# Patient Record
Sex: MALE | Race: WHITE | NOT HISPANIC OR LATINO | Employment: OTHER | URBAN - METROPOLITAN AREA
[De-identification: names, ages, dates, MRNs, and addresses within clinical notes are randomized per-mention and may not be internally consistent; named-entity substitution may affect disease eponyms.]

---

## 2017-01-02 ENCOUNTER — HOSPITAL ENCOUNTER (OUTPATIENT)
Dept: PHYSICAL THERAPY | Facility: CLINIC | Age: 52
Setting detail: THERAPIES SERIES
End: 2017-01-02
Attending: NEUROLOGICAL SURGERY
Payer: COMMERCIAL

## 2017-01-02 DIAGNOSIS — M47.812 OSTEOARTHRITIS OF CERVICAL SPINE, UNSPECIFIED SPINAL OSTEOARTHRITIS COMPLICATION STATUS: Primary | ICD-10-CM

## 2017-01-02 DIAGNOSIS — M54.2 CERVICALGIA: Primary | ICD-10-CM

## 2017-01-02 PROCEDURE — 40000718 ZZHC STATISTIC PT DEPARTMENT ORTHO VISIT: Performed by: PHYSICAL THERAPIST

## 2017-01-02 PROCEDURE — 97162 PT EVAL MOD COMPLEX 30 MIN: CPT | Mod: GP | Performed by: PHYSICAL THERAPIST

## 2017-01-02 PROCEDURE — 97140 MANUAL THERAPY 1/> REGIONS: CPT | Mod: GP | Performed by: PHYSICAL THERAPIST

## 2017-01-02 NOTE — PROGRESS NOTES
01/02/17 1456   General Information   Type of Visit Initial OP Ortho PT Evaluation   Start of Care Date 01/02/17   Referring Physician Dr. Tran   Patient/Family Goals Statement Get rid of pain.   Orders Evaluate and Treat   Date of Order 12/22/17   Insurance Type Other   Insurance Comments/Visits Authorized Preferred One   Medical Diagnosis Osteoarthritis of cervical spine, unsepcified spinal osteoarthritis complication satus.     Surgical/Medical history reviewed Yes   Weight-Bearing Status - LUE full weight-bearing   Weight-Bearing Status - RUE full weight-bearing       Present No   Body Part(s)   Body Part(s) Cervical Spine   Presentation and Etiology   Pertinent history of current problem (include personal factors and/or comorbidities that impact the POC) PMH:  Low back pain and bone on bone, 3 bulged disc in cervical spine.  In the summer time, he doesn't have as much pain in the neck.  Pt has been consistently having to take 2 pills but only lasting 5 hours later.  Pt also has the feet pain and having challenges standing for long periods of time.  Pt wants to focus on the cervical spine.  Only gets 5 hours of sleep at a time and feels like he is having troubles with pain when the neck feels swollen.  Pt states that he gets a tightness and swelling feeling that he gets lightheaded and dizzy.  Pt will also experience headaches on a regular basis.   Impairments A. Pain;C. Swelling;D. Decreased ROM   Functional Limitations perform activities of daily living   Symptom Location From C2-7   How/Where did it occur From Degenerative Joint Disease   Onset date of current episode/exacerbation 12/01/16   Chronicity Recurrent   Pain rating (0-10 point scale) Best (/10);Worst (/10)   Best (/10) 3/10   Worst (/10) 10/10   Pain quality C. Aching;B. Dull   Frequency of pain/symptoms A. Constant   Pain/symptoms are: Worse during the night   Pain/symptoms exacerbated by (Laying down)   Pain/symptoms eased  by (Codeine meds at night)   Progression of symptoms since onset: Worsened   Prior Level of Function   Prior Level of Function-Mobility Independent   Prior Level of Function-ADLs Independent   Current Level of Function   Current Community Support Family/friend caregiver   Patient role/employment history A. Employed   Employment Comments [employer]   [facility]   Home/community accessibility No concerns   Current equipment-Gait/Locomotion None   Current equipment-ADL None   Fall Risk Screen   Fall screen completed by PT   Per patient - Fall 2 or more times in past year? No   Per patient - Fall with injury in past year? No   Is patient a fall risk? No   Functional Scales   Functional Scales Other   Other Scales  NDI   Cervical Spine   Integumentary  No swelling.   Posture Forward head, rounded shoulders   Cervical Flexion ROM WNL   Cervical Extension ROM WNL   Cervical Right Side Bending ROM WNL   Cervical Left Side Bending ROM WNL   Cervical Right Rotation ROM WNL   Cervical Left Rotation ROM WNL   Shoulder AROM Screen WNL for all motions   Shoulder Shrug (C2-C4) Strength 5/5   Shoulder Abd (C5) Strength 5/5   Shoulder Add (C7) Strength 5/5   Shoulder ER (C5, C6) Strength 4+/5   Shoulder IR (C5, C6) Strength 5/5   Elbow Flexion (C5, C6) Strength 5/5   Elbow Extension (C7) Strength 5/5   Upper Trapezius Flexibility Increased tone and tightness   Levator Scapula Flexibility Increased tone and tightness   Scalene Flexibility Increased tone and tightness   Vertebral Artery Test Negative   Alar Ligament Test Negative   Transverse Ligament Test Negative   Spurling Test Negative   Cervical Distraction Test Negative   Cervical Rotation/Lateral Flexion Test Positive   Neer Impingement Test Negative   Cheng Impingement Test Negative   Segmental Mobility-Cervical Decreased PA mobility of C3-7.   Segmental Mobility-Thoracic Decreased PA mobility of T1-4.   Palpation Increased tone and tightness to  suboccipital region resulting in lightheadedness when palpating.  1st rib restrictions R > L.   Planned Therapy Interventions   Planned Therapy Interventions joint mobilization;manual therapy;neuromuscular re-education;ROM;strengthening;stretching   Planned Therapy Interventions Comment Skilled services to improve impairments and return pt to more functional mobility.   Planned Modality Interventions   Planned Modality Interventions Comments As needed for symptom relief.   Clinical Impression   Criteria for Skilled Therapeutic Interventions Met yes, treatment indicated   PT Diagnosis Cervical pain and postural weakness secondary to chronic issues of disc and nerve irritation.   Influenced by the following impairments Pain   Functional limitations due to impairments Sleeping   Clinical Presentation Evolving/Changing   Clinical Presentation Rationale Pt is a 51 year old male with complaints of neck pain especially at night.  Pt suffers from headaches, lightheadedness, and sleep disruption.  He has a history of low back and neck issues that reoccur.  When the neck flares up the back flares up and vice versa.  Pt will benefit from skilled PT services to address impairments of tissue and joint mobility to allow for improved posture and ROM.   Clinical Decision Making (Complexity) Moderate complexity   Therapy Frequency 2 times/Week   Predicted Duration of Therapy Intervention (days/wks) 8 weeks   Risk & Benefits of therapy have been explained Yes   Patient, Family & other staff in agreement with plan of care Yes   Education Assessment   Preferred Learning Style Listening;Reading;Demonstration;Pictures/video   Barriers to Learning No barriers   ORTHO GOALS   PT Ortho Eval Goals 1;2;3   Ortho Goal 1   Goal Identifier #1   Goal Description Pt will demonstrate ability to sleep for >5 hours for 3 nights a week demonstrating ability to get enough rest to be more functional throughout the day.   Target Date 03/03/17   Ortho  Goal 2   Goal Identifier #2   Goal Description Pt will report less than 1 headache a week with <2/10 pain allowing pt to stay focused on work and home duties.   Target Date 03/03/17   Ortho Goal 3   Goal Identifier #3   Goal Description Pt will report <20% on the NDI demonstrating improved functional mobility with less impairments.   Target Date 03/03/17   Total Evaluation Time   Total Evaluation Time 20     Thank you for your referral.    Kiera Morrison, PT, DPT  [facility]  [phone]

## 2017-01-02 NOTE — TELEPHONE ENCOUNTER
Tylenol #3      Last Written Prescription Date: 12/06/2016  Last Fill Quantity: 60,  # refills: 0   [place] [facility] [place] [facility] prescribing provider: 07/02/2015    Aura Erazo, Pharmacy Technician  [facility]  [phone]

## 2017-01-09 ENCOUNTER — HOSPITAL ENCOUNTER (OUTPATIENT)
Dept: PHYSICAL THERAPY | Facility: CLINIC | Age: 52
Setting detail: THERAPIES SERIES
End: 2017-01-09
Attending: NEUROLOGICAL SURGERY
Payer: COMMERCIAL

## 2017-01-09 PROCEDURE — 40000718 ZZHC STATISTIC PT DEPARTMENT ORTHO VISIT: Performed by: PHYSICAL THERAPIST

## 2017-01-09 PROCEDURE — 97140 MANUAL THERAPY 1/> REGIONS: CPT | Mod: GP | Performed by: PHYSICAL THERAPIST

## 2017-01-09 PROCEDURE — 97035 APP MDLTY 1+ULTRASOUND EA 15: CPT | Mod: GP | Performed by: PHYSICAL THERAPIST

## 2017-01-10 ENCOUNTER — TRANSFERRED RECORDS (OUTPATIENT)
Dept: HEALTH INFORMATION MANAGEMENT | Facility: CLINIC | Age: 52
End: 2017-01-10

## 2017-01-11 ENCOUNTER — HOSPITAL ENCOUNTER (OUTPATIENT)
Dept: PHYSICAL THERAPY | Facility: CLINIC | Age: 52
Setting detail: THERAPIES SERIES
End: 2017-01-11
Attending: NEUROLOGICAL SURGERY
Payer: COMMERCIAL

## 2017-01-11 PROCEDURE — 97140 MANUAL THERAPY 1/> REGIONS: CPT | Mod: GP | Performed by: PHYSICAL THERAPIST

## 2017-01-11 PROCEDURE — 40000718 ZZHC STATISTIC PT DEPARTMENT ORTHO VISIT: Performed by: PHYSICAL THERAPIST

## 2017-01-11 PROCEDURE — 97035 APP MDLTY 1+ULTRASOUND EA 15: CPT | Mod: GP | Performed by: PHYSICAL THERAPIST

## 2017-01-17 ENCOUNTER — HOSPITAL ENCOUNTER (OUTPATIENT)
Dept: PHYSICAL THERAPY | Facility: CLINIC | Age: 52
Setting detail: THERAPIES SERIES
End: 2017-01-17
Attending: NEUROLOGICAL SURGERY
Payer: COMMERCIAL

## 2017-01-17 PROCEDURE — 97140 MANUAL THERAPY 1/> REGIONS: CPT | Mod: GP

## 2017-01-17 PROCEDURE — 97035 APP MDLTY 1+ULTRASOUND EA 15: CPT | Mod: GP

## 2017-01-17 PROCEDURE — 97110 THERAPEUTIC EXERCISES: CPT | Mod: GP

## 2017-01-17 PROCEDURE — 40000718 ZZHC STATISTIC PT DEPARTMENT ORTHO VISIT

## 2017-01-19 ENCOUNTER — HOSPITAL ENCOUNTER (OUTPATIENT)
Dept: PHYSICAL THERAPY | Facility: CLINIC | Age: 52
Setting detail: THERAPIES SERIES
End: 2017-01-19
Attending: NEUROLOGICAL SURGERY
Payer: COMMERCIAL

## 2017-01-19 PROCEDURE — 40000718 ZZHC STATISTIC PT DEPARTMENT ORTHO VISIT

## 2017-01-19 PROCEDURE — 97140 MANUAL THERAPY 1/> REGIONS: CPT | Mod: GP

## 2017-01-19 PROCEDURE — 97110 THERAPEUTIC EXERCISES: CPT | Mod: GP

## 2017-01-19 PROCEDURE — 97035 APP MDLTY 1+ULTRASOUND EA 15: CPT | Mod: GP

## 2017-01-20 NOTE — ADDENDUM NOTE
Encounter addended by: Moon Jacinto, PT on: 1/20/2017  1:32 PM<BR>     Documentation filed: Inpatient Document Flowsheet

## 2017-01-24 NOTE — ADDENDUM NOTE
Encounter addended by: Moon Jacinto, PT on: 1/23/2017  9:01 PM<BR>     Documentation filed: Inpatient Document Flowsheet

## 2017-01-24 NOTE — ADDENDUM NOTE
Encounter addended by: Moon Jacinto, PT on: 1/23/2017  9:02 PM<BR>     Documentation filed: Episodes

## 2017-01-27 ENCOUNTER — TELEPHONE (OUTPATIENT)
Dept: FAMILY MEDICINE | Facility: CLINIC | Age: 52
End: 2017-01-27

## 2017-01-27 DIAGNOSIS — F41.9 ANXIETY: Primary | ICD-10-CM

## 2017-01-27 RX ORDER — DIAZEPAM 10 MG
10 TABLET ORAL EVERY 6 HOURS PRN
Qty: 2 TABLET | Refills: 0 | Status: SHIPPED | OUTPATIENT
Start: 2017-01-27 | End: 2018-11-13

## 2017-01-27 NOTE — TELEPHONE ENCOUNTER
Dr Robbins, TAMMYS had to renew his DESMOND number and has not received his new DESMOND number.  Bulmaro needs 1 Diazepam 10mg taken 1 hour prior to appointment for Monday morning appointment.  If you have any questions, Dr Robbins can be reached at [phone].  Will you approve this medication and send to [facility] pharmacy.  Bulmaro takes this with every dental appointment, so it is a refill.    Thank you,  Tara Seals [facility]  [phone]

## 2017-01-27 NOTE — TELEPHONE ENCOUNTER
Prescription signed and brought to the [facility].   Sole Owusu, FER

## 2017-01-31 ENCOUNTER — OFFICE VISIT (OUTPATIENT)
Dept: URGENT CARE | Facility: RETAIL CLINIC | Age: 52
End: 2017-01-31
Payer: COMMERCIAL

## 2017-01-31 ENCOUNTER — HOSPITAL ENCOUNTER (OUTPATIENT)
Dept: PHYSICAL THERAPY | Facility: CLINIC | Age: 52
Setting detail: THERAPIES SERIES
End: 2017-01-31
Attending: NEUROLOGICAL SURGERY
Payer: COMMERCIAL

## 2017-01-31 VITALS
DIASTOLIC BLOOD PRESSURE: 86 MMHG | OXYGEN SATURATION: 95 % | TEMPERATURE: 97.3 F | HEART RATE: 71 BPM | SYSTOLIC BLOOD PRESSURE: 120 MMHG

## 2017-01-31 DIAGNOSIS — J02.9 ACUTE PHARYNGITIS, UNSPECIFIED ETIOLOGY: Primary | ICD-10-CM

## 2017-01-31 DIAGNOSIS — J00 COMMON COLD: ICD-10-CM

## 2017-01-31 LAB — S PYO AG THROAT QL IA.RAPID: NORMAL

## 2017-01-31 PROCEDURE — 97140 MANUAL THERAPY 1/> REGIONS: CPT | Mod: GP | Performed by: PHYSICAL THERAPIST

## 2017-01-31 PROCEDURE — 40000718 ZZHC STATISTIC PT DEPARTMENT ORTHO VISIT: Performed by: PHYSICAL THERAPIST

## 2017-01-31 PROCEDURE — 99213 OFFICE O/P EST LOW 20 MIN: CPT | Performed by: NURSE PRACTITIONER

## 2017-01-31 PROCEDURE — 97035 APP MDLTY 1+ULTRASOUND EA 15: CPT | Mod: GP | Performed by: PHYSICAL THERAPIST

## 2017-01-31 PROCEDURE — 87880 STREP A ASSAY W/OPTIC: CPT | Mod: QW | Performed by: NURSE PRACTITIONER

## 2017-01-31 PROCEDURE — 87081 CULTURE SCREEN ONLY: CPT | Performed by: NURSE PRACTITIONER

## 2017-01-31 NOTE — PROGRESS NOTES
[facility] clinic note    SUBJECTIVE:  Bulmaro Herrera is a 51 year old male who presents to [facility] with chief complaint of sore throat.    Onset of symptoms was 5 day(s) ago.    Course of illness: sudden onset and some up & down.    Severity moderate  Course of illness:  Current and Associated symptoms: fever, sore throat and stomach ache  Treatment measures tried at home include OTC meds and Rest.  Predisposing factors include strep exposure.    Current Outpatient Prescriptions   Medication     acetaminophen-codeine (TYLENOL #3) 300-30 MG per tablet     diazepam (VALIUM) 10 MG tablet     albuterol (PROAIR HFA, PROVENTIL HFA, VENTOLIN HFA) 108 (90 BASE) MCG/ACT inhaler     omeprazole (PRILOSEC) 20 MG capsule     No current facility-administered medications for this visit.     PAST MEDICAL HISTORY:   Past Medical History   Diagnosis Date     Tobacco use disorder      Other and unspecified hyperlipidemia 1/2007     Depressive disorder, not elsewhere classified 1/28/2007     declines Rx     Back pains      Disc degeneration 12/2008     see MRI -throaco-lumbar mild discogenic degenerative changes     Sleep disorder 9/09     CPAP     Elevated LFT's 11/09     alt and ast       PAST SURGICAL HISTORY:   Past Surgical History   Procedure Laterality Date     C nonspecific procedure       rt hand surgery - laceration/glass/tendons     Colonoscopy  07/07/08     adenomatous polyp repeat 5 years     Hc echo heart xthoracic, stress/rest  6/2009     neg     Orthopedic surgery       Right hand     Tonsillectomy       Ent surgery       for deviated septum     Inject epidural cervical N/A 5/14/2015     Procedure: INJECT EPIDURAL CERVICAL;  Surgeon: Christian Chaudhry MD;  Location:  OR       FAMILY HISTORY:   Family History   Problem Relation Age of Onset     Breast Cancer Mother      Arthritis Mother      joint ?     Depression Father      CANCER Maternal Grandmother      CAMADO  Maternal Grandfather      Cardiovascular Maternal Grandfather      Gynecology Paternal Grandmother       giving birth     Prostate Cancer Paternal Grandfather      Genetic Disorder Brother      joint pain?     Depression Brother      Cardiovascular Son      congenital heart defect -       Prostate Cancer Other       age 70's     DIABETES Other        SOCIAL HISTORY:   Social History   Substance Use Topics     Smoking status: Former Smoker -- 1.50 packs/day     Quit date: 2007     Smokeless tobacco: Never Used     Alcohol Use: Yes      Comment: 3-4 times/year     ROS:  Review of systems negative except as stated above.    OBJECTIVE:   Filed Vitals:    17 0908   BP: 120/86   Pulse: 71   Temp: 97.3  F (36.3  C)   TempSrc: Oral   SpO2: 95%     GENERAL APPEARANCE: alert, active, mild distress and cooperative  EYES: EOMI,  PERRL, conjunctiva clear  HENT: ear canals and TM's normal.  Nose congested.  oral mucous membranes moist, no erythema noted  NECK: supple, non-tender to palpation, no adenopathy noted  RESP: lungs clear to auscultation - no rales, rhonchi or wheezes  CV: regular rates and rhythm, normal S1 S2, no murmur noted  ABDOMEN:  soft, nontender, no HSM or masses and bowel sounds normal  SKIN: no suspicious lesions or rashes    Rapid Strep test is negative; await throat culture results.    ASSESSMENT:  Sore Throat J02.9/Acute pharyngitis, unspecified etiology [J02.9]  Common cold [J00]    PLAN:   [facility] not [facility] [phone]  Encourage good hydration (mainly water), may drink tea /c honey, warm chicken broth to sooth throat.  Soft foods may be preferred for several days.  Symptomatic treatment with warm Na+ H2O gargles, OTC analgesic, etc. discussed.   Strep culture pending.   Bulmaro Herrera told positive cultures called only.  Rest as needed.  Follow-up with primary care provider if not improving.    If difficulty breathing or  swallowing be seen immediately in the ED.    Jeramy CHANEY, MSN, Family NP-C  [facility]

## 2017-01-31 NOTE — MR AVS SNAPSHOT
After Visit Summary   1/31/2017    Bulmaro Herrera    MRN: [id number]           Patient Information     Date Of Birth          1965        Visit Information        Provider Department      1/31/2017 9:00 AM Jreamy Grissom APRN [facility]        Today's Diagnoses     Acute pharyngitis, unspecified etiology    -  1     Common cold            Follow-ups after your visit        Your next 10 appointments already scheduled     Jan 31, 2017  4:45 PM   Treatment 45 with Kiera Morrison, PT   [facility] ([facility])    [address] [place] [id number] [phone]            Feb 03, 2017  4:15 PM   Treatment 45 with Kiera Morrison, PT   [facility] ([facility])    [address] [place] [id number] [phone]              Who to contact     You can reach your care team any time of the day by calling [phone].  Notification of test results:  If you have an abnormal lab result, we will notify you by phone as soon as possible.         Additional Information About Your Visit        MyChart Information     [employer]Spring gives you secure access to your electronic health record. If you see a primary care provider, you can also send messages to your care team and make appointments. If you have questions, please call your primary care clinic.  If you do not have a primary care provider, please call [phone] and they will assist you.        Care EveryWhere ID     This is your Care EveryWhere ID. This could be used by other organizations to access your [place] medical records  [id number]        Your Vitals Were     Pulse Temperature Pulse Oximetry             71 97.3  F (36.3  C) (Oral) 95%          Blood Pressure from Last 3 Encounters:   01/31/17 120/86   09/25/16 127/94   06/19/16 132/88    Weight from Last 3 Encounters:   12/22/16 237 lb (107.502 kg)   12/22/16 237 lb (107.502 kg)    09/25/16 230 lb (104.327 kg)              We Performed the Following     BETA STREP GROUP A R/O CULTURE     RAPID STREP SCREEN        Primary Care Provider Office Phone # Fax #    Roger Rosado [phone]       [address] [place] [address]        Thank you!     Thank you for choosing [facility]  for your care. Our goal is always to provide you with excellent care. Hearing back from our patients is one way we can continue to improve our services. Please take a few minutes to complete the written survey that you may receive in the mail after your visit with us. Thank you!             Your Updated Medication List - Protect others around you: Learn how to safely use, store and throw away your medicines at www.disposemymeds.org.          This list is accurate as of: 1/31/17  9:38 AM.  Always use your most recent med list.                   Brand Name Dispense Instructions for use    acetaminophen-codeine 300-30 MG per tablet    TYLENOL #3    60 tablet    Take 1 tablet by mouth every 4 hours as needed for moderate pain       albuterol 108 (90 BASE) MCG/ACT Inhaler    PROAIR HFA/PROVENTIL HFA/VENTOLIN HFA    1 Inhaler    Inhale 2 puffs into the lungs every 6 hours as needed for shortness of breath / dyspnea or wheezing       diazepam 10 MG tablet    VALIUM    2 tablet    Take 1 tablet (10 mg) by mouth every 6 hours as needed for anxiety or sleep (Before appointment) Take 30-60 minutes before procedure.  Do not operate a vehicle after taking this medication.       omeprazole 20 MG CR capsule    priLOSEC    180 capsule    Take 2 capsules (40 mg) by mouth daily

## 2017-02-02 LAB — BETA STREP CONFIRM: NORMAL

## 2017-02-03 ENCOUNTER — HOSPITAL ENCOUNTER (OUTPATIENT)
Dept: PHYSICAL THERAPY | Facility: CLINIC | Age: 52
Setting detail: THERAPIES SERIES
End: 2017-02-03
Attending: NEUROLOGICAL SURGERY
Payer: COMMERCIAL

## 2017-02-03 PROCEDURE — 97035 APP MDLTY 1+ULTRASOUND EA 15: CPT | Mod: GP | Performed by: PHYSICAL THERAPIST

## 2017-02-03 PROCEDURE — 40000718 ZZHC STATISTIC PT DEPARTMENT ORTHO VISIT: Performed by: PHYSICAL THERAPIST

## 2017-02-03 PROCEDURE — 97140 MANUAL THERAPY 1/> REGIONS: CPT | Mod: GP | Performed by: PHYSICAL THERAPIST

## 2017-02-06 DIAGNOSIS — M54.2 CERVICALGIA: Primary | ICD-10-CM

## 2017-02-07 NOTE — TELEPHONE ENCOUNTER
Tylenol #3      Last Written Prescription Date: 01/02/2017  Last Fill Quantity: 60,  # refills: 0   Last Office Visit with G, P or [facility] prescribing provider: 02/18/2016    Aura Erazo, Pharmacy Technician  [facility]  [phone]

## 2017-02-14 ENCOUNTER — HOSPITAL ENCOUNTER (OUTPATIENT)
Dept: PHYSICAL THERAPY | Facility: CLINIC | Age: 52
Setting detail: THERAPIES SERIES
End: 2017-02-14
Attending: NEUROLOGICAL SURGERY
Payer: COMMERCIAL

## 2017-02-14 PROCEDURE — 97035 APP MDLTY 1+ULTRASOUND EA 15: CPT | Mod: GP | Performed by: PHYSICAL THERAPIST

## 2017-02-14 PROCEDURE — 40000718 ZZHC STATISTIC PT DEPARTMENT ORTHO VISIT: Performed by: PHYSICAL THERAPIST

## 2017-02-14 PROCEDURE — 97140 MANUAL THERAPY 1/> REGIONS: CPT | Mod: GP | Performed by: PHYSICAL THERAPIST

## 2017-03-10 ENCOUNTER — MYC REFILL (OUTPATIENT)
Dept: FAMILY MEDICINE | Facility: CLINIC | Age: 52
End: 2017-03-10

## 2017-03-10 DIAGNOSIS — M54.2 CERVICALGIA: ICD-10-CM

## 2017-03-10 NOTE — TELEPHONE ENCOUNTER
Message from [employer]:  Original authorizing provider: MD Bulmaro Patrick would like a refill of the following medications:  acetaminophen-codeine (TYLENOL #3) 300-30 MG per tablet [Roger Rosado MD]    Preferred pharmacy: [address]     Comment:  refill #[id number] thank you

## 2017-03-10 NOTE — TELEPHONE ENCOUNTER
TYLENOL #3      Last Written Prescription Date:  2/7/17  Last Fill Quantity: 60,   # refills: 0  [place] [facility] [facility] or [facility] prescribing provider: 12/22/16  Future Office visit:       Routing refill request to provider for review/approval because:  Drug not on the [facility] [facility] or [facility] refill protocol or controlled substance

## 2017-03-21 ENCOUNTER — THERAPY VISIT (OUTPATIENT)
Dept: CHIROPRACTIC MEDICINE | Facility: CLINIC | Age: 52
End: 2017-03-21
Payer: COMMERCIAL

## 2017-03-21 DIAGNOSIS — M51.369 DDD (DEGENERATIVE DISC DISEASE), LUMBAR: Primary | ICD-10-CM

## 2017-03-21 DIAGNOSIS — M99.01 SEGMENTAL DYSFUNCTION OF CERVICAL REGION: ICD-10-CM

## 2017-03-21 DIAGNOSIS — M99.02 SEGMENTAL DYSFUNCTION OF THORACIC REGION: ICD-10-CM

## 2017-03-21 DIAGNOSIS — M99.03 SEGMENTAL DYSFUNCTION OF LUMBAR REGION: ICD-10-CM

## 2017-03-21 DIAGNOSIS — M54.2 CERVICALGIA: ICD-10-CM

## 2017-03-21 DIAGNOSIS — M50.20 HERNIATION OF INTERVERTEBRAL DISC OF CERVICAL SPINE WITHOUT RADICULOPATHY: ICD-10-CM

## 2017-03-21 PROCEDURE — 97035 APP MDLTY 1+ULTRASOUND EA 15: CPT | Performed by: CHIROPRACTOR

## 2017-03-21 PROCEDURE — 98941 CHIROPRACT MANJ 3-4 REGIONS: CPT | Mod: AT | Performed by: CHIROPRACTOR

## 2017-03-21 PROCEDURE — 99203 OFFICE O/P NEW LOW 30 MIN: CPT | Mod: 25 | Performed by: CHIROPRACTOR

## 2017-03-21 NOTE — MR AVS SNAPSHOT
After Visit Summary   3/21/2017    Bulmaro Herrera    MRN: [id number]           Patient Information     Date Of Birth          1965        Visit Information        Provider Department      3/21/2017 7:15 AM Verito Candelario DC [facility]        Today's Diagnoses     DDD (degenerative disc disease), lumbar    -  1    Herniation of intervertebral disc of cervical spine without radiculopathy        Segmental dysfunction of cervical region        Cervicalgia        Segmental dysfunction of thoracic region        Segmental dysfunction of lumbar region           Follow-ups after your visit        Your next 10 appointments already scheduled     Mar 24, 2017  7:15 AM CDT   [facility] Chiropractor with Verito Candelario DC   [facility] ([facility])    [address] [id number] [phone]              Who to contact     If you have questions or need follow up information about today's clinic visit or your schedule please contact [facility] directly at [phone].  Normal or non-critical lab and imaging results will be communicated to you by T[employer], letter or phone within 4 business days after the clinic has received the results. If you do not hear from us within 7 days, please contact the clinic through FameBit or phone. If you have a critical or abnormal lab result, we will notify you by phone as soon as possible.  Submit refill requests through FameBit or call your pharmacy and they will forward the refill request to us. Please allow 3 business days for your refill to be completed.          Additional Information About Your Visit        T[employer] Information     [employer] gives you secure access to your electronic health record. If you see a primary care provider, you can also send messages to your care team and make appointments. If you have questions, please call your primary care clinic.  If you do not have  a primary care provider, please call [phone] and they will assist you.        Care EveryWhere ID     This is your Care EveryWhere ID. This could be used by other organizations to access your [place] medical records  [id number]         Blood Pressure from Last 3 Encounters:   01/31/17 120/86   09/25/16 (!) 127/94   06/19/16 132/88    Weight from Last 3 Encounters:   12/22/16 107.5 kg (237 lb)   12/22/16 107.5 kg (237 lb)   09/25/16 104.3 kg (230 lb)              We Performed the Following     CHIROPRAC MANIP,SPINAL,3-4 REGIONS     OFFICE/OUTPT VISIT,NEW,LEVL III     ULTRASOUND THERAPY        Primary Care Provider Office Phone # Fax #    Roger Rosado [phone]       [address] [place] [address]        Thank you!     Thank you for choosing [facility]  for your care. Our goal is always to provide you with excellent care. Hearing back from our patients is one way we can continue to improve our services. Please take a few minutes to complete the written survey that you may receive in the mail after your visit with us. Thank you!             Your Updated Medication List - Protect others around you: Learn how to safely use, store and throw away your medicines at www.disposemymeds.org.          This list is accurate as of: 3/21/17  9:10 AM.  Always use your most recent med list.                   Brand Name Dispense Instructions for use    acetaminophen-codeine 300-30 MG per tablet    TYLENOL #3    60 tablet    Take 1 tablet by mouth every 4 hours as needed for moderate pain       albuterol 108 (90 BASE) MCG/ACT Inhaler    PROAIR HFA/PROVENTIL HFA/VENTOLIN HFA    1 Inhaler    Inhale 2 puffs into the lungs every 6 hours as needed for shortness of breath / dyspnea or wheezing       diazepam 10 MG tablet    VALIUM    2 tablet    Take 1 tablet (10 mg) by mouth every 6 hours as needed for anxiety or sleep (Before appointment) Take  30-60 minutes before procedure.  Do not operate a vehicle after taking this medication.       omeprazole 20 MG CR capsule    priLOSEC    180 capsule    Take 2 capsules (40 mg) by mouth daily

## 2017-03-21 NOTE — PROGRESS NOTES
"Chiropractic Clinic Visit    PCP: Roger Rosado Sharon is a 51 year old male who is seen as a self referral presenting with neck pain that began a \"long time ago.\" He also has lower back pain that is what started it all. He takes pain killers every night to sleep. He describes numbness in the bottom of bottom feet, all the time. He is in so much pain in his feet that he can hardly stand on his feet anymore to work. After he got inserts for his shoes, his LBP has improved. His neck constantly hurts, it feels like a toothache. The pain is right at the CT junction, and is rated 6/10. He has been to a chiropractor in the past, and the Ultrasound immediately \"fixes the pain.\" He denies radiation into his arms and legs. He does note \"little muscle quivers all the time.\" He states that when he is sitting he really notices the pain and \"is a big baby,\" but when he is on his feet he just plows thru. He notes that his pain doesn't bother him when he is driving. He notices his pain more when he is standing, and his neck when he is relaxing in bed. He notes that he has tried every pillow possible, and he changes them each night. He has seen [facility] and Dr. Tran for both of his pain, he started PT and that has helped some for 2-3 days of relief. He denies headaches. He does get lightheaded from time to time, mostly in the winter. He notes that in the summer time, his pain is more minimal but in the winter, it is much worse. He takes a Benadryl, ibuprofen, and Tylenol 3 every night to sleep. Patient notes that he was last adjusted last Saturday but states he never has time to get adjusted. Patient notes he has noticed some changes in his left eye, and has balance issues from time to time. His muscle twitches happen in his arms about once per day. Patient does not prefer P to A adjustment in lumbar region.    Injury: 1988: slipped on belly dump, hit left shoulder, heard crack, neck has never been " same    Location of Pain: central CT junction, but more painful on the left side  Duration of Pain: always had neck and back pain, gotten worse in the last 6 years  Rating of Pain at worst: 10/10 at night  Rating of Pain Currently: 6/10 on average  Symptoms are better with: US, heating pad  Symptoms are worse with: laying down  Additional Features: numbness in bilateral feet       Health History  as reported by the patient:    How does the patient rate their own health:   Fair    Current or past medical history:   Dizziness/Concussions, Numbness/tingling, Pain at night/rest and Weakness    Medical allergies:  Percocet  Wasps    Past Traumas/Surgeries:  1988: fall    Family History:  Father, grandfather - all have bad backs  Mother - breast cancer  Grandfather - prostate cancer    Medications:  Pain     Occupation:      Primary job tasks:   Driving, Prolonged sitting, Prolonged standing and Pushing/pulling    Barriers as home/work:   Patient notes that when he is working on his feet, he oftentimes has to get a 6-pack of beer to manage it.           Review of Systems  Musculoskeletal: as above  Remainder of review of systems is negative including constitutional, CV, pulmonary, GI, Skin and Neurologic except as noted in HPI or medical history.    Past Medical History   Diagnosis Date     Back pains      Depressive disorder, not elsewhere classified 1/28/2007     declines Rx     Disc degeneration 12/2008     see MRI -throaco-lumbar mild discogenic degenerative changes     Elevated LFT's 11/09     alt and ast     Other and unspecified hyperlipidemia 1/2007     Sleep disorder 9/09     CPAP     Tobacco use disorder      Past Surgical History   Procedure Laterality Date     C nonspecific procedure       rt hand surgery - laceration/glass/tendons     Colonoscopy  07/07/08     adenomatous polyp repeat 5 years     Hc echo heart xthoracic, stress/rest  6/2009     neg     Orthopedic surgery       Right hand      Tonsillectomy       Ent surgery       for deviated septum     Inject epidural cervical N/A 5/14/2015     Procedure: INJECT EPIDURAL CERVICAL;  Surgeon: Christian Chaudhry MD;  Location: [facility]       Objective  There were no vitals taken for this visit.    GENERAL APPEARANCE: healthy, alert and no distress   GAIT: NORMAL  SKIN: no suspicious lesions or rashes  NEURO: Normal strength and tone, mentation intact and speech normal  PSYCH:  mentation appears normal and affect normal/bright    Bulmaro was asked to complete the Neck Disability Index, today in the office. NDI Disability score: 38%; pain severity scale: 6/10. Oswestry: 30%. Ivan: 8;5.      Cervical Spine Exam    Range of Motion:         Flexion, RR mildly restricted, Extension, RLF, LLF and LR moderately reduced    Inspection:         No visible deformity        normal lordotic curvature maintained    Tender:        upper border of trapezius      Muscle strength:       C5 (shoulder abduction) symmetric 5/5       C6 (elbow flexion) symmetric 5/5       C7 (elbow extension) symmetric 5/5       C8 (finger abduction, thumb flexion) symmetric 5/5    Reflexes:        C5 (biceps) symmetric normal       C6 (supinator) symmetric normal       C7 (triceps) symmetric normal    Sensation:       grossly intact througout bilateral upper extremities    Special Tests:       neg (-) Spurling  Sb's- positive, Distraction - positive and Shoulder depression - Right negative and Left negative    Lymphatics:        Skin turns hyperemic very quickly       Segmental spinal dysfunction/restrictions found at:  C2 RR, LRR  C5 LR, RRR  T1 RR, LRR  T3 E, FR  T7 E, FR  L1 Extension restriction and Flexion restriction  L2 Extension restriction and Flexion restriction  L3 Extension restriction and Flexion restriction  L4 Extension restriction and Flexion restriction  L5 Extension restriction and Flexion restriction.        Muscle spasm found in:Traps      Radiology:  MRIs date 1/10/2017 from  [facility] show severe disk degeneration at L5-S1 and C5-R with mild central canal stenosis.    Assessment:    No diagnosis found.    RX ordered/plan of care: Degenerative changes in cervical and lumbar spine with associated myospasm and intersegmental dysfunction.  Anticipated outcomes: Patient is expected to receive benefit with care.  Possible risks and side effects: Minimal soreness expected post-adjustment.    After discussing the risk and benefits of care, patient consented to treatment.    Patient's condition:  Patient had restrictions pre-manipulation and Patient had decreased motion prior to manipulation    Treatment effectiveness:  Post manipulation there is better intersegmental movement and Patient claims to feel looser post manipulation    Plan:    Procedures:    Evaluation and Management:  61446 Moderate level exam 30 min    CMT:  21883 Chiropractic manipulative treatment 3-4 regions performed   Cervical: Diversified, C2, C5 , Supine  Thoracic: Diversified, T1, T3, T7, Prone  Lumbar: Flexion/Distraction, L1, L2, L3, L4, L5, Prone    Modalities:  27335: US:  1.0 Lane/cm squared for 8 minutes at 1.0mhz  Continuous  pulsed, Location: bilateral upper traps    Therapeutic procedures:  None      Prognosis: Good      Treatment plan and goals:  Goals:  Reduce numbness and tingling into bilateral feet.  Decrease pain in neck and shoulders from 6-10/10 to 4/10 in 4 visits.  Decrease Neck Disability from 38% to 20% in 4 visits.    Frequency of care  Duration of care is estimated to be 4 weeks, from the initial treatment.  It is estimated that the patient will need a total of 8 visits to resolve this episode.  For the initial therapeutic trial of care, the frequency is recommended at 1-2 times per week.  A reevaluation would be clinically appropriate in 8 visits, to determine progress and further course of care.    In-Office Treatment  Spinal Chiropractic Manipulative Therapy:    Trial of care - re-evaluate after 8  visits.      Recommendations:    Instructions:ice 20 minutes every other hour as needed and stretch as instructed at visit    Follow-up:  Return to care Friday. Patient stated when he left that he had no numbness in his feet, and his neck pain had improved.     Disclaimer: This note consists of symbols derived from keyboarding, dictation and/or voice recognition software. As a result, there may be errors in the script that have gone undetected. Please consider this when interpreting information found in this chart.

## 2017-03-24 ENCOUNTER — THERAPY VISIT (OUTPATIENT)
Dept: CHIROPRACTIC MEDICINE | Facility: CLINIC | Age: 52
End: 2017-03-24
Payer: COMMERCIAL

## 2017-03-24 DIAGNOSIS — M99.03 SEGMENTAL DYSFUNCTION OF LUMBAR REGION: ICD-10-CM

## 2017-03-24 DIAGNOSIS — M99.01 SEGMENTAL DYSFUNCTION OF CERVICAL REGION: ICD-10-CM

## 2017-03-24 DIAGNOSIS — M99.02 SEGMENTAL DYSFUNCTION OF THORACIC REGION: ICD-10-CM

## 2017-03-24 DIAGNOSIS — M51.369 DDD (DEGENERATIVE DISC DISEASE), LUMBAR: ICD-10-CM

## 2017-03-24 DIAGNOSIS — M54.2 CERVICALGIA: ICD-10-CM

## 2017-03-24 DIAGNOSIS — M50.20 HERNIATION OF INTERVERTEBRAL DISC OF CERVICAL SPINE WITHOUT RADICULOPATHY: ICD-10-CM

## 2017-03-24 PROCEDURE — 98940 CHIROPRACT MANJ 1-2 REGIONS: CPT | Mod: AT | Performed by: CHIROPRACTOR

## 2017-03-24 PROCEDURE — 97035 APP MDLTY 1+ULTRASOUND EA 15: CPT | Performed by: CHIROPRACTOR

## 2017-03-24 NOTE — PROGRESS NOTES
Visit #:  2 of 8 based on treatment plan 3/21/2017    Subjective:  Bulmaro Herrera is a 51 year old male who is seen in f/u up for:        Herniation of intervertebral disc of cervical spine without radiculopathy  Segmental dysfunction of cervical region  Segmental dysfunction of thoracic region  Segmental dysfunction of lumbar region  Cervicalgia  DDD (degenerative disc disease), lumbar.     Since last visit on 3/21/2017,  Bulmaro Herrera reports the following changes: Patient presents and states that his neck felt pretty good when he left here, but after a couple days he is right back to where he was in his neck. His feet weren't hurting as bad either. He notes that he just concentrating on his work, and not his pain, but when we asked him today how he was, he knew he had improved. He rates his current neck pain 6/10. His sleep was about the same, his pain bugs him the most at night.          Objective:  The following was observed:    P: pain elicited on palpation, bilateral upper traps    A: static palpation demonstrates intersegmental asymmetry, as noted    R: motion palpation notes restricted motion    T: localized muscle spasm at: Traps Bilaterally      Assessment:    Segmental spinal dysfunction/restrictions found at:  C2   C5   T1   T5    Diagnoses:      1. Herniation of intervertebral disc of cervical spine without radiculopathy    2. Segmental dysfunction of cervical region    3. Segmental dysfunction of thoracic region    4. Segmental dysfunction of lumbar region    5. Cervicalgia    6. DDD (degenerative disc disease), lumbar        Patient's condition:  Patient had restrictions pre-manipulation and Patient had decreased motion prior to manipulation    Treatment effectiveness:  Post manipulation there is better intersegmental movement and Patient claims to feel looser post manipulation      Procedures:  CMT:  98321 Chiropractic manipulative treatment 1-2 regions performed   Cervical: Diversified, C2, C5 ,  Supine  Thoracic: Diversified, T1, T5, Prone    Modalities:  13906: US:  1.0 Lane/cm squared for 8 minutes at 1.0mhz  continuous pulsed, Location: upper traps    Therapeutic procedures:  None      Prognosis: Good    Progress towards Goals: Patient is making progress towards the goal of:   Reduce numbness and tingling into bilateral feet.  Decrease pain in neck and shoulders from 6-10/10 to 4/10 in 4 visits.  Decrease Neck Disability from 38% to 20% in 4 visits.    Response to Treatment:   Reduction of symptoms overall      Recommendations:    Instructions:ice 20 minutes every other hour as needed and stretch as instructed at visit    Follow-up:  Return to care next week.

## 2017-03-24 NOTE — MR AVS SNAPSHOT
After Visit Summary   3/24/2017    Bulmaro Herrera    MRN: [id number]           Patient Information     Date Of Birth          1965        Visit Information        Provider Department      3/24/2017 7:15 AM Verito Candelario DC [facility]        Today's Diagnoses     Herniation of intervertebral disc of cervical spine without radiculopathy        Segmental dysfunction of cervical region        Segmental dysfunction of thoracic region        Segmental dysfunction of lumbar region        Cervicalgia        DDD (degenerative disc disease), lumbar           Follow-ups after your visit        Who to contact     If you have questions or need follow up information about today's clinic visit or your schedule please contact [facility] directly at [phone].  Normal or non-critical lab and imaging results will be communicated to you by Neurologixhart, letter or phone within 4 business days after the clinic has received the results. If you do not hear from us within 7 days, please contact the clinic through Neurologixhart or phone. If you have a critical or abnormal lab result, we will notify you by phone as soon as possible.  Submit refill requests through Tilera or call your pharmacy and they will forward the refill request to us. Please allow 3 business days for your refill to be completed.          Additional Information About Your Visit        MyChart Information     Tilera gives you secure access to your electronic health record. If you see a primary care provider, you can also send messages to your care team and make appointments. If you have questions, please call your primary care clinic.  If you do not have a primary care provider, please call [phone] and they will assist you.        Care EveryWhere ID     This is your Care EveryWhere ID. This could be used by other organizations to access your [facility] medical records  [id number]         Blood Pressure  from Last 3 Encounters:   01/31/17 120/86   09/25/16 (!) 127/94   06/19/16 132/88    Weight from Last 3 Encounters:   12/22/16 107.5 kg (237 lb)   12/22/16 107.5 kg (237 lb)   09/25/16 104.3 kg (230 lb)              We Performed the Following     CHIROPRAC MANIP,SPINAL,1-2 REGIONS     ULTRASOUND THERAPY        Primary Care Provider Office Phone # Fax #    Roger Rosado [phone]       [address]   [place] [facility] [place] [address]        Thank you!     Thank you for choosing [facility]  for your care. Our goal is always to provide you with excellent care. Hearing back from our patients is one way we can continue to improve our services. Please take a few minutes to complete the written survey that you may receive in the mail after your visit with us. Thank you!             Your Updated Medication List - Protect others around you: Learn how to safely use, store and throw away your medicines at www.disposemymeds.org.          This list is accurate as of: 3/24/17  7:41 AM.  Always use your most recent med list.                   Brand Name Dispense Instructions for use    acetaminophen-codeine 300-30 MG per tablet    TYLENOL #3    60 tablet    Take 1 tablet by mouth every 4 hours as needed for moderate pain       albuterol 108 (90 BASE) MCG/ACT Inhaler    PROAIR HFA/PROVENTIL HFA/VENTOLIN HFA    1 Inhaler    Inhale 2 puffs into the lungs every 6 hours as needed for shortness of breath / dyspnea or wheezing       diazepam 10 MG tablet    VALIUM    2 tablet    Take 1 tablet (10 mg) by mouth every 6 hours as needed for anxiety or sleep (Before appointment) Take 30-60 minutes before procedure.  Do not operate a vehicle after taking this medication.       omeprazole 20 MG CR capsule    priLOSEC    180 capsule    Take 2 capsules (40 mg) by mouth daily

## 2017-03-27 ENCOUNTER — THERAPY VISIT (OUTPATIENT)
Dept: CHIROPRACTIC MEDICINE | Facility: CLINIC | Age: 52
End: 2017-03-27
Payer: COMMERCIAL

## 2017-03-27 DIAGNOSIS — M51.369 DDD (DEGENERATIVE DISC DISEASE), LUMBAR: ICD-10-CM

## 2017-03-27 DIAGNOSIS — M99.03 SEGMENTAL DYSFUNCTION OF LUMBAR REGION: ICD-10-CM

## 2017-03-27 DIAGNOSIS — M50.20 HERNIATION OF INTERVERTEBRAL DISC OF CERVICAL SPINE WITHOUT RADICULOPATHY: ICD-10-CM

## 2017-03-27 DIAGNOSIS — M99.02 SEGMENTAL DYSFUNCTION OF THORACIC REGION: ICD-10-CM

## 2017-03-27 DIAGNOSIS — M99.01 SEGMENTAL DYSFUNCTION OF CERVICAL REGION: ICD-10-CM

## 2017-03-27 DIAGNOSIS — M54.2 CERVICALGIA: ICD-10-CM

## 2017-03-27 PROCEDURE — 97035 APP MDLTY 1+ULTRASOUND EA 15: CPT | Performed by: CHIROPRACTOR

## 2017-03-27 PROCEDURE — 98940 CHIROPRACT MANJ 1-2 REGIONS: CPT | Mod: AT | Performed by: CHIROPRACTOR

## 2017-03-27 NOTE — PROGRESS NOTES
Visit #:  3 of 8 based on treatment plan 3/21/2017    Subjective:  Bulmaro Herrera is a 51 year old male who is seen in f/u up for:        Herniation of intervertebral disc of cervical spine without radiculopathy  Segmental dysfunction of cervical region  Segmental dysfunction of thoracic region  Segmental dysfunction of lumbar region  Cervicalgia  DDD (degenerative disc disease), lumbar.     Since last visit on 3/24/2017,  Bulmaro Herrera reports the following changes: Patient presents and states that he is feeling dizzy today, and he feels like it is related to his neck. He felt zingers in his right mastoid area yesterday. He feels like US would really help in his upper neck today. He rates his pain 6/10. He notices the dizziness the most.         Objective:  The following was observed:    P: pain elicited on palpation, bilateral upper traps    A: static palpation demonstrates intersegmental asymmetry, as noted    R: motion palpation notes restricted motion    T: localized muscle spasm at: Traps Bilaterally      Assessment:    Segmental spinal dysfunction/restrictions found at:  C1 RR,  LRR  C5 LR, RRR  T1 RR, LRR  T5 E, FR    Diagnoses:      1. Herniation of intervertebral disc of cervical spine without radiculopathy    2. Segmental dysfunction of cervical region    3. Segmental dysfunction of thoracic region    4. Segmental dysfunction of lumbar region    5. Cervicalgia    6. DDD (degenerative disc disease), lumbar        Patient's condition:  Patient had restrictions pre-manipulation and Patient had decreased motion prior to manipulation    Treatment effectiveness:  Post manipulation there is better intersegmental movement and Patient claims to feel looser post manipulation      Procedures:  CMT:  93003 Chiropractic Treatment  Cervical: Diversified, C1, C5, Supine  Thoracic: Diversified, T1, T5, Prone    Modalities:  18174: US:  1.0 Lane/cm squared for 8 minutes at 1.0mhz  continuous pulsed, Location: Right upper  traps to right SCM    Therapeutic procedures:  None      Prognosis: Good    Progress towards Goals: Patient is making progress towards the goal of:   Reduce numbness and tingling into bilateral feet.  Decrease pain in neck and shoulders from 6-10/10 to 4/10 in 4 visits.  Decrease Neck Disability from 38% to 20% in 4 visits.    Response to Treatment:   Reduction of symptoms overall      Recommendations:    Instructions:ice 20 minutes every other hour as needed and stretch as instructed at visit    Follow-up:  Return to care tomorrow.

## 2017-03-27 NOTE — MR AVS SNAPSHOT
After Visit Summary   3/27/2017    Bulmaro Herrera    MRN: [id number]           Patient Information     Date Of Birth          1965        Visit Information        Provider Department      3/27/2017 2:00 PM Verito Candelario DC [facility]        Today's Diagnoses     Herniation of intervertebral disc of cervical spine without radiculopathy        Segmental dysfunction of cervical region        Segmental dysfunction of thoracic region        Segmental dysfunction of lumbar region        Cervicalgia        DDD (degenerative disc disease), lumbar           Follow-ups after your visit        Your next 10 appointments already scheduled     Mar 27, 2017  2:00 PM CDT   AIDAN Chiropractor with Verito Candelario DC   [facility] ([facility])    [address] [place] [address] [id number] [phone]            Mar 28, 2017  8:15 AM CDT   AIDAN Chiropractor with Verito Candelario DC   [facility] ([facility])    [address] [place] [address] [id number] [phone]              Who to contact     If you have questions or need follow up information about today's clinic visit or your schedule please contact [facility] directly at [phone].  Normal or non-critical lab and imaging results will be communicated to you by Infoharmonihart, letter or phone within 4 business days after the clinic has received the results. If you do not hear from us within 7 days, please contact the clinic through Infoharmonihart or phone. If you have a critical or abnormal lab result, we will notify you by phone as soon as possible.  Submit refill requests through [employer] or call your pharmacy and they will forward the refill request to us. Please allow 3 business days for your refill to be completed.          Additional Information About Your Visit        [email or web address] Information     [employer] gives you secure access to your electronic  health record. If you see a primary care provider, you can also send messages to your care team and make appointments. If you have questions, please call your primary care clinic.  If you do not have a primary care provider, please call [phone] and they will assist you.        Care EveryWhere ID     This is your Care EveryWhere ID. This could be used by other organizations to access your [facility] medical records  [id number]         Blood Pressure from Last 3 Encounters:   01/31/17 120/86   09/25/16 (!) 127/94   06/19/16 132/88    Weight from Last 3 Encounters:   12/22/16 107.5 kg (237 lb)   12/22/16 107.5 kg (237 lb)   09/25/16 104.3 kg (230 lb)              We Performed the Following     CHIROPRAC MANIP,SPINAL,1-2 REGIONS     ULTRASOUND THERAPY        Primary Care Provider Office Phone # Fax #    Roger Rosado [phone]       [address]   [facility] [address]        Thank you!     Thank you for choosing [facility]  for your care. Our goal is always to provide you with excellent care. Hearing back from our patients is one way we can continue to improve our services. Please take a few minutes to complete the written survey that you may receive in the mail after your visit with us. Thank you!             Your Updated Medication List - Protect others around you: Learn how to safely use, store and throw away your medicines at www.disposemymeds.org.          This list is accurate as of: 3/27/17 12:58 PM.  Always use your most recent med list.                   Brand Name Dispense Instructions for use    acetaminophen-codeine 300-30 MG per tablet    TYLENOL #3    60 tablet    Take 1 tablet by mouth every 4 hours as needed for moderate pain       albuterol 108 (90 BASE) MCG/ACT Inhaler    PROAIR HFA/PROVENTIL HFA/VENTOLIN HFA    1 Inhaler    Inhale 2 puffs into the lungs every 6 hours as needed for shortness of breath / dyspnea or  wheezing       diazepam 10 MG tablet    VALIUM    2 tablet    Take 1 tablet (10 mg) by mouth every 6 hours as needed for anxiety or sleep (Before appointment) Take 30-60 minutes before procedure.  Do not operate a vehicle after taking this medication.       omeprazole 20 MG CR capsule    priLOSEC    180 capsule    Take 2 capsules (40 mg) by mouth daily

## 2017-04-03 ENCOUNTER — THERAPY VISIT (OUTPATIENT)
Dept: CHIROPRACTIC MEDICINE | Facility: CLINIC | Age: 52
End: 2017-04-03
Payer: COMMERCIAL

## 2017-04-03 DIAGNOSIS — M54.2 CERVICALGIA: ICD-10-CM

## 2017-04-03 DIAGNOSIS — M50.20 HERNIATION OF INTERVERTEBRAL DISC OF CERVICAL SPINE WITHOUT RADICULOPATHY: ICD-10-CM

## 2017-04-03 DIAGNOSIS — M99.01 SEGMENTAL DYSFUNCTION OF CERVICAL REGION: ICD-10-CM

## 2017-04-03 DIAGNOSIS — M51.369 DDD (DEGENERATIVE DISC DISEASE), LUMBAR: ICD-10-CM

## 2017-04-03 DIAGNOSIS — M99.03 SEGMENTAL DYSFUNCTION OF LUMBAR REGION: ICD-10-CM

## 2017-04-03 DIAGNOSIS — M99.02 SEGMENTAL DYSFUNCTION OF THORACIC REGION: ICD-10-CM

## 2017-04-03 PROCEDURE — 97035 APP MDLTY 1+ULTRASOUND EA 15: CPT | Performed by: CHIROPRACTOR

## 2017-04-03 PROCEDURE — 98940 CHIROPRACT MANJ 1-2 REGIONS: CPT | Mod: AT | Performed by: CHIROPRACTOR

## 2017-04-03 NOTE — PROGRESS NOTES
Visit #:  4 of 8 based on treatment plan 3/21/2017    Subjective:  Bulmaro Herrera is a 51 year old male who is seen in f/u up for:        Herniation of intervertebral disc of cervical spine without radiculopathy  Segmental dysfunction of cervical region  Segmental dysfunction of thoracic region  Segmental dysfunction of lumbar region  Cervicalgia  DDD (degenerative disc disease), lumbar.     Since last visit on 3/27/2017,  Bulmaro Herrera reports the following changes: Patient presents and states that his pain is worse when he is trying to sleep at night. He can't get comfortable when he is laying down. His neck is achy right at the C7 level, rated 10/10 at night, but he has no pain throughout the day. He notes that he is getting improvement with care, but it lasts for 2-3 days and then he is back to where he started.        Objective:  The following was observed:    P: pain elicited on palpation, bilateral upper traps    A: static palpation demonstrates intersegmental asymmetry, as noted    R: motion palpation notes restricted motion    T: localized muscle spasm at: Traps Bilaterally      Assessment:    Segmental spinal dysfunction/restrictions found at:  C1 RR,  LRR  C5 LR, RRR  T1 RR, LRR  T5 E, FR    Diagnoses:      1. Herniation of intervertebral disc of cervical spine without radiculopathy    2. Segmental dysfunction of cervical region    3. Segmental dysfunction of thoracic region    4. Segmental dysfunction of lumbar region    5. Cervicalgia    6. DDD (degenerative disc disease), lumbar        Patient's condition:  Patient had restrictions pre-manipulation and Patient had decreased motion prior to manipulation    Treatment effectiveness:  Post manipulation there is better intersegmental movement and Patient claims to feel looser post manipulation      Procedures:  CMT:  12965 Chiropractic Treatment  Cervical: Diversified, C1, C5, Supine  Thoracic: Diversified, T1, T5, Prone    Modalities:  19148: US:  1.0  Lane/cm squared for 8 minutes at 1.0mhz  continuous pulsed, Location: bilateral upper traps at C7 level    Therapeutic procedures:  None      Prognosis: Good    Progress towards Goals: Patient is making progress towards the goal of:   Reduce numbness and tingling into bilateral feet.  Decrease pain in neck and shoulders from 6-10/10 to 4/10 in 4 visits.  Decrease Neck Disability from 38% to 20% in 4 visits.    Response to Treatment:   Reduction of symptoms overall      Recommendations:    Instructions:ice 20 minutes every other hour as needed and stretch as instructed at visit    Follow-up:  Return to care as needed.

## 2017-04-03 NOTE — MR AVS SNAPSHOT
After Visit Summary   4/3/2017    Bulmaro Herrera    MRN: [id number]           Patient Information     Date Of Birth          1965        Visit Information        Provider Department      4/3/2017 1:45 PM Verito Candelario DC [facility] Orthopedic [facility]        Today's Diagnoses     Herniation of intervertebral disc of cervical spine without radiculopathy        Segmental dysfunction of cervical region        Segmental dysfunction of thoracic region        Segmental dysfunction of lumbar region        Cervicalgia        DDD (degenerative disc disease), lumbar           Follow-ups after your visit        Who to contact     If you have questions or need follow up information about today's clinic visit or your schedule please contact [facility] directly at [phone].  Normal or non-critical lab and imaging results will be communicated to you by Filementhart, letter or phone within 4 business days after the clinic has received the results. If you do not hear from us within 7 days, please contact the clinic through Filementhart or phone. If you have a critical or abnormal lab result, we will notify you by phone as soon as possible.  Submit refill requests through Connect HQ or call your pharmacy and they will forward the refill request to us. Please allow 3 business days for your refill to be completed.          Additional Information About Your Visit        MyChart Information     Connect HQ gives you secure access to your electronic health record. If you see a primary care provider, you can also send messages to your care team and make appointments. If you have questions, please call your primary care clinic.  If you do not have a primary care provider, please call [phone] and they will assist you.        Care EveryWhere ID     This is your Care EveryWhere ID. This could be used by other organizations to access your [place] medical records  [id number]         Blood Pressure  from Last 3 Encounters:   01/31/17 120/86   09/25/16 (!) 127/94   06/19/16 132/88    Weight from Last 3 Encounters:   12/22/16 107.5 kg (237 lb)   12/22/16 107.5 kg (237 lb)   09/25/16 104.3 kg (230 lb)              We Performed the Following     CHIROPRAC MANIP,SPINAL,1-2 REGIONS     ULTRASOUND THERAPY        Primary Care Provider Office Phone # Fax #    Roger Rosado [phone]       [address]   [facility] [address]        Thank you!     Thank you for choosing [place] [facility]  for your care. Our goal is always to provide you with excellent care. Hearing back from our patients is one way we can continue to improve our services. Please take a few minutes to complete the written survey that you may receive in the mail after your visit with us. Thank you!             Your Updated Medication List - Protect others around you: Learn how to safely use, store and throw away your medicines at www.disposemymeds.org.          This list is accurate as of: 4/3/17  2:07 PM.  Always use your most recent med list.                   Brand Name Dispense Instructions for use    acetaminophen-codeine 300-30 MG per tablet    TYLENOL #3    60 tablet    Take 1 tablet by mouth every 4 hours as needed for moderate pain       albuterol 108 (90 BASE) MCG/ACT Inhaler    PROAIR HFA/PROVENTIL HFA/VENTOLIN HFA    1 Inhaler    Inhale 2 puffs into the lungs every 6 hours as needed for shortness of breath / dyspnea or wheezing       diazepam 10 MG tablet    VALIUM    2 tablet    Take 1 tablet (10 mg) by mouth every 6 hours as needed for anxiety or sleep (Before appointment) Take 30-60 minutes before procedure.  Do not operate a vehicle after taking this medication.       omeprazole 20 MG CR capsule    priLOSEC    180 capsule    Take 2 capsules (40 mg) by mouth daily

## 2017-04-05 ENCOUNTER — MYC REFILL (OUTPATIENT)
Dept: FAMILY MEDICINE | Facility: CLINIC | Age: 52
End: 2017-04-05

## 2017-04-05 DIAGNOSIS — M54.2 CERVICALGIA: ICD-10-CM

## 2017-04-05 NOTE — TELEPHONE ENCOUNTER
TYLENOL #3      Last Written Prescription Date:  3/10/17  Last Fill Quantity: 60,   # refills: 0  Last Office Visit with [facility] prescribing provider: 4/3/17, Gato Pham  Future Office visit:       Routing refill request to provider for review/approval because:  Drug not on the [facility] refill protocol or controlled substance

## 2017-04-05 NOTE — TELEPHONE ENCOUNTER
Message from [employer] Management[employer]:  Original authorizing provider: MD Bulmaro Patrick would like a refill of the following medications:  acetaminophen-codeine (TYLENOL #3) 300-30 MG per tablet [Roger Rosado MD]    [facility] [place] [address] [facility]     Comment:

## 2017-04-19 ENCOUNTER — THERAPY VISIT (OUTPATIENT)
Dept: CHIROPRACTIC MEDICINE | Facility: CLINIC | Age: 52
End: 2017-04-19
Payer: COMMERCIAL

## 2017-04-19 DIAGNOSIS — M99.01 SEGMENTAL DYSFUNCTION OF CERVICAL REGION: ICD-10-CM

## 2017-04-19 DIAGNOSIS — M51.369 DDD (DEGENERATIVE DISC DISEASE), LUMBAR: ICD-10-CM

## 2017-04-19 DIAGNOSIS — M50.20 HERNIATION OF INTERVERTEBRAL DISC OF CERVICAL SPINE WITHOUT RADICULOPATHY: ICD-10-CM

## 2017-04-19 DIAGNOSIS — M99.03 SEGMENTAL DYSFUNCTION OF LUMBAR REGION: ICD-10-CM

## 2017-04-19 DIAGNOSIS — M54.2 CERVICALGIA: ICD-10-CM

## 2017-04-19 DIAGNOSIS — M99.02 SEGMENTAL DYSFUNCTION OF THORACIC REGION: ICD-10-CM

## 2017-04-19 PROCEDURE — 98940 CHIROPRACT MANJ 1-2 REGIONS: CPT | Mod: AT | Performed by: CHIROPRACTOR

## 2017-04-19 PROCEDURE — 97035 APP MDLTY 1+ULTRASOUND EA 15: CPT | Performed by: CHIROPRACTOR

## 2017-04-19 NOTE — PROGRESS NOTES
"Visit #:  5 of 8 based on treatment plan 3/21/2017    Subjective:  Bulmaro Herrera is a 51 year old male who is seen in f/u up for:        Herniation of intervertebral disc of cervical spine without radiculopathy  Segmental dysfunction of cervical region  Segmental dysfunction of thoracic region  Segmental dysfunction of lumbar region  Cervicalgia  DDD (degenerative disc disease), lumbar.     Since last visit on 43/3/2017,  Bulmaro Herrera reports the following changes: Patient presents and states that he is back to work more full-time, and he feels like he is doing pretty good. He feels like his anxiety is \"going thru the roof.\" He states that his neck \"needs to be whacked.\" He feels like he always has to squint to get everything to focus, and his pain is in his lower cervical spine. With the US and adjusting, everything comes in to focus. He rates his pain 6/10.        Objective:  The following was observed:    P: pain elicited on palpation, bilateral upper traps    A: static palpation demonstrates intersegmental asymmetry, as noted    R: motion palpation notes restricted motion    T: localized muscle spasm at: Traps Bilaterally      Assessment:    Segmental spinal dysfunction/restrictions found at:  C5 LR, RRR  T1 RR, LRR  T5 E, FR    Diagnoses:      1. Herniation of intervertebral disc of cervical spine without radiculopathy    2. Segmental dysfunction of cervical region    3. Segmental dysfunction of thoracic region    4. Segmental dysfunction of lumbar region    5. Cervicalgia    6. DDD (degenerative disc disease), lumbar        Patient's condition:  Patient had restrictions pre-manipulation and Patient had decreased motion prior to manipulation    Treatment effectiveness:  Post manipulation there is better intersegmental movement and Patient claims to feel looser post manipulation      Procedures:  CMT:  58626 Chiropractic Treatment  Cervical: Diversified, C5, Supine  Thoracic: Diversified, T1, T5, " Prone    Modalities:  99962: US:  1.0 Lane/cm squared for 8 minutes at 1.0mhz  continuous pulsed, Location: bilateral upper traps at C7 level    Therapeutic procedures:  None      Prognosis: Good    Progress towards Goals: Patient is making progress towards the goal of:   Reduce numbness and tingling into bilateral feet.  Decrease pain in neck and shoulders from 6-10/10 to 4/10 in 4 visits.  Decrease Neck Disability from 38% to 20% in 4 visits.    Response to Treatment:   Reduction of symptoms overall      Recommendations:    Instructions:ice 20 minutes every other hour as needed and stretch as instructed at visit    Follow-up:  Return to care as needed.

## 2017-04-19 NOTE — MR AVS SNAPSHOT
After Visit Summary   4/19/2017    Bulmaro Herrera    MRN: [id number]           Patient Information     Date Of Birth          1965        Visit Information        Provider Department      4/19/2017 4:30 PM Verito Candelario DC [facility] Orthopedic [facility]        Today's Diagnoses     Herniation of intervertebral disc of cervical spine without radiculopathy        Segmental dysfunction of cervical region        Segmental dysfunction of thoracic region        Segmental dysfunction of lumbar region        Cervicalgia        DDD (degenerative disc disease), lumbar           Follow-ups after your visit        Who to contact     If you have questions or need follow up information about today's clinic visit or your schedule please contact [facility] directly at [phone].  Normal or non-critical lab and imaging results will be communicated to you by CyrusOnehart, letter or phone within 4 business days after the clinic has received the results. If you do not hear from us within 7 days, please contact the clinic through CyrusOnehart or phone. If you have a critical or abnormal lab result, we will notify you by phone as soon as possible.  Submit refill requests through Lazy Angel or call your pharmacy and they will forward the refill request to us. Please allow 3 business days for your refill to be completed.          Additional Information About Your Visit        MyChart Information     Lazy Angel gives you secure access to your electronic health record. If you see a primary care provider, you can also send messages to your care team and make appointments. If you have questions, please call your primary care clinic.  If you do not have a primary care provider, please call [phone] and they will assist you.        Care EveryWhere ID     This is your Care EveryWhere ID. This could be used by other organizations to access your [place] medical records  [id number]         Blood Pressure  from Last 3 Encounters:   01/31/17 120/86   09/25/16 (!) 127/94   06/19/16 132/88    Weight from Last 3 Encounters:   12/22/16 107.5 kg (237 lb)   12/22/16 107.5 kg (237 lb)   09/25/16 104.3 kg (230 lb)              We Performed the Following     CHIROPRAC MANIP,SPINAL,1-2 REGIONS     ULTRASOUND THERAPY        Primary Care Provider Office Phone # Fax #    Roger Rosado [phone]       [address]   [facility] [place] [address]        Thank you!     Thank you for choosing [place] [facility]  for your care. Our goal is always to provide you with excellent care. Hearing back from our patients is one way we can continue to improve our services. Please take a few minutes to complete the written survey that you may receive in the mail after your visit with us. Thank you!             Your Updated Medication List - Protect others around you: Learn how to safely use, store and throw away your medicines at www.disposemymeds.org.          This list is accurate as of: 4/19/17  4:38 PM.  Always use your most recent med list.                   Brand Name Dispense Instructions for use    acetaminophen-codeine 300-30 MG per tablet    TYLENOL #3    60 tablet    Take 1 tablet by mouth every 4 hours as needed for moderate pain       albuterol 108 (90 BASE) MCG/ACT Inhaler    PROAIR HFA/PROVENTIL HFA/VENTOLIN HFA    1 Inhaler    Inhale 2 puffs into the lungs every 6 hours as needed for shortness of breath / dyspnea or wheezing       diazepam 10 MG tablet    VALIUM    2 tablet    Take 1 tablet (10 mg) by mouth every 6 hours as needed for anxiety or sleep (Before appointment) Take 30-60 minutes before procedure.  Do not operate a vehicle after taking this medication.       omeprazole 20 MG CR capsule    priLOSEC    180 capsule    Take 2 capsules (40 mg) by mouth daily

## 2017-05-02 ENCOUNTER — THERAPY VISIT (OUTPATIENT)
Dept: CHIROPRACTIC MEDICINE | Facility: CLINIC | Age: 52
End: 2017-05-02
Payer: COMMERCIAL

## 2017-05-02 DIAGNOSIS — M99.01 SEGMENTAL DYSFUNCTION OF CERVICAL REGION: ICD-10-CM

## 2017-05-02 DIAGNOSIS — M50.20 HERNIATION OF INTERVERTEBRAL DISC OF CERVICAL SPINE WITHOUT RADICULOPATHY: ICD-10-CM

## 2017-05-02 DIAGNOSIS — M99.03 SEGMENTAL DYSFUNCTION OF LUMBAR REGION: ICD-10-CM

## 2017-05-02 DIAGNOSIS — M99.02 SEGMENTAL DYSFUNCTION OF THORACIC REGION: ICD-10-CM

## 2017-05-02 DIAGNOSIS — M51.369 DDD (DEGENERATIVE DISC DISEASE), LUMBAR: ICD-10-CM

## 2017-05-02 DIAGNOSIS — M54.2 CERVICALGIA: ICD-10-CM

## 2017-05-02 PROCEDURE — 98940 CHIROPRACT MANJ 1-2 REGIONS: CPT | Mod: AT | Performed by: CHIROPRACTOR

## 2017-05-02 PROCEDURE — 97035 APP MDLTY 1+ULTRASOUND EA 15: CPT | Performed by: CHIROPRACTOR

## 2017-05-02 NOTE — PROGRESS NOTES
Visit #:  6 of 8 based on treatment plan 3/21/2017    Subjective:  Bulmaro Herrera is a 51 year old male who is seen in f/u up for:        Herniation of intervertebral disc of cervical spine without radiculopathy  Segmental dysfunction of cervical region  Segmental dysfunction of thoracic region  Segmental dysfunction of lumbar region  Cervicalgia  DDD (degenerative disc disease), lumbar.     Since last visit on 4/19/2017,  Bulmaro Herrera reports the following changes: Patient presents and states that he is very stressed because of various vehicle issues he is having. He has pain in his upper jara more on the left side. He rates that pain 6-7/10. He denies headaches, but is completely exhausted. He denies radiation.           Objective:  The following was observed:    P: pain elicited on palpation, bilateral upper traps    A: static palpation demonstrates intersegmental asymmetry, as noted    R: motion palpation notes restricted motion    T: localized muscle spasm at: Traps Bilaterally      Assessment:    Segmental spinal dysfunction/restrictions found at:  C2 RR, LRR  C5 LR, RRR  T1 RR, LRR  T2 LR, RRR  T5 E, FR    Diagnoses:      1. Herniation of intervertebral disc of cervical spine without radiculopathy    2. Segmental dysfunction of cervical region    3. Segmental dysfunction of thoracic region    4. Segmental dysfunction of lumbar region    5. Cervicalgia    6. DDD (degenerative disc disease), lumbar        Patient's condition:  Patient had restrictions pre-manipulation and Patient had decreased motion prior to manipulation    Treatment effectiveness:  Post manipulation there is better intersegmental movement and Patient claims to feel looser post manipulation      Procedures:  CMT:  68252 Chiropractic Treatment  Cervical: Diversified, C2, C5, Supine  Thoracic: Diversified, T1, T2, T5, Prone    Modalities:  75231: US:  1.0 Lane/cm squared for 8 minutes at 1.0mhz  continuous pulsed, Location: bilateral upper  traps at C7 level    Therapeutic procedures:  None      Prognosis: Good    Progress towards Goals: Patient is making progress towards the goal of:   Reduce numbness and tingling into bilateral feet.  Decrease pain in neck and shoulders from 6-10/10 to 4/10 in 4 visits.  Decrease Neck Disability from 38% to 20% in 4 visits.    Response to Treatment:   Reduction of symptoms overall      Recommendations:    Instructions:ice 20 minutes every other hour as needed and stretch as instructed at visit    Follow-up:  Return to care as needed.

## 2017-05-02 NOTE — MR AVS SNAPSHOT
After Visit Summary   5/2/2017    Bulmaro Herrera    MRN: [id number]           Patient Information     Date Of Birth          1965        Visit Information        Provider Department      5/2/2017 9:00 AM Verito Candelario DC [facility] Orth[facility]        Today's Diagnoses     Herniation of intervertebral disc of cervical spine without radiculopathy        Segmental dysfunction of cervical region        Segmental dysfunction of thoracic region        Segmental dysfunction of lumbar region        Cervicalgia        DDD (degenerative disc disease), lumbar           Follow-ups after your visit        Your next 10 appointments already scheduled     May 04, 2017  6:00 PM CDT   [facility]    [address] [place] [id number] [phone]              Who to contact     If you have questions or need follow up information about today's clinic visit or your schedule please contact [facility] directly at [phone].  Normal or non-critical lab and imaging results will be communicated to you by CargoGuardhart, letter or phone within 4 business days after the clinic has received the results. If you do not hear from us within 7 days, please contact the clinic through Vitrynt or phone. If you have a critical or abnormal lab result, we will notify you by phone as soon as possible.  Submit refill requests through [employer] or call your pharmacy and they will forward the refill request to us. Please allow 3 business days for your refill to be completed.          Additional Information About Your Visit        MyChart Information     [employer] gives you secure access to your electronic health record. If you see a primary care provider, you can also send messages to your care team and make appointments. If you have questions, please call your primary care clinic.  If you do not have a primary  care provider, please call [phone] and they will assist you.        Care EveryWhere ID     This is your Care EveryWhere ID. This could be used by other organizations to access your [place] medical records  [id number]         Blood Pressure from Last 3 Encounters:   01/31/17 120/86   09/25/16 (!) 127/94   06/19/16 132/88    Weight from Last 3 Encounters:   12/22/16 107.5 kg (237 lb)   12/22/16 107.5 kg (237 lb)   09/25/16 104.3 kg (230 lb)              We Performed the Following     CHIROPRAC MANIP,SPINAL,1-2 REGIONS     ULTRASOUND THERAPY        Primary Care Provider Office Phone # Fax #    Roger Rosado [phone]       [address] [place] [address]        Thank you!     Thank you for choosing [place] [facility]  for your care. Our goal is always to provide you with excellent care. Hearing back from our patients is one way we can continue to improve our services. Please take a few minutes to complete the written survey that you may receive in the mail after your visit with us. Thank you!             Your Updated Medication List - Protect others around you: Learn how to safely use, store and throw away your medicines at www.disposemymeds.org.          This list is accurate as of: 5/2/17  9:18 AM.  Always use your most recent med list.                   Brand Name Dispense Instructions for use    acetaminophen-codeine 300-30 MG per tablet    TYLENOL #3    60 tablet    Take 1 tablet by mouth every 4 hours as needed for moderate pain       albuterol 108 (90 BASE) MCG/ACT Inhaler    PROAIR HFA/PROVENTIL HFA/VENTOLIN HFA    1 Inhaler    Inhale 2 puffs into the lungs every 6 hours as needed for shortness of breath / dyspnea or wheezing       diazepam 10 MG tablet    VALIUM    2 tablet    Take 1 tablet (10 mg) by mouth every 6 hours as needed for anxiety or sleep (Before appointment) Take 30-60 minutes before procedure.  Do not operate a  vehicle after taking this medication.       omeprazole 20 MG CR capsule    priLOSEC    180 capsule    Take 2 capsules (40 mg) by mouth daily

## 2017-05-05 DIAGNOSIS — M54.2 CERVICALGIA: ICD-10-CM

## 2017-05-05 NOTE — ADDENDUM NOTE
Encounter addended by: Kiera Morrison, PT on: 5/5/2017  1:55 PM<BR>     Actions taken: Episode resolved, Pend clinical note, Flowsheet accepted, Sign clinical note

## 2017-05-05 NOTE — TELEPHONE ENCOUNTER
T3       Last Written Prescription Date: 04/05/2017  Last Fill Quantity: 60,  # refills: 0   Last Office Visit with [facility], [facility] or [facility] prescribing provider: 02/18/2016    Thanks  Berta Katz [facility]   [phone]

## 2017-05-05 NOTE — PROGRESS NOTES
Outpatient Physical Therapy Discharge Note     Patient: Bulmaro Herrera  : 1965    Beginning/End Dates of Reporting Period:  2017 to 2017    Referring Provider: Dr. Tran    Therapy Diagnosis: Cervical pain and postural weakness secondary to chronic issues of disc and nerve irritation.     Client Self Report: Pt reports he was doing better up until a few days ago.  Feels like the last couple days the dizziness feeling has returned, and he is not sleeping as well.    Objective Measurements:  Objective Measure: Palpation  Details: Increased tightness L > R suboccipital musculature.    Goals:  Goal Identifier #1   Goal Description Pt will demonstrate ability to sleep for >5 hours for 3 nights a week demonstrating ability to get enough rest to be more functional throughout the day.   Target Date 17   Date Met   (Still 5 hours and wakes to pain.)   Progress:     Goal Identifier #2   Goal Description Pt will report less than 1 headache a week with <2/10 pain allowing pt to stay focused on work and home duties.   Target Date 17   Date Met  17   Progress:     Goal Identifier #3   Goal Description Pt will report <20% on the NDI demonstrating improved functional mobility with less impairments.   Target Date 17   Date Met      Progress:     Progress Toward Goals:   Pt was seen for a total of 8 visits.  He reports improvement in symptoms and longer relief with each treatment.  Pt has began to complete HEP and postural exercises to avoid increased symptoms.  Pt went on vacation and was to make more follow up appointments however no return to clinic.  Pt will progress with continued HEP and self management.      Plan:  Discharge from therapy.    Discharge:    Reason for Discharge: Patient has failed to schedule further appointments.    Equipment Issued: None    Discharge Plan: Patient to continue home program.    Thank you for your referral.    Kiera Morrison, PT, DPT  [facility]  [facility]  [phone]

## 2017-05-10 ENCOUNTER — OFFICE VISIT (OUTPATIENT)
Dept: URGENT CARE | Facility: RETAIL CLINIC | Age: 52
End: 2017-05-10
Payer: COMMERCIAL

## 2017-05-10 VITALS
OXYGEN SATURATION: 96 % | DIASTOLIC BLOOD PRESSURE: 88 MMHG | HEART RATE: 73 BPM | TEMPERATURE: 97.4 F | SYSTOLIC BLOOD PRESSURE: 125 MMHG

## 2017-05-10 DIAGNOSIS — J20.9 ACUTE BRONCHITIS WITH SYMPTOMS > 10 DAYS: Primary | ICD-10-CM

## 2017-05-10 DIAGNOSIS — R05.9 COUGH: ICD-10-CM

## 2017-05-10 PROCEDURE — 99213 OFFICE O/P EST LOW 20 MIN: CPT | Performed by: PHYSICIAN ASSISTANT

## 2017-05-10 RX ORDER — CODEINE PHOSPHATE AND GUAIFENESIN 10; 100 MG/5ML; MG/5ML
1-2 SOLUTION ORAL EVERY 4 HOURS PRN
Qty: 120 ML | Refills: 0 | Status: SHIPPED | OUTPATIENT
Start: 2017-05-10 | End: 2017-08-10

## 2017-05-10 RX ORDER — AZITHROMYCIN 250 MG/1
TABLET, FILM COATED ORAL
Qty: 6 TABLET | Refills: 0 | Status: SHIPPED | OUTPATIENT
Start: 2017-05-10 | End: 2017-08-10

## 2017-05-10 NOTE — MR AVS SNAPSHOT
After Visit Summary   5/10/2017    Bulmaro Herrera    MRN: [id number]           Patient Information     Date Of Birth          1965        Visit Information        Provider Department      5/10/2017 5:00 PM Casie Riojas PA-C [facility]        Today's Diagnoses     Acute bronchitis with symptoms > 10 days    -  1    Cough          Care Instructions      Please FOLLOW UP at primary care clinic if not improving, new symptoms, worse or this does not resolve.  [facility]  [phone]          Follow-ups after your visit        Your next 10 appointments already scheduled     Jun 01, 2017  6:00 PM CDT   PHYSICAL with Roger Rosado MD   [facility] ([facility])    [address] [address] [phone]              Who to contact     You can reach your care team any time of the day by calling [phone].  Notification of test results:  If you have an abnormal lab result, we will notify you by phone as soon as possible.         Additional Information About Your Visit        MyChart Information     Sinc[employer]Pool gives you secure access to your electronic health record. If you see a primary care provider, you can also send messages to your care team and make appointments. If you have questions, please call your primary care clinic.  If you do not have a primary care provider, please call [phone] and they will assist you.        Care EveryWhere ID     This is your Care EveryWhere ID. This could be used by other organizations to access your [place] medical records  [id number]        Your Vitals Were     Pulse Temperature Pulse Oximetry             73 97.4  F (36.3  C) (Oral) 96%          Blood Pressure from Last 3 Encounters:   05/10/17 125/88   01/31/17 120/86   09/25/16 (!) 127/94    Weight from Last 3 Encounters:   12/22/16 237 lb (107.5 kg)   12/22/16 237 lb (107.5 kg)   09/25/16 230 lb (104.3 kg)               Today, you had the following     No orders found for display         Today's Medication Changes          These changes are accurate as of: 5/10/17  5:23 PM.  If you have any questions, ask your nurse or doctor.               Start taking these medicines.        Dose/Directions    azithromycin 250 MG tablet   Commonly known as:  ZITHROMAX   Used for:  Acute bronchitis with symptoms > 10 days   Started by:  Casie Riojas PA-C        Two tablets first day, then one tablet daily for four days.   Quantity:  6 tablet   Refills:  0       guaiFENesin-codeine 100-10 MG/5ML Soln solution   Commonly known as:  ROBITUSSIN AC   Used for:  Cough   Started by:  Casie Riojas PA-C        Dose:  1-2 tsp.   Take 5-10 mLs by mouth every 4 hours as needed   Quantity:  120 mL   Refills:  0            Where to get your medicines      These medications were sent to [facility] [place] [facility] [address] [facility]   [address] [facility] [address]     Phone:  [phone]     azithromycin 250 MG tablet         Some of these will need a paper prescription and others can be bought over the counter.  Ask your nurse if you have questions.     Bring a paper prescription for each of these medications     guaiFENesin-codeine 100-10 MG/5ML Soln solution                Primary Care Provider Office Phone # Fax #    Roger Rosado [phone]       [place] [address] [facility]   [facility] [address]        Thank you!     Thank you for choosing [facility]  for your care. Our goal is always to provide you with excellent care. Hearing back from our patients is one way we can continue to improve our services. Please take a few minutes to complete the written survey that you may receive in the mail after your visit with us. Thank you!             Your Updated Medication List - Protect others around you: Learn how to safely use, store and throw away your  medicines at www.disposemymeds.org.          This list is accurate as of: 5/10/17  5:23 PM.  Always use your most recent med list.                   Brand Name Dispense Instructions for use    acetaminophen-codeine 300-30 MG per tablet    TYLENOL #3    60 tablet    Take 1 tablet by mouth every 4 hours as needed for moderate pain       albuterol 108 (90 BASE) MCG/ACT Inhaler    PROAIR HFA/PROVENTIL HFA/VENTOLIN HFA    1 Inhaler    Inhale 2 puffs into the lungs every 6 hours as needed for shortness of breath / dyspnea or wheezing       azithromycin 250 MG tablet    ZITHROMAX    6 tablet    Two tablets first day, then one tablet daily for four days.       diazepam 10 MG tablet    VALIUM    2 tablet    Take 1 tablet (10 mg) by mouth every 6 hours as needed for anxiety or sleep (Before appointment) Take 30-60 minutes before procedure.  Do not operate a vehicle after taking this medication.       guaiFENesin-codeine 100-10 MG/5ML Soln solution    ROBITUSSIN AC    120 mL    Take 5-10 mLs by mouth every 4 hours as needed       omeprazole 20 MG CR capsule    priLOSEC    180 capsule    Take 2 capsules (40 mg) by mouth daily

## 2017-05-10 NOTE — NURSING NOTE
Chief Complaint   Patient presents with     Cough     x a week       Initial /88  Pulse 73  Temp 97.4  F (36.3  C) (Oral)  SpO2 96% Estimated body mass index is 32.14 kg/(m^2) as calculated from the following:    Height as of 12/22/16: 6' (1.829 m).    Weight as of 12/22/16: 237 lb (107.5 kg).  Medication Reconciliation: complete   Ayaka Zarate

## 2017-05-10 NOTE — PROGRESS NOTES
Chief Complaint   Patient presents with     Cough     x a week         SUBJECTIVE:   Pt. presenting to [facility] -  with a chief complaint of cough -getting worse. Wakes him at night. Tired.. No SOB or chest pain .  Onset of symptoms 6 days - had a little head cold before this  Course of illness is worsening.    Severity moderate  Current and Associated symptoms: cough  and malaise  Treatment measures tried include Fluids, OTC meds and Rest.  Predisposing factors include None.  Last antibiotic 6/2016     Smoker no  Past Medical History:   Diagnosis Date     Back pains      Depressive disorder, not elsewhere classified 1/28/2007    declines Rx     Disc degeneration 12/2008    see MRI -throaco-lumbar mild discogenic degenerative changes     Elevated LFT's 11/09    alt and ast     Other and unspecified hyperlipidemia 1/2007     Sleep disorder 9/09    CPAP     Tobacco use disorder      Past Surgical History:   Procedure Laterality Date     C NONSPECIFIC PROCEDURE      rt hand surgery - laceration/glass/tendons     COLONOSCOPY  07/07/08    adenomatous polyp repeat 5 years     ENT SURGERY      for deviated septum     HC ECHO HEART XTHORACIC, STRESS/REST  6/2009    neg     INJECT EPIDURAL CERVICAL N/A 5/14/2015    Procedure: INJECT EPIDURAL CERVICAL;  Surgeon: Christian Chaudhry MD;  Location:  OR     ORTHOPEDIC SURGERY      Right hand     TONSILLECTOMY       Patient Active Problem List   Diagnosis     Esophageal reflux     Back pains     Pruritus ani     Elevated liver enzymes     DDD (degenerative disc disease), lumbar     DDD (degenerative disc disease), cervical     Hyperlipidemia LDL goal <160     Mild intermittent asthma without complication     Chronic pain syndrome     Herniation of intervertebral disc of cervical spine without radiculopathy     Segmental dysfunction of cervical region     Cervicalgia     Segmental dysfunction of thoracic region     Segmental dysfunction of lumbar region     Current  Outpatient Prescriptions   Medication     acetaminophen-codeine (TYLENOL #3) 300-30 MG per tablet     omeprazole (PRILOSEC) 20 MG capsule     diazepam (VALIUM) 10 MG tablet     albuterol (PROAIR HFA, PROVENTIL HFA, VENTOLIN HFA) 108 (90 BASE) MCG/ACT inhaler     No current facility-administered medications for this visit.        OBJECTIVE:  /88  Pulse 73  Temp 97.4  F (36.3  C) (Oral)  SpO2 96%    GENERAL APPEARANCE: cooperative, alert and no distress. Appears well hydrated.  EYES: conjunctiva clear  HENT: Rt ear canal  clear and TM normal   Lt ear canal clear and TM normal   Nose some congestion. some discharge  Mouth without ulcers or lesions. no erythema. no exudate. Some PND noted  NECK: supple, few small shoddy NT ant nodes. No  posterior nodes.  RESP: lungs  no rales but few scattered  rhonchi  Rt - no wheeze. Breathing easily.  CV: regular rates and rhythm  ABDOMEN:  soft, nontender, no HSM or masses and bowel sounds normal   SKIN: no suspicious lesions or rashes  no tenderness to palpate over  sinus areas.    ASSESSMENT:     Acute bronchitis with symptoms > 10 days  Cough      PLAN:  Symptomatic measures   Prescriptions as below. Discussed indications, dosing, side affects and adverse reactions of medications with  Patient -Z pack (no history of arrhythmias)  And Theo DILMA (do not take T#3 with this)  Eat yogurt daily or take a probiotic supplement when on antibiotics.  OTC cough suppressant/expectorant discussed -see Rx  Cool mist vaporizer.   Stay in clean air environment.  > rest.  > fluids.  Contagiousness and hygiene discussed.  Fever and pain  control measures discussed.   If unable to swallow or any breathing difficulty to go to ED     AVS given and discussed:  Patient Instructions     Please FOLLOW UP at primary care clinic if not improving, new symptoms, worse or this does not resolve.  [facility]  [phone]    Patient is comfortable with this plan.  Electronically  signed,  ROGELIO Riojas, PAC

## 2017-05-10 NOTE — PATIENT INSTRUCTIONS
Please FOLLOW UP at primary care clinic if not improving, new symptoms, worse or this does not resolve.  [facility]  [phone]

## 2017-05-19 ENCOUNTER — THERAPY VISIT (OUTPATIENT)
Dept: CHIROPRACTIC MEDICINE | Facility: CLINIC | Age: 52
End: 2017-05-19
Payer: COMMERCIAL

## 2017-05-19 DIAGNOSIS — M99.03 SEGMENTAL DYSFUNCTION OF LUMBAR REGION: ICD-10-CM

## 2017-05-19 DIAGNOSIS — M99.02 SEGMENTAL DYSFUNCTION OF THORACIC REGION: ICD-10-CM

## 2017-05-19 DIAGNOSIS — M50.20 HERNIATION OF INTERVERTEBRAL DISC OF CERVICAL SPINE WITHOUT RADICULOPATHY: ICD-10-CM

## 2017-05-19 DIAGNOSIS — M54.2 CERVICALGIA: ICD-10-CM

## 2017-05-19 DIAGNOSIS — M51.369 DDD (DEGENERATIVE DISC DISEASE), LUMBAR: ICD-10-CM

## 2017-05-19 DIAGNOSIS — M99.01 SEGMENTAL DYSFUNCTION OF CERVICAL REGION: ICD-10-CM

## 2017-05-19 PROCEDURE — 97035 APP MDLTY 1+ULTRASOUND EA 15: CPT | Performed by: CHIROPRACTOR

## 2017-05-19 PROCEDURE — 98940 CHIROPRACT MANJ 1-2 REGIONS: CPT | Mod: AT | Performed by: CHIROPRACTOR

## 2017-05-19 NOTE — MR AVS SNAPSHOT
After Visit Summary   5/19/2017    Bulmaro Herrera    MRN: [id number]           Patient Information     Date Of Birth          1965        Visit Information        Provider Department      5/19/2017 8:30 AM Verito Candelario DC [facility]        Today's Diagnoses     Herniation of intervertebral disc of cervical spine without radiculopathy        Segmental dysfunction of cervical region        Segmental dysfunction of thoracic region        Segmental dysfunction of lumbar region        Cervicalgia        DDD (degenerative disc disease), lumbar           Follow-ups after your visit        Your next 10 appointments already scheduled     Jun 01, 2017  6:00 PM CDT   PHYSICAL with Roger Rosado MD   [facility] ([facility])    [address] [place] [address] [phone]              Who to contact     If you have questions or need follow up information about today's clinic visit or your schedule please contact [facility] directly at [phone].  Normal or non-critical lab and imaging results will be communicated to you by Primus Powerhart, letter or phone within 4 business days after the clinic has received the results. If you do not hear from us within 7 days, please contact the clinic through EndoInSightt or phone. If you have a critical or abnormal lab result, we will notify you by phone as soon as possible.  Submit refill requests through Char[employer]zz or call your pharmacy and they will forward the refill request to us. Please allow 3 business days for your refill to be completed.          Additional Information About Your Visit        MyChart Information     [employer] gives you secure access to your electronic health record. If you see a primary care provider, you can also send messages to your care team and make appointments. If you have questions, please call your primary care clinic.  If you do not have a  primary care provider, please call [phone] and they will assist you.        Care EveryWhere ID     This is your Care EveryWhere ID. This could be used by other organizations to access your [place] medical records  [id number]         Blood Pressure from Last 3 Encounters:   05/10/17 125/88   01/31/17 120/86   09/25/16 (!) 127/94    Weight from Last 3 Encounters:   12/22/16 107.5 kg (237 lb)   12/22/16 107.5 kg (237 lb)   09/25/16 104.3 kg (230 lb)              We Performed the Following     CHIROPRAC MANIP,SPINAL,1-2 REGIONS     ULTRASOUND THERAPY        Primary Care Provider Office Phone # Fax #    Roger Rosado [phone]       [address] [place] [address]        Thank you!     Thank you for choosing [place] [facility]  for your care. Our goal is always to provide you with excellent care. Hearing back from our patients is one way we can continue to improve our services. Please take a few minutes to complete the written survey that you may receive in the mail after your visit with us. Thank you!             Your Updated Medication List - Protect others around you: Learn how to safely use, store and throw away your medicines at www.disposemymeds.org.          This list is accurate as of: 5/19/17  8:39 AM.  Always use your most recent med list.                   Brand Name Dispense Instructions for use    acetaminophen-codeine 300-30 MG per tablet    TYLENOL #3    60 tablet    Take 1 tablet by mouth every 4 hours as needed for moderate pain       albuterol 108 (90 BASE) MCG/ACT Inhaler    PROAIR HFA/PROVENTIL HFA/VENTOLIN HFA    1 Inhaler    Inhale 2 puffs into the lungs every 6 hours as needed for shortness of breath / dyspnea or wheezing       azithromycin 250 MG tablet    ZITHROMAX    6 tablet    Two tablets first day, then one tablet daily for four days.       diazepam 10 MG tablet    VALIUM    2 tablet    Take 1 tablet (10 mg)  by mouth every 6 hours as needed for anxiety or sleep (Before appointment) Take 30-60 minutes before procedure.  Do not operate a vehicle after taking this medication.       guaiFENesin-codeine 100-10 MG/5ML Soln solution    ROBITUSSIN AC    120 mL    Take 5-10 mLs by mouth every 4 hours as needed       omeprazole 20 MG CR capsule    priLOSEC    180 capsule    Take 2 capsules (40 mg) by mouth daily

## 2017-05-19 NOTE — PROGRESS NOTES
"Visit #:  7 of 8 based on treatment plan 3/21/2017    Subjective:  Bulmaro Herrera is a 51 year old male who is seen in f/u up for:        Herniation of intervertebral disc of cervical spine without radiculopathy  Segmental dysfunction of cervical region  Segmental dysfunction of thoracic region  Segmental dysfunction of lumbar region  Cervicalgia  DDD (degenerative disc disease), lumbar.     Since last visit on 5/2/2017,  Bulmaro Herrera reports the following changes: Patient presents and states that he is \"tweaking out because he hasn't been working because of the rain.\" He feels sharp pains that comes and goes along his left occipital ridge. He is not getting headaches, just specific pain in the muscles. This has been happening for the last 10 days, and he has been getting worried enough to go to the ER.         Objective:  The following was observed:    P: pain elicited on palpation, bilateral upper traps    A: static palpation demonstrates intersegmental asymmetry, as noted    R: motion palpation notes restricted motion    T: localized muscle spasm at: Traps Bilaterally      Assessment:    Segmental spinal dysfunction/restrictions found at:  C1 LR, RRR  C2 RR, LRR  T1 RR, LRR  T2 LR, RRR  T7 E, FR    Diagnoses:      1. Herniation of intervertebral disc of cervical spine without radiculopathy    2. Segmental dysfunction of cervical region    3. Segmental dysfunction of thoracic region    4. Segmental dysfunction of lumbar region    5. Cervicalgia    6. DDD (degenerative disc disease), lumbar        Patient's condition:  Patient had restrictions pre-manipulation and Patient had decreased motion prior to manipulation    Treatment effectiveness:  Post manipulation there is better intersegmental movement and Patient claims to feel looser post manipulation      Procedures:  CMT:  95571 Chiropractic Treatment  Cervical: Diversified, C1, C2, Supine  Thoracic: Diversified, T1, T2, T7, Prone    Modalities:  08879: US:  1.0 " Lane/cm squared for 8 minutes at 1.0mhz  continuous pulsed, Location: bilateral upper traps at C7 level and left suboccipital ridge    Therapeutic procedures:  None      Prognosis: Good    Progress towards Goals: Patient is making progress towards the goal of:   Reduce numbness and tingling into bilateral feet.  Decrease pain in neck and shoulders from 6-10/10 to 4/10 in 4 visits.  Decrease Neck Disability from 38% to 20% in 4 visits.    Response to Treatment:   Reduction of symptoms overall      Recommendations:    Instructions:ice 20 minutes every other hour as needed and stretch as instructed at visit    Follow-up:  Return to care as needed.

## 2017-06-03 DIAGNOSIS — M54.2 CERVICALGIA: ICD-10-CM

## 2017-06-03 NOTE — TELEPHONE ENCOUNTER
Acetaminophen #3      Last Written Prescription Date: 5/5/17  Last Fill Quantity: 60,  # refills: 0   Last Office Visit with G, P or [facility] prescribing provider: 5/10/17

## 2017-06-05 DIAGNOSIS — K21.9 GASTROESOPHAGEAL REFLUX DISEASE WITHOUT ESOPHAGITIS: Primary | ICD-10-CM

## 2017-06-06 NOTE — TELEPHONE ENCOUNTER
omeprazole (PRILOSEC) 20 MG capsule      Last Written Prescription Date: 5/2/16  Last Fill Quantity: 180,  # refills: 3   Last Office Visit with [facility], [facility] or [facility] prescribing provider: 2/18/16

## 2017-06-07 ENCOUNTER — THERAPY VISIT (OUTPATIENT)
Dept: CHIROPRACTIC MEDICINE | Facility: CLINIC | Age: 52
End: 2017-06-07
Payer: COMMERCIAL

## 2017-06-07 DIAGNOSIS — M54.2 CERVICALGIA: ICD-10-CM

## 2017-06-07 DIAGNOSIS — M99.03 SEGMENTAL DYSFUNCTION OF LUMBAR REGION: ICD-10-CM

## 2017-06-07 DIAGNOSIS — M99.02 SEGMENTAL DYSFUNCTION OF THORACIC REGION: ICD-10-CM

## 2017-06-07 DIAGNOSIS — M99.01 SEGMENTAL DYSFUNCTION OF CERVICAL REGION: ICD-10-CM

## 2017-06-07 DIAGNOSIS — M50.20 HERNIATION OF INTERVERTEBRAL DISC OF CERVICAL SPINE WITHOUT RADICULOPATHY: ICD-10-CM

## 2017-06-07 DIAGNOSIS — M51.369 DDD (DEGENERATIVE DISC DISEASE), LUMBAR: ICD-10-CM

## 2017-06-07 PROCEDURE — 98941 CHIROPRACT MANJ 3-4 REGIONS: CPT | Mod: AT | Performed by: CHIROPRACTOR

## 2017-06-07 PROCEDURE — 97035 APP MDLTY 1+ULTRASOUND EA 15: CPT | Performed by: CHIROPRACTOR

## 2017-06-07 NOTE — MR AVS SNAPSHOT
After Visit Summary   6/7/2017    Bulmaro Herrera    MRN: [id number]           Patient Information     Date Of Birth          1965        Visit Information        Provider Department      6/7/2017 2:30 PM Verito Candelario DC [facility]        Today's Diagnoses     Herniation of intervertebral disc of cervical spine without radiculopathy        Segmental dysfunction of cervical region        Segmental dysfunction of thoracic region        Segmental dysfunction of lumbar region        Cervicalgia        DDD (degenerative disc disease), lumbar           Follow-ups after your visit        Who to contact     If you have questions or need follow up information about today's clinic visit or your schedule please contact [facility] directly at [phone].  Normal or non-critical lab and imaging results will be communicated to you by Project Dancehart, letter or phone within 4 business days after the clinic has received the results. If you do not hear from us within 7 days, please contact the clinic through Project Dancehart or phone. If you have a critical or abnormal lab result, we will notify you by phone as soon as possible.  Submit refill requests through Playfire or call your pharmacy and they will forward the refill request to us. Please allow 3 business days for your refill to be completed.          Additional Information About Your Visit        MyChart Information     Playfire gives you secure access to your electronic health record. If you see a primary care provider, you can also send messages to your care team and make appointments. If you have questions, please call your primary care clinic.  If you do not have a primary care provider, please call [phone] and they will assist you.        Care EveryWhere ID     This is your Care EveryWhere ID. This could be used by other organizations to access your [place] medical records  [id number]         Blood Pressure  from Last 3 Encounters:   05/10/17 125/88   01/31/17 120/86   09/25/16 (!) 127/94    Weight from Last 3 Encounters:   12/22/16 107.5 kg (237 lb)   12/22/16 107.5 kg (237 lb)   09/25/16 104.3 kg (230 lb)              We Performed the Following     CHIROPRAC MANIP,SPINAL,3-4 REGIONS     ULTRASOUND THERAPY        Primary Care Provider Office Phone # Fax #    Jeronimolauren Steve Rosado [phone]       [address]   [place] [address]        Thank you!     Thank you for choosing [place] [facility]  for your care. Our goal is always to provide you with excellent care. Hearing back from our patients is one way we can continue to improve our services. Please take a few minutes to complete the written survey that you may receive in the mail after your visit with us. Thank you!             Your Updated Medication List - Protect others around you: Learn how to safely use, store and throw away your medicines at www.disposemymeds.org.          This list is accurate as of: 6/7/17  2:58 PM.  Always use your most recent med list.                   Brand Name Dispense Instructions for use    acetaminophen-codeine 300-30 MG per tablet    TYLENOL #3    60 tablet    Take 1 tablet by mouth every 4 hours as needed for moderate pain       albuterol 108 (90 BASE) MCG/ACT Inhaler    PROAIR HFA/PROVENTIL HFA/VENTOLIN HFA    1 Inhaler    Inhale 2 puffs into the lungs every 6 hours as needed for shortness of breath / dyspnea or wheezing       azithromycin 250 MG tablet    ZITHROMAX    6 tablet    Two tablets first day, then one tablet daily for four days.       diazepam 10 MG tablet    VALIUM    2 tablet    Take 1 tablet (10 mg) by mouth every 6 hours as needed for anxiety or sleep (Before appointment) Take 30-60 minutes before procedure.  Do not operate a vehicle after taking this medication.       guaiFENesin-codeine 100-10 MG/5ML Soln solution    ROBITUSSIN AC    120 mL    Take  5-10 mLs by mouth every 4 hours as needed       omeprazole 20 MG CR capsule    priLOSEC    180 capsule    Take 2 capsules (40 mg) by mouth daily

## 2017-06-07 NOTE — PROGRESS NOTES
"Visit #:  8 of 8 based on treatment plan 3/21/2017    Subjective:  Bulmaro Herrera is a 51 year old male who is seen in f/u up for:        Herniation of intervertebral disc of cervical spine without radiculopathy  Segmental dysfunction of cervical region  Segmental dysfunction of thoracic region  Segmental dysfunction of lumbar region  Cervicalgia  DDD (degenerative disc disease), lumbar.     Since last visit on 5/19/2017,  Bulmaro Herrera reports the following changes: Patient presents and states that his neck is killing him. He did get some  Improvement with his last adjustment, but he is really stressed lately with work, and probably has not noticed his pain. He rates his current pain as \"aggravating.\" He really notices the pain at night time because he is so busy. He has not been getting headaches. He also states that his lower back has been tight and bothering him, too.        Objective:  The following was observed:    P: pain elicited on palpation, bilateral upper traps    A: static palpation demonstrates intersegmental asymmetry, as noted    R: motion palpation notes restricted motion    T: localized muscle spasm at: Traps , lumbar paraspinals Bilaterally      Assessment:    Segmental spinal dysfunction/restrictions found at:  C1 LR, RRR  C2 RR, LRR  T1 RR, LRR  T2 LR, RRR  T7 E, FR  L4 LR, RRR  Right SI posterior    Diagnoses:      1. Herniation of intervertebral disc of cervical spine without radiculopathy    2. Segmental dysfunction of cervical region    3. Segmental dysfunction of thoracic region    4. Segmental dysfunction of lumbar region    5. Cervicalgia    6. DDD (degenerative disc disease), lumbar        Patient's condition:  Patient had restrictions pre-manipulation and Patient had decreased motion prior to manipulation    Treatment effectiveness:  Post manipulation there is better intersegmental movement and Patient claims to feel looser post manipulation      Procedures:  CMT:  37357 Chiropractic " Treatment  Cervical: Diversified, C1, C2, Supine  Thoracic: Diversified, T1, T3, T9, Prone   Lumbar/Pelvis: Drop assist, L4, Right SI, Prone    Modalities:  18244: US:  1.0 Lane/cm squared for 8 minutes at 1.0mhz  continuous pulsed, Location: bilateral upper traps at C7 level and second unit on central lumbar region    Therapeutic procedures:  None      Prognosis: Good    Progress towards Goals: Patient is making progress towards the goal of:   Reduce numbness and tingling into bilateral feet.  Decrease pain in neck and shoulders from 6-10/10 to 4/10 in 4 visits.  Decrease Neck Disability from 38% to 20% in 4 visits.    Response to Treatment:   Reduction of symptoms overall      Recommendations:    Instructions:ice 20 minutes every other hour as needed and stretch as instructed at visit    Follow-up:  Return to care as needed.

## 2017-06-08 RX ORDER — OMEPRAZOLE 40 MG/1
CAPSULE, DELAYED RELEASE ORAL
Qty: 90 CAPSULE | Refills: 0 | Status: SHIPPED | OUTPATIENT
Start: 2017-06-08 | End: 2017-08-08

## 2017-06-08 NOTE — TELEPHONE ENCOUNTER
Medication is being filled for 1 time refill only due to:  Patient needs to be seen because it has been more than one year since last visit..............TONEY Yan

## 2017-06-14 ENCOUNTER — THERAPY VISIT (OUTPATIENT)
Dept: CHIROPRACTIC MEDICINE | Facility: CLINIC | Age: 52
End: 2017-06-14
Payer: COMMERCIAL

## 2017-06-14 DIAGNOSIS — M51.369 DDD (DEGENERATIVE DISC DISEASE), LUMBAR: ICD-10-CM

## 2017-06-14 DIAGNOSIS — M50.20 HERNIATION OF INTERVERTEBRAL DISC OF CERVICAL SPINE WITHOUT RADICULOPATHY: ICD-10-CM

## 2017-06-14 DIAGNOSIS — M99.02 SEGMENTAL DYSFUNCTION OF THORACIC REGION: ICD-10-CM

## 2017-06-14 DIAGNOSIS — M99.03 SEGMENTAL DYSFUNCTION OF LUMBAR REGION: ICD-10-CM

## 2017-06-14 DIAGNOSIS — M54.2 CERVICALGIA: ICD-10-CM

## 2017-06-14 DIAGNOSIS — M99.01 SEGMENTAL DYSFUNCTION OF CERVICAL REGION: ICD-10-CM

## 2017-06-14 PROCEDURE — 98941 CHIROPRACT MANJ 3-4 REGIONS: CPT | Mod: AT | Performed by: CHIROPRACTOR

## 2017-06-14 PROCEDURE — 97035 APP MDLTY 1+ULTRASOUND EA 15: CPT | Performed by: CHIROPRACTOR

## 2017-06-14 NOTE — PROGRESS NOTES
Visit #:  1 of 8 based on treatment plan 3/21/2017    Subjective:  Bulmaro Herrera is a 51 year old male who is seen in f/u up for:        Herniation of intervertebral disc of cervical spine without radiculopathy  Segmental dysfunction of cervical region  Segmental dysfunction of thoracic region  Segmental dysfunction of lumbar region  Cervicalgia  DDD (degenerative disc disease), lumbar.     Since last visit on 6/7/2017,  Bulmaro Herrera reports the following changes: Patient presents and states that he is terrible today. He thinks he slept wrong on it or something. He states that he wishes he could pop pills today. He rates his current pain 10/10, achy constantly. The pain is in the same spot and the CT junction. He states that he has not been working the last few days. He states that his lower bach has been a little sore.        Objective:  The following was observed:    P: pain elicited on palpation, bilateral upper traps, CT junction    A: static palpation demonstrates intersegmental asymmetry, as noted    R: motion palpation notes restricted motion    T: localized muscle spasm at: Traps , lumbar paraspinals Bilaterally      Assessment:    Segmental spinal dysfunction/restrictions found at:  C1 RR, LRR  C2 LR, RRR  T1 RR, LRR  T5 E, FR  T9 E, FR  L4 LR, RRR  Right SI posterior    Diagnoses:      1. Herniation of intervertebral disc of cervical spine without radiculopathy    2. Segmental dysfunction of cervical region    3. Segmental dysfunction of thoracic region    4. Segmental dysfunction of lumbar region    5. Cervicalgia    6. DDD (degenerative disc disease), lumbar        Patient's condition:  Patient had restrictions pre-manipulation and Patient had decreased motion prior to manipulation    Treatment effectiveness:  Post manipulation there is better intersegmental movement and Patient claims to feel looser post manipulation      Procedures:  CMT:  90489 Chiropractic Treatment  Cervical: Diversified, C1, C2,  Supine  Thoracic: Diversified, T1, T5, T9, Prone   Lumbar/Pelvis: Drop assist, L4, Right SI, Prone    Modalities:  63437: US:  1.0 Lane/cm squared for 8 minutes at 1.0mhz  continuous pulsed, Location: bilateral upper traps at C7 level     Therapeutic procedures:  None      Prognosis: Good    Progress towards Goals: Patient is making progress towards the goal of:   Reduce numbness and tingling into bilateral feet.  Decrease pain in neck and shoulders from 6-10/10 to 4/10 in 4 visits.  Decrease Neck Disability from 38% to 20% in 4 visits.    Response to Treatment:   Reduction of symptoms overall      Recommendations:    Instructions:ice 20 minutes every other hour as needed and stretch as instructed at visit    Follow-up:  Return to care as needed.

## 2017-06-14 NOTE — MR AVS SNAPSHOT
After Visit Summary   6/14/2017    Bulmaro Herrera    MRN: [id number]           Patient Information     Date Of Birth          1965        Visit Information        Provider Department      6/14/2017 1:15 PM Verito Candelario DC [facility]        Today's Diagnoses     Herniation of intervertebral disc of cervical spine without radiculopathy        Segmental dysfunction of cervical region        Segmental dysfunction of thoracic region        Segmental dysfunction of lumbar region        Cervicalgia        DDD (degenerative disc disease), lumbar           Follow-ups after your visit        Who to contact     If you have questions or need follow up information about today's clinic visit or your schedule please contact [facility] directly at [phone].  Normal or non-critical lab and imaging results will be communicated to you by [employer], letter or phone within 4 business days after the clinic has received the results. If you do not hear from us within 7 days, please contact the clinic through [employer] or phone. If you have a critical or abnormal lab result, we will notify you by phone as soon as possible.  Submit refill requests through [employer] or call your pharmacy and they will forward the refill request to us. Please allow 3 business days for your refill to be completed.          Additional Information About Your Visit        MyChart Information     [employer] gives you secure access to your electronic health record. If you see a primary care provider, you can also send messages to your care team and make appointments. If you have questions, please call your primary care clinic.  If you do not have a primary care provider, please call [phone] and they will assist you.        Care EveryWhere ID     This is your Care EveryWhere ID. This could be used by other organizations to access your [place] medical records  [id number]         Blood Pressure  from Last 3 Encounters:   05/10/17 125/88   01/31/17 120/86   09/25/16 (!) 127/94    Weight from Last 3 Encounters:   12/22/16 107.5 kg (237 lb)   12/22/16 107.5 kg (237 lb)   09/25/16 104.3 kg (230 lb)              We Performed the Following     CHIROPRAC MANIP,SPINAL,3-4 REGIONS     ULTRASOUND THERAPY        Primary Care Provider Office Phone # Fax #    Jeronimolauren Steve Rosado [phone]       [address]   [facility] [address]        Thank you!     Thank you for choosing [facility]  for your care. Our goal is always to provide you with excellent care. Hearing back from our patients is one way we can continue to improve our services. Please take a few minutes to complete the written survey that you may receive in the mail after your visit with us. Thank you!             Your Updated Medication List - Protect others around you: Learn how to safely use, store and throw away your medicines at www.disposemymeds.org.          This list is accurate as of: 6/14/17  1:32 PM.  Always use your most recent med list.                   Brand Name Dispense Instructions for use    acetaminophen-codeine 300-30 MG per tablet    TYLENOL #3    60 tablet    Take 1 tablet by mouth every 4 hours as needed for moderate pain       albuterol 108 (90 BASE) MCG/ACT Inhaler    PROAIR HFA/PROVENTIL HFA/VENTOLIN HFA    1 Inhaler    Inhale 2 puffs into the lungs every 6 hours as needed for shortness of breath / dyspnea or wheezing       azithromycin 250 MG tablet    ZITHROMAX    6 tablet    Two tablets first day, then one tablet daily for four days.       diazepam 10 MG tablet    VALIUM    2 tablet    Take 1 tablet (10 mg) by mouth every 6 hours as needed for anxiety or sleep (Before appointment) Take 30-60 minutes before procedure.  Do not operate a vehicle after taking this medication.       guaiFENesin-codeine 100-10 MG/5ML Soln solution    ROBITUSSIN AC    120 mL    Take  5-10 mLs by mouth every 4 hours as needed       * omeprazole 20 MG CR capsule    priLOSEC    180 capsule    Take 2 capsules (40 mg) by mouth daily       * omeprazole 40 MG capsule    priLOSEC    90 capsule    TAKE ONE CAPSULE BY MOUTH EVERY DAY       * Notice:  This list has 2 medication(s) that are the same as other medications prescribed for you. Read the directions carefully, and ask your doctor or other care provider to review them with you.

## 2017-07-05 ENCOUNTER — THERAPY VISIT (OUTPATIENT)
Dept: CHIROPRACTIC MEDICINE | Facility: CLINIC | Age: 52
End: 2017-07-05
Payer: COMMERCIAL

## 2017-07-05 DIAGNOSIS — M99.03 SEGMENTAL DYSFUNCTION OF LUMBAR REGION: ICD-10-CM

## 2017-07-05 DIAGNOSIS — M99.01 SEGMENTAL DYSFUNCTION OF CERVICAL REGION: ICD-10-CM

## 2017-07-05 DIAGNOSIS — M51.369 DDD (DEGENERATIVE DISC DISEASE), LUMBAR: ICD-10-CM

## 2017-07-05 DIAGNOSIS — M99.02 SEGMENTAL DYSFUNCTION OF THORACIC REGION: ICD-10-CM

## 2017-07-05 DIAGNOSIS — M54.2 CERVICALGIA: ICD-10-CM

## 2017-07-05 DIAGNOSIS — M50.20 HERNIATION OF INTERVERTEBRAL DISC OF CERVICAL SPINE WITHOUT RADICULOPATHY: ICD-10-CM

## 2017-07-05 PROCEDURE — 99212 OFFICE O/P EST SF 10 MIN: CPT | Mod: 25 | Performed by: CHIROPRACTOR

## 2017-07-05 PROCEDURE — 98940 CHIROPRACT MANJ 1-2 REGIONS: CPT | Mod: AT | Performed by: CHIROPRACTOR

## 2017-07-05 PROCEDURE — 97035 APP MDLTY 1+ULTRASOUND EA 15: CPT | Performed by: CHIROPRACTOR

## 2017-07-05 NOTE — PROGRESS NOTES
"Visit #:  2 of 8 based on treatment plan 3/21/2017    Subjective:  Bulmaro Herrera is a 51 year old male who is seen in f/u up for:        Herniation of intervertebral disc of cervical spine without radiculopathy  Segmental dysfunction of cervical region  Segmental dysfunction of thoracic region  Segmental dysfunction of lumbar region  Cervicalgia  DDD (degenerative disc disease), lumbar.     Since last visit on 6/14/2017,  Bulmaro Herrera reports the following changes: Patient presents and states that he is terrible today. He states that he is so bad that it isn't even normal. He states that he has a horrible ache at his CT junction. He is sleeping okay with sleeping pills. He states that he is \"freaking at his wit's end.\"     We reviewed his MRI results of the cervical spine performed on 1/10/2017. The MRI found \"osteophyte and disc bulge abut the cord with mild central spinal stenosis. Moderate right and mild left degenerative foraminal stenosis, no facet arthropathy\" at the C5-C6 level. At the C6-C7 level \"Mild disc degeneration with osteophyte and bulge without central stenosis, Moderate right foraminal stenosis, patent left nerve root canal. No facet arthropathy.\"         Objective:  The following was observed:    P: pain elicited on palpation, bilateral upper traps, CT junction    A: static palpation demonstrates intersegmental asymmetry, as noted    R: motion palpation notes restricted motion    T: localized muscle spasm at: Traps , lumbar paraspinals Bilaterally      Assessment:      MSRs:  C5-T1 WNL    CAROM: RLF and LR moderately reduced    Segmental spinal dysfunction/restrictions found at:  C1 RR, LRR  C3 LR, RRR  T1 RR, LRR  T5 E, FR  T9 E, FR        Diagnoses:      1. Herniation of intervertebral disc of cervical spine without radiculopathy    2. Segmental dysfunction of cervical region    3. Segmental dysfunction of thoracic region    4. Segmental dysfunction of lumbar region    5. Cervicalgia    6. DDD " (degenerative disc disease), lumbar        Patient's condition:  Patient had restrictions pre-manipulation and Patient had decreased motion prior to manipulation    Treatment effectiveness:  Post manipulation there is better intersegmental movement and Patient claims to feel looser post manipulation      Procedures:  CMT:  Level 2 re-exam  10296 Chiropractic Treatment  Cervical: Diversified, C1, C3, Supine  Thoracic: Diversified, T1, T5, T9, Prone       Modalities:  80141: US:  1.0 Lane/cm squared for 8 minutes at 1.0mhz  continuous pulsed, Location: bilateral upper traps at C7 level     Therapeutic procedures:  None      Prognosis: Good    Progress towards Goals: Patient is making progress towards the goal of:   Reduce numbness and tingling into bilateral feet.  Decrease pain in neck and shoulders from 6-10/10 to 4/10 in 4 visits.  Decrease Neck Disability from 38% to 20% in 4 visits.    Response to Treatment:   Reduction of symptoms overall      Recommendations:    Instructions:ice 20 minutes every other hour as needed and stretch as instructed at visit    Follow-up:  Return to care as needed. I will look into referral options at [facility] per patient request.

## 2017-07-05 NOTE — MR AVS SNAPSHOT
After Visit Summary   7/5/2017    Bulmaro Herrera    MRN: [id number]           Patient Information     Date Of Birth          1965        Visit Information        Provider Department      7/5/2017 1:15 PM Verito Candelario DC [facility]        Today's Diagnoses     Herniation of intervertebral disc of cervical spine without radiculopathy        Segmental dysfunction of cervical region        Segmental dysfunction of thoracic region        Segmental dysfunction of lumbar region        Cervicalgia        DDD (degenerative disc disease), lumbar           Follow-ups after your visit        Who to contact     If you have questions or need follow up information about today's clinic visit or your schedule please contact [facility] directly at [phone].  Normal or non-critical lab and imaging results will be communicated to you by [employer], letter or phone within 4 business days after the clinic has received the results. If you do not hear from us within 7 days, please contact the clinic through [employer] or phone. If you have a critical or abnormal lab result, we will notify you by phone as soon as possible.  Submit refill requests through [employer] or call your pharmacy and they will forward the refill request to us. Please allow 3 business days for your refill to be completed.          Additional Information About Your Visit        MyChart Information     [employer] gives you secure access to your electronic health record. If you see a primary care provider, you can also send messages to your care team and make appointments. If you have questions, please call your primary care clinic.  If you do not have a primary care provider, please call [phone] and they will assist you.        Care EveryWhere ID     This is your Care EveryWhere ID. This could be used by other organizations to access your [place] medical records  [id number]         Blood Pressure  from Last 3 Encounters:   05/10/17 125/88   01/31/17 120/86   09/25/16 (!) 127/94    Weight from Last 3 Encounters:   12/22/16 107.5 kg (237 lb)   12/22/16 107.5 kg (237 lb)   09/25/16 104.3 kg (230 lb)              We Performed the Following     CHIROPRAC MANIP,SPINAL,1-2 REGIONS     OFFICE/OUTPT VISIT,EST,LEVL II     ULTRASOUND THERAPY        Primary Care Provider Office Phone # Fax #    Jeronimolauren Steve Rosado [phone]       [address]   [facility] [address]        Equal Access to Services     NICK WEBSTER : Hadii cruz Chavez, wafreda avalos, shine kaalmajama easley, darin stanton. So [facility] [phone].    ATENCIÓN: Si habla español, tiene a nair disposición servicios gratuitos de asistencia lingüística. Llame al [phone].    We comply with applicable federal civil rights laws and [place] laws. We do not discriminate on the basis of race, color, national origin, age, disability sex, sexual orientation or gender identity.            Thank you!     Thank you for choosing [facility]  for your care. Our goal is always to provide you with excellent care. Hearing back from our patients is one way we can continue to improve our services. Please take a few minutes to complete the written survey that you may receive in the mail after your visit with us. Thank you!             Your Updated Medication List - Protect others around you: Learn how to safely use, store and throw away your medicines at www.disposemymeds.org.          This list is accurate as of: 7/5/17  1:59 PM.  Always use your most recent med list.                   Brand Name Dispense Instructions for use Diagnosis    acetaminophen-codeine 300-30 MG per tablet    TYLENOL #3    60 tablet    Take 1 tablet by mouth every 4 hours as needed for moderate pain    Cervicalgia       albuterol 108 (90 BASE) MCG/ACT Inhaler    PROAIR HFA/PROVENTIL  HFA/VENTOLIN HFA    1 Inhaler    Inhale 2 puffs into the lungs every 6 hours as needed for shortness of breath / dyspnea or wheezing    Mild intermittent asthma without complication       azithromycin 250 MG tablet    ZITHROMAX    6 tablet    Two tablets first day, then one tablet daily for four days.    Acute bronchitis with symptoms > 10 days       diazepam 10 MG tablet    VALIUM    2 tablet    Take 1 tablet (10 mg) by mouth every 6 hours as needed for anxiety or sleep (Before appointment) Take 30-60 minutes before procedure.  Do not operate a vehicle after taking this medication.    Anxiety       guaiFENesin-codeine 100-10 MG/5ML Soln solution    ROBITUSSIN AC    120 mL    Take 5-10 mLs by mouth every 4 hours as needed    Cough       * omeprazole 20 MG CR capsule    priLOSEC    180 capsule    Take 2 capsules (40 mg) by mouth daily    Gastroesophageal reflux disease without esophagitis       * omeprazole 40 MG capsule    priLOSEC    90 capsule    TAKE ONE CAPSULE BY MOUTH EVERY DAY    Gastroesophageal reflux disease without esophagitis       * Notice:  This list has 2 medication(s) that are the same as other medications prescribed for you. Read the directions carefully, and ask your doctor or other care provider to review them with you.

## 2017-07-11 DIAGNOSIS — M54.2 CERVICALGIA: ICD-10-CM

## 2017-07-26 ENCOUNTER — THERAPY VISIT (OUTPATIENT)
Dept: CHIROPRACTIC MEDICINE | Facility: CLINIC | Age: 52
End: 2017-07-26
Payer: COMMERCIAL

## 2017-07-26 DIAGNOSIS — M99.01 SEGMENTAL DYSFUNCTION OF CERVICAL REGION: ICD-10-CM

## 2017-07-26 DIAGNOSIS — M51.369 DDD (DEGENERATIVE DISC DISEASE), LUMBAR: ICD-10-CM

## 2017-07-26 DIAGNOSIS — M50.20 HERNIATION OF INTERVERTEBRAL DISC OF CERVICAL SPINE WITHOUT RADICULOPATHY: ICD-10-CM

## 2017-07-26 DIAGNOSIS — M99.02 SEGMENTAL DYSFUNCTION OF THORACIC REGION: ICD-10-CM

## 2017-07-26 DIAGNOSIS — M99.03 SEGMENTAL DYSFUNCTION OF LUMBAR REGION: ICD-10-CM

## 2017-07-26 DIAGNOSIS — M54.2 CERVICALGIA: ICD-10-CM

## 2017-07-26 PROCEDURE — 97035 APP MDLTY 1+ULTRASOUND EA 15: CPT | Performed by: CHIROPRACTOR

## 2017-07-26 PROCEDURE — 98940 CHIROPRACT MANJ 1-2 REGIONS: CPT | Mod: AT | Performed by: CHIROPRACTOR

## 2017-07-26 NOTE — PROGRESS NOTES
"Visit #:  3 of 8 based on treatment plan 3/21/2017    Subjective:  Bulmaro Herrera is a 51 year old male who is seen in f/u up for:        Herniation of intervertebral disc of cervical spine without radiculopathy  Segmental dysfunction of cervical region  Segmental dysfunction of thoracic region  Segmental dysfunction of lumbar region  Cervicalgia  DDD (degenerative disc disease), lumbar.     Since last visit on 7/5/2017,  Bulmaro Herrera reports the following changes: Patient presents and states that his pain is up and down in his neck, but today he is feeling pretty good. The other day that he was here he had an \"episode\" where he felt like he might need a steroid injection. Currently his pain is \"tolerable.\" His treatments here are helpful for a few days. He notes he is still asleep, and is taking pain killers, so it is tolerable today, but most days he is working he is in severe pain.         Objective:  The following was observed:    P: pain elicited on palpation, bilateral upper traps, CT junction    A: static palpation demonstrates intersegmental asymmetry, as noted    R: motion palpation notes restricted motion    T: localized muscle spasm at: Traps , lumbar paraspinals Bilaterally      Assessment:    Segmental spinal dysfunction/restrictions found at:  C1 RR, LRR  C2 LR, RRR  T1 RR, LRR  T5 E, FR        Diagnoses:      1. Herniation of intervertebral disc of cervical spine without radiculopathy    2. Segmental dysfunction of cervical region    3. Segmental dysfunction of thoracic region    4. Segmental dysfunction of lumbar region    5. Cervicalgia    6. DDD (degenerative disc disease), lumbar        Patient's condition:  Patient had restrictions pre-manipulation and Patient had decreased motion prior to manipulation    Treatment effectiveness:  Post manipulation there is better intersegmental movement and Patient claims to feel looser post manipulation      Procedures:  CMT:  20659 Chiropractic " Treatment  Cervical: Diversified, C1, C2, Supine  Thoracic: Diversified, T1, T5,  Prone       Modalities:  84462: US:  1.0 Lane/cm squared for 8 minutes at 1.0mhz  continuous pulsed, Location: bilateral upper traps at C7 level     Therapeutic procedures:  None      Prognosis: Good    Progress towards Goals: Patient is making progress towards the goal of:   Reduce numbness and tingling into bilateral feet.  Decrease pain in neck and shoulders from 6-10/10 to 4/10 in 4 visits.  Decrease Neck Disability from 38% to 20% in 4 visits.    Response to Treatment:   Reduction of symptoms overall      Recommendations:    Instructions:ice 20 minutes every other hour as needed and stretch as instructed at visit    Follow-up:  Return to care as needed, per patient's work schedule. Patient would like to be referred to Dr. Bonilla Samuels at [facility] for a second opinion.

## 2017-07-26 NOTE — MR AVS SNAPSHOT
After Visit Summary   7/26/2017    Bulmaro Herrera    MRN: [id number]           Patient Information     Date Of Birth          1965        Visit Information        Provider Department      7/26/2017 9:00 AM Verito Candelario DC [facility]        Today's Diagnoses     Herniation of intervertebral disc of cervical spine without radiculopathy        Segmental dysfunction of cervical region        Segmental dysfunction of thoracic region        Segmental dysfunction of lumbar region        Cervicalgia        DDD (degenerative disc disease), lumbar           Follow-ups after your visit        Additional Services     ORTHO  REFERRAL       [facility] is referring you to the Orthopedic  Services at [facility].       The  Representative will assist you in the coordination of your Orthopedic and Musculoskeletal Care as prescribed by your physician.    The  Representative will call you within 1 business day to help schedule your appointment, or you may contact the  Representative at:    All areas ~ [phone]     Type of Referral : Spine: Cervical / Thoracic: Cervical / Thoracic Spine Surgeon  - Dr. Wei Samuels      Timeframe requested: Within 2 weeks    Coverage of these services is subject to the terms and limitations of your health insurance plan.  Please call member services at your health plan with any benefit or coverage questions.      If X-rays, CT or MRI's have been performed, please contact the facility where they were done to arrange for , prior to your scheduled appointment.  Please bring this referral request to your appointment and present it to your specialist.                  Who to contact     If you have questions or need follow up information about today's clinic visit or your schedule please contact [facility] directly at  [phone].  Normal or non-critical lab and imaging results will be communicated to you by MyChart, letter or phone within 4 business days after the clinic has received the results. If you do not hear from us within 7 days, please contact the clinic through METRIXWAREhart or phone. If you have a critical or abnormal lab result, we will notify you by phone as soon as possible.  Submit refill requests through Rank By Search or call your pharmacy and they will forward the refill request to us. Please allow 3 business days for your refill to be completed.          Additional Information About Your Visit        METRIXWAREhart Information     Rank By Search gives you secure access to your electronic health record. If you see a primary care provider, you can also send messages to your care team and make appointments. If you have questions, please call your primary care clinic.  If you do not have a primary care provider, please call [phone] and they will assist you.        Care EveryWhere ID     This is your Care EveryWhere ID. This could be used by other organizations to access your [place] medical records  [id number]         Blood Pressure from Last 3 Encounters:   05/10/17 125/88   01/31/17 120/86   09/25/16 (!) 127/94    Weight from Last 3 Encounters:   12/22/16 107.5 kg (237 lb)   12/22/16 107.5 kg (237 lb)   09/25/16 104.3 kg (230 lb)              We Performed the Following     CHIROPRAC MANIP,SPINAL,1-2 REGIONS     ORTHO  REFERRAL     ULTRASOUND THERAPY        Primary Care Provider Office Phone # Fax #    [facility] Steve Rosado [phone]       [address]   [facility] [address]        Equal Access to Services     [facility]: Hadii cruz vickers hadashlucina Soomar, waaxda luqadaha, qaybta kaalmada tracee, darin stanton. So [facility] [phone].    ATENCIÓN: Si habla español, tiene a nair disposición servicios gratuitos de asistencia lingüística. Llame al  [phone].    We comply with applicable federal civil rights laws and [place] laws. We do not discriminate on the basis of race, color, national origin, age, disability sex, sexual orientation or gender identity.            Thank you!     Thank you for choosing [facility]  for your care. Our goal is always to provide you with excellent care. Hearing back from our patients is one way we can continue to improve our services. Please take a few minutes to complete the written survey that you may receive in the mail after your visit with us. Thank you!             Your Updated Medication List - Protect others around you: Learn how to safely use, store and throw away your medicines at www.disposemymeds.org.          This list is accurate as of: 7/26/17  9:21 AM.  Always use your most recent med list.                   Brand Name Dispense Instructions for use Diagnosis    acetaminophen-codeine 300-30 MG per tablet    TYLENOL #3    60 tablet    Take 1 tablet by mouth every 4 hours as needed for moderate pain    Cervicalgia       albuterol 108 (90 BASE) MCG/ACT Inhaler    PROAIR HFA/PROVENTIL HFA/VENTOLIN HFA    1 Inhaler    Inhale 2 puffs into the lungs every 6 hours as needed for shortness of breath / dyspnea or wheezing    Mild intermittent asthma without complication       azithromycin 250 MG tablet    ZITHROMAX    6 tablet    Two tablets first day, then one tablet daily for four days.    Acute bronchitis with symptoms > 10 days       diazepam 10 MG tablet    VALIUM    2 tablet    Take 1 tablet (10 mg) by mouth every 6 hours as needed for anxiety or sleep (Before appointment) Take 30-60 minutes before procedure.  Do not operate a vehicle after taking this medication.    Anxiety       guaiFENesin-codeine 100-10 MG/5ML Soln solution    ROBITUSSIN AC    120 mL    Take 5-10 mLs by mouth every 4 hours as needed    Cough       * omeprazole 20 MG CR capsule    priLOSEC    180 capsule    Take 2  capsules (40 mg) by mouth daily    Gastroesophageal reflux disease without esophagitis       * omeprazole 40 MG capsule    priLOSEC    90 capsule    TAKE ONE CAPSULE BY MOUTH EVERY DAY    Gastroesophageal reflux disease without esophagitis       * Notice:  This list has 2 medication(s) that are the same as other medications prescribed for you. Read the directions carefully, and ask your doctor or other care provider to review them with you.

## 2017-08-08 ENCOUNTER — MYC REFILL (OUTPATIENT)
Dept: FAMILY MEDICINE | Facility: CLINIC | Age: 52
End: 2017-08-08

## 2017-08-08 DIAGNOSIS — M54.2 CERVICALGIA: ICD-10-CM

## 2017-08-08 DIAGNOSIS — K21.9 GASTROESOPHAGEAL REFLUX DISEASE WITHOUT ESOPHAGITIS: ICD-10-CM

## 2017-08-08 RX ORDER — OMEPRAZOLE 40 MG/1
40 CAPSULE, DELAYED RELEASE ORAL DAILY
Qty: 90 CAPSULE | Refills: 0 | Status: SHIPPED | OUTPATIENT
Start: 2017-08-08 | End: 2017-08-10

## 2017-08-08 NOTE — TELEPHONE ENCOUNTER
OMEPRAZOLE      Last Written Prescription Date: 6/8/17  Last Fill Quantity: 90,  # refills: 0   Last Office Visit with [facility], [facility] or [facility] prescribing provider: 2/18/16                                         Next 5 appointments (look out 90 days)     Aug 24, 2017  5:20 PM CDT   PHYSICAL with Roger Rosado MD   [facility] ([address] [address]   [phone]                 TYLENOL #3      Last Written Prescription Date:  7/12/17  Last Fill Quantity: 60,   # refills: 0  Last Office Visit with [facility], [facility] or [facility] prescribing provider: 2/18/16  Future Office visit:    Next 5 appointments (look out 90 days)     Aug 24, 2017  5:20 PM CDT   PHYSICAL with Roger Rosado MD   [facility] ([facility])    [address] [place] [address]   [phone]                   Routing refill request to provider for review/approval because:  Drug not on the [facility], [facility] or [facility] refill protocol or controlled substance

## 2017-08-08 NOTE — TELEPHONE ENCOUNTER
Message from Kidboxhart:  Original authorizing provider: MD Bulmaro Patrick would like a refill of the following medications:  omeprazole (PRILOSEC) 40 MG capsule [Roger Rosado MD]  acetaminophen-codeine (TYLENOL #3) 300-30 MG per tablet [Roger Rosado MD]    [facility] [place] [id number] [facility]     Comment:

## 2017-08-10 ENCOUNTER — TELEPHONE (OUTPATIENT)
Dept: PULMONOLOGY | Facility: CLINIC | Age: 52
End: 2017-08-10

## 2017-08-10 ENCOUNTER — OFFICE VISIT (OUTPATIENT)
Dept: FAMILY MEDICINE | Facility: CLINIC | Age: 52
End: 2017-08-10
Payer: COMMERCIAL

## 2017-08-10 VITALS
OXYGEN SATURATION: 97 % | DIASTOLIC BLOOD PRESSURE: 70 MMHG | RESPIRATION RATE: 16 BRPM | HEART RATE: 82 BPM | TEMPERATURE: 96.4 F | BODY MASS INDEX: 33.5 KG/M2 | WEIGHT: 247 LBS | SYSTOLIC BLOOD PRESSURE: 104 MMHG

## 2017-08-10 DIAGNOSIS — R06.02 SHORTNESS OF BREATH: Primary | ICD-10-CM

## 2017-08-10 PROCEDURE — 99214 OFFICE O/P EST MOD 30 MIN: CPT | Performed by: OBSTETRICS & GYNECOLOGY

## 2017-08-10 PROCEDURE — 93000 ELECTROCARDIOGRAM COMPLETE: CPT | Performed by: OBSTETRICS & GYNECOLOGY

## 2017-08-10 ASSESSMENT — PAIN SCALES - GENERAL: PAINLEVEL: NO PAIN (0)

## 2017-08-10 NOTE — NURSING NOTE
Chief Complaint   Patient presents with     chest congestion       Initial /70 (BP Location: Right arm, Patient Position: Chair, Cuff Size: Adult Large)  Pulse 82  Temp 96.4  F (35.8  C) (Tympanic)  Resp 16  Wt 247 lb (112 kg)  SpO2 97%  BMI 33.5 kg/m2 Estimated body mass index is 33.5 kg/(m^2) as calculated from the following:    Height as of 12/22/16: 6' (1.829 m).    Weight as of this encounter: 247 lb (112 kg)..   BP completed using cuff size: large  Medication Rec Completed    Lilliana Simons, CMA

## 2017-08-10 NOTE — TELEPHONE ENCOUNTER
Reason for Call:  Other call back    Detailed comments: Dr. Rainey referring for shortness of breath, first available is November, patient will be having a PFT and cardio work up, Dr. Ortiz requesting to see if patient can be worked in on Aug. 25th. Please call patient if possible     Phone Number Patient can be reached at: Cell number on file:    Telephone Information:   Mobile [phone]       Best Time: asap     Can we leave a detailed message on this number? YES    Call taken on 8/10/2017 at 9:46 AM by Connie Pearson

## 2017-08-10 NOTE — MR AVS SNAPSHOT
After Visit Summary   8/10/2017    Bulmaro Herrera    MRN: [id number]           Patient Information     Date Of Birth          1965        Visit Information        Provider Department      8/10/2017 8:50 AM Ayaan Rainey MD [facility]        Today's Diagnoses     Shortness of breath    -  1       Follow-ups after your visit        Additional Services     PULMONARY MEDICINE REFERRAL       Your provider has referred you to: [facility]: [facility] [phone]   [email or web address]    Please be aware that coverage of these services is subject to the terms and limitations of your health insurance plan.  Call member services at your health plan with any benefit or coverage questions.      Please bring the following with you to your appointment:    (1) Any X-Rays, CTs or MRIs which have been performed.  Contact the facility where they were done to arrange for  prior to your scheduled appointment.    (2) List of current medications   (3) This referral request   (4) Any documents/labs given to you for this referral                  Your next 10 appointments already scheduled     Aug 23, 2017  4:00 PM CDT   Pulmonary Function with [facility]AT[facility]APY   [facility] ([facility])    [address] [place] [address]   [facility] [id number] [phone]           No Inhalers for 6 hours prior to test No Smoking 2 hours prior to test            Aug 24, 2017  5:20 PM CDT   PHYSICAL with Roger Rosado MD   [facility] ([address] [address]   [phone]            Nov 10, 2017  3:30 PM CST   New Visit with Harris Deras MD   [facility] ([address] [address] [phone]              Future tests that were ordered for you today     Open  Future Orders        Priority Expected Expires Ordered    NM Lexiscan stress test Routine  8/10/2018 8/10/2017    General PFT Lab (Please always keep checked) Routine  8/10/2018 8/10/2017    Pulmonary Function Test Routine  8/10/2018 8/10/2017    General PFT Lab (Please always keep checked) Routine  8/10/2018 8/10/2017    Pulmonary Function Test Routine  8/10/2018 8/10/2017            Who to contact     If you have questions or need follow up information about today's clinic visit or your schedule please contact [facility] directly at [phone].  Normal or non-critical lab and imaging results will be communicated to you by SNAPP'hart, letter or phone within 4 business days after the clinic has received the results. If you do not hear from us within 7 days, please contact the clinic through Spruikt or phone. If you have a critical or abnormal lab result, we will notify you by phone as soon as possible.  Submit refill requests through EchoPixel or call your pharmacy and they will forward the refill request to us. Please allow 3 business days for your refill to be completed.          Additional Information About Your Visit        SNAPP'harGolden Star Resources Information     EchoPixel gives you secure access to your electronic health record. If you see a primary care provider, you can also send messages to your care team and make appointments. If you have questions, please call your primary care clinic.  If you do not have a primary care provider, please call [phone] and they will assist you.        Care EveryWhere ID     This is your Care EveryWhere ID. This could be used by other organizations to access your [place] medical records  [id number]        Your Vitals Were     Pulse Temperature Respirations Pulse Oximetry BMI (Body Mass Index)       82 96.4  F (35.8  C) (Tympanic) 16 97% 33.5 kg/m2        Blood Pressure from Last 3 Encounters:   08/10/17 104/70   05/10/17 125/88   01/31/17 120/86    Weight from Last 3  Encounters:   08/10/17 247 lb (112 kg)   12/22/16 237 lb (107.5 kg)   12/22/16 237 lb (107.5 kg)              We Performed the Following     PULMONARY MEDICINE REFERRAL        Primary Care Provider Office Phone # Fax #    Roger Rosado [phone] [address] [place] [address]        Equal Access to Services     [facility]: Hadii aad ku hadasho Soomaali, waaxda luqadaha, qaybta kaalmada adeegyada, waxay idiin hayaan adeeg khbryantsh la'aan ah. So [facility] [phone].    ATENCIÓN: Si habla español, tiene a nair disposición servicios gratuitos de asistencia lingüística. [facility] [phone].    We comply with applicable federal civil rights laws and [place] laws. We do not discriminate on the basis of race, color, national origin, age, disability sex, sexual orientation or gender identity.            Thank you!     Thank you for choosing [facility]  for your care. Our goal is always to provide you with excellent care. Hearing back from our patients is one way we can continue to improve our services. Please take a few minutes to complete the written survey that you may receive in the mail after your visit with us. Thank you!             Your Updated Medication List - Protect others around you: Learn how to safely use, store and throw away your medicines at www.disposemymeds.org.          This list is accurate as of: 8/10/17 10:00 AM.  Always use your most recent med list.                   Brand Name Dispense Instructions for use Diagnosis    acetaminophen-codeine 300-30 MG per tablet    TYLENOL #3    60 tablet    Take 1 tablet by mouth every 4 hours as needed for moderate pain    Cervicalgia       albuterol 108 (90 BASE) MCG/ACT Inhaler    PROAIR HFA/PROVENTIL HFA/VENTOLIN HFA    1 Inhaler    Inhale 2 puffs into the lungs every 6 hours as needed for shortness of breath / dyspnea or wheezing    Mild intermittent asthma without complication       diazepam 10 MG tablet     VALIUM    2 tablet    Take 1 tablet (10 mg) by mouth every 6 hours as needed for anxiety or sleep (Before appointment) Take 30-60 minutes before procedure.  Do not operate a vehicle after taking this medication.    Anxiety       omeprazole 20 MG CR capsule    priLOSEC    180 capsule    Take 2 capsules (40 mg) by mouth daily    Gastroesophageal reflux disease without esophagitis

## 2017-08-10 NOTE — PROGRESS NOTES
Subjective: he gets an occasional cough but feels a certain tightness at times. He smoked 2  1/2 packs per day for almost 20 years but quit 10 years ago. No hemoptysis reported.  No wheezing.  It is the sense of tightness or possibly difficult breathing that worries him. It comes and goes. No dyspnea on exertion. No PND.  Sx for the past few months.      The past medical history, social history, past surgical history and family history as shown below have been reviewed by me today.  Past Medical History:   Diagnosis Date     Back pains      Depressive disorder, not elsewhere classified 1/28/2007    declines Rx     Disc degeneration 12/2008    see MRI -throaco-lumbar mild discogenic degenerative changes     Elevated LFT's 11/09    alt and ast     Other and unspecified hyperlipidemia 1/2007     Sleep disorder 9/09    CPAP     Tobacco use disorder         Allergies   Allergen Reactions     Percocet [Oxycodone-Acetaminophen] Itching     Wasps [Hornets] Swelling     Current Outpatient Prescriptions   Medication Sig Dispense Refill     albuterol (PROAIR HFA, PROVENTIL HFA, VENTOLIN HFA) 108 (90 BASE) MCG/ACT inhaler Inhale 2 puffs into the lungs every 6 hours as needed for shortness of breath / dyspnea or wheezing 1 Inhaler 0     acetaminophen-codeine (TYLENOL #3) 300-30 MG per tablet Take 1 tablet by mouth every 4 hours as needed for moderate pain (Patient not taking: Reported on 8/10/2017) 60 tablet 0     diazepam (VALIUM) 10 MG tablet Take 1 tablet (10 mg) by mouth every 6 hours as needed for anxiety or sleep (Before appointment) Take 30-60 minutes before procedure.  Do not operate a vehicle after taking this medication. (Patient not taking: Reported on 8/10/2017) 2 tablet 0     omeprazole (PRILOSEC) 20 MG capsule Take 2 capsules (40 mg) by mouth daily (Patient not taking: Reported on 8/10/2017) 180 capsule 3     Past Surgical History:   Procedure Laterality Date     C NONSPECIFIC PROCEDURE      rt hand surgery -  laceration/glass/tendons     COLONOSCOPY  08    adenomatous polyp repeat 5 years     ENT SURGERY      for deviated septum     HC ECHO HEART XTHORACIC, STRESS/REST  2009    neg     INJECT EPIDURAL CERVICAL N/A 2015    Procedure: INJECT EPIDURAL CERVICAL;  Surgeon: Christian Chaudhry MD;  Location: [facility] OR     ORTHOPEDIC SURGERY      Right hand     TONSILLECTOMY       Social History     Social History     Marital status:      Spouse name: N/A     Number of children: N/A     Years of education: N/A     Occupational History      Self     Social History Main Topics     Smoking status: Former Smoker     Packs/day: 1.50     Quit date: 2007     Smokeless tobacco: Never Used     Alcohol use Yes      Comment: 3-4 times/year     Drug use: No     Sexual activity: Yes     Partners: Female     Other Topics Concern     Parent/Sibling W/ Cabg, Mi Or Angioplasty Before 65f 55m? No     Social History Narrative    Dairy/d a lot, 1 gallon of milk per day plus cheese and other servings     Caffeine 48 oz. coffee per day    Exercise none    Sunscreen used - No    Seatbelts used - Yes    Working smoke/CO detectors in the home - Yes    Guns stored in the home - No    Self Breast Exams - NA    Self Testicular Exam - No    Eye Exam up to date - No    Dental Exam up to date - Yes    Pap Smear up to date - NA    Mammogram up to date - NA    PSA up to date - No    Dexa Scan up to date - NA    Flex Sig / Colonoscopy up to date - No    Immunizations up to date - No    Abuse: Current or Past(Physical, Sexual or Emotional)- No    Do you feel safe in your environment - Yes    Tms,cma        Lives with spouse and her parents.     Family History   Problem Relation Age of Onset     Breast Cancer Mother      Arthritis Mother      joint ?     Depression Father      CANCER Maternal Grandmother      C.A.D. Maternal Grandfather      Cardiovascular Maternal Grandfather      Gynecology Paternal Grandmother       giving  birth     Prostate Cancer Paternal Grandfather      Genetic Disorder Brother      joint pain?     Depression Brother      Cardiovascular Son      congenital heart defect -       Prostate Cancer Other       age 70's     DIABETES Other        ROS: A 12 point review of systems was done. Except for what is listed above in the HPI, the systems review is negative .      Objective: Vital signs: Blood pressure 104/70, pulse 82, temperature 96.4  F (35.8  C), temperature source Tympanic, resp. rate 16, weight 247 lb (112 kg), SpO2 97 %.    HEENT:    Sclerae and conjunctiva are normal.   Ear canals and TMs look normal.  Nasal mucosa is pink  - no polyps or masses seen.  sinuses are non tender to palpation.  Throat is unremarkable . Mucous membranes are moist.   Neck is supple, mobile, no adenopathy or masses palpable. The thyroid feels normal.   Normal range of motion noted.  Chest is clear to auscultation.  No wheezes, rales or rhonchi heard.  Cardiac exam is normal with s1, s2, no murmurs or adventitious sounds.Normal rate and rhythm is heard.     O2 sat 97% on room air.    EKG  Shows no pathologic Q waves  or ST changes.          Assessment/Plan:    1. Sense of dyspnea- 40-50 pack year history of smoking- quit 10 years ago.   R/o COPD   R/o underlying coronary artery disease. I offered CXR but he declined- had on 2016- it was normal.    2. We will arrange for formal PFTs and then pulmonary consult.    3. We will set up cardiac stress testing after the PFTs completed.    4. rechekc acutely if sx worse.    KRYSTAL Rainey MD

## 2017-08-11 DIAGNOSIS — M54.2 BILATERAL NECK PAIN: Primary | ICD-10-CM

## 2017-08-14 ENCOUNTER — TRANSFERRED RECORDS (OUTPATIENT)
Dept: HEALTH INFORMATION MANAGEMENT | Facility: CLINIC | Age: 52
End: 2017-08-14

## 2017-08-15 ENCOUNTER — OFFICE VISIT (OUTPATIENT)
Dept: NEUROSURGERY | Facility: CLINIC | Age: 52
End: 2017-08-15
Payer: COMMERCIAL

## 2017-08-15 VITALS
HEIGHT: 72 IN | OXYGEN SATURATION: 97 % | DIASTOLIC BLOOD PRESSURE: 93 MMHG | SYSTOLIC BLOOD PRESSURE: 126 MMHG | BODY MASS INDEX: 32.7 KG/M2 | WEIGHT: 241.4 LBS | HEART RATE: 86 BPM | RESPIRATION RATE: 16 BRPM

## 2017-08-15 DIAGNOSIS — M50.30 DEGENERATIVE DISC DISEASE, CERVICAL: Primary | ICD-10-CM

## 2017-08-15 PROCEDURE — 99213 OFFICE O/P EST LOW 20 MIN: CPT | Performed by: NURSE PRACTITIONER

## 2017-08-15 RX ORDER — METHOCARBAMOL 750 MG/1
750 TABLET, FILM COATED ORAL 2 TIMES DAILY PRN
Qty: 60 TABLET | Refills: 0 | Status: SHIPPED | OUTPATIENT
Start: 2017-08-15 | End: 2018-02-05

## 2017-08-15 ASSESSMENT — PAIN SCALES - GENERAL: PAINLEVEL: SEVERE PAIN (6)

## 2017-08-15 NOTE — PROGRESS NOTES
"[facility]  Neurosurgery followup:    The following is a transcription of a shared visit between Dr. Wei Samuels and myself, Caren Stringer CNP        HPI: Mr Herrera is a 52 year old male with posterior neck pain for the past 5-6 years.  He was seen lastly at our office by Dr. Demetrius Tran on 12/22/16 at which time an updated MRI was recommended.  The patient states Dr. Tran had recommended PT and had told him he was not a candidate for surgery.  Prior to the appointment with Dr. Tran the patient had gone to [facility] and had multiple injections along with PT which did not give him long-term relief.  He states the provider at [facility] had also recommended surgery.  The patient has been unable to f/u with them due to insurance barriers.  The patient is here today to review recent MRI results and meet with Dr. Samuels for a second opinion. Today he states the pain has become progressively worse and he describes it as a constant aching pain to the posterior neck. He denies radicular symptoms or BUE weakness.  The pain is more noticeable to him during down time.  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   He is taking narcotic pain medication and states, \"I only take it at night.  I hate the way it makes me feel.\"  He denies difficulty with gait or balance or changes to bowel or bladder.    Exam:  Constitutional:  Alert, well nourished, NAD.  HEENT: Normocephalic, atraumatic.   Pulm:  Without shortness of breath   CV:  No pitting edema of BLE.     Neurological:  Awake  Alert  Oriented x 3  Motor exam:     Shoulder Abduction:  Right:  5/5    Left:  5/5  Biceps:                      Right:  5/5    Left:  5/5  Triceps:                     Right:  5/5    Left:  5/5  Wrist Extensors:       Right:  5/5    Left:  5/5  Wrist Flexors:           Right:  5/5    Left:  5/5  Intrinsics:                  Right:  5/5    Left:  5/5     Able to spontaneously move " U/E bilaterally  Sensation intact throughout all U/E dermatomes  Dumont's negative    Cervical examination reveals tenderness along paravertebral bodies and paraspinous musculature bilaterally.  ROM causes pain most notably with extension which is limited.    Imaging:  Cervical MRI with disc protrusion at C5-6 with mild central stenosis and ventral cord flattening; moderate severe bilateral foraminal narrowing.      A/P:   Cervical DDD with stenosis    Mr Herrera is a 52 year old male with posterior neck pain for the past 5-6 years.  He was seen lastly at our office by Dr. Demetrius Tran on 12/22/16 at which time an updated MRI was recommended.  The patient states Dr. Tran had recommended PT and had told him he was not a candidate for surgery.  Prior to the appointment with Dr. Tran the patient had gone to [facility] and had multiple injections along with PT which did not give him long-term relief.  He states the provider at [facility] had also recommended surgery.  The patient has been unable to f/u with them due to insurance barriers.  The patient is here today to review recent MRI results and meet with Dr. Samuels for a second opinion. Today he states the pain has become progressively worse and he describes it as a constant aching pain to the posterior neck. He denies radicular symptoms or BUE weakness.   Dr. Samuels met with the patient today and reviewed MRI results.  He is not recommending surgery.  We discussed recommendation of PT and muscle relaxants and injections.  The patient was prescribed a trial of Robaxin and was instructed not to take while driving.  We offered a referral for comprehensive care, but he declined.  He will f/u with his primary provider if he finds Robaxin helpful for further management.    Patient Instructions   Trial robaxin as needed.  Please contact the clinic if questions at [phone].      Caren Stringer [facility]  [facility]  [address]  [address]  [place] [address]    Tel [phone]  Pager [phone]

## 2017-08-15 NOTE — MR AVS SNAPSHOT
After Visit Summary   8/15/2017    Bulmaro Herrera    MRN: [id number]           Patient Information     Date Of Birth          1965        Visit Information        Provider Department      8/15/2017 11:40 AM Caren Stringer NP [facility]        Today's Diagnoses     Degenerative disc disease, cervical    -  1      Care Instructions    Trial robaxin as needed.  Please contact the clinic if questions at [phone].            Follow-ups after your visit        Your next 10 appointments already scheduled     Aug 23, 2017  4:00 PM CDT   Pulmonary Function with NL RESPIRATORY THERAPY   [facility] ([facility])    [address] [place] [address] [address] [place] [address]   [phone]           No Inhalers for 6 hours prior to test No Smoking 2 hours prior to test            Aug 24, 2017  5:20 PM CDT   PHYSICAL with Roger Rosado MD   [facility]    [address] [place] [address]   [phone]            Aug 25, 2017  2:00 PM CDT   New Visit with Harris Deras MD   [facility] ([facility])    [address] [place] [address]   [phone]              Who to contact     If you have questions or need follow up information about today's clinic visit or your schedule please contact [facility] directly at [phone].  Normal or non-critical lab and imaging results will be communicated to you by MyChart, letter or phone within 4 business days after the clinic has received the results. If you do not hear from us within 7 days, please contact the clinic through MyChart or phone. If you have a critical or abnormal lab result, we will notify you by phone as soon as possible.  Submit refill requests through [employer] or call your pharmacy and they will forward the refill request to us. Please allow 3 business days for your refill to  be completed.          Additional Information About Your Visit        [employer] Information     [employer] gives you secure access to your electronic health record. If you see a primary care provider, you can also send messages to your care team and make appointments. If you have questions, please call your primary care clinic.  If you do not have a primary care provider, please call [phone] and they will assist you.        Care EveryWhere ID     This is your Care EveryWhere ID. This could be used by other organizations to access your [facility] medical records  [id number]        Your Vitals Were     Pulse Respirations Height Pulse Oximetry BMI (Body Mass Index)       86 16 6' (1.829 m) 97% 32.74 kg/m2        Blood Pressure from Last 3 Encounters:   08/15/17 (!) 126/93   08/10/17 104/70   05/10/17 125/88    Weight from Last 3 Encounters:   08/15/17 241 lb 6.4 oz (109.5 kg)   08/10/17 247 lb (112 kg)   12/22/16 237 lb (107.5 kg)              Today, you had the following     No orders found for display         Today's Medication Changes          These changes are accurate as of: 8/15/17  1:18 PM.  If you have any questions, ask your nurse or doctor.               Start taking these medicines.        Dose/Directions    methocarbamol 750 MG tablet   Commonly known as:  ROBAXIN   Used for:  Degenerative disc disease, cervical   Started by:  Caren Stringer, NP        Dose:  750 mg   Take 1 tablet (750 mg) by mouth 2 times daily as needed for muscle spasms (DO NOT take while driving)   Quantity:  60 tablet   Refills:  0            Where to get your medicines      These medications were sent to [facility]  [address] [place] [address]     Phone:  [phone]     methocarbamol 750 MG tablet                Primary Care Provider Office Phone # Fax #    Roger Rosaod [phone]       [place] [address]        Equal  Access to Services     [facility]: Hadii cruz vickers alonzo Chavez, wamameda luqadaha, qaybta kamelissadarin yusuf. So [facility] [phone].    ATENCIÓN: Si nancyla raina, tiene a nair disposición servicios gratuitos de asistencia lingüística. Llame al [phone].    We comply with applicable federal civil rights laws and [place] laws. We do not discriminate on the basis of race, color, national origin, age, disability sex, sexual orientation or gender identity.            Thank you!     Thank you for choosing [facility]  for your care. Our goal is always to provide you with excellent care. Hearing back from our patients is one way we can continue to improve our services. Please take a few minutes to complete the written survey that you may receive in the mail after your visit with us. Thank you!             Your Updated Medication List - Protect others around you: Learn how to safely use, store and throw away your medicines at www.disposemymeds.org.          This list is accurate as of: 8/15/17  1:18 PM.  Always use your most recent med list.                   Brand Name Dispense Instructions for use Diagnosis    acetaminophen-codeine 300-30 MG per tablet    TYLENOL #3    60 tablet    Take 1 tablet by mouth every 4 hours as needed for moderate pain    Cervicalgia       albuterol 108 (90 BASE) MCG/ACT Inhaler    PROAIR HFA/PROVENTIL HFA/VENTOLIN HFA    1 Inhaler    Inhale 2 puffs into the lungs every 6 hours as needed for shortness of breath / dyspnea or wheezing    Mild intermittent asthma without complication       diazepam 10 MG tablet    VALIUM    2 tablet    Take 1 tablet (10 mg) by mouth every 6 hours as needed for anxiety or sleep (Before appointment) Take 30-60 minutes before procedure.  Do not operate a vehicle after taking this medication.    Anxiety       methocarbamol 750 MG tablet    ROBAXIN    60 tablet    Take 1 tablet (750 mg) by mouth 2 times  daily as needed for muscle spasms (DO NOT take while driving)    Degenerative disc disease, cervical       omeprazole 20 MG CR capsule    priLOSEC    180 capsule    Take 2 capsules (40 mg) by mouth daily    Gastroesophageal reflux disease without esophagitis

## 2017-08-15 NOTE — NURSING NOTE
Bulmaro Herrera is a 52 year old male who presents for:  Chief Complaint   Patient presents with     Neck Pain     Neck pain. Onset: 5-6 years, but has gotten worse in the last 2 years. NKI. Patient is here for a 2nd opinion. He has seen Dr. Tran. MRI 8/14/17.The only thing he can contribute the neck pain is when he fell off a trailer in 1989 and landing on his left shoulder, he heard a crunch. He can not sleep at night. He has to take pain pills to go to sleep. Denies any N/T in arms. His neck constantly aches.         Initial Vitals:  BP (!) 126/93  Pulse 86  Resp 16  Ht 6' (1.829 m)  Wt 241 lb 6.4 oz (109.5 kg)  SpO2 97%  BMI 32.74 kg/m2 Estimated body mass index is 32.74 kg/(m^2) as calculated from the following:    Height as of this encounter: 6' (1.829 m).    Weight as of this encounter: 241 lb 6.4 oz (109.5 kg).. Body surface area is 2.36 meters squared. BP completed using cuff size: large  Severe Pain (6)    Do you feel safe in your environment?  Yes  Do you need any refills today? No    Nursing Comments: Neck pain. Onset: 5-6 years, but has gotten worse in the last 2 years. NKI. Patient is here for a 2nd opinion. He has seen Dr. Tran. MRI 8/14/17.The only thing he can contribute the neck pain is when he fell off a trailer in 1989 and landing on his left shoulder, he heard a crunch. He can not sleep at night. He has to take pain pills to go to sleep. Denies any N/T in arms. His neck constantly aches. He has had injections in his neck but they did not help. He has seen [facility]. He has gone to physical therapy but it only lasts for 1-3 days.     Analy Mcelroy

## 2017-08-24 ENCOUNTER — OFFICE VISIT (OUTPATIENT)
Dept: FAMILY MEDICINE | Facility: CLINIC | Age: 52
End: 2017-08-24
Payer: COMMERCIAL

## 2017-08-24 ENCOUNTER — DOCUMENTATION ONLY (OUTPATIENT)
Dept: FAMILY MEDICINE | Facility: CLINIC | Age: 52
End: 2017-08-24

## 2017-08-24 VITALS
OXYGEN SATURATION: 97 % | HEART RATE: 95 BPM | TEMPERATURE: 98.1 F | DIASTOLIC BLOOD PRESSURE: 82 MMHG | WEIGHT: 249.6 LBS | SYSTOLIC BLOOD PRESSURE: 126 MMHG | BODY MASS INDEX: 33.85 KG/M2

## 2017-08-24 DIAGNOSIS — D12.6 BENIGN NEOPLASM OF COLON, UNSPECIFIED PART OF COLON: ICD-10-CM

## 2017-08-24 DIAGNOSIS — M50.30 DDD (DEGENERATIVE DISC DISEASE), CERVICAL: ICD-10-CM

## 2017-08-24 DIAGNOSIS — Z00.00 ROUTINE GENERAL MEDICAL EXAMINATION AT A HEALTH CARE FACILITY: Primary | ICD-10-CM

## 2017-08-24 PROCEDURE — 99396 PREV VISIT EST AGE 40-64: CPT | Performed by: FAMILY MEDICINE

## 2017-08-24 PROCEDURE — 99000 SPECIMEN HANDLING OFFICE-LAB: CPT | Performed by: FAMILY MEDICINE

## 2017-08-24 PROCEDURE — 80307 DRUG TEST PRSMV CHEM ANLYZR: CPT | Mod: 90 | Performed by: FAMILY MEDICINE

## 2017-08-24 RX ORDER — GABAPENTIN 300 MG/1
CAPSULE ORAL
Qty: 60 CAPSULE | Refills: 0 | Status: SHIPPED | OUTPATIENT
Start: 2017-08-24 | End: 2017-12-26

## 2017-08-24 ASSESSMENT — PATIENT HEALTH QUESTIONNAIRE - PHQ9
5. POOR APPETITE OR OVEREATING: SEVERAL DAYS
SUM OF ALL RESPONSES TO PHQ QUESTIONS 1-9: 7

## 2017-08-24 ASSESSMENT — ANXIETY QUESTIONNAIRES
1. FEELING NERVOUS, ANXIOUS, OR ON EDGE: NOT AT ALL
2. NOT BEING ABLE TO STOP OR CONTROL WORRYING: NOT AT ALL
IF YOU CHECKED OFF ANY PROBLEMS ON THIS QUESTIONNAIRE, HOW DIFFICULT HAVE THESE PROBLEMS MADE IT FOR YOU TO DO YOUR WORK, TAKE CARE OF THINGS AT HOME, OR GET ALONG WITH OTHER PEOPLE: NOT DIFFICULT AT ALL
GAD7 TOTAL SCORE: 1
7. FEELING AFRAID AS IF SOMETHING AWFUL MIGHT HAPPEN: NOT AT ALL
6. BECOMING EASILY ANNOYED OR IRRITABLE: NOT AT ALL
5. BEING SO RESTLESS THAT IT IS HARD TO SIT STILL: NOT AT ALL
3. WORRYING TOO MUCH ABOUT DIFFERENT THINGS: NOT AT ALL

## 2017-08-24 ASSESSMENT — PAIN SCALES - GENERAL: PAINLEVEL: NO PAIN (0)

## 2017-08-24 NOTE — MR AVS SNAPSHOT
After Visit Summary   8/24/2017    Bulmaro Herrera    MRN: [id number]           Patient Information     Date Of Birth          1965        Visit Information        Provider Department      8/24/2017 5:20 PM Roger Rosado MD [facility]        Today's Diagnoses     Routine general medical examination at a health care facility    -  1    DDD (degenerative disc disease), cervical        Benign neoplasm of colon, unspecified part of colon          Care Instructions      Preventive Health Recommendations  Male Ages 50 - 64    Yearly exam:             See your health care provider every year in order to  o   Review health changes.   o   Discuss preventive care.    o   Review your medicines if your doctor has prescribed any.     Have a cholesterol test every 5 years, or more frequently if you are at risk for high cholesterol/heart disease.     Have a diabetes test (fasting glucose) every three years. If you are at risk for diabetes, you should have this test more often.     Have a colonoscopy at age 50, or have a yearly FIT test (stool test). These exams will check for colon cancer.      Talk with your health care provider about whether or not a prostate cancer screening test (PSA) is right for you.    You should be tested each year for STDs (sexually transmitted diseases), if you re at risk.     Shots: Get a flu shot each year. Get a tetanus shot every 10 years.     Nutrition:    Eat at least 5 servings of fruits and vegetables daily.     Eat whole-grain bread, whole-wheat pasta and brown rice instead of white grains and rice.     Talk to your provider about Calcium and Vitamin D.     Lifestyle    Exercise for at least 150 minutes a week (30 minutes a day, 5 days a week). This will help you control your weight and prevent disease.     Limit alcohol to one drink per day.     No smoking.     Wear sunscreen to prevent skin cancer.     See your dentist every six months for an exam and  cleaning.     See your eye doctor every 1 to 2 years.            Follow-ups after your visit        Additional Services     GASTROENTEROLOGY ADULT REF PROCEDURE ONLY       Last Lab Result: Creatinine (mg/dL)       Date                     Value                 01/28/2016               1.09             ----------  Body mass index is 33.85 kg/(m^2).     Needed:  No  Language:  English    Patient will be contacted to schedule procedure.     Please be aware that coverage of these services is subject to the terms and limitations of your health insurance plan.  Call member services at your health plan with any benefit or coverage questions.  Any procedures must be performed at a [facility] facility OR coordinated by your clinic's referral office.    Please bring the following with you to your appointment:    (1) Any X-Rays, CTs or MRIs which have been performed.  Contact the facility where they were done to arrange for  prior to your scheduled appointment.    (2) List of current medications   (3) This referral request   (4) Any documents/labs given to you for this referral                  Your next 10 appointments already scheduled     Aug 29, 2017  4:00 PM CDT   Pulmonary Function with NL RESPIRATORY THERAPY   [facility] ([facility])    [address] [place] [address] [phone]           No Inhalers for 6 hours prior to test No Smoking 2 hours prior to test              Who to contact     If you have questions or need follow up information about today's clinic visit or your schedule please contact [facility] directly at [phone].  Normal or non-critical lab and imaging results will be communicated to you by MyChart, letter or phone within 4 business days after the clinic has received the results. If you do not hear from us within 7 days, please contact the clinic through MyChart or phone. If you have a critical or abnormal  lab result, we will notify you by phone as soon as possible.  Submit refill requests through [employer] or call your pharmacy and they will forward the refill request to us. Please allow 3 business days for your refill to be completed.          Additional Information About Your Visit        [employer] Information     [employer] gives you secure access to your electronic health record. If you see a primary care provider, you can also send messages to your care team and make appointments. If you have questions, please call your primary care clinic.  If you do not have a primary care provider, please call [phone] and they will assist you.        Care EveryWhere ID     This is your Care EveryWhere ID. This could be used by other organizations to access your [facility] medical records  [id number]        Your Vitals Were     Pulse Temperature Pulse Oximetry BMI (Body Mass Index)          95 98.1  F (36.7  C) (Temporal) 97% 33.85 kg/m2         Blood Pressure from Last 3 Encounters:   08/24/17 126/82   08/15/17 (!) 126/93   08/10/17 104/70    Weight from Last 3 Encounters:   08/24/17 113.2 kg (249 lb 9.6 oz)   08/15/17 109.5 kg (241 lb 6.4 oz)   08/10/17 112 kg (247 lb)              We Performed the Following     Drug  Screen Comprehensive , Urine with Reported Meds ([employer]) (Pain Care Package)     GASTROENTEROLOGY ADULT REF PROCEDURE ONLY          Today's Medication Changes          These changes are accurate as of: 8/24/17 11:59 PM.  If you have any questions, ask your nurse or doctor.               Start taking these medicines.        Dose/Directions    gabapentin 300 MG capsule   Commonly known as:  NEURONTIN   Used for:  DDD (degenerative disc disease), cervical   Started by:  Roger Rosado MD        Take 1 tablet (300 mg) every night for 1-3 days, then 1 tablet twice daily   Quantity:  60 capsule   Refills:  0            Where to get your medicines      These medications were sent to [facility]  [place], [place] - [address]   [address] , [facility] [address]     Phone:  [phone]     gabapentin 300 MG capsule                Primary Care Provider Office Phone # Fax #    Roger Steve Rosado [phone]       [address]   [facility] [address]        Equal Access to Services     [facility]: Hadii aad ku hadasho Soomaali, waaxda luqadaha, qaybta kaalmada adeegyada, waxay idiin hayaan adeeg kharash la'aan . So [facility] [phone].    ATENCIÓN: Si habla español, tiene a nair disposición servicios gratuitos de asistencia lingüística. [facility] [phone].    We comply with applicable federal civil rights laws and [place] laws. We do not discriminate on the basis of race, color, national origin, age, disability sex, sexual orientation or gender identity.            Thank you!     Thank you for choosing [facility]  for your care. Our goal is always to provide you with excellent care. Hearing back from our patients is one way we can continue to improve our services. Please take a few minutes to complete the written survey that you may receive in the mail after your visit with us. Thank you!             Your Updated Medication List - Protect others around you: Learn how to safely use, store and throw away your medicines at www.disposemymeds.org.          This list is accurate as of: 8/24/17 11:59 PM.  Always use your most recent med list.                   Brand Name Dispense Instructions for use Diagnosis    acetaminophen-codeine 300-30 MG per tablet    TYLENOL #3    60 tablet    Take 1 tablet by mouth every 4 hours as needed for moderate pain    Cervicalgia       albuterol 108 (90 BASE) MCG/ACT Inhaler    PROAIR HFA/PROVENTIL HFA/VENTOLIN HFA    1 Inhaler    Inhale 2 puffs into the lungs every 6 hours as needed for shortness of breath / dyspnea or wheezing    Mild intermittent asthma without complication       diazepam 10 MG tablet    VALIUM    2 tablet    Take 1  tablet (10 mg) by mouth every 6 hours as needed for anxiety or sleep (Before appointment) Take 30-60 minutes before procedure.  Do not operate a vehicle after taking this medication.    Anxiety       gabapentin 300 MG capsule    NEURONTIN    60 capsule    Take 1 tablet (300 mg) every night for 1-3 days, then 1 tablet twice daily    DDD (degenerative disc disease), cervical       methocarbamol 750 MG tablet    ROBAXIN    60 tablet    Take 1 tablet (750 mg) by mouth 2 times daily as needed for muscle spasms (DO NOT take while driving)    Degenerative disc disease, cervical       omeprazole 20 MG CR capsule    priLOSEC    180 capsule    Take 2 capsules (40 mg) by mouth daily    Gastroesophageal reflux disease without esophagitis

## 2017-08-24 NOTE — PROGRESS NOTES
SUBJECTIVE:   CC: Bulmaro Herrera is an 52 year old male who presents for preventative health visit.     Healthy Habits:    Do you get at least three servings of calcium containing foods daily (dairy, green leafy vegetables, etc.)? yes    Amount of exercise or daily activities, outside of work: 0-1 hour(s) per day    Problems taking medications regularly No    Medication side effects: Yes when he wakes up in th emonring he feels like he has a hangover from the tylenol    Have you had an eye exam in the past two years? yes    Do you see a dentist twice per year? yes    Do you have sleep apnea, excessive snoring or daytime drowsiness?no      Returns to clinic to discuss annual exam topics and a number of items. He continues to suffer from cervical disc disease. Barely controlled with Tylenol 3 at bedtime. He pretty much. The pain during the day. Recent evaluation showed there was no surgical options. Wondering what to do next. Schedule for rhizotomy next.     Today's PHQ-2 Score:   PHQ-2 ( 1999 Pf[employer]) 8/24/2017 5/14/2015   Q1: Little interest or pleasure in doing things 0 0   Q2: Feeling down, depressed or hopeless 0 0   PHQ-2 Score 0 0         Abuse: Current or Past(Physical, Sexual or Emotional)- No  Do you feel safe in your environment - Yes  Social History   Substance Use Topics     Smoking status: Former Smoker     Packs/day: 1.50     Quit date: 2/14/2007     Smokeless tobacco: Never Used     Alcohol use Yes      Comment: 3-4 times/year     The patient does not drink >3 drinks per day nor >7 drinks per week.    Last PSA: No results found for: PSA    Reviewed orders with patient. Reviewed health maintenance and updated orders accordingly - Yes      Reviewed and updated as needed this visit by clinical staff  Tobacco  Allergies  Meds         Reviewed and updated as needed this visit by Provider            ROS:   ROS: 10 point ROS neg other than the symptoms noted above in the HPI.     OBJECTIVE:   /82 (BP  Location: Right arm, Patient Position: Chair, Cuff Size: Adult Regular)  Pulse 95  Temp 98.1  F (36.7  C) (Temporal)  Wt 113.2 kg (249 lb 9.6 oz)  SpO2 97%  BMI 33.85 kg/m2  EXAM:  GENERAL: healthy, alert and no distress  EYES: Eyes grossly normal to inspection, PERRL and conjunctivae and sclerae normal  HENT: ear canals and TM's normal, nose and mouth without ulcers or lesions  NECK: no adenopathy, no asymmetry, masses, or scars and thyroid normal to palpation  RESP: lungs clear to auscultation - no rales, rhonchi or wheezes  CV: regular rate and rhythm, normal S1 S2, no S3 or S4, no murmur, click or rub, no peripheral edema, and peripheral pulses strong  ABDOMEN: soft, nontender, no hepatosplenomegaly, no masses and bowel sounds normal  MS: no gross musculoskeletal defects noted, no edema  SKIN: no suspicious lesions or rashes  NEURO: Normal strength and tone, mentation intact and speech normal  PSYCH: mentation appears normal, affect normal/bright    ASSESSMENT/PLAN:       ICD-10-CM    1. Routine general medical examination at a health care facility Z00.00 TSH with free T4 reflex     CBC with platelets     Comprehensive metabolic panel     Lipid panel reflex to direct LDL     CANCELED: TSH with free T4 reflex     CANCELED: CBC with platelets     CANCELED: Comprehensive metabolic panel     CANCELED: Lipid panel reflex to direct LDL   2. DDD (degenerative disc disease), cervical M50.30 gabapentin (NEURONTIN) 300 MG capsule   3. Benign neoplasm of colon, unspecified part of colon D12.6 GASTROENTEROLOGY ADULT REF PROCEDURE ONLY     Continue current plan for cervical disc disease. Update his urine drug screen. Other annual exam items discovered. Due for colonoscopy due to history of adenomatous polyps. Recheck routine labs. See him back in 6 months    COUNSELING:  Reviewed preventive health counseling, as reflected in patient instructions         reports that he quit smoking about 10 years ago. He smoked 1.50  packs per day. He has never used smokeless tobacco.    Estimated body mass index is 33.85 kg/(m^2) as calculated from the following:    Height as of 8/15/17: 1.829 m (6').    Weight as of this encounter: 113.2 kg (249 lb 9.6 oz).       Counseling Resources:  ATP IV Guidelines  Pooled Cohorts Equation Calculator  FRAX Risk Assessment  ICSI Preventive Guidelines  Dietary Guidelines for Americans, 2010  USDA's MyPlate  ASA Prophylaxis  Lung CA Screening    Roger Rosado MD  [facility]

## 2017-08-24 NOTE — LETTER
My Asthma Action Plan  Name: Bulmaro Herrera   YOB: 1965  Date: 8/24/2017   My doctor: Roger Rosado MD   My clinic: [facility]      My Control Medicine: { :430760}  My Rescue Medicine: { :059878}  {AAP include Oral Steroid:414451} My Asthma Severity: { :376041}  My Best Peak Flow Number:***  Avoid your asthma triggers: { :165618}        {Is patient a child or adult?:807475}       GREEN ZONE   Good Control    I feel good    No cough or wheeze    Can work, sleep and play without asthma symptoms    My Peak Flow number is above *** (>80% of Best)       Take your asthma control medicine every day.     1. If exercise triggers your asthma, take your rescue medication    15 minutes before exercise or sports, and    During exercise if you have asthma symptoms  2. Spacer to use with inhaler: If you have a spacer, make sure to use it with your inhaler             YELLOW ZONE Getting Worse  I have ANY of these:    I do not feel good    Cough or wheeze    Chest feels tight    Wake up at night    My Peak Flow number is between *** (50%) and *** (80%)   1. Keep taking your Green Zone medications  2. Start taking your rescue medicine:    every 20 minutes for up to 1 hour. Then every 4 hours for 24-48 hours.  3. If you stay in the Yellow Zone for more than 12-24 hours, contact your doctor.  4. If you do not return to the Green Zone in 12-24 hours or you get worse, start taking your oral steroid medicine if prescribed by your provider.           RED ZONE Medical Alert - Get Help  I have ANY of these:    I feel awful    Medicine is not helping    Breathing getting harder    Trouble walking or talking    Nose opens wide to breathe    My Peak Flow number is below *** (50%)       1. Take your rescue medicine NOW  2. If your provider has prescribed an oral steroid medicine, start taking it NOW  3. Call your doctor NOW  4. If you are still in the Red Zone after 20 minutes and you have not reached your  doctor:    Take your rescue medicine again and    Call 911 or go to the emergency room right away    See your regular doctor within 2 weeks of an Emergency Room or Urgent Care visit for follow-up treatment.        Electronically signed by: Sole Owusu, August 24, 2017    Annual Reminders:  Meet with Asthma Educator,  Flu Shot in the Fall, consider Pneumonia Vaccination for patients with asthma (aged 19 and older).    Pharmacy:    [facility]  [place] [address] [address]                    Asthma Triggers  How To Control Things That Make Your Asthma Worse    Triggers are things that make your asthma worse.  Look at the list below to help you find your triggers and what you can do about them.  You can help prevent asthma flare-ups by staying away from your triggers.      Trigger                                                          What you can do   Cigarette Smoke  Tobacco smoke can make asthma worse. Do not allow smoking in your home, car or around you.  Be sure no one smokes at a child s day care or school.  If you smoke, ask your health care provider for ways to help you quit.  Ask family members to quit too.  Ask your health care provider for a referral to Quit Plan to help you quit smoking, or call 7-206-312-PLAN.     Colds, Flu, Bronchitis  These are common triggers of asthma. Wash your hands often.  Don t touch your eyes, nose or mouth.  Get a flu shot every year.     Dust Mites  These are tiny bugs that live in cloth or carpet. They are too small to see. Wash sheets and blankets in hot water every week.   Encase pillows and mattress in dust mite proof covers.  Avoid having carpet if you can. If you have carpet, vacuum weekly.   Use a dust mask and HEPA vacuum.   Pollen and Outdoor Mold  Some people are allergic to trees, grass, or weed pollen, or molds. Try to keep your windows closed.  Limit time out doors  when pollen count is high.   Ask you health care provider about taking medicine during allergy season.     Animal Dander  Some people are allergic to skin flakes, urine or saliva from pets with fur or feathers. Keep pets with fur or feathers out of your home.    If you can t keep the pet outdoors, then keep the pet out of your bedroom.  Keep the bedroom door closed.  Keep pets off cloth furniture and away from stuffed toys.     Mice, Rats, and Cockroaches  Some people are allergic to the waste from these pests.   Cover food and garbage.  Clean up spills and food crumbs.  Store grease in the refrigerator.   Keep food out of the bedroom.   Indoor Mold  This can be a trigger if your home has high moisture. Fix leaking faucets, pipes, or other sources of water.   Clean moldy surfaces.  Dehumidify basement if it is damp and smelly.   Smoke, Strong Odors, and Sprays  These can reduce air quality. Stay away from strong odors and sprays, such as perfume, powder, hair spray, paints, smoke incense, paint, cleaning products, candles and new carpet.   Exercise or Sports  Some people with asthma have this trigger. Be active!  Ask your doctor about taking medicine before sports or exercise to prevent symptoms.    Warm up for 5-10 minutes before and after sports or exercise.     Other Triggers of Asthma  Cold air:  Cover your nose and mouth with a scarf.  Sometimes laughing or crying can be a trigger.  Some medicines and food can trigger asthma.

## 2017-08-25 ENCOUNTER — TELEPHONE (OUTPATIENT)
Dept: FAMILY MEDICINE | Facility: CLINIC | Age: 52
End: 2017-08-25

## 2017-08-25 ASSESSMENT — ANXIETY QUESTIONNAIRES: GAD7 TOTAL SCORE: 1

## 2017-08-25 ASSESSMENT — ASTHMA QUESTIONNAIRES: ACT_TOTALSCORE: 22

## 2017-08-25 NOTE — PROGRESS NOTES
Called patient and let him know, he stated that he would come in this weekend and get them done.     Sole Owusu, CMA

## 2017-08-25 NOTE — PROGRESS NOTES
Patient did not show up for lab. Orders were canceled and reordered as future for 1 week.  Please contact patient to come back in.  Thanks, Mavis Noriega

## 2017-08-25 NOTE — TELEPHONE ENCOUNTER
Called patient to schedule colonoscopy, he states he will check his work schedule and call back to schedule    Schedule with Dr. Rosado

## 2017-08-27 PROBLEM — M99.02 SEGMENTAL DYSFUNCTION OF THORACIC REGION: Status: RESOLVED | Noted: 2017-03-21 | Resolved: 2017-08-27

## 2017-08-27 PROBLEM — M99.01 SEGMENTAL DYSFUNCTION OF CERVICAL REGION: Status: RESOLVED | Noted: 2017-03-21 | Resolved: 2017-08-27

## 2017-08-27 PROBLEM — M99.03 SEGMENTAL DYSFUNCTION OF LUMBAR REGION: Status: RESOLVED | Noted: 2017-03-21 | Resolved: 2017-08-27

## 2017-09-01 NOTE — TELEPHONE ENCOUNTER
Patients wife scheduled colonoscopy for 9/18 Dr. Rosado. Prep given to wife    Saray Garcia  Surgical Scheduler

## 2017-09-02 DIAGNOSIS — Z00.00 ROUTINE GENERAL MEDICAL EXAMINATION AT A HEALTH CARE FACILITY: ICD-10-CM

## 2017-09-02 LAB
ALBUMIN SERPL-MCNC: 4.1 G/DL (ref 3.4–5)
ALP SERPL-CCNC: 71 U/L (ref 40–150)
ALT SERPL W P-5'-P-CCNC: 72 U/L (ref 0–70)
ANION GAP SERPL CALCULATED.3IONS-SCNC: 13 MMOL/L (ref 3–14)
AST SERPL W P-5'-P-CCNC: 32 U/L (ref 0–45)
BILIRUB SERPL-MCNC: 1 MG/DL (ref 0.2–1.3)
BUN SERPL-MCNC: 16 MG/DL (ref 7–30)
CALCIUM SERPL-MCNC: 8.7 MG/DL (ref 8.5–10.1)
CHLORIDE SERPL-SCNC: 104 MMOL/L (ref 94–109)
CHOLEST SERPL-MCNC: 270 MG/DL
CO2 SERPL-SCNC: 26 MMOL/L (ref 20–32)
CREAT SERPL-MCNC: 1.12 MG/DL (ref 0.66–1.25)
ERYTHROCYTE [DISTWIDTH] IN BLOOD BY AUTOMATED COUNT: 12.8 % (ref 10–15)
GFR SERPL CREATININE-BSD FRML MDRD: 69 ML/MIN/1.7M2
GLUCOSE SERPL-MCNC: 100 MG/DL (ref 70–99)
HCT VFR BLD AUTO: 47 % (ref 40–53)
HDLC SERPL-MCNC: 32 MG/DL
HGB BLD-MCNC: 15.8 G/DL (ref 13.3–17.7)
LDLC SERPL CALC-MCNC: ABNORMAL MG/DL
LDLC SERPL DIRECT ASSAY-MCNC: 159 MG/DL
MCH RBC QN AUTO: 31.2 PG (ref 26.5–33)
MCHC RBC AUTO-ENTMCNC: 33.6 G/DL (ref 31.5–36.5)
MCV RBC AUTO: 93 FL (ref 78–100)
NONHDLC SERPL-MCNC: 238 MG/DL
PLATELET # BLD AUTO: 197 10E9/L (ref 150–450)
POTASSIUM SERPL-SCNC: 4.3 MMOL/L (ref 3.4–5.3)
PROT SERPL-MCNC: 7.1 G/DL (ref 6.8–8.8)
RBC # BLD AUTO: 5.07 10E12/L (ref 4.4–5.9)
SODIUM SERPL-SCNC: 143 MMOL/L (ref 133–144)
TRIGL SERPL-MCNC: 450 MG/DL
TSH SERPL DL<=0.005 MIU/L-ACNC: 3.07 MU/L (ref 0.4–4)
WBC # BLD AUTO: 5 10E9/L (ref 4–11)

## 2017-09-02 PROCEDURE — 80061 LIPID PANEL: CPT | Performed by: FAMILY MEDICINE

## 2017-09-02 PROCEDURE — 83721 ASSAY OF BLOOD LIPOPROTEIN: CPT | Mod: 59 | Performed by: FAMILY MEDICINE

## 2017-09-02 PROCEDURE — 36415 COLL VENOUS BLD VENIPUNCTURE: CPT | Performed by: FAMILY MEDICINE

## 2017-09-02 PROCEDURE — 80053 COMPREHEN METABOLIC PANEL: CPT | Performed by: FAMILY MEDICINE

## 2017-09-02 PROCEDURE — 85027 COMPLETE CBC AUTOMATED: CPT | Performed by: FAMILY MEDICINE

## 2017-09-02 PROCEDURE — 84443 ASSAY THYROID STIM HORMONE: CPT | Performed by: FAMILY MEDICINE

## 2017-09-03 LAB — PAIN DRUG SCR UR W RPTD MEDS: NORMAL

## 2017-09-05 RX ORDER — ONDANSETRON 2 MG/ML
4 INJECTION INTRAMUSCULAR; INTRAVENOUS
Status: CANCELLED | OUTPATIENT
Start: 2017-09-05

## 2017-09-05 RX ORDER — LIDOCAINE 40 MG/G
CREAM TOPICAL
Status: CANCELLED | OUTPATIENT
Start: 2017-09-05

## 2017-09-19 DIAGNOSIS — M54.2 CERVICALGIA: ICD-10-CM

## 2017-09-19 NOTE — TELEPHONE ENCOUNTER
Acetaminophen      Last Written Prescription Date: 9/12/17  Last Fill Quantity: 120,  # refills: 0   Last Office Visit with G, P or [facility] prescribing provider: 8/24/17

## 2017-09-21 NOTE — TELEPHONE ENCOUNTER
Patient stated he was never notified my the pharmacy or staff here that it was filled. He will call pharmacy.  Charis Beard MA 9/21/2017

## 2017-09-26 ENCOUNTER — TELEPHONE (OUTPATIENT)
Dept: FAMILY MEDICINE | Facility: CLINIC | Age: 52
End: 2017-09-26

## 2017-09-26 ENCOUNTER — MYC MEDICAL ADVICE (OUTPATIENT)
Dept: FAMILY MEDICINE | Facility: CLINIC | Age: 52
End: 2017-09-26

## 2017-09-26 DIAGNOSIS — M54.2 CERVICALGIA: ICD-10-CM

## 2017-09-26 NOTE — TELEPHONE ENCOUNTER
Note other phone encounter. Script was placed and approved by Dr. Rosado today. Anabela Kenyon LPN

## 2017-09-26 NOTE — TELEPHONE ENCOUNTER
Our , Nuha Kenyon LPN, has resent new RX to Dr. Rosado to sign as the hard copy cannot be found anywhere in the clinic. The original RX was printed and signed after 6 PM by DR. Rosado on 9/12/17. The Shred it bin man  came and emptied out all the  Bins yesterday to cannot look in them to find it. ....................TONEY Yan

## 2017-09-26 NOTE — TELEPHONE ENCOUNTER
Pharmacy stating they did not receive script that was approved on 9/12. No documentation that it was taken to pharmacy. New script placed to approve. Anabela Kenyon LPN    TYLENOL #3      Last Written Prescription Date:  8/8/1/  Last Fill Quantity: 60,   # refills: 0  Last Office Visit with [facility], [facility] or  Health prescribing provider: 8/24/17  Future Office visit:       Routing refill request to provider for review/approval because:  Drug not on the [facility], [facility] or  Health refill protocol or controlled substance

## 2017-09-26 NOTE — TELEPHONE ENCOUNTER
Please resend script for T-3 - shows approved 9/12/17, but pharmacy never received        Thanks       Charlee Stricklandles  Pharmacy Tech.  [facility]  [phone]

## 2017-10-30 ENCOUNTER — OFFICE VISIT (OUTPATIENT)
Dept: FAMILY MEDICINE | Facility: CLINIC | Age: 52
End: 2017-10-30
Payer: COMMERCIAL

## 2017-10-30 VITALS
WEIGHT: 241.8 LBS | OXYGEN SATURATION: 97 % | HEART RATE: 112 BPM | BODY MASS INDEX: 32.75 KG/M2 | TEMPERATURE: 97.1 F | SYSTOLIC BLOOD PRESSURE: 110 MMHG | HEIGHT: 72 IN | DIASTOLIC BLOOD PRESSURE: 84 MMHG

## 2017-10-30 DIAGNOSIS — J20.9 ACUTE BRONCHITIS, UNSPECIFIED ORGANISM: Primary | ICD-10-CM

## 2017-10-30 PROCEDURE — 99213 OFFICE O/P EST LOW 20 MIN: CPT | Performed by: FAMILY MEDICINE

## 2017-10-30 RX ORDER — PREDNISONE 20 MG/1
20 TABLET ORAL 2 TIMES DAILY
Qty: 12 TABLET | Refills: 0 | Status: SHIPPED | OUTPATIENT
Start: 2017-10-30 | End: 2017-12-26

## 2017-10-30 RX ORDER — AZITHROMYCIN 250 MG/1
TABLET, FILM COATED ORAL
Qty: 6 TABLET | Refills: 0 | Status: SHIPPED | OUTPATIENT
Start: 2017-10-30 | End: 2017-12-26

## 2017-10-30 NOTE — NURSING NOTE
Chief Complaint   Patient presents with     Cough     cough        Initial /84 (BP Location: Left arm, Patient Position: Chair, Cuff Size: Adult Large)  Pulse 112  Temp 97.1  F (36.2  C) (Tympanic)  Ht 6' (1.829 m)  Wt 241 lb 12.8 oz (109.7 kg)  SpO2 97%  BMI 32.79 kg/m2 Estimated body mass index is 32.79 kg/(m^2) as calculated from the following:    Height as of this encounter: 6' (1.829 m).    Weight as of this encounter: 241 lb 12.8 oz (109.7 kg).  Medication Reconciliation: complete

## 2017-10-30 NOTE — MR AVS SNAPSHOT
After Visit Summary   10/30/2017    Bulmaro Herrera    MRN: [id number]           Patient Information     Date Of Birth          1965        Visit Information        Provider Department      10/30/2017 4:40 PM Jesus Manuel Hurt MD [facility]        Today's Diagnoses     Acute bronchitis, unspecified organism    -  1       Follow-ups after your visit        Your next 10 appointments already scheduled     Nov 27, 2017   Procedure with Roger Rosado MD   [facility] ([facility])    [address] [place] [id number] [phone]              Who to contact     If you have questions or need follow up information about today's clinic visit or your schedule please contact [facility] directly at [phone].  Normal or non-critical lab and imaging results will be communicated to you by MyChart, letter or phone within 4 business days after the clinic has received the results. If you do not hear from us within 7 days, please contact the clinic through MyChart or phone. If you have a critical or abnormal lab result, we will notify you by phone as soon as possible.  Submit refill requests through [employer] or call your pharmacy and they will forward the refill request to us. Please allow 3 business days for your refill to be completed.          Additional Information About Your Visit        MyChart Information     [employer] gives you secure access to your electronic health record. If you see a primary care provider, you can also send messages to your care team and make appointments. If you have questions, please call your primary care clinic.  If you do not have a primary care provider, please call [phone] and they will assist you.        Care EveryWhere ID     This is your Care EveryWhere ID. This could be used by other organizations to access your [place] medical records  [id number]        Your Vitals Were     Pulse  Temperature Height Pulse Oximetry BMI (Body Mass Index)       112 97.1  F (36.2  C) (Tympanic) 6' (1.829 m) 97% 32.79 kg/m2        Blood Pressure from Last 3 Encounters:   10/30/17 110/84   08/24/17 126/82   08/15/17 (!) 126/93    Weight from Last 3 Encounters:   10/30/17 241 lb 12.8 oz (109.7 kg)   08/24/17 249 lb 9.6 oz (113.2 kg)   08/15/17 241 lb 6.4 oz (109.5 kg)              Today, you had the following     No orders found for display         Today's Medication Changes          These changes are accurate as of: 10/30/17  5:09 PM.  If you have any questions, ask your nurse or doctor.               Start taking these medicines.        Dose/Directions    azithromycin 250 MG tablet   Commonly known as:  ZITHROMAX   Used for:  Acute bronchitis, unspecified organism   Started by:  Jesus Manuel Hurt MD        Two tablets first day, then one tablet daily for four days.   Quantity:  6 tablet   Refills:  0       predniSONE 20 MG tablet   Commonly known as:  DELTASONE   Used for:  Acute bronchitis, unspecified organism   Started by:  Jesus Manuel Hurt MD        Dose:  20 mg   Take 1 tablet (20 mg) by mouth 2 times daily   Quantity:  12 tablet   Refills:  0            Where to get your medicines      These medications were sent to [facility] [address]   [address] [place] [address] [address] [facility] [address]     Phone:  [phone]     azithromycin 250 MG tablet    predniSONE 20 MG tablet                Primary Care Provider Office Phone # Fax #    Rolb Steve Rosado [phone]       [address] [facility]   [facility] [address]        Equal Access to Services     [facility] Center[facility]YSTAL : Hadii cruz Chavez, waaxda luqadaha, qaybta kaaldarin alvarez. So [facility] [phone].    ATENCIÓN: Si habla español, tiene a nair disposición servicios gratuitos de asistencia lingüística. Llame al [phone].    We comply with applicable [facility] civil  rights laws and [place] laws. We do not discriminate on the basis of race, color, national origin, age, disability, sex, sexual orientation, or gender identity.            Thank you!     Thank you for choosing [facility]  for your care. Our goal is always to provide you with excellent care. Hearing back from our patients is one way we can continue to improve our services. Please take a few minutes to complete the written survey that you may receive in the mail after your visit with us. Thank you!             Your Updated Medication List - Protect others around you: Learn how to safely use, store and throw away your medicines at www.disposemymeds.org.          This list is accurate as of: 10/30/17  5:09 PM.  Always use your most recent med list.                   Brand Name Dispense Instructions for use Diagnosis    acetaminophen-codeine 300-30 MG per tablet    TYLENOL #3    120 tablet    Take 1 tablet by mouth every 4 hours as needed for moderate pain    Cervicalgia       albuterol 108 (90 BASE) MCG/ACT Inhaler    PROAIR HFA/PROVENTIL HFA/VENTOLIN HFA    1 Inhaler    Inhale 2 puffs into the lungs every 6 hours as needed for shortness of breath / dyspnea or wheezing    Mild intermittent asthma without complication       azithromycin 250 MG tablet    ZITHROMAX    6 tablet    Two tablets first day, then one tablet daily for four days.    Acute bronchitis, unspecified organism       diazepam 10 MG tablet    VALIUM    2 tablet    Take 1 tablet (10 mg) by mouth every 6 hours as needed for anxiety or sleep (Before appointment) Take 30-60 minutes before procedure.  Do not operate a vehicle after taking this medication.    Anxiety       gabapentin 300 MG capsule    NEURONTIN    60 capsule    Take 1 tablet (300 mg) every night for 1-3 days, then 1 tablet twice daily    DDD (degenerative disc disease), cervical       methocarbamol 750 MG tablet    ROBAXIN    60 tablet    Take 1 tablet (750 mg) by mouth 2  times daily as needed for muscle spasms (DO NOT take while driving)    Degenerative disc disease, cervical       omeprazole 20 MG CR capsule    priLOSEC    180 capsule    Take 2 capsules (40 mg) by mouth daily    Gastroesophageal reflux disease without esophagitis       predniSONE 20 MG tablet    DELTASONE    12 tablet    Take 1 tablet (20 mg) by mouth 2 times daily    Acute bronchitis, unspecified organism

## 2017-10-30 NOTE — PROGRESS NOTES
SUBJECTIVE:   Bulmaro Herrera is a 52 year old male who presents to clinic today for the following health issues:      Acute Illness   Acute illness concerns: cough   Onset: October 27th     Fever: YES    Chills/Sweats: YES    Headache (location?): no     Sinus Pressure:yes     Conjunctivitis:  no    Ear Pain: YES- Ear,teeth    Rhinorrhea: YES    Congestion: YES    Sore Throat: no      Cough: YES - Green phlem     Wheeze: YES    Decreased Appetite: no     Nausea: no     Vomiting: no     Diarrhea:  no     Dysuria/Freq.: no     Fatigue/Achiness: YES    Sick/Strep Exposure: no      Therapies Tried and outcome: Mucinex             Problem list and histories reviewed & adjusted, as indicated.  Additional history: as documented        Reviewed and updated as needed this visit by clinical staff     Reviewed and updated as needed this visit by Provider        SUBJECTIVE:  Bulmaro  is a 52 year old male who presents for:  Cough symptoms as noted above. He is felt he may have had some fever. Feels like he can't get a full deep breath at times.    Past Medical History:   Diagnosis Date     Back pains      Depressive disorder, not elsewhere classified 1/28/2007    declines Rx     Disc degeneration 12/2008    see MRI -throaco-lumbar mild discogenic degenerative changes     Elevated LFT's 11/09    alt and ast     Other and unspecified hyperlipidemia 1/2007     Sleep disorder 9/09    CPAP     Tobacco use disorder      Past Surgical History:   Procedure Laterality Date     C NONSPECIFIC PROCEDURE      rt hand surgery - laceration/glass/tendons     COLONOSCOPY  07/07/08    adenomatous polyp repeat 5 years     ENT SURGERY      for deviated septum     HC ECHO HEART XTHORACIC, STRESS/REST  6/2009    neg     INJECT EPIDURAL CERVICAL N/A 5/14/2015    Procedure: INJECT EPIDURAL CERVICAL;  Surgeon: Christian Chaudhry MD;  Location:  OR     ORTHOPEDIC SURGERY      Right hand     TONSILLECTOMY       Social History   Substance Use Topics      Smoking status: Former Smoker     Packs/day: 1.50     Quit date: 2/14/2007     Smokeless tobacco: Never Used     Alcohol use Yes      Comment: 3-4 times/year     Current Outpatient Prescriptions   Medication Sig Dispense Refill     azithromycin (ZITHROMAX) 250 MG tablet Two tablets first day, then one tablet daily for four days. 6 tablet 0     predniSONE (DELTASONE) 20 MG tablet Take 1 tablet (20 mg) by mouth 2 times daily 12 tablet 0     acetaminophen-codeine (TYLENOL #3) 300-30 MG per tablet Take 1 tablet by mouth every 4 hours as needed for moderate pain 120 tablet 0     albuterol (PROAIR HFA, PROVENTIL HFA, VENTOLIN HFA) 108 (90 BASE) MCG/ACT inhaler Inhale 2 puffs into the lungs every 6 hours as needed for shortness of breath / dyspnea or wheezing 1 Inhaler 0     omeprazole (PRILOSEC) 20 MG capsule Take 2 capsules (40 mg) by mouth daily 180 capsule 3     gabapentin (NEURONTIN) 300 MG capsule Take 1 tablet (300 mg) every night for 1-3 days, then 1 tablet twice daily (Patient not taking: Reported on 10/30/2017) 60 capsule 0     methocarbamol (ROBAXIN) 750 MG tablet Take 1 tablet (750 mg) by mouth 2 times daily as needed for muscle spasms (DO NOT take while driving) (Patient not taking: Reported on 8/24/2017) 60 tablet 0     diazepam (VALIUM) 10 MG tablet Take 1 tablet (10 mg) by mouth every 6 hours as needed for anxiety or sleep (Before appointment) Take 30-60 minutes before procedure.  Do not operate a vehicle after taking this medication. (Patient not taking: Reported on 8/24/2017) 2 tablet 0       REVIEW OF SYSTEMS:   5 point ROS negative except as noted above in HPI, including Gen., Resp, CV, GI &  system review.     OBJECTIVE:  Vitals: /84 (BP Location: Left arm, Patient Position: Chair, Cuff Size: Adult Large)  Pulse 112  Temp 97.1  F (36.2  C) (Tympanic)  Ht 6' (1.829 m)  Wt 241 lb 12.8 oz (109.7 kg)  SpO2 97%  BMI 32.79 kg/m2  BMI= Body mass index is 32.79 kg/(m^2).  He appears in no  distress. Throat is clear. Ears are clear. Neck is supple no adenopathy. Lungs are pretty much clear to auscultation. Heart regular rhythm. Skin clear. Frequent coughing in the room is best treated with deep breaths.    ASSESSMENT:  Bronchitis    PLAN:  He is regarding taking Robitussin with codeine for his back and knees been on some Mucinex. States it isn't helping his cough. I told him he may be just take some cough lozenge. He is already taking Ventolin in this doesn't seem to be helping with the bronchospasm. We'll put him on a Z-Matthew and some prednisone for inflammatory component of his airways if not improving is going to need further workup with chest x-ray included.        Jesus Manuel Hurt MD  [facility]

## 2017-11-08 ENCOUNTER — MYC MEDICAL ADVICE (OUTPATIENT)
Dept: FAMILY MEDICINE | Facility: CLINIC | Age: 52
End: 2017-11-08

## 2017-11-20 ENCOUNTER — MYC REFILL (OUTPATIENT)
Dept: FAMILY MEDICINE | Facility: CLINIC | Age: 52
End: 2017-11-20

## 2017-11-20 DIAGNOSIS — M54.2 CERVICALGIA: ICD-10-CM

## 2017-11-20 DIAGNOSIS — K21.9 GASTROESOPHAGEAL REFLUX DISEASE WITHOUT ESOPHAGITIS: ICD-10-CM

## 2017-11-20 NOTE — TELEPHONE ENCOUNTER
prilosec       Last Written Prescription Date: 5/2/2016  Last Fill Quantity: 180,  # refills: 3   Last Office Visit with [facility], [facility] or [facility] prescribing provider: 10/30/2017                                              Tylenol #3      Last Written Prescription Date:  9/26/2017  Last Fill Quantity: 120,   # refills: 0  Future Office visit:       Routing refill request to provider for review/approval because:  Drug not on the [facility], [facility] or [facility] refill protocol or controlled substance

## 2017-11-20 NOTE — TELEPHONE ENCOUNTER
Message from Yangaro[employer]art:  Original authorizing provider: MD Bulmaro Patrick would like a refill of the following medications:  omeprazole (PRILOSEC) 20 MG capsule [Roger Rosado MD]  acetaminophen-codeine (TYLENOL #3) 300-30 MG per tablet [Roger Rosado MD]    [facility] [place] [address] [facility]     Comment:

## 2017-12-27 ENCOUNTER — HOSPITAL ENCOUNTER (OUTPATIENT)
Dept: RESPIRATORY THERAPY | Facility: CLINIC | Age: 52
Discharge: HOME | End: 2017-12-27
Attending: OBSTETRICS & GYNECOLOGY | Admitting: OBSTETRICS & GYNECOLOGY
Payer: COMMERCIAL

## 2017-12-27 DIAGNOSIS — R06.02 SHORTNESS OF BREATH: ICD-10-CM

## 2017-12-27 PROCEDURE — 94060 EVALUATION OF WHEEZING: CPT

## 2017-12-27 PROCEDURE — 94060 EVALUATION OF WHEEZING: CPT | Mod: 26

## 2017-12-27 PROCEDURE — 94726 PLETHYSMOGRAPHY LUNG VOLUMES: CPT

## 2017-12-27 PROCEDURE — 25000125 ZZHC RX 250: Performed by: OBSTETRICS & GYNECOLOGY

## 2017-12-27 PROCEDURE — 94726 PLETHYSMOGRAPHY LUNG VOLUMES: CPT | Mod: 26

## 2017-12-27 PROCEDURE — 94729 DIFFUSING CAPACITY: CPT

## 2017-12-27 PROCEDURE — 94729 DIFFUSING CAPACITY: CPT | Mod: 26

## 2017-12-27 RX ORDER — ALBUTEROL SULFATE 0.83 MG/ML
2.5 SOLUTION RESPIRATORY (INHALATION)
Status: COMPLETED | OUTPATIENT
Start: 2017-12-27 | End: 2017-12-27

## 2017-12-27 RX ADMIN — ALBUTEROL SULFATE 2.5 MG: 2.5 SOLUTION RESPIRATORY (INHALATION) at 09:19

## 2017-12-27 NOTE — PROGRESS NOTES
Lilliana Please inform Bulmaro/ or caretaker  that this result(s) is/are back but I am not familiar with how to interpret the results- so please make sure he has the referral to the lung doctor as we had planned- I wanted him to have a pulmonology consult- with Dr Deras if possible-Thanks. KRYSTAL Rainey MD

## 2017-12-27 NOTE — DISCHARGE INSTRUCTIONS
Thank you for completing pulmonary function testing today.  All results will be scanned into your epic results for your doctor to review.  Please resume taking all your current prescribed medications and diet as directed by your provider.   If you have not heard from your provider about your testing within two weeks and do not have a follow-up appointment scheduled with them please contact your provider about any questions you have concerning your testing.   Thank you  [facility]

## 2017-12-27 NOTE — PROGRESS NOTES
The FEV1 and FVC are reduced but the FEV1/FVC ratio is normal.  The inspiratory flow rates are reduced.  The airway resistance is normal.  The lung volumes are reduced.  Following administration of bronchodilators, there is no significant response.  The diffusing capacity is normal.  However, the diffusing capacity was not corrected for the patient's hemoglobin.    The reduced FRC, in combination with a normal RV, suggest an extrapulmonary deformity such as pleural disease or obesity.    IMPRESSION:  Mild Restriction -Secondary to Obesity        This preliminary report should not be used clinically unless reviewed and signed by a physician.

## 2017-12-27 NOTE — PROGRESS NOTES
Pt notified of labs results, No questions or concerns. Wife will coordinate visit with Pulm as appt was cancelled. Lilliana Simons CMA

## 2017-12-27 NOTE — PROGRESS NOTES
Pre-Bronch    Post-Bronch         Actual Pred %Pred  Actual %Pred %Chng       ---- SPIROMETRY ----                          FVC (L)  3.95 5.32 74   4.08 76 +3            FEV1 (L)  3.13 4.17 75   3.40 81 +8            FEV1/FVC (%) 79 79     83   +5            FEV1/SVC (%) 90 75                      FEV1/FEV6 (%) 79 80     83   +5            FEF Max (L/sec) 6.57 10.26 64   6.84 66 +3            FEF 25-75% (L/sec) 2.85 3.72 76   3.76 100 +31            FIVC (L) 3.32       3.61   +8            FIF Max (L/sec) 3.58 8.28 43   4.34 52 +21            Expiratory Time (sec) 8.51       6.91   -18            ----LUNG MECHANICS                           MVV (L/min)   159                      MEP (cmH2O)                          MIP (cmH2O)                          ---- LUNG VOLUMES ----                          SVC (L) 3.46 5.56 62                    IC (L) 2.59 4.36 59                    ERV (L) 0.87 1.20 72                    FRC(Pleth) (L) 2.84 3.64 77                    RV (Pleth) (L) 1.97 2.28 86                    TLC (Pleth) (L) 5.43 7.53 72                    RV/TLC (Pleth) (%) 36 34                      ---- DIFFUSION ----                          DLCOunc (ml/min/mmHg) 29.93 31.44 95                    DLCOcor (ml/min/mmHg)   31.44                      DL/VA (ml/min/mmHg/L) 5.63 4.36                      VA (L) 5.32 7.21 73                    IVC (L) 3.46                        Hgb (gm/dL)   12-18

## 2017-12-29 ENCOUNTER — HOSPITAL ENCOUNTER (OUTPATIENT)
Facility: CLINIC | Age: 52
Discharge: HOME | End: 2017-12-29
Attending: INTERNAL MEDICINE | Admitting: INTERNAL MEDICINE
Payer: COMMERCIAL

## 2017-12-29 ENCOUNTER — SURGERY (OUTPATIENT)
Age: 52
End: 2017-12-29

## 2017-12-29 VITALS
RESPIRATION RATE: 14 BRPM | SYSTOLIC BLOOD PRESSURE: 127 MMHG | TEMPERATURE: 97.6 F | DIASTOLIC BLOOD PRESSURE: 88 MMHG | OXYGEN SATURATION: 93 %

## 2017-12-29 LAB — COLONOSCOPY: NORMAL

## 2017-12-29 PROCEDURE — 40000296 ZZH STATISTIC ENDO RECOVERY CLASS 1:2 FIRST HOUR: Performed by: INTERNAL MEDICINE

## 2017-12-29 PROCEDURE — 88305 TISSUE EXAM BY PATHOLOGIST: CPT | Mod: 26 | Performed by: INTERNAL MEDICINE

## 2017-12-29 PROCEDURE — 25000128 H RX IP 250 OP 636: Performed by: INTERNAL MEDICINE

## 2017-12-29 PROCEDURE — 45380 COLONOSCOPY AND BIOPSY: CPT | Performed by: INTERNAL MEDICINE

## 2017-12-29 PROCEDURE — 25000125 ZZHC RX 250: Performed by: INTERNAL MEDICINE

## 2017-12-29 PROCEDURE — 88305 TISSUE EXAM BY PATHOLOGIST: CPT | Performed by: INTERNAL MEDICINE

## 2017-12-29 RX ORDER — LIDOCAINE 40 MG/G
CREAM TOPICAL
Status: DISCONTINUED | OUTPATIENT
Start: 2017-12-29 | End: 2017-12-29 | Stop reason: HOSPADM

## 2017-12-29 RX ORDER — ONDANSETRON 2 MG/ML
4 INJECTION INTRAMUSCULAR; INTRAVENOUS
Status: COMPLETED | OUTPATIENT
Start: 2017-12-29 | End: 2017-12-29

## 2017-12-29 RX ORDER — FENTANYL CITRATE 50 UG/ML
INJECTION, SOLUTION INTRAMUSCULAR; INTRAVENOUS PRN
Status: DISCONTINUED | OUTPATIENT
Start: 2017-12-29 | End: 2017-12-29 | Stop reason: HOSPADM

## 2017-12-29 RX ADMIN — LIDOCAINE HYDROCHLORIDE 1 ML: 10 INJECTION, SOLUTION EPIDURAL; INFILTRATION; INTRACAUDAL; PERINEURAL at 07:37

## 2017-12-29 RX ADMIN — FENTANYL CITRATE 50 MCG: 50 INJECTION, SOLUTION INTRAMUSCULAR; INTRAVENOUS at 08:17

## 2017-12-29 RX ADMIN — MIDAZOLAM 1 MG: 1 INJECTION INTRAMUSCULAR; INTRAVENOUS at 08:19

## 2017-12-29 RX ADMIN — MIDAZOLAM 1 MG: 1 INJECTION INTRAMUSCULAR; INTRAVENOUS at 08:26

## 2017-12-29 RX ADMIN — FENTANYL CITRATE 50 MCG: 50 INJECTION, SOLUTION INTRAMUSCULAR; INTRAVENOUS at 08:23

## 2017-12-29 RX ADMIN — MIDAZOLAM 2 MG: 1 INJECTION INTRAMUSCULAR; INTRAVENOUS at 08:17

## 2017-12-29 RX ADMIN — ONDANSETRON 4 MG: 2 INJECTION, SOLUTION INTRAMUSCULAR; INTRAVENOUS at 07:36

## 2017-12-29 RX ADMIN — MIDAZOLAM 1 MG: 1 INJECTION INTRAMUSCULAR; INTRAVENOUS at 08:20

## 2017-12-29 RX ADMIN — MIDAZOLAM 1 MG: 1 INJECTION INTRAMUSCULAR; INTRAVENOUS at 08:18

## 2017-12-29 NOTE — CONSULTS
[facility] GI Pre-Procedure Physical Assessment    Bulmaro Herrera MRN# [id number]   Age: 52 year old YOB: 1965      Date of Surgery: 12/29/2017  Location [facility]      Date of Exam 12/29/2017 Facility (Same day)       Home clinic: [facility]  Primary care provider: Roger Rosado         Active problem list:   Patient Active Problem List   Diagnosis     Esophageal reflux     Back pains     Pruritus ani     Elevated liver enzymes     DDD (degenerative disc disease), lumbar     DDD (degenerative disc disease), cervical     Hyperlipidemia LDL goal <160     Mild intermittent asthma without complication     Chronic pain syndrome     Herniation of intervertebral disc of cervical spine without radiculopathy     Cervicalgia            Medications (include herbals and vitamins):   Any Plavix use in the last 7 days?  No     Current Facility-Administered Medications   Medication     sodium chloride (PF) 0.9% PF flush 3 mL     lidocaine 1 % 1 mL     lidocaine (LMX4) kit     sodium chloride (PF) 0.9% PF flush 3 mL             Allergies:      Allergies   Allergen Reactions     Percocet [Oxycodone-Acetaminophen] Itching     Wasps [Hornets] Swelling     Allergy to Latex?  No  Allergy to tape?    No          Social History:     Social History   Substance Use Topics     Smoking status: Former Smoker     Packs/day: 1.50     Quit date: 2/14/2007     Smokeless tobacco: Never Used     Alcohol use Yes      Comment: 3-4 times/year            Physical Exam:   All vitals have been reviewed  Blood pressure (!) 130/97, temperature 97.6  F (36.4  C), temperature source Oral, resp. rate 16, SpO2 95 %.  Airway assessment:   Patient is able to open mouth wide  Patient is able to stick out tongue      Lungs:   No increased work of breathing, good air exchange, clear to auscultation bilaterally, no crackles or wheezing      Cardiovascular:   Normal apical impulse, regular rate and rhythm,  normal S1 and S2, no S3 or S4, and no murmur noted           Lab / Radiology Results:   All laboratory data reviewed          Assessment:   Appropriately NPO  Chief complaint or anatomic assessment of involved area: polyps colonoscopy         Plan:   Moderate (conscious) sedation     Patient's active problems diagnostically and therapeutically optimized for the planned procedure  Risks, benefits, alternatives to sedation and blood explained and consent obtained  Risks, benefits, alternatives to procedure explained and consent obtained  Orders and progress notes are in the chart  Discharge from Phase 1 and / or Phase 2 recovery when patient meets criteria    I have reviewed the history and physical, lab finding(s), diagnostic data, medicaitons, and the plan for sedation.  I have determined this patient to be an appropriate candidate for the planned sedation / procedure and have reassessed the patient immediately prior to sedation / procedure.    I have personally and medically directed the administration of medications used.    Matt Reyes MD

## 2018-01-02 LAB — COPATH REPORT: NORMAL

## 2018-01-05 LAB
DLCOUNC-%PRED-PRE: 95 %
DLCOUNC-PRE: 29.93 ML/MIN/MMHG
DLCOUNC-PRED: 31.44 ML/MIN/MMHG
ERV-%PRED-PRE: 72 %
ERV-PRE: 0.87 L
ERV-PRED: 1.2 L
EXPTIME-PRE: 8.51 SEC
FEF2575-%PRED-POST: 100 %
FEF2575-%PRED-PRE: 76 %
FEF2575-POST: 3.76 L/SEC
FEF2575-PRE: 2.85 L/SEC
FEF2575-PRED: 3.72 L/SEC
FEFMAX-%PRED-PRE: 64 %
FEFMAX-PRE: 6.57 L/SEC
FEFMAX-PRED: 10.26 L/SEC
FEV1-%PRED-PRE: 75 %
FEV1-PRE: 3.13 L
FEV1FEV6-PRE: 79 %
FEV1FEV6-PRED: 80 %
FEV1FVC-PRE: 79 %
FEV1FVC-PRED: 79 %
FEV1SVC-PRE: 90 %
FEV1SVC-PRED: 75 %
FIFMAX-PRE: 3.58 L/SEC
FRCPLETH-%PRED-PRE: 77 %
FRCPLETH-PRE: 2.84 L
FRCPLETH-PRED: 3.64 L
FVC-%PRED-PRE: 74 %
FVC-PRE: 3.95 L
FVC-PRED: 5.32 L
GAW-%PRED-PRE: 52 %
GAW-PRE: 0.54 L/S/CMH2O
GAW-PRED: 1.03 L/S/CMH2O
IC-%PRED-PRE: 59 %
IC-PRE: 2.59 L
IC-PRED: 4.36 L
RVPLETH-%PRED-PRE: 86 %
RVPLETH-PRE: 1.97 L
RVPLETH-PRED: 2.28 L
SGAW-%PRED-PRE: 236 %
SGAW-PRE: 0.19 1/CMH2O*S
SGAW-PRED: 0.08 1/CMH2O*S
SRAW-%PRED-PRE: 116 %
SRAW-PRE: 5.53 CMH2O*S
SRAW-PRED: 4.76 CMH2O*S
TLCPLETH-%PRED-PRE: 72 %
TLCPLETH-PRE: 5.43 L
TLCPLETH-PRED: 7.53 L
VA-%PRED-PRE: 73 %
VA-PRE: 5.32 L
VC-%PRED-PRE: 62 %
VC-PRE: 3.46 L
VC-PRED: 5.56 L

## 2018-01-09 ENCOUNTER — HOSPITAL ENCOUNTER (OUTPATIENT)
Dept: NUCLEAR MEDICINE | Facility: CLINIC | Age: 53
Setting detail: NUCLEAR MEDICINE
Discharge: HOME | End: 2018-01-09
Attending: OBSTETRICS & GYNECOLOGY | Admitting: OBSTETRICS & GYNECOLOGY
Payer: COMMERCIAL

## 2018-01-09 DIAGNOSIS — R06.02 SHORTNESS OF BREATH: ICD-10-CM

## 2018-01-09 PROCEDURE — 78452 HT MUSCLE IMAGE SPECT MULT: CPT | Mod: 26 | Performed by: INTERNAL MEDICINE

## 2018-01-09 PROCEDURE — 93018 CV STRESS TEST I&R ONLY: CPT | Performed by: INTERNAL MEDICINE

## 2018-01-09 PROCEDURE — 78452 HT MUSCLE IMAGE SPECT MULT: CPT

## 2018-01-09 PROCEDURE — 34300033 ZZH RX 343: Performed by: OBSTETRICS & GYNECOLOGY

## 2018-01-09 PROCEDURE — 93016 CV STRESS TEST SUPVJ ONLY: CPT | Performed by: INTERNAL MEDICINE

## 2018-01-09 PROCEDURE — A9502 TC99M TETROFOSMIN: HCPCS | Performed by: OBSTETRICS & GYNECOLOGY

## 2018-01-09 PROCEDURE — 25000128 H RX IP 250 OP 636: Performed by: OBSTETRICS & GYNECOLOGY

## 2018-01-09 PROCEDURE — 93017 CV STRESS TEST TRACING ONLY: CPT | Performed by: REHABILITATION PRACTITIONER

## 2018-01-09 RX ORDER — REGADENOSON 0.08 MG/ML
0.4 INJECTION, SOLUTION INTRAVENOUS ONCE
Status: COMPLETED | OUTPATIENT
Start: 2018-01-09 | End: 2018-01-09

## 2018-01-09 RX ADMIN — REGADENOSON 0.4 MG: 0.08 INJECTION, SOLUTION INTRAVENOUS at 09:27

## 2018-01-09 RX ADMIN — TETROFOSMIN 31.7 MCI.: 1.38 INJECTION, POWDER, LYOPHILIZED, FOR SOLUTION INTRAVENOUS at 09:40

## 2018-01-09 RX ADMIN — TETROFOSMIN 10.8 MCI.: 1.38 INJECTION, POWDER, LYOPHILIZED, FOR SOLUTION INTRAVENOUS at 07:25

## 2018-01-10 ENCOUNTER — TELEPHONE (OUTPATIENT)
Dept: FAMILY MEDICINE | Facility: CLINIC | Age: 53
End: 2018-01-10

## 2018-01-10 DIAGNOSIS — R06.02 SOB (SHORTNESS OF BREATH): Primary | ICD-10-CM

## 2018-01-10 NOTE — TELEPHONE ENCOUNTER
Per  and Dr. Deras recommendations. Patient to have repeat chest x ray to compare to one 1 year ago to look for changes. Recent normal PFT and Muna scan. Orders placed. Patient informed    Lilliana Simons CMA

## 2018-01-11 ENCOUNTER — HOSPITAL ENCOUNTER (OUTPATIENT)
Dept: GENERAL RADIOLOGY | Facility: CLINIC | Age: 53
Discharge: HOME | End: 2018-01-11
Attending: OBSTETRICS & GYNECOLOGY | Admitting: OBSTETRICS & GYNECOLOGY
Payer: COMMERCIAL

## 2018-01-11 DIAGNOSIS — R06.02 SOB (SHORTNESS OF BREATH): ICD-10-CM

## 2018-01-11 PROCEDURE — 71046 X-RAY EXAM CHEST 2 VIEWS: CPT | Mod: TC

## 2018-01-15 NOTE — PROGRESS NOTES
Lilliana Please inform Bulmaro/ or caretaker  that this result(s) is/are normal.  Thanks. KRYSTAL Rainey MD

## 2018-02-05 ENCOUNTER — HOSPITAL ENCOUNTER (OUTPATIENT)
Dept: GENERAL RADIOLOGY | Facility: CLINIC | Age: 53
Discharge: HOME | End: 2018-02-05
Attending: FAMILY MEDICINE | Admitting: FAMILY MEDICINE
Payer: COMMERCIAL

## 2018-02-05 ENCOUNTER — HOSPITAL ENCOUNTER (OUTPATIENT)
Dept: CT IMAGING | Facility: CLINIC | Age: 53
End: 2018-02-05
Attending: FAMILY MEDICINE
Payer: COMMERCIAL

## 2018-02-05 ENCOUNTER — OFFICE VISIT (OUTPATIENT)
Dept: FAMILY MEDICINE | Facility: CLINIC | Age: 53
End: 2018-02-05
Payer: COMMERCIAL

## 2018-02-05 VITALS
RESPIRATION RATE: 18 BRPM | TEMPERATURE: 98.5 F | WEIGHT: 249 LBS | HEIGHT: 72 IN | SYSTOLIC BLOOD PRESSURE: 132 MMHG | HEART RATE: 90 BPM | OXYGEN SATURATION: 100 % | DIASTOLIC BLOOD PRESSURE: 74 MMHG | BODY MASS INDEX: 33.72 KG/M2

## 2018-02-05 DIAGNOSIS — R10.84 ABDOMINAL PAIN, GENERALIZED: ICD-10-CM

## 2018-02-05 DIAGNOSIS — R10.84 ABDOMINAL PAIN, GENERALIZED: Primary | ICD-10-CM

## 2018-02-05 DIAGNOSIS — R19.7 DIARRHEA, UNSPECIFIED TYPE: ICD-10-CM

## 2018-02-05 DIAGNOSIS — R19.7 ACUTE DIARRHEA: ICD-10-CM

## 2018-02-05 LAB
ALBUMIN SERPL-MCNC: 4.3 G/DL (ref 3.4–5)
ALP SERPL-CCNC: 87 U/L (ref 40–150)
ALT SERPL W P-5'-P-CCNC: 56 U/L (ref 0–70)
ANION GAP SERPL CALCULATED.3IONS-SCNC: 9 MMOL/L (ref 3–14)
AST SERPL W P-5'-P-CCNC: 23 U/L (ref 0–45)
BASOPHILS # BLD AUTO: 0 10E9/L (ref 0–0.2)
BASOPHILS NFR BLD AUTO: 0.3 %
BILIRUB SERPL-MCNC: 0.8 MG/DL (ref 0.2–1.3)
BUN SERPL-MCNC: 18 MG/DL (ref 7–30)
CALCIUM SERPL-MCNC: 9 MG/DL (ref 8.5–10.1)
CHLORIDE SERPL-SCNC: 106 MMOL/L (ref 94–109)
CO2 SERPL-SCNC: 24 MMOL/L (ref 20–32)
CREAT SERPL-MCNC: 1.02 MG/DL (ref 0.66–1.25)
DIFFERENTIAL METHOD BLD: NORMAL
EOSINOPHIL # BLD AUTO: 0.3 10E9/L (ref 0–0.7)
EOSINOPHIL NFR BLD AUTO: 3 %
ERYTHROCYTE [DISTWIDTH] IN BLOOD BY AUTOMATED COUNT: 12.9 % (ref 10–15)
GFR SERPL CREATININE-BSD FRML MDRD: 77 ML/MIN/1.7M2
GLUCOSE SERPL-MCNC: 102 MG/DL (ref 70–99)
HCT VFR BLD AUTO: 49.3 % (ref 40–53)
HGB BLD-MCNC: 16.4 G/DL (ref 13.3–17.7)
IMM GRANULOCYTES # BLD: 0.1 10E9/L (ref 0–0.4)
IMM GRANULOCYTES NFR BLD: 1 %
LIPASE SERPL-CCNC: 116 U/L (ref 73–393)
LYMPHOCYTES # BLD AUTO: 2.5 10E9/L (ref 0.8–5.3)
LYMPHOCYTES NFR BLD AUTO: 27.7 %
MCH RBC QN AUTO: 31.4 PG (ref 26.5–33)
MCHC RBC AUTO-ENTMCNC: 33.3 G/DL (ref 31.5–36.5)
MCV RBC AUTO: 94 FL (ref 78–100)
MONOCYTES # BLD AUTO: 0.7 10E9/L (ref 0–1.3)
MONOCYTES NFR BLD AUTO: 8 %
NEUTROPHILS # BLD AUTO: 5.4 10E9/L (ref 1.6–8.3)
NEUTROPHILS NFR BLD AUTO: 60 %
PLATELET # BLD AUTO: 195 10E9/L (ref 150–450)
POTASSIUM SERPL-SCNC: 4 MMOL/L (ref 3.4–5.3)
PROT SERPL-MCNC: 7.6 G/DL (ref 6.8–8.8)
RBC # BLD AUTO: 5.23 10E12/L (ref 4.4–5.9)
SODIUM SERPL-SCNC: 139 MMOL/L (ref 133–144)
WBC # BLD AUTO: 9 10E9/L (ref 4–11)

## 2018-02-05 PROCEDURE — 99214 OFFICE O/P EST MOD 30 MIN: CPT | Performed by: FAMILY MEDICINE

## 2018-02-05 PROCEDURE — 25000125 ZZHC RX 250: Performed by: FAMILY MEDICINE

## 2018-02-05 PROCEDURE — 74019 RADEX ABDOMEN 2 VIEWS: CPT | Mod: TC

## 2018-02-05 PROCEDURE — 85025 COMPLETE CBC W/AUTO DIFF WBC: CPT | Performed by: FAMILY MEDICINE

## 2018-02-05 PROCEDURE — 36415 COLL VENOUS BLD VENIPUNCTURE: CPT | Performed by: FAMILY MEDICINE

## 2018-02-05 PROCEDURE — 80053 COMPREHEN METABOLIC PANEL: CPT | Performed by: FAMILY MEDICINE

## 2018-02-05 PROCEDURE — 74177 CT ABD & PELVIS W/CONTRAST: CPT

## 2018-02-05 PROCEDURE — 83690 ASSAY OF LIPASE: CPT | Performed by: FAMILY MEDICINE

## 2018-02-05 PROCEDURE — 25000128 H RX IP 250 OP 636: Performed by: FAMILY MEDICINE

## 2018-02-05 RX ORDER — IOPAMIDOL 755 MG/ML
500 INJECTION, SOLUTION INTRAVASCULAR ONCE
Status: COMPLETED | OUTPATIENT
Start: 2018-02-05 | End: 2018-02-05

## 2018-02-05 RX ADMIN — SODIUM CHLORIDE, PRESERVATIVE FREE 60 ML: 5 INJECTION INTRAVENOUS at 12:17

## 2018-02-05 RX ADMIN — IOPAMIDOL 100 ML: 755 INJECTION, SOLUTION INTRAVENOUS at 12:18

## 2018-02-05 ASSESSMENT — PAIN SCALES - GENERAL: PAINLEVEL: MODERATE PAIN (4)

## 2018-02-05 NOTE — NURSING NOTE
Chief Complaint   Patient presents with     Diarrhea     bloated, fullness, can't have a BM, feels like he ate a full [place] dinner. At night he is up and down having Explosive diarrhea, looks like pea soup.        Initial /74  Pulse 90  Temp 98.5  F (36.9  C) (Temporal)  Resp 18  Ht 6' (1.829 m)  Wt 249 lb (112.9 kg)  SpO2 100%  BMI 33.77 kg/m2 Estimated body mass index is 33.77 kg/(m^2) as calculated from the following:    Height as of this encounter: 6' (1.829 m).    Weight as of this encounter: 249 lb (112.9 kg).  Medication Reconciliation: complete

## 2018-02-05 NOTE — MR AVS SNAPSHOT
After Visit Summary   2/5/2018    Bulmaro Herrera    MRN: [id number]           Patient Information     Date Of Birth          1965        Visit Information        Provider Department      2/5/2018 9:00 AM Sandor Serrano MD [facility]        Today's Diagnoses     Abdominal pain, generalized    -  1    Diarrhea, unspecified type           Follow-ups after your visit        Future tests that were ordered for you today     Open Future Orders        Priority Expected Expires Ordered    CT Abdomen Pelvis w Contrast STAT  2/5/2019 2/5/2018    XR Abdomen 2 Views STAT 2/5/2018 2/5/2019 2/5/2018            Who to contact     If you have questions or need follow up information about today's clinic visit or your schedule please contact [facility] directly at [phone].  Normal or non-critical lab and imaging results will be communicated to you by MyChart, letter or phone within 4 business days after the clinic has received the results. If you do not hear from us within 7 days, please contact the clinic through [employer] or phone. If you have a critical or abnormal lab result, we will notify you by phone as soon as possible.  Submit refill requests through [employer] or call your pharmacy and they will forward the refill request to us. Please allow 3 business days for your refill to be completed.          Additional Information About Your Visit        MyChart Information     [employer] gives you secure access to your electronic health record. If you see a primary care provider, you can also send messages to your care team and make appointments. If you have questions, please call your primary care clinic.  If you do not have a primary care provider, please call [phone] and they will assist you.        Care EveryWhere ID     This is your Care EveryWhere ID. This could be used by other organizations to access your [place] medical records  [id number]        Your Vitals Were      Pulse Temperature Respirations Height Pulse Oximetry BMI (Body Mass Index)    90 98.5  F (36.9  C) (Temporal) 18 6' (1.829 m) 100% 33.77 kg/m2       Blood Pressure from Last 3 Encounters:   02/05/18 132/74   12/29/17 127/88   10/30/17 110/84    Weight from Last 3 Encounters:   02/05/18 249 lb (112.9 kg)   10/30/17 241 lb 12.8 oz (109.7 kg)   08/24/17 249 lb 9.6 oz (113.2 kg)              We Performed the Following     CBC with platelets and differential     Comprehensive metabolic panel     Lipase          Today's Medication Changes          These changes are accurate as of 2/5/18 10:42 AM.  If you have any questions, ask your nurse or doctor.               Stop taking these medicines if you haven't already. Please contact your care team if you have questions.     methocarbamol 750 MG tablet   Commonly known as:  ROBAXIN   Stopped by:  Sandor Serrano MD                    Primary Care Provider Office Phone # Fax #    Royi Steve Rosado [phone] [address] [place] [address]        Equal Access to Services     [facility] AH: Hadii cruz vickers hadradhao Soomar, waaxda luqadaha, qaybta kaalmada adephilipyada, darin miranda . So [facility] [phone].    ATENCIÓN: Si habla español, tiene a nair disposición servicios gratuitos de asistencia lingüística. Llame al [phone].    We comply with applicable federal civil rights laws and [place] laws. We do not discriminate on the basis of race, color, national origin, age, disability, sex, sexual orientation, or gender identity.            Thank you!     Thank you for choosing [facility]  for your care. Our goal is always to provide you with excellent care. Hearing back from our patients is one way we can continue to improve our services. Please take a few minutes to complete the written survey that you may receive in the mail after your visit with us. Thank you!             Your Updated  Medication List - Protect others around you: Learn how to safely use, store and throw away your medicines at www.disposemymeds.org.          This list is accurate as of 2/5/18 10:42 AM.  Always use your most recent med list.                   Brand Name Dispense Instructions for use Diagnosis    acetaminophen-codeine 300-30 MG per tablet    TYLENOL #3    120 tablet    Take 1 tablet by mouth every 4 hours as needed for moderate pain    Cervicalgia       albuterol 108 (90 BASE) MCG/ACT Inhaler    PROAIR HFA/PROVENTIL HFA/VENTOLIN HFA    1 Inhaler    Inhale 2 puffs into the lungs every 6 hours as needed for shortness of breath / dyspnea or wheezing    Mild intermittent asthma without complication       diazepam 10 MG tablet    VALIUM    2 tablet    Take 1 tablet (10 mg) by mouth every 6 hours as needed for anxiety or sleep (Before appointment) Take 30-60 minutes before procedure.  Do not operate a vehicle after taking this medication.    Anxiety       omeprazole 20 MG CR capsule    priLOSEC    180 capsule    Take 2 capsules (40 mg) by mouth daily    Gastroesophageal reflux disease without esophagitis

## 2018-02-05 NOTE — PROGRESS NOTES
SUBJECTIVE:   Bulmaro Herrera is a 52 year old male who presents to clinic today for the following health issues:      Diarrhea      Duration: 6 days.     Description:       Consistency of stool: watery, runny, green and explosive       Blood in stool: no        Number of loose stools past 24 hours:     Intensity:  severe    Accompanying signs and symptoms:       Fever: no        Nausea/vomitting: no        Abdominal pain: YES       Weight loss: no     History (recent antibiotics or travel/ill contacts/med changes/testing done):     Precipitating or alleviating factors: None    Therapies tried and outcome: Imodium AD, PeptoBismol and Tums    Problem list and histories reviewed & adjusted, as indicated.  Additional history: as documented        Reviewed and updated as needed this visit by clinical staff  Tobacco  Allergies  Meds  Problems  Med Hx  Surg Hx  Fam Hx  Soc Hx        Reviewed and updated as needed this visit by Provider  Allergies  Meds  Problems         Patient is here for symptoms of diarrhea as noted above as well as severe abdominal pain and bloating.  This has worsened over the past 6 days.  Burping all the time with foul-smelling burps.  He feels like he could just put a pain in his abdomen and pop at.  Passing some gas, but not enough to give him comfort.  And have all the air escape.  He feels hungry, but getting full easily when he eats.    No fevers, chills, no hematochezia, melena.      No history of abdominal surgery.      ROS:  10 point ROS of systems including Constitutional, Eyes, HENT, Respiratory, Cardiovascular, Gastroenterology, Genitourinary, Integumentary, Muscularskeletal, Psychiatric were all negative except for pertinent positives noted in my HPI.     OBJECTIVE:   /74  Pulse 90  Temp 98.5  F (36.9  C) (Temporal)  Resp 18  Ht 6' (1.829 m)  Wt 249 lb (112.9 kg)  SpO2 100%  BMI 33.77 kg/m2  Body mass index is 33.77 kg/(m^2).  Physical Exam  Results for orders  placed or performed in visit on 02/05/18   CBC with platelets and differential   Result Value Ref Range    WBC 9.0 4.0 - 11.0 10e9/L    RBC Count 5.23 4.4 - 5.9 10e12/L    Hemoglobin 16.4 13.3 - 17.7 g/dL    Hematocrit 49.3 40.0 - 53.0 %    MCV 94 78 - 100 fl    MCH 31.4 26.5 - 33.0 pg    MCHC 33.3 31.5 - 36.5 g/dL    RDW 12.9 10.0 - 15.0 %    Platelet Count 195 150 - 450 10e9/L    Diff Method Automated Method     % Neutrophils 60.0 %    % Lymphocytes 27.7 %    % Monocytes 8.0 %    % Eosinophils 3.0 %    % Basophils 0.3 %    % Immature Granulocytes 1.0 %    Absolute Neutrophil 5.4 1.6 - 8.3 10e9/L    Absolute Lymphocytes 2.5 0.8 - 5.3 10e9/L    Absolute Monocytes 0.7 0.0 - 1.3 10e9/L    Absolute Eosinophils 0.3 0.0 - 0.7 10e9/L    Absolute Basophils 0.0 0.0 - 0.2 10e9/L    Abs Immature Granulocytes 0.1 0 - 0.4 10e9/L   Comprehensive metabolic panel   Result Value Ref Range    Sodium 139 133 - 144 mmol/L    Potassium 4.0 3.4 - 5.3 mmol/L    Chloride 106 94 - 109 mmol/L    Carbon Dioxide 24 20 - 32 mmol/L    Anion Gap 9 3 - 14 mmol/L    Glucose 102 (H) 70 - 99 mg/dL    Urea Nitrogen 18 7 - 30 mg/dL    Creatinine 1.02 0.66 - 1.25 mg/dL    GFR Estimate 77 >60 mL/min/1.7m2    GFR Estimate If Black >90 >60 mL/min/1.7m2    Calcium 9.0 8.5 - 10.1 mg/dL    Bilirubin Total 0.8 0.2 - 1.3 mg/dL    Albumin 4.3 3.4 - 5.0 g/dL    Protein Total 7.6 6.8 - 8.8 g/dL    Alkaline Phosphatase 87 40 - 150 U/L    ALT 56 0 - 70 U/L    AST 23 0 - 45 U/L   Lipase   Result Value Ref Range    Lipase 116 73 - 393 U/L     CT of the abdomen and pelvis done with oral and IV contrast:    FINDINGS:      Abdomen: Moderate diffuse fatty infiltration of the liver. The spleen,  pancreas, adrenal glands and kidneys are unremarkable. The gallbladder  is present. No enlarged lymph nodes or free fluid in the upper  abdomen.     Scan through the lower chest is significant for a small calcified  granuloma in left lung base and a few small calcified left hilar  lymph  nodes.     Pelvis: No bowel wall thickening, pneumatosis or free intraperitoneal  gas. The small and large bowel are normal in caliber. The appendix is  unremarkable. No enlarged lymph nodes or free fluid in the pelvis.         IMPRESSION: No cause of acute pain identified in the abdomen or  pelvis.  ASSESSMENT/PLAN:       ICD-10-CM    1. Abdominal pain, generalized R10.84 XR Abdomen 2 Views     CBC with platelets and differential     Comprehensive metabolic panel     Lipase     CT Abdomen Pelvis w Contrast   2. Acute diarrhea R19.7 XR Abdomen 2 Views     CBC with platelets and differential     Comprehensive metabolic panel     Lipase     CT Abdomen Pelvis w Contrast     Clostridium difficile Toxin B PCR     Enteric Bacteria and Virus Panel by MILI Stool     PLAN:  1.  Patient had workup as noted above for his acute onset of diarrhea and severe abdominal pain spanning over the past 6 days.  At this point, his workup is essentially normal and I do not have a good explanation as to why he has his symptoms.  I suspect that he has a severe viral gastroenteritis and hopefully over the next few days his symptoms will completely pass.  If, however, if symptoms do not resolve within the next 3-4 days, I did place orders for stool testing including enteric pathogens and C. difficile testing.  He will bring in these samples if needed, or return to clinic or the ER if his symptoms severely worsen.    Sandor Serrano MD   [facility]

## 2018-02-06 ASSESSMENT — ASTHMA QUESTIONNAIRES: ACT_TOTALSCORE: 25

## 2018-02-20 ENCOUNTER — MYC REFILL (OUTPATIENT)
Dept: FAMILY MEDICINE | Facility: CLINIC | Age: 53
End: 2018-02-20

## 2018-02-20 DIAGNOSIS — M54.2 CERVICALGIA: ICD-10-CM

## 2018-02-20 DIAGNOSIS — K21.9 GASTROESOPHAGEAL REFLUX DISEASE WITHOUT ESOPHAGITIS: ICD-10-CM

## 2018-02-20 NOTE — TELEPHONE ENCOUNTER
"Requested Prescriptions   Pending Prescriptions Disp Refills     omeprazole (PRILOSEC) 20 MG CR capsule 180 capsule 3     Sig: Take 2 capsules (40 mg) by mouth daily    PPI Protocol Passed    2/20/2018  8:49 AM       Passed - Not on Clopidogrel (unless Pantoprazole ordered)       Passed - No diagnosis of osteoporosis on record       Passed - Recent or future visit with authorizing provider's specialty    Patient had office visit in the last year or has a visit in the next 30 days with authorizing provider.  See \"Patient Info\" tab in inbasket, or \"Choose Columns\" in Meds & Orders section of the refill encounter.            Passed - Patient is age 18 or older        acetaminophen-codeine (TYLENOL #3) 300-30 MG per tablet 120 tablet 0     Sig: Take 1 tablet by mouth every 4 hours as needed for moderate pain    There is no refill protocol information for this order          Last Written Prescription Date:  11/27/17  Last Fill Quantity: 180,  # refills: 3   Last Office Visit with [facility], [facility] or [facility] prescribing provider:  2/5/18   Future Office Visit:     Tylenol #3      Last Written Prescription Date:  11/21/17  Last Fill Quantity: 120,   # refills: 0  Last Office Visit: 2/5/18  Future Office visit:       Routing refill request to provider for review/approval because:  Drug not on the [facility], [facility] or [facility] refill protocol or controlled substance    "

## 2018-02-20 NOTE — TELEPHONE ENCOUNTER
Message from [employer]:  Original authorizing provider: MD Bulmaro Patrick would like a refill of the following medications:  omeprazole (PRILOSEC) 20 MG CR capsule [Roger Rosado MD]  acetaminophen-codeine (TYLENOL #3) 300-30 MG per tablet [Roger Rosado MD]    [facility] [place] [address] [facility]     Comment:

## 2018-02-21 NOTE — TELEPHONE ENCOUNTER
Omeprazole has refills on file.     Tylenol # 3   Last Written Prescription Date:  11/21/2017  Last Fill Quantity: 120,  # refills: 0   Last office visit: 2/5/2018 with prescribing provider:  08/24/2017     Routing refill request to provider for review/approval because:  Drug not on the [facility] refill protocol.    Maria L Parmar RN

## 2018-06-21 DIAGNOSIS — M54.2 CERVICALGIA: ICD-10-CM

## 2018-06-22 NOTE — TELEPHONE ENCOUNTER
Tylenol #3      Last Written Prescription Date:  2/21/18  Last Fill Quantity: 120,   # refills: 0  Last Office Visit: 2/5/18  Future Office visit:       Routing refill request to provider for review/approval because:  Drug not on the [facility] or [facility] refill protocol or controlled substance

## 2018-08-29 ENCOUNTER — OFFICE VISIT (OUTPATIENT)
Dept: FAMILY MEDICINE | Facility: CLINIC | Age: 53
End: 2018-08-29
Payer: COMMERCIAL

## 2018-08-29 VITALS
DIASTOLIC BLOOD PRESSURE: 82 MMHG | SYSTOLIC BLOOD PRESSURE: 118 MMHG | RESPIRATION RATE: 14 BRPM | BODY MASS INDEX: 33.46 KG/M2 | WEIGHT: 247 LBS | HEART RATE: 102 BPM | OXYGEN SATURATION: 98 % | TEMPERATURE: 98.1 F | HEIGHT: 72 IN

## 2018-08-29 DIAGNOSIS — D23.9 INTRADERMAL NEVUS: Primary | ICD-10-CM

## 2018-08-29 PROCEDURE — 99213 OFFICE O/P EST LOW 20 MIN: CPT | Performed by: FAMILY MEDICINE

## 2018-08-29 ASSESSMENT — ANXIETY QUESTIONNAIRES
1. FEELING NERVOUS, ANXIOUS, OR ON EDGE: NOT AT ALL
7. FEELING AFRAID AS IF SOMETHING AWFUL MIGHT HAPPEN: SEVERAL DAYS
3. WORRYING TOO MUCH ABOUT DIFFERENT THINGS: SEVERAL DAYS
GAD7 TOTAL SCORE: 3
6. BECOMING EASILY ANNOYED OR IRRITABLE: NOT AT ALL
2. NOT BEING ABLE TO STOP OR CONTROL WORRYING: NOT AT ALL
IF YOU CHECKED OFF ANY PROBLEMS ON THIS QUESTIONNAIRE, HOW DIFFICULT HAVE THESE PROBLEMS MADE IT FOR YOU TO DO YOUR WORK, TAKE CARE OF THINGS AT HOME, OR GET ALONG WITH OTHER PEOPLE: NOT DIFFICULT AT ALL
5. BEING SO RESTLESS THAT IT IS HARD TO SIT STILL: NOT AT ALL

## 2018-08-29 ASSESSMENT — PAIN SCALES - GENERAL: PAINLEVEL: MILD PAIN (2)

## 2018-08-29 ASSESSMENT — PATIENT HEALTH QUESTIONNAIRE - PHQ9: 5. POOR APPETITE OR OVEREATING: SEVERAL DAYS

## 2018-08-29 NOTE — PATIENT INSTRUCTIONS
Monthly Mole Check Chart  Anyone can get skin cancer. It doesn t matter what your skin color is. Doing a monthly skin check is an important way to spot early signs of melanoma. Melanoma is the deadliest type of skin cancer. But if it s found early, it can be treated. Regular skin checks can help you track any changes in your skin.  Getting started  Each month, check your body for any spots such as freckles, age spots, and moles. Use this sheet to help you by doing the followin. Number each spot you find on the images below.  2. Record the details for each spot, along with the date, on the chart at the bottom of the page.  3. Keep all of your completed charts. This will help you track any changes in your skin over time.  What to look for  When doing a skin check, be sure to use the ABCDEs of melanoma. This means checking spots and moles for the following:      Asymmetry. One half of the mole is not like the other half.    Border. The edges are not smooth but ragged, notched, or blurred.    Color. Color varies from 1 part of the mole to another and may be tan, brown, or black. In some cases the color can be white, red, or blue.    Diameter. The mole is larger than 6 mm (size of a pencil eraser).    Evolving. The mole is getting larger or changing its shape or color.  How to check  Stand before a full-length mirror and check all parts of your body. For spots on your back or other areas you can't see, have a family member or friend do this for you. Or you can also use a hand mirror to check hard-to-see areas such as your back, buttocks, back of the neck, and scalp. To get a better look when checking your scalp, part your hair.  When to call your healthcare provider  Call your healthcare provider if any of your moles:    Hurt    Itch    Ooze    Bleed    Thicken    Become crusty    Show any of the ABCDEs of melanoma  Monthly Skin Check Chart  Date       Mole #    Asymmetry:  What is the mole's shape? Border of mole  Color of mole Diameter of mole Evolving: How has mole changed?                                                                                                   Date Last Reviewed: 3/1/2017    [id number] [email or web address] [address], [place] [address]. All rights reserved. This information is not intended as a substitute for professional medical care. Always follow your healthcare professional's instructions.

## 2018-08-29 NOTE — MR AVS SNAPSHOT
After Visit Summary   2018    Bulmaro Herrera    MRN: [id number]           Patient Information     Date Of Birth          1965        Visit Information        Provider Department      2018 5:00 PM Aleksander Bowles MD [employer]        Care Instructions      Monthly Mole Check Chart  Anyone can get skin cancer. It doesn t matter what your skin color is. Doing a monthly skin check is an important way to spot early signs of melanoma. Melanoma is the deadliest type of skin cancer. But if it s found early, it can be treated. Regular skin checks can help you track any changes in your skin.  Getting started  Each month, check your body for any spots such as freckles, age spots, and moles. Use this sheet to help you by doing the followin. Number each spot you find on the images below.  2. Record the details for each spot, along with the date, on the chart at the bottom of the page.  3. Keep all of your completed charts. This will help you track any changes in your skin over time.  What to look for  When doing a skin check, be sure to use the ABCDEs of melanoma. This means checking spots and moles for the following:      Asymmetry. One half of the mole is not like the other half.    Border. The edges are not smooth but ragged, notched, or blurred.    Color. Color varies from 1 part of the mole to another and may be tan, brown, or black. In some cases the color can be white, red, or blue.    Diameter. The mole is larger than 6 mm (size of a pencil eraser).    Evolving. The mole is getting larger or changing its shape or color.  How to check  Stand before a full-length mirror and check all parts of your body. For spots on your back or other areas you can't see, have a family member or friend do this for you. Or you can also use a hand mirror to check hard-to-see areas such as your back, buttocks, back of the neck, and scalp. To get a better look when checking your scalp, part your  hair.  When to call your healthcare provider  Call your healthcare provider if any of your moles:    Hurt    Itch    Ooze    Bleed    Thicken    Become crusty    Show any of the ABCDEs of melanoma  Monthly Skin Check Chart  Date       Mole #    Asymmetry:  What is the mole's shape? Border of mole Color of mole Diameter of mole Evolving: How has mole changed?                                                                                                   Date Last Reviewed: 3/1/2017    9406-7750 [employer]. [address] [place] [address], [place] [id number]. All rights reserved. This information is not intended as a substitute for professional medical care. Always follow your healthcare professional's instructions.                Follow-ups after your visit        Follow-up notes from your care team     Return in about 1 month (around 9/29/2018) for recheck mole if not resolved.      Who to contact     If you have questions or need follow up information about today's clinic visit or your schedule please contact [facility] directly at [phone].  Normal or non-critical lab and imaging results will be communicated to you by MyChart, letter or phone within 4 business days after the clinic has received the results. If you do not hear from us within 7 days, please contact the clinic through [employer] or phone. If you have a critical or abnormal lab result, we will notify you by phone as soon as possible.  Submit refill requests through [employer] or call your pharmacy and they will forward the refill request to us. Please allow 3 business days for your refill to be completed.          Additional Information About Your Visit        [facility] Information     [employer] gives you secure access to your electronic health record. If you see a primary care provider, you can also send messages to your care team and make appointments. If you have questions, please call your primary care clinic.  If you do not have  "a primary care provider, please call [phone] and they will assist you.        Care EveryWhere ID     This is your Care EveryWhere ID. This could be used by other organizations to access your [facility] medical records  [id number]        Your Vitals Were     Pulse Temperature Respirations Height Pulse Oximetry BMI (Body Mass Index)    102 98.1  F (36.7  C) (Tympanic) 14 5' 11.5\" (1.816 m) 98% 33.97 kg/m2       Blood Pressure from Last 3 Encounters:   08/29/18 118/82   02/05/18 132/74   12/29/17 127/88    Weight from Last 3 Encounters:   08/29/18 247 lb (112 kg)   02/05/18 249 lb (112.9 kg)   10/30/17 241 lb 12.8 oz (109.7 kg)              Today, you had the following     No orders found for display       Primary Care Provider Office Phone # Fax #    Jeronimolauren Steve Rosado [phone]       [address] [place] [address]        Equal Access to Services     [facility]: Hadii aad ku hadasho Soomaali, waaxda luqadaha, qaybta kaalmada adeegyada, darin miranda . So [facility] [phone].    ATENCIÓN: Si habla español, tiene a nair disposición servicios gratuitos de asistencia lingüística. Llame al [phone].    We comply with applicable federal civil rights laws and [place] laws. We do not discriminate on the basis of race, color, national origin, age, disability, sex, sexual orientation, or gender identity.            Thank you!     Thank you for choosing [facility]  for your care. Our goal is always to provide you with excellent care. Hearing back from our patients is one way we can continue to improve our services. Please take a few minutes to complete the written survey that you may receive in the mail after your visit with us. Thank you!             Your Updated Medication List - Protect others around you: Learn how to safely use, store and throw away your medicines at www.disposemymeds.org.          This list is accurate as of 8/29/18  5:33 PM.  " Always use your most recent med list.                   Brand Name Dispense Instructions for use Diagnosis    acetaminophen-codeine 300-30 MG per tablet    TYLENOL #3    120 tablet    Take 1 tablet by mouth every 4 hours as needed for moderate pain    Cervicalgia       diazepam 10 MG tablet    VALIUM    2 tablet    Take 1 tablet (10 mg) by mouth every 6 hours as needed for anxiety or sleep (Before appointment) Take 30-60 minutes before procedure.  Do not operate a vehicle after taking this medication.    Anxiety       omeprazole 20 MG CR capsule    priLOSEC    180 capsule    Take 2 capsules (40 mg) by mouth daily    Gastroesophageal reflux disease without esophagitis

## 2018-08-29 NOTE — LETTER
My Asthma Action Plan  Name: Bulmaro Herrera   YOB: 1965  Date: 8/29/2018   My doctor: Aleksander Bowles MD   My clinic: [facility]        My Control Medicine: None  My Rescue Medicine: Albuterol (Proair/Ventolin/Proventil) inhaler prn   My Asthma Severity: mild intermittent  Avoid your asthma triggers: Patient is unaware of triggers               GREEN ZONE   Good Control    I feel good    No cough or wheeze    Can work, sleep and play without asthma symptoms       Take your asthma control medicine every day.     1. If exercise triggers your asthma, take your rescue medication    15 minutes before exercise or sports, and    During exercise if you have asthma symptoms  2. Spacer to use with inhaler: If you have a spacer, make sure to use it with your inhaler             YELLOW ZONE Getting Worse  I have ANY of these:    I do not feel good    Cough or wheeze    Chest feels tight    Wake up at night   1. Keep taking your Green Zone medications  2. Start taking your rescue medicine:    every 20 minutes for up to 1 hour. Then every 4 hours for 24-48 hours.  3. If you stay in the Yellow Zone for more than 12-24 hours, contact your doctor.  4. If you do not return to the Green Zone in 12-24 hours or you get worse, start taking your oral steroid medicine if prescribed by your provider.           RED ZONE Medical Alert - Get Help  I have ANY of these:    I feel awful    Medicine is not helping    Breathing getting harder    Trouble walking or talking    Nose opens wide to breathe       1. Take your rescue medicine NOW  2. If your provider has prescribed an oral steroid medicine, start taking it NOW  3. Call your doctor NOW  4. If you are still in the Red Zone after 20 minutes and you have not reached your doctor:    Take your rescue medicine again and    Call 911 or go to the emergency room right away    See your regular doctor within 2 weeks of an Emergency Room or Urgent Care visit for follow-up  treatment.          Annual Reminders:  Meet with Asthma Educator,  Flu Shot in the Fall, consider Pneumonia Vaccination for patients with asthma (aged 19 and older).    Pharmacy:    [facility] - [facility]  [facility], [place] - [facility] KASHMIR MEEHAN                      Asthma Triggers  How To Control Things That Make Your Asthma Worse    Triggers are things that make your asthma worse.  Look at the list below to help you find your triggers and what you can do about them.  You can help prevent asthma flare-ups by staying away from your triggers.      Trigger                                                          What you can do   Cigarette Smoke  Tobacco smoke can make asthma worse. Do not allow smoking in your home, car or around you.  Be sure no one smokes at a child s day care or school.  If you smoke, ask your health care provider for ways to help you quit.  Ask family members to quit too.  Ask your health care provider for a referral to Quit Plan to help you quit smoking, or call 2-001-455-PLAN.     Colds, Flu, Bronchitis  These are common triggers of asthma. Wash your hands often.  Don t touch your eyes, nose or mouth.  Get a flu shot every year.     Dust Mites  These are tiny bugs that live in cloth or carpet. They are too small to see. Wash sheets and blankets in hot water every week.   Encase pillows and mattress in dust mite proof covers.  Avoid having carpet if you can. If you have carpet, vacuum weekly.   Use a dust mask and HEPA vacuum.   Pollen and Outdoor Mold  Some people are allergic to trees, grass, or weed pollen, or molds. Try to keep your windows closed.  Limit time out doors when pollen count is high.   Ask you health care provider about taking medicine during allergy season.     Animal Dander  Some people are allergic to skin flakes, urine or saliva from pets with fur or feathers. Keep pets with fur or feathers out of your  home.    If you can t keep the pet outdoors, then keep the pet out of your bedroom.  Keep the bedroom door closed.  Keep pets off cloth furniture and away from stuffed toys.     Mice, Rats, and Cockroaches  Some people are allergic to the waste from these pests.   Cover food and garbage.  Clean up spills and food crumbs.  Store grease in the refrigerator.   Keep food out of the bedroom.   Indoor Mold  This can be a trigger if your home has high moisture. Fix leaking faucets, pipes, or other sources of water.   Clean moldy surfaces.  Dehumidify basement if it is damp and smelly.   Smoke, Strong Odors, and Sprays  These can reduce air quality. Stay away from strong odors and sprays, such as perfume, powder, hair spray, paints, smoke incense, paint, cleaning products, candles and new carpet.   Exercise or Sports  Some people with asthma have this trigger. Be active!  Ask your doctor about taking medicine before sports or exercise to prevent symptoms.    Warm up for 5-10 minutes before and after sports or exercise.     Other Triggers of Asthma  Cold air:  Cover your nose and mouth with a scarf.  Sometimes laughing or crying can be a trigger.  Some medicines and food can trigger asthma.

## 2018-08-29 NOTE — PROGRESS NOTES
SUBJECTIVE:   Bulmaro Herrera is a 53 year old male who presents to clinic today for the following health issues:      Chief Complaint   Patient presents with     Mole     unsure how long he has had it but his wife thought it didnt look right. Upper back left shoulder blade area. Slightly raised.              Problem list and histories reviewed & adjusted, as indicated.  Additional history:     SUBJECTIVE:   Bulmaro Herrera is a 53 year old male who would like a complete skin check today. There is no personal history of skin cancer. There is no family history of skin cancer. His wife noted the mole on his upper back. He does not recall any trauma but was lying on the ground working on his truck.  See below for history and description of each lesion.        Patient Active Problem List   Diagnosis     Esophageal reflux     Back pains     Pruritus ani     Elevated liver enzymes     DDD (degenerative disc disease), lumbar     DDD (degenerative disc disease), cervical     Hyperlipidemia LDL goal <160     Mild intermittent asthma without complication     Chronic pain syndrome     Herniation of intervertebral disc of cervical spine without radiculopathy     Cervicalgia     Past Surgical History:   Procedure Laterality Date     C NONSPECIFIC PROCEDURE      rt hand surgery - laceration/glass/tendons     COLONOSCOPY  07/07/08    adenomatous polyp repeat 5 years     COLONOSCOPY N/A 12/29/2017    Procedure: COMBINED COLONOSCOPY, SINGLE OR MULTIPLE BIOPSY/POLYPECTOMY BY BIOPSY;  Colonoscopy, Polypectomy by Biopsy;  Surgeon: Matt Reyes MD;  Location:  GI     ENT SURGERY      for deviated septum     HC ECHO HEART XTHORACIC, STRESS/REST  6/2009    neg     INJECT EPIDURAL CERVICAL N/A 5/14/2015    Procedure: INJECT EPIDURAL CERVICAL;  Surgeon: Christian Chaudhry MD;  Location:  OR     ORTHOPEDIC SURGERY      Right hand     TONSILLECTOMY         Social History   Substance Use Topics     Smoking status: Former Smoker      Packs/day: 1.50     Quit date: 2007     Smokeless tobacco: Never Used     Alcohol use Yes      Comment: 3-4 times/year     Family History   Problem Relation Age of Onset     Breast Cancer Mother      Arthritis Mother      joint ?     Depression Father      Cancer Maternal Grandmother      LEODAN.A.D. Maternal Grandfather      Cardiovascular Maternal Grandfather      Gynecology Paternal Grandmother       giving birth     Prostate Cancer Paternal Grandfather      Genetic Disorder Brother      joint pain?     Depression Brother      Cardiovascular Son      congenital heart defect -       Prostate Cancer Other       age 70's     Diabetes Other          Current Outpatient Prescriptions   Medication Sig Dispense Refill     acetaminophen-codeine (TYLENOL #3) 300-30 MG per tablet Take 1 tablet by mouth every 4 hours as needed for moderate pain 120 tablet 0     omeprazole (PRILOSEC) 20 MG CR capsule Take 2 capsules (40 mg) by mouth daily 180 capsule 3     diazepam (VALIUM) 10 MG tablet Take 1 tablet (10 mg) by mouth every 6 hours as needed for anxiety or sleep (Before appointment) Take 30-60 minutes before procedure.  Do not operate a vehicle after taking this medication. (Patient not taking: Reported on 2018) 2 tablet 0     Allergies   Allergen Reactions     Percocet [Oxycodone-Acetaminophen] Itching     Wasps [Hornets] Swelling     Recent Labs   Lab Test  18   0943  17   1000  02/10/16   0725   07/03/15   0821   14   0837   A1C   --    --   5.4   --    --    --    --    LDL   --   Cannot estimate LDL when triglyceride exceeds 400 mg/dL  159*   --    --   Cannot estimate LDL when triglyceride exceeds 400 mg/dL  185*   --   126   HDL   --   32*   --    --   34*   --   40*   TRIG   --   450*   --    --   439*   --   109   ALT  56  72*   --    --    --    --   43   CR  1.02  1.12   --    < >   --    < >   --    GFRESTIMATED  77  69   --    < >   --    < >   --    GFRESTBLACK  >90  84    "--    < >   --    < >   --    POTASSIUM  4.0  4.3   --    < >   --    < >   --    TSH   --   3.07  6.54*   --    --    < >   --     < > = values in this interval not displayed.      BP Readings from Last 3 Encounters:   08/29/18 118/82   02/05/18 132/74   12/29/17 127/88    Wt Readings from Last 3 Encounters:   08/29/18 247 lb (112 kg)   02/05/18 249 lb (112.9 kg)   10/30/17 241 lb 12.8 oz (109.7 kg)                  Labs reviewed in EPIC    Reviewed and updated as needed this visit by clinical staff  Tobacco  Allergies  Meds  Problems  Soc Hx      Reviewed and updated as needed this visit by Provider  Allergies  Meds  Problems         ROS:  Constitutional, HEENT, cardiovascular, pulmonary, gi and gu systems are negative, except as otherwise noted.    OBJECTIVE:     /82  Pulse 102  Temp 98.1  F (36.7  C) (Tympanic)  Resp 14  Ht 5' 11.5\" (1.816 m)  Wt 247 lb (112 kg)  SpO2 98%  BMI 33.97 kg/m2  Body mass index is 33.97 kg/(m^2).  OBJECTIVE:   Appears well, alert, oriented, pleasant and cooperative. Vitals are as noted by the nurse. Complete skin exam is performed. Lesion on left upper back with patient's observations stated as spouse has noticed the lesion, exam of this area shows intradermal nevus, features sharp border, evenly pigmented, raised, slight trauma to lateral edge 7 o'clock, size 3 mm.        ASSESSMENT:  intradermal nevus with recent traumatized edge   Diagnostic Test Results:  none     ASSESSMENT/PLAN:     1. Intradermal nevus    PLAN:   asymptomatic skin lesions as described, suspect lesion as described on left upper back. Appears to be a recently traumatized intradermal nevus.  Asymptomatic benign lesions can be observed for changes or symptoms over time. Will continue to monitor over next 2-4 weeks. He will take photo and recheck in 2 weeks. If not resolved then consider excisional biopsy. Symptomatic lesions can be treated if he desires; to be scheduled at a later date. Sun " protection with sunscreens and clothing to prevent skin cancer is discussed. The signs and symptoms of malignant skin lesions are reviewed with him today.          FUTURE APPOINTMENTS:       - Follow-up visit in 2-4 weeks if not resolved.    Patient Instructions       Monthly Mole Check Chart  Anyone can get skin cancer. It doesn t matter what your skin color is. Doing a monthly skin check is an important way to spot early signs of melanoma. Melanoma is the deadliest type of skin cancer. But if it s found early, it can be treated. Regular skin checks can help you track any changes in your skin.  Getting started  Each month, check your body for any spots such as freckles, age spots, and moles. Use this sheet to help you by doing the followin. Number each spot you find on the images below.  2. Record the details for each spot, along with the date, on the chart at the bottom of the page.  3. Keep all of your completed charts. This will help you track any changes in your skin over time.  What to look for  When doing a skin check, be sure to use the ABCDEs of melanoma. This means checking spots and moles for the following:      Asymmetry. One half of the mole is not like the other half.    Border. The edges are not smooth but ragged, notched, or blurred.    Color. Color varies from 1 part of the mole to another and may be tan, brown, or black. In some cases the color can be white, red, or blue.    Diameter. The mole is larger than 6 mm (size of a pencil eraser).    Evolving. The mole is getting larger or changing its shape or color.  How to check  Stand before a full-length mirror and check all parts of your body. For spots on your back or other areas you can't see, have a family member or friend do this for you. Or you can also use a hand mirror to check hard-to-see areas such as your back, buttocks, back of the neck, and scalp. To get a better look when checking your scalp, part your hair.  When to call your  healthcare provider  Call your healthcare provider if any of your moles:    Hurt    Itch    Ooze    Bleed    Thicken    Become crusty    Show any of the ABCDEs of melanoma  Monthly Skin Check Chart  Date       Mole #    Asymmetry:  What is the mole's shape? Border of mole Color of mole Diameter of mole Evolving: How has mole changed?                                                                                                   Date Last Reviewed: 3/1/2017    4825-8860 [employer]. [address] [place] [address]. All rights reserved. This information is not intended as a substitute for professional medical care. Always follow your healthcare professional's instructions.            Aleksander Bowles MD  [facility]

## 2018-08-30 ASSESSMENT — PATIENT HEALTH QUESTIONNAIRE - PHQ9: SUM OF ALL RESPONSES TO PHQ QUESTIONS 1-9: 3

## 2018-08-30 ASSESSMENT — ANXIETY QUESTIONNAIRES: GAD7 TOTAL SCORE: 3

## 2018-08-30 ASSESSMENT — ASTHMA QUESTIONNAIRES: ACT_TOTALSCORE: 25

## 2018-09-10 ENCOUNTER — TELEPHONE (OUTPATIENT)
Dept: FAMILY MEDICINE | Facility: CLINIC | Age: 53
End: 2018-09-10

## 2018-09-10 ENCOUNTER — HOSPITAL ENCOUNTER (EMERGENCY)
Facility: CLINIC | Age: 53
Discharge: HOME | End: 2018-09-10
Attending: EMERGENCY MEDICINE | Admitting: EMERGENCY MEDICINE
Payer: COMMERCIAL

## 2018-09-10 ENCOUNTER — APPOINTMENT (OUTPATIENT)
Dept: ULTRASOUND IMAGING | Facility: CLINIC | Age: 53
End: 2018-09-10
Attending: EMERGENCY MEDICINE
Payer: COMMERCIAL

## 2018-09-10 ENCOUNTER — APPOINTMENT (OUTPATIENT)
Dept: CT IMAGING | Facility: CLINIC | Age: 53
End: 2018-09-10
Attending: EMERGENCY MEDICINE
Payer: COMMERCIAL

## 2018-09-10 VITALS
RESPIRATION RATE: 16 BRPM | OXYGEN SATURATION: 95 % | HEIGHT: 72 IN | WEIGHT: 240 LBS | BODY MASS INDEX: 32.51 KG/M2 | TEMPERATURE: 98 F | SYSTOLIC BLOOD PRESSURE: 132 MMHG | DIASTOLIC BLOOD PRESSURE: 93 MMHG

## 2018-09-10 DIAGNOSIS — R10.13 ABDOMINAL PAIN, EPIGASTRIC: ICD-10-CM

## 2018-09-10 DIAGNOSIS — K27.9 PEPTIC ULCER DISEASE: ICD-10-CM

## 2018-09-10 LAB
ALBUMIN SERPL-MCNC: 4.5 G/DL (ref 3.4–5)
ALBUMIN UR-MCNC: NEGATIVE MG/DL
ALP SERPL-CCNC: 81 U/L (ref 40–150)
ALT SERPL W P-5'-P-CCNC: 64 U/L (ref 0–70)
ANION GAP SERPL CALCULATED.3IONS-SCNC: 14 MMOL/L (ref 3–14)
APPEARANCE UR: CLEAR
AST SERPL W P-5'-P-CCNC: 32 U/L (ref 0–45)
BASOPHILS # BLD AUTO: 0.1 10E9/L (ref 0–0.2)
BASOPHILS NFR BLD AUTO: 1 %
BILIRUB SERPL-MCNC: 1.2 MG/DL (ref 0.2–1.3)
BILIRUB UR QL STRIP: NEGATIVE
BUN SERPL-MCNC: 19 MG/DL (ref 7–30)
CALCIUM SERPL-MCNC: 9.3 MG/DL (ref 8.5–10.1)
CHLORIDE SERPL-SCNC: 103 MMOL/L (ref 94–109)
CO2 SERPL-SCNC: 24 MMOL/L (ref 20–32)
COLOR UR AUTO: YELLOW
CREAT SERPL-MCNC: 1.06 MG/DL (ref 0.66–1.25)
DIFFERENTIAL METHOD BLD: NORMAL
EOSINOPHIL NFR BLD AUTO: 2 %
ERYTHROCYTE [DISTWIDTH] IN BLOOD BY AUTOMATED COUNT: 12.4 % (ref 10–15)
GFR SERPL CREATININE-BSD FRML MDRD: 74 ML/MIN/1.7M2
GLUCOSE SERPL-MCNC: 91 MG/DL (ref 70–99)
GLUCOSE UR STRIP-MCNC: NEGATIVE MG/DL
HCT VFR BLD AUTO: 47.4 % (ref 40–53)
HGB BLD-MCNC: 16.4 G/DL (ref 13.3–17.7)
HGB UR QL STRIP: NEGATIVE
IMM GRANULOCYTES # BLD: 0.1 10E9/L (ref 0–0.4)
IMM GRANULOCYTES NFR BLD: 0.9 %
KETONES UR STRIP-MCNC: NEGATIVE MG/DL
LEUKOCYTE ESTERASE UR QL STRIP: NEGATIVE
LIPASE SERPL-CCNC: 95 U/L (ref 73–393)
LYMPHOCYTES # BLD AUTO: 2.9 10E9/L (ref 0.8–5.3)
LYMPHOCYTES NFR BLD AUTO: 41.7 %
MCH RBC QN AUTO: 31.8 PG (ref 26.5–33)
MCHC RBC AUTO-ENTMCNC: 34.6 G/DL (ref 31.5–36.5)
MCV RBC AUTO: 92 FL (ref 78–100)
MONOCYTES # BLD AUTO: 0.5 10E9/L (ref 0–1.3)
MONOCYTES NFR BLD AUTO: 7.2 %
MUCOUS THREADS #/AREA URNS LPF: PRESENT /LPF
NEUTROPHILS # BLD AUTO: 3.2 10E9/L (ref 1.6–8.3)
NEUTROPHILS NFR BLD AUTO: 47.2 %
NITRATE UR QL: NEGATIVE
NRBC # BLD AUTO: 0 10*3/UL
NRBC BLD AUTO-RTO: 0 /100
PH UR STRIP: 5 PH (ref 5–7)
PLATELET # BLD AUTO: 236 10E9/L (ref 150–450)
POTASSIUM SERPL-SCNC: 4 MMOL/L (ref 3.4–5.3)
PROT SERPL-MCNC: 7.9 G/DL (ref 6.8–8.8)
RBC # BLD AUTO: 5.16 10E12/L (ref 4.4–5.9)
RBC #/AREA URNS AUTO: 1 /HPF (ref 0–2)
SODIUM SERPL-SCNC: 141 MMOL/L (ref 133–144)
SOURCE: ABNORMAL
SP GR UR STRIP: >1.035 (ref 1–1.03)
SQUAMOUS #/AREA URNS AUTO: <1 /HPF (ref 0–1)
TROPONIN I SERPL-MCNC: <0.015 UG/L (ref 0–0.04)
UROBILINOGEN UR STRIP-MCNC: 0 MG/DL (ref 0–2)
WBC # BLD AUTO: 6.8 10E9/L (ref 4–11)
WBC #/AREA URNS AUTO: <1 /HPF (ref 0–5)

## 2018-09-10 PROCEDURE — 99284 EMERGENCY DEPT VISIT MOD MDM: CPT | Mod: Z6 | Performed by: EMERGENCY MEDICINE

## 2018-09-10 PROCEDURE — 25000125 ZZHC RX 250: Performed by: EMERGENCY MEDICINE

## 2018-09-10 PROCEDURE — 25000132 ZZH RX MED GY IP 250 OP 250 PS 637: Performed by: EMERGENCY MEDICINE

## 2018-09-10 PROCEDURE — 25000128 H RX IP 250 OP 636: Performed by: EMERGENCY MEDICINE

## 2018-09-10 PROCEDURE — 74177 CT ABD & PELVIS W/CONTRAST: CPT

## 2018-09-10 PROCEDURE — 85025 COMPLETE CBC W/AUTO DIFF WBC: CPT | Performed by: EMERGENCY MEDICINE

## 2018-09-10 PROCEDURE — 99285 EMERGENCY DEPT VISIT HI MDM: CPT | Mod: 25 | Performed by: EMERGENCY MEDICINE

## 2018-09-10 PROCEDURE — 83690 ASSAY OF LIPASE: CPT | Performed by: EMERGENCY MEDICINE

## 2018-09-10 PROCEDURE — 76705 ECHO EXAM OF ABDOMEN: CPT

## 2018-09-10 PROCEDURE — 81001 URINALYSIS AUTO W/SCOPE: CPT | Performed by: EMERGENCY MEDICINE

## 2018-09-10 PROCEDURE — 80053 COMPREHEN METABOLIC PANEL: CPT | Performed by: EMERGENCY MEDICINE

## 2018-09-10 PROCEDURE — 84484 ASSAY OF TROPONIN QUANT: CPT | Performed by: EMERGENCY MEDICINE

## 2018-09-10 PROCEDURE — 96374 THER/PROPH/DIAG INJ IV PUSH: CPT | Mod: 59 | Performed by: EMERGENCY MEDICINE

## 2018-09-10 RX ORDER — IOPAMIDOL 755 MG/ML
100 INJECTION, SOLUTION INTRAVASCULAR ONCE
Status: COMPLETED | OUTPATIENT
Start: 2018-09-10 | End: 2018-09-10

## 2018-09-10 RX ORDER — ONDANSETRON 2 MG/ML
4 INJECTION INTRAMUSCULAR; INTRAVENOUS EVERY 30 MIN PRN
Status: DISCONTINUED | OUTPATIENT
Start: 2018-09-10 | End: 2018-09-10 | Stop reason: HOSPADM

## 2018-09-10 RX ADMIN — SODIUM CHLORIDE 60 ML: 9 INJECTION, SOLUTION INTRAVENOUS at 20:00

## 2018-09-10 RX ADMIN — LIDOCAINE HYDROCHLORIDE 30 ML: 20 SOLUTION ORAL; TOPICAL at 19:27

## 2018-09-10 RX ADMIN — IOPAMIDOL 100 ML: 755 INJECTION, SOLUTION INTRAVENOUS at 20:01

## 2018-09-10 RX ADMIN — ONDANSETRON 4 MG: 2 INJECTION INTRAMUSCULAR; INTRAVENOUS at 19:27

## 2018-09-10 NOTE — TELEPHONE ENCOUNTER
----- Message from Jazzy Herrera sent at 9/10/2018 10:43 AM CDT -----  Regarding: Appointment scheduled from [facility]  Contact: [phone]  Appointment For: JAZZY [place] ([id number])  Visit Type: [facility] OFFICE VISIT SHORT ([id number])    9/24/2018    3:00 PM  15 mins.  Sandor Serrano MD  FAMILY PRACTICE    Patient Comments:  Primary Care  [facility] care

## 2018-09-10 NOTE — TELEPHONE ENCOUNTER
Please see [facility] message below.  Pt scheduled himself an appt thru [facility] and only scheduled it for 15 minutes.  Is this ok?

## 2018-09-10 NOTE — ED AVS SNAPSHOT
[facility] Emergency Department    [address] [place] [address]    Phone:  [phone]    Fax:  [phone]                                       Bulmaro Herrera   MRN: [id number]    Department:  [facility] Emergency Department   Date of Visit:  9/10/2018           Patient Information     Date Of Birth          1965        Your diagnoses for this visit were:     Abdominal pain, epigastric     Peptic ulcer disease suspected       You were seen by Corbin Henderson MD.      Follow-up Information     Follow up with Roger Rosado MD In 2 days.    Specialty:  Family Practice    Contact information:    [address] [place] [address]  [phone]          Discharge Instructions       Prilosec 20 mg 2x/day for one week, then once a week.  May use Maalox or Tums in between as needed.  No ibuprofen.  Recommend no alcohol.      Epigastric Pain (Uncertain Cause)     Epigastric pain can be a sign of disease in the upper abdomen. Common causes include:    Acid reflux (stomach acid flowing up into the esophagus)    Gastritis (irritation of the stomach lining)    Peptic Ulcer Disease    Inflammation of the pancreas    Gallstone    Infection in the gallbladder  Pain may be dull or burning. It may spread upward to the chest or to the back. There may be other symptoms such as belching, bloating, cramps or hunger pains. There may be weight loss or poor appetite, nausea or vomiting.  Since the diagnosis of your pain is not certain yet, further tests may sometimes be needed. Sometimes the doctor will treat you for the most likely condition to see if there is improvement before doing further tests.  Home care  Medicines    Antacids help neutralize the normal acids in your stomach. Examples are Maalox, Mylanta, Rolaids, and Tums. If you don t like the liquid, you can also try a chewable one. You may find one works better than another for you. Overuse can cause diarrhea or  constipation.    Acid blockers (H2 blockers) decrease acid production. Examples are cimetidine (Tagamet), famotidine (Pepcid) and ranitidine (Zantac).    Acid inhibitors (PPIs) decrease acid production in a different way than the blockers. You may find they work better, but can take a little longer to take effect.  Examples are omeprazole (Prilosec), lansoprazole (Prevacid), pantoprazole (Protonix), rabeprazole (Aciphex), and esomeprazole (Nexium).    Take an antacid 30-60 minutes after eating and at bedtime, but not at the same time as an acid blocker.    Try not to take NSAIDs. Aspirin may also cause problems, but if taking it for your heart or other medical reasons, talk to your doctor before stopping it; you do not want to cause a worse problem, like a heart attack or stroke.  Diet    If certain foods seem to cause your spasm, try to avoid them.     Eat slowly and chew food well before swallowing. Symptoms of gastritis can be worsened by certain foods. Limit or avoid fatty, fried, and spicy foods, as well as coffee, chocolate, mint, and foods with high acid content such as tomatoes and citrus fruit and juices (orange, grapefruit, lemon).    Avoid alcohol, caffeine, and tobacco, which can delay healing and worsen your problem.    Try eating smaller meals with snacks in between  Follow-up care  Follow up with your healthcare provider or as advised.  When to seek medical advice  Call your healthcare provider right away if any of the following occur:    Stomach pain worsens or moves to the right lower part of the abdomen    Chest pain appears, or if it worsens or spreads to the chest, back, neck, shoulder, or arm    Frequent vomiting (can t keep down liquids)    Blood in the stool or vomit (red or black color)    Feeling weak or dizzy, fainting, or having trouble breathing    Fever of 100.4 F (38 C) or higher, or as directed by your healthcare provider    Abdominal swelling  Date Last Reviewed: 9/25/2015     8914-1969 [employer]. [address] [place] [address]. All rights reserved. This information is not intended as a substitute for professional medical care. Always follow your healthcare professional's instructions.          Peptic Ulcer    A peptic ulcer is an open sore in the lining of the stomach. It may also occur in the duodenum (first part of the small intestine).   Causes  The most common causes of ulcers are:    H. pylori bacteria infection    Long-term use of nonsteroidal anti-inflammatory drugs (NSAIDs), such as aspirin or ibuprofen  Other factors that can increase the risk for ulcers include older age and family history of peptic ulcers. Tobacco and alcohol use are also risk factors. Emotional stress, worry, and spicy foods are not causes of peptic ulcers.  Symptoms  A peptic ulcer may or may not cause symptoms. If symptoms do occur, they can include:    Dull or burning pain in the stomach region (anywhere between your belly button and breastbone)    Loss of appetite    Heartburn or upset stomach    Frequent burping    Bloated feeling    Nausea or vomiting (vomit may be bloody or look like coffee grounds)    Black, tarry, or bloody stools (which means the ulcer is bleeding)  Sometimes the bleeding is not seen. In these cases, it may be discovered by symptoms of low blood count (anemia) such as dizziness, weakness, shortness of breath, pale skin, difficulty with exercise, or a blood test.  If an ulcer is suspected, tests may be done to check for H. pylori infection. These can include blood, stool, or breath tests. Upper endoscopy (also called EGD) is usually done to check for ulcers and allows for samples (biopsies) to be taken and evaluated under the microscope. An X-ray test called an upper GI series can be done in some situations but it could miss small ulcers that would be seen on the upper endoscopy.  Without treatment, a peptic ulcer may worsen. This can lead to serious  problems such as bleeding or perforation (a hole) in the stomach or duodenum. Treatment is needed to prevent these problems.  Medicines are the most common treatment for peptic ulcers. In severe cases, surgery may be needed.  Home care  Medicines  If you re prescribed medicines, be sure to take them as directed. Common medicines prescribed include:    Antibiotics. These kill H. pylori bacteria. In many cases, you ll need to take at least two types of antibiotics. The regimens can be complicated and require many medicines either at once, or taken in order. This is because this bacteria is often difficult to treat. It is very important to take the medicines as prescribed.     Proton pump inhibitors. These block your stomach from making any acid.    H2 blockers. These reduce the amount of acid your stomach makes. These are sometimes used for duodenal ulcers.    Bismuth subsalicylate. This helps protect the lining of your stomach and duodenum from acid.  General care    Don t take any NSAIDS without talking with your healthcare provider first. They may delay healing. Also check with your healthcare provider before taking any antacids.    Don't use alcohol or tobacco. These may delay healing. They may also make symptoms worse.  Follow-up care  Follow up with your healthcare provider as directed. If testing was done, you ll be told the results when they are ready. In some situations of gastric ulcer, a repeat upper endoscopy is needed to check for healing. Your healthcare provider may also do a test of cure after treatment for H. pylori.  When to seek medical advice  Call your healthcare provider right away if any of these occur:    Fever of 100.4 F (38 C) or higher, or as directed by your healthcare provider    Stomach pain that worsens or moves to the lower right part of abdomen    Continued weight loss    Pale skin    Fast heartbeat    Weakness or dizziness    Extreme fatigue    Shortness of breath    Frequent  vomiting, blood in your vomit, or coffee ground-like substance in your vomit    Black, tarry, or bloody stools  Call 911  Call 911 right away if any of these occur:    Sudden or severe pain in the stomach region    Stomach becomes rigid    Low body temperature    Unusually fast heart rate    Chest pain appears or worsens, or spreads to the back, neck, shoulder, or arm    Trouble breathing or swallowing    Confusion    Extreme drowsiness or trouble waking up    Fainting    Large amounts of blood present in vomit or stool  Date Last Reviewed: 9/1/2017 2000-2017 [email or web address]. [address] [place] [address]. All rights reserved. This information is not intended as a substitute for professional medical care. Always follow your healthcare professional's instructions.          Your next 10 appointments already scheduled     Sep 20, 2018  7:30 AM CDT   (Arrive by 12:00 AM)   Jaqueline Bonds with Sandor Serrano MD   [facility]    [address] [place] [id number] [phone]              24 Hour Appointment Hotline       To make an appointment at any [facility], call [phone] ([phone]). If you don't have a family doctor or clinic, we will help you find one. [facility] are conveniently located to serve the needs of you and your family.             Review of your medicines      Our records show that you are taking the medicines listed below. If these are incorrect, please call your family doctor or clinic.        Dose / Directions Last dose taken    acetaminophen-codeine 300-30 MG per tablet   Commonly known as:  TYLENOL #3   Dose:  1 tablet   Quantity:  120 tablet        Take 1 tablet by mouth every 4 hours as needed for moderate pain   Refills:  0        diazepam 10 MG tablet   Commonly known as:  VALIUM   Dose:  10 mg   Quantity:  2 tablet        Take 1 tablet (10 mg) by mouth every 6 hours as needed for  anxiety or sleep (Before appointment) Take 30-60 minutes before procedure.  Do not operate a vehicle after taking this medication.   Refills:  0        omeprazole 20 MG CR capsule   Commonly known as:  priLOSEC   Dose:  40 mg   Quantity:  180 capsule        Take 2 capsules (40 mg) by mouth daily   Refills:  3                Procedures and tests performed during your visit     CBC with platelets differential    CT Abdomen Pelvis w Contrast    Comprehensive metabolic panel    EKG 12-lead, tracing only    Give 20 ounces of water 15 minutes before CT of abdomen    Lipase    Peripheral IV catheter    Troponin I    UA with Microscopic    US Abdomen Limited      Orders Needing Specimen Collection     None      Pending Results     Date and Time Order Name Status Description    9/10/2018 1909 US Abdomen Limited Preliminary     9/10/2018 1909 CT Abdomen Pelvis w Contrast Preliminary             Pending Culture Results     No orders found from 9/8/2018 to 9/11/2018.            Pending Results Instructions     If you had any lab results that were not finalized at the time of your Discharge, you can call the ED Lab Result RN at [phone]. You will be contacted by this team for any positive Lab results or changes in treatment. The nurses are available 7 days a week from 10A to 6:30P.  You can leave a message 24 hours per day and they will return your call.        Thank you for choosing [facility]       Thank you for choosing [facility] for your care. Our goal is always to provide you with excellent care. Hearing back from our patients is one way we can continue to improve our services. Please take a few minutes to complete the written survey that you may receive in the mail after you visit with us. Thank you!        [facility] Information     [facility] [employer] gives you secure access to your electronic health record. If you see a primary care provider, you can also send messages to your care team and make appointments. If you have questions,  please call your primary care clinic.  If you do not have a primary care provider, please call [phone] and they will assist you.        Care EveryWhere ID     This is your Care EveryWhere ID. This could be used by other organizations to access your [place] medical records  [id number]        Equal Access to Services     RAUL WEBSTER : Fran Chavez, kirstie avalos, shine joealdarin alvarez. So [facility] [phone].    ATENCIÓN: Si habla español, tiene a nair disposición servicios gratuitos de asistencia lingüística. Llame al [phone].    We comply with applicable federal civil rights laws and [place] laws. We do not discriminate on the basis of race, color, national origin, age, disability, sex, sexual orientation, or gender identity.            After Visit Summary       This is your record. Keep this with you and show to your community pharmacist(s) and doctor(s) at your next visit.

## 2018-09-10 NOTE — ED AVS SNAPSHOT
[facility] Emergency Department    [address] [place] [address]    Phone:  [phone]    Fax:  [phone]                                       Bulmaro Herrera   MRN: [id number]    Department:  [facility] Emergency Department   Date of Visit:  9/10/2018           After Visit Summary Signature Page     I have received my discharge instructions, and my questions have been answered. I have discussed any challenges I see with this plan with the nurse or doctor.    ..........................................................................................................................................  Patient/Patient Representative Signature      ..........................................................................................................................................  Patient Representative Print Name and Relationship to Patient    ..................................................               ................................................  Date                                            Time    ..........................................................................................................................................  Reviewed by Signature/Title    ...................................................              ..............................................  Date                                                            Time          22EPIC Rev 08/18

## 2018-09-11 NOTE — DISCHARGE INSTRUCTIONS
Prilosec 20 mg 2x/day for one week, then once a week.  May use Maalox or Tums in between as needed.  No ibuprofen.  Recommend no alcohol.      Epigastric Pain (Uncertain Cause)     Epigastric pain can be a sign of disease in the upper abdomen. Common causes include:    Acid reflux (stomach acid flowing up into the esophagus)    Gastritis (irritation of the stomach lining)    Peptic Ulcer Disease    Inflammation of the pancreas    Gallstone    Infection in the gallbladder  Pain may be dull or burning. It may spread upward to the chest or to the back. There may be other symptoms such as belching, bloating, cramps or hunger pains. There may be weight loss or poor appetite, nausea or vomiting.  Since the diagnosis of your pain is not certain yet, further tests may sometimes be needed. Sometimes the doctor will treat you for the most likely condition to see if there is improvement before doing further tests.  Home care  Medicines    Antacids help neutralize the normal acids in your stomach. Examples are Maalox, Mylanta, Rolaids, and Tums. If you don t like the liquid, you can also try a chewable one. You may find one works better than another for you. Overuse can cause diarrhea or constipation.    Acid blockers (H2 blockers) decrease acid production. Examples are cimetidine (Tagamet), famotidine (Pepcid) and ranitidine (Zantac).    Acid inhibitors (PPIs) decrease acid production in a different way than the blockers. You may find they work better, but can take a little longer to take effect.  Examples are omeprazole (Prilosec), lansoprazole (Prevacid), pantoprazole (Protonix), rabeprazole (Aciphex), and esomeprazole (Nexium).    Take an antacid 30-60 minutes after eating and at bedtime, but not at the same time as an acid blocker.    Try not to take NSAIDs. Aspirin may also cause problems, but if taking it for your heart or other medical reasons, talk to your doctor before stopping it; you do not want to cause a worse  problem, like a heart attack or stroke.  Diet    If certain foods seem to cause your spasm, try to avoid them.     Eat slowly and chew food well before swallowing. Symptoms of gastritis can be worsened by certain foods. Limit or avoid fatty, fried, and spicy foods, as well as coffee, chocolate, mint, and foods with high acid content such as tomatoes and citrus fruit and juices (orange, grapefruit, lemon).    Avoid alcohol, caffeine, and tobacco, which can delay healing and worsen your problem.    Try eating smaller meals with snacks in between  Follow-up care  Follow up with your healthcare provider or as advised.  When to seek medical advice  Call your healthcare provider right away if any of the following occur:    Stomach pain worsens or moves to the right lower part of the abdomen    Chest pain appears, or if it worsens or spreads to the chest, back, neck, shoulder, or arm    Frequent vomiting (can t keep down liquids)    Blood in the stool or vomit (red or black color)    Feeling weak or dizzy, fainting, or having trouble breathing    Fever of 100.4 F (38 C) or higher, or as directed by your healthcare provider    Abdominal swelling  Date Last Reviewed: 9/25/2015 2000-2017 [employer]. [address] [place] [address]. All rights reserved. This information is not intended as a substitute for professional medical care. Always follow your healthcare professional's instructions.          Peptic Ulcer    A peptic ulcer is an open sore in the lining of the stomach. It may also occur in the duodenum (first part of the small intestine).   Causes  The most common causes of ulcers are:    H. pylori bacteria infection    Long-term use of nonsteroidal anti-inflammatory drugs (NSAIDs), such as aspirin or ibuprofen  Other factors that can increase the risk for ulcers include older age and family history of peptic ulcers. Tobacco and alcohol use are also risk factors. Emotional stress, worry, and  spicy foods are not causes of peptic ulcers.  Symptoms  A peptic ulcer may or may not cause symptoms. If symptoms do occur, they can include:    Dull or burning pain in the stomach region (anywhere between your belly button and breastbone)    Loss of appetite    Heartburn or upset stomach    Frequent burping    Bloated feeling    Nausea or vomiting (vomit may be bloody or look like coffee grounds)    Black, tarry, or bloody stools (which means the ulcer is bleeding)  Sometimes the bleeding is not seen. In these cases, it may be discovered by symptoms of low blood count (anemia) such as dizziness, weakness, shortness of breath, pale skin, difficulty with exercise, or a blood test.  If an ulcer is suspected, tests may be done to check for H. pylori infection. These can include blood, stool, or breath tests. Upper endoscopy (also called EGD) is usually done to check for ulcers and allows for samples (biopsies) to be taken and evaluated under the microscope. An X-ray test called an upper GI series can be done in some situations but it could miss small ulcers that would be seen on the upper endoscopy.  Without treatment, a peptic ulcer may worsen. This can lead to serious problems such as bleeding or perforation (a hole) in the stomach or duodenum. Treatment is needed to prevent these problems.  Medicines are the most common treatment for peptic ulcers. In severe cases, surgery may be needed.  Home care  Medicines  If you re prescribed medicines, be sure to take them as directed. Common medicines prescribed include:    Antibiotics. These kill H. pylori bacteria. In many cases, you ll need to take at least two types of antibiotics. The regimens can be complicated and require many medicines either at once, or taken in order. This is because this bacteria is often difficult to treat. It is very important to take the medicines as prescribed.     Proton pump inhibitors. These block your stomach from making any  acid.    H2 blockers. These reduce the amount of acid your stomach makes. These are sometimes used for duodenal ulcers.    Bismuth subsalicylate. This helps protect the lining of your stomach and duodenum from acid.  General care    Don t take any NSAIDS without talking with your healthcare provider first. They may delay healing. Also check with your healthcare provider before taking any antacids.    Don't use alcohol or tobacco. These may delay healing. They may also make symptoms worse.  Follow-up care  Follow up with your healthcare provider as directed. If testing was done, you ll be told the results when they are ready. In some situations of gastric ulcer, a repeat upper endoscopy is needed to check for healing. Your healthcare provider may also do a test of cure after treatment for H. pylori.  When to seek medical advice  Call your healthcare provider right away if any of these occur:    Fever of 100.4 F (38 C) or higher, or as directed by your healthcare provider    Stomach pain that worsens or moves to the lower right part of abdomen    Continued weight loss    Pale skin    Fast heartbeat    Weakness or dizziness    Extreme fatigue    Shortness of breath    Frequent vomiting, blood in your vomit, or coffee ground-like substance in your vomit    Black, tarry, or bloody stools  Call 911  Call 911 right away if any of these occur:    Sudden or severe pain in the stomach region    Stomach becomes rigid    Low body temperature    Unusually fast heart rate    Chest pain appears or worsens, or spreads to the back, neck, shoulder, or arm    Trouble breathing or swallowing    Confusion    Extreme drowsiness or trouble waking up    Fainting    Large amounts of blood present in vomit or stool  Date Last Reviewed: 9/1/2017 2000-2017 [employer]. [address] [place] [address], [place] [address]. All rights reserved. This information is not intended as a substitute for professional medical care. Always follow  your healthcare professional's instructions.

## 2018-09-15 NOTE — ED PROVIDER NOTES
History     Chief Complaint   Patient presents with     Abdominal Pain     HPI  Bulmaro Herrera is a 53 year old male who presents with upper abdominal pain.  He states this is been there for 3 days and worse today.  He has had no fever.  He has had some intermittent nausea.  No vomiting.  3 days ago he had some green stool.  He is not sleeping well.  He has not noted a food is made this especially worse or better.  There is some concern about gallbladder disease.  Pain is dull and achy.  He does carry a history of some gastroesophageal esophageal reflux.  He occasionally takes Prilosec but nothing real regular or recent.    Problem List:    Patient Active Problem List    Diagnosis Date Noted     Herniation of intervertebral disc of cervical spine without radiculopathy 03/21/2017     Priority: Medium     Cervicalgia 03/21/2017     Priority: Medium     Chronic pain syndrome 04/05/2016     Priority: Medium     DDD, cervical. T#3, #60/mo.        Mild intermittent asthma without complication 02/18/2016     Priority: Medium     Hyperlipidemia LDL goal <160 04/30/2015     Priority: Medium     DDD (degenerative disc disease), lumbar 12/20/2013     Priority: Medium     DDD (degenerative disc disease), cervical 12/20/2013     Priority: Medium     Elevated liver enzymes 05/14/2012     Priority: Medium     Pruritus ani 01/20/2010     Priority: Medium     Back pains      Priority: Medium     Problem list name updated by automated process. Provider to review and confirm       Esophageal reflux 04/13/2005     Priority: Medium        Past Medical History:    Past Medical History:   Diagnosis Date     Back pains      Depressive disorder, not elsewhere classified 1/28/2007     Disc degeneration 12/2008     Elevated LFT's 11/09     Other and unspecified hyperlipidemia 1/2007     Sleep disorder 9/09     Tobacco use disorder        Past Surgical History:    Past Surgical History:   Procedure Laterality Date     C NONSPECIFIC PROCEDURE       rt hand surgery - laceration/glass/tendons     COLONOSCOPY  08    adenomatous polyp repeat 5 years     COLONOSCOPY N/A 2017    Procedure: COMBINED COLONOSCOPY, SINGLE OR MULTIPLE BIOPSY/POLYPECTOMY BY BIOPSY;  Colonoscopy, Polypectomy by Biopsy;  Surgeon: Matt Reyes MD;  Location:  GI     ENT SURGERY      for deviated septum     HC ECHO HEART XTHORACIC, STRESS/REST  2009    neg     INJECT EPIDURAL CERVICAL N/A 2015    Procedure: INJECT EPIDURAL CERVICAL;  Surgeon: Christian Chaudhry MD;  Location:  OR     ORTHOPEDIC SURGERY      Right hand     TONSILLECTOMY         Family History:    Family History   Problem Relation Age of Onset     Breast Cancer Mother      Arthritis Mother      joint ?     Depression Father      Cancer Maternal Grandmother      C.A.D. Maternal Grandfather      Cardiovascular Maternal Grandfather      Gynecology Paternal Grandmother       giving birth     Prostate Cancer Paternal Grandfather      Genetic Disorder Brother      joint pain?     Depression Brother      Cardiovascular Son      congenital heart defect -       Prostate Cancer Other       age 70's     Diabetes Other        Social History:  Marital Status:   [2]  Social History   Substance Use Topics     Smoking status: Former Smoker     Packs/day: 1.50     Quit date: 2007     Smokeless tobacco: Never Used     Alcohol use Yes      Comment: rare        Medications:      acetaminophen-codeine (TYLENOL #3) 300-30 MG per tablet   omeprazole (PRILOSEC) 20 MG CR capsule   diazepam (VALIUM) 10 MG tablet         Review of Systems  All other systems are reviewed and are negative    Physical Exam   BP: (!) 130/99 (right arm, regular long cuff)  Heart Rate: 68  Temp: 98  F (36.7  C)  Resp: 16  Height: 182.9 cm (6')  Weight: 108.9 kg (240 lb)  SpO2: 95 %      Physical Exam   Constitutional: He appears well-developed and well-nourished. No distress.   HENT:   Head: Normocephalic and atraumatic.    Mouth/Throat: Oropharynx is clear and moist.   Eyes: Conjunctivae are normal. Right eye exhibits no discharge. Left eye exhibits no discharge. No scleral icterus.   Neck: Normal range of motion. Neck supple.   Cardiovascular: Normal rate, regular rhythm and normal heart sounds.    No murmur heard.  Pulmonary/Chest: Effort normal and breath sounds normal. No stridor. No respiratory distress.   Abdominal: Soft. There is tenderness (mild) in the epigastric area. There is no rigidity, no rebound and no guarding.   Musculoskeletal: Normal range of motion.   Neurological: He is alert. No cranial nerve deficit. He exhibits normal muscle tone.   Skin: Skin is warm and dry. No rash noted. He is not diaphoretic. No erythema. No pallor.   Psychiatric: He has a normal mood and affect.   Nursing note and vitals reviewed.      ED Course     ED Course     Procedures             Recent Results (from the past 744 hour(s))   US Abdomen Limited    Narrative    US ABDOMEN LIMITED 9/10/2018 7:56 PM    HISTORY: Abdominal pain.    COMPARISON: 2/9/2013.    FINDINGS:  Gallbladder: Normal.    Common bile duct: Normal.    Liver: Diffuse fatty infiltration. No focal lesions.    Pancreas: Most of the pancreatic body and tail are obscured by  overlying bowel gas. Visualized pancreas is normal.    Right kidney: Normal.    Proximal aorta is partially obscured but appears to be of normal  diameter. Proximal inferior vena cava is totally obscured by overlying  structures.      Impression    IMPRESSION:   1. Diffuse fatty infiltration of the liver.  2. Most of the pancreas is obscured.     DEBBY COE MD   CT Abdomen Pelvis w Contrast    Narrative    CT ABDOMEN PELVIS WITH CONTRAST 9/10/2018 8:17 PM    HISTORY: Abdominal pain.    TECHNIQUE: Helical axial scans from dome of liver through pubic  symphysis with Isovue-370, 100mL IV contrast. Radiation dose for this  scan was reduced using automated exposure control, adjustment of the  mA and/or kV  according to patient size, or iterative reconstruction  technique.    COMPARISON: 2/5/2018.    FINDINGS: Diffuse fatty infiltration of the liver without change. The  spleen, pancreas, bilateral adrenal glands and kidneys bilaterally are  unremarkable and unchanged. The bowel and mesentery in the upper  abdomen show no abnormality. Minimal vascular calcifications.    Scans through the pelvis show no acute abnormality or free fluid. The  appendix is identified and appears normal.      Impression    IMPRESSION:  1. No acute appearing abnormality in the abdomen or pelvis and no  change since the prior exam.  2. Diffuse fatty infiltration of the liver.     DEBBY COE MD     Labs including troponin, UA, CBC, comprehensive metabolic panel and lipase without significant abnormality.    EKG: Normal sinus rhythm, normal axis.  Rate of 68 beats per minute.  No acute ST or T wave changes.  Interpreted by myself.      Critical Care time:  none            No results found for this or any previous visit (from the past 24 hour(s)).    Medications   lidocaine (viscous) (XYLOCAINE) 2 % 15 mL, alum & mag hydroxide-simethicone (MYLANTA ES/MAALOX  ES) 15 mL GI Cocktail (30 mLs Oral Given 9/10/18 1927)   sodium chloride (PF) 0.9% PF flush 3 mL (10 mLs Intravenous Given 9/10/18 2000)   iopamidol (ISOVUE-370) solution 100 mL (100 mLs Intravenous Given 9/10/18 2001)   sodium chloride 0.9 % bag 500mL for CT scan flush use (60 mLs As instructed Given 9/10/18 2000)       Assessments & Plan (with Medical Decision Making)  53-year-old male with some epigastric abdominal pain.  He has had considerable stress.  Workup as above including labs, ultrasound and CT without significant acute abnormality.  Suspect this could be peptic ulcer disease.  Have recommended trial of regular Prilosec with antacids in between as needed.  Will need to follow-up primary care if symptoms not improving.  Return anytime sooner to the emergency department if  condition worsens or other concern.       I have reviewed the nursing notes.    I have reviewed the findings, diagnosis, plan and need for follow up with the patient.       Discharge Medication List as of 9/10/2018  9:46 PM          Final diagnoses:   Abdominal pain, epigastric   Peptic ulcer disease - suspected       9/10/2018   [facility] EMERGENCY DEPARTMENT     Corbin Henderson MD  09/15/18 1369

## 2018-09-16 ENCOUNTER — OFFICE VISIT (OUTPATIENT)
Dept: URGENT CARE | Facility: RETAIL CLINIC | Age: 53
End: 2018-09-16
Payer: COMMERCIAL

## 2018-09-16 VITALS
SYSTOLIC BLOOD PRESSURE: 107 MMHG | HEART RATE: 86 BPM | TEMPERATURE: 97.4 F | DIASTOLIC BLOOD PRESSURE: 65 MMHG | OXYGEN SATURATION: 96 %

## 2018-09-16 DIAGNOSIS — J45.20 MILD INTERMITTENT ASTHMA WITHOUT COMPLICATION: ICD-10-CM

## 2018-09-16 DIAGNOSIS — J20.9 ACUTE BRONCHITIS WITH COEXISTING CONDITION REQUIRING PROPHYLACTIC TREATMENT: Primary | ICD-10-CM

## 2018-09-16 PROCEDURE — 99213 OFFICE O/P EST LOW 20 MIN: CPT | Performed by: FAMILY MEDICINE

## 2018-09-16 RX ORDER — AZITHROMYCIN 250 MG/1
TABLET, FILM COATED ORAL
Qty: 6 TABLET | Refills: 0 | Status: SHIPPED | OUTPATIENT
Start: 2018-09-16 | End: 2018-11-13

## 2018-09-16 RX ORDER — FLUTICASONE PROPIONATE 220 UG/1
2 AEROSOL, METERED RESPIRATORY (INHALATION) 2 TIMES DAILY
Qty: 1 INHALER | Refills: 1 | Status: SHIPPED | OUTPATIENT
Start: 2018-09-16 | End: 2018-11-13

## 2018-09-16 NOTE — PROGRESS NOTES
SUBJECTIVE:  Bulmaro Herrera is a 53 year old male who presents to the clinic today with a chief complaint of cough  for 1 day(s).  His cough is described as persistent, daytime, nightime and productive green.    The patient's symptoms are severe and worsening.  Associated symptoms include malaise. The patient's symptoms are exacerbated by no particular triggers  Patient has been using nothing  to improve symptoms.    Past Medical History:   Diagnosis Date     Back pains      Depressive disorder, not elsewhere classified 1/28/2007    declines Rx     Disc degeneration 12/2008    see MRI -throaco-lumbar mild discogenic degenerative changes     Elevated LFT's 11/09    alt and ast     Other and unspecified hyperlipidemia 1/2007     Sleep disorder 9/09    CPAP     Tobacco use disorder      Current Outpatient Prescriptions   Medication Sig Dispense Refill     acetaminophen-codeine (TYLENOL #3) 300-30 MG per tablet Take 1 tablet by mouth every 4 hours as needed for moderate pain 120 tablet 0     azithromycin (ZITHROMAX) 250 MG tablet Two tablets first day, then one tablet daily for four days. 6 tablet 0     fluticasone (FLOVENT HFA) 220 MCG/ACT Inhaler Inhale 2 puffs into the lungs 2 times daily 1 Inhaler 1     omeprazole (PRILOSEC) 20 MG CR capsule Take 2 capsules (40 mg) by mouth daily 180 capsule 3     diazepam (VALIUM) 10 MG tablet Take 1 tablet (10 mg) by mouth every 6 hours as needed for anxiety or sleep (Before appointment) Take 30-60 minutes before procedure.  Do not operate a vehicle after taking this medication. (Patient not taking: Reported on 2/5/2018) 2 tablet 0     History   Smoking Status     Former Smoker     Packs/day: 1.50     Quit date: 2/14/2007   Smokeless Tobacco     Never Used       ROS  Review of systems negative except as stated above.    OBJECTIVE:  /65 (BP Location: Left arm)  Pulse 86  Temp 97.4  F (36.3  C) (Tympanic)  SpO2 96%  GENERAL APPEARANCE: mild distress  EYES: EOMI,  PERRL,  conjunctiva clear  HENT: ear canals and TM's normal.  Nose and mouth without ulcers, erythema or lesions  NECK: supple, nontender, no lymphadenopathy  RESP: rhonchi throughout  CV: regular rates and rhythm, normal S1 S2, no murmur noted  ABDOMEN:  soft, nontender, no HSM or masses and bowel sounds normal  NEURO: Normal strength and tone, sensory exam grossly normal,  normal speech and mentation  SKIN: no suspicious lesions or rashes    ASSESSMENT:    Acute Bronchitis  Asthma exacerbation    PLAN:  Flovent and zithromax, rest, fluids.  Symptomatic measures encouraged, humidified air, plenty of fluids.  Follow up with primary care provider if no improvement.

## 2018-09-16 NOTE — MR AVS SNAPSHOT
After Visit Summary   9/16/2018    Bulmaro Herrera    MRN: [id number]           Patient Information     Date Of Birth          1965        Visit Information        Provider Department      9/16/2018 9:20 AM Jimmie Malone MD [facility]        Today's Diagnoses     Acute bronchitis with coexisting condition requiring prophylactic treatment    -  1    Mild intermittent asthma without complication           Follow-ups after your visit        Your next 10 appointments already scheduled     Sep 20, 2018  7:30 AM CDT   (Arrive by 12:00 AM)   Jaqueline Short with Sandor Serrano MD   [facility]    [address] [place] [id number] [phone]              Who to contact     You can reach your care team any time of the day by calling [phone].  Notification of test results:  If you have an abnormal lab result, we will notify you by phone as soon as possible.         Additional Information About Your Visit        MyChart Information     [employer] gives you secure access to your electronic health record. If you see a primary care provider, you can also send messages to your care team and make appointments. If you have questions, please call your primary care clinic.  If you do not have a primary care provider, please call [phone] and they will assist you.        Care EveryWhere ID     This is your Care EveryWhere ID. This could be used by other organizations to access your [place] medical records  [id number]        Your Vitals Were     Pulse Temperature Pulse Oximetry             86 97.4  F (36.3  C) (Tympanic) 96%          Blood Pressure from Last 3 Encounters:   09/16/18 107/65   09/10/18 (!) 132/93   08/29/18 118/82    Weight from Last 3 Encounters:   09/10/18 240 lb (108.9 kg)   08/29/18 247 lb (112 kg)   02/05/18 249 lb (112.9 kg)              Today, you had the following     No orders found for display          Today's Medication Changes          These changes are accurate as of 9/16/18  9:41 AM.  If you have any questions, ask your nurse or doctor.               Start taking these medicines.        Dose/Directions    azithromycin 250 MG tablet   Commonly known as:  ZITHROMAX   Used for:  Mild intermittent asthma without complication, Acute bronchitis with coexisting condition requiring prophylactic treatment        Two tablets first day, then one tablet daily for four days.   Quantity:  6 tablet   Refills:  0       fluticasone 220 MCG/ACT Inhaler   Commonly known as:  FLOVENT HFA   Used for:  Acute bronchitis with coexisting condition requiring prophylactic treatment, Mild intermittent asthma without complication        Dose:  2 puff   Inhale 2 puffs into the lungs 2 times daily   Quantity:  1 Inhaler   Refills:  1            Where to get your medicines      These medications were sent to [facility] [place] [id number] [facility]   [address] [facility] , [facility] [address]     Phone:  [phone]     azithromycin 250 MG tablet         Some of these will need a paper prescription and others can be bought over the counter.  Ask your nurse if you have questions.     Bring a paper prescription for each of these medications     fluticasone 220 MCG/ACT Inhaler                Primary Care Provider Office Phone # Fax #    Jeronimolauren Steve Rosado [phone]       [address] [facility]   [facility] [address]        Equal Access to Services     RAUL WEBSTER AH: Fran mchugho Soomar, waaxda luqadaha, qaybta kaalmada adeegyada, darin stanton. So [facility] [phone].    ATENCIÓN: Si habla español, tiene a nair disposición servicios gratuitos de asistencia lingüística. Llame al [phone].    We comply with applicable federal civil rights laws and [place] laws. We do not discriminate on the basis of race, color, national origin, age, disability, sex, sexual orientation, or  gender identity.            Thank you!     Thank you for choosing [facility]  for your care. Our goal is always to provide you with excellent care. Hearing back from our patients is one way we can continue to improve our services. Please take a few minutes to complete the written survey that you may receive in the mail after your visit with us. Thank you!             Your Updated Medication List - Protect others around you: Learn how to safely use, store and throw away your medicines at www.disposemymeds.org.          This list is accurate as of 9/16/18  9:41 AM.  Always use your most recent med list.                   Brand Name Dispense Instructions for use Diagnosis    acetaminophen-codeine 300-30 MG per tablet    TYLENOL #3    120 tablet    Take 1 tablet by mouth every 4 hours as needed for moderate pain    Cervicalgia       azithromycin 250 MG tablet    ZITHROMAX    6 tablet    Two tablets first day, then one tablet daily for four days.    Mild intermittent asthma without complication, Acute bronchitis with coexisting condition requiring prophylactic treatment       diazepam 10 MG tablet    VALIUM    2 tablet    Take 1 tablet (10 mg) by mouth every 6 hours as needed for anxiety or sleep (Before appointment) Take 30-60 minutes before procedure.  Do not operate a vehicle after taking this medication.    Anxiety       fluticasone 220 MCG/ACT Inhaler    FLOVENT HFA    1 Inhaler    Inhale 2 puffs into the lungs 2 times daily    Acute bronchitis with coexisting condition requiring prophylactic treatment, Mild intermittent asthma without complication       omeprazole 20 MG CR capsule    priLOSEC    180 capsule    Take 2 capsules (40 mg) by mouth daily    Gastroesophageal reflux disease without esophagitis

## 2018-09-18 ENCOUNTER — OFFICE VISIT (OUTPATIENT)
Dept: FAMILY MEDICINE | Facility: CLINIC | Age: 53
End: 2018-09-18
Payer: COMMERCIAL

## 2018-09-18 VITALS
RESPIRATION RATE: 20 BRPM | SYSTOLIC BLOOD PRESSURE: 118 MMHG | HEART RATE: 80 BPM | BODY MASS INDEX: 32.9 KG/M2 | WEIGHT: 242.6 LBS | DIASTOLIC BLOOD PRESSURE: 80 MMHG | OXYGEN SATURATION: 98 % | TEMPERATURE: 98.3 F

## 2018-09-18 DIAGNOSIS — J20.9 ACUTE BRONCHITIS, UNSPECIFIED ORGANISM: Primary | ICD-10-CM

## 2018-09-18 PROCEDURE — 99213 OFFICE O/P EST LOW 20 MIN: CPT | Performed by: PHYSICIAN ASSISTANT

## 2018-09-18 RX ORDER — GUAIFENESIN 600 MG/1
600 TABLET, EXTENDED RELEASE ORAL 2 TIMES DAILY PRN
Qty: 20 TABLET | Refills: 0 | Status: SHIPPED | OUTPATIENT
Start: 2018-09-18 | End: 2018-11-13

## 2018-09-18 RX ORDER — PREDNISONE 20 MG/1
20 TABLET ORAL 2 TIMES DAILY
Qty: 10 TABLET | Refills: 0 | Status: SHIPPED | OUTPATIENT
Start: 2018-09-18 | End: 2018-11-13

## 2018-09-18 ASSESSMENT — PAIN SCALES - GENERAL: PAINLEVEL: EXTREME PAIN (8)

## 2018-09-18 NOTE — PROGRESS NOTES
SUBJECTIVE:   Bulmaro Herrera is a 53 year old male who presents to clinic today for the following health issues:      Acute Illness   Acute illness concerns: cold symptoms  Onset: recheck    Fever: YES    Chills/Sweats: YES    Headache (location?): YES    Sinus Pressure:YES    Conjunctivitis:  no    Ear Pain: YES: right    Rhinorrhea: YES    Congestion: YES    Sore Throat: YES     Cough: YES-productive of green sputum    Wheeze: YES    Decreased Appetite: YES    Nausea: no    Vomiting: no    Diarrhea:  no    Dysuria/Freq.: no    Fatigue/Achiness: YES    Sick/Strep Exposure: no     Therapies Tried and outcome: Zithromax, no relief      Patient is a 53 year old male who presents with complaint of worsening lower respiratory infection. He was seen on 09/16 at the [facility] and treated for acute bronchitis with zpak and supportive care. He said that he is on day 3 of the antibiotic and not feeling any better. His symptoms include SOB, fever, sinus congestion, sore throat, productive cough, generalized fatigue and achiness. He has tried his son's albuterol inhaler with minimal relief. We discussed supportive therapies such as steamy showers, guaifenesin, OTC pain medication for aches and honey for cough. Denies chest pain/pressure, trouble breathing, abdominal pain, nausea/vomiting, changes in bladder/bowel or rash.     Problem list and histories reviewed & adjusted, as indicated.  Additional history: as documented    Patient Active Problem List   Diagnosis     Esophageal reflux     Back pains     Pruritus ani     Elevated liver enzymes     DDD (degenerative disc disease), lumbar     DDD (degenerative disc disease), cervical     Hyperlipidemia LDL goal <160     Mild intermittent asthma without complication     Chronic pain syndrome     Herniation of intervertebral disc of cervical spine without radiculopathy     Cervicalgia     Past Surgical History:   Procedure Laterality Date     C NONSPECIFIC PROCEDURE      rt  hand surgery - laceration/glass/tendons     COLONOSCOPY  08    adenomatous polyp repeat 5 years     COLONOSCOPY N/A 2017    Procedure: COMBINED COLONOSCOPY, SINGLE OR MULTIPLE BIOPSY/POLYPECTOMY BY BIOPSY;  Colonoscopy, Polypectomy by Biopsy;  Surgeon: Matt Reyes MD;  Location:  GI     ENT SURGERY      for deviated septum     HC ECHO HEART XTHORACIC, STRESS/REST  2009    neg     INJECT EPIDURAL CERVICAL N/A 2015    Procedure: INJECT EPIDURAL CERVICAL;  Surgeon: Christian Chaudhry MD;  Location:  OR     ORTHOPEDIC SURGERY      Right hand     TONSILLECTOMY         Social History   Substance Use Topics     Smoking status: Former Smoker     Packs/day: 1.50     Quit date: 2007     Smokeless tobacco: Never Used     Alcohol use Yes      Comment: rare     Family History   Problem Relation Age of Onset     Breast Cancer Mother      Arthritis Mother      joint ?     Depression Father      Cancer Maternal Grandmother      C.A.D. Maternal Grandfather      Cardiovascular Maternal Grandfather      Gynecology Paternal Grandmother       giving birth     Prostate Cancer Paternal Grandfather      Genetic Disorder Brother      joint pain?     Depression Brother      Cardiovascular Son      congenital heart defect -       Prostate Cancer Other       age 70's     Diabetes Other          Current Outpatient Prescriptions   Medication Sig Dispense Refill     amoxicillin-clavulanate (AUGMENTIN) 875-125 MG per tablet Take 1 tablet by mouth 2 times daily 20 tablet 0     azithromycin (ZITHROMAX) 250 MG tablet Two tablets first day, then one tablet daily for four days. 6 tablet 0     guaiFENesin (MUCINEX) 600 MG 12 hr tablet Take 1 tablet (600 mg) by mouth 2 times daily as needed for congestion 20 tablet 0     omeprazole (PRILOSEC) 20 MG CR capsule Take 2 capsules (40 mg) by mouth daily 180 capsule 3     predniSONE (DELTASONE) 20 MG tablet Take 1 tablet (20 mg) by mouth 2 times daily 10 tablet 0      acetaminophen-codeine (TYLENOL #3) 300-30 MG per tablet Take 1 tablet by mouth every 4 hours as needed for moderate pain (Patient not taking: Reported on 9/18/2018) 120 tablet 0     diazepam (VALIUM) 10 MG tablet Take 1 tablet (10 mg) by mouth every 6 hours as needed for anxiety or sleep (Before appointment) Take 30-60 minutes before procedure.  Do not operate a vehicle after taking this medication. (Patient not taking: Reported on 2/5/2018) 2 tablet 0     fluticasone (FLOVENT HFA) 220 MCG/ACT Inhaler Inhale 2 puffs into the lungs 2 times daily (Patient not taking: Reported on 9/18/2018) 1 Inhaler 1     Allergies   Allergen Reactions     Percocet [Oxycodone-Acetaminophen] Itching     Wasps [Hornets] Swelling       Reviewed and updated as needed this visit by clinical staff  Tobacco  Allergies  Meds  Med Hx  Surg Hx  Fam Hx  Soc Hx      Reviewed and updated as needed this visit by Provider         ROS:  Constitutional, HEENT, cardiovascular, pulmonary, gi and gu systems are negative, except as otherwise noted.    OBJECTIVE:     /80 (BP Location: Left arm, Patient Position: Chair, Cuff Size: Adult Large)  Pulse 80  Temp 98.3  F (36.8  C) (Oral)  Resp 20  Wt 242 lb 9.6 oz (110 kg)  SpO2 98%  BMI 32.9 kg/m2  Body mass index is 32.9 kg/(m^2).  GENERAL: healthy, alert and no distress  HENT: nose congested and mouth without ulcers or lesions  RESP: lungs with diffuse rhonchi, cleared on cough  CV: regular rate and rhythm, normal S1 S2, no S3 or S4, no murmur, click or rub, no peripheral edema and peripheral pulses strong  MS: no gross musculoskeletal defects noted, no edema  SKIN: no suspicious lesions or rashes  NEURO: Normal strength and tone, mentation intact and speech normal  PSYCH: mentation appears normal, affect normal/bright    Diagnostic Test Results:  none     ASSESSMENT/PLAN:       1. Acute bronchitis, unspecified organism  Discussed with patient switching to an antibiotic with broader  coverage and adding prednisone burst on to reduce inflammation. Patient was in agreement with this plan. Reviewed additional supportive therapies (see HPI) and educational material with patient. Will call if not improving as expected.   - predniSONE (DELTASONE) 20 MG tablet; Take 1 tablet (20 mg) by mouth 2 times daily  Dispense: 10 tablet; Refill: 0  - amoxicillin-clavulanate (AUGMENTIN) 875-125 MG per tablet; Take 1 tablet by mouth 2 times daily  Dispense: 20 tablet; Refill: 0  - guaiFENesin (MUCINEX) 600 MG 12 hr tablet; Take 1 tablet (600 mg) by mouth 2 times daily as needed for congestion  Dispense: 20 tablet; Refill: 0    Follow up with clinic as needed or sooner if conditions change, worsen or fail to improve as expected.      Yassine Gregorio PA-C  [facility]

## 2018-09-18 NOTE — PATIENT INSTRUCTIONS
1. Prednisone 20mg 1 tablet with breakfast and lunch  2. Augment 1 tablet with breakfast and dinner   - Probiotic or yogurt with lunch as needed  3. Tylenol or ibuprofen for pain as needed   4. Guaifenesin 600mg twice daily for 5 days as needed  5. Honey natural cough suppressant     Acute Bronchitis  Your healthcare provider has told you that you have acute bronchitis. Bronchitis is infection or inflammation of the bronchial tubes (airways in the lungs). Normally, air moves easily in and out of the airways. Bronchitis narrows the airways, making it harder for air to flow in and out of the lungs. This causes symptoms such as shortness of breath, coughing up yellow or green mucus, and wheezing. Bronchitis can be acute or chronic. Acute means the condition comes on quickly and goes away in a short time, usually within 3 to 10 days. Chronic means a condition lasts a long time and often comes back.    What causes acute bronchitis?  Acute bronchitis almost always starts as a viral respiratory infection, such as a cold or the flu. Certain factors make it more likely for a cold or flu to turn into bronchitis. These include being very young, being elderly, having a heart or lung problem, or having a weak immune system. Cigarette smoking also makes bronchitis more likely.  When bronchitis develops, the airways become swollen. The airways may also become infected with bacteria. This is known as a secondary infection.  Diagnosing acute bronchitis  Your healthcare provider will examine you and ask about your symptoms and health history. You may also have a sputum culture to test the fluid in your lungs. Chest X-rays may be done to look for infection in the lungs.  Treating acute bronchitis  Bronchitis usually clears up as the cold or flu goes away. You can help feel better faster by doing the following:    Take medicine as directed. You may be told to take ibuprofen or other over-the-counter medicines. These help relieve  inflammation in your bronchial tubes. Your healthcare provider may prescribe an inhaler to help open up the bronchial tubes. Most of the time, acute bronchitis is caused by a viral infection. Antibiotics are usually not prescribed for viral infections.    Drink plenty of fluids, such as water, juice, or warm soup. Fluids loosen mucus so that you can cough it up. This helps you breathe more easily. Fluids also prevent dehydration.    Make sure you get plenty of rest.    Do not smoke. Do not allow anyone else to smoke in your home.  Recovery and follow-up  Follow up with your doctor as you are told. You will likely feel better in a week or two. But a dry cough can linger beyond that time. Let your doctor know if you still have symptoms (other than a dry cough) after 2 weeks, or if you re prone to getting bronchial infections. Take steps to protect yourself from future infections. These steps include stopping smoking and avoiding tobacco smoke, washing your hands often, and getting a yearly flu shot.  When to call your healthcare provider  Call the healthcare provider if you have any of the following:    Fever of 100.4 F (38.0 C) or higher, or as advised    Symptoms that get worse, or new symptoms    Trouble breathing    Symptoms that don t start to improve within a week, or within 3 days of taking antibiotics   Date Last Reviewed: 12/1/2016 2000-2017 [employer]. [address] [place] [address], [place] [address]. All rights reserved. This information is not intended as a substitute for professional medical care. Always follow your healthcare professional's instructions.

## 2018-09-18 NOTE — MR AVS SNAPSHOT
After Visit Summary   9/18/2018    Bulmaro Herrera    MRN: [id number]           Patient Information     Date Of Birth          1965        Visit Information        Provider Department      9/18/2018 5:00 PM Yassine Gregorio PA-C [facility]        Today's Diagnoses     Acute bronchitis, unspecified organism    -  1      Care Instructions      1. Prednisone 20mg 1 tablet with breakfast and lunch  2. Augment 1 tablet with breakfast and dinner   - Probiotic or yogurt with lunch as needed  3. Tylenol or ibuprofen for pain as needed   4. Guaifenesin 600mg twice daily for 5 days as needed  5. Honey natural cough suppressant     Acute Bronchitis  Your healthcare provider has told you that you have acute bronchitis. Bronchitis is infection or inflammation of the bronchial tubes (airways in the lungs). Normally, air moves easily in and out of the airways. Bronchitis narrows the airways, making it harder for air to flow in and out of the lungs. This causes symptoms such as shortness of breath, coughing up yellow or green mucus, and wheezing. Bronchitis can be acute or chronic. Acute means the condition comes on quickly and goes away in a short time, usually within 3 to 10 days. Chronic means a condition lasts a long time and often comes back.    What causes acute bronchitis?  Acute bronchitis almost always starts as a viral respiratory infection, such as a cold or the flu. Certain factors make it more likely for a cold or flu to turn into bronchitis. These include being very young, being elderly, having a heart or lung problem, or having a weak immune system. Cigarette smoking also makes bronchitis more likely.  When bronchitis develops, the airways become swollen. The airways may also become infected with bacteria. This is known as a secondary infection.  Diagnosing acute bronchitis  Your healthcare provider will examine you and ask about your symptoms and health history. You may also have a  sputum culture to test the fluid in your lungs. Chest X-rays may be done to look for infection in the lungs.  Treating acute bronchitis  Bronchitis usually clears up as the cold or flu goes away. You can help feel better faster by doing the following:    Take medicine as directed. You may be told to take ibuprofen or other over-the-counter medicines. These help relieve inflammation in your bronchial tubes. Your healthcare provider may prescribe an inhaler to help open up the bronchial tubes. Most of the time, acute bronchitis is caused by a viral infection. Antibiotics are usually not prescribed for viral infections.    Drink plenty of fluids, such as water, juice, or warm soup. Fluids loosen mucus so that you can cough it up. This helps you breathe more easily. Fluids also prevent dehydration.    Make sure you get plenty of rest.    Do not smoke. Do not allow anyone else to smoke in your home.  Recovery and follow-up  Follow up with your doctor as you are told. You will likely feel better in a week or two. But a dry cough can linger beyond that time. Let your doctor know if you still have symptoms (other than a dry cough) after 2 weeks, or if you re prone to getting bronchial infections. Take steps to protect yourself from future infections. These steps include stopping smoking and avoiding tobacco smoke, washing your hands often, and getting a yearly flu shot.  When to call your healthcare provider  Call the healthcare provider if you have any of the following:    Fever of 100.4 F (38.0 C) or higher, or as advised    Symptoms that get worse, or new symptoms    Trouble breathing    Symptoms that don t start to improve within a week, or within 3 days of taking antibiotics   Date Last Reviewed: 12/1/2016 2000-2017 [employer]. [address] [place] [address], [place] [address]. All rights reserved. This information is not intended as a substitute for professional medical care. Always follow your healthcare  professional's instructions.                Follow-ups after your visit        Your next 10 appointments already scheduled     Sep 18, 2018  5:00 PM CDT   SHORT with Yassine Gregorio PA-C   [facility] ([address] [id number] [phone]            Sep 20, 2018  7:30 AM CDT   (Arrive by 12:00 AM)   Jaqueline Short with Sandor Serrano MD   [facility] ([address] [id number] [phone]              Who to contact     If you have questions or need follow up information about today's clinic visit or your schedule please contact [facility] directly at [phone].  Normal or non-critical lab and imaging results will be communicated to you by MyChart, letter or phone within 4 business days after the clinic has received the results. If you do not hear from us within 7 days, please contact the clinic through [facility] or phone. If you have a critical or abnormal lab result, we will notify you by phone as soon as possible.  Submit refill requests through [employer] or call your pharmacy and they will forward the refill request to us. Please allow 3 business days for your refill to be completed.          Additional Information About Your Visit        [facility] [facility] Information     [employer] gives you secure access to your electronic health record. If you see a primary care provider, you can also send messages to your care team and make appointments. If you have questions, please call your primary care clinic.  If you do not have a primary care provider, please call [phone] and they will assist you.        Care EveryWhere ID     This is your Care EveryWhere ID. This could be used by other organizations to access your [facility] medical records  [id number]        Your Vitals Were     Pulse Temperature Respirations Pulse Oximetry BMI (Body Mass Index)       80 98.3  F (36.8   C) (Oral) 20 98% 32.9 kg/m2        Blood Pressure from Last 3 Encounters:   09/18/18 118/80   09/16/18 107/65   09/10/18 (!) 132/93    Weight from Last 3 Encounters:   09/18/18 242 lb 9.6 oz (110 kg)   09/10/18 240 lb (108.9 kg)   08/29/18 247 lb (112 kg)              Today, you had the following     No orders found for display         Today's Medication Changes          These changes are accurate as of 9/18/18  4:52 PM.  If you have any questions, ask your nurse or doctor.               Start taking these medicines.        Dose/Directions    amoxicillin-clavulanate 875-125 MG per tablet   Commonly known as:  AUGMENTIN   Used for:  Acute bronchitis, unspecified organism   Started by:  Yassine Gregorio PA-C        Dose:  1 tablet   Take 1 tablet by mouth 2 times daily   Quantity:  20 tablet   Refills:  0       guaiFENesin 600 MG 12 hr tablet   Commonly known as:  MUCINEX   Used for:  Acute bronchitis, unspecified organism   Started by:  Yassine Gregorio PA-C        Dose:  600 mg   Take 1 tablet (600 mg) by mouth 2 times daily as needed for congestion   Quantity:  20 tablet   Refills:  0       predniSONE 20 MG tablet   Commonly known as:  DELTASONE   Used for:  Acute bronchitis, unspecified organism   Started by:  Yassine Gregorio PA-C        Dose:  20 mg   Take 1 tablet (20 mg) by mouth 2 times daily   Quantity:  10 tablet   Refills:  0            Where to get your medicines      These medications were sent to [facility] [place], [place] [facility] [place] [address] [place] [address]     Phone:  [phone]     amoxicillin-clavulanate 875-125 MG per tablet    guaiFENesin 600 MG 12 hr tablet    predniSONE 20 MG tablet                Primary Care Provider Office Phone # Fax #    Roger Rosado [phone]        KASHMIR QURESHI [address]        Equal Access to Services     RAUL WEBSTER AH: Fran Chavez, kirstie avalos, shine caal  darin easleyphilip efrajoe mcdonaldaan ah. [facility] [phone].    ATENCIÓN: Si nancyla raina, tiene a nair disposición servicios gratuitos de asistencia lingüística. Jodie al [phone].    We comply with applicable federal civil rights laws and [place] laws. We do not discriminate on the basis of race, color, national origin, age, disability, sex, sexual orientation, or gender identity.            Thank you!     Thank you for choosing [facility]  for your care. Our goal is always to provide you with excellent care. Hearing back from our patients is one way we can continue to improve our services. Please take a few minutes to complete the written survey that you may receive in the mail after your visit with us. Thank you!             Your Updated Medication List - Protect others around you: Learn how to safely use, store and throw away your medicines at www.disposemymeds.org.          This list is accurate as of 9/18/18  4:52 PM.  Always use your most recent med list.                   Brand Name Dispense Instructions for use Diagnosis    acetaminophen-codeine 300-30 MG per tablet    TYLENOL #3    120 tablet    Take 1 tablet by mouth every 4 hours as needed for moderate pain    Cervicalgia       amoxicillin-clavulanate 875-125 MG per tablet    AUGMENTIN    20 tablet    Take 1 tablet by mouth 2 times daily    Acute bronchitis, unspecified organism       azithromycin 250 MG tablet    ZITHROMAX    6 tablet    Two tablets first day, then one tablet daily for four days.    Mild intermittent asthma without complication, Acute bronchitis with coexisting condition requiring prophylactic treatment       diazepam 10 MG tablet    VALIUM    2 tablet    Take 1 tablet (10 mg) by mouth every 6 hours as needed for anxiety or sleep (Before appointment) Take 30-60 minutes before procedure.  Do not operate a vehicle after taking this medication.    Anxiety       fluticasone 220 MCG/ACT Inhaler    FLOVENT  HFA    1 Inhaler    Inhale 2 puffs into the lungs 2 times daily    Acute bronchitis with coexisting condition requiring prophylactic treatment, Mild intermittent asthma without complication       guaiFENesin 600 MG 12 hr tablet    MUCINEX    20 tablet    Take 1 tablet (600 mg) by mouth 2 times daily as needed for congestion    Acute bronchitis, unspecified organism       omeprazole 20 MG CR capsule    priLOSEC    180 capsule    Take 2 capsules (40 mg) by mouth daily    Gastroesophageal reflux disease without esophagitis       predniSONE 20 MG tablet    DELTASONE    10 tablet    Take 1 tablet (20 mg) by mouth 2 times daily    Acute bronchitis, unspecified organism

## 2018-09-24 ENCOUNTER — MYC REFILL (OUTPATIENT)
Dept: FAMILY MEDICINE | Facility: CLINIC | Age: 53
End: 2018-09-24

## 2018-09-24 DIAGNOSIS — K21.9 GASTROESOPHAGEAL REFLUX DISEASE WITHOUT ESOPHAGITIS: ICD-10-CM

## 2018-09-24 DIAGNOSIS — M54.2 CERVICALGIA: ICD-10-CM

## 2018-09-24 NOTE — TELEPHONE ENCOUNTER
Omeprazole Prescription approved per [facility] Refill Protocol.    Tylenol #3  Routing refill request to provider for review/approval because:  Drug not on the [facility] refill protocol     Maria L Parmar RN

## 2018-09-24 NOTE — TELEPHONE ENCOUNTER
Message from [employer]:  Original authorizing provider: MD Bulmaro Patrick would like a refill of the following medications:  omeprazole (PRILOSEC) 20 MG CR capsule [Roger Rosado MD]  acetaminophen-codeine (TYLENOL #3) 300-30 MG per tablet [Roger Rosado MD]    [facility] [place] [address] [facility]     Comment:

## 2018-09-24 NOTE — TELEPHONE ENCOUNTER
"Requested Prescriptions   Pending Prescriptions Disp Refills     omeprazole (PRILOSEC) 20 MG CR capsule 180 capsule 3    Last Written Prescription Date:  11/21/2017  Last Fill Quantity: 180,  # refills: 3   Last office visit: 9/18/2018 with prescribing provider:  08/24/2017Aaliyah    Future Office Visit:     Sig: Take 2 capsules (40 mg) by mouth daily    PPI Protocol Passed    9/24/2018  1:22 PM       Passed - Not on Clopidogrel (unless Pantoprazole ordered)       Passed - No diagnosis of osteoporosis on record       Passed - Recent (12 mo) or future (30 days) visit within the authorizing provider's specialty    Patient had office visit in the last 12 months or has a visit in the next 30 days with authorizing provider or within the authorizing provider's specialty.  See \"Patient Info\" tab in inbasket, or \"Choose Columns\" in Meds & Orders section of the refill encounter.           Passed - Patient is age 18 or older        acetaminophen-codeine (TYLENOL #3) 300-30 MG per tablet 120 tablet 0     Sig: Take 1 tablet by mouth every 4 hours as needed for moderate pain    There is no refill protocol information for this order        Last Written Prescription Date:  06/26/2018  Last Fill Quantity: 120,  # refills: 0   Last office visit: 9/18/2018 with prescribing provider:  08/27/2017-Ashlee    Future Office Visit:      "

## 2018-11-01 ENCOUNTER — MYC REFILL (OUTPATIENT)
Dept: FAMILY MEDICINE | Facility: CLINIC | Age: 53
End: 2018-11-01

## 2018-11-01 DIAGNOSIS — M54.2 CERVICALGIA: ICD-10-CM

## 2018-11-02 NOTE — TELEPHONE ENCOUNTER
acetaminophen-codeine (TYLENOL #3) 300-30 MG per tablet     Last Written Prescription Date:  09/26/2018  Last Fill Quantity: 120,   # refills: 0  Last Office Visit: 09/18/2018  Future Office visit:    Next 5 appointments (look out 90 days)     Nov 13, 2018  7:30 AM CST   [facility]    [address] [place] [id number] [phone]                   Routing refill request to provider for review/approval because:  Drug not on the [facility], [facility] or  Health refill protocol or controlled substance

## 2018-11-13 ENCOUNTER — OFFICE VISIT (OUTPATIENT)
Dept: FAMILY MEDICINE | Facility: CLINIC | Age: 53
End: 2018-11-13
Payer: COMMERCIAL

## 2018-11-13 VITALS
OXYGEN SATURATION: 98 % | HEIGHT: 72 IN | BODY MASS INDEX: 32.91 KG/M2 | RESPIRATION RATE: 20 BRPM | TEMPERATURE: 98.2 F | SYSTOLIC BLOOD PRESSURE: 120 MMHG | HEART RATE: 74 BPM | WEIGHT: 243 LBS | DIASTOLIC BLOOD PRESSURE: 72 MMHG

## 2018-11-13 DIAGNOSIS — M51.369 DDD (DEGENERATIVE DISC DISEASE), LUMBAR: Primary | ICD-10-CM

## 2018-11-13 DIAGNOSIS — R79.89 LOW TESTOSTERONE IN MALE: ICD-10-CM

## 2018-11-13 DIAGNOSIS — E78.2 MIXED HYPERLIPIDEMIA: ICD-10-CM

## 2018-11-13 DIAGNOSIS — M50.30 DDD (DEGENERATIVE DISC DISEASE), CERVICAL: ICD-10-CM

## 2018-11-13 DIAGNOSIS — M50.20 HERNIATION OF INTERVERTEBRAL DISC OF CERVICAL SPINE WITHOUT RADICULOPATHY: ICD-10-CM

## 2018-11-13 DIAGNOSIS — G47.33 OSA (OBSTRUCTIVE SLEEP APNEA): ICD-10-CM

## 2018-11-13 LAB
CHOLEST SERPL-MCNC: 278 MG/DL
HDLC SERPL-MCNC: 33 MG/DL
LDLC SERPL CALC-MCNC: ABNORMAL MG/DL
LDLC SERPL DIRECT ASSAY-MCNC: 171 MG/DL
NONHDLC SERPL-MCNC: 245 MG/DL
TRIGL SERPL-MCNC: 440 MG/DL

## 2018-11-13 PROCEDURE — 36415 COLL VENOUS BLD VENIPUNCTURE: CPT | Performed by: FAMILY MEDICINE

## 2018-11-13 PROCEDURE — 83721 ASSAY OF BLOOD LIPOPROTEIN: CPT | Mod: 59 | Performed by: FAMILY MEDICINE

## 2018-11-13 PROCEDURE — 99215 OFFICE O/P EST HI 40 MIN: CPT | Performed by: FAMILY MEDICINE

## 2018-11-13 PROCEDURE — 84403 ASSAY OF TOTAL TESTOSTERONE: CPT | Performed by: FAMILY MEDICINE

## 2018-11-13 PROCEDURE — 80061 LIPID PANEL: CPT | Performed by: FAMILY MEDICINE

## 2018-11-13 RX ORDER — CYCLOBENZAPRINE HCL 10 MG
TABLET ORAL
Qty: 90 TABLET | Refills: 0 | Status: SHIPPED | OUTPATIENT
Start: 2018-11-13 | End: 2018-11-30

## 2018-11-13 ASSESSMENT — ENCOUNTER SYMPTOMS
WEAKNESS: 0
ABDOMINAL PAIN: 0
CHILLS: 0
HEMATURIA: 0
NAUSEA: 0
SORE THROAT: 0
COUGH: 1
HEMATOCHEZIA: 0
DIZZINESS: 1
HEARTBURN: 0
CONSTIPATION: 0
FEVER: 0
DYSURIA: 0
PARESTHESIAS: 0
FREQUENCY: 0
MYALGIAS: 0
HEADACHES: 0
DIARRHEA: 0
JOINT SWELLING: 0
EYE PAIN: 0
NERVOUS/ANXIOUS: 0
ARTHRALGIAS: 0
PALPITATIONS: 0
SHORTNESS OF BREATH: 0

## 2018-11-13 ASSESSMENT — PAIN SCALES - GENERAL: PAINLEVEL: MILD PAIN (2)

## 2018-11-13 NOTE — PROGRESS NOTES
"SUBJECTIVE:  Bulmaro is a 53 year old male who comes in today for discussion on his neck and low back pain management.  He originally was here for a preventative visit but proceeded to discuss his pain concerns and wished to discuss this in more detail.  We opted to cancel his physical and discuss his pain management issues in more detail instead.  He is a new patient to me and is establishing care.    Patient reviewed with me his neck and spine history.  He saw our old neurosurgery team a few years ago and went through a series of injections and evaluation and was told that he needed surgery.  He opted to not have surgery but eventually continued to have pain.  By then, the current neurosurgery team had moved on from our practice and he went and sought consult from our current neurosurgery team with Dr. Tran.  Patient did not have a good experience and was told that he did not need surgery and he is to pursue physical therapy instead.  Patient stated that he had done physical therapy in copious amounts but had no response to pain management.  In addition, patient sees chiropractic care on a regular basis without long-term improvement.    Patient currently takes Tylenol No. 3 at night to help him sleep.  This is only partially effective as it helps him fall asleep but he does not stay asleep.  He wakes up in the middle the night with terrible pain and when he finally does wake up to get up in the morning, he states he feels \"hung over.\"    Review of his medication list reveals that he has been on gabapentin in the past for which he states that it did not work.  However, it does not appear that he was on more than 300 mg twice daily.  He stated that he did not have any side effects on this medication.  He has never tried cyclobenzaprine for pain management.  He also has never tried amitriptyline.  He has been on an SSRI before for depression management but he said that that \"messed him up.\"    Patient has a history of " obstructive sleep apnea and he states that it is mild.  He was seen by our sleep clinic a number of years ago and was not successful with CPAP but did find some improvement with an oral appliance.  He states that this is starting to wear out and he needs to get it replaced.  He has not been back to the sleep clinic in quite some time.    Patient has a history of hyperlipidemia and he is fasting today and wishes to get his cholesterol checked.    Patient apparently has a history of low testosterone and was supposed to do testosterone shots.  This was a number of years ago and he never did follow-up on this.    ROS:  10 point ROS of systems including Constitutional, Eyes, HENT, Respiratory, Cardiovascular, Gastroenterology, Genitourinary, Integumentary, Muscularskeletal, Psychiatric were all negative except for pertinent positives noted in my HPI.     OBJECTIVE:  /72  Pulse 74  Temp 98.2  F (36.8  C) (Temporal)  Resp 20  Ht 6' (1.829 m)  Wt 243 lb (110.2 kg)  SpO2 98%  BMI 32.96 kg/m2  Body mass index is 32.96 kg/(m^2).  Physical Exam   Constitutional: He appears well-developed and well-nourished.   Cardiovascular: Normal rate, regular rhythm, S1 normal, S2 normal and normal heart sounds.    No murmur heard.  Pulmonary/Chest: Effort normal and breath sounds normal. No respiratory distress. He has no wheezes. He has no rhonchi. He has no rales.   Neurological: He is alert.   Psychiatric: He has a normal mood and affect. His behavior is normal. Judgment and thought content normal.       ASSESSMENT/ORDERS:    ICD-10-CM    1. DDD (degenerative disc disease), lumbar M51.36 cyclobenzaprine (FLEXERIL) 10 MG tablet   2. DDD (degenerative disc disease), cervical M50.30 cyclobenzaprine (FLEXERIL) 10 MG tablet   3. Herniation of intervertebral disc of cervical spine without radiculopathy M50.20 cyclobenzaprine (FLEXERIL) 10 MG tablet   4. Mixed hyperlipidemia E78.2 Lipid panel reflex to direct LDL Fasting   5. ROBBY  (obstructive sleep apnea) G47.33    6. Low testosterone in male R79.89 Testosterone, total     PLAN:  1.  We had a long conversation about his pain management options today.  First, we discussed that he could go back again and see Dr. Tran for a another recheck and further discussion on surgical options.  Another option would be for him to see another neurosurgeon within the [facility] system, but he would need to go to [facility] in order to have this done.  He is not sure today is what to what he wants to do with regards to this.  2.  We discussed pain management options and I informed him that Tylenol #3 is certainly not a good option for long-term pain management.  I informed him that he could try gabapentin again (he was not interested in this at this time as it is a 3 times daily option) or we could try cyclobenzaprine dosing at 20 mg at night and 10 mg in the morning.  I informed him that he may have drowsiness from this medication so the nighttime dose will certainly be beneficial.  Not everybody gets daytime drowsiness so he should try the morning dose on a day in which that he is not working.  He also should not take the Tylenol 3 at the same time as the cyclobenzaprine.  We also discussed that amitriptyline may potentially be an option for him as well.  3.  Cholesterol testing done today as well as a recheck on his testosterone as the timing works well for an a.m. lab draw.  4.  I recommended that he contact the sleep clinic for a recheck on his sleep apnea.  5. See below for over the counter medication recommendations for Tylenol and ibuprofen.    Patient Instructions   Ibuprofen up to 800 mg every 8 hours  Acetaminophen up to 1000 mg every 6 hours    You can take both acetaminophen and ibuprofen of these together and it is safe to do so.  You can also take it with the flexeril.      Make appointment with sleep clinic for recheck on obstructive sleep apnea.        Follow up with Provider - Return in about 1  month (around 12/13/2018).      I spent >50 minutes of face to face time with the patient, >50% of which was spent counseling and coordination of care regarding pain management.     Sandor Serrano MD

## 2018-11-13 NOTE — PROGRESS NOTES
SUBJECTIVE:   CC: Bulmaro Herrera is an 53 year old male who presents for preventative health visit.     Physical   Annual:     Getting at least 3 servings of Calcium per day:  Yes    Bi-annual eye exam:  Yes    Dental care twice a year:  Yes    Sleep apnea or symptoms of sleep apnea:  Excessive snoring    Diet:  Regular (no restrictions)    Frequency of exercise:  1 day/week    Duration of exercise:  Less than 15 minutes    Taking medications regularly:  Yes    Medication side effects:  None    Additional concerns today:  No        {additional problems to add (Optional):136975}    Today's PHQ-2 Score:   PHQ-2 ( 1999 [employer]) 11/13/2018   Q1: Little interest or pleasure in doing things 1   Q2: Feeling down, depressed or hopeless 0   PHQ-2 Score 1   Q1: Little interest or pleasure in doing things Several days   Q2: Feeling down, depressed or hopeless Not at all   PHQ-2 Score 1       Abuse: Current or Past(Physical, Sexual or Emotional)- No  Do you feel safe in your environment - Yes    Social History   Substance Use Topics     Smoking status: Former Smoker     Packs/day: 1.50     Quit date: 2/14/2007     Smokeless tobacco: Never Used     Alcohol use Yes      Comment: rare     Alcohol Use 11/13/2018   If you drink alcohol do you typically have greater than 3 drinks per day OR greater than 7 drinks per week? No   {add AUDIT responses (Optional) (A score of 7 for adult men is an indication of hazardous drinking; a score of 8 or more is an indication of an alcohol use disorder.  A score of 7 or more for adult women is an indication of hazardous drinking or an alchohol use disorder):965614}    Last PSA: No results found for: PSA    Reviewed orders with patient. Reviewed health maintenance and updated orders accordingly - Yes  {Chronicprobdata (Optional):161854}    Reviewed and updated as needed this visit by clinical staff  Tobacco  Allergies  Meds  Soc Hx        Reviewed and updated as needed this visit by Provider       "  {HISTORY OPTIONS (Optional):647364}    Review of Systems   Constitutional: Negative for chills and fever.   HENT: Positive for hearing loss. Negative for congestion, ear pain and sore throat.    Eyes: Negative for pain and visual disturbance.   Respiratory: Positive for cough. Negative for shortness of breath.    Cardiovascular: Negative for chest pain, palpitations and peripheral edema.   Gastrointestinal: Negative for abdominal pain, constipation, diarrhea, heartburn, hematochezia and nausea.   Genitourinary: Negative for discharge, dysuria, frequency, genital sores, hematuria, impotence and urgency.   Musculoskeletal: Negative for arthralgias, joint swelling and myalgias.   Skin: Negative for rash.   Neurological: Positive for dizziness. Negative for weakness, headaches and paresthesias.   Psychiatric/Behavioral: Negative for mood changes. The patient is not nervous/anxious.      {MALE ROS (Optional):230529::\"CONSTITUTIONAL: NEGATIVE for fever, chills, change in weight\",\"INTEGUMENTARY/SKIN: NEGATIVE for worrisome rashes, moles or lesions\",\"EYES: NEGATIVE for vision changes or irritation\",\"ENT: NEGATIVE for ear, mouth and throat problems\",\"RESP: NEGATIVE for significant cough or SOB\",\"CV: NEGATIVE for chest pain, palpitations or peripheral edema\",\"GI: NEGATIVE for nausea, abdominal pain, heartburn, or change in bowel habits\",\" male: negative for dysuria, hematuria, decreased urinary stream, erectile dysfunction, urethral discharge\",\"MUSCULOSKELETAL: NEGATIVE for significant arthralgias or myalgia\",\"NEURO: NEGATIVE for weakness, dizziness or paresthesias\",\"PSYCHIATRIC: NEGATIVE for changes in mood or affect\"}    OBJECTIVE:   There were no vitals taken for this visit.    Physical Exam  {MALE - complete :182977}    {Diagnostic Test Results (Optional):547763::\"Diagnostic Test Results:\",\"none \"}    ASSESSMENT/PLAN:   {Diag Picklist:591050}    COUNSELING:   {MALE COUNSELING MESSAGES:913130::\"Reviewed preventive " "health counseling, as reflected in patient instructions\"}    BP Readings from Last 1 Encounters:   09/18/18 118/80     Estimated body mass index is 32.9 kg/(m^2) as calculated from the following:    Height as of 9/10/18: 6' (1.829 m).    Weight as of 9/18/18: 242 lb 9.6 oz (110 kg).    {BP Counseling- Complete if BP >= 120/80  (Optional):174233}  {Weight Management Plan (ACO) Complete if BMI is abnormal-  Ages 18-64  BMI >24.9.  Age 65+ with BMI <23 or >30 (Optional):071508}     reports that he quit smoking about 11 years ago. He smoked 1.50 packs per day. He has never used smokeless tobacco.      Counseling Resources:  ATP IV Guidelines  Pooled Cohorts Equation Calculator  FRAX Risk Assessment  ICSI Preventive Guidelines  Dietary Guidelines for Americans, 2010  USDA's MyPlate  ASA Prophylaxis  Lung CA Screening    Sandor Serrano MD  [facility]  Answers for HPI/ROS submitted by the patient on 11/13/2018   PHQ-2 Score: 1    "

## 2018-11-13 NOTE — PATIENT INSTRUCTIONS
Ibuprofen up to 800 mg every 8 hours  Acetaminophen up to 1000 mg every 6 hours    You can take both acetaminophen and ibuprofen of these together and it is safe to do so.  You can also take it with the flexeril.      Make appointment with sleep clinic for recheck on obstructive sleep apnea.

## 2018-11-13 NOTE — MR AVS SNAPSHOT
After Visit Summary   11/13/2018    Bulmaro Herrera    MRN: [id number]           Patient Information     Date Of Birth          1965        Visit Information        Provider Department      11/13/2018 7:30 AM Sandor Serrano MD [facility]        Today's Diagnoses     DDD (degenerative disc disease), lumbar    -  1    DDD (degenerative disc disease), cervical        Herniation of intervertebral disc of cervical spine without radiculopathy        Mixed hyperlipidemia        ROBBY (obstructive sleep apnea)        Low testosterone in male          Care Instructions    Ibuprofen up to 800 mg every 8 hours  Acetaminophen up to 1000 mg every 6 hours    You can take both acetaminophen and ibuprofen of these together and it is safe to do so.  You can also take it with the flexeril.      Make appointment with sleep clinic for recheck on obstructive sleep apnea.          Follow-ups after your visit        Follow-up notes from your care team     Return in about 1 month (around 12/13/2018).      Who to contact     If you have questions or need follow up information about today's clinic visit or your schedule please contact [facility] directly at [phone].  Normal or non-critical lab and imaging results will be communicated to you by Respihart, letter or phone within 4 business days after the clinic has received the results. If you do not hear from us within 7 days, please contact the clinic through Qufenqit or phone. If you have a critical or abnormal lab result, we will notify you by phone as soon as possible.  Submit refill requests through Zaelab or call your pharmacy and they will forward the refill request to us. Please allow 3 business days for your refill to be completed.          Additional Information About Your Visit        MyChart Information     Zaelab gives you secure access to your electronic health record. If you see a primary care provider, you can also  send messages to your care team and make appointments. If you have questions, please call your primary care clinic.  If you do not have a primary care provider, please call [phone] and they will assist you.        Care EveryWhere ID     This is your Care EveryWhere ID. This could be used by other organizations to access your [facility] medical records  [id number]        Your Vitals Were     Pulse Temperature Respirations Height Pulse Oximetry BMI (Body Mass Index)    74 98.2  F (36.8  C) (Temporal) 20 6' (1.829 m) 98% 32.96 kg/m2       Blood Pressure from Last 3 Encounters:   11/13/18 120/72   09/18/18 118/80   09/16/18 107/65    Weight from Last 3 Encounters:   11/13/18 243 lb (110.2 kg)   09/18/18 242 lb 9.6 oz (110 kg)   09/10/18 240 lb (108.9 kg)              We Performed the Following     Lipid panel reflex to direct LDL Fasting     Testosterone, total          Today's Medication Changes          These changes are accurate as of 11/13/18  8:49 AM.  If you have any questions, ask your nurse or doctor.               Start taking these medicines.        Dose/Directions    cyclobenzaprine 10 MG tablet   Commonly known as:  FLEXERIL   Used for:  DDD (degenerative disc disease), lumbar, DDD (degenerative disc disease), cervical, Herniation of intervertebral disc of cervical spine without radiculopathy   Started by:  Sandor Serrano MD        Take 2 tablets in the evening and 1 in the morning   Quantity:  90 tablet   Refills:  0         Stop taking these medicines if you haven't already. Please contact your care team if you have questions.     amoxicillin-clavulanate 875-125 MG per tablet   Commonly known as:  AUGMENTIN   Stopped by:  Sandor Serrano MD           azithromycin 250 MG tablet   Commonly known as:  ZITHROMAX   Stopped by:  Sandor Serrano MD           diazepam 10 MG tablet   Commonly known as:  VALIUM   Stopped by:  Sandor Serrano MD           fluticasone 220 MCG/ACT  Inhaler   Commonly known as:  FLOVENT HFA   Stopped by:  Sandor Serrano MD           guaiFENesin 600 MG 12 hr tablet   Commonly known as:  MUCINEX   Stopped by:  Sandor Serrano MD           predniSONE 20 MG tablet   Commonly known as:  DELTASONE   Stopped by:  Sandor Serrano MD                Where to get your medicines      These medications were sent to [facility], [place] - [address]   [address] , [facility] [address]     Phone:  [phone]     cyclobenzaprine 10 MG tablet                Primary Care Provider Office Phone # Fax #    Sandor Serrano [phone] [address]   [facility] [address]        Equal Access to Services     [facility] : Hadii aad ku hadasho Soomaali, waaxda luqadaha, qaybta kaalmada adeegyada, waxay idiin haysanjay miranda . So [facility] [phone].    ATENCIÓN: Si habla español, tiene a nair disposición servicios gratuitos de asistencia lingüística. [facility] [phone].    We comply with applicable federal civil rights laws and [place] laws. We do not discriminate on the basis of race, color, national origin, age, disability, sex, sexual orientation, or gender identity.            Thank you!     Thank you for choosing [facility]  for your care. Our goal is always to provide you with excellent care. Hearing back from our patients is one way we can continue to improve our services. Please take a few minutes to complete the written survey that you may receive in the mail after your visit with us. Thank you!             Your Updated Medication List - Protect others around you: Learn how to safely use, store and throw away your medicines at www.disposemymeds.org.          This list is accurate as of 11/13/18  8:49 AM.  Always use your most recent med list.                   Brand Name Dispense Instructions for use Diagnosis    acetaminophen-codeine 300-30 MG per tablet     TYLENOL #3    120 tablet    Take 1 tablet by mouth every 4 hours as needed for moderate pain    Cervicalgia       cyclobenzaprine 10 MG tablet    FLEXERIL    90 tablet    Take 2 tablets in the evening and 1 in the morning    DDD (degenerative disc disease), lumbar, DDD (degenerative disc disease), cervical, Herniation of intervertebral disc of cervical spine without radiculopathy       omeprazole 20 MG CR capsule    priLOSEC    180 capsule    Take 2 capsules (40 mg) by mouth daily    Gastroesophageal reflux disease without esophagitis

## 2018-11-15 LAB — TESTOST SERPL-MCNC: 445 NG/DL (ref 240–950)

## 2018-11-19 ENCOUNTER — TELEPHONE (OUTPATIENT)
Dept: FAMILY MEDICINE | Facility: CLINIC | Age: 53
End: 2018-11-19

## 2018-11-19 DIAGNOSIS — E78.2 MIXED HYPERLIPIDEMIA: Primary | ICD-10-CM

## 2018-11-19 DIAGNOSIS — G47.33 OSA (OBSTRUCTIVE SLEEP APNEA): ICD-10-CM

## 2018-11-19 RX ORDER — ATORVASTATIN CALCIUM 20 MG/1
20 TABLET, FILM COATED ORAL DAILY
Qty: 90 TABLET | Refills: 0 | Status: SHIPPED | OUTPATIENT
Start: 2018-11-19 | End: 2019-02-12

## 2018-11-19 NOTE — PROGRESS NOTES
Bulmaro,  Your results show that your cholesterol is elevated as well as your triglycerides.  We should start you on cholesterol medication if you would like.  I would plan on Lipitor 20 mg daily and then recheck your cholesterol in 2 months.  Please contact us if you feel that this is okay.  Your testosterone level was within normal limits and I would not recommend any further management on this.  You should contact the sleep clinic for management of your sleep apnea as this will be the best impact that you can have on your sleep issues.  Please let me know if you have any questions.    Sincerely,  Dr. Serrano

## 2018-11-19 NOTE — TELEPHONE ENCOUNTER
Reason for Call:  Medication or medication refill:    Do you use a [facility]?  Name of the [facility] and phone [facility] for the current request[facility] [phone]    Name of the medication requested: Lipitor     Other request: Pt received your BeeTV message. He would like to start the Lipitor. Also, Can he do the sleep study at home?     Can we leave a detailed message on this number? YES    Phone number patient's wife can be reached at: Home number on file [phone] (home)    Best Time: any     Call taken on 11/19/2018 at 4:26 PM by Hilda Razo

## 2018-11-20 NOTE — TELEPHONE ENCOUNTER
I sent atorvastatin to patient's pharmacy.  He should take this daily.  He should come back in 2 months for a lab visit for a recheck on his cholesterol.    He needs to see the sleep clinic for discussion on his sleep apnea.  They will discuss with him whether he can do a sleep study at home or at the sleep lab or if he needs one at all.  I will have staff place a referral for the sleep clinic so that they can contact him to set up an appointment.    We will have staff notify patient of the above notes.    Sandor Serrano MD

## 2018-11-20 NOTE — TELEPHONE ENCOUNTER
Patient notified of prescription for Atorvastatin being sent to the pharmacy for his cholesterol. Informed that he should come back in 2 months for lab to recheck his cholesterol. Informed of referral being placed for sleep eval and that they will call him with that appointment. Anabela Kenyon LPN

## 2018-11-30 ENCOUNTER — OFFICE VISIT (OUTPATIENT)
Dept: FAMILY MEDICINE | Facility: CLINIC | Age: 53
End: 2018-11-30
Payer: COMMERCIAL

## 2018-11-30 VITALS
DIASTOLIC BLOOD PRESSURE: 82 MMHG | WEIGHT: 249 LBS | TEMPERATURE: 98 F | HEART RATE: 112 BPM | OXYGEN SATURATION: 95 % | BODY MASS INDEX: 33.77 KG/M2 | SYSTOLIC BLOOD PRESSURE: 122 MMHG | RESPIRATION RATE: 18 BRPM

## 2018-11-30 DIAGNOSIS — M50.30 DDD (DEGENERATIVE DISC DISEASE), CERVICAL: Primary | ICD-10-CM

## 2018-11-30 DIAGNOSIS — T88.7XXA MEDICATION SIDE EFFECTS: ICD-10-CM

## 2018-11-30 DIAGNOSIS — M51.369 DDD (DEGENERATIVE DISC DISEASE), LUMBAR: ICD-10-CM

## 2018-11-30 DIAGNOSIS — M54.2 CERVICALGIA: ICD-10-CM

## 2018-11-30 DIAGNOSIS — K21.9 GASTROESOPHAGEAL REFLUX DISEASE WITHOUT ESOPHAGITIS: ICD-10-CM

## 2018-11-30 PROCEDURE — 99214 OFFICE O/P EST MOD 30 MIN: CPT | Performed by: FAMILY MEDICINE

## 2018-11-30 RX ORDER — AMITRIPTYLINE HYDROCHLORIDE 10 MG/1
TABLET ORAL
Qty: 90 TABLET | Refills: 1 | Status: SHIPPED | OUTPATIENT
Start: 2018-11-30 | End: 2019-03-05

## 2018-11-30 ASSESSMENT — PAIN SCALES - GENERAL: PAINLEVEL: NO PAIN (0)

## 2018-11-30 NOTE — NURSING NOTE
Chief Complaint   Patient presents with     Medication Problem     Med reaction, leg restlessness        Initial /82  Pulse 112  Temp 98  F (36.7  C) (Temporal)  Resp 18  Wt 249 lb (112.9 kg)  SpO2 95%  BMI 33.77 kg/m2 Estimated body mass index is 33.77 kg/(m^2) as calculated from the following:    Height as of 11/13/18: 6' (1.829 m).    Weight as of this encounter: 249 lb (112.9 kg).  BP completed using cuff size: timur Bowman MA

## 2018-11-30 NOTE — PROGRESS NOTES
SUBJECTIVE:   Bulmaro Herrera is a 53 year old male who presents to clinic today for the following health issues:      Med reaction, severe leg restlessness       Problem list and histories reviewed & adjusted, as indicated.  Additional history: as documented      Reviewed and updated as needed this visit by clinical staff  Tobacco  Allergies  Meds  Problems       Reviewed and updated as needed this visit by Provider  Allergies  Meds  Problems         Patient today for follow-up on generative disc disease of the lumbar spine and degenerative disease of the cervical spine.  When I saw him about 2 weeks ago, we trialed cyclobenzaprine for chronic pain management.  Unfortunately, patient experienced severe restless leg syndrome symptoms with this and had to discontinue the medication.  Soon after discontinuation, his restless leg syndrome symptoms disappeared.  Patient thought perhaps it was a combination medication reaction as he took the Tylenol 3 the first night of the cyclobenzaprine trial, but the following night he did not take the Tylenol 3 and still experienced the restless leg syndrome symptoms.  Patient told me today that he also remembered that he had the same experience with gabapentin.    Patient has received a call from the sleep clinic to schedule an appointment, but has been unable to do so at this time.  He is still working on doing this.    Patient also needs his acid reflux medication refilled.  He is taking omeprazole with good results.  He has been on this medication for quite some time.    ROS:  10 point ROS of systems including Constitutional, Eyes, HENT, Respiratory, Cardiovascular, Gastroenterology, Genitourinary, Integumentary, Muscularskeletal, Psychiatric were all negative except for pertinent positives noted in my HPI.     OBJECTIVE:   /82  Pulse 112  Temp 98  F (36.7  C) (Temporal)  Resp 18  Wt 249 lb (112.9 kg)  SpO2 95%  BMI 33.77 kg/m2  Body mass index is 33.77  kg/(m^2).  Physical Exam   Constitutional: He appears well-developed and well-nourished.   Cardiovascular: Normal rate, regular rhythm, S1 normal, S2 normal and normal heart sounds.    No murmur heard.  Pulmonary/Chest: Effort normal and breath sounds normal. No respiratory distress. He has no wheezes. He has no rhonchi. He has no rales.   Neurological: He is alert.   Psychiatric: He has a normal mood and affect. His behavior is normal. Judgment and thought content normal.       ASSESSMENT/PLAN:       ICD-10-CM    1. DDD (degenerative disc disease), cervical M50.30 amitriptyline (ELAVIL) 10 MG tablet   2. DDD (degenerative disc disease), lumbar M51.36 amitriptyline (ELAVIL) 10 MG tablet   3. Medication side effects T88.7XXA    4. Gastroesophageal reflux disease without esophagitis K21.9 omeprazole (PRILOSEC) 40 MG DR capsule     DISCONTINUED: omeprazole (PRILOSEC) 20 MG DR capsule   5. Cervicalgia M54.2 acetaminophen-codeine (TYLENOL #3) 300-30 MG tablet     PLAN:  1.  We discussed medication options for him.  One option would be for him to trial Lyrica, but we did discuss that this is a very expensive option for chronic pain management.  He has not tried amitriptyline yet, and this would be far more cost effective for him.  We will have him titrate the medication starting at 10 mg daily and he can increase by 10 mg daily up to 30 mg daily after a few days.  He will contact me with any side effects in the meantime.  We would then try Lyrica as the next option.  We also discussed the possibility of going to another neurosurgeon for a second opinion, and he states that he found somebody through the [employer] that he would like to see for an opinion.    Follow up with Provider - Return in about 4 weeks (around 12/28/2018).      I spent >25 minutes of face to face time with the patient, >50% of which was spent counseling and coordination of care regarding pain management and medication side effects issues.      Sandor Serrano MD   [facility]

## 2018-11-30 NOTE — PATIENT INSTRUCTIONS
Amitriptyline:  Take as directed on the bottle.  Continue your other nighttime medications such as Tylenol #3 and benadryl.  See how you do overnight and the next morning.  If you are feeling well or not any worse, start decreasing the Tylenol #3 and other medications that are not the amitriptyline.  If feeling worse, also consider decrease Tylenol #3 dose slightly.      If symptoms are worse when you start amitriptyline, but doing well enough to stay on it, wait on increase the dose for a few days.      Decrease Tylenol #3 as you feel comfortable and symptoms are okay.

## 2018-11-30 NOTE — MR AVS SNAPSHOT
After Visit Summary   11/30/2018    Bulmaro Herrera    MRN: [id number]           Patient Information     Date Of Birth          1965        Visit Information        Provider Department      11/30/2018 10:30 AM Sandor Serrano MD [facility]        Today's Diagnoses     DDD (degenerative disc disease), lumbar    -  1    Medication side effects        Gastroesophageal reflux disease without esophagitis        Cervicalgia          Care Instructions    Amitriptyline:  Take as directed on the bottle.  Continue your other nighttime medications such as Tylenol #3 and benadryl.  See how you do overnight and the next morning.  If you are feeling well or not any worse, start decreasing the Tylenol #3 and other medications that are not the amitriptyline.  If feeling worse, also consider decrease Tylenol #3 dose slightly.      If symptoms are worse when you start amitriptyline, but doing well enough to stay on it, wait on increase the dose for a few days.      Decrease Tylenol #3 as you feel comfortable and symptoms are okay.          Follow-ups after your visit        Follow-up notes from your care team     Return in about 4 weeks (around 12/28/2018).      Who to contact     If you have questions or need follow up information about today's clinic visit or your schedule please contact [facility] directly at [phone].  Normal or non-critical lab and imaging results will be communicated to you by MyChart, letter or phone within 4 business days after the clinic has received the results. If you do not hear from us within 7 days, please contact the clinic through MyChart or phone. If you have a critical or abnormal lab result, we will notify you by phone as soon as possible.  Submit refill requests through XYverify or call your pharmacy and they will forward the refill request to us. Please allow 3 business days for your refill to be completed.          Additional Information  About Your Visit        Jamplify Information     Jamplify gives you secure access to your electronic health record. If you see a primary care provider, you can also send messages to your care team and make appointments. If you have questions, please call your primary care clinic.  If you do not have a primary care provider, please call [phone] and they will assist you.        Care EveryWhere ID     This is your Care EveryWhere ID. This could be used by other organizations to access your [place] medical records  [id number]        Your Vitals Were     Pulse Temperature Respirations Pulse Oximetry BMI (Body Mass Index)       112 98  F (36.7  C) (Temporal) 18 95% 33.77 kg/m2        Blood Pressure from Last 3 Encounters:   11/30/18 122/82   11/13/18 120/72   09/18/18 118/80    Weight from Last 3 Encounters:   11/30/18 249 lb (112.9 kg)   11/13/18 243 lb (110.2 kg)   09/18/18 242 lb 9.6 oz (110 kg)              Today, you had the following     No orders found for display         Today's Medication Changes          These changes are accurate as of 11/30/18 12:08 PM.  If you have any questions, ask your nurse or doctor.               Start taking these medicines.        Dose/Directions    amitriptyline 10 MG tablet   Commonly known as:  ELAVIL   Used for:  DDD (degenerative disc disease), lumbar   Started by:  Sandor Serrano MD        Start at 1 tab at bedtime for 3 days, then 2 tabs at bedtime for 3 days, then 3 tabs at bedtime.   Quantity:  90 tablet   Refills:  1         These medicines have changed or have updated prescriptions.        Dose/Directions    acetaminophen-codeine 300-30 MG tablet   Commonly known as:  TYLENOL #3   This may have changed:  when to take this   Used for:  Cervicalgia   Changed by:  Sandor Serrano MD        Dose:  1 tablet   Take 1 tablet by mouth nightly as needed for moderate pain   Quantity:  60 tablet   Refills:  0            Where to get your medicines      These  medications were sent to [facility], [place] - [address]  [address] , [place] [address]     Phone:  [phone]     amitriptyline 10 MG tablet    omeprazole 20 MG DR capsule         Some of these will need a paper prescription and others can be bought over the counter.  Ask your nurse if you have questions.     Bring a paper prescription for each of these medications     acetaminophen-codeine 300-30 MG tablet               Information about OPIOIDS     PRESCRIPTION OPIOIDS: WHAT YOU NEED TO KNOW   We gave you an opioid (narcotic) pain medicine. It is important to manage your pain, but opioids are not always the best choice. You should first try all the other options your care team gave you. Take this medicine for as short a time (and as few doses) as possible.    Some activities can increase your pain, such as bandage changes or therapy sessions. It may help to take your pain medicine 30 to 60 minutes before these activities. Reduce your stress by getting enough sleep, working on hobbies you enjoy and practicing relaxation or meditation. Talk to your care team about ways to manage your pain beyond prescription opioids.    These medicines have risks:    DO NOT drive when on new or higher doses of pain medicine. These medicines can affect your alertness and reaction times, and you could be arrested for driving under the influence (DUI). If you need to use opioids long-term, talk to your care team about driving.    DO NOT operate heavy machinery    DO NOT do any other dangerous activities while taking these medicines.    DO NOT drink any alcohol while taking these medicines.     If the opioid prescribed includes acetaminophen, DO NOT take with any other medicines that contain acetaminophen. Read all labels carefully. Look for the word  acetaminophen  or  Tylenol.  Ask your pharmacist if you have questions or are unsure.    You can get addicted to pain medicines, especially if you  have a history of addiction (chemical, alcohol or substance dependence). Talk to your care team about ways to reduce this risk.    All opioids tend to cause constipation. Drink plenty of water and eat foods that have a lot of fiber, such as fruits, vegetables, prune juice, apple juice and high-fiber cereal. Take a laxative (Miralax, milk of magnesia, Colace, Senna) if you don t move your bowels at least every other day. Other side effects include upset stomach, sleepiness, dizziness, throwing up, tolerance (needing more of the medicine to have the same effect), physical dependence and slowed breathing.    Store your pills in a secure place, locked if possible. We will not replace any lost or stolen medicine. If you don t finish your medicine, please throw away (dispose) as directed by your pharmacist. The [place] Pollution [employer] has more information about safe disposal: https://www.pca.[facility].[place]/living-green/managing-unwanted-medications         Primary Care Provider Office Phone # Fax #    Sandor Sandro Serrano [phone]       [address] [place] [address]        Equal Access to Services     [facility] : Hadii cruz vickers hadasho Sosowmyaali, waaxda luqadaha, qaybta kaalmada tracee, darin miranda . So [facility] [phone].    ATENCIÓN: Si habla español, tiene a nair disposición servicios gratuitos de asistencia lingüística. Jodie al [phone].    We comply with applicable federal civil rights laws and [place] laws. We do not discriminate on the basis of race, color, national origin, age, disability, sex, sexual orientation, or gender identity.            Thank you!     Thank you for choosing [facility]  for your care. Our goal is always to provide you with excellent care. Hearing back from our patients is one way we can continue to improve our services. Please take a few minutes to complete the written survey that you may receive in the  mail after your visit with us. Thank you!             Your Updated Medication List - Protect others around you: Learn how to safely use, store and throw away your medicines at www.disposemymeds.org.          This list is accurate as of 11/30/18 12:08 PM.  Always use your most recent med list.                   Brand Name Dispense Instructions for use Diagnosis    acetaminophen-codeine 300-30 MG tablet    TYLENOL #3    60 tablet    Take 1 tablet by mouth nightly as needed for moderate pain    Cervicalgia       amitriptyline 10 MG tablet    ELAVIL    90 tablet    Start at 1 tab at bedtime for 3 days, then 2 tabs at bedtime for 3 days, then 3 tabs at bedtime.    DDD (degenerative disc disease), lumbar       atorvastatin 20 MG tablet    LIPITOR    90 tablet    Take 1 tablet (20 mg) by mouth daily    Mixed hyperlipidemia       omeprazole 20 MG DR capsule    priLOSEC    180 capsule    Take 2 capsules (40 mg) by mouth daily    Gastroesophageal reflux disease without esophagitis

## 2018-12-02 RX ORDER — OMEPRAZOLE 40 MG/1
40 CAPSULE, DELAYED RELEASE ORAL DAILY
Qty: 90 CAPSULE | Refills: 3 | Status: SHIPPED | OUTPATIENT
Start: 2018-12-02 | End: 2019-11-14

## 2018-12-03 ENCOUNTER — ALLIED HEALTH/NURSE VISIT (OUTPATIENT)
Dept: FAMILY MEDICINE | Facility: CLINIC | Age: 53
End: 2018-12-03
Payer: COMMERCIAL

## 2018-12-03 DIAGNOSIS — Z23 NEED FOR PROPHYLACTIC VACCINATION AND INOCULATION AGAINST INFLUENZA: Primary | ICD-10-CM

## 2018-12-03 PROCEDURE — 90686 IIV4 VACC NO PRSV 0.5 ML IM: CPT

## 2018-12-03 PROCEDURE — 90471 IMMUNIZATION ADMIN: CPT

## 2018-12-03 PROCEDURE — 99207 ZZC NO CHARGE NURSE ONLY: CPT

## 2018-12-03 NOTE — MR AVS SNAPSHOT
After Visit Summary   12/3/2018    Bulmaro Herrera    MRN: [id number]           Patient Information     Date Of Birth          1965        Visit Information        Provider Department      12/3/2018 9:15 AM CYNTHIA SHAW [facility]        Today's Diagnoses     Need for prophylactic vaccination and inoculation against influenza    -  1       Follow-ups after your visit        Your next 10 appointments already scheduled     Dec 03, 2018  9:15 AM CST   Nurse Only with [facility] ([facility])    [address] [place] [id number] [phone]              Who to contact     If you have questions or need follow up information about today's clinic visit or your schedule please contact [facility] directly at [phone].  Normal or non-critical lab and imaging results will be communicated to you by MyChart, letter or phone within 4 business days after the clinic has received the results. If you do not hear from us within 7 days, please contact the clinic through MyChart or phone. If you have a critical or abnormal lab result, we will notify you by phone as soon as possible.  Submit refill requests through Tip Network or call your pharmacy and they will forward the refill request to us. Please allow 3 business days for your refill to be completed.          Additional Information About Your Visit        MyChart Information     Tip Network gives you secure access to your electronic health record. If you see a primary care provider, you can also send messages to your care team and make appointments. If you have questions, please call your primary care clinic.  If you do not have a primary care provider, please call [phone] and they will assist you.        Care EveryWhere ID     This is your Care EveryWhere ID. This could be used by other organizations to access your [place] medical records  [id number]         Blood  Pressure from Last 3 Encounters:   11/30/18 122/82   11/13/18 120/72   09/18/18 118/80    Weight from Last 3 Encounters:   11/30/18 249 lb (112.9 kg)   11/13/18 243 lb (110.2 kg)   09/18/18 242 lb 9.6 oz (110 kg)              We Performed the Following     FLU VACCINE, SPLIT VIRUS, IM (QUADRIVALENT) [95577]- >3 YRS     Vaccine Administration, Initial [19125]        Primary Care Provider Office Phone # Fax #    Sandor Serrano [phone] [address] [place] [address]        Equal Access to Services     [facility] DARIN : Hadii cruz Chavez, wafreda avalos, shine easley, darin stanton. So [facility] [phone].    ATENCIÓN: Si habla español, tiene a nair disposición servicios gratuitos de asistencia lingüística. Llame al [phone].    We comply with applicable federal civil rights laws and [place] laws. We do not discriminate on the basis of race, color, national origin, age, disability, sex, sexual orientation, or gender identity.            Thank you!     Thank you for choosing [facility]  for your care. Our goal is always to provide you with excellent care. Hearing back from our patients is one way we can continue to improve our services. Please take a few minutes to complete the written survey that you may receive in the mail after your visit with us. Thank you!             Your Updated Medication List - Protect others around you: Learn how to safely use, store and throw away your medicines at www.disposemymeds.org.          This list is accurate as of 12/3/18  8:46 AM.  Always use your most recent med list.                   Brand Name Dispense Instructions for use Diagnosis    acetaminophen-codeine 300-30 MG tablet    TYLENOL #3    60 tablet    Take 1 tablet by mouth nightly as needed for moderate pain    Cervicalgia       amitriptyline 10 MG tablet    ELAVIL    90 tablet    Start at 1 tab at bedtime for 3 days,  then 2 tabs at bedtime for 3 days, then 3 tabs at bedtime.    DDD (degenerative disc disease), lumbar, DDD (degenerative disc disease), cervical       atorvastatin 20 MG tablet    LIPITOR    90 tablet    Take 1 tablet (20 mg) by mouth daily    Mixed hyperlipidemia       omeprazole 40 MG DR capsule    priLOSEC    90 capsule    Take 1 capsule (40 mg) by mouth daily    Gastroesophageal reflux disease without esophagitis

## 2018-12-03 NOTE — PROGRESS NOTES

## 2018-12-06 ENCOUNTER — OFFICE VISIT (OUTPATIENT)
Dept: SURGERY | Facility: CLINIC | Age: 53
End: 2018-12-06
Payer: COMMERCIAL

## 2018-12-06 ENCOUNTER — OFFICE VISIT (OUTPATIENT)
Dept: FAMILY MEDICINE | Facility: CLINIC | Age: 53
End: 2018-12-06
Payer: COMMERCIAL

## 2018-12-06 ENCOUNTER — ANESTHESIA EVENT (OUTPATIENT)
Dept: SURGERY | Facility: CLINIC | Age: 53
End: 2018-12-06
Payer: COMMERCIAL

## 2018-12-06 VITALS
HEART RATE: 80 BPM | BODY MASS INDEX: 33.88 KG/M2 | DIASTOLIC BLOOD PRESSURE: 84 MMHG | WEIGHT: 249.8 LBS | RESPIRATION RATE: 18 BRPM | SYSTOLIC BLOOD PRESSURE: 114 MMHG | TEMPERATURE: 97.8 F | OXYGEN SATURATION: 94 %

## 2018-12-06 VITALS
DIASTOLIC BLOOD PRESSURE: 60 MMHG | BODY MASS INDEX: 33.78 KG/M2 | WEIGHT: 249.1 LBS | HEART RATE: 59 BPM | SYSTOLIC BLOOD PRESSURE: 124 MMHG | OXYGEN SATURATION: 100 %

## 2018-12-06 DIAGNOSIS — L98.9 SKIN LESION OF BACK: Primary | ICD-10-CM

## 2018-12-06 DIAGNOSIS — Z01.818 PREOP GENERAL PHYSICAL EXAM: Primary | ICD-10-CM

## 2018-12-06 DIAGNOSIS — L98.9 UNKNOWN SKIN LESION: ICD-10-CM

## 2018-12-06 PROCEDURE — 99214 OFFICE O/P EST MOD 30 MIN: CPT | Performed by: FAMILY MEDICINE

## 2018-12-06 PROCEDURE — 99203 OFFICE O/P NEW LOW 30 MIN: CPT | Performed by: SPECIALIST

## 2018-12-06 ASSESSMENT — LIFESTYLE VARIABLES: TOBACCO_USE: 1

## 2018-12-06 ASSESSMENT — PAIN SCALES - GENERAL: PAINLEVEL: NO PAIN (0)

## 2018-12-06 NOTE — PROGRESS NOTES
Consult requested by Dr. Serrano    Reason for consultation - back skin lesion.      HPI:   Patient is a 53-year-old white male whose wife noticed a skin lesion on his back about a year ago.  He had checked by his PCP at that time and it was not concerning.  Recently his wife noticed that it was to have changed has changed and become darker in color and raised.  He would now like to have it excised due to the change.  Denies any pain or bleeding from the site.  He now presents for evaluation of his back skin lesion.      Past Medical History:   Diagnosis Date     Back pains      Depressive disorder, not elsewhere classified 1/28/2007    declines Rx     Disc degeneration 12/2008    see MRI -throaco-lumbar mild discogenic degenerative changes     Elevated LFT's 11/09    alt and ast     Other and unspecified hyperlipidemia 1/2007     Sleep disorder 9/09    CPAP     Tobacco use disorder      Past Surgical History:   Procedure Laterality Date     C NONSPECIFIC PROCEDURE      rt hand surgery - laceration/glass/tendons     COLONOSCOPY  07/07/08    adenomatous polyp repeat 5 years     COLONOSCOPY N/A 12/29/2017    Procedure: COMBINED COLONOSCOPY, SINGLE OR MULTIPLE BIOPSY/POLYPECTOMY BY BIOPSY;  Colonoscopy, Polypectomy by Biopsy;  Surgeon: Matt Reyes MD;  Location:  GI     ENT SURGERY      for deviated septum     HC ECHO HEART XTHORACIC, STRESS/REST  6/2009    neg     INJECT EPIDURAL CERVICAL N/A 5/14/2015    Procedure: INJECT EPIDURAL CERVICAL;  Surgeon: Christian Chaudhry MD;  Location:  OR     ORTHOPEDIC SURGERY      Right hand     TONSILLECTOMY       Current Outpatient Prescriptions   Medication     acetaminophen-codeine (TYLENOL #3) 300-30 MG tablet     amitriptyline (ELAVIL) 10 MG tablet     atorvastatin (LIPITOR) 20 MG tablet     omeprazole (PRILOSEC) 40 MG DR capsule     No current facility-administered medications for this visit.         Allergies   Allergen Reactions     Percocet [Oxycodone-Acetaminophen]  Itching     Wasps [Hornets] Swelling     Social History   Substance Use Topics     Smoking status: Former Smoker     Packs/day: 1.50     Quit date: 2007     Smokeless tobacco: Never Used     Alcohol use Yes      Comment: rare     Family History   Problem Relation Age of Onset     Breast Cancer Mother      Arthritis Mother      joint ?     Depression Father      Cancer Maternal Grandmother      C.A.D. Maternal Grandfather      Cardiovascular Maternal Grandfather      Gynecology Paternal Grandmother       giving birth     Prostate Cancer Paternal Grandfather      Genetic Disorder Brother      joint pain?     Depression Brother      Cardiovascular Son      congenital heart defect -       Prostate Cancer Other       age 70's     Diabetes Other       ROS: 10 point ROS neg other than the symptoms noted above in the HPI.    PE:  B/P: 124/60, T: Data Unavailable, P: 59, R: Data Unavailable  General: well developed, well nourished WM who appears their stated age  HEENT: NC/AT, EOMI, (-)icterus, (-)injection  Neck: Supple, No JVD  Chest: CTA  Heart: S1, S2, (-)m/r/g  Abd: Soft, non tender, non distended, non tender, no masses  Back:  0.3x0.4 cm crusted raised ? Recent bleeding  Ext; Warm, no edema  Psych: AAOx3  Neuro: No focal deficits      Impression/plan:  This a 53-year-old gentleman with a skin lesion on his back that is recently changed become crusted.  It is unclear whether this is due to bleeding from trauma or change in the lesion itself.  I did offer him a punch biopsy today but he refused as he has a phobia of needles.  After discussion with the patient the plan at this time is for an excision of the lesion with adequate margins under MAC.  The procedure, risks, benefits alternatives were discussed and he agrees to proceed.  Be scheduled in the near future.    Ayaan Chaney MD, FACS

## 2018-12-06 NOTE — NURSING NOTE
[facility] Surgical Services    Bulmaro Herrera has been given the following teaching information:  Before Your Surgery booklet  Instructions for Showering or Bathing before Surgery

## 2018-12-06 NOTE — NURSING NOTE
Bulmaro Herrera is a 53 year old male who presents for:  Chief Complaint   Patient presents with     Mole     back mole, changing colors and shape     Consult     referring Zach        Initial Vitals:  /60 (BP Location: Right arm, Patient Position: Sitting, Cuff Size: Adult Large)  Pulse 59  Wt 113 kg (249 lb 1.6 oz)  SpO2 100%  BMI 33.78 kg/m2 Estimated body mass index is 33.78 kg/(m^2) as calculated from the following:    Height as of 11/13/18: 1.829 m (6').    Weight as of this encounter: 113 kg (249 lb 1.6 oz).. Body surface area is 2.4 meters squared. BP completed using cuff size: large  Data Unavailable    Do you feel safe in your environment?  Yes  Do you need any refills today? No    Nursing Comments:         Caren Leung, FER

## 2018-12-06 NOTE — MR AVS SNAPSHOT
After Visit Summary   12/6/2018    Bulmaro Herrera    MRN: [id number]           Patient Information     Date Of Birth          1965        Visit Information        Provider Department      12/6/2018 8:45 AM Ayaan Chaney MD [facility]        Today's Diagnoses     Skin lesion of back    -  1       Follow-ups after your visit        Your next 10 appointments already scheduled     Dec 06, 2018  6:40 PM CST   Pre-Op physical with Aleksander Bowles MD   [facility] ([facility]    [address] [place] [id number] [phone]            Dec 14, 2018  8:45 AM CST   Return Visit with Ayaan Chaney MD   [facility] ([facility])    [address] [place] [id number] [phone]              Who to contact     If you have questions or need follow up information about today's clinic visit or your schedule please contact [facility] directly at [phone].  Normal or non-critical lab and imaging results will be communicated to you by [employer] Club[employer], letter or phone within 4 business days after the clinic has received the results. If you do not hear from us within 7 days, please contact the clinic through CANDDit or phone. If you have a critical or abnormal lab result, we will notify you by phone as soon as possible.  Submit refill requests through Rebyoo or call your pharmacy and they will forward the refill request to us. Please allow 3 business days for your refill to be completed.          Additional Information About Your Visit        [employer] Information     Rebyoo gives you secure access to your electronic health record. If you see a primary care provider, you can also send messages to your care team and make appointments. If you have questions, please call your primary care clinic.  If you do not have a primary care provider, please call [phone] and they will assist you.         Care EveryWhere ID     This is your Care EveryWhere ID. This could be used by other organizations to access your [place] medical records  [id number]        Your Vitals Were     Pulse Pulse Oximetry BMI (Body Mass Index)             59 100% 33.78 kg/m2          Blood Pressure from Last 3 Encounters:   12/06/18 124/60   11/30/18 122/82   11/13/18 120/72    Weight from Last 3 Encounters:   12/06/18 113 kg (249 lb 1.6 oz)   11/30/18 112.9 kg (249 lb)   11/13/18 110.2 kg (243 lb)              We Performed the Following     Abril-Operative Worksheet General        Primary Care Provider Office Phone # Fax #    Sandor Serrano [phone] [address] [place] [address]        Equal Access to Services     [facility]: Hadii cruz vickers hadasho Soomaali, waaxda luqadaha, qaybta kaalmada adeegyada, waxamelia miranda . So [facility] [phone].    ATENCIÓN: Si habla español, tiene a nair disposición servicios gratuitos de asistencia lingüística. [facility] [phone].    We comply with applicable federal civil rights laws and [place] laws. We do not discriminate on the basis of race, color, national origin, age, disability, sex, sexual orientation, or gender identity.            Thank you!     Thank you for choosing [facility]  for your care. Our goal is always to provide you with excellent care. Hearing back from our patients is one way we can continue to improve our services. Please take a few minutes to complete the written survey that you may receive in the mail after your visit with us. Thank you!             Your Updated Medication List - Protect others around you: Learn how to safely use, store and throw away your medicines at www.disposemymeds.org.          This list is accurate as of 12/6/18  9:00 AM.  Always use your most recent med list.                   Brand Name Dispense Instructions for use Diagnosis    acetaminophen-codeine 300-30 MG tablet     TYLENOL #3    60 tablet    Take 1 tablet by mouth nightly as needed for moderate pain    Cervicalgia       amitriptyline 10 MG tablet    ELAVIL    90 tablet    Start at 1 tab at bedtime for 3 days, then 2 tabs at bedtime for 3 days, then 3 tabs at bedtime.    DDD (degenerative disc disease), lumbar, DDD (degenerative disc disease), cervical       atorvastatin 20 MG tablet    LIPITOR    90 tablet    Take 1 tablet (20 mg) by mouth daily    Mixed hyperlipidemia       omeprazole 40 MG DR capsule    priLOSEC    90 capsule    Take 1 capsule (40 mg) by mouth daily    Gastroesophageal reflux disease without esophagitis

## 2018-12-06 NOTE — MR AVS SNAPSHOT
After Visit Summary   12/6/2018    Bulmaro Herrera    MRN: [id number]           Patient Information     Date Of Birth          1965        Visit Information        Provider Department      12/6/2018 6:40 PM Aleksander Bowles MD [facility]        Today's Diagnoses     Preop general physical exam    -  1    Unknown skin lesion          Care Instructions      Before Your Surgery      Call your surgeon if there is any change in your health. This includes signs of a cold or flu (such as a sore throat, runny nose, cough, rash or fever).    Do not smoke, drink alcohol or take over the counter medicine (unless your surgeon or primary care doctor tells you to) for the 24 hours before and after surgery.    If you take prescribed drugs: Follow your doctor s orders about which medicines to take and which to stop until after surgery.    Eating and drinking prior to surgery: follow the instructions from your surgeon    Take a shower or bath the night before surgery. Use the soap your surgeon gave you to gently clean your skin. If you do not have soap from your surgeon, use your regular soap. Do not shave or scrub the surgery site.  Wear clean pajamas and have clean sheets on your bed.           Follow-ups after your visit        Your next 10 appointments already scheduled     Dec 07, 2018   Procedure with Ayaan Chaney MD   [facility] ([facility])    [address] [place] [id number] [phone]           From [facility] [address]: Exit at [address] on south side of [place]. Turn right on [address]. Turn left at stoplight on [address]. [facility] will be in view two blocks ahead            Dec 14, 2018  8:45 AM CST   Return Visit with Ayaan Chaney MD   [facility] ([facility]    [address] [id number] [phone]              Who to contact     If you have questions or  need follow up information about today's clinic visit or your schedule please contact [facility] directly at [phone].  Normal or non-critical lab and imaging results will be communicated to you by MyChart, letter or phone within 4 business days after the clinic has received the results. If you do not hear from us within 7 days, please contact the clinic through [employer] or phone. If you have a critical or abnormal lab result, we will notify you by phone as soon as possible.  Submit refill requests through [employer] or call your pharmacy and they will forward the refill request to us. Please allow 3 business days for your refill to be completed.          Additional Information About Your Visit        [employer] Information     [employer] gives you secure access to your electronic health record. If you see a primary care provider, you can also send messages to your care team and make appointments. If you have questions, please call your primary care clinic.  If you do not have a primary care provider, please call [phone] and they will assist you.        Care EveryWhere ID     This is your Care EveryWhere ID. This could be used by other organizations to access your [facility] medical records  [id number]        Your Vitals Were     Pulse Temperature Respirations Pulse Oximetry BMI (Body Mass Index)       80 97.8  F (36.6  C) (Temporal) 18 94% 33.88 kg/m2        Blood Pressure from Last 3 Encounters:   12/06/18 114/84   12/06/18 124/60   11/30/18 122/82    Weight from Last 3 Encounters:   12/06/18 249 lb 12.8 oz (113.3 kg)   12/06/18 249 lb 1.6 oz (113 kg)   11/30/18 249 lb (112.9 kg)              Today, you had the following     No orders found for display       Primary Care Provider Office Phone # Fax #    Sandor Serrano [phone]       [address] [place] [id number]        Equal Access to Services     RAUL WEBSTER AH: kirstie Martino, shine  darin yiphilip miranda ah. So [facility] [phone].    ATENCIÓN: Si marilyn paris, tiene a nair disposición servicios gratuitos de asistencia lingüística. Jodie al [phone].    We comply with applicable federal civil rights laws and [place] laws. We do not discriminate on the basis of race, color, national origin, age, disability, sex, sexual orientation, or gender identity.            Thank you!     Thank you for choosing [facility]  for your care. Our goal is always to provide you with excellent care. Hearing back from our patients is one way we can continue to improve our services. Please take a few minutes to complete the written survey that you may receive in the mail after your visit with us. Thank you!             Your Updated Medication List - Protect others around you: Learn how to safely use, store and throw away your medicines at www.disposemymeds.org.          This list is accurate as of 12/6/18  7:09 PM.  Always use your most recent med list.                   Brand Name Dispense Instructions for use Diagnosis    acetaminophen-codeine 300-30 MG tablet    TYLENOL #3    60 tablet    Take 1 tablet by mouth nightly as needed for moderate pain    Cervicalgia       amitriptyline 10 MG tablet    ELAVIL    90 tablet    Start at 1 tab at bedtime for 3 days, then 2 tabs at bedtime for 3 days, then 3 tabs at bedtime.    DDD (degenerative disc disease), lumbar, DDD (degenerative disc disease), cervical       atorvastatin 20 MG tablet    LIPITOR    90 tablet    Take 1 tablet (20 mg) by mouth daily    Mixed hyperlipidemia       omeprazole 40 MG DR capsule    priLOSEC    90 capsule    Take 1 capsule (40 mg) by mouth daily    Gastroesophageal reflux disease without esophagitis

## 2018-12-06 NOTE — LETTER
12/6/2018         RE: Bulmaro Herrera  [address]  [facility] [address]        Dear Colleague,    Thank you for referring your patient, Bulmaro Herrera, to the [facility]. Please see a copy of my visit note below.    Consult requested by Dr. Serrano    Reason for consultation - back skin lesion.      HPI:   Patient is a 53-year-old white male whose wife noticed a skin lesion on his back about a year ago.  He had checked by his PCP at that time and it was not concerning.  Recently his wife noticed that it was to have changed has changed and become darker in color and raised.  He would now like to have it excised due to the change.  Denies any pain or bleeding from the site.  He now presents for evaluation of his back skin lesion.      Past Medical History:   Diagnosis Date     Back pains      Depressive disorder, not elsewhere classified 1/28/2007    declines Rx     Disc degeneration 12/2008    see MRI -throaco-lumbar mild discogenic degenerative changes     Elevated LFT's 11/09    alt and ast     Other and unspecified hyperlipidemia 1/2007     Sleep disorder 9/09    CPAP     Tobacco use disorder      Past Surgical History:   Procedure Laterality Date     C NONSPECIFIC PROCEDURE      rt hand surgery - laceration/glass/tendons     COLONOSCOPY  07/07/08    adenomatous polyp repeat 5 years     COLONOSCOPY N/A 12/29/2017    Procedure: COMBINED COLONOSCOPY, SINGLE OR MULTIPLE BIOPSY/POLYPECTOMY BY BIOPSY;  Colonoscopy, Polypectomy by Biopsy;  Surgeon: Matt Reyes MD;  Location:  GI     ENT SURGERY      for deviated septum     HC ECHO HEART XTHORACIC, STRESS/REST  6/2009    neg     INJECT EPIDURAL CERVICAL N/A 5/14/2015    Procedure: INJECT EPIDURAL CERVICAL;  Surgeon: Christian Chaudhry MD;  Location:  OR     ORTHOPEDIC SURGERY      Right hand     TONSILLECTOMY       Current Outpatient Prescriptions   Medication     acetaminophen-codeine (TYLENOL #3) 300-30 MG tablet     amitriptyline (ELAVIL) 10  MG tablet     atorvastatin (LIPITOR) 20 MG tablet     omeprazole (PRILOSEC) 40 MG DR capsule     No current facility-administered medications for this visit.         Allergies   Allergen Reactions     Percocet [Oxycodone-Acetaminophen] Itching     Wasps [Hornets] Swelling     Social History   Substance Use Topics     Smoking status: Former Smoker     Packs/day: 1.50     Quit date: 2007     Smokeless tobacco: Never Used     Alcohol use Yes      Comment: rare     Family History   Problem Relation Age of Onset     Breast Cancer Mother      Arthritis Mother      joint ?     Depression Father      Cancer Maternal Grandmother      C.A.D. Maternal Grandfather      Cardiovascular Maternal Grandfather      Gynecology Paternal Grandmother       giving birth     Prostate Cancer Paternal Grandfather      Genetic Disorder Brother      joint pain?     Depression Brother      Cardiovascular Son      congenital heart defect -       Prostate Cancer Other       age 70's     Diabetes Other       ROS: 10 point ROS neg other than the symptoms noted above in the HPI.    PE:  B/P: 124/60, T: Data Unavailable, P: 59, R: Data Unavailable  General: well developed, well nourished WM who appears their stated age  HEENT: NC/AT, EOMI, (-)icterus, (-)injection  Neck: Supple, No JVD  Chest: CTA  Heart: S1, S2, (-)m/r/g  Abd: Soft, non tender, non distended, non tender, no masses  Back:  0.3x0.4 cm crusted raised ? Recent bleeding  Ext; Warm, no edema  Psych: AAOx3  Neuro: No focal deficits      Impression/plan:  This a 53-year-old gentleman with a skin lesion on his back that is recently changed become crusted.  It is unclear whether this is due to bleeding from trauma or change in the lesion itself.  I did offer him a punch biopsy today but he refused as he has a phobia of needles.  After discussion with the patient the plan at this time is for an excision of the lesion with adequate margins under MAC.  The procedure, risks,  benefits alternatives were discussed and he agrees to proceed.  Be scheduled in the near future.    Ayaan Chaney MD, FACS    Again, thank you for allowing me to participate in the care of your patient.        Sincerely,        Ayaan Chaney MD

## 2018-12-07 ENCOUNTER — ANESTHESIA (OUTPATIENT)
Dept: SURGERY | Facility: CLINIC | Age: 53
End: 2018-12-07
Payer: COMMERCIAL

## 2018-12-07 ENCOUNTER — HOSPITAL ENCOUNTER (OUTPATIENT)
Facility: CLINIC | Age: 53
Discharge: HOME | End: 2018-12-07
Attending: SPECIALIST | Admitting: SPECIALIST
Payer: COMMERCIAL

## 2018-12-07 VITALS
RESPIRATION RATE: 16 BRPM | DIASTOLIC BLOOD PRESSURE: 81 MMHG | TEMPERATURE: 97.8 F | SYSTOLIC BLOOD PRESSURE: 118 MMHG | OXYGEN SATURATION: 93 %

## 2018-12-07 DIAGNOSIS — G89.18 POST-OP PAIN: Primary | ICD-10-CM

## 2018-12-07 PROCEDURE — 37000009 ZZH ANESTHESIA TECHNICAL FEE, EACH ADDTL 15 MIN: Performed by: SPECIALIST

## 2018-12-07 PROCEDURE — 11404 EXC TR-EXT B9+MARG 3.1-4 CM: CPT | Mod: 59 | Performed by: SPECIALIST

## 2018-12-07 PROCEDURE — 40000306 ZZH STATISTIC PRE PROC ASSESS II: Performed by: SPECIALIST

## 2018-12-07 PROCEDURE — 11200 RMVL SKIN TAGS UP TO&INC 15: CPT | Performed by: SPECIALIST

## 2018-12-07 PROCEDURE — 88304 TISSUE EXAM BY PATHOLOGIST: CPT | Performed by: SPECIALIST

## 2018-12-07 PROCEDURE — 88305 TISSUE EXAM BY PATHOLOGIST: CPT | Mod: 26 | Performed by: SPECIALIST

## 2018-12-07 PROCEDURE — 37000008 ZZH ANESTHESIA TECHNICAL FEE, 1ST 30 MIN: Performed by: SPECIALIST

## 2018-12-07 PROCEDURE — 71000027 ZZH RECOVERY PHASE 2 EACH 15 MINS: Performed by: SPECIALIST

## 2018-12-07 PROCEDURE — 25000128 H RX IP 250 OP 636: Performed by: SPECIALIST

## 2018-12-07 PROCEDURE — 36000050 ZZH SURGERY LEVEL 2 1ST 30 MIN: Performed by: SPECIALIST

## 2018-12-07 PROCEDURE — 88305 TISSUE EXAM BY PATHOLOGIST: CPT | Performed by: SPECIALIST

## 2018-12-07 PROCEDURE — 25000128 H RX IP 250 OP 636: Performed by: NURSE ANESTHETIST, CERTIFIED REGISTERED

## 2018-12-07 PROCEDURE — 25000125 ZZHC RX 250: Performed by: NURSE ANESTHETIST, CERTIFIED REGISTERED

## 2018-12-07 PROCEDURE — 12031 INTMD RPR S/A/T/EXT 2.5 CM/<: CPT | Mod: 59 | Performed by: SPECIALIST

## 2018-12-07 PROCEDURE — 27210794 ZZH OR GENERAL SUPPLY STERILE: Performed by: SPECIALIST

## 2018-12-07 PROCEDURE — 88304 TISSUE EXAM BY PATHOLOGIST: CPT | Mod: 26 | Performed by: SPECIALIST

## 2018-12-07 PROCEDURE — 25000125 ZZHC RX 250: Performed by: SPECIALIST

## 2018-12-07 PROCEDURE — 11403 EXC TR-EXT B9+MARG 2.1-3CM: CPT | Mod: 59 | Performed by: SPECIALIST

## 2018-12-07 PROCEDURE — 36000052 ZZH SURGERY LEVEL 2 EA 15 ADDTL MIN: Performed by: SPECIALIST

## 2018-12-07 RX ORDER — HYDROCODONE BITARTRATE AND ACETAMINOPHEN 5; 325 MG/1; MG/1
1-2 TABLET ORAL EVERY 4 HOURS PRN
Qty: 15 TABLET | Refills: 0 | Status: ON HOLD | OUTPATIENT
Start: 2018-12-07 | End: 2018-12-28

## 2018-12-07 RX ORDER — ONDANSETRON 4 MG/1
4 TABLET, ORALLY DISINTEGRATING ORAL EVERY 30 MIN PRN
Status: DISCONTINUED | OUTPATIENT
Start: 2018-12-07 | End: 2018-12-07 | Stop reason: HOSPADM

## 2018-12-07 RX ORDER — ONDANSETRON 2 MG/ML
4 INJECTION INTRAMUSCULAR; INTRAVENOUS EVERY 30 MIN PRN
Status: DISCONTINUED | OUTPATIENT
Start: 2018-12-07 | End: 2018-12-07 | Stop reason: HOSPADM

## 2018-12-07 RX ORDER — BUPIVACAINE HYDROCHLORIDE 5 MG/ML
INJECTION, SOLUTION PERINEURAL PRN
Status: DISCONTINUED | OUTPATIENT
Start: 2018-12-07 | End: 2018-12-07 | Stop reason: HOSPADM

## 2018-12-07 RX ORDER — CEFAZOLIN SODIUM 2 G/100ML
2 INJECTION, SOLUTION INTRAVENOUS
Status: COMPLETED | OUTPATIENT
Start: 2018-12-07 | End: 2018-12-07

## 2018-12-07 RX ORDER — LIDOCAINE HYDROCHLORIDE 20 MG/ML
INJECTION, SOLUTION INFILTRATION; PERINEURAL PRN
Status: DISCONTINUED | OUTPATIENT
Start: 2018-12-07 | End: 2018-12-07

## 2018-12-07 RX ORDER — CEFAZOLIN SODIUM 1 G/3ML
1 INJECTION, POWDER, FOR SOLUTION INTRAMUSCULAR; INTRAVENOUS SEE ADMIN INSTRUCTIONS
Status: DISCONTINUED | OUTPATIENT
Start: 2018-12-07 | End: 2018-12-07 | Stop reason: HOSPADM

## 2018-12-07 RX ORDER — ONDANSETRON 2 MG/ML
INJECTION INTRAMUSCULAR; INTRAVENOUS PRN
Status: DISCONTINUED | OUTPATIENT
Start: 2018-12-07 | End: 2018-12-07

## 2018-12-07 RX ORDER — NALOXONE HYDROCHLORIDE 0.4 MG/ML
.1-.4 INJECTION, SOLUTION INTRAMUSCULAR; INTRAVENOUS; SUBCUTANEOUS
Status: DISCONTINUED | OUTPATIENT
Start: 2018-12-07 | End: 2018-12-07 | Stop reason: HOSPADM

## 2018-12-07 RX ORDER — SODIUM CHLORIDE, SODIUM LACTATE, POTASSIUM CHLORIDE, CALCIUM CHLORIDE 600; 310; 30; 20 MG/100ML; MG/100ML; MG/100ML; MG/100ML
INJECTION, SOLUTION INTRAVENOUS CONTINUOUS
Status: DISCONTINUED | OUTPATIENT
Start: 2018-12-07 | End: 2018-12-07 | Stop reason: HOSPADM

## 2018-12-07 RX ORDER — LIDOCAINE 40 MG/G
CREAM TOPICAL
Status: DISCONTINUED | OUTPATIENT
Start: 2018-12-07 | End: 2018-12-07 | Stop reason: HOSPADM

## 2018-12-07 RX ORDER — MEPERIDINE HYDROCHLORIDE 25 MG/ML
12.5 INJECTION INTRAMUSCULAR; INTRAVENOUS; SUBCUTANEOUS
Status: DISCONTINUED | OUTPATIENT
Start: 2018-12-07 | End: 2018-12-07 | Stop reason: HOSPADM

## 2018-12-07 RX ORDER — FENTANYL CITRATE 50 UG/ML
INJECTION, SOLUTION INTRAMUSCULAR; INTRAVENOUS PRN
Status: DISCONTINUED | OUTPATIENT
Start: 2018-12-07 | End: 2018-12-07

## 2018-12-07 RX ORDER — DIMENHYDRINATE 50 MG/ML
25 INJECTION, SOLUTION INTRAMUSCULAR; INTRAVENOUS
Status: DISCONTINUED | OUTPATIENT
Start: 2018-12-07 | End: 2018-12-07 | Stop reason: HOSPADM

## 2018-12-07 RX ORDER — ALBUTEROL SULFATE 90 UG/1
2 AEROSOL, METERED RESPIRATORY (INHALATION) EVERY 6 HOURS
COMMUNITY
End: 2019-01-11

## 2018-12-07 RX ORDER — GINSENG 100 MG
CAPSULE ORAL PRN
Status: DISCONTINUED | OUTPATIENT
Start: 2018-12-07 | End: 2018-12-07 | Stop reason: HOSPADM

## 2018-12-07 RX ORDER — LIDOCAINE HYDROCHLORIDE AND EPINEPHRINE 10; 10 MG/ML; UG/ML
INJECTION, SOLUTION INFILTRATION; PERINEURAL PRN
Status: DISCONTINUED | OUTPATIENT
Start: 2018-12-07 | End: 2018-12-07 | Stop reason: HOSPADM

## 2018-12-07 RX ORDER — FENTANYL CITRATE 50 UG/ML
25-50 INJECTION, SOLUTION INTRAMUSCULAR; INTRAVENOUS
Status: DISCONTINUED | OUTPATIENT
Start: 2018-12-07 | End: 2018-12-07 | Stop reason: HOSPADM

## 2018-12-07 RX ORDER — HYDROCODONE BITARTRATE AND ACETAMINOPHEN 5; 325 MG/1; MG/1
1 TABLET ORAL
Status: DISCONTINUED | OUTPATIENT
Start: 2018-12-07 | End: 2018-12-07 | Stop reason: HOSPADM

## 2018-12-07 RX ORDER — KETOROLAC TROMETHAMINE 30 MG/ML
INJECTION, SOLUTION INTRAMUSCULAR; INTRAVENOUS PRN
Status: DISCONTINUED | OUTPATIENT
Start: 2018-12-07 | End: 2018-12-07

## 2018-12-07 RX ORDER — PROPOFOL 10 MG/ML
INJECTION, EMULSION INTRAVENOUS CONTINUOUS PRN
Status: DISCONTINUED | OUTPATIENT
Start: 2018-12-07 | End: 2018-12-07

## 2018-12-07 RX ORDER — SODIUM CHLORIDE, SODIUM LACTATE, POTASSIUM CHLORIDE, CALCIUM CHLORIDE 600; 310; 30; 20 MG/100ML; MG/100ML; MG/100ML; MG/100ML
500 INJECTION, SOLUTION INTRAVENOUS CONTINUOUS
Status: DISCONTINUED | OUTPATIENT
Start: 2018-12-07 | End: 2018-12-07 | Stop reason: HOSPADM

## 2018-12-07 RX ADMIN — SODIUM CHLORIDE, POTASSIUM CHLORIDE, SODIUM LACTATE AND CALCIUM CHLORIDE: 600; 310; 30; 20 INJECTION, SOLUTION INTRAVENOUS at 14:44

## 2018-12-07 RX ADMIN — CEFAZOLIN SODIUM 2 G: 2 INJECTION, SOLUTION INTRAVENOUS at 15:41

## 2018-12-07 RX ADMIN — ONDANSETRON 4 MG: 2 INJECTION INTRAMUSCULAR; INTRAVENOUS at 16:00

## 2018-12-07 RX ADMIN — MIDAZOLAM 2 MG: 1 INJECTION INTRAMUSCULAR; INTRAVENOUS at 15:36

## 2018-12-07 RX ADMIN — KETOROLAC TROMETHAMINE 30 MG: 30 INJECTION, SOLUTION INTRAMUSCULAR at 16:17

## 2018-12-07 RX ADMIN — MIDAZOLAM 2 MG: 1 INJECTION INTRAMUSCULAR; INTRAVENOUS at 15:38

## 2018-12-07 RX ADMIN — LIDOCAINE HYDROCHLORIDE 40 MG: 20 INJECTION, SOLUTION INFILTRATION; PERINEURAL at 15:43

## 2018-12-07 RX ADMIN — FENTANYL CITRATE 50 MCG: 50 INJECTION, SOLUTION INTRAMUSCULAR; INTRAVENOUS at 15:43

## 2018-12-07 RX ADMIN — FENTANYL CITRATE 50 MCG: 50 INJECTION, SOLUTION INTRAMUSCULAR; INTRAVENOUS at 15:40

## 2018-12-07 RX ADMIN — LIDOCAINE HYDROCHLORIDE 5 ML: 10 INJECTION, SOLUTION EPIDURAL; INFILTRATION; INTRACAUDAL at 14:45

## 2018-12-07 RX ADMIN — PROPOFOL 150 MCG/KG/MIN: 10 INJECTION, EMULSION INTRAVENOUS at 15:43

## 2018-12-07 NOTE — ANESTHESIA PREPROCEDURE EVALUATION
Anesthesia Evaluation     . Pt has had prior anesthetic. Type: General and MAC    No history of anesthetic complications          ROS/MED HX    ENT/Pulmonary:     (+)sleep apnea, tobacco use, Past use uses CPAP , . .    Neurologic:  - neg neurologic ROS     Cardiovascular:  - neg cardiovascular ROS   (+) ----. : . . . :. . Previous cardiac testing date:results:date: results:ECG reviewed date:9/10/18 results:SR date: results:          METS/Exercise Tolerance:  >4 METS   Hematologic:  - neg hematologic  ROS       Musculoskeletal:   (+) , , other musculoskeletal- DDD cervical spine with numbness down both arms on and off      GI/Hepatic:     (+) GERD Asymptomatic on medication,       Renal/Genitourinary:  - ROS Renal section negative       Endo:  - neg endo ROS       Psychiatric:     (+) psychiatric history depression      Infectious Disease:  - neg infectious disease ROS       Malignancy:      - no malignancy   Other:    (+) H/O Chronic Pain,                   Physical Exam  Normal systems: cardiovascular and pulmonary    Airway   Mallampati: II  TM distance: >3 FB    Dental     Cardiovascular   Rhythm and rate: regular and normal      Pulmonary    breath sounds clear to auscultation                        Anesthesia Plan      History & Physical Review  History and physical reviewed and following examination; no interval change.    ASA Status:  2 .    NPO Status:  > 8 hours    Plan for MAC with Propofol induction. Reason for MAC:  Deep or markedly invasive procedure (G8)  PONV prophylaxis:  Ondansetron (or other 5HT-3)       Postoperative Care  Postoperative pain management:  IV analgesics and Oral pain medications.      Consents  Anesthetic plan, risks, benefits and alternatives discussed with:  Patient or representative and Patient.  Use of blood products discussed: No .   .                          .

## 2018-12-07 NOTE — ANESTHESIA POSTPROCEDURE EVALUATION
Patient: Bulmaro Herrera    Procedure(s):  Excision Back Skin Lesion X Two  Excision Skin Tags Right Axilla    Diagnosis:back skin lesion  Diagnosis Additional Information: No value filed.    Anesthesia Type:  MAC    Note:  Anesthesia Post Evaluation    Patient location during evaluation: Phase 2 and Bedside  Patient participation: Able to fully participate in evaluation  Level of consciousness: awake  Pain management: adequate  Airway patency: patent  Cardiovascular status: acceptable and hemodynamically stable  Respiratory status: acceptable, room air and nonlabored ventilation  Hydration status: stable  PONV: none     Anesthetic complications: None    Comments: Patient was happy with the anesthesia care received and no anesthesia related complications were noted.  I will follow up with the patient again if it is needed.        Last vitals:  Vitals:    12/07/18 1426   BP: (!) 140/93   Resp: 18   Temp: 97.8  F (36.6  C)   SpO2: 93%         Electronically Signed By: SHIV Mcarthur CRNA  December 7, 2018  4:43 PM

## 2018-12-07 NOTE — IP AVS SNAPSHOT
[facility]    [address]     [place] [address]    Phone:  [phone]                                       After Visit Summary   12/7/2018    Bulmaro Herrera    MRN: [id number]           After Visit Summary Signature Page     I have received my discharge instructions, and my questions have been answered. I have discussed any challenges I see with this plan with the nurse or doctor.    ..........................................................................................................................................  Patient/Patient Representative Signature      ..........................................................................................................................................  Patient Representative Print Name and Relationship to Patient    ..................................................               ................................................  Date                                   Time    ..........................................................................................................................................  Reviewed by Signature/Title    ...................................................              ..............................................  Date                                               Time          22EPIC Rev 08/18

## 2018-12-07 NOTE — IP AVS SNAPSHOT
MRN:[id number]                      After Visit Summary   12/7/2018    Bulmaro Herrera    MRN: [id number]           Thank you!     Thank you for choosing [facility] for your care. Our goal is always to provide you with excellent care. Hearing back from our patients is one way we can continue to improve our services. Please take a few minutes to complete the written survey that you may receive in the mail after you visit with us. Thank you!        Patient Information     Date Of Birth          1965        About your hospital stay     You were admitted on:  December 7, 2018 You last received care in the:  [facility]    You were discharged on:  December 7, 2018       Who to Call     For medical emergencies, please call 911.  For non-urgent questions about your medical care, please call your primary care provider or clinic, [phone]  For questions related to your surgery, please call your surgery clinic        Attending Provider     Provider Specialty    Ayaan Chaney MD General Surgery       Primary Care Provider Office Phone # Fax #    Sandor Sandro Serrano [phone]      After Care Instructions     Diet Instructions       Resume pre-procedure diet            Discharge Instructions       Follow up appointment as instructed by Surgeon and or RN            Do not - immerse incision in water until sutures removed       Do not immerse incision in water until sutures removed            Dressing       Keep dressing clean and dry.  Dressing / incisional care as instructed by RN and or Surgeon            Ice to affected area       Ice to operative site PRN            No Alcohol       For 24 hours post procedure            No driving or operating machinery        until the day after procedure            Shower       No shower for 24 hours post procedure. May shower Postoperative Day (POD)  2                  Your next 10 appointments already scheduled     Dec 14,  2018  8:45 AM CST   [facility]aney [facility]   [facility]    [address] [address] [phone]              Further instructions from your care team       Same-Day Surgery Anesthesia  Adult Discharge Orders & Instructions     For 24 hours after surgery    1. Get plenty of rest.  A responsible adult must stay with you for at least 24 hours after you leave the hospital.   2. Do not drive or use heavy equipment.  If you have weakness or tingling, don't drive or use heavy equipment until this feeling goes away.  3. Do not drink alcohol.  4. Avoid strenuous or risky activities.  Ask for help when climbing stairs.   5. You may feel lightheaded.  If so, sit for a few minutes before standing.  Have someone help you get up.   6. You may have a slight fever. Call the doctor if your fever is over 100 F (37.7 C) (taken under the tongue) or lasts longer than 24 hours.  7. You may have a dry mouth, a sore throat, muscle aches or trouble sleeping.  These should go away after 24 hours.  8. Do not make important or legal decisions.  Based on the surgery/procedure that you had today, we do not expect that you will have any problems.  However, we want you to know what to do if you have pain, nausea, bleeding,or infection:  To control pain:  Take medicines your physician has prescribed or or over-the counter medicine he or she advises.  Ice packs and periods of rest are often helpful.  For surgery on an arm or leg, raise it on a pillow to ease swelling.  If your pain is not managed with the above methods, contact your physician.  To control nausea:  Take anti-nausea medicine approved by your physician.  Drink clear liquids such as apple juice, ginger ale, broth or 7-Up. Be sure to drink enough fluids.  Move to a regular diet as you feel able.  Rest may also help.  Bleeding:  You may see a little blood on your dressing, about the size of a quarter in the  first 24 hours.  If you see this, there is no reason to be alarmed.  However, if this continues to increase in size, apply pressure if able, and notify your physician.  Infection: Please contact your physician if you have any of the following signs:  redness, swelling, heat, increasing pain or foul-smelling drainage at your surgery site, fever or chills.    Call your doctor for any of the followin.  It has been over 8 to 10 hours since surgery and you are still not able to urinate (pass water).    2.  Headache for over 24 hours.    3.  Numbness, tingling or weakness in your legs the day after surgery (if you had spinal anesthesia).    24 hour Nurse advice line: [phone]      Pending Results     Date and Time Order Name Status Description    2018 1556 Surgical pathology exam In process             Admission Information     Date & Time Provider Department Dept. Phone    2018 Ayaan Chaney MD [facility] Phase [phone]      Your Vitals Were     Blood Pressure Temperature Respirations Pulse Oximetry          100/64 97.8  F (36.6  C) (Oral) 16 93%        MyChart Information     [facility] gives you secure access to your electronic health record. If you see a primary care provider, you can also send messages to your care team and make appointments. If you have questions, please call your primary care clinic.  If you do not have a primary care provider, please call [phone] and they will assist you.        Care EveryWhere ID     This is your Care EveryWhere ID. This could be used by other organizations to access your [facility] medical records  [id number]        Equal Access to Services     [facility] : Hadmarvel rosado Soomar, waaxda luqadaha, qaybta kaalmada tracee, darin stanton. So [facility] [phone].    ATENCIÓN: Si habla español, tiene a nair disposición servicios gratuitos de asistencia lingüística. Llame al [phone].    We comply with  applicable federal civil rights laws and [place] laws. We do not discriminate on the basis of race, color, national origin, age, disability, sex, sexual orientation, or gender identity.               Review of your medicines      START taking        Dose / Directions    HYDROcodone-acetaminophen 5-325 MG tablet   Commonly known as:  NORCO   Used for:  Post-op pain        Dose:  1-2 tablet   Take 1-2 tablets by mouth every 4 hours as needed for moderate to severe pain   Quantity:  15 tablet   Refills:  0         CONTINUE these medicines which have NOT CHANGED        Dose / Directions    acetaminophen-codeine 300-30 MG tablet   Commonly known as:  TYLENOL #3   Used for:  Cervicalgia        Dose:  1 tablet   Take 1 tablet by mouth nightly as needed for moderate pain   Quantity:  60 tablet   Refills:  0       albuterol 108 (90 Base) MCG/ACT inhaler   Commonly known as:  PROAIR HFA/PROVENTIL HFA/VENTOLIN HFA        Dose:  2 puff   Inhale 2 puffs into the lungs every 6 hours   Refills:  0       amitriptyline 10 MG tablet   Commonly known as:  ELAVIL   Used for:  DDD (degenerative disc disease), lumbar, DDD (degenerative disc disease), cervical        Start at 1 tab at bedtime for 3 days, then 2 tabs at bedtime for 3 days, then 3 tabs at bedtime.   Quantity:  90 tablet   Refills:  1       atorvastatin 20 MG tablet   Commonly known as:  LIPITOR   Used for:  Mixed hyperlipidemia        Dose:  20 mg   Take 1 tablet (20 mg) by mouth daily   Quantity:  90 tablet   Refills:  0       omeprazole 40 MG DR capsule   Commonly known as:  priLOSEC   Used for:  Gastroesophageal reflux disease without esophagitis        Dose:  40 mg   Take 1 capsule (40 mg) by mouth daily   Quantity:  90 capsule   Refills:  3            Where to get your medicines      Some of these will need a paper prescription and others can be bought over the counter. Ask your nurse if you have questions.     Bring a paper prescription for each of these medications      HYDROcodone-acetaminophen 5-325 MG tablet                Protect others around you: Learn how to safely use, store and throw away your medicines at www.disposemymeds.org.        Information about OPIOIDS     PRESCRIPTION OPIOIDS: WHAT YOU NEED TO KNOW   We gave you an opioid (narcotic) pain medicine. It is important to manage your pain, but opioids are not always the best choice. You should first try all the other options your care team gave you. Take this medicine for as short a time (and as few doses) as possible.    Some activities can increase your pain, such as bandage changes or therapy sessions. It may help to take your pain medicine 30 to 60 minutes before these activities. Reduce your stress by getting enough sleep, working on hobbies you enjoy and practicing relaxation or meditation. Talk to your care team about ways to manage your pain beyond prescription opioids.    These medicines have risks:    DO NOT drive when on new or higher doses of pain medicine. These medicines can affect your alertness and reaction times, and you could be arrested for driving under the influence (DUI). If you need to use opioids long-term, talk to your care team about driving.    DO NOT operate heavy machinery    DO NOT do any other dangerous activities while taking these medicines.    DO NOT drink any alcohol while taking these medicines.     If the opioid prescribed includes acetaminophen, DO NOT take with any other medicines that contain acetaminophen. Read all labels carefully. Look for the word  acetaminophen  or  Tylenol.  Ask your pharmacist if you have questions or are unsure.    You can get addicted to pain medicines, especially if you have a history of addiction (chemical, alcohol or substance dependence). Talk to your care team about ways to reduce this risk.    All opioids tend to cause constipation. Drink plenty of water and eat foods that have a lot of fiber, such as fruits, vegetables, prune juice, apple  juice and high-fiber cereal. Take a laxative (Miralax, milk of magnesia, Colace, Senna) if you don t move your bowels at least every other day. Other side effects include upset stomach, sleepiness, dizziness, throwing up, tolerance (needing more of the medicine to have the same effect), physical dependence and slowed breathing.    Store your pills in a secure place, locked if possible. We will not replace any lost or stolen medicine. If you don t finish your medicine, please throw away (dispose) as directed by your pharmacist. The [place] [employer]ution [employer] has more information about safe disposal: https://www.pca.[email or web address]/living-green/[email or web address]-medications             Medication List: This is a list of all your medications and when to take them. Check marks below indicate your daily home schedule. Keep this list as a reference.      Medications           Morning Afternoon Evening Bedtime As Needed    acetaminophen-codeine 300-30 MG tablet   Commonly known as:  TYLENOL #3   Take 1 tablet by mouth nightly as needed for moderate pain                                albuterol 108 (90 Base) MCG/ACT inhaler   Commonly known as:  PROAIR HFA/PROVENTIL HFA/VENTOLIN HFA   Inhale 2 puffs into the lungs every 6 hours                                amitriptyline 10 MG tablet   Commonly known as:  ELAVIL   Start at 1 tab at bedtime for 3 days, then 2 tabs at bedtime for 3 days, then 3 tabs at bedtime.                                atorvastatin 20 MG tablet   Commonly known as:  LIPITOR   Take 1 tablet (20 mg) by mouth daily                                HYDROcodone-acetaminophen 5-325 MG tablet   Commonly known as:  NORCO   Take 1-2 tablets by mouth every 4 hours as needed for moderate to severe pain                                omeprazole 40 MG DR capsule   Commonly known as:  priLOSEC   Take 1 capsule (40 mg) by mouth daily

## 2018-12-07 NOTE — H&P (VIEW-ONLY)
[address] [address] [phone]  Dept: [phone]    PRE-OP EVALUATION:  Today's date: 2018    Bulmaro Herrera (: 1965) presents for pre-operative evaluation assessment as requested by Dr. Chaney.  He requires evaluation and anesthesia risk assessment prior to undergoing surgery/procedure for treatment of excision back skin lesion .    Fax number for surgical facility: [facility]  Primary Physician: Sandor Serrano  Type of Anesthesia Anticipated: Combined MAC with Local    Patient has a Health Care Directive or Living Will:  NO    Preop Questions 2018   What are you having done? mole removed   1.  Do you have a history of Heart attack, stroke, stent, coronary bypass surgery, or other heart surgery? No   2.  Do you ever have any pain or discomfort in your chest? No   3.  Do you have a history of  Heart Failure? No   4.   Are you troubled by shortness of breath when:  walking on a level surface, or up a slight hill, or at night? No   5.  Do you currently have a cold, bronchitis or other respiratory infection? No   6.  Do you have a cough, shortness of breath, or wheezing? No   7.  Do you sometimes get pains in the calves of your legs when you walk? No   8. Do you or anyone in your family have previous history of blood clots? No   9.  Do you or does anyone in your family have a serious bleeding problem such as prolonged bleeding following surgeries or cuts? No   10. Have you ever had problems with anemia or been told to take iron pills? No   11. Have you had any abnormal blood loss such as black, tarry or bloody stools? No   12. Have you ever had a blood transfusion? No   13. Have you or any of your relatives ever had problems with anesthesia? YES - mother   14. Do you have sleep apnea, excessive snoring or daytime drowsiness? YES - Sleep apnea   15. Do you have any prosthetic heart valves? No   16. Do you have prosthetic joints? No          HPI:     HPI related to upcoming procedure:   This a 53-year-old gentleman with a skin lesion on his back that is recently changed become crusted.  It is unclear whether this is due to bleeding from trauma or change in the lesion itself.  I did offer him a punch biopsy today but he refused as he has a phobia of needles.  After discussion with the patient the plan at this time is for an excision of the lesion with adequate margins under MAC.      See problem list for active medical problems.  Problems all longstanding and stable, except as noted/documented.  See ROS for pertinent symptoms related to these conditions.                                                                                                                                                          .    MEDICAL HISTORY:     Patient Active Problem List    Diagnosis Date Noted     Mixed hyperlipidemia 11/13/2018     Priority: Medium     ROBBY (obstructive sleep apnea) 11/13/2018     Priority: Medium     Herniation of intervertebral disc of cervical spine without radiculopathy 03/21/2017     Priority: Medium     Cervicalgia 03/21/2017     Priority: Medium     Chronic pain syndrome 04/05/2016     Priority: Medium     DDD, cervical. T#3, #60/mo.        DDD (degenerative disc disease), lumbar 12/20/2013     Priority: Medium     DDD (degenerative disc disease), cervical 12/20/2013     Priority: Medium     Elevated liver enzymes 05/14/2012     Priority: Medium     Pruritus ani 01/20/2010     Priority: Medium     Back pains      Priority: Medium     Problem list name updated by automated process. Provider to review and confirm       Esophageal reflux 04/13/2005     Priority: Medium      Past Medical History:   Diagnosis Date     Back pains      Depressive disorder, not elsewhere classified 1/28/2007    declines Rx     Disc degeneration 12/2008    see MRI -throaco-lumbar mild discogenic degenerative changes     Elevated LFT's 11/09    alt and ast      Other and unspecified hyperlipidemia 1/2007     Sleep disorder 9/09    CPAP     Tobacco use disorder      Past Surgical History:   Procedure Laterality Date     C NONSPECIFIC PROCEDURE      rt hand surgery - laceration/glass/tendons     COLONOSCOPY  07/07/08    adenomatous polyp repeat 5 years     COLONOSCOPY N/A 12/29/2017    Procedure: COMBINED COLONOSCOPY, SINGLE OR MULTIPLE BIOPSY/POLYPECTOMY BY BIOPSY;  Colonoscopy, Polypectomy by Biopsy;  Surgeon: Matt Reyes MD;  Location:  GI     ENT SURGERY      for deviated septum     HC ECHO HEART XTHORACIC, STRESS/REST  6/2009    neg     INJECT EPIDURAL CERVICAL N/A 5/14/2015    Procedure: INJECT EPIDURAL CERVICAL;  Surgeon: Christian Chaudhry MD;  Location:  OR     ORTHOPEDIC SURGERY      Right hand     TONSILLECTOMY       Current Outpatient Prescriptions   Medication Sig Dispense Refill     acetaminophen-codeine (TYLENOL #3) 300-30 MG tablet Take 1 tablet by mouth nightly as needed for moderate pain 60 tablet 0     amitriptyline (ELAVIL) 10 MG tablet Start at 1 tab at bedtime for 3 days, then 2 tabs at bedtime for 3 days, then 3 tabs at bedtime. 90 tablet 1     atorvastatin (LIPITOR) 20 MG tablet Take 1 tablet (20 mg) by mouth daily 90 tablet 0     omeprazole (PRILOSEC) 40 MG DR capsule Take 1 capsule (40 mg) by mouth daily 90 capsule 3     OTC products: None, except as noted above    Allergies   Allergen Reactions     Percocet [Oxycodone-Acetaminophen] Itching     Wasps [Hornets] Swelling      Latex Allergy: NO    Social History   Substance Use Topics     Smoking status: Former Smoker     Packs/day: 1.50     Quit date: 2/14/2007     Smokeless tobacco: Never Used     Alcohol use Yes      Comment: rare     History   Drug Use No       REVIEW OF SYSTEMS:   CONSTITUTIONAL: NEGATIVE for fever, chills, change in weight  INTEGUMENTARY/SKIN: POSITIVE for changing lesion on upper back  ENT/MOUTH: NEGATIVE for ear, mouth and throat problems  RESP: NEGATIVE for significant  cough or SOB  CV: NEGATIVE for chest pain, palpitations or peripheral edema  ROS otherwise negative    EXAM:   /84 (BP Location: Right arm, Patient Position: Chair, Cuff Size: Adult Large)  Pulse 80  Temp 97.8  F (36.6  C) (Temporal)  Resp 18  Wt 249 lb 12.8 oz (113.3 kg)  SpO2 94%  BMI 33.88 kg/m2  GENERAL APPEARANCE: healthy, alert and no distress  HENT: ear canals and TM's normal and nose and mouth without ulcers or lesions  RESP: lungs clear to auscultation - no rales, rhonchi or wheezes  CV: regular rate and rhythm, normal S1 S2, no S3 or S4 and no murmur, click or rub   ABDOMEN: soft, nontender, no HSM or masses and bowel sounds normal  SKIN: 0.3x0.4 cm crusted raised lesion  NEURO: Normal strength and tone, sensory exam grossly normal, mentation intact and speech normal    DIAGNOSTICS:   No labs or EKG required for low risk surgery (cataract, skin procedure, breast biopsy, etc)    Recent Labs   Lab Test  09/10/18   1930  02/05/18   0943   02/10/16   0725   02/17/14   1735   HGB  16.4  16.4   < >   --    < >   --    PLT  236  195   < >   --    < >   --    INR   --    --    --    --    --   1.0   NA  141  139   < >   --    < >   --    POTASSIUM  4.0  4.0   < >   --    < >   --    CR  1.06  1.02   < >   --    < >   --    A1C   --    --    --   5.4   --    --     < > = values in this interval not displayed.        IMPRESSION:   Reason for surgery/procedure: Changing lesion on back/ excisional bipsoy    The proposed surgical procedure is considered LOW risk.    REVISED CARDIAC RISK INDEX  The patient has the following serious cardiovascular risks for perioperative complications such as (MI, PE, VFib and 3  AV Block):  No serious cardiac risks  INTERPRETATION: 0 risks: Class I (very low risk - 0.4% complication rate)    The patient has the following additional risks for perioperative complications:  No identified additional risks      ICD-10-CM    1. Preop general physical exam Z01.818    2. Unknown  skin lesion L98.9        RECOMMENDATIONS:       --Patient is to take all scheduled medications on the day of surgery EXCEPT for modifications listed below.    APPROVAL GIVEN to proceed with proposed procedure, without further diagnostic evaluation       Signed Electronically by: Aleksander Bowles MD    Copy of this evaluation report is provided to requesting physician.    [place] Preop Guidelines    Revised Cardiac Risk Index

## 2018-12-07 NOTE — PROGRESS NOTES
[address] [id number] [phone]  Dept: [phone]    PRE-OP EVALUATION:  Today's date: 2018    Bulmaro Herrera (: 1965) presents for pre-operative evaluation assessment as requested by Dr. Chaney.  He requires evaluation and anesthesia risk assessment prior to undergoing surgery/procedure for treatment of excision back skin lesion .    Fax number for surgical facility: [facility]  Primary Physician: Sandor Serrano  Type of Anesthesia Anticipated: Combined MAC with Local    Patient has a Health Care Directive or Living Will:  NO    Preop Questions 2018   What are you having done? mole removed   1.  Do you have a history of Heart attack, stroke, stent, coronary bypass surgery, or other heart surgery? No   2.  Do you ever have any pain or discomfort in your chest? No   3.  Do you have a history of  Heart Failure? No   4.   Are you troubled by shortness of breath when:  walking on a level surface, or up a slight hill, or at night? No   5.  Do you currently have a cold, bronchitis or other respiratory infection? No   6.  Do you have a cough, shortness of breath, or wheezing? No   7.  Do you sometimes get pains in the calves of your legs when you walk? No   8. Do you or anyone in your family have previous history of blood clots? No   9.  Do you or does anyone in your family have a serious bleeding problem such as prolonged bleeding following surgeries or cuts? No   10. Have you ever had problems with anemia or been told to take iron pills? No   11. Have you had any abnormal blood loss such as black, tarry or bloody stools? No   12. Have you ever had a blood transfusion? No   13. Have you or any of your relatives ever had problems with anesthesia? YES - mother   14. Do you have sleep apnea, excessive snoring or daytime drowsiness? YES - Sleep apnea   15. Do you have any prosthetic heart valves? No   16. Do you have prosthetic joints? No          HPI:     HPI related to upcoming procedure:   This a 53-year-old gentleman with a skin lesion on his back that is recently changed become crusted.  It is unclear whether this is due to bleeding from trauma or change in the lesion itself.  I did offer him a punch biopsy today but he refused as he has a phobia of needles.  After discussion with the patient the plan at this time is for an excision of the lesion with adequate margins under MAC.      See problem list for active medical problems.  Problems all longstanding and stable, except as noted/documented.  See ROS for pertinent symptoms related to these conditions.                                                                                                                                                          .    MEDICAL HISTORY:     Patient Active Problem List    Diagnosis Date Noted     Mixed hyperlipidemia 11/13/2018     Priority: Medium     ROBBY (obstructive sleep apnea) 11/13/2018     Priority: Medium     Herniation of intervertebral disc of cervical spine without radiculopathy 03/21/2017     Priority: Medium     Cervicalgia 03/21/2017     Priority: Medium     Chronic pain syndrome 04/05/2016     Priority: Medium     DDD, cervical. T#3, #60/mo.        DDD (degenerative disc disease), lumbar 12/20/2013     Priority: Medium     DDD (degenerative disc disease), cervical 12/20/2013     Priority: Medium     Elevated liver enzymes 05/14/2012     Priority: Medium     Pruritus ani 01/20/2010     Priority: Medium     Back pains      Priority: Medium     Problem list name updated by automated process. Provider to review and confirm       Esophageal reflux 04/13/2005     Priority: Medium      Past Medical History:   Diagnosis Date     Back pains      Depressive disorder, not elsewhere classified 1/28/2007    declines Rx     Disc degeneration 12/2008    see MRI -throaco-lumbar mild discogenic degenerative changes     Elevated LFT's 11/09    alt and ast      Other and unspecified hyperlipidemia 1/2007     Sleep disorder 9/09    CPAP     Tobacco use disorder      Past Surgical History:   Procedure Laterality Date     C NONSPECIFIC PROCEDURE      rt hand surgery - laceration/glass/tendons     COLONOSCOPY  07/07/08    adenomatous polyp repeat 5 years     COLONOSCOPY N/A 12/29/2017    Procedure: COMBINED COLONOSCOPY, SINGLE OR MULTIPLE BIOPSY/POLYPECTOMY BY BIOPSY;  Colonoscopy, Polypectomy by Biopsy;  Surgeon: Matt Reyes MD;  Location:  GI     ENT SURGERY      for deviated septum     HC ECHO HEART XTHORACIC, STRESS/REST  6/2009    neg     INJECT EPIDURAL CERVICAL N/A 5/14/2015    Procedure: INJECT EPIDURAL CERVICAL;  Surgeon: Christian Chaudhry MD;  Location:  OR     ORTHOPEDIC SURGERY      Right hand     TONSILLECTOMY       Current Outpatient Prescriptions   Medication Sig Dispense Refill     acetaminophen-codeine (TYLENOL #3) 300-30 MG tablet Take 1 tablet by mouth nightly as needed for moderate pain 60 tablet 0     amitriptyline (ELAVIL) 10 MG tablet Start at 1 tab at bedtime for 3 days, then 2 tabs at bedtime for 3 days, then 3 tabs at bedtime. 90 tablet 1     atorvastatin (LIPITOR) 20 MG tablet Take 1 tablet (20 mg) by mouth daily 90 tablet 0     omeprazole (PRILOSEC) 40 MG DR capsule Take 1 capsule (40 mg) by mouth daily 90 capsule 3     OTC products: None, except as noted above    Allergies   Allergen Reactions     Percocet [Oxycodone-Acetaminophen] Itching     Wasps [Hornets] Swelling      Latex Allergy: NO    Social History   Substance Use Topics     Smoking status: Former Smoker     Packs/day: 1.50     Quit date: 2/14/2007     Smokeless tobacco: Never Used     Alcohol use Yes      Comment: rare     History   Drug Use No       REVIEW OF SYSTEMS:   CONSTITUTIONAL: NEGATIVE for fever, chills, change in weight  INTEGUMENTARY/SKIN: POSITIVE for changing lesion on upper back  ENT/MOUTH: NEGATIVE for ear, mouth and throat problems  RESP: NEGATIVE for significant  cough or SOB  CV: NEGATIVE for chest pain, palpitations or peripheral edema  ROS otherwise negative    EXAM:   /84 (BP Location: Right arm, Patient Position: Chair, Cuff Size: Adult Large)  Pulse 80  Temp 97.8  F (36.6  C) (Temporal)  Resp 18  Wt 249 lb 12.8 oz (113.3 kg)  SpO2 94%  BMI 33.88 kg/m2  GENERAL APPEARANCE: healthy, alert and no distress  HENT: ear canals and TM's normal and nose and mouth without ulcers or lesions  RESP: lungs clear to auscultation - no rales, rhonchi or wheezes  CV: regular rate and rhythm, normal S1 S2, no S3 or S4 and no murmur, click or rub   ABDOMEN: soft, nontender, no HSM or masses and bowel sounds normal  SKIN: 0.3x0.4 cm crusted raised lesion  NEURO: Normal strength and tone, sensory exam grossly normal, mentation intact and speech normal    DIAGNOSTICS:   No labs or EKG required for low risk surgery (cataract, skin procedure, breast biopsy, etc)    Recent Labs   Lab Test  09/10/18   1930  02/05/18   0943   02/10/16   0725   02/17/14   1735   HGB  16.4  16.4   < >   --    < >   --    PLT  236  195   < >   --    < >   --    INR   --    --    --    --    --   1.0   NA  141  139   < >   --    < >   --    POTASSIUM  4.0  4.0   < >   --    < >   --    CR  1.06  1.02   < >   --    < >   --    A1C   --    --    --   5.4   --    --     < > = values in this interval not displayed.        IMPRESSION:   Reason for surgery/procedure: Changing lesion on back/ excisional bipsoy    The proposed surgical procedure is considered LOW risk.    REVISED CARDIAC RISK INDEX  The patient has the following serious cardiovascular risks for perioperative complications such as (MI, PE, VFib and 3  AV Block):  No serious cardiac risks  INTERPRETATION: 0 risks: Class I (very low risk - 0.4% complication rate)    The patient has the following additional risks for perioperative complications:  No identified additional risks      ICD-10-CM    1. Preop general physical exam Z01.818    2. Unknown  skin lesion L98.9        RECOMMENDATIONS:       --Patient is to take all scheduled medications on the day of surgery EXCEPT for modifications listed below.    APPROVAL GIVEN to proceed with proposed procedure, without further diagnostic evaluation       Signed Electronically by: Aleksander Bowles MD    Copy of this evaluation report is provided to requesting physician.    [place] Preop Guidelines    Revised Cardiac Risk Index

## 2018-12-07 NOTE — ANESTHESIA CARE TRANSFER NOTE
Patient: Bulmaro Herrera    Procedure(s):  Excision Back Skin Lesion X Two  Excision Skin Tags Right Axilla    Diagnosis: back skin lesion  Diagnosis Additional Information: No value filed.    Anesthesia Type:   MAC     Note:  Airway :Face Mask  Patient transferred to:Phase II  Handoff Report: Identifed the Patient, Identified the Reponsible Provider, Reviewed the pertinent medical history, Discussed the surgical course, Reviewed Intra-OP anesthesia mangement and issues during anesthesia, Set expectations for post-procedure period and Allowed opportunity for questions and acknowledgement of understanding      Vitals: (Last set prior to Anesthesia Care Transfer)    CRNA VITALS  12/7/2018 1556 - 12/7/2018 1631      12/7/2018             Pulse: 84    SpO2: 98 %    Resp Rate (observed): 15                Electronically Signed By: SHIV Mcarthur CRNA  December 7, 2018  4:31 PM

## 2018-12-07 NOTE — DISCHARGE INSTRUCTIONS
Same-Day Surgery Anesthesia  Adult Discharge Orders & Instructions     For 24 hours after surgery    1. Get plenty of rest.  A responsible adult must stay with you for at least 24 hours after you leave the hospital.   2. Do not drive or use heavy equipment.  If you have weakness or tingling, don't drive or use heavy equipment until this feeling goes away.  3. Do not drink alcohol.  4. Avoid strenuous or risky activities.  Ask for help when climbing stairs.   5. You may feel lightheaded.  If so, sit for a few minutes before standing.  Have someone help you get up.   6. You may have a slight fever. Call the doctor if your fever is over 100 F (37.7 C) (taken under the tongue) or lasts longer than 24 hours.  7. You may have a dry mouth, a sore throat, muscle aches or trouble sleeping.  These should go away after 24 hours.  8. Do not make important or legal decisions.  Based on the surgery/procedure that you had today, we do not expect that you will have any problems.  However, we want you to know what to do if you have pain, nausea, bleeding,or infection:  To control pain:  Take medicines your physician has prescribed or or over-the counter medicine he or she advises.  Ice packs and periods of rest are often helpful.  For surgery on an arm or leg, raise it on a pillow to ease swelling.  If your pain is not managed with the above methods, contact your physician.  To control nausea:  Take anti-nausea medicine approved by your physician.  Drink clear liquids such as apple juice, ginger ale, broth or 7-Up. Be sure to drink enough fluids.  Move to a regular diet as you feel able.  Rest may also help.  Bleeding:  You may see a little blood on your dressing, about the size of a quarter in the first 24 hours.  If you see this, there is no reason to be alarmed.  However, if this continues to increase in size, apply pressure if able, and notify your physician.  Infection: Please contact your physician if you have any of the  following signs:  redness, swelling, heat, increasing pain or foul-smelling drainage at your surgery site, fever or chills.    Call your doctor for any of the followin.  It has been over 8 to 10 hours since surgery and you are still not able to urinate (pass water).    2.  Headache for over 24 hours.    3.  Numbness, tingling or weakness in your legs the day after surgery (if you had spinal anesthesia).    24 hour Nurse advice line: [phone]

## 2018-12-07 NOTE — BRIEF OP NOTE
[facility] Brief Operative Note    Pre-operative diagnosis: back skin lesions, right axillary skin tags   Post-operative diagnosis Same   Procedure: 1) Excision left back skin lesion 3.5cm ellipse  2) Excision right back skin lesion 2.8 cm ellipse  3) Excision of axillary skin tags x 4   Surgeon: Ayaan Chnaey MD, FACS   Assistants(s): None   Estimated blood loss: Minimal    Specimens: As above   Findings: Skin lesions x 2 (0.3x0.3 cm each.)     Ayaan Chaney MD, FACS    #[id number]

## 2018-12-09 NOTE — OP NOTE
Procedure Date: 12/07/2018      PREOPERATIVE DIAGNOSIS:  Back skin lesions and right axillary skin tags.      POSTOPERATIVE DIAGNOSIS:  Back skin lesions and right axillary skin tags.      PROCEDURES:   1.  Excision of left back skin lesion with a 3.5 cm ellipse.   2.  Excision of right back skin lesion 2.8 cm ellipse.   3.  Excision of right axillary skin tags x 4.      SURGEON:  Ayaan Chaney MD, [facility]      ANESTHESIA:  MAC.      INDICATIONS FOR PROCEDURE:  This is a 53-year-old gentleman who initially presented with a changing mole in his left back.  It had crusted over and his wife was concerned about it.  He wanted to have this excised.  When he then presented today, his wife also found a second lesion she wanted excised on the right, as well as he had some axillary skin tags he wanted removed at the same time while he was under MAC.      OPERATIVE FINDINGS:  Included skin lesions x 2 in the left and right upper back as previously described, measuring 0.3 x 0.3 cm in each.       DETAILS OF PROCEDURE:  With the cooperation of the patient in the preoperative holding area, both lesions in the back and right axilla were marked.  He was then taken to the operating room and placed on the stretcher in the left lateral decubitus position.  Initially, the back was prepped and draped in sterile fashion.  Both lesions were infiltrated with local anesthetic.  On the left back lesion, a transverse 3.5 cm ellipse was then made and the mass was then taken off the subcutaneous tissue using cautery.  Subcutaneous tissue was undermined superiorly and inferiorly and the defect was closed using 3-0 Vicryl and 4-0 Monocryl subcuticular stitches.  Steri-Strips and a sterile dressing were applied.  We then turned our attention to the right back mass.  In a similar fashion, it was infiltrated with a local anesthetic.  The elliptical incision of 2.8 cm was made.  Subcutaneous tissue was undermined superiorly and inferiorly.  The  subcutaneous tissue was reapproximated with 3-0 Vicryl.  The skin was closed using a running 4-0 Monocryl subcuticular stitch.  Steri-Strips, sterile dressings were applied.  We then turned our attention to the axilla, which was reprepped and draped in a sterile fashion.  The 4 identified skin tags were infiltrated with local anesthetic and then taken off the skin using needle cautery.  Bacitracin and a sterile dressing were applied.  The patient was taken from the operating room to recovery in stable condition to be sent home.         MAHNAZ LEWIS MD. NANCY             D: 2018   T: 2018   MT: GILDA      Name:     JAZZY SIDDIQI   MRN:      [id number]        Account:        [id number]   :      1965           Procedure Date: 2018      Document: [id number]

## 2018-12-10 ENCOUNTER — THERAPY VISIT (OUTPATIENT)
Dept: CHIROPRACTIC MEDICINE | Facility: CLINIC | Age: 53
End: 2018-12-10
Payer: COMMERCIAL

## 2018-12-10 DIAGNOSIS — M54.2 CERVICALGIA: ICD-10-CM

## 2018-12-10 DIAGNOSIS — M51.369 DDD (DEGENERATIVE DISC DISEASE), LUMBAR: ICD-10-CM

## 2018-12-10 DIAGNOSIS — M50.20 HERNIATION OF INTERVERTEBRAL DISC OF CERVICAL SPINE WITHOUT RADICULOPATHY: ICD-10-CM

## 2018-12-10 DIAGNOSIS — M99.01 SEGMENTAL DYSFUNCTION OF CERVICAL REGION: Primary | ICD-10-CM

## 2018-12-10 DIAGNOSIS — M99.02 SEGMENTAL DYSFUNCTION OF THORACIC REGION: ICD-10-CM

## 2018-12-10 PROCEDURE — 99212 OFFICE O/P EST SF 10 MIN: CPT | Mod: 25 | Performed by: CHIROPRACTOR

## 2018-12-10 PROCEDURE — 97035 APP MDLTY 1+ULTRASOUND EA 15: CPT | Performed by: CHIROPRACTOR

## 2018-12-10 PROCEDURE — 98940 CHIROPRACT MANJ 1-2 REGIONS: CPT | Mod: AT | Performed by: CHIROPRACTOR

## 2018-12-10 NOTE — PROGRESS NOTES
[facility] Same Day Surgery  Discharge Call Back  Bulmaro Herrera  1965  MRN: [id number]  Home: [phone] (home)   PCP: Sandor Serrano    We are calling to see how you're doing since your surgery/procedure with us?   Comments: Good  Clinical Questions  1. Have you had time to look at your discharge instructions? Do you have any questions in regards to the instructions?   Comment: yes, no  2. Do you feel your pain is being controlled with the regimen the surgeon sent you home on? (ie: prescription medications, over the counter pain medications, ice packs)   Comments: yes  3. Have you noticed any drainage on your dressing? Do you know what to do if you have bleeding as a result of your procedure?   Comments: no  4. Have you had any nausea/vomiting? Do you know how to treat this?   Comment: no  5. Have you had any signs/symptoms of infection? (ie: fever, swelling, heat, drainage or redness) Do you know what to do if you have?   Comment: no  6. Do you have a follow up appointment made with your surgeon? Do you have a number to contact them at if you need it?   Comment: yes, yes  Retained Foreign Object (DIANNA, Hemovac, Penrose, Wound Packing, Vaginal Packing, Nasal Splints, Urethral Stents, Golden Catheter)  1. Do you still have na in place?   2. If the item is still in place, can you review the plan for removal with me? na  Recognition  Is there anyone from your care team that you would like to recognize?   Comments: no  Improvement  Our goal is to be the best. Do you have any suggestions for things that we could improve on?   Comments: no  You may be randomly selected to fill out a [facility] survey. We would appreciate you taking the time to fill this out. It is important to us if you would answer all of the questions on the survey.

## 2018-12-10 NOTE — PROGRESS NOTES
"Visit #:  4 of 8 based on treatment plan 3/21/2017    Subjective:  Bulmaro Herrera is a 53 year old male who is seen in f/u up for:        Herniation of intervertebral disc of cervical spine without radiculopathy  Cervicalgia  DDD (degenerative disc disease), lumbar.     Since last visit on 7/26/2017,  Bulmaro Herrera reports the following changes: Patient presents and states that he is \"horrible\" today. He has pain right at the C7/T1 level, right where \"the bulged discs are.\" He feels like he can't turn his head. He feels major pain today, dull ache, rated 11/10 today. He can't sleep, can't do anything, he is dizzy and feels like his head is in the clouds. He feels like someone is squeezing his disc. He denies radiation into his UE, but his legs feel numb today, but he has 3 \"bone one bone levels.\" He has been taking Tylenol with Codeine, which helps with the pain, but makes him feel sick, so he doesn't want to use that anymore.     Patient notes that he has seen another chiropractor and has had acupuncture for symptoms.       Objective:  The following was observed:    P: pain elicited on palpation, bilateral upper traps, CT junction    A: static palpation demonstrates intersegmental asymmetry, as noted    R: motion palpation notes restricted motion    T: localized muscle spasm at: Traps ,      Assessment:    CAROM: restricted in all directions, especially Extension (only to neutral) and LF    Segmental spinal dysfunction/restrictions found at:  C1 RR, LRR  C2 LR, RRR  T1 RR, LRR  T5 E, FR        Diagnoses:      1. Herniation of intervertebral disc of cervical spine without radiculopathy    2. Cervicalgia    3. DDD (degenerative disc disease), lumbar        Patient's condition:  Patient had restrictions pre-manipulation and Patient had decreased motion prior to manipulation    Treatment effectiveness:  Post manipulation there is better intersegmental movement and Patient claims to feel looser post " manipulation      Procedures:  Level 2 re-exam  CMT:  97559 Chiropractic Treatment  Cervical: Diversified, C1, C2, Supine  Thoracic: Diversified, T1, T5,  Prone       Modalities:  98253: US:  1.0 Lane/cm squared for 8 minutes at 1.0mhz  continuous pulsed, Location: bilateral upper traps at C7 level     Therapeutic procedures:  Gave patient ice instructions.       Prognosis: Good    Progress towards Goals: Patient is making progress towards the goal of:   Reduce numbness and tingling into bilateral feet.  Decrease pain in neck and shoulders from 6-10/10 to 4/10 in 4 visits.  Decrease Neck Disability from 38% to 20% in 4 visits.    Response to Treatment:   Recent exacerbation of symptoms in cervical spine.       Recommendations:    Instructions:ice 20 minutes every other hour as needed and stretch as instructed at visit    Follow-up:  Return to care as needed.  Patient did not have a favorably experience with Dr. Tran, and feels that he needs further care. I am referring him to Pain Care at [facility] where he will be overseen and have consultation for further injections.

## 2018-12-11 LAB — COPATH REPORT: NORMAL

## 2018-12-12 ENCOUNTER — THERAPY VISIT (OUTPATIENT)
Dept: CHIROPRACTIC MEDICINE | Facility: CLINIC | Age: 53
End: 2018-12-12
Payer: COMMERCIAL

## 2018-12-12 DIAGNOSIS — M54.2 CERVICALGIA: ICD-10-CM

## 2018-12-12 DIAGNOSIS — M99.01 SEGMENTAL DYSFUNCTION OF CERVICAL REGION: Primary | ICD-10-CM

## 2018-12-12 DIAGNOSIS — M99.02 SEGMENTAL DYSFUNCTION OF THORACIC REGION: ICD-10-CM

## 2018-12-12 DIAGNOSIS — M50.20 HERNIATION OF INTERVERTEBRAL DISC OF CERVICAL SPINE WITHOUT RADICULOPATHY: ICD-10-CM

## 2018-12-12 DIAGNOSIS — M54.50 LUMBAGO: ICD-10-CM

## 2018-12-12 DIAGNOSIS — M51.369 DDD (DEGENERATIVE DISC DISEASE), LUMBAR: ICD-10-CM

## 2018-12-12 DIAGNOSIS — M99.04 SEGMENTAL DYSFUNCTION OF SACRAL REGION: ICD-10-CM

## 2018-12-12 PROCEDURE — 97035 APP MDLTY 1+ULTRASOUND EA 15: CPT | Performed by: CHIROPRACTOR

## 2018-12-12 PROCEDURE — 98941 CHIROPRACT MANJ 3-4 REGIONS: CPT | Mod: AT | Performed by: CHIROPRACTOR

## 2018-12-12 NOTE — PROGRESS NOTES
Visit #:  5 of 8 based on treatment plan 3/21/2017    Subjective:  Bulmaro Herrera is a 53 year old male who is seen in f/u up for:        Herniation of intervertebral disc of cervical spine without radiculopathy  Cervicalgia  DDD (degenerative disc disease), lumbar.     Since last visit on 12/10/2017,  Bulmaro Herrera reports the following changes: Patient presents and states that he felt better after his adjustment and US on Monday. He was able to sleep that night, but then later in the day yesterday, he started feeling the pain and brain fog again. He isn't quite as bad as he was on Monday, but feels like it is starting to come back to the same level of intensity. He rates his current pain 7/10 currently. He is dizzy. We set him up with a cervical MRI and radiologist consult for injections in [place] at [facility] next week. He has increased movement after his treatment. He is requesting US in his lower back today as well.       Objective:  The following was observed:    P: pain elicited on palpation, bilateral upper traps, CT junction    A: static palpation demonstrates intersegmental asymmetry, as noted    R: motion palpation notes restricted motion    T: localized muscle spasm at: Traps , TL junction paraspinals    Assessment:      Segmental spinal dysfunction/restrictions found at:  C1 RR, LRR  C2 LR, RRR  T1 RR, LRR  T5 E, FR  T10 RR, LRR  Left SI posterior        Diagnoses:      1. Herniation of intervertebral disc of cervical spine without radiculopathy    2. Cervicalgia    3. DDD (degenerative disc disease), lumbar        Patient's condition:  Patient had restrictions pre-manipulation and Patient had decreased motion prior to manipulation    Treatment effectiveness:  Post manipulation there is better intersegmental movement and Patient claims to feel looser post manipulation      Procedures:  CMT:  50524 Chiropractic Treatment  Cervical: Diversified, C1, C2, Supine  Thoracic: Diversified, T1, T5, T10  Prone   Pelvis:  Drop assist, Left SI, Prone      Modalities:   2 units - 02632: US:  1.0 Lane/cm squared for 8 minutes at 1.0mhz  continuous pulsed, Location: bilateral upper traps at C7 level ; TL junction paraspinals to lower back    Therapeutic procedures:  Gave patient ice instructions.       Prognosis: Good    Progress towards Goals: Patient is making progress towards the goal of:   Reduce numbness and tingling into bilateral feet.  Decrease pain in neck and shoulders from 6-10/10 to 4/10 in 4 visits.  Decrease Neck Disability from 38% to 20% in 4 visits.    Response to Treatment:   Recent exacerbation of symptoms in cervical spine.       Recommendations:    Instructions:ice 20 minutes every other hour as needed and stretch as instructed at visit    Follow-up:  Return to care as needed.  Patient will see [facility] next week 12/19.

## 2018-12-14 ENCOUNTER — THERAPY VISIT (OUTPATIENT)
Dept: CHIROPRACTIC MEDICINE | Facility: CLINIC | Age: 53
End: 2018-12-14
Payer: COMMERCIAL

## 2018-12-14 ENCOUNTER — TELEPHONE (OUTPATIENT)
Dept: FAMILY MEDICINE | Facility: CLINIC | Age: 53
End: 2018-12-14

## 2018-12-14 ENCOUNTER — OFFICE VISIT (OUTPATIENT)
Dept: SURGERY | Facility: CLINIC | Age: 53
End: 2018-12-14
Payer: COMMERCIAL

## 2018-12-14 VITALS
WEIGHT: 247.3 LBS | BODY MASS INDEX: 33.54 KG/M2 | TEMPERATURE: 96.1 F | HEART RATE: 95 BPM | OXYGEN SATURATION: 97 % | DIASTOLIC BLOOD PRESSURE: 60 MMHG | SYSTOLIC BLOOD PRESSURE: 130 MMHG

## 2018-12-14 DIAGNOSIS — M51.369 DDD (DEGENERATIVE DISC DISEASE), LUMBAR: ICD-10-CM

## 2018-12-14 DIAGNOSIS — M50.20 HERNIATION OF INTERVERTEBRAL DISC OF CERVICAL SPINE WITHOUT RADICULOPATHY: ICD-10-CM

## 2018-12-14 DIAGNOSIS — M54.2 CERVICALGIA: ICD-10-CM

## 2018-12-14 DIAGNOSIS — Z98.890 POST-OPERATIVE STATE: Primary | ICD-10-CM

## 2018-12-14 DIAGNOSIS — M99.01 SEGMENTAL DYSFUNCTION OF CERVICAL REGION: ICD-10-CM

## 2018-12-14 DIAGNOSIS — M99.02 SEGMENTAL DYSFUNCTION OF THORACIC REGION: ICD-10-CM

## 2018-12-14 DIAGNOSIS — M99.03 SEGMENTAL DYSFUNCTION OF LUMBAR REGION: ICD-10-CM

## 2018-12-14 DIAGNOSIS — M99.04 SEGMENTAL DYSFUNCTION OF SACRAL REGION: Primary | ICD-10-CM

## 2018-12-14 PROCEDURE — 97035 APP MDLTY 1+ULTRASOUND EA 15: CPT | Performed by: CHIROPRACTOR

## 2018-12-14 PROCEDURE — 99024 POSTOP FOLLOW-UP VISIT: CPT | Performed by: SPECIALIST

## 2018-12-14 PROCEDURE — 98941 CHIROPRACT MANJ 3-4 REGIONS: CPT | Mod: AT | Performed by: CHIROPRACTOR

## 2018-12-14 NOTE — NURSING NOTE
Bulmaro Herrera is a 53 year old male who presents for:  Chief Complaint   Patient presents with     Surgical Followup     Excision Back Skin Lesion X Two, excision axilla lesion        Initial Vitals:  /60 (BP Location: Right arm, Patient Position: Sitting, Cuff Size: Adult Large)   Pulse 95   Temp 96.1  F (35.6  C) (Temporal)   Wt 112.2 kg (247 lb 4.8 oz)   SpO2 97%   BMI 33.54 kg/m   Estimated body mass index is 33.54 kg/m  as calculated from the following:    Height as of 11/13/18: 1.829 m (6').    Weight as of this encounter: 112.2 kg (247 lb 4.8 oz).. Body surface area is 2.39 meters squared. BP completed using cuff size: large  Data Unavailable    Do you feel safe in your environment?  Yes  Do you need any refills today? No    Nursing Comments:         Caren Leung

## 2018-12-14 NOTE — PROGRESS NOTES
F/U for excision of skin lesions      Subjective:  Pt feels good.  No Complaints.      Objective:  B/P: 130/60, T: 96.1, P: 95, R: Data Unavailable  Back: Incisions healing well.        Path -  SPECIMEN(S):   A: Left back lesion   B: Right back lesion   C: Right axilla skin tags     FINAL DIAGNOSIS:   A. Skin, left back, lesion, excision:   - Changes suggestive of an ulcerated hemangioma.     B. Skin, right back lesion, excision:   - Dermal nevus with some congenital features.     C. Right axilla skin tags:   - Fibroepithelial polyps.     Electronically signed out by:     Sami Collins M.D., PhD       Assessment/Plan:  Pt is s/p excision of benign back lesions.  Doing well.  F/U PRN.    Ayaan Chaney MD, FACS

## 2018-12-14 NOTE — LETTER
12/14/2018         RE: Bulmaro Herrera  [address]  [facility] [address]        Dear Colleague,    Thank you for referring your patient, Bulmaro Herrera, to the [facility]. Please see a copy of my visit note below.    F/U for excision of skin lesions      Subjective:  Pt feels good.  No Complaints.      Objective:  B/P: 130/60, T: 96.1, P: 95, R: Data Unavailable  Back: Incisions healing well.        Path -  SPECIMEN(S):   A: Left back lesion   B: Right back lesion   C: Right axilla skin tags     FINAL DIAGNOSIS:   A. Skin, left back, lesion, excision:   - Changes suggestive of an ulcerated hemangioma.     B. Skin, right back lesion, excision:   - Dermal nevus with some congenital features.     C. Right axilla skin tags:   - Fibroepithelial polyps.     Electronically signed out by:     Sami Collins M.D., PhD       Assessment/Plan:  Pt is s/p excision of benign back lesions.  Doing well.  F/U PRN.    Ayaan Chaney MD, FACS    Again, thank you for allowing me to participate in the care of your patient.        Sincerely,        Ayaan Chaney MD

## 2018-12-14 NOTE — PROGRESS NOTES
"Visit #:  6 of 8 based on treatment plan 3/21/2017    Subjective:  Bulmaro Herrera is a 53 year old male who is seen in f/u up for:        Herniation of intervertebral disc of cervical spine without radiculopathy  Cervicalgia  DDD (degenerative disc disease), lumbar.     Since last visit on 12/12/2018,  Bulmaro Herrera reports the following changes: Patient presents and states that he just got the results from his moles being removed, and they were \"nothing.\" He is very relieved and emotional today. His neck is worse than when he came in on Wednesday, but better than when he was here Monday. His lower back is still bothering him.       Objective:  The following was observed:    P: pain elicited on palpation, bilateral upper traps, CT junction    A: static palpation demonstrates intersegmental asymmetry, as noted    R: motion palpation notes restricted motion    T: localized muscle spasm at: Traps, TL junction paraspinals    Assessment:      Segmental spinal dysfunction/restrictions found at:  C1 RR, LRR mobilization  C5 LR, RRR  T1 RR, LRR  T5 E, FR  L2 RR, LRR  Left SI posterior        Diagnoses:      1. Herniation of intervertebral disc of cervical spine without radiculopathy    2. Cervicalgia    3. DDD (degenerative disc disease), lumbar        Patient's condition:  Patient had restrictions pre-manipulation and Patient had decreased motion prior to manipulation    Treatment effectiveness:  Post manipulation there is better intersegmental movement and Patient claims to feel looser post manipulation      Procedures:  CMT:  75664 Chiropractic Treatment  Cervical: Diversified, C1, C5, Supine  Thoracic: Diversified, T1, T5,  Prone   Pelvis: Drop assist, L2, Left SI, Prone      Modalities:   2 units - 90528: US:  1.0 Lane/cm squared for 8 minutes at 1.0mhz  continuous pulsed, Location: bilateral upper traps at C7 level ; TL junction paraspinals to lower back    Therapeutic procedures:  Gave patient ice instructions. "       Prognosis: Good    Progress towards Goals: Patient is making progress towards the goal of:   Reduce numbness and tingling into bilateral feet.  Decrease pain in neck and shoulders from 6-10/10 to 4/10 in 4 visits.  Decrease Neck Disability from 38% to 20% in 4 visits.    Response to Treatment:   Recent exacerbation of symptoms in cervical spine.       Recommendations:    Instructions:ice 20 minutes every other hour as needed and stretch as instructed at visit    Follow-up:  Return to care as needed.  Patient will see CDI next week 12/19.

## 2018-12-14 NOTE — TELEPHONE ENCOUNTER
Prior Authorization needed for Acetaminophen with codeine 300mg/30mg . Insurance will only allow 2 prescriptions within 48 days.     Insurance: Preferred one:   ID# [id number]  PHONE: [phone]    Thanks  Berta Katz [facility]   [phone]

## 2018-12-16 ENCOUNTER — OFFICE VISIT (OUTPATIENT)
Dept: URGENT CARE | Facility: RETAIL CLINIC | Age: 53
End: 2018-12-16
Payer: COMMERCIAL

## 2018-12-16 VITALS
OXYGEN SATURATION: 98 % | DIASTOLIC BLOOD PRESSURE: 85 MMHG | HEART RATE: 83 BPM | SYSTOLIC BLOOD PRESSURE: 130 MMHG | TEMPERATURE: 98 F

## 2018-12-16 DIAGNOSIS — B02.9 HERPES ZOSTER WITHOUT COMPLICATION: Primary | ICD-10-CM

## 2018-12-16 PROCEDURE — 99213 OFFICE O/P EST LOW 20 MIN: CPT | Performed by: INTERNAL MEDICINE

## 2018-12-16 RX ORDER — VALACYCLOVIR HYDROCHLORIDE 1 G/1
1000 TABLET, FILM COATED ORAL 2 TIMES DAILY
Qty: 20 TABLET | Refills: 0 | Status: SHIPPED | OUTPATIENT
Start: 2018-12-16 | End: 2019-01-11

## 2018-12-16 NOTE — PROGRESS NOTES
[facility] [place]        Therese De Luna MD, MPH  12/16/2018    Bulmaro Herrera is a pleasant  53   year old male seen for a mildly pruritic rash involving right upper shoulder for 4  days . There has not been any change in the diet or new detergent, soap or toiletries.  No new medications, no insect bite. No fever or chills and no recent illness. No cough or shortness of breath or wheezing is referred.  No nausea, vomiting or diarrhea.  No arthralgia or myalgia.  No tongue, lip or mouth swelling or swallowing problems are referred. Patient had skin biopsy of upper back 2 weeks ago.    Physical Exam   /85   Pulse 83   Temp 98  F (36.7  C) (Oral)   SpO2 98%      Constitutional: Patient is in no distress The patient is pleasant and cooperative.   HEENT: Head:  Head is atraumatic, normocephalic.    Eyes: Pupils are equal, round and reactive to light and accomodation.  Sclera is non-icteric. No conjunctival injection, or exudate noted. Extraocular motion is intact. Visual acuity is intact bilaterally.  Ears:  External acoustic canals are patent and clear.  There is no erythema and bulging( exudate)  of the ( R/L ) tympanic membrane(s ).   Nose:  No Nasal congestion w/o drainage or mucosal ulceration is noted.  Throat:  Oral mucosa is moist.  No oral lesions are noted.  No posterior pharyngeal hyperemia or exudate noted.     Neck Supple.  There is no cervical lymphadenopathy.  No nuchal rigidity noted.  There is no meningismus.     Cardiovascular: Heart is regular to rate and rhythm.  No murmur is noted.     Chest. Chest Symmetrical, no soft tissues, swelling, or tenderness upon palpation   Lungs: Clear in the anterior and posterior pulmonary fields.   Abdomen: Soft and non-tender.    Back No flank tenderness is noted.   Extremeties No edema, no calf tenderness.   Neuro: No focal deficit.   Skin No petechiae or purpura is noted.  There are:     skin lesions without pustules or scales  or exudate involving right upper back  With Vesicular skin lesions but with dermatomal pattern. No cellulitis or lymphangitis is noted. Two areas of skin biopsy of upper back bilaterally are healing well. No drainage or post op complication is noted.     Mood Normal         Assessment & Plan        Herpes zoster without complication    - valACYclovir (VALTREX) 1000 mg tablet; Take 1 tablet (1,000 mg) by mouth 2 times daily for 10 days  Dispense: 20 tablet; Refill: 0     Follow-up with your PCP in 4-5 days, earlier if symptoms worsen.  Advised to avoid hot baths/showers.  Advised to avoid irritating soap/detergents.  Use Benedryl every 8 hours as needed for pruritis ( discussed the sedative nature of benadryl and advised patient to avoid operating equipment/machinery and caution with driving is advised ).  Please avoid direct skin contact with and infants,elderly and immunocompromised individuals.  Please keep the involved skin area covered withj clothing/dressing ( avoid adhesive taping,however).  Acetaminophen 650 mg or Ibuprofen 400 mg by mouth every 6 hours as needed.  Please wash the skin with soap and water daily.

## 2018-12-17 NOTE — TELEPHONE ENCOUNTER
CENTRAL PRIOR AUTHORIZATION  [phone]  Any questions in regards to this request can be answered by contacting the PA team at the above number.    PA Initiation    Medication: t3 needs Prior Authorization - INITAITED 12/17/2018  Insurance Company: [employer] - Phone [phone] Fax [phone]  Pharmacy Filling the Rx: [facility] [address]   Filling Pharmacy Phone: [phone]  Filling Pharmacy Fax:    Start Date: 12/17/2018

## 2018-12-19 ENCOUNTER — THERAPY VISIT (OUTPATIENT)
Dept: CHIROPRACTIC MEDICINE | Facility: CLINIC | Age: 53
End: 2018-12-19
Payer: COMMERCIAL

## 2018-12-19 DIAGNOSIS — M51.369 DDD (DEGENERATIVE DISC DISEASE), LUMBAR: ICD-10-CM

## 2018-12-19 DIAGNOSIS — M99.03 SEGMENTAL DYSFUNCTION OF LUMBAR REGION: ICD-10-CM

## 2018-12-19 DIAGNOSIS — M54.2 CERVICALGIA: ICD-10-CM

## 2018-12-19 DIAGNOSIS — M99.04 SEGMENTAL DYSFUNCTION OF SACRAL REGION: ICD-10-CM

## 2018-12-19 DIAGNOSIS — M99.01 SEGMENTAL DYSFUNCTION OF CERVICAL REGION: Primary | ICD-10-CM

## 2018-12-19 DIAGNOSIS — M50.20 HERNIATION OF INTERVERTEBRAL DISC OF CERVICAL SPINE WITHOUT RADICULOPATHY: ICD-10-CM

## 2018-12-19 DIAGNOSIS — M99.02 SEGMENTAL DYSFUNCTION OF THORACIC REGION: ICD-10-CM

## 2018-12-19 PROCEDURE — 97035 APP MDLTY 1+ULTRASOUND EA 15: CPT | Performed by: CHIROPRACTOR

## 2018-12-19 PROCEDURE — 98941 CHIROPRACT MANJ 3-4 REGIONS: CPT | Mod: AT | Performed by: CHIROPRACTOR

## 2018-12-19 NOTE — PROGRESS NOTES
"Visit #:  7 of 8 based on treatment plan 3/21/2017    Subjective:  Bulmaro Herrera is a 53 year old male who is seen in f/u up for:        Herniation of intervertebral disc of cervical spine without radiculopathy  Cervicalgia  DDD (degenerative disc disease), lumbar.     Since last visit on 12/14/2018,  Bulmaro Herrera reports the following changes: Patient presents and states that he had his MRI on Monday at [facility]. The report was faxed to our office today and there has not been significant change since his last cervical MRI in 8/2017. He was supposed to be seen today for an injection by the radiologist, but he broke out in shingles on Sunday, and started an anti-viral, so they will not do the injections, and it was rescheduled for 12/27/2018. He states that the shingles are just mildly itchy but he thinks he started the anti-viral just in time.    His neck is \"the same today.\" He rates his pain 7/10, but he denies radiation of symptoms into his UE. He states that the throbbing achy feeling, and dizziness, is what is the most concerning for him at this point. He also reiterates that he is very afraid of needles.         Objective:  The following was observed:    P: pain elicited on palpation, bilateral upper traps, CT junction    A: static palpation demonstrates intersegmental asymmetry, as noted    R: motion palpation notes restricted motion    T: localized muscle spasm at: Traps, TL junction paraspinals    Assessment:      Segmental spinal dysfunction/restrictions found at:  C1 RR, LRR mobilization  C2 LR, RRR  T1 RR, LRR  T9 E, FR  L2 RR, LRR  Left SI posterior        Diagnoses:      1. Herniation of intervertebral disc of cervical spine without radiculopathy    2. Cervicalgia    3. DDD (degenerative disc disease), lumbar        Patient's condition:  Patient had restrictions pre-manipulation and Patient had decreased motion prior to manipulation    Treatment effectiveness:  Post manipulation there is better " intersegmental movement and Patient claims to feel looser post manipulation      Procedures:  CMT:  62218 Chiropractic Treatment  Cervical: Diversified, C1, C2, Supine  Thoracic: Diversified, T1, T9,  Prone   Pelvis: Drop assist, L2, Left SI, Prone      Modalities:   2 units - 90129: US:  1.0 Lane/cm squared for 8 minutes at 1.0mhz  continuous pulsed, Location: bilateral upper traps at C7 level ; TL junction paraspinals to lower back    Therapeutic procedures:  Gave patient ice instructions.       Prognosis: Good    Progress towards Goals: Patient is making progress towards the goal of:   Reduce numbness and tingling into bilateral feet.  Decrease pain in neck and shoulders from 6-10/10 to 4/10 in 4 visits.  Decrease Neck Disability from 38% to 20% in 4 visits.    Response to Treatment:   Recent exacerbation of symptoms in cervical spine.       Recommendations:    Instructions:ice 20 minutes every other hour as needed and stretch as instructed at visit    Follow-up:  Return to care as needed.  Patient will see CDI next week 12/27.

## 2018-12-20 NOTE — TELEPHONE ENCOUNTER
Prior Authorization Approval    Authorization Effective Date: 12/19/2018  Authorization Expiration Date: 6/17/2019  Medication: t3 needs Prior Authorization - APPROVED 12/19/201/  Approved Dose/Quantity:   Reference #: [id number]   Insurance Company: [employer] - Phone [phone] Fax [phone]  Expected CoPay:       CoPay Card Available:      Foundation Assistance Needed:    Which Pharmacy is filling the prescription (Not needed for infusion/clinic administered): [facility] [address]   Pharmacy Notified: Yes  Patient Notified: Yes    I received an approval over the phone when I was checking the status on the request.  I have not received an approval letter, but will update if I receive one.

## 2018-12-26 ENCOUNTER — TELEPHONE (OUTPATIENT)
Dept: SLEEP MEDICINE | Facility: CLINIC | Age: 53
End: 2018-12-26

## 2018-12-26 NOTE — TELEPHONE ENCOUNTER
Reason for Call:  Other call back    Detailed comments: Pt has orders placed for sleep study by Dr. Serrano, but sleep [facility] stated he had to have orders placed by Dr Stark or set up a new con. With sleep pt is already established with Dr Stark please place orders for sleep study. Please call Wife Laine at [phone] or work [phone].     Phone Number Patient can be reached at: Home number on file [phone] (home)    Best Time: any    Can we leave a detailed message on this number? YES    Call taken on 12/26/2018 at 2:14 PM by Sarah Pearson

## 2018-12-27 ENCOUNTER — ANESTHESIA EVENT (OUTPATIENT)
Dept: SURGERY | Facility: CLINIC | Age: 53
End: 2018-12-27
Payer: COMMERCIAL

## 2018-12-27 ENCOUNTER — TELEPHONE (OUTPATIENT)
Dept: FAMILY MEDICINE | Facility: CLINIC | Age: 53
End: 2018-12-27

## 2018-12-27 DIAGNOSIS — M54.9 BACK PAIN: Primary | ICD-10-CM

## 2018-12-27 ASSESSMENT — LIFESTYLE VARIABLES: TOBACCO_USE: 1

## 2018-12-27 NOTE — TELEPHONE ENCOUNTER
Patient's wife is calling to report patient needs orders for steroid injection in the cervical spine.  He is getting this completed by Dr. Stewart in Same Day surgery tomorrow, 12/28/18.    If there are any questions, please contact wife at  [phone].  Katelin Velásquez, QUEN, RN

## 2018-12-28 ENCOUNTER — HOSPITAL ENCOUNTER (OUTPATIENT)
Dept: GENERAL RADIOLOGY | Facility: CLINIC | Age: 53
End: 2018-12-28
Attending: ANESTHESIOLOGY | Admitting: ANESTHESIOLOGY
Payer: COMMERCIAL

## 2018-12-28 ENCOUNTER — ANESTHESIA (OUTPATIENT)
Dept: SURGERY | Facility: CLINIC | Age: 53
End: 2018-12-28
Payer: COMMERCIAL

## 2018-12-28 ENCOUNTER — HOSPITAL ENCOUNTER (OUTPATIENT)
Facility: CLINIC | Age: 53
Discharge: HOME | End: 2018-12-28
Attending: ANESTHESIOLOGY | Admitting: ANESTHESIOLOGY
Payer: COMMERCIAL

## 2018-12-28 VITALS
HEART RATE: 84 BPM | SYSTOLIC BLOOD PRESSURE: 145 MMHG | RESPIRATION RATE: 16 BRPM | TEMPERATURE: 98.1 F | DIASTOLIC BLOOD PRESSURE: 101 MMHG | OXYGEN SATURATION: 95 %

## 2018-12-28 DIAGNOSIS — M54.2 NECK PAIN: ICD-10-CM

## 2018-12-28 PROCEDURE — 37000008 ZZH ANESTHESIA TECHNICAL FEE, 1ST 30 MIN: Performed by: ANESTHESIOLOGY

## 2018-12-28 PROCEDURE — 25000128 H RX IP 250 OP 636: Performed by: ANESTHESIOLOGY

## 2018-12-28 PROCEDURE — 40000277 XR SURGERY CARM FLUORO LESS THAN 5 MIN W STILLS: Mod: TC

## 2018-12-28 PROCEDURE — 25000128 H RX IP 250 OP 636: Performed by: NURSE ANESTHETIST, CERTIFIED REGISTERED

## 2018-12-28 PROCEDURE — 25000125 ZZHC RX 250: Performed by: NURSE ANESTHETIST, CERTIFIED REGISTERED

## 2018-12-28 PROCEDURE — 62321 NJX INTERLAMINAR CRV/THRC: CPT | Performed by: ANESTHESIOLOGY

## 2018-12-28 PROCEDURE — 25000125 ZZHC RX 250: Performed by: ANESTHESIOLOGY

## 2018-12-28 RX ORDER — ONDANSETRON 2 MG/ML
4 INJECTION INTRAMUSCULAR; INTRAVENOUS EVERY 30 MIN PRN
Status: DISCONTINUED | OUTPATIENT
Start: 2018-12-28 | End: 2018-12-28 | Stop reason: HOSPADM

## 2018-12-28 RX ORDER — NALOXONE HYDROCHLORIDE 0.4 MG/ML
.1-.4 INJECTION, SOLUTION INTRAMUSCULAR; INTRAVENOUS; SUBCUTANEOUS
Status: DISCONTINUED | OUTPATIENT
Start: 2018-12-28 | End: 2018-12-28 | Stop reason: HOSPADM

## 2018-12-28 RX ORDER — PROPOFOL 10 MG/ML
INJECTION, EMULSION INTRAVENOUS PRN
Status: DISCONTINUED | OUTPATIENT
Start: 2018-12-28 | End: 2018-12-28

## 2018-12-28 RX ORDER — TRIAMCINOLONE ACETONIDE 40 MG/ML
INJECTION, SUSPENSION INTRA-ARTICULAR; INTRAMUSCULAR PRN
Status: DISCONTINUED | OUTPATIENT
Start: 2018-12-28 | End: 2018-12-28 | Stop reason: HOSPADM

## 2018-12-28 RX ORDER — MEPERIDINE HYDROCHLORIDE 25 MG/ML
12.5 INJECTION INTRAMUSCULAR; INTRAVENOUS; SUBCUTANEOUS
Status: DISCONTINUED | OUTPATIENT
Start: 2018-12-28 | End: 2018-12-28 | Stop reason: HOSPADM

## 2018-12-28 RX ORDER — SODIUM CHLORIDE, SODIUM LACTATE, POTASSIUM CHLORIDE, CALCIUM CHLORIDE 600; 310; 30; 20 MG/100ML; MG/100ML; MG/100ML; MG/100ML
INJECTION, SOLUTION INTRAVENOUS CONTINUOUS
Status: DISCONTINUED | OUTPATIENT
Start: 2018-12-28 | End: 2018-12-28 | Stop reason: HOSPADM

## 2018-12-28 RX ORDER — ONDANSETRON 4 MG/1
4 TABLET, ORALLY DISINTEGRATING ORAL EVERY 30 MIN PRN
Status: DISCONTINUED | OUTPATIENT
Start: 2018-12-28 | End: 2018-12-28 | Stop reason: HOSPADM

## 2018-12-28 RX ORDER — LIDOCAINE HYDROCHLORIDE 20 MG/ML
INJECTION, SOLUTION INFILTRATION; PERINEURAL PRN
Status: DISCONTINUED | OUTPATIENT
Start: 2018-12-28 | End: 2018-12-28

## 2018-12-28 RX ORDER — LIDOCAINE 40 MG/G
CREAM TOPICAL
Status: DISCONTINUED | OUTPATIENT
Start: 2018-12-28 | End: 2018-12-28 | Stop reason: HOSPADM

## 2018-12-28 RX ORDER — IOPAMIDOL 612 MG/ML
INJECTION, SOLUTION INTRATHECAL PRN
Status: DISCONTINUED | OUTPATIENT
Start: 2018-12-28 | End: 2018-12-28 | Stop reason: HOSPADM

## 2018-12-28 RX ADMIN — PROPOFOL 50 MG: 10 INJECTION, EMULSION INTRAVENOUS at 13:32

## 2018-12-28 RX ADMIN — PROPOFOL 40 MG: 10 INJECTION, EMULSION INTRAVENOUS at 13:34

## 2018-12-28 RX ADMIN — LIDOCAINE HYDROCHLORIDE 1 ML: 10 INJECTION, SOLUTION EPIDURAL; INFILTRATION; INTRACAUDAL; PERINEURAL at 13:00

## 2018-12-28 RX ADMIN — PROPOFOL 50 MG: 10 INJECTION, EMULSION INTRAVENOUS at 13:31

## 2018-12-28 RX ADMIN — LIDOCAINE HYDROCHLORIDE 40 MG: 20 INJECTION, SOLUTION INFILTRATION; PERINEURAL at 13:30

## 2018-12-28 NOTE — ANESTHESIA CARE TRANSFER NOTE
Patient: Bulmaro Herrera    Procedure(s):  INJECT EPIDURAL CERVICAL 6-7    Diagnosis: neck pain  Diagnosis Additional Information: No value filed.    Anesthesia Type:   MAC     Note:  Airway :Nasal Cannula  Patient transferred to:Phase II  Handoff Report: Identifed the Patient, Identified the Reponsible Provider, Reviewed the pertinent medical history, Discussed the surgical course, Reviewed Intra-OP anesthesia mangement and issues during anesthesia, Set expectations for post-procedure period and Allowed opportunity for questions and acknowledgement of understanding      Vitals: (Last set prior to Anesthesia Care Transfer)    CRNA VITALS  12/28/2018 1308 - 12/28/2018 1350      12/28/2018             Resp Rate (observed):  17                Electronically Signed By: SHIV Laureano CRNA  December 28, 2018  1:50 PM

## 2018-12-28 NOTE — DISCHARGE INSTRUCTIONS
Home Care Instructions                Procedure:  Epidural Steroid Injection or Joint injection    Activity:    Rest today    Do not work today    Resume normal activity tomorrow    Pain:    You may experience soreness at the injection site for one or two days    You may use an ice pack for 20 minutes every 2 hours for the first 24 hours    You may use a heating pad after the first 24 hours    You may use Tylenol  (acetaminophen) every 4 hours or other pain medicines as directed by your physician    Safety  Sedation medicine, if given may remain active for many hours.    It is important for the next 24 hours that you do not:    Drive a car    Operate machines or power tools    Consume alcohol, including beer    Sign any important papers or legal documents    You may experience numbness radiating into your legs or arms, (depending on the procedure location)  This numbness may last several hours.  Until the numb sensation returns to normal please use caution in walking, climbing stairs, stepping out of your vehicle, etc.    Common side effects of steroids:  Not everyone will experience corticosteroid side effects. If side effects are experienced they will gradually subside in the 7-10 day period following an injection.    Most common side effects include:    Flushed face and/or chest    Feeling of warmth, particularly in face but could be overall feeling of warmth    Increased blood sugar in diabetic patients    Menstrual irregularities may occur.  If taking hormone based birth control an alternate method of birth control is recommended    Sleep disturbances and/or mood swings are possible    Leg cramps    Please contact us if you have:  Severe pain   Fever more than 101.5 degrees Fahrenheit  Signs of infection (redness, swelling or drainage)      If you have questions during normal business hours (8am-5pm Monday-Friday) contact the [facility] at [phone]. If you need help after hours, we recommend that  you go to a hospital emergency room or dial 911.

## 2018-12-28 NOTE — ANESTHESIA PREPROCEDURE EVALUATION
Anesthesia Pre-Procedure Evaluation    Patient: Bulmaro Herrera   MRN: [id number] : 1965          Preoperative Diagnosis: neck pain    Procedure(s):  INJECT EPIDURAL CERVICAL    Past Medical History:   Diagnosis Date     Back pains      Depressive disorder, not elsewhere classified 2007    declines Rx     Disc degeneration 2008    see MRI -throaco-lumbar mild discogenic degenerative changes     Elevated LFT's     alt and ast     Other and unspecified hyperlipidemia 2007     Sleep disorder     CPAP     Tobacco use disorder      Past Surgical History:   Procedure Laterality Date     C NONSPECIFIC PROCEDURE      rt hand surgery - laceration/glass/tendons     COLONOSCOPY  08    adenomatous polyp repeat 5 years     COLONOSCOPY N/A 2017    Procedure: COMBINED COLONOSCOPY, SINGLE OR MULTIPLE BIOPSY/POLYPECTOMY BY BIOPSY;  Colonoscopy, Polypectomy by Biopsy;  Surgeon: Matt Reyes MD;  Location:  GI     ENT SURGERY      for deviated septum     EXCISE LESION AXILLA  2018    Procedure: Excision Skin Tags Right Axilla;  Surgeon: Ayaan Chaney MD;  Location: [facility] OR     EXCISE LESION BACK N/A 2018    Procedure: Excision Back Skin Lesion X Two;  Surgeon: Ayaan Chaney MD;  Location: [facility] OR      ECHO HEART XTHORACIC, STRESS/REST  2009    neg     INJECT EPIDURAL CERVICAL N/A 2015    Procedure: INJECT EPIDURAL CERVICAL;  Surgeon: Christian Chaudhry MD;  Location: [facility] OR     ORTHOPEDIC SURGERY      Right hand     TONSILLECTOMY         Anesthesia Evaluation     . Pt has had prior anesthetic. Type: General and MAC    No history of anesthetic complications          ROS/MED HX    ENT/Pulmonary:     (+)sleep apnea, tobacco use, Past use uses CPAP , . .    Neurologic:  - neg neurologic ROS     Cardiovascular:  - neg cardiovascular ROS   (+) ----. : . . . :. . Previous cardiac testing date:results:date: results:ECG reviewed date:9/10/18 results:SR date: results:           METS/Exercise Tolerance:  >4 METS   Hematologic:  - neg hematologic  ROS       Musculoskeletal:   (+) , , other musculoskeletal- DDD cervical spine with numbness down both arms on and off      GI/Hepatic:     (+) GERD Asymptomatic on medication,       Renal/Genitourinary:  - ROS Renal section negative       Endo:  - neg endo ROS       Psychiatric:     (+) psychiatric history depression      Infectious Disease:  - neg infectious disease ROS       Malignancy:      - no malignancy   Other:    (+) No chance of pregnancy C-spine cleared: N/A, H/O Chronic Pain,H/O chronic opiod use , no other significant disability                         Physical Exam  Normal systems: cardiovascular, pulmonary and dental    Airway   Mallampati: II  TM distance: >3 FB  Neck ROM: limited    Dental     Cardiovascular   Rhythm and rate: regular and normal      Pulmonary    breath sounds clear to auscultation            Lab Results   Component Value Date    WBC 6.8 09/10/2018    HGB 16.4 09/10/2018    HCT 47.4 09/10/2018     09/10/2018    CRP <2.9 03/25/2015    SED 7 09/10/2012     09/10/2018    POTASSIUM 4.0 09/10/2018    CHLORIDE 103 09/10/2018    CO2 24 09/10/2018    BUN 19 09/10/2018    CR 1.06 09/10/2018    GLC 91 09/10/2018    WILIAM 9.3 09/10/2018    ALBUMIN 4.5 09/10/2018    PROTTOTAL 7.9 09/10/2018    ALT 64 09/10/2018    AST 32 09/10/2018    ALKPHOS 81 09/10/2018    BILITOTAL 1.2 09/10/2018    LIPASE 95 09/10/2018    AMYLASE 76 03/08/2014    PTT 31 06/06/2008    INR 1.0 02/17/2014    TSH 3.07 09/02/2017    T4 0.89 02/10/2016       Preop Vitals  BP Readings from Last 3 Encounters:   12/16/18 130/85   12/14/18 130/60   12/07/18 118/81    Pulse Readings from Last 3 Encounters:   12/16/18 83   12/14/18 95   12/06/18 80      Resp Readings from Last 3 Encounters:   12/07/18 16   12/06/18 18   11/30/18 18    SpO2 Readings from Last 3 Encounters:   12/16/18 98%   12/14/18 97%   12/07/18 93%      Temp Readings from Last 1  Encounters:   12/16/18 98  F (36.7  C) (Oral)    Ht Readings from Last 1 Encounters:   11/13/18 1.829 m (6')      Wt Readings from Last 1 Encounters:   12/14/18 112.2 kg (247 lb 4.8 oz)    Estimated body mass index is 33.54 kg/m  as calculated from the following:    Height as of 11/13/18: 1.829 m (6').    Weight as of 12/14/18: 112.2 kg (247 lb 4.8 oz).       Anesthesia Plan      History & Physical Review  History and physical reviewed and following examination; no interval change.    ASA Status:  2 .    NPO Status:  > 8 hours    Plan for MAC with Propofol induction. Maintenance will be TIVA.  Reason for MAC:  Deep or markedly invasive procedure (G8)         Postoperative Care      Consents  Anesthetic plan, risks, benefits and alternatives discussed with:  Patient or representative and Patient.  Use of blood products discussed: No .   .                 SHIV Laureano CRNA

## 2018-12-28 NOTE — OP NOTE
CHIEF COMPLAINT: Neck pain secondary to cervical spondylosis and cervical disc degeneration  PROCEDURE: C6-7 Interlaminar epidural steroid injection using fluoroscopic guidance with contrast dye.   PROCEDURE DETAILS: After written informed consent was obtained from the patient, the patient was escorted to the procedure room.  The patient was placed in the prone position.  A  time out  was conducted to verify patient identity, procedure to be performed, side, site, allergies and any special requirements.  The skin over the neck and upper back region were prepped and draped in normal sterile fashion. Fluoroscopy was used to identify the C6-7 interspace in an AP view and the skin was anesthetized with 2 mL of 1% lidocaine with bicarbonate buffer.  A 20-gauge 3-1/2 inch Tuohy needle was advanced using the loss of resistance technique with preservative free normal saline with fluoroscopic guidance. After negative aspiration for CSF and blood, 1.5 cc of Isovue contrast dye was injected revealing the appropriate cervical epidurogram without evidence of intrathecal or intravascular spread. Following this, a 3-mL solution of 40 mg of triamcinolone with 2 cc preservative-free normal saline was slowly injected.  After injection of the medication, as the needle tip was withdrawn, it was flushed with local anesthetic.  The patient was monitored with blood pressure and pulse oximetry machines with the assistance of an RN throughout the procedure.  The patient was alert and responsive to questions throughout the procedure.   The patient tolerated the procedure well and was observed in the post-procedural area.  The patient was dismissed without apparent complications.     DIAGNOSIS:  1. Neck pain secondary to cervical spondylosis and cervical disc degeneration  2.  Low back pain either secondary to pelvic instability causing pseudo-sciatica versus progressive disc degeneration at L5-S1 causing foraminal stenosis versus vascular  claudication which is much less likely.  3.  Severe needle phobia  PLAN:  1. Performed a C6-7 interlaminar epidural steroid injection.  He has had a cervical epidural before and he has had some temporary benefit from this suggesting a discogenic source of neck pain.  However, if today's injection is not helpful he could consider medial branch blocks, though I do not think he would tolerate this given his severe needle phobia and the requirements that this is done under local anesthesia.    2.  For his low back I looked at his MRI.  There is no neural impingement from the L5-S1 level.  The MRI was done in 2017.  He says this is not progressively gotten a lot worse therefore I do not think an updated MRI is the next step.  Instead I would recommend an EMG looking for any objective evidence of a peripheral entrapment neuropathy versus proximal radiculopathy.  One possibility is that his SI joints are unstable.  He does report having manipulation to his low back and that occasionally relieves the sciatica going down his legs which points towards pelvic instability.  Interestingly, he does not have a physical exam consistent with SI joint dysfunction.  He has no tenderness over the SI joints and a Prashanth maneuver is negative.  2.  If today's cervical epidural is not helpful and not sure there is anything else you can offer him.  He has seen a neurosurgeon and a neurosurgical consultation did not recommend compression or effusion surgery.  He also would not likely tolerate the medial branch blocks given his problem with local anesthesia and his severe needle phobia.  He essentially needed a general anesthetic today to have the cervical epidural.  He needed more than the average patient during today's procedure.    Christian Chaudhry MD  Diplomate of the American Board of Anesthesiology, Pain Medicine

## 2019-01-04 ENCOUNTER — THERAPY VISIT (OUTPATIENT)
Dept: CHIROPRACTIC MEDICINE | Facility: CLINIC | Age: 54
End: 2019-01-04
Payer: COMMERCIAL

## 2019-01-04 ENCOUNTER — MYC MEDICAL ADVICE (OUTPATIENT)
Dept: FAMILY MEDICINE | Facility: CLINIC | Age: 54
End: 2019-01-04

## 2019-01-04 DIAGNOSIS — M99.01 SEGMENTAL DYSFUNCTION OF CERVICAL REGION: Primary | ICD-10-CM

## 2019-01-04 DIAGNOSIS — M54.2 CERVICALGIA: ICD-10-CM

## 2019-01-04 DIAGNOSIS — M99.02 SEGMENTAL DYSFUNCTION OF THORACIC REGION: ICD-10-CM

## 2019-01-04 DIAGNOSIS — M99.04 SEGMENTAL DYSFUNCTION OF SACRAL REGION: ICD-10-CM

## 2019-01-04 DIAGNOSIS — M50.20 HERNIATION OF INTERVERTEBRAL DISC OF CERVICAL SPINE WITHOUT RADICULOPATHY: ICD-10-CM

## 2019-01-04 DIAGNOSIS — M51.369 DDD (DEGENERATIVE DISC DISEASE), LUMBAR: ICD-10-CM

## 2019-01-04 PROCEDURE — 97035 APP MDLTY 1+ULTRASOUND EA 15: CPT | Performed by: CHIROPRACTOR

## 2019-01-04 PROCEDURE — 98941 CHIROPRACT MANJ 3-4 REGIONS: CPT | Mod: AT | Performed by: CHIROPRACTOR

## 2019-01-04 NOTE — PROGRESS NOTES
"Visit #:  8 of 8 based on treatment plan 3/21/2017    Subjective:  Bulmaro Herrera is a 53 year old male who is seen in f/u up for:        Herniation of intervertebral disc of cervical spine without radiculopathy  Cervicalgia  DDD (degenerative disc disease), lumbar.     Since last visit on 12/19/2018,  Bulmaro Herrera reports the following changes: Patient presents and states that he had an injection at C6-7 last Friday. He felt good the first night, but then had horrible pain the next few nights. He feels a little better today, but is frustrated that he felt like the pain was higher up. He still doesn't feel like he got relief, and was told the next thing to try was the facets. Patient is also going to have an EMG for his lumbar pain.     He still is getting \"electric shock like\" sensations in his arms occasionally, and is just frustrated with his continued pain of 5+ years.       Objective:  The following was observed:    P: pain elicited on palpation, bilateral upper traps, CT junction    A: static palpation demonstrates intersegmental asymmetry, as noted    R: motion palpation notes restricted motion    T: localized muscle spasm at: Traps, TL junction paraspinals    Assessment:      Segmental spinal dysfunction/restrictions found at:  C1 RR, LRR   C5 LR, RRR  T1 RR, LRR  T5 E, FR  T9 E, FR  Left SI posterior        Diagnoses:      1. Herniation of intervertebral disc of cervical spine without radiculopathy    2. Cervicalgia    3. DDD (degenerative disc disease), lumbar        Patient's condition:  Patient had restrictions pre-manipulation and Patient had decreased motion prior to manipulation    Treatment effectiveness:  Post manipulation there is better intersegmental movement and Patient claims to feel looser post manipulation      Procedures:  CMT:  40850 Chiropractic Treatment  Cervical: Diversified, C1, C5, Supine  Thoracic: Diversified, T1, T5, T9,  Prone   Pelvis: Drop assist,  Left SI, " Prone      Modalities:   1 unit - 69025: US:  1.0 Lane/cm squared for 8 minutes at 1.0mhz  continuous pulsed, Location: bilateral upper traps at C7 level     Therapeutic procedures:  Gave patient ice instructions.       Prognosis: Good    Progress towards Goals: Patient is making progress towards the goal of:   Reduce numbness and tingling into bilateral feet.  Decrease pain in neck and shoulders from 6-10/10 to 4/10 in 4 visits.  Decrease Neck Disability from 38% to 20% in 4 visits.    Response to Treatment:   Recent exacerbation of symptoms in cervical spine. Pain continues but patient states that he does get relief with care.        Recommendations:    Instructions:ice 20 minutes every other hour as needed and stretch as instructed at visit    Follow-up:  Return to care as needed.  Patient is awaiting orders for facet injections and EMG.

## 2019-01-07 NOTE — TELEPHONE ENCOUNTER
I have informed pt of message from provider via [employer]. Shakila Lopez CMA ([facility])

## 2019-01-11 ENCOUNTER — TELEPHONE (OUTPATIENT)
Dept: PALLIATIVE MEDICINE | Facility: CLINIC | Age: 54
End: 2019-01-11

## 2019-01-11 ENCOUNTER — OFFICE VISIT (OUTPATIENT)
Dept: FAMILY MEDICINE | Facility: CLINIC | Age: 54
End: 2019-01-11
Payer: COMMERCIAL

## 2019-01-11 VITALS
HEIGHT: 72 IN | WEIGHT: 249.2 LBS | OXYGEN SATURATION: 97 % | SYSTOLIC BLOOD PRESSURE: 124 MMHG | HEART RATE: 104 BPM | TEMPERATURE: 96.8 F | DIASTOLIC BLOOD PRESSURE: 86 MMHG | RESPIRATION RATE: 16 BRPM | BODY MASS INDEX: 33.75 KG/M2

## 2019-01-11 DIAGNOSIS — M54.2 CERVICALGIA: ICD-10-CM

## 2019-01-11 DIAGNOSIS — R05.3 CHRONIC COUGHING: ICD-10-CM

## 2019-01-11 DIAGNOSIS — M51.369 DDD (DEGENERATIVE DISC DISEASE), LUMBAR: ICD-10-CM

## 2019-01-11 DIAGNOSIS — M50.30 DDD (DEGENERATIVE DISC DISEASE), CERVICAL: ICD-10-CM

## 2019-01-11 DIAGNOSIS — R06.09 DOE (DYSPNEA ON EXERTION): Primary | ICD-10-CM

## 2019-01-11 DIAGNOSIS — G89.4 CHRONIC PAIN SYNDROME: ICD-10-CM

## 2019-01-11 PROCEDURE — 99215 OFFICE O/P EST HI 40 MIN: CPT | Performed by: FAMILY MEDICINE

## 2019-01-11 RX ORDER — ALBUTEROL SULFATE 90 UG/1
1-2 AEROSOL, METERED RESPIRATORY (INHALATION) EVERY 6 HOURS PRN
Qty: 1 INHALER | Refills: 1 | Status: SHIPPED | OUTPATIENT
Start: 2019-01-11 | End: 2019-01-21

## 2019-01-11 SDOH — HEALTH STABILITY: MENTAL HEALTH: HOW OFTEN DO YOU HAVE A DRINK CONTAINING ALCOHOL?: NEVER

## 2019-01-11 ASSESSMENT — PAIN SCALES - GENERAL: PAINLEVEL: NO PAIN (0)

## 2019-01-11 ASSESSMENT — MIFFLIN-ST. JEOR: SCORE: 2023.36

## 2019-01-11 NOTE — PROGRESS NOTES
SUBJECTIVE:   Bulmaro Herrera is a 53 year old male who presents to clinic today for the following health issues:    Chief Complaint   Patient presents with     Cough     ongoing x 5-6 months; would like a CT Scan     Referral     would like a referral for neck injections           Problem list and histories reviewed & adjusted, as indicated.  Additional history: as documented      Reviewed and updated as needed this visit by clinical staff  Tobacco  Allergies  Meds  Problems  Med Hx  Surg Hx  Fam Hx  Soc Hx        Reviewed and updated as needed this visit by Provider  Tobacco  Allergies  Meds  Problems  Med Hx  Surg Hx  Fam Hx         Patient is here today for discussion on ongoing dyspnea on exertion and chronic coughing.  He states that his cough is been going on for the better part of 6 months and seems to be a dry cough without any triggers or known cause.  He is a former smoker and quit more than 10 years ago.  He states that his cough is never productive.  Patient was seen by another doctor here about a year ago and another doctor before that a year prior for this very issue and had to normal chest x-rays done.  He had pulmonary function testing that was done twice and overall were normal but the one done 2 years ago did show a mild restrictive lung pattern.    Patient had a stress echocardiogram done 16 months ago and was normal.  Patient does not report any chest pain or chest pressure with his shortness of breath and dyspnea on exertion.    Patient has tried albuterol inhaler in the past without improvement in his symptoms.  However, he does not know if the albuterol was relatively new and thinks that the inhaler may have been .    Patient also here today to discuss his ongoing cervical spine pain.  He was seen by our chiropractor in our rehab department who ordered an MRI at an outside facility and it was suggested that he get injections of his cervical spine.  He was seen by   Please call [facility] [phone] now for constipation    Take Fluconazole 150 mg 1 tablet once a day 3/16/18 and 3/17/18.  Then take 1 tablet once no 3/21/18.  Then start taking twice weekly, once on Wednesdays and Saturdays from then on.   Isidoro 3 weeks ago who performed those injections and they apparently were not effective.  He was offered to have a different kind of injection done by him which the patient thinks was for his facet joints, but he is not entirely sure about that.  He was also told by Dr. Chaudhry that he should consider having an EMG done because of his neurological symptoms in his bilateral lower extremities.  Patient stated that this testing was not ordered by this doctor and is wondering how to further pursue this.    Patient last saw me for management of his cervical degenerative disc disease over a month ago and at that time I had suggested a ramping dose of amitriptyline as a trial for medication management.  Patient started on the 10 mg nightly dose and took it for a few days but then he stated that lots of different things were happening with regards to his neck pain and he got distracted and stopped taking the medication.  He has not been on it since then.    Patient tells me again today that he is growing more more frustrated about the issues with regards to his ongoing pain management with regards to his most neck and his low back.  He states that when he went to [facility] number of years ago they had offered him back surgery as a solution to his symptoms, but he backed out of it because he was scared about possible complications.  Then, when he finally decided that he was okay having surgery, it was spine was no longer at this facility and he instead went to our current neurosurgeon here for consultation and was told that surgery was not an option for him as it would not help his pain.  He went for a second opinion at a different neurosurgeon and [facility] who agreed with this opinion.  He states that they both told him that he needed to try physical therapy, but he states that he has done physical therapy in the past and it did not continue to help his symptoms.    Patient states that his goal for pain management is to  decrease his pain to a level that he can be more comfortable, be able to do things that he can enjoy doing such as family vacations, and be able to stop having to use Tylenol #3 or other narcotic-based medications.    ROS:  10 point ROS of systems including Constitutional, Eyes, HENT, Respiratory, Cardiovascular, Gastroenterology, Genitourinary, Integumentary, Muscularskeletal, Psychiatric were all negative except for pertinent positives noted in my HPI.     OBJECTIVE:   /86   Pulse 104   Temp 96.8  F (36  C) (Temporal)   Resp 16   Ht 1.829 m (6')   Wt 113 kg (249 lb 3.2 oz)   SpO2 97%   BMI 33.80 kg/m    Body mass index is 33.8 kg/m .  Physical Exam   Constitutional: He appears well-developed and well-nourished.   Cardiovascular: Normal rate, regular rhythm, S1 normal, S2 normal and normal heart sounds.   No murmur heard.  Pulmonary/Chest: Effort normal and breath sounds normal. No respiratory distress. He has no wheezes. He has no rhonchi. He has no rales.   Neurological: He is alert.       ASSESSMENT/PLAN:       ICD-10-CM    1. YODER (dyspnea on exertion) R06.09 albuterol (PROAIR HFA/PROVENTIL HFA/VENTOLIN HFA) 108 (90 Base) MCG/ACT inhaler     OTOLARYNGOLOGY REFERRAL   2. Chronic coughing R05 albuterol (PROAIR HFA/PROVENTIL HFA/VENTOLIN HFA) 108 (90 Base) MCG/ACT inhaler     OTOLARYNGOLOGY REFERRAL   3. DDD (degenerative disc disease), lumbar M51.36 PAIN MANAGEMENT REFERRAL   4. DDD (degenerative disc disease), cervical M50.30 PAIN MANAGEMENT REFERRAL   5. Chronic pain syndrome G89.4 PAIN MANAGEMENT REFERRAL   6. Cervicalgia M54.2 PAIN MANAGEMENT REFERRAL     PLAN:  1.  For patient's chronic cough and dyspnea on exertion, I first recommended that he see Dr. Stark, ENT, for evaluation of possible upper airway issue as a cause of his chronic cough.  Patient also has a history of GERD which may also be a contributor to his cough.  Patient was requesting CT scan be done, but I would like to defer  this until after he has been seen by ENT.  2.  Regards to his pain management, I feel the most appropriate option at this point will be to have him see our pain management clinic for further discussion with regards to the origins of his pain and to look into a multidisciplinary approach with respect to his pain management.  I informed him that this may involve multiple different specialists and modalities and that he should be open to all options that are presented to him.  I discussed with him that this will hopefully help achieve the pain management goals that he expressed above.  For now, I did recommend that he trial the amitriptyline again so that he can report to the pain management clinic its effectiveness or any side effects that he is experiencing.    Follow up with Provider -pending evaluation with Dr. Stark and pain management clinic.    Sandor Serrano MD   [facility]     I spent >50 minutes of face to face time with the patient, >50% of which was spent counseling and coordination of care regarding management of chronic cervical and lumbar spine pain.

## 2019-01-11 NOTE — TELEPHONE ENCOUNTER
PATIENT STATES THAT HE ALREADY HAS A PACKET, WILL BRING IT WITH TO [facility]                    Pain Management Center Referral      1. Confirmed address with patient? Yes  2. Confirmed phone number with patient? Yes  3. Confirmed referring provider? Yes  4. Is the PCP the same as the referring provider? Yes  5. Has the patient been to any previous pain clinics? No  (If yes, send JAVY with welcome letter)  6. Which insurance are we to bill for this appointment?  po    7. Informed pt of cancellation (48 hour) policy? Yes    REGARDING OPIOID MEDICATIONS: We will always address appropriateness of opioid pain medications, but we generally will not automatically take on a prescribing role. When we do take on prescribing of opioids for chronic pain, it is in collaboration with the referring physician for an intermediate period of time (months), with an expectation that the primary physician or provider will assume the prescribing role if medications are effective at stable doses with demonstrated compliance. Therefore, please do not assume that your prescribing responsibilities end on the day of pain clinic consultation.  8. Informed pt of prescribing policy? Yes    9.Please be aware that once you are established with a pain provider and location, you will need to continue have all future visits with that provider and location. It is best to determine what location is the most convenient for you and schedule with that one.    ** PATIENT INFORMED OF THIS POLICY Yes      9. Referring Provider: Sandor Serrano     10. Criteria for Triage Eval:   -Missed/Failed 1st DUAL appointment? N/A   -Medication Focused? N/A   -Mental Health Concerns? (e.g. Recent psych hospitalization/snap shot)? N/A   -Active substance abuse? N/A   -Patient behaviors (e.g. Offensive language/raised voice)? N/A

## 2019-01-14 ENCOUNTER — OFFICE VISIT (OUTPATIENT)
Dept: PODIATRY | Facility: CLINIC | Age: 54
End: 2019-01-14
Payer: COMMERCIAL

## 2019-01-14 VITALS
BODY MASS INDEX: 34.13 KG/M2 | WEIGHT: 252 LBS | DIASTOLIC BLOOD PRESSURE: 86 MMHG | SYSTOLIC BLOOD PRESSURE: 118 MMHG | HEIGHT: 72 IN | TEMPERATURE: 97.6 F

## 2019-01-14 DIAGNOSIS — M79.672 PAIN IN BOTH FEET: ICD-10-CM

## 2019-01-14 DIAGNOSIS — G89.29 CHRONIC MIDLINE LOW BACK PAIN WITHOUT SCIATICA: ICD-10-CM

## 2019-01-14 DIAGNOSIS — M79.18 MYOFASCIAL PAIN: ICD-10-CM

## 2019-01-14 DIAGNOSIS — M54.50 CHRONIC MIDLINE LOW BACK PAIN WITHOUT SCIATICA: ICD-10-CM

## 2019-01-14 DIAGNOSIS — M79.671 PAIN IN BOTH FEET: ICD-10-CM

## 2019-01-14 DIAGNOSIS — M54.2 PAIN OF CERVICAL FACET JOINT: Primary | ICD-10-CM

## 2019-01-14 DIAGNOSIS — M54.2 CERVICALGIA: ICD-10-CM

## 2019-01-14 PROCEDURE — 99205 OFFICE O/P NEW HI 60 MIN: CPT | Performed by: PHYSICIAN ASSISTANT

## 2019-01-14 RX ORDER — METHOCARBAMOL 500 MG/1
500 TABLET, FILM COATED ORAL 3 TIMES DAILY
Qty: 60 TABLET | Refills: 0 | Status: SHIPPED | OUTPATIENT
Start: 2019-01-14 | End: 2019-03-05

## 2019-01-14 ASSESSMENT — MIFFLIN-ST. JEOR: SCORE: 2036.06

## 2019-01-14 NOTE — PROGRESS NOTES
"/86   Temp 97.6  F (36.4  C) (Temporal)   Ht 1.829 m (6')   Wt 114.3 kg (252 lb)   BMI 34.18 kg/m    Body mass index is 34.18 kg/m .  6' 0\"  252 lbs 0 oz        Lashonda Puga MA on 1/14/2019 at 8:33 AM    "

## 2019-01-14 NOTE — LETTER
"    1/14/2019         RE: Blumaro Herrera  [address]  [facility] [address]        Dear Colleague,    Thank you for referring your patient, Bulmaro Herrera, to the [facility]. Please see a copy of my visit note below.    [facility] Consultation      This patient is being seen in consultation at the request of his provider Dr. Sandor Serrano.    Primary Care Provider is Sandor Serrano.    The current opiate pain medications are being prescribed by: Dr. Sandor Serrano    Please see the [facility] health questionnaire which the patient completed and reviewed with me in detail    CHIEF COMPLAINT:  Neck and low back pain    HISTORY OF PRESENT ILLNESS:  Bulmaro Herrera is a 53 year old male with history of neck pain and low back pain     Neck pain last 5-6 years where he can't go to bed without discomfort. Tylenol with codeine, within 30 minutes it becomes tolerable. Wakes up at 3-4am and is uncomfortable again. Feeling very frustrated. Many years ago, fell off a semi trailer and alexys shoulder and felt neck \"crack\". Since then has had aching. Worse in winter vs summer. Aching in neck is worse if goes to lay down. Wife will grab neck and squeeze, then all symptoms will clear up. Feels like dizziness resolves and feels more clear.     Low back is better with getting shoe insoles. Pain is down to a 1-2. Can't  one spot still though. Needs to keep moving. Pain is better if distracted by other activities.     Feet started feeling numb on the bottom. Still having numbness but now also hurt. Can't be on feet more than 20-30 minutes. Toes feel like they're cramping up. Last 3-4 months, legs feel like the \"bone itches\". Feels a \"sensation\" from hips to feet. Was previously told to get an EMG. Has not done, does not do well with needles. If lifts up body, feels like blood is rushing back into feet. Hurts if someone rubs feet.    Has tried injections for neck " "and back. Tried physical therapy. Was told at one point, that the next step would be surgery. He backed off from surgery, but then were considering. Has had 2 consults and surgery was not recommended.     Quality of life is poor, due to pain in feet when trying to walk and at night time its the neck to try to sleep. Knows he needs to lose weight and drink more water.    Main concern: neck to go to sleep. Feet \"just hurt\".     Pain Information:   Onset/Progression:  Pain started 6-7 years ago for neck and back hurting as long as he can remember.  His feet about 6-7 years ago.  When he got insoles for his shoes his back did not hurt as much, it went from a 10 to a 3.   Pain quality: Neck-major ache, especially when lying down, cannot get comfortable  Back-hurts when standing still in one place  Feet-hurts all the time, especially when on feet   Pain timing: Constant     Pain rating: intensity ranges from 2 to a 10 and averages at a 3.  The neck ranges in intensity from a 6 to a 10 and averages a 10 when lying down.  The feet are always at a 10 out of 10.   Aggravating factors include:   Neck-lying down  Lower back-standing on one spot, standing at [employer]  Feet-standing on feet   Relieving factors include:   Neck-sitting up  Lower back-sitting  Feet-being off of them      Past Pain Treatments:    Pain Clinic:   No    PT: Yes for neck. Helped for about 1 day. Was doing exercises at home.    Psychologist: No   Relaxation techniques/biofeedback: No   Chiropractor: Yes, helps for 1/2 to 1 day   Acupuncture: Yes, NH one needle caused SE of significantly increased pain   Pharmacotherapy:     Opioids: Yes      Non-opioids:  Yes    TENs Unit: Yes, tried at chiropractor, makes more tense   Injections: Yes, C6-7 interlaminar epidural steroid injection 12/28/2018, had a pain flare for 1 week, then maybe it's been slightly helpful. 2-3 years ago, low back injection. Neither were very helpful.    Self-care:   Yes, tried [place] " Gel   Surgeries related to pain: No     Current Pain Relevant Medications:    Amitriptyline-took for about 1 week, didn't seem to help, have not started again  Tylenol #3-takes 1 at bedtime, occasionally takes 2, but 2 causes a hangover effect  Ibuprofen-200mg, 1 at night    Previous Pain Relevant Medications: (H--helped; HI--Helped initially; [facility]--Somewhat helpful; NH--No help; W--worse; SE--side effects; ?--Unsure if helpful)   NOTE: This medication information taken from patient's intake form, not medical records.    Opiates: Tylenol #3 H SE wake up like with a hangover, dizzy   NSAIDS: Ibuprofen H    Muscle Relaxants: Flexeril NH SE sedation   Anti-migraine mediations: none   Anti-depressants: SE after coming off of them, feels like his personality was altered from them. While on them, felt like head was in a cloud. Amitriptyline ?    Sleep aids:None   Anxiolytics: None   Neuropathics: Gabapentin SE restless legs    Topicals: Lidocaine Patches [facility]    Other medications not covered above: Prednisone H          THE 4 As OF OPIOID MAINTENANCE ANALGESIA    Analgesia: Is pain relief clinically significant? YES   Activity: Is patient functional and able to perform Activities of Daily Living? YES   Adverse effects: Is patient free from adverse side effects from opiates? NO   Adherence to Rx protocol: Is patient adhering to Controlled Substance Agreement and taking medications ONLY as ordered? N/A    Is Narcan prescribed for opiate use >50 MME daily? N/A    Total Daily MME: 4.5    PAST MEDICAL HISTORY:   Past Medical History:   Diagnosis Date     Back pains      Depressive disorder, not elsewhere classified 1/28/2007    declines Rx     Disc degeneration 12/2008    see MRI -throaco-lumbar mild discogenic degenerative changes     Elevated LFT's 11/09    alt and ast     Other and unspecified hyperlipidemia 1/2007     Sleep disorder 9/09    CPAP     Tobacco use disorder          CURRENT FAMILY/SOCIAL SITUATION:  Living  situation: Patient is . He lives with his wife, 3 kids, and in-laws   Support system: Feels like he gets good support.   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 No  Have you ever fel you should cut down your use of alcohol/drugs? Yes, the tylenol #3  Have people every annoyed you by criticizing your alcohol/drug use? no  Have you ever felt bad or guilty about your alcohol/drug use? Yes, Don't feel like I want to keep taking Tylenol #3  Have you ever had a drink or taken a drug first thing in the morning for an eye-opener/hangover? no    SLEEP:  Do you snore heavily? yes  Do you wake up feeling rested? no  How many hours of sleep do you average per day? 4-5  What keep you from sleep? Neck pain, feet and legs  Have you been diagnosed with sleep apnea? yes  Do you wear a CPAP? No, mouth guard for sleeping      ALLERGIES:  Allergies   Allergen Reactions     Percocet [Oxycodone-Acetaminophen] Itching     Wasps [Hornets] Swelling       MEDICATIONS:  Current Outpatient Medications   Medication Sig Dispense Refill     acetaminophen-codeine (TYLENOL #3) 300-30 MG tablet Take 1 tablet by mouth nightly as needed for moderate pain 60 tablet 0     albuterol (PROAIR HFA/PROVENTIL HFA/VENTOLIN HFA) 108 (90 Base) MCG/ACT inhaler Inhale 1-2 puffs into the lungs every 6 hours as needed for shortness of breath / dyspnea or wheezing 1 Inhaler 1     amitriptyline (ELAVIL) 10 MG tablet Start at 1 tab at bedtime for 3 days, then 2 tabs at bedtime for 3 days, then 3 tabs at bedtime. (Patient not taking: Reported on 1/11/2019) 90 tablet 1     atorvastatin (LIPITOR) 20 MG tablet  Take 1 tablet (20 mg) by mouth daily 90 tablet 0     omeprazole (PRILOSEC) 40 MG DR capsule Take 1 capsule (40 mg) by mouth daily 90 capsule 3         REVIEW OF SYSTEMS:   Constitutional:  Fatigue and Malaise  Eyes/Head: Dizziness  Ears/Nose/Throat: Negative  Allergy/Immune: Negative  Skin:Rash-shingles  Hematologic/Lymphatic/Immunologic:Negative  Respiratory: Cough and Shortness of Breath  Cardiovascular: Negative  Gastrointestinal: Negative  Endocrine: Negative  Musculoskeletal: Arthritis, Back pain, Joint pain and Neck pain  Urinary:  Negative   Any bowel or bladder incontinence: Denies   Neurologic: Negative  Psychiatric: Negative      Any illicit drug use: none  EtOH use: 1-2 times/year  Caffeine use: coffee daily, usually about 1-2  Nicotine use: former smoker  Any use of prescriptions other than how they were prescribed:no    PHYSICAL EXAM    /86   Temp 97.6  F (36.4  C) (Temporal)   Ht 1.829 m (6')   Wt 114.3 kg (252 lb)   BMI 34.18 kg/m         Appearance:     A&O. Patient is appropriate.   Patient is in NAD.   Patient is well groomed and appears stated age.     HEENT:   Normocephalic, atraumatic, sclera, conjunctiva and pharynx normal. Pupils are equal, round and reactive to light. Hearing is adequate for exam. Uvula rises with phonation.   Neck: Supple. No deformities or adenopathy  Cardiovascular:  Heart has a regular rate and rhythm. Audible S1 and S2. No murmurs auscultated. No edema on bilateral lower extremities.   Respiratory: Lungs are clear to auscultation bilaterally. No wheezes or crackles.  Abdominal/Pelvic:   Soft, non-tender with good bowel sounds, no palpable organomegaly.  Skin:  No rashes, erythema, breakdowns, lesions to exposed skin.   Hematologic:  No bruises, petechiae or ecchymosis to exposed areas.  Psychiatric:  mentation appears normal., affect and mood normal  Musculoskeletal:  Posture upright, shoulders and pelvis are leveled.   Deltoid: R: 5/5 L: 5/5  Biceps: R: 5/5 L:  5/5  Triceps: R: 5/5 L: 5/5  Intrinsic hand: R: 5/5   L: 5/5  Hip flexion: R: 5/5 L: 5/5  Knee ext: R: 5/5 L: 5/5  Knee flex: R: 5/5 L: 5/5  Dorsiflexion: R: 5/5 L: 5/5  Plantarflexion: R: 5/5 L: 5/5    Cervical spine:   Flex:  35 degrees, pain free   Ext: 25 degrees, painful    Rotation to right: Unable, makes him feel dizzy   Rotation to left: Unable, makes him feel dizzy   Tenderness in the cervical spine at midline. Yes   Tenderness in the cervical paraspinal muscles. Yes, very tight muscles  Thoracic spine:    Tenderness in the thoracic spine at midline. No   Tenderness in the thoracic paraspinal muscles. No  Lumbar/Sacral spine:   Flexion:  75 degrees, pain free   Ext: 25 degrees, painful    Rotation/ext to right: very mildly painful    Rotation/ext to left: pain free   Tenderness in the lumbar spine at midline. Yes   Tenderness in the lumbar paraspinal muscles.Yes   Straight leg exam:    Right: negative     Left:  negative   Prashanth/Jacobo's test:      Right: negative     Left:  negative   Tenderness over SI joint:      Right: negative     Left:  negative   Tenderness over piriformis:     Right: negative     Left:  negative   Tenderness over Trochanteric Bursa:     Right: negative     Left: negative     Gait pattern:  Able to walk on the heels and toes. Patient has normal gait.     Neurological:   Deep Tendon Reflex exam:   Biceps:     R:  2/4   L: 2/4   Brachioradialis   R:  2/4   L: 2/4:   Patella:  R:  2/4   L: 2/4   Achilles:  R:  2/4   L: 2/4    Sensory exam:   Light touch: normal bilateral upper and lower extremities    Vibration: normal in bilateral upper extremities and decreased bilateral lower extremities, unable to feel in bilateral feet   Sharp: normal in bilateral upper extremities and bilateral lower extremities   No allodynia, dysesthesia, or hyperalgesia.      Previous Diagnostic Tests:   Imaging Studies:   MRI Cervical Spine 12/17/2018  Impression:  1.  Mild to moderate multilevel cervical and  upper thoracic disc degeneration.  2.  1-2 mm left paracentral disc protrusion at C5-6 with left ventral cord flattening.  3.  Facet arthropathy bilaterally at C7-T1, on the left at C2-3 and within the upper thoracic spine.  4.  Foraminal stenosis at multiple levels, moderate bilaterally at C6-7 and C5-6, and moderate on the right at C4-5.  5.  Comparison with 8/14/2017 shows no significant change.    MRI Lumbar Spine 1/10/2017  Impression:  1.  Severe L5 -S1 disc degeneration with 2 mm left central protrusion/annular fissure.  2.  No stenosis or neural compression at level.  3.  No fractures or destructive lesions.  Benign L3 intraosseous hemangioma.  Note: Mild interval enlargement of L5-S1 2 mm disc protrusion since 10/11/2012; otherwise, no significant interval changes.      [employer] [facility] Data Base Reviewed:    YES; No concern for abuse or misuse of controlled medications based on this report.     Outside records reviewed: MRI reports of cervical and lumbar spine from [facility].    ASSESSMENT:   Bulmaro Herrera is a 53 year old male who presents today with the complaints of:    1.  Cervical facet joint pain  2.  cervicalgia  3.  Bilateral feet pain  4.  Lumbago  5. Myofascial pain    PLAN:    Diagnosis reviewed, treatment option addressed, and risk/benefits discussed.  Self-care instructions given.  I am recommending a multidisciplinary treatment plan to help this patient better manage his pain.       1.  Physical Therapy:  We will revisit this in the future  2.  Clinical Health Psychologist to address issues of relaxation, behavorial change, coping style, and other factors important to improvement: we will revisit this in the future  3.  Diagnostic Studies:  Bilateral lower extremity EMG, ? peripheral neuropathy vs proximal radiculopathy  4.  Medication Management:    1. Start Amitriptyline as previously recommended by Dr. Serrano   2. Robaxin 500mg TID as needed for muscle pain/spasms   3. Patient is  "interested in coming off of the Tylenol #3, however when he has tried in the past he has become very ill. Will have him try 1/2 tab at bedtime for 1 week, then 1/2 tab every other day for 1 week, then stop.    5.  Potential procedures: Discussed cervical facet joint injections vs medial branch blocks. Patient has a strong needle phobia. He is aware that both take multiple needles. He is currently uncomfortable at the thought of the medial branch blocks, but would like to try the facet steroid injections. Order placed.    6. Recommendations to PCP: see above    Follow up: 4 Weeks, sooner if needed     TIME SPENT:   A total of 70  minutes was spent on the patient today, greater than 50% of that time was spent on face to face counseling and care coordination regarding diagnoses and treatment options as mentioned above.    I would like to thank Dr. Sandor Serrano for allowing me to participate in the management of this patient.     Jacque Kay PA-C  [facility]        /86   Temp 97.6  F (36.4  C) (Temporal)   Ht 1.829 m (6')   Wt 114.3 kg (252 lb)   BMI 34.18 kg/m     Body mass index is 34.18 kg/m .  6' 0\"  252 lbs 0 oz        Lashonda Puga MA on 1/14/2019 at 8:33 AM      Again, thank you for allowing me to participate in the care of your patient.        Sincerely,        Jacque Kay PA-C    "

## 2019-01-14 NOTE — PROGRESS NOTES
"[facility] [place] Consultation      This patient is being seen in consultation at the request of his provider Dr. Sandor Serrano.    Primary Care Provider is Sandor Serrano.    The current opiate pain medications are being prescribed by: Dr. Sandor Serrano    Please see the [facility] health questionnaire which the patient completed and reviewed with me in detail    CHIEF COMPLAINT:  Neck and low back pain    HISTORY OF PRESENT ILLNESS:  Bulmaro Herrera is a 53 year old male with history of neck pain and low back pain     Neck pain last 5-6 years where he can't go to bed without discomfort. Tylenol with codeine, within 30 minutes it becomes tolerable. Wakes up at 3-4am and is uncomfortable again. Feeling very frustrated. Many years ago, fell off a semi trailer and alexys shoulder and felt neck \"crack\". Since then has had aching. Worse in winter vs summer. Aching in neck is worse if goes to lay down. Wife will grab neck and squeeze, then all symptoms will clear up. Feels like dizziness resolves and feels more clear.     Low back is better with getting shoe insoles. Pain is down to a 1-2. Can't  one spot still though. Needs to keep moving. Pain is better if distracted by other activities.     Feet started feeling numb on the bottom. Still having numbness but now also hurt. Can't be on feet more than 20-30 minutes. Toes feel like they're cramping up. Last 3-4 months, legs feel like the \"bone itches\". Feels a \"sensation\" from hips to feet. Was previously told to get an EMG. Has not done, does not do well with needles. If lifts up body, feels like blood is rushing back into feet. Hurts if someone rubs feet.    Has tried injections for neck and back. Tried physical therapy. Was told at one point, that the next step would be surgery. He backed off from surgery, but then were considering. Has had 2 consults and surgery was not recommended.     Quality of life is poor, due to " "pain in feet when trying to walk and at night time its the neck to try to sleep. Knows he needs to lose weight and drink more water.    Main concern: neck to go to sleep. Feet \"just hurt\".     Pain Information:   Onset/Progression:  Pain started 6-7 years ago for neck and back hurting as long as he can remember.  His feet about 6-7 years ago.  When he got insoles for his shoes his back did not hurt as much, it went from a 10 to a 3.   Pain quality: Neck-major ache, especially when lying down, cannot get comfortable  Back-hurts when standing still in one place  Feet-hurts all the time, especially when on feet   Pain timing: Constant     Pain rating: intensity ranges from 2 to a 10 and averages at a 3.  The neck ranges in intensity from a 6 to a 10 and averages a 10 when lying down.  The feet are always at a 10 out of 10.   Aggravating factors include:   Neck-lying down  Lower back-standing on one spot, standing at [facility]  Feet-standing on feet   Relieving factors include:   Neck-sitting up  Lower back-sitting  Feet-being off of them      Past Pain Treatments:    Pain Clinic:   No    PT: Yes for neck. Helped for about 1 day. Was doing exercises at home.    Psychologist: No   Relaxation techniques/biofeedback: No   Chiropractor: Yes, helps for 1/2 to 1 day   Acupuncture: Yes, NH one needle caused SE of significantly increased pain   Pharmacotherapy:     Opioids: Yes      Non-opioids:  Yes    TENs Unit: Yes, tried at chiropractor, makes more tense   Injections: Yes, C6-7 interlaminar epidural steroid injection 12/28/2018, had a pain flare for 1 week, then maybe it's been slightly helpful. 2-3 years ago, low back injection. Neither were very helpful.    Self-care:   Yes, tried [place] Gel   Surgeries related to pain: No     Current Pain Relevant Medications:    Amitriptyline-took for about 1 week, didn't seem to help, have not started again  Tylenol #3-takes 1 at bedtime, occasionally takes 2, but 2 causes a hangover " effect  Ibuprofen-200mg, 1 at night    Previous Pain Relevant Medications: (H--helped; HI--Helped initially; SWH--Somewhat helpful; NH--No help; W--worse; SE--side effects; ?--Unsure if helpful)   NOTE: This medication information taken from patient's intake form, not medical records.    Opiates: Tylenol #3 H SE wake up like with a hangover, dizzy   NSAIDS: Ibuprofen H    Muscle Relaxants: Flexeril NH SE sedation   Anti-migraine mediations: none   Anti-depressants: SE after coming off of them, feels like his personality was altered from them. While on them, felt like head was in a cloud. Amitriptyline ?    Sleep aids:None   Anxiolytics: None   Neuropathics: Gabapentin SE restless legs    Topicals: Lidocaine Patches SWH    Other medications not covered above: Prednisone H          THE 4 As OF OPIOID MAINTENANCE ANALGESIA    Analgesia: Is pain relief clinically significant? YES   Activity: Is patient functional and able to perform Activities of Daily Living? YES   Adverse effects: Is patient free from adverse side effects from opiates? NO   Adherence to Rx protocol: Is patient adhering to Controlled Substance Agreement and taking medications ONLY as ordered? N/A    Is Narcan prescribed for opiate use >50 MME daily? N/A    Total Daily MME: 4.5    PAST MEDICAL HISTORY:   Past Medical History:   Diagnosis Date     Back pains      Depressive disorder, not elsewhere classified 1/28/2007    declines Rx     Disc degeneration 12/2008    see MRI -throaco-lumbar mild discogenic degenerative changes     Elevated LFT's 11/09    alt and ast     Other and unspecified hyperlipidemia 1/2007     Sleep disorder 9/09    CPAP     Tobacco use disorder          CURRENT FAMILY/SOCIAL SITUATION:  Living situation: Patient is . He lives with his wife, 3 kids, and in-laws   Support system: Feels like he gets good support.   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 No  Have you ever fel you should cut down your use of alcohol/drugs? Yes, the tylenol #3  Have people every annoyed you by criticizing your alcohol/drug use? no  Have you ever felt bad or guilty about your alcohol/drug use? Yes, Don't feel like I want to keep taking Tylenol #3  Have you ever had a drink or taken a drug first thing in the morning for an eye-opener/hangover? no    SLEEP:  Do you snore heavily? yes  Do you wake up feeling rested? no  How many hours of sleep do you average per day? 4-5  What keep you from sleep? Neck pain, feet and legs  Have you been diagnosed with sleep apnea? yes  Do you wear a CPAP? No, mouth guard for sleeping      ALLERGIES:  Allergies   Allergen Reactions     Percocet [Oxycodone-Acetaminophen] Itching     Wasps [Hornets] Swelling       MEDICATIONS:  Current Outpatient Medications   Medication Sig Dispense Refill     acetaminophen-codeine (TYLENOL #3) 300-30 MG tablet Take 1 tablet by mouth nightly as needed for moderate pain 60 tablet 0     albuterol (PROAIR HFA/PROVENTIL HFA/VENTOLIN HFA) 108 (90 Base) MCG/ACT inhaler Inhale 1-2 puffs into the lungs every 6 hours as needed for shortness of breath / dyspnea or wheezing 1 Inhaler 1     amitriptyline (ELAVIL) 10 MG tablet Start at 1 tab at bedtime for 3 days, then 2 tabs at bedtime for 3 days, then 3 tabs at bedtime. (Patient not taking: Reported on 1/11/2019) 90 tablet 1     atorvastatin (LIPITOR) 20 MG tablet Take 1 tablet (20 mg) by mouth daily 90 tablet 0     omeprazole (PRILOSEC) 40 MG DR capsule Take 1 capsule (40 mg) by mouth daily 90 capsule 3         REVIEW OF SYSTEMS:   Constitutional:  Fatigue and Malaise  Eyes/Head:  Dizziness  Ears/Nose/Throat: Negative  Allergy/Immune: Negative  Skin:Rash-shingles  Hematologic/Lymphatic/Immunologic:Negative  Respiratory: Cough and Shortness of Breath  Cardiovascular: Negative  Gastrointestinal: Negative  Endocrine: Negative  Musculoskeletal: Arthritis, Back pain, Joint pain and Neck pain  Urinary:  Negative   Any bowel or bladder incontinence: Denies   Neurologic: Negative  Psychiatric: Negative      Any illicit drug use: none  EtOH use: 1-2 times/year  Caffeine use: coffee daily, usually about 1-2  Nicotine use: former smoker  Any use of prescriptions other than how they were prescribed:no    PHYSICAL EXAM    /86   Temp 97.6  F (36.4  C) (Temporal)   Ht 1.829 m (6')   Wt 114.3 kg (252 lb)   BMI 34.18 kg/m        Appearance:     A&O. Patient is appropriate.   Patient is in NAD.   Patient is well groomed and appears stated age.     HEENT:   Normocephalic, atraumatic, sclera, conjunctiva and pharynx normal. Pupils are equal, round and reactive to light. Hearing is adequate for exam. Uvula rises with phonation.   Neck: Supple. No deformities or adenopathy  Cardiovascular:  Heart has a regular rate and rhythm. Audible S1 and S2. No murmurs auscultated. No edema on bilateral lower extremities.   Respiratory: Lungs are clear to auscultation bilaterally. No wheezes or crackles.  Abdominal/Pelvic:   Soft, non-tender with good bowel sounds, no palpable organomegaly.  Skin:  No rashes, erythema, breakdowns, lesions to exposed skin.   Hematologic:  No bruises, petechiae or ecchymosis to exposed areas.  Psychiatric:  mentation appears normal., affect and mood normal  Musculoskeletal:  Posture upright, shoulders and pelvis are leveled.   Deltoid: R: 5/5 L: 5/5  Biceps: R: 5/5 L: 5/5  Triceps: R: 5/5 L: 5/5  Intrinsic hand: R: 5/5   L: 5/5  Hip flexion: R: 5/5 L: 5/5  Knee ext: R: 5/5 L: 5/5  Knee flex: R: 5/5 L: 5/5  Dorsiflexion: R: 5/5 L: 5/5  Plantarflexion: R: 5/5 L: 5/5    Cervical  spine:   Flex:  35 degrees, pain free   Ext: 25 degrees, painful    Rotation to right: Unable, makes him feel dizzy   Rotation to left: Unable, makes him feel dizzy   Tenderness in the cervical spine at midline. Yes   Tenderness in the cervical paraspinal muscles. Yes, very tight muscles  Thoracic spine:    Tenderness in the thoracic spine at midline. No   Tenderness in the thoracic paraspinal muscles. No  Lumbar/Sacral spine:   Flexion:  75 degrees, pain free   Ext: 25 degrees, painful    Rotation/ext to right: very mildly painful    Rotation/ext to left: pain free   Tenderness in the lumbar spine at midline. Yes   Tenderness in the lumbar paraspinal muscles.Yes   Straight leg exam:    Right: negative     Left:  negative   Prashanth/Jacobo's test:      Right: negative     Left:  negative   Tenderness over SI joint:      Right: negative     Left:  negative   Tenderness over piriformis:     Right: negative     Left:  negative   Tenderness over Trochanteric Bursa:     Right: negative     Left: negative     Gait pattern:  Able to walk on the heels and toes. Patient has normal gait.     Neurological:   Deep Tendon Reflex exam:   Biceps:     R:  2/4   L: 2/4   Brachioradialis   R:  2/4   L: 2/4:   Patella:  R:  2/4   L: 2/4   Achilles:  R:  2/4   L: 2/4    Sensory exam:   Light touch: normal bilateral upper and lower extremities    Vibration: normal in bilateral upper extremities and decreased bilateral lower extremities, unable to feel in bilateral feet   Sharp: normal in bilateral upper extremities and bilateral lower extremities   No allodynia, dysesthesia, or hyperalgesia.      Previous Diagnostic Tests:   Imaging Studies:   MRI Cervical Spine 12/17/2018  Impression:  1.  Mild to moderate multilevel cervical and upper thoracic disc degeneration.  2.  1-2 mm left paracentral disc protrusion at C5-6 with left ventral cord flattening.  3.  Facet arthropathy bilaterally at C7-T1, on the left at C2-3 and within the upper  thoracic spine.  4.  Foraminal stenosis at multiple levels, moderate bilaterally at C6-7 and C5-6, and moderate on the right at C4-5.  5.  Comparison with 8/14/2017 shows no significant change.    MRI Lumbar Spine 1/10/2017  Impression:  1.  Severe L5 -S1 disc degeneration with 2 mm left central protrusion/annular fissure.  2.  No stenosis or neural compression at level.  3.  No fractures or destructive lesions.  Benign L3 intraosseous hemangioma.  Note: Mild interval enlargement of L5-S1 2 mm disc protrusion since 10/11/2012; otherwise, no significant interval changes.      [place] [employer] [facility] Data Base Reviewed:    YES; No concern for abuse or misuse of controlled medications based on this report.     Outside records reviewed: MRI reports of cervical and lumbar spine from [facility].    ASSESSMENT:   Bulmaro Herrera is a 53 year old male who presents today with the complaints of:    1.  Cervical facet joint pain  2.  cervicalgia  3.  Bilateral feet pain  4.  Lumbago  5. Myofascial pain    PLAN:    Diagnosis reviewed, treatment option addressed, and risk/benefits discussed.  Self-care instructions given.  I am recommending a multidisciplinary treatment plan to help this patient better manage his pain.       1.  Physical Therapy:  We will revisit this in the future  2.  Clinical Health Psychologist to address issues of relaxation, behavorial change, coping style, and other factors important to improvement: we will revisit this in the future  3.  Diagnostic Studies:  Bilateral lower extremity EMG, ? peripheral neuropathy vs proximal radiculopathy  4.  Medication Management:    1. Start Amitriptyline as previously recommended by Dr. Serrano   2. Robaxin 500mg TID as needed for muscle pain/spasms   3. Patient is interested in coming off of the Tylenol #3, however when he has tried in the past he has become very ill. Will have him try 1/2 tab at bedtime for 1 week, then 1/2 tab every other day for 1 week, then stop.     5.  Potential procedures: Discussed cervical facet joint injections vs medial branch blocks. Patient has a strong needle phobia. He is aware that both take multiple needles. He is currently uncomfortable at the thought of the medial branch blocks, but would like to try the facet steroid injections. Order placed.    6. Recommendations to PCP: see above    Follow up: 4 Weeks, sooner if needed     TIME SPENT:   A total of 70  minutes was spent on the patient today, greater than 50% of that time was spent on face to face counseling and care coordination regarding diagnoses and treatment options as mentioned above.    I would like to thank Dr. Sandor Serrano for allowing me to participate in the management of this patient.     Jacque Kay PA-C  [facility]

## 2019-01-14 NOTE — PATIENT INSTRUCTIONS
After Visit Instructions:     Thank you for coming to [facility] for your care. It is my goal to partner with you to help you reach your optimal state of health.     I am recommending multidisciplinary care at this time.  The focus of care will be to continue gradual rehabilitation and pain management with medication adjustments as needed.    Continue daily self-care, identifying contributing factors, and monitoring variations in pain level. Continue to integrate self-care into your life.      1. Schedule pain psychology assessment/visit: We can revisit in future  2. Schedule physical therapy assessment/visit: we can revisit in future  3. Schedule follow-up with Jacque Kay PA-C in 4 weeks. You will need to schedule this.   4. Procedures recommended: Cervical facet joint steroid injections with Dr. Chaudhry. We will call you for this appointment.    5. Referrals: You were referred today to [facility] for a bilateral lower extremity EMG. We will call you to schedule this appointment.   6. Medication recommendations:   1. Start the Amitriptyline per Dr. Serrano instructions  2. Start Robaxin 500mg three times a day as needed for muscle pain/spasms  3. Tylenol #3: go down to 1 tablet at bedtime every other day for 1 week then stop. Other option: take 1/2 tab at bedtime for 1 week, then take 1/2 tab every other night for 1 week then stop      Jacque Kay PA-C  [facility]  [facility]    Contact information: [facility]  Clinic Number:  [phone]   Call this number with any questions about your care and for scheduling assistance. Calls are returned Monday through Friday between 8 AM and 4:30 PM. We usually get back to you within 2 business days depending on the issue/request.           Medication Refills Policy:    For non-narcotic medications, please your pharmacy directly to request a refill and the pharmacy will  call the [facility] Management Center for authorization. Please allow 3-4 days for these refills.    For narcotic refills, call the nurse triage line and leave a message requesting your refill along with the name of the pharmacy that you use. Narcotic prescriptions will be mailed to your pharmacy or you may pick them up at the clinic.  Please call 7-10 days before your refill is due  The above policy allows adequate time so that you do not run out of medication.    No Show - Late Cancellation - Late Arrival Policy  We believe regular attendance is key to your success in our program.    Any time you are unable to keep your appointment we ask that you call us at least 24 hours in advance to let us know. This will allow us to offer the appointment time to another patient. The following is our policy for missed appointments. This also applies to appointments cancelled with less than 24 hours notice.    After missing 3 appointments without calling first, we will cancel all of your future appointments at [facility].    At that point, you will not be able to resume services unless approved by your care team  We understand that unforseen circumstances arise, however, out of respect for all concerned and to provide this appointment to another patient, this policy will be enforced.    Please note that most follow up appointments are 30 minutes long. If you arrive late, your provider may not be able to see you for the entire 30 minutes. Please also note that if you arrive more than 15 minutes for any appointment, it may be rescheduled.

## 2019-01-16 ENCOUNTER — TELEPHONE (OUTPATIENT)
Facility: CLINIC | Age: 54
End: 2019-01-16

## 2019-01-16 NOTE — TELEPHONE ENCOUNTER
Patient is way too complicated and needs a preop visit.  Will have staff notify patient and schedule this before his procedure on 1/25/2019.    Sandor Serrano MD

## 2019-01-16 NOTE — TELEPHONE ENCOUNTER
Pt's wife called and scheduled Facet injection on 1/25/19 at 0900 with Dr. Chaudhry.  Will need preop and  the day of the procedure.

## 2019-01-21 ENCOUNTER — OFFICE VISIT (OUTPATIENT)
Dept: FAMILY MEDICINE | Facility: CLINIC | Age: 54
End: 2019-01-21
Payer: COMMERCIAL

## 2019-01-21 VITALS
SYSTOLIC BLOOD PRESSURE: 122 MMHG | TEMPERATURE: 98.1 F | OXYGEN SATURATION: 99 % | HEART RATE: 84 BPM | BODY MASS INDEX: 34 KG/M2 | WEIGHT: 251 LBS | RESPIRATION RATE: 18 BRPM | DIASTOLIC BLOOD PRESSURE: 74 MMHG | HEIGHT: 72 IN

## 2019-01-21 DIAGNOSIS — M50.30 DDD (DEGENERATIVE DISC DISEASE), CERVICAL: ICD-10-CM

## 2019-01-21 DIAGNOSIS — M51.369 DDD (DEGENERATIVE DISC DISEASE), LUMBAR: ICD-10-CM

## 2019-01-21 DIAGNOSIS — F40.231 SEVERE NEEDLE PHOBIA: Chronic | ICD-10-CM

## 2019-01-21 DIAGNOSIS — Z01.818 PREOP GENERAL PHYSICAL EXAM: Primary | ICD-10-CM

## 2019-01-21 DIAGNOSIS — G89.4 CHRONIC PAIN SYNDROME: ICD-10-CM

## 2019-01-21 DIAGNOSIS — M54.2 CERVICALGIA: ICD-10-CM

## 2019-01-21 DIAGNOSIS — G47.33 OSA (OBSTRUCTIVE SLEEP APNEA): ICD-10-CM

## 2019-01-21 PROCEDURE — 99214 OFFICE O/P EST MOD 30 MIN: CPT | Performed by: FAMILY MEDICINE

## 2019-01-21 ASSESSMENT — PAIN SCALES - GENERAL: PAINLEVEL: MILD PAIN (3)

## 2019-01-21 ASSESSMENT — MIFFLIN-ST. JEOR: SCORE: 2031.53

## 2019-01-21 NOTE — PROGRESS NOTES
[address] [address]  [phone]  Dept: [phone]    PRE-OP EVALUATION:  Today's date: 2019    Bulmaro Herrera (: 1965) presents for pre-operative evaluation assessment as requested by Dr. Chaudhry.  He requires evaluation and anesthesia risk assessment prior to undergoing surgery/procedure for treatment of back pain  .    Fax number for surgical facility:   Primary Physician: Sandor Serrano  Type of Anesthesia Anticipated: General    Patient has a Health Care Directive or Living Will:  NO    Preop Questions 2019   Who is doing your surgery? [facility]   What are you having done? [facility]   Date of Surgery/Procedure: 2019   Facility or Hospital where procedure/surgery will be performed: [facility]   1.  Do you have a history of Heart attack, stroke, stent, coronary bypass surgery, or other heart surgery? No   2.  Do you ever have any pain or discomfort in your chest? No   3.  Do you have a history of  Heart Failure? No   4.   Are you troubled by shortness of breath when:  walking on a level surface, or up a slight hill, or at night? UNKNOWN - not sure if deconditioning versus reactive airway versus pain issues   5.  Do you currently have a cold, bronchitis or other respiratory infection? No   6.  Do you have a cough, shortness of breath, or wheezing? No   7.  Do you sometimes get pains in the calves of your legs when you walk? No   8. Do you or anyone in your family have previous history of blood clots? No   9.  Do you or does anyone in your family have a serious bleeding problem such as prolonged bleeding following surgeries or cuts? No   10. Have you ever had problems with anemia or been told to take iron pills? No   11. Have you had any abnormal blood loss such as black, tarry or bloody stools? No   12. Have you ever had a blood transfusion? No   13. Have you or any of your relatives ever had problems with anesthesia?  YES - Mom has had trouble with waking up after surgery.  Patient does not have issues with anesthesia.     14. Do you have sleep apnea, excessive snoring or daytime drowsiness? UNKNOWN - is setup for sleep study, wearing a mouth guard.    15. Do you have any prosthetic heart valves? No   16. Do you have prosthetic joints? No         HPI:     HPI related to upcoming procedure: patient is undergoing neck procedure for underlying neck discomfort and chronic pain.  Recommended as ongoing evaluation and possible treatment.  Is being followed by pain management clinic now.        See problem list for active medical problems.  Problems all longstanding and stable, except as noted/documented.  See ROS for pertinent symptoms related to these conditions.                                                                                                                                                          .    MEDICAL HISTORY:     Patient Active Problem List    Diagnosis Date Noted     Severe needle phobia 01/21/2019     Priority: Medium     Class: Chronic     likely to preclude him from having minimally invasive interventional pain procedures with minimal sedation. Would need deeper MAC sedation       Mixed hyperlipidemia 11/13/2018     Priority: Medium     ROBBY (obstructive sleep apnea) 11/13/2018     Priority: Medium     Herniation of intervertebral disc of cervical spine without radiculopathy 03/21/2017     Priority: Medium     Cervicalgia 03/21/2017     Priority: Medium     Chronic pain syndrome 04/05/2016     Priority: Medium     DDD, cervical. T#3, #60/mo.        DDD (degenerative disc disease), lumbar 12/20/2013     Priority: Medium     DDD (degenerative disc disease), cervical 12/20/2013     Priority: Medium     Elevated liver enzymes 05/14/2012     Priority: Medium     Pruritus ani 01/20/2010     Priority: Medium     Back pains      Priority: Medium     Problem list name updated by automated process. Provider to  review and confirm       Esophageal reflux 04/13/2005     Priority: Medium      Past Medical History:   Diagnosis Date     Back pains      Depressive disorder, not elsewhere classified 1/28/2007    declines Rx     Disc degeneration 12/2008    see MRI -throaco-lumbar mild discogenic degenerative changes     Elevated LFT's 11/09    alt and ast     Other and unspecified hyperlipidemia 1/2007     Sleep disorder 9/09    CPAP     Tobacco use disorder      Past Surgical History:   Procedure Laterality Date     C NONSPECIFIC PROCEDURE      rt hand surgery - laceration/glass/tendons     COLONOSCOPY  07/07/08    adenomatous polyp repeat 5 years     COLONOSCOPY N/A 12/29/2017    Procedure: COMBINED COLONOSCOPY, SINGLE OR MULTIPLE BIOPSY/POLYPECTOMY BY BIOPSY;  Colonoscopy, Polypectomy by Biopsy;  Surgeon: Matt Reyes MD;  Location: [facility]     ENT SURGERY      for deviated septum     EXCISE LESION AXILLA  12/7/2018    Procedure: Excision Skin Tags Right Axilla;  Surgeon: Ayaan Chaney MD;  Location: [facility]     EXCISE LESION BACK N/A 12/7/2018    Procedure: Excision Back Skin Lesion X Two;  Surgeon: Ayaan Chaney MD;  Location: [facility]     HC ECHO HEART XTHORACIC, STRESS/REST  6/2009    neg     INJECT EPIDURAL CERVICAL N/A 5/14/2015    Procedure: INJECT EPIDURAL CERVICAL;  Surgeon: Christian Chaudhry MD;  Location: [facility]     INJECT EPIDURAL CERVICAL N/A 12/28/2018    Procedure: INJECT EPIDURAL CERVICAL 6-7;  Surgeon: Christian Chaudhry MD;  Location: [facility]     ORTHOPEDIC SURGERY      Right hand     TONSILLECTOMY       Current Outpatient Medications   Medication Sig Dispense Refill     acetaminophen-codeine (TYLENOL #3) 300-30 MG tablet Take 1 tablet by mouth nightly as needed for moderate pain 60 tablet 0     atorvastatin (LIPITOR) 20 MG tablet Take 1 tablet (20 mg) by mouth daily 90 tablet 0     methocarbamol (ROBAXIN) 500 MG tablet Take 1 tablet (500 mg) by mouth 3 times daily 60 tablet 0     omeprazole (PRILOSEC) 40 MG   capsule Take 1 capsule (40 mg) by mouth daily 90 capsule 3     amitriptyline (ELAVIL) 10 MG tablet Start at 1 tab at bedtime for 3 days, then 2 tabs at bedtime for 3 days, then 3 tabs at bedtime. (Patient not taking: Reported on 2019) 90 tablet 1     OTC products: None, except as noted above    Allergies   Allergen Reactions     Percocet [Oxycodone-Acetaminophen] Itching     Wasps [Hornets] Swelling      Latex Allergy: NO    Social History     Tobacco Use     Smoking status: Former Smoker     Packs/day: 1.50     Last attempt to quit: 2007     Years since quittin.9     Smokeless tobacco: Never Used   Substance Use Topics     Alcohol use: No     Frequency: Never     Comment: rare     History   Drug Use No       REVIEW OF SYSTEMS:   Constitutional, neuro, ENT, endocrine, pulmonary, cardiac, gastrointestinal, genitourinary, musculoskeletal, integument and psychiatric systems are negative, except as otherwise noted.    EXAM:   /74   Pulse 84   Temp 98.1  F (36.7  C) (Temporal)   Resp 18   Ht 1.829 m (6')   Wt 113.9 kg (251 lb)   SpO2 99%   BMI 34.04 kg/m      GENERAL APPEARANCE: healthy, alert and no distress     EYES: EOMI,  PERRL     HENT: ear canals and TM's normal and nose and mouth without ulcers or lesions     NECK: no adenopathy, no asymmetry, masses, or scars and thyroid normal to palpation     RESP: lungs clear to auscultation - no rales, rhonchi or wheezes     CV: regular rates and rhythm, normal S1 S2, no S3 or S4 and no murmur, click or rub     ABDOMEN:  soft, nontender, no HSM or masses and bowel sounds normal     MS: extremities normal- no gross deformities noted, no evidence of inflammation in joints, FROM in all extremities.     SKIN: no suspicious lesions or rashes     NEURO: Normal strength and tone, sensory exam grossly normal, mentation intact and speech normal     PSYCH: mentation appears normal. and affect normal/bright     LYMPHATICS: No cervical  adenopathy    DIAGNOSTICS:   EKG: Not indicated due to non-vascular surgery and low risk of event (age <65 and without cardiac risk factors)    Recent Labs   Lab Test 09/10/18  1930 02/05/18  0943  02/10/16  0725  02/17/14  1735   HGB 16.4 16.4   < >  --    < >  --     195   < >  --    < >  --    INR  --   --   --   --   --  1.0    139   < >  --    < >  --    POTASSIUM 4.0 4.0   < >  --    < >  --    CR 1.06 1.02   < >  --    < >  --    A1C  --   --   --  5.4  --   --     < > = values in this interval not displayed.        IMPRESSION:   Reason for surgery/procedure:   DDD (degenerative disc disease), cervical  DDD (degenerative disc disease), lumbar  Chronic pain syndrome    Diagnosis/reason for consult:   Severe needle phobia  ORBBY (obstructive sleep apnea)  Cervicalgia      The proposed surgical procedure is considered LOW risk.    REVISED CARDIAC RISK INDEX  The patient has the following serious cardiovascular risks for perioperative complications such as (MI, PE, VFib and 3  AV Block):  No serious cardiac risks  INTERPRETATION: 0 risks: Class I (very low risk - 0.4% complication rate)    The patient has the following additional risks for perioperative complications:  No identified additional risks      ICD-10-CM    1. Preop general physical exam Z01.818    2. DDD (degenerative disc disease), cervical M50.30    3. DDD (degenerative disc disease), lumbar M51.36    4. Chronic pain syndrome G89.4    5. Severe needle phobia F40.231    6. ROBBY (obstructive sleep apnea) G47.33    7. Cervicalgia M54.2        RECOMMENDATIONS:       Obstructive Sleep Apnea (or suspected sleep apnea)  Hospital staff are advised to monitor for sleep related oxygen desaturations due to suspicion of ROBBY      --Patient is to take all scheduled medications on the day of surgery.    APPROVAL GIVEN to proceed with proposed procedure, without further diagnostic evaluation       Signed Electronically by: Sandor Serrano MD    Copy  of this evaluation report is provided to requesting physician.    [facility] Preop Guidelines    Revised Cardiac Risk Index

## 2019-01-25 ENCOUNTER — ANESTHESIA (OUTPATIENT)
Dept: SURGERY | Facility: CLINIC | Age: 54
End: 2019-01-25
Payer: COMMERCIAL

## 2019-01-25 ENCOUNTER — ANESTHESIA EVENT (OUTPATIENT)
Dept: SURGERY | Facility: CLINIC | Age: 54
End: 2019-01-25
Payer: COMMERCIAL

## 2019-01-25 ENCOUNTER — HOSPITAL ENCOUNTER (OUTPATIENT)
Facility: CLINIC | Age: 54
Discharge: HOME | End: 2019-01-25
Attending: ANESTHESIOLOGY | Admitting: ANESTHESIOLOGY
Payer: COMMERCIAL

## 2019-01-25 ENCOUNTER — HOSPITAL ENCOUNTER (OUTPATIENT)
Dept: GENERAL RADIOLOGY | Facility: CLINIC | Age: 54
End: 2019-01-25
Attending: ANESTHESIOLOGY | Admitting: ANESTHESIOLOGY
Payer: COMMERCIAL

## 2019-01-25 VITALS
SYSTOLIC BLOOD PRESSURE: 118 MMHG | HEART RATE: 73 BPM | DIASTOLIC BLOOD PRESSURE: 90 MMHG | TEMPERATURE: 97.7 F | OXYGEN SATURATION: 95 % | RESPIRATION RATE: 16 BRPM

## 2019-01-25 DIAGNOSIS — M54.2 PAIN OF CERVICAL FACET JOINT: ICD-10-CM

## 2019-01-25 PROCEDURE — 25000128 H RX IP 250 OP 636: Performed by: NURSE ANESTHETIST, CERTIFIED REGISTERED

## 2019-01-25 PROCEDURE — 25000125 ZZHC RX 250: Performed by: ANESTHESIOLOGY

## 2019-01-25 PROCEDURE — 64491 INJ PARAVERT F JNT C/T 2 LEV: CPT | Performed by: ANESTHESIOLOGY

## 2019-01-25 PROCEDURE — 25000125 ZZHC RX 250: Performed by: NURSE ANESTHETIST, CERTIFIED REGISTERED

## 2019-01-25 PROCEDURE — 37000008 ZZH ANESTHESIA TECHNICAL FEE, 1ST 30 MIN: Performed by: ANESTHESIOLOGY

## 2019-01-25 PROCEDURE — 64490 INJ PARAVERT F JNT C/T 1 LEV: CPT | Mod: 50 | Performed by: ANESTHESIOLOGY

## 2019-01-25 PROCEDURE — 64491 INJ PARAVERT F JNT C/T 2 LEV: CPT | Mod: 50 | Performed by: ANESTHESIOLOGY

## 2019-01-25 PROCEDURE — 64490 INJ PARAVERT F JNT C/T 1 LEV: CPT | Performed by: ANESTHESIOLOGY

## 2019-01-25 PROCEDURE — 25000128 H RX IP 250 OP 636: Performed by: ANESTHESIOLOGY

## 2019-01-25 PROCEDURE — 40000277 XR SURGERY CARM FLUORO LESS THAN 5 MIN W STILLS: Mod: TC

## 2019-01-25 RX ORDER — LIDOCAINE HYDROCHLORIDE 20 MG/ML
INJECTION, SOLUTION INFILTRATION; PERINEURAL PRN
Status: DISCONTINUED | OUTPATIENT
Start: 2019-01-25 | End: 2019-01-25

## 2019-01-25 RX ORDER — SODIUM CHLORIDE, SODIUM LACTATE, POTASSIUM CHLORIDE, CALCIUM CHLORIDE 600; 310; 30; 20 MG/100ML; MG/100ML; MG/100ML; MG/100ML
INJECTION, SOLUTION INTRAVENOUS CONTINUOUS
Status: DISCONTINUED | OUTPATIENT
Start: 2019-01-25 | End: 2019-01-25 | Stop reason: HOSPADM

## 2019-01-25 RX ORDER — NALOXONE HYDROCHLORIDE 0.4 MG/ML
.1-.4 INJECTION, SOLUTION INTRAMUSCULAR; INTRAVENOUS; SUBCUTANEOUS
Status: DISCONTINUED | OUTPATIENT
Start: 2019-01-25 | End: 2019-01-25 | Stop reason: HOSPADM

## 2019-01-25 RX ORDER — BUPIVACAINE HYDROCHLORIDE 7.5 MG/ML
INJECTION, SOLUTION EPIDURAL; RETROBULBAR PRN
Status: DISCONTINUED | OUTPATIENT
Start: 2019-01-25 | End: 2019-01-25 | Stop reason: HOSPADM

## 2019-01-25 RX ORDER — IBUPROFEN 200 MG
400 TABLET ORAL EVERY MORNING
COMMUNITY

## 2019-01-25 RX ORDER — MEPERIDINE HYDROCHLORIDE 25 MG/ML
12.5 INJECTION INTRAMUSCULAR; INTRAVENOUS; SUBCUTANEOUS
Status: DISCONTINUED | OUTPATIENT
Start: 2019-01-25 | End: 2019-01-25 | Stop reason: HOSPADM

## 2019-01-25 RX ORDER — ONDANSETRON 4 MG/1
4 TABLET, ORALLY DISINTEGRATING ORAL EVERY 30 MIN PRN
Status: DISCONTINUED | OUTPATIENT
Start: 2019-01-25 | End: 2019-01-25 | Stop reason: HOSPADM

## 2019-01-25 RX ORDER — TRIAMCINOLONE ACETONIDE 40 MG/ML
INJECTION, SUSPENSION INTRA-ARTICULAR; INTRAMUSCULAR PRN
Status: DISCONTINUED | OUTPATIENT
Start: 2019-01-25 | End: 2019-01-25 | Stop reason: HOSPADM

## 2019-01-25 RX ORDER — LIDOCAINE 40 MG/G
CREAM TOPICAL
Status: DISCONTINUED | OUTPATIENT
Start: 2019-01-25 | End: 2019-01-25 | Stop reason: HOSPADM

## 2019-01-25 RX ORDER — PROPOFOL 10 MG/ML
INJECTION, EMULSION INTRAVENOUS PRN
Status: DISCONTINUED | OUTPATIENT
Start: 2019-01-25 | End: 2019-01-25

## 2019-01-25 RX ORDER — ONDANSETRON 2 MG/ML
4 INJECTION INTRAMUSCULAR; INTRAVENOUS EVERY 30 MIN PRN
Status: DISCONTINUED | OUTPATIENT
Start: 2019-01-25 | End: 2019-01-25 | Stop reason: HOSPADM

## 2019-01-25 RX ADMIN — PROPOFOL 20 MG: 10 INJECTION, EMULSION INTRAVENOUS at 09:12

## 2019-01-25 RX ADMIN — PROPOFOL 20 MG: 10 INJECTION, EMULSION INTRAVENOUS at 09:04

## 2019-01-25 RX ADMIN — LIDOCAINE HYDROCHLORIDE 1 ML: 10 INJECTION, SOLUTION EPIDURAL; INFILTRATION; INTRACAUDAL; PERINEURAL at 08:19

## 2019-01-25 RX ADMIN — LIDOCAINE HYDROCHLORIDE 40 MG: 20 INJECTION, SOLUTION INFILTRATION; PERINEURAL at 09:03

## 2019-01-25 RX ADMIN — PROPOFOL 30 MG: 10 INJECTION, EMULSION INTRAVENOUS at 09:16

## 2019-01-25 RX ADMIN — PROPOFOL 100 MG: 10 INJECTION, EMULSION INTRAVENOUS at 09:03

## 2019-01-25 RX ADMIN — PROPOFOL 40 MG: 10 INJECTION, EMULSION INTRAVENOUS at 09:06

## 2019-01-25 RX ADMIN — PROPOFOL 40 MG: 10 INJECTION, EMULSION INTRAVENOUS at 09:09

## 2019-01-25 ASSESSMENT — LIFESTYLE VARIABLES: TOBACCO_USE: 1

## 2019-01-25 NOTE — ANESTHESIA CARE TRANSFER NOTE
Patient: Bulmaro Herrera    Procedure(s):  Cervical Facet Injections Cervical 5-6 and 6-7 Bilateral    Diagnosis: Pain of cervical facet joint  Diagnosis Additional Information: No value filed.    Anesthesia Type:   MAC     Note:  Airway :Nasal Cannula  Patient transferred to:Phase II  Handoff Report: Identifed the Patient, Identified the Reponsible Provider, Reviewed the pertinent medical history, Discussed the surgical course, Reviewed Intra-OP anesthesia mangement and issues during anesthesia, Set expectations for post-procedure period and Allowed opportunity for questions and acknowledgement of understanding      Vitals: (Last set prior to Anesthesia Care Transfer)    CRNA VITALS  1/25/2019 0849 - 1/25/2019 0925      1/25/2019             Pulse:  87    SpO2:  95 %                Electronically Signed By: SHIV Aelx CRNA  January 25, 2019  9:25 AM

## 2019-01-25 NOTE — ANESTHESIA POSTPROCEDURE EVALUATION
Patient: Bulmaro Herrera    Procedure(s):  Cervical Facet Injections Cervical 5-6 and 6-7 Bilateral    Diagnosis:Pain of cervical facet joint  Diagnosis Additional Information: No value filed.    Anesthesia Type:  MAC    Note:  Anesthesia Post Evaluation    Patient location during evaluation: Phase 2  Patient participation: Able to fully participate in evaluation  Level of consciousness: awake  Pain management: adequate  Airway patency: patent  Cardiovascular status: acceptable and hemodynamically stable  Respiratory status: acceptable, room air and nonlabored ventilation  Hydration status: stable  PONV: none     Anesthetic complications: None    Comments: Patient was happy with the anesthesia care received and no anesthesia related complications were noted.  I will follow up with the patient again if it is needed.        Last vitals:  Vitals:    01/25/19 0811   BP: 131/86   Pulse: 79   Resp: 16   Temp: 97.7  F (36.5  C)   SpO2: 98%         Electronically Signed By: SHVI Alex CRNA  January 25, 2019  9:48 AM

## 2019-01-25 NOTE — OP NOTE
CHIEF COMPLAINT:  neck pain secondary to cervical spondylosis.  INTERVAL HISTORY:  I discussed the above note with the patient. I agree with above.  He did have a epidural injection in the neck and that did not help suggesting that the discs in his neck that have some degeneration are not the major pain generators.  Because of this we are going to diagnostically and therapeutically test his facet joints.  The complicating factor in his situation is that he has a severe needle phobia.  He needed very deep monitored anesthetic care in order to have the facet injection performed.  This would preclude him from having a radiofrequency ablation which requires fully awake medial branch blocks done under local anesthesia.  PHYSICAL EXAM:   Musculoskeletal: Strength of upper extremities is 5/5 bilaterally.  Pain on palpation of the   neck. Pain on   Rotation, extension and flexion of neck.   Neuro: No sensory perception differences of lower extremities.  PROCEDURE:     Left C5-C6, Right C5-C6, Left C6-C7 and Right C6-C7  Intra-articular zygopophyseal joint injection utilizing fluoroscopic guidance with contrast dye.   PROCEDURE DETAILS: After written informed consent was obtained from the patient, the patient was escorted to the procedure room.  The patient was placed in the sitting position.  A  time out  was conducted to verify the patient identity, procedure to be performed, side, site, allergies and any special requirements.  The skin over the neck was prepped and draped in normal sterile fashion. Fluoroscopy was used to identify the zygopophyseal joint with a lateral view.   The skin was anesthetized with 0.5 mL of 1% lidocaine with bicarbonate buffer.  A 25-gauge 2 inch Quincke needle was advanced under fluoroscopic guidance in the lateral approach until entry into the zygopophyseal joint was successful.  View was confirmed in AP view.  Then, <0.3 cc of Omnipaque contrast dye was injected producing a satisfactory  arthrogram without evidence of intrathecal or intravascular spread.  Then, a 1 mL solution of 5 mg dexamethasone and 0.5 mL of 2% lidocaine was incrementally injected into each  joint.  After injection of the medication, as the needle tip was withdrawn, it was flushed with local anesthetic. The patient was monitored with blood pressure and pulse oximetry machines with the assistance of an RN throughout the procedure.  The patient was alert and responsive to questions throughout the procedure.   The patient tolerated the procedure well and was observed in the post-procedural area.  The patient was dismissed without apparent complications.     DIAGNOSIS:  PLAN:  1. Performed a zygopophyseal joint injections.  He has a severe needle phobia so he would not be a candidate for medial branch blocks or a radiofrequency ablation so if he does have positive diagnostic injections with his pain form then he would need to consider a cervical fusion if the facet injections do not provide long-term pain relief for him.    Christian Chaudhry MD  Diplomate of the American Board of Anesthesiology, Pain Medicine

## 2019-01-25 NOTE — ANESTHESIA PREPROCEDURE EVALUATION
Anesthesia Pre-Procedure Evaluation    Patient: Bulmaro Herrera   MRN: [id number] : 1965          Preoperative Diagnosis: neck pain    Procedure(s):  INJECT EPIDURAL CERVICAL    Past Medical History:   Diagnosis Date     Back pains      Depressive disorder, not elsewhere classified 2007    declines Rx     Disc degeneration 2008    see MRI -throaco-lumbar mild discogenic degenerative changes     Elevated LFT's     alt and ast     Other and unspecified hyperlipidemia 2007     Sleep disorder     CPAP     Tobacco use disorder      Past Surgical History:   Procedure Laterality Date     C NONSPECIFIC PROCEDURE      rt hand surgery - laceration/glass/tendons     COLONOSCOPY  08    adenomatous polyp repeat 5 years     COLONOSCOPY N/A 2017    Procedure: COMBINED COLONOSCOPY, SINGLE OR MULTIPLE BIOPSY/POLYPECTOMY BY BIOPSY;  Colonoscopy, Polypectomy by Biopsy;  Surgeon: Matt Reyes MD;  Location:  GI     ENT SURGERY      for deviated septum     EXCISE LESION AXILLA  2018    Procedure: Excision Skin Tags Right Axilla;  Surgeon: Ayaan Chaney MD;  Location:  OR     EXCISE LESION BACK N/A 2018    Procedure: Excision Back Skin Lesion X Two;  Surgeon: Ayaan Chaney MD;  Location:  OR      ECHO HEART XTHORACIC, STRESS/REST  2009    neg     INJECT EPIDURAL CERVICAL N/A 2015    Procedure: INJECT EPIDURAL CERVICAL;  Surgeon: Christian Chaudhry MD;  [facility]: [facility]     INJECT EPIDURAL CERVICAL N/A 2018    Procedure: INJECT EPIDURAL CERVICAL 6-7;  Surgeon: Christian Chaudhry MD;  Location:  OR     ORTHOPEDIC SURGERY      Right hand     TONSILLECTOMY         Anesthesia Evaluation     . Pt has had prior anesthetic. Type: General and MAC    No history of anesthetic complications          ROS/MED HX    ENT/Pulmonary:     (+)sleep apnea, tobacco use, Past use uses CPAP , . .    Neurologic:  - neg neurologic ROS     Cardiovascular:     (+) Dyslipidemia,  hypertension----. : . . . :. . Previous cardiac testing date:results:date: results:ECG reviewed date:9/10/18 results:SR date: results:          METS/Exercise Tolerance:  >4 METS   Hematologic:  - neg hematologic  ROS       Musculoskeletal:   (+) , , other musculoskeletal- DDD cervical spine with numbness down both arms on and off      GI/Hepatic:     (+) GERD Asymptomatic on medication,       Renal/Genitourinary:  - ROS Renal section negative       Endo:  - neg endo ROS       Psychiatric: Comment: Extreme anxiety and needle phobia     (+) psychiatric history depression and anxiety      Infectious Disease:  - neg infectious disease ROS       Malignancy:      - no malignancy   Other:    (+) No chance of pregnancy C-spine cleared: N/A, H/O Chronic Pain,H/O chronic opiod use , no other significant disability                           Physical Exam  Normal systems: cardiovascular, pulmonary and dental    Airway   Mallampati: II  TM distance: >3 FB  Neck ROM: limited    Dental     Cardiovascular   Rhythm and rate: regular and normal      Pulmonary    breath sounds clear to auscultation            Lab Results   Component Value Date    WBC 6.8 09/10/2018    HGB 16.4 09/10/2018    HCT 47.4 09/10/2018     09/10/2018    CRP <2.9 03/25/2015    SED 7 09/10/2012     09/10/2018    POTASSIUM 4.0 09/10/2018    CHLORIDE 103 09/10/2018    CO2 24 09/10/2018    BUN 19 09/10/2018    CR 1.06 09/10/2018    GLC 91 09/10/2018    WILIAM 9.3 09/10/2018    ALBUMIN 4.5 09/10/2018    PROTTOTAL 7.9 09/10/2018    ALT 64 09/10/2018    AST 32 09/10/2018    ALKPHOS 81 09/10/2018    BILITOTAL 1.2 09/10/2018    LIPASE 95 09/10/2018    AMYLASE 76 03/08/2014    PTT 31 06/06/2008    INR 1.0 02/17/2014    TSH 3.07 09/02/2017    T4 0.89 02/10/2016       Preop Vitals  BP Readings from Last 3 Encounters:   01/21/19 122/74   01/14/19 118/86   01/11/19 124/86    Pulse Readings from Last 3 Encounters:   01/21/19 84   01/11/19 104   12/28/18 84      Resp  Readings from Last 3 Encounters:   01/21/19 18   01/11/19 16   12/28/18 16    SpO2 Readings from Last 3 Encounters:   01/21/19 99%   01/11/19 97%   12/28/18 95%      Temp Readings from Last 1 Encounters:   01/21/19 98.1  F (36.7  C) (Temporal)    Ht Readings from Last 1 Encounters:   01/21/19 1.829 m (6')      Wt Readings from Last 1 Encounters:   01/21/19 113.9 kg (251 lb)    Estimated body mass index is 34.04 kg/m  as calculated from the following:    Height as of 1/21/19: 1.829 m (6').    Weight as of 1/21/19: 113.9 kg (251 lb).       Anesthesia Plan      History & Physical Review  History and physical reviewed and following examination; no interval change.    ASA Status:  2 .    NPO Status:  > 8 hours    Plan for MAC with Propofol induction. Maintenance will be TIVA.  Reason for MAC:  Deep or markedly invasive procedure (G8)    Pt suffers from nearly inconsolable anxiety.  He has nervous rigors and fidgeting throughout our preoperative plan.         Postoperative Care      Consents  Anesthetic plan, risks, benefits and alternatives discussed with:  Patient or representative and Patient.  Use of blood products discussed: No .   .                   SHIV Alex CRNA

## 2019-01-25 NOTE — DISCHARGE INSTRUCTIONS
Home Care Instructions                Procedure:  Epidural Steroid Injection or Joint injection    Activity:    Rest today    Do not work today    Resume normal activity tomorrow    Pain:    You may experience soreness at the injection site for one or two days    You may use an ice pack for 20 minutes every 2 hours for the first 24 hours    You may use a heating pad after the first 24 hours    You may use Tylenol  (acetaminophen) every 4 hours or other pain medicines as directed by your physician    Safety  Sedation medicine, if given may remain active for many hours.    It is important for the next 24 hours that you do not:    Drive a car    Operate machines or power tools    Consume alcohol, including beer    Sign any important papers or legal documents    You may experience numbness radiating into your legs or arms, (depending on the procedure location)  This numbness may last several hours.  Until the numb sensation returns to normal please use caution in walking, climbing stairs, stepping out of your vehicle, etc.    Common side effects of steroids:  Not everyone will experience corticosteroid side effects. If side effects are experienced they will gradually subside in the 7-10 day period following an injection.    Most common side effects include:    Flushed face and/or chest    Feeling of warmth, particularly in face but could be overall feeling of warmth    Increased blood sugar in diabetic patients    Menstrual irregularities may occur.  If taking hormone based birth control an alternate method of birth control is recommended    Sleep disturbances and/or mood swings are possible    Leg cramps    Please contact us if you have:  Severe pain   Fever more than 101.5 degrees Fahrenheit  Signs of infection (redness, swelling or drainage)      If you have questions during normal business hours (8am-5pm Monday-Friday) contact the [facility] at [phone]. If you need help after hours, we recommend  that you go to a hospital emergency room or dial 911.

## 2019-02-01 NOTE — PROGRESS NOTES
ENT Consultation    Bulmaro Herrera is a 53 year old male who is seen in consultation at the request of .      History of Present Illness - Bulmaro Herrera is a 53 year old male here today for SOB on exertion and chronic cough  Patient has been diagnosed in 2009 with moderate obstructive sleep apnea.  He is tried CPAP in good tanner but could not tolerate.  Finally he was fitted with dental appliance and according to his wife does not snore.  However over time he started feeling more fatigued again more excessive daytime sleepiness.  He takes short naps oftentimes that helps.  He has little time to take naps.  Initially sleeps in for a longer period of time he still has issues.  Recently his chronic pain has been better managed with your medications.  Previously he was on Tylenol with codeine chronically but now is off it.  Again he feels a little bit better little bit more alert.  There is possibility of some leg movements but only related to chronic back issues and pain.  Body mass index is 32.82 kg/m .    Weight management plan: Patient was referred to their PCP to discuss a diet and exercise plan.    BP Readings from Last 1 Encounters:   01/25/19 118/90       BP noted to be elevated today in office.     Bulmaro IS NOT a smoker/uses chewing tobacco.      Past Medical History -   Past Medical History:   Diagnosis Date     Back pains      Depressive disorder, not elsewhere classified 1/28/2007    declines Rx     Disc degeneration 12/2008    see MRI -throaco-lumbar mild discogenic degenerative changes     Elevated LFT's 11/09    alt and ast     Other and unspecified hyperlipidemia 1/2007     Sleep disorder 9/09    CPAP     Tobacco use disorder        Current Medications -   Current Outpatient Medications:      acetaminophen-codeine (TYLENOL #3) 300-30 MG tablet, Take 1 tablet by mouth nightly as needed for moderate pain, Disp: 60 tablet, Rfl: 0     amitriptyline (ELAVIL) 10 MG tablet, Start at 1 tab at bedtime for  3 days, then 2 tabs at bedtime for 3 days, then 3 tabs at bedtime., Disp: 90 tablet, Rfl: 1     atorvastatin (LIPITOR) 20 MG tablet, Take 1 tablet (20 mg) by mouth daily, Disp: 90 tablet, Rfl: 0     ibuprofen (ADVIL/MOTRIN) 200 MG tablet, Take 200 mg by mouth every 4 hours as needed for mild pain, Disp: , Rfl:      methocarbamol (ROBAXIN) 500 MG tablet, Take 1 tablet (500 mg) by mouth 3 times daily, Disp: 60 tablet, Rfl: 0     omeprazole (PRILOSEC) 40 MG DR capsule, Take 1 capsule (40 mg) by mouth daily, Disp: 90 capsule, Rfl: 3    Allergies -   Allergies   Allergen Reactions     Percocet [Oxycodone-Acetaminophen] Itching     Wasps [Hornets] Swelling       Social History -   Social History     Socioeconomic History     Marital status:      Spouse name: Not on file     Number of children: Not on file     Years of education: Not on file     Highest education level: Not on file   Social Needs     Financial resource strain: Not on file     Food insecurity - worry: Not on file     Food insecurity - inability: Not on file     Transportation needs - medical: Not on file     Transportation needs - non-medical: Not on file   Occupational History     Occupation:      Employer: SELF   Tobacco Use     Smoking status: Former Smoker     Packs/day: 1.50     Last attempt to quit: 2007     Years since quittin.9     Smokeless tobacco: Never Used   Substance and Sexual Activity     Alcohol use: No     Frequency: Never     Comment: rare     Drug use: No     Sexual activity: Yes     Partners: Female   Other Topics Concern     Parent/sibling w/ CABG, MI or angioplasty before 65F 55M? No   Social History Narrative    Dairy/d a lot, 1 gallon of milk per day plus cheese and other servings     Caffeine 48 oz. coffee per day    Exercise none    Sunscreen used - No    Seatbelts used - Yes    Working smoke/CO detectors in the home - Yes    Guns stored in the home - No    Self Breast Exams - NA    Self Testicular  Exam - No    Eye Exam up to date - No    Dental Exam up to date - Yes    Pap Smear up to date - NA    Mammogram up to date - NA    PSA up to date - No    Dexa Scan up to date - NA    Flex Sig / Colonoscopy up to date - No    Immunizations up to date - No    Abuse: Current or Past(Physical, Sexual or Emotional)- No    Do you feel safe in your environment - Yes    Tms,cma        Lives with spouse and her parents.       Family History -   Family History   Problem Relation Age of Onset     Breast Cancer Mother      Arthritis Mother         joint ?     Depression Father      Cancer Maternal Grandmother      C.A.D. Maternal Grandfather      Cardiovascular Maternal Grandfather      Gynecology Paternal Grandmother          giving birth     Prostate Cancer Paternal Grandfather      Genetic Disorder Brother         joint pain?     Depression Brother      Cardiovascular Son         congenital heart defect -       Prostate Cancer Other          age 70's     Diabetes Other        Review of Systems - As per HPI and PMHx, otherwise review of system review of the head and neck negative. Otherwise 10+ review systems negative.    Physical Exam  There were no vitals taken for this visit.  BMI: There is no height or weight on file to calculate BMI.    General - The patient is well nourished and well developed, and appears to have good nutritional status.  Alert and oriented to person and place, answers questions and cooperates with examination appropriately.    SKIN - No suspicious lesions or rashes.  Respiration - No respiratory distress.  Head and Face - Normocephalic and atraumatic, with no gross asymmetry noted of the contour of the facial features.  The facial nerve is intact, with strong symmetric movements.    Voice and Breathing - The patient was breathing comfortably without the use of accessory muscles. The patients voice was clear and strong, and had appropriate pitch and quality.    Ears - Bilateral pinna  and EACs with normal appearing overlying skin. Tympanic membrane intact with good mobility on pneumatic otoscopy bilaterally. Bony landmarks of the ossicular chain are normal. The tympanic membranes are normal in appearance. No retraction, perforation, or masses.  No fluid or purulence was seen in the external canal or the middle ear.     Eyes - Extraocular movements intact.  Sclera were not icteric or injected, conjunctiva were pink and moist.    Mouth - Examination of the oral cavity showed pink, healthy oral mucosa. No lesions or ulcerations noted.  The tongue was mobile and midline, and the dentition were in good condition.  Carrion tongue position 3 mild macroglossia noted.    Throat - The walls of the oropharynx were smooth, pink, moist, symmetric, and had no lesions or ulcerations.  The tonsillar pillars and soft palate were symmetric.  The uvula was midline on elevation.    Neck - Normal midline excursion of the laryngotracheal complex during swallowing.  Full range of motion on passive movement.  Palpation of the occipital, submental, submandibular, internal jugular chain, and supraclavicular nodes did not demonstrate any abnormal lymph nodes or masses.  The carotid pulse was palpable bilaterally.  Palpation of the thyroid was soft and smooth, with no nodules or goiter appreciated.  The trachea was mobile and midline.    Nose - External contour is symmetric, no gross deflection or scars.  Nasal mucosa is pink and moist with no abnormal mucus.  The septum was midline and non-obstructive, turbinates of normal size and position.  No polyps, masses, or purulence noted on examination.    Neuro - Nonfocal neuro exam is normal, CN 2 through 12 intact, normal gait and muscle tone.      Performed in clinic today:  No procedures preformed in clinic today      A/P - Bulmaro FRANK Sharon is a 53 year old male history of moderate obstructive sleep apnea with older appliance that he has had for many years but never rechecked  in the objective weight.  He now has more weeks excessive daytime sleepiness and fatigue which may be reported associated with her chronic back pains and secondary leg movements and is currently in your medications that may be more helpful.  Certainly fragmentation of sleep can affect the alertness.  However when not sure what his older appliance is still doing but is supposed to address his obstructive apnea.  Discussed possibility of home sleep study with appliance then to see how the patient does.  We should discuss other causes of hypersomnia after the study if the study is negative.      Jeffrey Stark MD

## 2019-02-04 ENCOUNTER — OFFICE VISIT (OUTPATIENT)
Dept: OTOLARYNGOLOGY | Facility: CLINIC | Age: 54
End: 2019-02-04
Payer: COMMERCIAL

## 2019-02-04 VITALS
BODY MASS INDEX: 32.82 KG/M2 | OXYGEN SATURATION: 94 % | WEIGHT: 242 LBS | SYSTOLIC BLOOD PRESSURE: 135 MMHG | DIASTOLIC BLOOD PRESSURE: 95 MMHG | HEART RATE: 125 BPM

## 2019-02-04 DIAGNOSIS — G47.33 OSA (OBSTRUCTIVE SLEEP APNEA): ICD-10-CM

## 2019-02-04 DIAGNOSIS — G47.19 EXCESSIVE DAYTIME SLEEPINESS: Primary | ICD-10-CM

## 2019-02-04 PROCEDURE — 99203 OFFICE O/P NEW LOW 30 MIN: CPT | Performed by: OTOLARYNGOLOGY

## 2019-02-04 NOTE — LETTER
2/4/2019         RE: Bulmaro Herrera  [address]  [facility] [address]        Dear Colleague,    Thank you for referring your patient, Bulmaro Herrera, to the [facility]. Please see a copy of my visit note below.    ENT Consultation    Bulmaro Herrera is a 53 year old male who is seen in consultation at the request of .      History of Present Illness - Bulmaro Herrera is a 53 year old male here today for SOB on exertion and chronic cough  Patient has been diagnosed in 2009 with moderate obstructive sleep apnea.  He is tried CPAP in good tanner but could not tolerate.  Finally he was fitted with dental appliance and according to his wife does not snore.  However over time he started feeling more fatigued again more excessive daytime sleepiness.  He takes short naps oftentimes that helps.  He has little time to take naps.  Initially sleeps in for a longer period of time he still has issues.  Recently his chronic pain has been better managed with your medications.  Previously he was on Tylenol with codeine chronically but now is off it.  Again he feels a little bit better little bit more alert.  There is possibility of some leg movements but only related to chronic back issues and pain.  Body mass index is 32.82 kg/m .    Weight management plan: Patient was referred to their PCP to discuss a diet and exercise plan.    BP Readings from Last 1 Encounters:   01/25/19 118/90       BP noted to be elevated today in office.     Bulmaro IS NOT a smoker/uses chewing tobacco.      Past Medical History -   Past Medical History:   Diagnosis Date     Back pains      Depressive disorder, not elsewhere classified 1/28/2007    declines Rx     Disc degeneration 12/2008    see MRI -throaco-lumbar mild discogenic degenerative changes     Elevated LFT's 11/09    alt and ast     Other and unspecified hyperlipidemia 1/2007     Sleep disorder 9/09    CPAP     Tobacco use disorder        Current Medications -   Current  Outpatient Medications:      acetaminophen-codeine (TYLENOL #3) 300-30 MG tablet, Take 1 tablet by mouth nightly as needed for moderate pain, Disp: 60 tablet, Rfl: 0     amitriptyline (ELAVIL) 10 MG tablet, Start at 1 tab at bedtime for 3 days, then 2 tabs at bedtime for 3 days, then 3 tabs at bedtime., Disp: 90 tablet, Rfl: 1     atorvastatin (LIPITOR) 20 MG tablet, Take 1 tablet (20 mg) by mouth daily, Disp: 90 tablet, Rfl: 0     ibuprofen (ADVIL/MOTRIN) 200 MG tablet, Take 200 mg by mouth every 4 hours as needed for mild pain, Disp: , Rfl:      methocarbamol (ROBAXIN) 500 MG tablet, Take 1 tablet (500 mg) by mouth 3 times daily, Disp: 60 tablet, Rfl: 0     omeprazole (PRILOSEC) 40 MG DR capsule, Take 1 capsule (40 mg) by mouth daily, Disp: 90 capsule, Rfl: 3    Allergies -   Allergies   Allergen Reactions     Percocet [Oxycodone-Acetaminophen] Itching     Wasps [Hornets] Swelling       Social History -   Social History     Socioeconomic History     Marital status:      Spouse name: Not on file     Number of children: Not on file     Years of education: Not on file     Highest education level: Not on file   Social Needs     Financial resource strain: Not on file     Food insecurity - worry: Not on file     Food insecurity - inability: Not on file     Transportation needs - medical: Not on file     Transportation needs - non-medical: Not on file   Occupational History     Occupation:      Employer: SELF   Tobacco Use     Smoking status: Former Smoker     Packs/day: 1.50     Last attempt to quit: 2007     Years since quittin.9     Smokeless tobacco: Never Used   Substance and Sexual Activity     Alcohol use: No     Frequency: Never     Comment: rare     Drug use: No     Sexual activity: Yes     Partners: Female   Other Topics Concern     Parent/sibling w/ CABG, MI or angioplasty before 65F 55M? No   Social History Narrative    Dairy/d a lot, 1 gallon of milk per day plus cheese and  other servings     Caffeine 48 oz. coffee per day    Exercise none    Sunscreen used - No    Seatbelts used - Yes    Working smoke/CO detectors in the home - Yes    Guns stored in the home - No    Self Breast Exams - NA    Self Testicular Exam - No    Eye Exam up to date - No    Dental Exam up to date - Yes    Pap Smear up to date - NA    Mammogram up to date - NA    PSA up to date - No    Dexa Scan up to date - NA    Flex Sig / Colonoscopy up to date - No    Immunizations up to date - No    Abuse: Current or Past(Physical, Sexual or Emotional)- No    Do you feel safe in your environment - Yes    Tms,cma        Lives with spouse and her parents.       Family History -   Family History   Problem Relation Age of Onset     Breast Cancer Mother      Arthritis Mother         joint ?     Depression Father      Cancer Maternal Grandmother      C.A.D. Maternal Grandfather      Cardiovascular Maternal Grandfather      Gynecology Paternal Grandmother          giving birth     Prostate Cancer Paternal Grandfather      Genetic Disorder Brother         joint pain?     Depression Brother      Cardiovascular Son         congenital heart defect -       Prostate Cancer Other          age 70's     Diabetes Other        Review of Systems - As per HPI and PMHx, otherwise review of system review of the head and neck negative. Otherwise 10+ review systems negative.    Physical Exam  There were no vitals taken for this visit.  BMI: There is no height or weight on file to calculate BMI.    General - The patient is well nourished and well developed, and appears to have good nutritional status.  Alert and oriented to person and place, answers questions and cooperates with examination appropriately.    SKIN - No suspicious lesions or rashes.  Respiration - No respiratory distress.  Head and Face - Normocephalic and atraumatic, with no gross asymmetry noted of the contour of the facial features.  The facial nerve is intact,  with strong symmetric movements.    Voice and Breathing - The patient was breathing comfortably without the use of accessory muscles. The patients voice was clear and strong, and had appropriate pitch and quality.    Ears - Bilateral pinna and EACs with normal appearing overlying skin. Tympanic membrane intact with good mobility on pneumatic otoscopy bilaterally. Bony landmarks of the ossicular chain are normal. The tympanic membranes are normal in appearance. No retraction, perforation, or masses.  No fluid or purulence was seen in the external canal or the middle ear.     Eyes - Extraocular movements intact.  Sclera were not icteric or injected, conjunctiva were pink and moist.    Mouth - Examination of the oral cavity showed pink, healthy oral mucosa. No lesions or ulcerations noted.  The tongue was mobile and midline, and the dentition were in good condition.  Carrion tongue position 3 mild macroglossia noted.    Throat - The walls of the oropharynx were smooth, pink, moist, symmetric, and had no lesions or ulcerations.  The tonsillar pillars and soft palate were symmetric.  The uvula was midline on elevation.    Neck - Normal midline excursion of the laryngotracheal complex during swallowing.  Full range of motion on passive movement.  Palpation of the occipital, submental, submandibular, internal jugular chain, and supraclavicular nodes did not demonstrate any abnormal lymph nodes or masses.  The carotid pulse was palpable bilaterally.  Palpation of the thyroid was soft and smooth, with no nodules or goiter appreciated.  The trachea was mobile and midline.    Nose - External contour is symmetric, no gross deflection or scars.  Nasal mucosa is pink and moist with no abnormal mucus.  The septum was midline and non-obstructive, turbinates of normal size and position.  No polyps, masses, or purulence noted on examination.    Neuro - Nonfocal neuro exam is normal, CN 2 through 12 intact, normal gait and muscle  tone.      Performed in clinic today:  No procedures preformed in clinic today      A/P - Bulmaro ZAC Herrera is a 53 year old male history of moderate obstructive sleep apnea with older appliance that he has had for many years but never rechecked in the objective weight.  He now has more weeks excessive daytime sleepiness and fatigue which may be reported associated with her chronic back pains and secondary leg movements and is currently in your medications that may be more helpful.  Certainly fragmentation of sleep can affect the alertness.  However when not sure what his older appliance is still doing but is supposed to address his obstructive apnea.  Discussed possibility of home sleep study with appliance then to see how the patient does.  We should discuss other causes of hypersomnia after the study if the study is negative.      Jeffrey Stark MD    Again, thank you for allowing me to participate in the care of your patient.        Sincerely,        Jeffrey Stark MD, MD

## 2019-02-11 DIAGNOSIS — K21.9 GASTROESOPHAGEAL REFLUX DISEASE WITHOUT ESOPHAGITIS: ICD-10-CM

## 2019-02-12 DIAGNOSIS — E78.2 MIXED HYPERLIPIDEMIA: ICD-10-CM

## 2019-02-13 RX ORDER — ATORVASTATIN CALCIUM 20 MG/1
TABLET, FILM COATED ORAL
Qty: 90 TABLET | Refills: 1 | Status: SHIPPED | OUTPATIENT
Start: 2019-02-13 | End: 2019-05-30

## 2019-02-13 RX ORDER — OMEPRAZOLE 40 MG/1
CAPSULE, DELAYED RELEASE ORAL
Qty: 90 CAPSULE | Refills: 0 | OUTPATIENT
Start: 2019-02-13

## 2019-02-13 NOTE — TELEPHONE ENCOUNTER
Prilosec:  Sent 12/2/18 with 1 year supply. Refill not appropriate at this time.     Oliva Vargas, RN, BSN

## 2019-02-13 NOTE — TELEPHONE ENCOUNTER
"Requested Prescriptions   Pending Prescriptions Disp Refills     atorvastatin (LIPITOR) 20 MG tablet [Pharmacy Med Name: ATORVASTATIN CALCIUM 20MG TABS] 90 tablet 0    Last Written Prescription Date:  11/19/18  Last Fill Quantity: 90,  # refills: 0   Last office visit: 1/21/2019 with prescribing provider:     Future Office Visit:     Sig: TAKE ONE TABLET BY MOUTH ONCE DAILY    Statins Protocol Passed - 2/12/2019  9:30 AM       Passed - LDL on file in past 12 months    Recent Labs   Lab Test 11/13/18  0859   LDL Cannot estimate LDL when triglyceride exceeds 400 mg/dL  171*          Passed - No abnormal creatine kinase in past 12 months    No lab results found.          Passed - Recent (12 mo) or future (30 days) visit within the authorizing provider's specialty    Patient had office visit in the last 12 months or has a visit in the next 30 days with authorizing provider or within the authorizing provider's specialty.  See \"Patient Info\" tab in inbasket, or \"Choose Columns\" in Meds & Orders section of the refill encounter.             Passed - Medication is active on med list       Passed - Patient is age 18 or older      Prescription approved per [facility] Refill Protocol.  Amanda Shaw RN      "

## 2019-02-26 ENCOUNTER — OFFICE VISIT (OUTPATIENT)
Dept: SLEEP MEDICINE | Facility: CLINIC | Age: 54
End: 2019-02-26
Attending: FAMILY MEDICINE
Payer: COMMERCIAL

## 2019-02-26 ENCOUNTER — OFFICE VISIT (OUTPATIENT)
Dept: SLEEP MEDICINE | Facility: CLINIC | Age: 54
End: 2019-02-26
Payer: COMMERCIAL

## 2019-02-26 VITALS
HEIGHT: 72 IN | WEIGHT: 242 LBS | HEART RATE: 109 BPM | SYSTOLIC BLOOD PRESSURE: 135 MMHG | BODY MASS INDEX: 32.78 KG/M2 | OXYGEN SATURATION: 94 % | DIASTOLIC BLOOD PRESSURE: 89 MMHG

## 2019-02-26 DIAGNOSIS — R09.02 HYPOXEMIA: ICD-10-CM

## 2019-02-26 DIAGNOSIS — R09.02 HYPOXEMIA: Primary | ICD-10-CM

## 2019-02-26 DIAGNOSIS — G47.10 HYPERSOMNIA: Primary | ICD-10-CM

## 2019-02-26 DIAGNOSIS — G47.33 OSA (OBSTRUCTIVE SLEEP APNEA): ICD-10-CM

## 2019-02-26 PROCEDURE — 99213 OFFICE O/P EST LOW 20 MIN: CPT | Performed by: OTOLARYNGOLOGY

## 2019-02-26 RX ORDER — MODAFINIL 100 MG/1
100 TABLET ORAL DAILY
Qty: 30 TABLET | Refills: 1 | Status: SHIPPED | OUTPATIENT
Start: 2019-02-26 | End: 2019-06-18

## 2019-02-26 ASSESSMENT — MIFFLIN-ST. JEOR: SCORE: 1990.7

## 2019-02-26 NOTE — PROGRESS NOTES
Sleep Consultation:    Date on this visit: 2/26/2019    Bulmaro Herrera is sent by Sandor Serrano for a sleep consultation regarding he is obstructive sleep apnea..    Primary Physician: Sandor Serrano     Bulmaro wears dental appliance and has been wondering how that affects his sleep disordered breathing.  His concern is in spite of wearing his dental appliance and improved during CPAP he still variances excessive daytime sleepiness.  Results of the home sleep study was administered with dental device in fully titrated revealed AHI 4 with 0 obstructive sleep apnea 1 mixed apnea and then 35 help apneas in total.  The only significant abnormality was presence of hypoxemia with SPO2 at or below 88% equal to 19 minutes.  SPO2 dari was 83%.  He denies any pulmonary issues was a smoker but quit 11 years ago.  Denies any shortness of breath.  He is not snoring with a dental appliance in.    Allergies:    Allergies   Allergen Reactions     Percocet [Oxycodone-Acetaminophen] Itching     Wasps [Hornets] Swelling       Medications:    Current Outpatient Medications   Medication Sig Dispense Refill     acetaminophen-codeine (TYLENOL #3) 300-30 MG tablet Take 1 tablet by mouth nightly as needed for moderate pain 60 tablet 0     amitriptyline (ELAVIL) 10 MG tablet Start at 1 tab at bedtime for 3 days, then 2 tabs at bedtime for 3 days, then 3 tabs at bedtime. 90 tablet 1     atorvastatin (LIPITOR) 20 MG tablet TAKE ONE TABLET BY MOUTH ONCE DAILY 90 tablet 1     ibuprofen (ADVIL/MOTRIN) 200 MG tablet Take 200 mg by mouth every 4 hours as needed for mild pain       omeprazole (PRILOSEC) 40 MG DR capsule Take 1 capsule (40 mg) by mouth daily 90 capsule 3       Problem List:  Patient Active Problem List    Diagnosis Date Noted     Severe needle phobia 01/21/2019     Priority: Medium     Class: Chronic     likely to preclude him from having minimally invasive interventional pain procedures with minimal sedation. Would need  deeper MAC sedation       Mixed hyperlipidemia 11/13/2018     Priority: Medium     ROBBY (obstructive sleep apnea) 11/13/2018     Priority: Medium     Herniation of intervertebral disc of cervical spine without radiculopathy 03/21/2017     Priority: Medium     Cervicalgia 03/21/2017     Priority: Medium     Chronic pain syndrome 04/05/2016     Priority: Medium     DDD, cervical. T#3, #60/mo.        DDD (degenerative disc disease), lumbar 12/20/2013     Priority: Medium     DDD (degenerative disc disease), cervical 12/20/2013     Priority: Medium     Elevated liver enzymes 05/14/2012     Priority: Medium     Pruritus ani 01/20/2010     Priority: Medium     Back pains      Priority: Medium     Problem list name updated by automated process. Provider to review and confirm       Esophageal reflux 04/13/2005     Priority: Medium        Past Medical/Surgical History:  Past Medical History:   Diagnosis Date     Back pains      Depressive disorder, not elsewhere classified 1/28/2007    declines Rx     Disc degeneration 12/2008    see MRI -throaco-lumbar mild discogenic degenerative changes     Elevated LFT's 11/09    alt and ast     Other and unspecified hyperlipidemia 1/2007     Sleep disorder 9/09    CPAP     Tobacco use disorder      Past Surgical History:   Procedure Laterality Date     C NONSPECIFIC PROCEDURE      rt hand surgery - laceration/glass/tendons     COLONOSCOPY  07/07/08    adenomatous polyp repeat 5 years     COLONOSCOPY N/A 12/29/2017    Procedure: COMBINED COLONOSCOPY, SINGLE OR MULTIPLE BIOPSY/POLYPECTOMY BY BIOPSY;  Colonoscopy, Polypectomy by Biopsy;  Surgeon: Matt Reyes MD;  Location:  GI     ENT SURGERY      for deviated septum     EXCISE LESION AXILLA  12/7/2018    Procedure: Excision Skin Tags Right Axilla;  Surgeon: Ayaan Chaney MD;  Location: [facility] OR     EXCISE LESION BACK N/A 12/7/2018    Procedure: Excision Back Skin Lesion X Two;  Surgeon: Ayaan Chaney MD;  Location:  OR     HC  ECHO HEART XTHORACIC, STRESS/REST  2009    neg     INJECT EPIDURAL CERVICAL N/A 2015    Procedure: INJECT EPIDURAL CERVICAL;  Surgeon: Christian Chaudhry MD;  Location: [facility] OR     INJECT EPIDURAL CERVICAL N/A 2018    Procedure: INJECT EPIDURAL CERVICAL 6-7;  Surgeon: Christian Chaudhry MD;  Location: [facility] OR     INJECT FACET JOINT Bilateral 2019    Procedure: Cervical Facet Injections Cervical 5-6 and 6-7 Bilateral;  Surgeon: Christian Chaudhry MD;  Location: [facility] OR     ORTHOPEDIC SURGERY      Right hand     TONSILLECTOMY         Social History:  Social History     Socioeconomic History     Marital status:      Spouse name: Not on file     Number of children: Not on file     Years of education: Not on file     Highest education level: Not on file   Occupational History     Occupation:      Employer: SELF   Social Needs     Financial resource strain: Not on file     Food insecurity:     Worry: Not on file     Inability: Not on file     Transportation needs:     Medical: Not on file     Non-medical: Not on file   Tobacco Use     Smoking status: Former Smoker     Packs/day: 1.50     Last attempt to quit: 2007     Years since quittin.0     Smokeless tobacco: Never Used   Substance and Sexual Activity     Alcohol use: No     Frequency: Never     Comment: rare     Drug use: No     Sexual activity: Yes     Partners: Female   Lifestyle     Physical activity:     Days per week: Not on file     Minutes per session: Not on file     Stress: Not on file   Relationships     Social connections:     Talks on phone: Not on file     Gets together: Not on file     Attends Judaism service: Not on file     Active member of club or organization: Not on file     Attends meetings of clubs or organizations: Not on file     Relationship status: Not on file     Intimate partner violence:     Fear of current or ex partner: Not on file     Emotionally abused: Not on file     Physically abused: Not on file      Forced sexual activity: Not on file   Other Topics Concern     Parent/sibling w/ CABG, MI or angioplasty before 65F 55M? No   Social History Narrative    Dairy/d a lot, 1 gallon of milk per day plus cheese and other servings     Caffeine 48 oz. coffee per day    Exercise none    Sunscreen used - No    Seatbelts used - Yes    Working smoke/CO detectors in the home - Yes    Guns stored in the home - No    Self Breast Exams - NA    Self Testicular Exam - No    Eye Exam up to date - No    Dental Exam up to date - Yes    Pap Smear up to date - NA    Mammogram up to date - NA    PSA up to date - No    Dexa Scan up to date - NA    Flex Sig / Colonoscopy up to date - No    Immunizations up to date - No    Abuse: Current or Past(Physical, Sexual or Emotional)- No    Do you feel safe in your environment - Yes    Tms,cma        Lives with spouse and her parents.       Family History:  Family History   Problem Relation Age of Onset     Breast Cancer Mother      Arthritis Mother         joint ?     Depression Father      Cancer Maternal Grandmother      C.A.D. Maternal Grandfather      Cardiovascular Maternal Grandfather      Gynecology Paternal Grandmother          giving birth     Prostate Cancer Paternal Grandfather      Genetic Disorder Brother         joint pain?     Depression Brother      Cardiovascular Son         congenital heart defect -       Prostate Cancer Other          age 70's     Diabetes Other        Review of Systems:  A complete review of systems reviewed by me is negative with the exeption of what has been mentioned in the history of present illness.  CONSTITUTIONAL: NEGATIVE for weight gain/loss, fever, chills, sweats or night sweats, drug allergies.  EYES: NEGATIVE for changes in vision, blind spots, double vision.  ENT: NEGATIVE for ear pain, sore throat, sinus pain, post-nasal drip, runny nose, bloody nose  CARDIAC: NEGATIVE for fast heartbeats or fluttering in chest, chest pain or  pressure, breathlessness when lying flat, swollen legs or swollen feet.  NEUROLOGIC: NEGATIVE headaches, weakness or numbness in the arms or legs.  DERMATOLOGIC: NEGATIVE for rashes, new moles or change in mole(s)  PULMONARY: NEGATIVE SOB at rest, SOB with activity, dry cough, productive cough, coughing up blood, wheezing or whistling when breathing.    GASTROINTESTINAL: NEGATIVE for nausea or vomitting, loose or watery stools, fat or grease in stools, constipation, abdominal pain, bowel movements black in color or blood noted.  GENITOURINARY: NEGATIVE for pain during urination, blood in urine, urinating more frequently than usual, irregular menstrual periods.  MUSCULOSKELETAL: NEGATIVE for muscle pain, bone or joint pain, swollen joints.  ENDOCRINE: NEGATIVE for increased thirst or urination, diabetes.  LYMPHATIC: NEGATIVE for swollen lymph nodes, lumps or bumps in the breasts or nipple discharge.    Physical Examination:  Vitals: There were no vitals taken for this visit.  BMI= There is no height or weight on file to calculate BMI.         Mode Total Score 7/22/2014   Total score - Mode 0            Impression/Plan:    Patient with excessive daytime sleepiness in spite of optimal treatment of obstructive sleep apnea.  Questionable hypoxemia present.  No specific source of that.  He does use other medications for his chronic pains but quit using codeine and uses only amitriptyline nightly.  That should not be producing excessive daytime sleepiness during the day.  Will obtain the home oximetry with a dental device in to see if indeed this hypoxemia is reproducible.  If so then he may need further evaluation with pulmonology pulmonary function tests.  Otherwise would like him to try Provigil 100 mg daily in the morning to see if that improves his alertness.  I will follow-up with  virtual visit in a few weeks.  Polysomnography reviewed.  Obstructive sleep apnea reviewed.  Complications of untreated sleep apnea  were reviewed.    Jeffrey Stark MD      CC: Sandor Serrano

## 2019-02-27 ENCOUNTER — TELEPHONE (OUTPATIENT)
Dept: FAMILY MEDICINE | Facility: CLINIC | Age: 54
End: 2019-02-27

## 2019-02-27 NOTE — TELEPHONE ENCOUNTER
Prior Authorization Retail Medication Request    Medication/Dose: Modafinil [id number]-- MEDICATION PRESCRIBED VIA SLEEP SPECIALIST, DR. BOUDREAUX  ICD code (if different than what is on RX):  Daytime somnolence, decreased alertness   Previously Tried and Failed:    Rationale:      Insurance Name:  BIN: [id number], N: [id number]  Insurance ID:  [id number]      Pharmacy Information (if different than what is on RX)  Name:    Phone:

## 2019-02-27 NOTE — TELEPHONE ENCOUNTER
[facility] Prior Authorization Team Request    Medication: Modafinil [id number]  Dosing:   NDC (required for Medicaid members):     Insurance   BIN: [id number]  PCN: [id number]  Grp:   ID: [id number]    CoverMyMeds Key (if applicable):     Additional documentation:     Filling Pharmacy: [facility]  Phone Number: [phone]  Contact: SUSHMA PHARM GREGORIA PA ([id number]) please send all responses to this pool.  Pharmacy NPI (required for Medicaid members):[id number]

## 2019-02-28 NOTE — TELEPHONE ENCOUNTER
Sorry for the delay.  I would recommend that he does visit Dr. Serrano to order the EMG.  In the future I will be at the hospital more consistently every week but I am only there once every 2 weeks currently.  It makes this much harder for me to follow up with diagnostic results in regards to interacting with the patient's.

## 2019-02-28 NOTE — TELEPHONE ENCOUNTER
If Dr. Serrano is not comfortable ordering the EMG I would recommend a neurology consultation.  I do think he has symptoms that need to be worked up further.  I would leave it up to the neurologist to determine if an EMG would be appropriate.  My issue with ordering the EMG is I do not have a clinic that Bulmaro can follow-up with to discuss the results of the EMG.

## 2019-03-04 DIAGNOSIS — M50.30 DDD (DEGENERATIVE DISC DISEASE), CERVICAL: ICD-10-CM

## 2019-03-04 DIAGNOSIS — M51.369 DDD (DEGENERATIVE DISC DISEASE), LUMBAR: ICD-10-CM

## 2019-03-04 NOTE — TELEPHONE ENCOUNTER
Central Prior Authorization Team   Phone: [phone]    PA Initiation    Medication: Modafinil [id number]  Insurance Company: [id number] - Phone [phone] Fax [phone]  Pharmacy Filling the Rx: [address]   Filling Pharmacy Phone: [phone]  Filling Pharmacy Fax: [phone]  Start Date: 3/4/2019

## 2019-03-04 NOTE — TELEPHONE ENCOUNTER
Fax received from [facility] requesting a refill of amitriptyline (ELAVIL) 10 MG tablet on behalf of Bulmaro Herrera.  Last refill: 11/30/2018  # 90 with 1 refills at [facility].  Last office visit:  1/21/2019  Next office visit:  N/A    This is an appropriate refill, and has been e-prescribed. Lashonda Puga, CMA

## 2019-03-05 DIAGNOSIS — M79.18 MYOFASCIAL PAIN: ICD-10-CM

## 2019-03-05 RX ORDER — AMITRIPTYLINE HYDROCHLORIDE 10 MG/1
TABLET ORAL
Qty: 90 TABLET | Refills: 1 | Status: SHIPPED | OUTPATIENT
Start: 2019-03-05 | End: 2019-05-23

## 2019-03-05 RX ORDER — METHOCARBAMOL 500 MG/1
500 TABLET, FILM COATED ORAL 3 TIMES DAILY
Qty: 60 TABLET | Refills: 0 | Status: SHIPPED | OUTPATIENT
Start: 2019-03-05 | End: 2019-05-24

## 2019-03-05 NOTE — TELEPHONE ENCOUNTER
Needs today for a vacation please.     Thanks  Berta Katz [facility]   [phone]

## 2019-03-05 NOTE — TELEPHONE ENCOUNTER
Needs today please, for vacation     Thanks  Berta Katz [facility]   [phone]

## 2019-03-05 NOTE — TELEPHONE ENCOUNTER
Refill signed. Patient will need an appointment for any future refills.     Jacque Kay PA-C on 3/5/2019 at 9:28 AM

## 2019-03-05 NOTE — TELEPHONE ENCOUNTER
"Requested Prescriptions   Pending Prescriptions Disp Refills     amitriptyline (ELAVIL) 10 MG tablet 90 tablet 1    Last Written Prescription Date:  11/30/18  Last Fill Quantity: 90,  # refills: 1   Last office visit: 1/21/2019 with prescribing provider:     Future Office Visit:     Sig: Start at 1 tab at bedtime for 3 days, then 2 tabs at bedtime for 3 days, then 3 tabs at bedtime.    Tricyclic Agents ( Annual appt and no PHQ9) Passed - 3/5/2019  9:10 AM       Passed - Blood Pressure under 140/90 in past 12 mos    BP Readings from Last 3 Encounters:   02/26/19 135/89   02/04/19 (!) 135/95   01/25/19 118/90                Passed - Recent (12 mo) or future (30 days) visit within authorizing provider's specialty    Patient had office visit in the last 12 months or has a visit in the next 30 days with authorizing provider or within the authorizing provider's specialty.  See \"Patient Info\" tab in inbasket, or \"Choose Columns\" in Meds & Orders section of the refill encounter.             Passed - Medication is active on med list       Passed - Patient is age 18 or older        Rx refilled per RN protocol.  OBDULIO Easley, RN    "

## 2019-03-05 NOTE — TELEPHONE ENCOUNTER
Patient contacted VIA phone. Informed or medication refill and asked to follow up with provider as soon as convenient.    He was agreeable and appreciative of the phone call.  Lashonda Puga [facility]

## 2019-03-06 NOTE — PROGRESS NOTES
Your patient Bulmaro Herrera [id number] overnight oximetry is complete and scanned into EPIC. No charge for this procedure Thanks Arlene

## 2019-03-06 NOTE — TELEPHONE ENCOUNTER
Prior Authorization Approval    Authorization Effective Date: 3/5/2019  Authorization Expiration Date: 9/1/2019  Medication: Modafinil [address]-82  Approved Dose/Quantity: 30  Reference #: [id number]   Insurance Company: [employer] - Phone [phone] Fax [phone]  Which Pharmacy is filling the prescription (Not needed for infusion/clinic administered): [facility] [address]   Pharmacy Notified: Yes  Patient Notified: Yes pt picked this medication up on 3/5/19

## 2019-03-13 NOTE — PROGRESS NOTES
EPIC Screening Oximetry Report   URGENT SLEEP STUDY REFERRAL FOR CHF PATIENTS WITH EF 35-50%  FAX WITH TRACINGS TO SLEEP CENTER    This study identifies severity, pattern and frequency of hypoxemia and cannot exclude mild sleep apnea or and may not identified sleep related breathing abnormalities in patients on oxygen or positive airway pressure devices.    Report March 13, 2019  Recording date Feb. 26, 19  Name Bulmaro Herrera  MRN [id number]           REQUIRED FOR URGENT REFERRAL         Meets criteria?  1. ?  Duration of data collection: 10 hours [> 2 hours continuous artifact free]  2. ?  Condition:    ra           [room air]  3. ?  4% O2 desat index:   7.8/ hour  [>15/hour ]  4. ?  Pattern               episodic     [episodic]      Episodic      (Obstructive sleep apnea, Cheyne Beckwith)    Sustained    (Hypoventilation, Lung disease)      SpO2  <88%                          9.8min       [possible requirement]

## 2019-03-25 NOTE — PROGRESS NOTES
"[facility]    CHIEF COMPLAINT:   Pain  -Neck   -low back pain    INTERVAL HISTORY:  Last seen on 1/14/2019.        Recommendations/plan at the last visit included:  1.  Physical Therapy:  We will revisit this in the future  2.  Clinical Health Psychologist to address issues of relaxation, behavorial change, coping style, and other factors important to improvement: we will revisit this in the future  3.  Diagnostic Studies:  Bilateral lower extremity EMG, ? peripheral neuropathy vs proximal radiculopathy  4.  Medication Management:               1. Start Amitriptyline as previously recommended by Dr. Serrano              2. Robaxin 500mg TID as needed for muscle pain/spasms              3. Patient is interested in coming off of the Tylenol #3, however when he has tried in the past he has become very ill. Will have him try 1/2 tab at bedtime for 1 week, then 1/2 tab every other day for 1 week, then stop.    5.  Potential procedures: Discussed cervical facet joint injections vs medial branch blocks. Patient has a strong needle phobia. He is aware that both take multiple needles. He is currently uncomfortable at the thought of the medial branch blocks, but would like to try the facet steroid injections. Order placed.    6. Recommendations to PCP: see above     Follow up: 4 Weeks, sooner if needed     Since his last visit, Bulmaro Herrera reports:  - his pain is better.     He had cervical facet joint injections C5-6, C6-7 bilateral on 1/25/2019 with Dr. Chaudhry. He states that these injections were a \"life saver\". He is sleeping better. He is still getting relief from them.     He is unsure if the Robaxin is helpful, however he does state that he is not noticing the muscle twitching anymore. He neck doesn't ache as much and his low back is better. The pain in his feet is better as well with the Amitriptyline. He has stopped the codeine as well.     Pain Information:   Pain quality: Nagging    Pain " rating: intensity ranges from 2/10 to 5/10, and averages 4/10 on a 0-10 scale.   Pain today 4/10      CURRENT RELEVANT PAIN MEDICATIONS:   Amitriptyline-taking 3 at night  Ibuprofen-200mg, 1 at night  Robaxin 500mg-taking at night    Patient is using the medication as prescribed:  YES  Is your medication helpful? YES   Medication side effects? no side effect    Previous Medications: (H--helped; HI--Helped initially; SWH-- somewhat helpful, NH--No help; W--worse; SE--side effects)   Opiates: Tylenol #3 H SE wake up like with a hangover, dizzy  NSAIDS: Ibuprofen H  Muscle Relaxants: Flexeril NH SE sedation  Anti-migraine mediations: none  Anti-depressants: SE after coming off of them, feels like his personality was altered from them. While on them, felt like head was in a cloud. Amitriptyline ?   Sleep aids:None  Anxiolytics: None  Neuropathics: Gabapentin SE restless legs     Topicals: Lidocaine Patches SWH   Other medications not covered above:  Prednisone H    Past Pain Treatments:  Pain Clinic:   No   PT: Yes for neck. Helped for about 1 day. Was doing exercises at home.   Psychologist: No  Relaxation techniques/biofeedback: No  Chiropractor: Yes, helps for 1/2 to 1 day  Acupuncture: Yes, NH one needle caused SE of significantly increased pain  Pharmacotherapy:          Opioids: Yes            Non-opioids:    Yes   TENs Unit: Yes, tried at chiropractor, makes more tense  Injections: Yes, C6-7 interlaminar epidural steroid injection 12/28/2018, had a pain flare for 1 week, then maybe it's been slightly helpful. 2-3 years ago, low back injection. Neither were very helpful.   Self-care:   Yes, tried [place] Gel  Surgeries related to pain: No     [place] Board of Pharmacy Data Base Reviewed:    YES; As expected, no concern for misuse/abuse of controlled medications based on this report.      Medications:  Current Outpatient Medications   Medication Sig Dispense Refill     acetaminophen-codeine (TYLENOL #3) 300-30 MG  "tablet Take 1 tablet by mouth nightly as needed for moderate pain (Patient not taking: Reported on 2/26/2019) 60 tablet 0     amitriptyline (ELAVIL) 10 MG tablet 3 tabs at bedtime. 90 tablet 1     atorvastatin (LIPITOR) 20 MG tablet TAKE ONE TABLET BY MOUTH ONCE DAILY 90 tablet 1     ibuprofen (ADVIL/MOTRIN) 200 MG tablet Take 200 mg by mouth every 4 hours as needed for mild pain       methocarbamol (ROBAXIN) 500 MG tablet Take 1 tablet (500 mg) by mouth 3 times daily 60 tablet 0     modafinil (PROVIGIL) 100 MG tablet Take 1 tablet (100 mg) by mouth daily 30 tablet 1     omeprazole (PRILOSEC) 40 MG DR capsule Take 1 capsule (40 mg) by mouth daily 90 capsule 3       Review of Systems: A 10-point review of systems was negative, with the exception of chronic pain issues.      Social History: Reviewed; unchanged from previous consultation.      Family history: Reviewed; unchanged from previous consultation.     PHYSICAL EXAM:     Vitals:   /84   Temp 97.3  F (36.3  C) (Temporal)   Ht 1.829 m (6')   Wt 111.1 kg (245 lb)   BMI 33.23 kg/m    Body mass index is 33.23 kg/m .  6' 0\"  245 lbs 0 oz      Constitutional: healthy, alert and no distress  HEENT: Head atraumatic, normocephalic. Eyes without conjunctival injection or jaundice. Neck supple. No obvious neck masses.  Skin: No rash, lesions, or petechiae of exposed skin.   Psychiatric/mental status: Alert, without lethargy or stupor. Appropriate affect. Mood normal.   DIAGNOSTIC TESTS:  Imaging Studies:   No new imaging to review    Assessment:  Bulmaro Herrera is a 53 year old male who presents today for follow up regarding his:    1.  Cervical facet joint pain  2.  Myofascial pain  3.  Low back pain    Pain is significantly better after the cervical facet joint injections. His muscle have been doing better with the Robaxin at night as well. Amitriptyline is working well for him, especially for the burning in his feet. Overall, he has continued improvement. "     Plan:    Diagnosis reviewed, treatment option addressed, and risk/benifits discussed.  Self-care instructions given.  I am recommending a multidisciplinary treatment plan to help this patient better manage pain.      1. Physical Therapy:  NO   2. Clinical Health Psychologist:  NO    3. Diagnostic Studies:  none  4. Medication Management:  Continue current medications  5. Further procedures recommended: repeat cervical facet joint injections. We could try lumbar facet joint injections as well  6. Recommendations to PCP. Returning patient back, pain is under control      Follow up with this provider:  PRN     Total time spent face to face was 15 minutes and more than 50% of face to face time was spent in counseling and/or coordination of care regarding the diagnosis and recommendations above.      Jacque Kay PA-C   [facility]

## 2019-03-27 ENCOUNTER — OFFICE VISIT (OUTPATIENT)
Dept: PODIATRY | Facility: CLINIC | Age: 54
End: 2019-03-27
Payer: COMMERCIAL

## 2019-03-27 VITALS
SYSTOLIC BLOOD PRESSURE: 116 MMHG | TEMPERATURE: 97.3 F | HEIGHT: 72 IN | BODY MASS INDEX: 33.18 KG/M2 | DIASTOLIC BLOOD PRESSURE: 84 MMHG | WEIGHT: 245 LBS

## 2019-03-27 DIAGNOSIS — M54.2 PAIN OF CERVICAL FACET JOINT: Primary | ICD-10-CM

## 2019-03-27 DIAGNOSIS — M54.50 CHRONIC BILATERAL LOW BACK PAIN WITHOUT SCIATICA: ICD-10-CM

## 2019-03-27 DIAGNOSIS — M79.18 MYOFASCIAL PAIN: ICD-10-CM

## 2019-03-27 DIAGNOSIS — G89.29 CHRONIC BILATERAL LOW BACK PAIN WITHOUT SCIATICA: ICD-10-CM

## 2019-03-27 PROCEDURE — 99213 OFFICE O/P EST LOW 20 MIN: CPT | Performed by: PHYSICIAN ASSISTANT

## 2019-03-27 ASSESSMENT — PAIN SCALES - GENERAL: PAINLEVEL: MODERATE PAIN (4)

## 2019-03-27 ASSESSMENT — MIFFLIN-ST. JEOR: SCORE: 2004.31

## 2019-03-27 NOTE — PATIENT INSTRUCTIONS
After Visit Instructions:     Thank you for coming to [facility] for your care. It is my goal to partner with you to help you reach your optimal state of health.     I am recommending multidisciplinary care at this time.  The focus of care will be to continue gradual rehabilitation and pain management with medication adjustments as needed.    Continue daily self-care, identifying contributing factors, and monitoring variations in pain level. Continue to integrate self-care into your life.      1. Schedule follow-up with Jacque Kay PA-C as needed. You will need to make this appointment.   2. Procedures recommended: we can repeat the facet joint injections as needed   3. Medication recommendations:   1. Continue the Amitriptyline and Robaxin as you have been doing      Jacque Kay PA-C  [facility]    Contact information: [facility]  Clinic Number:  [phone]   Call this number with any questions about your care and for scheduling assistance. Calls are returned Monday through Friday between 8 AM and 4:30 PM. We usually get back to you within 2 business days depending on the issue/request.           Medication Refills Policy:    For non-narcotic medications, please your pharmacy directly to request a refill and the pharmacy will call the [facility] for authorization. Please allow 3-4 days for these refills.    For narcotic refills, call the nurse triage line and leave a message requesting your refill along with the name of the pharmacy that you use. Narcotic prescriptions will be mailed to your pharmacy or you may pick them up at the clinic.  Please call 7-10 days before your refill is due  The above policy allows adequate time so that you do not run out of medication.    No Show - Late Cancellation - Late Arrival Policy  We believe regular attendance is key to your success in our program.    Any time you  are unable to keep your appointment we ask that you call us at least 24 hours in advance to let us know. This will allow us to offer the appointment time to another patient. The following is our policy for missed appointments. This also applies to appointments cancelled with less than 24 hours notice.    After missing 3 appointments without calling first, we will cancel all of your future appointments at [facility].    At that point, you will not be able to resume services unless approved by your care team  We understand that unforseen circumstances arise, however, out of respect for all concerned and to provide this appointment to another patient, this policy will be enforced.    Please note that most follow up appointments are 30 minutes long. If you arrive late, your provider may not be able to see you for the entire 30 minutes. Please also note that if you arrive more than 15 minutes for any appointment, it may be rescheduled.

## 2019-03-27 NOTE — LETTER
"    3/27/2019         RE: Bulmaro Herrera  [address]  [place] [facility] [address]        Dear Colleague,    Thank you for referring your patient, Bulmaro Herrera, to the [facility]. Please see a copy of my visit note below.    [facility]    CHIEF COMPLAINT:   Pain  -Neck   -low back pain    INTERVAL HISTORY:  Last seen on 1/14/2019.        Recommendations/plan at the last visit included:  1.  Physical Therapy:  We will revisit this in the future  2.  Clinical Health Psychologist to address issues of relaxation, behavorial change, coping style, and other factors important to improvement: we will revisit this in the future  3.  Diagnostic Studies:  Bilateral lower extremity EMG, ? peripheral neuropathy vs proximal radiculopathy  4.  Medication Management:               1. Start Amitriptyline as previously recommended by Dr. Serrano              2. Robaxin 500mg TID as needed for muscle pain/spasms              3. Patient is interested in coming off of the Tylenol #3, however when he has tried in the past he has become very ill. Will have him try 1/2 tab at bedtime for 1 week, then 1/2 tab every other day for 1 week, then stop.    5.  Potential procedures: Discussed cervical facet joint injections vs medial branch blocks. Patient has a strong needle phobia. He is aware that both take multiple needles. He is currently uncomfortable at the thought of the medial branch blocks, but would like to try the facet steroid injections. Order placed.    6. Recommendations to PCP: see above     Follow up: 4 Weeks, sooner if needed     Since his last visit, Bulmaro Herrera reports:  - his pain is better.     He had cervical facet joint injections C5-6, C6-7 bilateral on 1/25/2019 with Dr. Chaudhry. He states that these injections were a \"life saver\". He is sleeping better. He is still getting relief from them.     He is unsure if the Robaxin is helpful, however he does state that he is not noticing the " muscle twitching anymore. He neck doesn't ache as much and his low back is better. The pain in his feet is better as well with the Amitriptyline. He has stopped the codeine as well.     Pain Information:   Pain quality: Nagging    Pain rating: intensity ranges from 2/10 to 5/10, and averages 4/10 on a 0-10 scale.   Pain today 4/10      CURRENT RELEVANT PAIN MEDICATIONS:   Amitriptyline-taking 3 at night  Ibuprofen-200mg, 1 at night  Robaxin 500mg-taking at night    Patient is using the medication as prescribed:  YES  Is your medication helpful? YES   Medication side effects? no side effect    Previous Medications: (H--helped; HI--Helped initially; SWH-- somewhat helpful, NH--No help; W--worse; SE--side effects)   Opiates: Tylenol #3 H SE wake up like with a hangover, dizzy  NSAIDS: Ibuprofen H  Muscle Relaxants: Flexeril NH SE sedation  Anti-migraine mediations: none  Anti-depressants: SE after coming off of them, feels like his personality was altered from them. While on them, felt like head was in a cloud. Amitriptyline ?   Sleep aids:None  Anxiolytics: None  Neuropathics: Gabapentin SE restless legs     Topicals: Lidocaine Patches SWH   Other medications not covered above:  Prednisone H    Past Pain Treatments:  Pain Clinic:   No   PT: Yes for neck. Helped for about 1 day. Was doing exercises at home.   Psychologist: No  Relaxation techniques/biofeedback: No  Chiropractor: Yes, helps for 1/2 to 1 day  Acupuncture: Yes, NH one needle caused SE of significantly increased pain  Pharmacotherapy:          Opioids: Yes            Non-opioids:    Yes   TENs Unit: Yes, tried at chiropractor, makes more tense  Injections: Yes, C6-7 interlaminar epidural steroid injection 12/28/2018, had a pain flare for 1 week, then maybe it's been slightly helpful. 2-3 years ago, low back injection. Neither were very helpful.   Self-care:   Yes, tried [place] Gel  Surgeries related to pain: No     [place] Board of Pharmacy Data Base  "Reviewed:    YES; As expected, no concern for misuse/abuse of controlled medications based on this report.      Medications:  Current Outpatient Medications   Medication Sig Dispense Refill     acetaminophen-codeine (TYLENOL #3) 300-30 MG tablet Take 1 tablet by mouth nightly as needed for moderate pain (Patient not taking: Reported on 2/26/2019) 60 tablet 0     amitriptyline (ELAVIL) 10 MG tablet 3 tabs at bedtime. 90 tablet 1     atorvastatin (LIPITOR) 20 MG tablet TAKE ONE TABLET BY MOUTH ONCE DAILY 90 tablet 1     ibuprofen (ADVIL/MOTRIN) 200 MG tablet Take 200 mg by mouth every 4 hours as needed for mild pain       methocarbamol (ROBAXIN) 500 MG tablet Take 1 tablet (500 mg) by mouth 3 times daily 60 tablet 0     modafinil (PROVIGIL) 100 MG tablet Take 1 tablet (100 mg) by mouth daily 30 tablet 1     omeprazole (PRILOSEC) 40 MG DR capsule Take 1 capsule (40 mg) by mouth daily 90 capsule 3       Review of Systems: A 10-point review of systems was negative, with the exception of chronic pain issues.      Social History: Reviewed; unchanged from previous consultation.      Family history: Reviewed; unchanged from previous consultation.     PHYSICAL EXAM:     Vitals:   /84   Temp 97.3  F (36.3  C) (Temporal)   Ht 1.829 m (6')   Wt 111.1 kg (245 lb)   BMI 33.23 kg/m     Body mass index is 33.23 kg/m .  6' 0\"  245 lbs 0 oz      Constitutional: healthy, alert and no distress  HEENT: Head atraumatic, normocephalic. Eyes without conjunctival injection or jaundice. Neck supple. No obvious neck masses.  Skin: No rash, lesions, or petechiae of exposed skin.   Psychiatric/mental status: Alert, without lethargy or stupor. Appropriate affect. Mood normal.   DIAGNOSTIC TESTS:  Imaging Studies:   No new imaging to review    Assessment:  Bulmaro Herrera is a 53 year old male who presents today for follow up regarding his:    1.  Cervical facet joint pain  2.  Myofascial pain  3.  Low back pain    Pain is significantly " better after the cervical facet joint injections. His muscle have been doing better with the Robaxin at night as well. Amitriptyline is working well for him, especially for the burning in his feet. Overall, he has continued improvement.     Plan:    Diagnosis reviewed, treatment option addressed, and risk/benifits discussed.  Self-care instructions given.  I am recommending a multidisciplinary treatment plan to help this patient better manage pain.      1. Physical Therapy:  NO   2. Clinical Health Psychologist:  NO    3. Diagnostic Studies:  none  4. Medication Management:  Continue current medications  5. Further procedures recommended: repeat cervical facet joint injections. We could try lumbar facet joint injections as well  6. Recommendations to PCP. Returning patient back, pain is under control      Follow up with this provider:  PRN     Total time spent face to face was 15 minutes and more than 50% of face to face time was spent in counseling and/or coordination of care regarding the diagnosis and recommendations above.      Jacque Kay PA-C   [facility]          Again, thank you for allowing me to participate in the care of your patient.        Sincerely,        Jacque Kay PA-C

## 2019-05-13 NOTE — TELEPHONE ENCOUNTER
Script brought to [facility] pharmacy. Anabela Kenyon LPN      
T3      Last Written Prescription Date: 06/06/2017  Last Fill Quantity: 60,  # refills: 0   Last Office Visit with [facility], [facility] or  Health prescribing provider: 02/18/2016    Thanks  Berta Katz [facility]   [phone]                                                   
none

## 2019-05-23 ENCOUNTER — MYC REFILL (OUTPATIENT)
Dept: FAMILY MEDICINE | Facility: CLINIC | Age: 54
End: 2019-05-23

## 2019-05-23 ENCOUNTER — MYC REFILL (OUTPATIENT)
Dept: FAMILY MEDICINE | Facility: OTHER | Age: 54
End: 2019-05-23

## 2019-05-23 DIAGNOSIS — M51.369 DDD (DEGENERATIVE DISC DISEASE), LUMBAR: ICD-10-CM

## 2019-05-23 DIAGNOSIS — M79.18 MYOFASCIAL PAIN: ICD-10-CM

## 2019-05-23 DIAGNOSIS — M50.30 DDD (DEGENERATIVE DISC DISEASE), CERVICAL: ICD-10-CM

## 2019-05-23 RX ORDER — METHOCARBAMOL 500 MG/1
500 TABLET, FILM COATED ORAL 3 TIMES DAILY
Qty: 60 TABLET | Refills: 0 | Status: CANCELLED | OUTPATIENT
Start: 2019-05-23

## 2019-05-23 NOTE — TELEPHONE ENCOUNTER
Amanda message from patient on 5/23 at 1043:    Bulmarolanette Herrera would like a refill of the following medications:         methocarbamol (ROBAXIN) 500 MG tablet [Jacque Kay PA-C]     Preferred pharmacy: [address]      -----------------    Will route to MA pool for assistance with gathering refill information.    OBDULIO Paz, RN-BC  Patient Care Supervisor/Care Coordinator  [facility]

## 2019-05-24 ENCOUNTER — TELEPHONE (OUTPATIENT)
Dept: PODIATRY | Facility: CLINIC | Age: 54
End: 2019-05-24

## 2019-05-24 DIAGNOSIS — M79.18 MYOFASCIAL PAIN: ICD-10-CM

## 2019-05-24 DIAGNOSIS — M50.30 DDD (DEGENERATIVE DISC DISEASE), CERVICAL: ICD-10-CM

## 2019-05-24 DIAGNOSIS — M51.369 DDD (DEGENERATIVE DISC DISEASE), LUMBAR: ICD-10-CM

## 2019-05-24 RX ORDER — AMITRIPTYLINE HYDROCHLORIDE 10 MG/1
TABLET ORAL
Qty: 90 TABLET | Refills: 1 | Status: SHIPPED | OUTPATIENT
Start: 2019-05-24 | End: 2019-07-31

## 2019-05-24 RX ORDER — METHOCARBAMOL 500 MG/1
500 TABLET, FILM COATED ORAL 3 TIMES DAILY
Qty: 60 TABLET | Refills: 0 | Status: CANCELLED | OUTPATIENT
Start: 2019-05-24

## 2019-05-24 RX ORDER — METHOCARBAMOL 500 MG/1
500 TABLET, FILM COATED ORAL 3 TIMES DAILY
Qty: 60 TABLET | Refills: 0 | Status: SHIPPED | OUTPATIENT
Start: 2019-05-24 | End: 2019-07-30

## 2019-05-24 NOTE — TELEPHONE ENCOUNTER
Wife calling in regards to this refill. C2C on file. Pt has one left. Can this be done today? She states she called the refill in on Tuesday.   Thank you,  Hilda Razo- Patient Representative

## 2019-05-24 NOTE — TELEPHONE ENCOUNTER
"Requested Prescriptions   Pending Prescriptions Disp Refills     amitriptyline (ELAVIL) 10 MG tablet 90 tablet 1     Sig: 3 tabs at bedtime.   Last Written Prescription Date:  3/5/19  Last Fill Quantity: 90,  # refills: 1   Last office visit: 1/21/2019 with prescribing provider:     Future Office Visit:        Tricyclic Agents ( Annual appt and no PHQ9) Passed - 5/23/2019 10:45 AM        Passed - Blood Pressure under 140/90 in past 12 mos     BP Readings from Last 3 Encounters:   03/27/19 116/84   02/26/19 135/89   02/04/19 (!) 135/95                 Passed - Recent (12 mo) or future (30 days) visit within authorizing provider's specialty     Patient had office visit in the last 12 months or has a visit in the next 30 days with authorizing provider or within the authorizing provider's specialty.  See \"Patient Info\" tab in inbasket, or \"Choose Columns\" in Meds & Orders section of the refill encounter.              Passed - Medication is active on med list        Passed - Patient is age 18 or older        I can't tell if patient was to follow up as I can't see if he has seen Pain Management.  RN does not know how much to fill.  Katelin Velásquez, QUEN, RN      "

## 2019-05-24 NOTE — TELEPHONE ENCOUNTER
Patient has been discharged back to PCP upon successful completion of pain management program. Medication to now be filled by Sandor Serrano MD.    Lashonda Puga CMA

## 2019-05-24 NOTE — TELEPHONE ENCOUNTER
Per request, methocarbamol refilled for 1 month. Further refills to be completed by PCP.     Jacque Kay PA-C on 5/24/2019 at 4:13 PM

## 2019-05-24 NOTE — TELEPHONE ENCOUNTER
This was already sent to PCP for signature via MyChart encounter.  Closing this encounter.  QUE EasleyN, RN

## 2019-05-24 NOTE — TELEPHONE ENCOUNTER
AMERICO for pt that meds were at [facility].pharmacy. KH   MRI tech phoned ED and ED nurse brought patient back to exam room 1 after MRI.

## 2019-05-28 RX ORDER — AMITRIPTYLINE HYDROCHLORIDE 10 MG/1
TABLET ORAL
Qty: 90 TABLET | Refills: 1 | OUTPATIENT
Start: 2019-05-28

## 2019-05-30 ENCOUNTER — MYC REFILL (OUTPATIENT)
Dept: FAMILY MEDICINE | Facility: CLINIC | Age: 54
End: 2019-05-30

## 2019-05-30 ENCOUNTER — MYC MEDICAL ADVICE (OUTPATIENT)
Dept: FAMILY MEDICINE | Facility: CLINIC | Age: 54
End: 2019-05-30

## 2019-05-30 DIAGNOSIS — E78.2 MIXED HYPERLIPIDEMIA: ICD-10-CM

## 2019-05-31 RX ORDER — ATORVASTATIN CALCIUM 20 MG/1
20 TABLET, FILM COATED ORAL DAILY
Qty: 30 TABLET | Refills: 1 | Status: SHIPPED | OUTPATIENT
Start: 2019-05-31 | End: 2019-07-27

## 2019-05-31 NOTE — TELEPHONE ENCOUNTER
"Last Written Prescription Date:  2/13/19  Last Fill Quantity: 90,  # refills: 1   Last office visit: 1/21/2019 with prescribing provider:  Sandor Serrano   Future Office Visit:      Requested Prescriptions   Pending Prescriptions Disp Refills     atorvastatin (LIPITOR) 20 MG tablet 90 tablet 1     Sig: Take 1 tablet (20 mg) by mouth daily       Statins Protocol Passed - 5/30/2019 10:20 AM        Passed - LDL on file in past 12 months     Recent Labs   Lab Test 11/13/18  0859   LDL Cannot estimate LDL when triglyceride exceeds 400 mg/dL  171*             Passed - No abnormal creatine kinase in past 12 months     No lab results found.             Passed - Recent (12 mo) or future (30 days) visit within the authorizing provider's specialty     Patient had office visit in the last 12 months or has a visit in the next 30 days with authorizing provider or within the authorizing provider's specialty.  See \"Patient Info\" tab in inbasket, or \"Choose Columns\" in Meds & Orders section of the refill encounter.              Passed - Medication is active on med list        Passed - Patient is age 18 or older        Routing refill request to provider for review/approval because:  Labs out of range:  LDL, unable to determine  Labs not current:  DENNYS Espinoza RN on 5/31/2019 at 3:54 PM        "

## 2019-05-31 NOTE — TELEPHONE ENCOUNTER
Lipitor refilled x1.  Needs lab appointment for further refills to see if his cholesterol has improved enough with current medication dose.  Will have staff notify patient.    Sandor Serrano MD

## 2019-06-18 DIAGNOSIS — G47.10 HYPERSOMNIA: Primary | ICD-10-CM

## 2019-06-20 RX ORDER — MODAFINIL 100 MG/1
100 TABLET ORAL DAILY
Qty: 30 TABLET | Refills: 2 | Status: SHIPPED | OUTPATIENT
Start: 2019-06-20 | End: 2019-07-22

## 2019-06-20 NOTE — TELEPHONE ENCOUNTER
Let patient know of script. Patient requests I give it to his wife Laine. Will do.  Katelin Glez RN on 6/20/2019 at 8:38 AM

## 2019-07-18 ENCOUNTER — MYC REFILL (OUTPATIENT)
Dept: FAMILY MEDICINE | Facility: CLINIC | Age: 54
End: 2019-07-18

## 2019-07-18 DIAGNOSIS — G47.10 HYPERSOMNIA: ICD-10-CM

## 2019-07-18 DIAGNOSIS — K21.9 GASTROESOPHAGEAL REFLUX DISEASE WITHOUT ESOPHAGITIS: ICD-10-CM

## 2019-07-18 RX ORDER — OMEPRAZOLE 40 MG/1
40 CAPSULE, DELAYED RELEASE ORAL DAILY
Qty: 90 CAPSULE | Refills: 3 | Status: CANCELLED | OUTPATIENT
Start: 2019-07-18

## 2019-07-18 RX ORDER — MODAFINIL 100 MG/1
100 TABLET ORAL DAILY
Qty: 30 TABLET | Refills: 2 | Status: CANCELLED | OUTPATIENT
Start: 2019-07-18

## 2019-07-19 ENCOUNTER — MYC REFILL (OUTPATIENT)
Dept: FAMILY MEDICINE | Facility: CLINIC | Age: 54
End: 2019-07-19

## 2019-07-19 DIAGNOSIS — K21.9 GASTROESOPHAGEAL REFLUX DISEASE WITHOUT ESOPHAGITIS: ICD-10-CM

## 2019-07-19 RX ORDER — OMEPRAZOLE 40 MG/1
40 CAPSULE, DELAYED RELEASE ORAL DAILY
Qty: 90 CAPSULE | Refills: 3 | Status: CANCELLED | OUTPATIENT
Start: 2019-07-19

## 2019-07-27 DIAGNOSIS — E78.2 MIXED HYPERLIPIDEMIA: ICD-10-CM

## 2019-07-28 DIAGNOSIS — M79.18 MYOFASCIAL PAIN: ICD-10-CM

## 2019-07-29 RX ORDER — ATORVASTATIN CALCIUM 20 MG/1
TABLET, FILM COATED ORAL
Qty: 30 TABLET | Refills: 1 | Status: SHIPPED | OUTPATIENT
Start: 2019-07-29 | End: 2019-09-26

## 2019-07-29 NOTE — TELEPHONE ENCOUNTER
"Requested Prescriptions   Pending Prescriptions Disp Refills     atorvastatin (LIPITOR) 20 MG tablet [Pharmacy Med Name: ATORVASTATIN CALCIUM 20MG TABS] 30 tablet 1     Sig: TAKE ONE TABLET BY MOUTH ONCE DAILY   Last Written Prescription Date:  5/31/2019  Last Fill Quantity: 30,  # refills: 1   Last office visit: 1/21/2019 with prescribing provider:     Future Office Visit:        Statins Protocol Passed - 7/27/2019  5:54 PM        Passed - LDL on file in past 12 months     Recent Labs   Lab Test 11/13/18  0859   LDL Cannot estimate LDL when triglyceride exceeds 400 mg/dL  171*             Passed - No abnormal creatine kinase in past 12 months     No lab results found.             Passed - Recent (12 mo) or future (30 days) visit within the authorizing provider's specialty     Patient had office visit in the last 12 months or has a visit in the next 30 days with authorizing provider or within the authorizing provider's specialty.  See \"Patient Info\" tab in inbasket, or \"Choose Columns\" in Meds & Orders section of the refill encounter.              Passed - Medication is active on med list        Passed - Patient is age 18 or older      Prescription approved per [facility] Refill Protocol.  Amanda Shaw RN      "

## 2019-07-30 RX ORDER — METHOCARBAMOL 500 MG/1
500 TABLET, FILM COATED ORAL 3 TIMES DAILY
Qty: 60 TABLET | Refills: 1 | Status: SHIPPED | OUTPATIENT
Start: 2019-07-30 | End: 2019-12-10

## 2019-07-30 NOTE — TELEPHONE ENCOUNTER
ROBAXIN  Last Written Prescription Date:  5/24/19  Last Fill Quantity: 60,  # refills: 0  -  by HILARIA Oden.    Last office visit: 1/21/2019 with provider:  Dr. Serrano   Future Office Visit:  NONE  Routing refill request to provider for review/approval because: Dr. Serrano is out of office all week.   Drug not on the [facility] refill protocol   TONEY Yan

## 2019-07-31 DIAGNOSIS — M50.30 DDD (DEGENERATIVE DISC DISEASE), CERVICAL: ICD-10-CM

## 2019-07-31 DIAGNOSIS — M51.369 DDD (DEGENERATIVE DISC DISEASE), LUMBAR: ICD-10-CM

## 2019-08-02 NOTE — TELEPHONE ENCOUNTER
"Routing refill request to provider for review/approval because:  Patient needs to be seen because:  Per RN protocol, pt needs office visit every 6 months.   Per OV note on 1/11/2019 pt was going to trial amitriptyline again and report to pain management.   No appt noted with pain management.  Elavil  Last Written Prescription Date:  5/24/2019  Last Fill Quantity: 90,  # refills: 1   Last office visit: 1/21/2019 with prescribing provider:  Zach   Future Office Visit:      Requested Prescriptions   Pending Prescriptions Disp Refills     amitriptyline (ELAVIL) 10 MG tablet [Pharmacy Med Name: AMITRIPTYLINE HCL 10MG TABS] 90 tablet 1     Sig: TAKE THREE TABLETS BY MOUTH EVERY NIGHT AT BEDTIME       Tricyclic Agents ( Annual appt and no PHQ9) Passed - 7/31/2019  2:16 PM        Passed - Blood Pressure under 140/90 in past 12 mos     BP Readings from Last 3 Encounters:   03/27/19 116/84   02/26/19 135/89   02/04/19 (!) 135/95                 Passed - Recent (12 mo) or future (30 days) visit within authorizing provider's specialty     Patient had office visit in the last 12 months or has a visit in the next 30 days with authorizing provider or within the authorizing provider's specialty.  See \"Patient Info\" tab in inbasket, or \"Choose Columns\" in Meds & Orders section of the refill encounter.              Passed - Medication is active on med list        Passed - Patient is age 18 or older        Ayaka Balderas RN on 8/2/2019 at 11:39 AM    "

## 2019-08-04 RX ORDER — AMITRIPTYLINE HYDROCHLORIDE 10 MG/1
TABLET ORAL
Qty: 90 TABLET | Refills: 0 | Status: SHIPPED | OUTPATIENT
Start: 2019-08-04 | End: 2019-08-30

## 2019-08-30 ENCOUNTER — MYC REFILL (OUTPATIENT)
Dept: SLEEP MEDICINE | Facility: CLINIC | Age: 54
End: 2019-08-30

## 2019-08-30 ENCOUNTER — MYC REFILL (OUTPATIENT)
Dept: FAMILY MEDICINE | Facility: CLINIC | Age: 54
End: 2019-08-30

## 2019-08-30 DIAGNOSIS — M51.369 DDD (DEGENERATIVE DISC DISEASE), LUMBAR: ICD-10-CM

## 2019-08-30 DIAGNOSIS — M50.30 DDD (DEGENERATIVE DISC DISEASE), CERVICAL: ICD-10-CM

## 2019-08-30 DIAGNOSIS — G47.10 HYPERSOMNIA: ICD-10-CM

## 2019-08-30 RX ORDER — AMITRIPTYLINE HYDROCHLORIDE 10 MG/1
TABLET ORAL
Qty: 90 TABLET | Refills: 0 | OUTPATIENT
Start: 2019-08-30

## 2019-08-30 RX ORDER — AMITRIPTYLINE HYDROCHLORIDE 10 MG/1
TABLET ORAL
Qty: 90 TABLET | Refills: 3 | Status: SHIPPED | OUTPATIENT
Start: 2019-08-30 | End: 2019-11-14

## 2019-08-30 NOTE — TELEPHONE ENCOUNTER
"  Requested Prescriptions   Pending Prescriptions Disp Refills     amitriptyline (ELAVIL) 10 MG tablet [Pharmacy Med Name: AMITRIPTYLINE HCL 10MG TABS] 90 tablet 0     Sig: TAKE THREE TABLETS BY MOUTH EVERY NIGHT AT BEDTIME   Last Written Prescription Date:  8/4/2019  Last Fill Quantity: 90,  # refills: 0   Last office visit: 1/21/2019 with prescribing provider:     Future Office Visit:        Tricyclic Agents ( Annual appt and no PHQ9) Passed - 8/30/2019  9:19 AM        Passed - Blood Pressure under 140/90 in past 12 mos     BP Readings from Last 3 Encounters:   03/27/19 116/84   02/26/19 135/89   02/04/19 (!) 135/95           Passed - Recent (12 mo) or future (30 days) visit within authorizing provider's specialty     Patient had office visit in the last 12 months or has a visit in the next 30 days with authorizing provider or within the authorizing provider's specialty.  See \"Patient Info\" tab in inbasket, or \"Choose Columns\" in Meds & Orders section of the refill encounter.              Passed - Medication is active on med list        Passed - Patient is age 18 or older      Prescription approved per [facility] Refill Protocol.  Amanda Shaw RN      "

## 2019-09-09 ENCOUNTER — MYC MEDICAL ADVICE (OUTPATIENT)
Dept: SLEEP MEDICINE | Facility: CLINIC | Age: 54
End: 2019-09-09

## 2019-09-09 ENCOUNTER — TELEPHONE (OUTPATIENT)
Dept: FAMILY MEDICINE | Facility: CLINIC | Age: 54
End: 2019-09-09

## 2019-09-09 DIAGNOSIS — G47.10 HYPERSOMNIA: Primary | ICD-10-CM

## 2019-09-09 RX ORDER — MODAFINIL 200 MG/1
200 TABLET ORAL DAILY
Qty: 30 TABLET | Refills: 1 | Status: SHIPPED | OUTPATIENT
Start: 2019-09-09 | End: 2019-09-11

## 2019-09-09 NOTE — TELEPHONE ENCOUNTER
PA Initiation    Medication: modafinil (PROVIGIL) 100 MG tablet  Insurance Company: [employer] - Phone [phone] Fax [phone]  Pharmacy Filling the Rx: [address]   Filling Pharmacy Phone: [phone]  Filling Pharmacy Fax:    Start Date: 9/9/2019    [facility] Prior Authorization Team   Phone: [phone]

## 2019-09-09 NOTE — TELEPHONE ENCOUNTER
Prior Authorization Retail Medication Request    Medication/Dose: madafinil 100mg  ICD code (if different than what is on RX):     Previously Tried and Failed:     Rationale:       Insurance Name:  [employer]  Insurance ID:  [id number]      Pharmacy Information (if different than what is on RX)  Name:     Phone:

## 2019-09-09 NOTE — TELEPHONE ENCOUNTER
We will try 200 mg of Provigil.  Patient needs to see me to discuss other options.  The prescription will be waiting at the [facility] pharmacy.  Jeffrey Stark MD

## 2019-09-10 NOTE — TELEPHONE ENCOUNTER
Dr. Stark did a new prescription for 200mg of the Provigil. Will call patient to figure out what dose of medication he wants to use.    Script is at writers desk.    Verna Bowser CMA

## 2019-09-10 NOTE — TELEPHONE ENCOUNTER
I understood from his last communication that he was not doing well in the 100 was inadequate.  Of course if 100 mg of Provigil is adequate he should continue with that and not felt to 100s yet when he runs out we will give more 100 mg pills  Jeffrey Stark MD

## 2019-09-10 NOTE — TELEPHONE ENCOUNTER
Spoke with wife (CTC). She will check with Bulmaro and let us know what to do with this RX.     Franci CEE RN. . .  9/10/2019, 3:26 PM

## 2019-09-10 NOTE — TELEPHONE ENCOUNTER
Patient was able to fill the 100 mg tabs as they were finally approved by insurance.  Do you want him to still increase to the 200 mg or stick with the 100 mg. He is doing well on the 100 mg dose.     Franci CEE RN. . .  9/10/2019, 9:47 AM

## 2019-09-10 NOTE — TELEPHONE ENCOUNTER
Prior Authorization Approval    Authorization Effective Date: 9/9/2019  Authorization Expiration Date: 3/7/2020  Medication: modafinil (PROVIGIL) 100 MG tablet  Approved Dose/Quantity: 30  Reference #: [id number]   Insurance Company: [employer] - Phone [phone] Fax [phone]  Expected [employer]:     $15  Which Pharmacy is filling the prescription (Not needed for infusion/clinic administered): [address]   Pharmacy Notified: Yes  Patient Notified: Pt already picked up from pharmacy

## 2019-09-11 NOTE — TELEPHONE ENCOUNTER
I spoke with Laine who said she's 99% sure that the 100 mg capsules are adequate and instructed writer to shred this. She just filled a prescription for this medication so doesn't need a refill. Laine will call back if anything changes.  Katelin Glez RN on 9/11/2019 at 8:33 AM

## 2019-09-26 DIAGNOSIS — E78.2 MIXED HYPERLIPIDEMIA: ICD-10-CM

## 2019-09-26 RX ORDER — ATORVASTATIN CALCIUM 20 MG/1
20 TABLET, FILM COATED ORAL DAILY
Qty: 30 TABLET | Refills: 0 | Status: SHIPPED | OUTPATIENT
Start: 2019-09-26 | End: 2019-11-04

## 2019-09-26 NOTE — TELEPHONE ENCOUNTER
"Will forward to schedulers to schedule patient for labs.    Requested Prescriptions   Pending Prescriptions Disp Refills     atorvastatin (LIPITOR) 20 MG tablet [Pharmacy Med Name: ATORVASTATIN CALCIUM 20MG TABS] 30 tablet 1     Sig: TAKE ONE TABLET BY MOUTH ONCE DAILY   Last Written Prescription Date:  7/29/2019  Last Fill Quantity: 30,  # refills: 1   Last office visit: 1/21/2019 with prescribing provider:     Future Office Visit:        Statins Protocol Passed - 9/26/2019 11:03 AM        Passed - LDL on file in past 12 months     Recent Labs   Lab Test 11/13/18  0859   LDL Cannot estimate LDL when triglyceride exceeds 400 mg/dL  171*           Passed - No abnormal creatine kinase in past 12 months     No lab results found.         Passed - Recent (12 mo) or future (30 days) visit within the authorizing provider's specialty     Patient had office visit in the last 12 months or has a visit in the next 30 days with authorizing provider or within the authorizing provider's specialty.  See \"Patient Info\" tab in inbasket, or \"Choose Columns\" in Meds & Orders section of the refill encounter.              Passed - Medication is active on med list        Passed - Patient is age 18 or older      Medication is being filled for 1 time refill only due to:  Patient needs labs LDL.   Amanda Shaw RN        "

## 2019-09-28 ENCOUNTER — HEALTH MAINTENANCE LETTER (OUTPATIENT)
Age: 54
End: 2019-09-28

## 2019-10-26 ENCOUNTER — HEALTH MAINTENANCE LETTER (OUTPATIENT)
Age: 54
End: 2019-10-26

## 2019-11-04 ENCOUNTER — MYC MEDICAL ADVICE (OUTPATIENT)
Dept: FAMILY MEDICINE | Facility: CLINIC | Age: 54
End: 2019-11-04

## 2019-11-04 DIAGNOSIS — E78.2 MIXED HYPERLIPIDEMIA: ICD-10-CM

## 2019-11-05 ENCOUNTER — MYC REFILL (OUTPATIENT)
Dept: SLEEP MEDICINE | Facility: CLINIC | Age: 54
End: 2019-11-05

## 2019-11-05 DIAGNOSIS — G47.10 HYPERSOMNIA: ICD-10-CM

## 2019-11-05 RX ORDER — ATORVASTATIN CALCIUM 20 MG/1
TABLET, FILM COATED ORAL
Qty: 30 TABLET | Refills: 2 | Status: SHIPPED | OUTPATIENT
Start: 2019-11-05 | End: 2019-11-14

## 2019-11-05 NOTE — TELEPHONE ENCOUNTER
"  Requested Prescriptions   Pending Prescriptions Disp Refills     atorvastatin (LIPITOR) 20 MG tablet [Pharmacy Med Name: ATORVASTATIN CALCIUM 20MG TABS] 30 tablet 0     Sig: TAKE ONE TABLET BY MOUTH ONCE DAILY (DUE FOR LAB WORK)   Last Written Prescription Date:  9/26/2019  Last Fill Quantity: 30,  # refills: 0   Last office visit: 1/21/2019 with prescribing provider:     Future Office Visit:   Next 5 appointments (look out 90 days)    Nov 14, 2019  1:00 PM CST  Office Visit with Sandor Serrano MD  [facility] ([facility]) [address] [place] [id number] [phone]             Statins Protocol Passed - 11/4/2019  7:09 PM        Passed - LDL on file in past 12 months     Recent Labs   Lab Test 11/13/18  0859   LDL Cannot estimate LDL when triglyceride exceeds 400 mg/dL  171*           Passed - No abnormal creatine kinase in past 12 months     No lab results found.         Passed - Recent (12 mo) or future (30 days) visit within the authorizing provider's specialty     Patient has had an office visit with the authorizing provider or a provider within the authorizing providers department within the previous 12 mos or has a future within next 30 days. See \"Patient Info\" tab in inbasket, or \"Choose Columns\" in Meds & Orders section of the refill encounter.          Passed - Medication is active on med list        Passed - Patient is age 18 or older      Prescription approved per [facility] Refill Protocol.  Amanda Shaw RN      "

## 2019-11-06 NOTE — TELEPHONE ENCOUNTER
Based on last prescription quantity and refills, patient should have a sufficient supply.  MA, please verify with pharmacy or patient, thanks!    Frank Oropeza RN....11/6/2019 11:35 AM

## 2019-11-14 ENCOUNTER — OFFICE VISIT (OUTPATIENT)
Dept: FAMILY MEDICINE | Facility: CLINIC | Age: 54
End: 2019-11-14
Payer: COMMERCIAL

## 2019-11-14 VITALS
BODY MASS INDEX: 33.58 KG/M2 | WEIGHT: 247.6 LBS | HEART RATE: 121 BPM | RESPIRATION RATE: 16 BRPM | TEMPERATURE: 98.7 F | SYSTOLIC BLOOD PRESSURE: 118 MMHG | DIASTOLIC BLOOD PRESSURE: 88 MMHG | OXYGEN SATURATION: 100 %

## 2019-11-14 DIAGNOSIS — E78.2 MIXED HYPERLIPIDEMIA: ICD-10-CM

## 2019-11-14 DIAGNOSIS — R53.83 FATIGUE, UNSPECIFIED TYPE: Primary | ICD-10-CM

## 2019-11-14 DIAGNOSIS — K21.9 GASTROESOPHAGEAL REFLUX DISEASE WITHOUT ESOPHAGITIS: ICD-10-CM

## 2019-11-14 DIAGNOSIS — M50.30 DDD (DEGENERATIVE DISC DISEASE), CERVICAL: ICD-10-CM

## 2019-11-14 DIAGNOSIS — R05.3 CHRONIC COUGHING: ICD-10-CM

## 2019-11-14 DIAGNOSIS — Z11.4 ENCOUNTER FOR SCREENING FOR HUMAN IMMUNODEFICIENCY VIRUS (HIV): ICD-10-CM

## 2019-11-14 DIAGNOSIS — M51.369 DDD (DEGENERATIVE DISC DISEASE), LUMBAR: ICD-10-CM

## 2019-11-14 DIAGNOSIS — G47.33 OSA (OBSTRUCTIVE SLEEP APNEA): ICD-10-CM

## 2019-11-14 PROCEDURE — 99215 OFFICE O/P EST HI 40 MIN: CPT | Performed by: FAMILY MEDICINE

## 2019-11-14 RX ORDER — OMEPRAZOLE 40 MG/1
40 CAPSULE, DELAYED RELEASE ORAL DAILY
Qty: 90 CAPSULE | Refills: 3 | Status: SHIPPED | OUTPATIENT
Start: 2019-11-14 | End: 2021-01-05

## 2019-11-14 RX ORDER — ATORVASTATIN CALCIUM 20 MG/1
20 TABLET, FILM COATED ORAL DAILY
Qty: 90 TABLET | Refills: 4 | Status: SHIPPED | OUTPATIENT
Start: 2019-11-14 | End: 2021-03-11

## 2019-11-14 ASSESSMENT — ANXIETY QUESTIONNAIRES
GAD7 TOTAL SCORE: 5
IF YOU CHECKED OFF ANY PROBLEMS ON THIS QUESTIONNAIRE, HOW DIFFICULT HAVE THESE PROBLEMS MADE IT FOR YOU TO DO YOUR WORK, TAKE CARE OF THINGS AT HOME, OR GET ALONG WITH OTHER PEOPLE: NOT DIFFICULT AT ALL
7. FEELING AFRAID AS IF SOMETHING AWFUL MIGHT HAPPEN: SEVERAL DAYS
1. FEELING NERVOUS, ANXIOUS, OR ON EDGE: SEVERAL DAYS
6. BECOMING EASILY ANNOYED OR IRRITABLE: SEVERAL DAYS
2. NOT BEING ABLE TO STOP OR CONTROL WORRYING: SEVERAL DAYS
5. BEING SO RESTLESS THAT IT IS HARD TO SIT STILL: NOT AT ALL
3. WORRYING TOO MUCH ABOUT DIFFERENT THINGS: SEVERAL DAYS

## 2019-11-14 ASSESSMENT — PATIENT HEALTH QUESTIONNAIRE - PHQ9
5. POOR APPETITE OR OVEREATING: NOT AT ALL
SUM OF ALL RESPONSES TO PHQ QUESTIONS 1-9: 4

## 2019-11-14 ASSESSMENT — PAIN SCALES - GENERAL: PAINLEVEL: MODERATE PAIN (5)

## 2019-11-14 NOTE — PATIENT INSTRUCTIONS
1.  Increase your amitriptyline dose as discussed today and do this for 2 weeks.  2.  We will contact you with your lab and x-ray results when they are back and let you know if we need to do anything different.  3.  If no improvement and no lab/x-ray findings to explain your fatigue, talk to Dr. Stark about increasing your modafinil dose.  4.  Go to lab and nurse visit tomorrow for your fasting labs and 2 vaccines.  5.  Try and walk or do some other type of movement-type exercise for 15-30 minutes 3-4 hours before bed (or anytime during the day).

## 2019-11-14 NOTE — PROGRESS NOTES
Subjective     Bulmaro Herrera is a 54 year old male who presents to clinic today for the following health issues:    HPI   Fatigued      Duration: 1 year    Description (location/character/radiation): Tired all the time    Intensity:  moderate    Accompanying signs and symptoms: Cough at night, dizzy     History (similar episodes/previous evaluation): None    Precipitating or alleviating factors: None    Therapies tried and outcome: None           Here today to discuss his fatigue.  Feels tired all the time.  Did go for sleep evaluation and was started on modafinil.  Is on 100 mg daily.  We discussed that this is a low dose of this medication.    Patient notes that he got initial improvement with his feet and back pain with amitriptyline 30 mg/day.  He notes pain is worsening some again.  Has not had trial of a higher dose.    Having a chronic cough.  He is concerned if this has to do with his fatigue.  No shortness of breath or dyspnea on exertion.     History of GERD.  Needs PPI refilled.  This has been effective for him.      Reviewed and updated as needed this visit by Provider  Tobacco  Allergies  Meds  Problems  Med Hx  Surg Hx  Fam Hx         Review of Systems   ROS COMP: Constitutional, HEENT, cardiovascular, pulmonary, GI, , musculoskeletal, neuro, skin, endocrine and psych systems are negative, except as otherwise noted.      Objective    /88   Pulse 121   Temp 98.7  F (37.1  C) (Temporal)   Resp 16   Wt 112.3 kg (247 lb 9.6 oz)   SpO2 100%   BMI 33.58 kg/m    Body mass index is 33.58 kg/m .  Physical Exam  Constitutional:       Appearance: He is well-developed.   Cardiovascular:      Rate and Rhythm: Normal rate and regular rhythm.      Heart sounds: Normal heart sounds, S1 normal and S2 normal. No murmur.   Pulmonary:      Effort: Pulmonary effort is normal. No respiratory distress.      Breath sounds: Normal breath sounds. No wheezing, rhonchi or rales.   Neurological:      Mental  Status: He is alert.                    Assessment & Plan     ASSESSMENT/ORDERS:    ICD-10-CM    1. Fatigue, unspecified type R53.83 CBC with platelets differential     Comprehensive metabolic panel     TSH with free T4 reflex   2. ROBBY (obstructive sleep apnea) G47.33 Asthma Action Plan (AAP)   3. DDD (degenerative disc disease), lumbar M51.36 amitriptyline (ELAVIL) 25 MG tablet   4. DDD (degenerative disc disease), cervical M50.30 amitriptyline (ELAVIL) 25 MG tablet   5. Chronic coughing R05 XR Chest 2 Views   6. Encounter for screening for human immunodeficiency virus (HIV) Z11.4 HIV Antigen Antibody Combo   7. Mixed hyperlipidemia E78.2 atorvastatin (LIPITOR) 20 MG tablet   8. Gastroesophageal reflux disease without esophagitis K21.9 omeprazole (PRILOSEC) 40 MG DR capsule     PLAN:  1.  Increased amitriptyline dose to 50 mg nightly today for his feet pain to see if this will help his pain management so he can walk better.  2.  Chest x-ray done today due to chronic cough.  3.  See below for patient instructions for recommendations and discussion on today's health issues and how we will proceed with workup of his fatigue.    Patient Instructions   1.  Increase your amitriptyline dose as discussed today and do this for 2 weeks.  2.  We will contact you with your lab and x-ray results when they are back and let you know if we need to do anything different.  3.  If no improvement and no lab/x-ray findings to explain your fatigue, talk to Dr. Stark about increasing your modafinil dose.  4.  Go to lab and nurse visit tomorrow for your fasting labs and 2 vaccines.  5.  Try and walk or do some other type of movement-type exercise for 15-30 minutes 3-4 hours before bed (or anytime during the day).                Return for recheck pending lab and xray results.     I spent >40 minutes of face to face time with the patient, >50% of which was spent counseling and coordination of care regarding management of above issues.      Sandor Serrano MD  [facility]

## 2019-11-15 ENCOUNTER — MYC MEDICAL ADVICE (OUTPATIENT)
Dept: FAMILY MEDICINE | Facility: CLINIC | Age: 54
End: 2019-11-15

## 2019-11-15 ENCOUNTER — ALLIED HEALTH/NURSE VISIT (OUTPATIENT)
Dept: FAMILY MEDICINE | Facility: CLINIC | Age: 54
End: 2019-11-15
Payer: COMMERCIAL

## 2019-11-15 ENCOUNTER — OFFICE VISIT (OUTPATIENT)
Dept: PALLIATIVE MEDICINE | Facility: CLINIC | Age: 54
End: 2019-11-15
Payer: COMMERCIAL

## 2019-11-15 ENCOUNTER — TELEPHONE (OUTPATIENT)
Dept: SURGERY | Facility: CLINIC | Age: 54
End: 2019-11-15

## 2019-11-15 VITALS
SYSTOLIC BLOOD PRESSURE: 116 MMHG | HEIGHT: 72 IN | DIASTOLIC BLOOD PRESSURE: 84 MMHG | WEIGHT: 246 LBS | BODY MASS INDEX: 33.32 KG/M2 | TEMPERATURE: 98.3 F

## 2019-11-15 DIAGNOSIS — M54.50 CHRONIC MIDLINE LOW BACK PAIN WITHOUT SCIATICA: ICD-10-CM

## 2019-11-15 DIAGNOSIS — Z23 NEED FOR VACCINATION: Primary | ICD-10-CM

## 2019-11-15 DIAGNOSIS — R53.83 FATIGUE, UNSPECIFIED TYPE: ICD-10-CM

## 2019-11-15 DIAGNOSIS — Z11.4 ENCOUNTER FOR SCREENING FOR HUMAN IMMUNODEFICIENCY VIRUS (HIV): ICD-10-CM

## 2019-11-15 DIAGNOSIS — G89.29 CHRONIC MIDLINE LOW BACK PAIN WITHOUT SCIATICA: ICD-10-CM

## 2019-11-15 DIAGNOSIS — M79.18 MYOFASCIAL PAIN: ICD-10-CM

## 2019-11-15 DIAGNOSIS — M47.812 ARTHROPATHY OF CERVICAL FACET JOINT: Primary | ICD-10-CM

## 2019-11-15 LAB
ALBUMIN SERPL-MCNC: 4.3 G/DL (ref 3.4–5)
ALP SERPL-CCNC: 91 U/L (ref 40–150)
ALT SERPL W P-5'-P-CCNC: 57 U/L (ref 0–70)
ANION GAP SERPL CALCULATED.3IONS-SCNC: 8 MMOL/L (ref 3–14)
AST SERPL W P-5'-P-CCNC: 34 U/L (ref 0–45)
BASOPHILS # BLD AUTO: 0.1 10E9/L (ref 0–0.2)
BASOPHILS NFR BLD AUTO: 1.2 %
BILIRUB SERPL-MCNC: 0.9 MG/DL (ref 0.2–1.3)
BUN SERPL-MCNC: 19 MG/DL (ref 7–30)
CALCIUM SERPL-MCNC: 9 MG/DL (ref 8.5–10.1)
CHLORIDE SERPL-SCNC: 104 MMOL/L (ref 94–109)
CO2 SERPL-SCNC: 27 MMOL/L (ref 20–32)
CREAT SERPL-MCNC: 1.12 MG/DL (ref 0.66–1.25)
DIFFERENTIAL METHOD BLD: NORMAL
EOSINOPHIL NFR BLD AUTO: 1.2 %
ERYTHROCYTE [DISTWIDTH] IN BLOOD BY AUTOMATED COUNT: 12 % (ref 10–15)
GFR SERPL CREATININE-BSD FRML MDRD: 75 ML/MIN/{1.73_M2}
GLUCOSE SERPL-MCNC: 98 MG/DL (ref 70–99)
HCT VFR BLD AUTO: 47.6 % (ref 40–53)
HGB BLD-MCNC: 16 G/DL (ref 13.3–17.7)
IMM GRANULOCYTES # BLD: 0.1 10E9/L (ref 0–0.4)
IMM GRANULOCYTES NFR BLD: 0.9 %
LYMPHOCYTES # BLD AUTO: 2.3 10E9/L (ref 0.8–5.3)
LYMPHOCYTES NFR BLD AUTO: 40.3 %
MCH RBC QN AUTO: 31.7 PG (ref 26.5–33)
MCHC RBC AUTO-ENTMCNC: 33.6 G/DL (ref 31.5–36.5)
MCV RBC AUTO: 94 FL (ref 78–100)
MONOCYTES # BLD AUTO: 0.5 10E9/L (ref 0–1.3)
MONOCYTES NFR BLD AUTO: 8.7 %
NEUTROPHILS # BLD AUTO: 2.7 10E9/L (ref 1.6–8.3)
NEUTROPHILS NFR BLD AUTO: 47.7 %
NRBC # BLD AUTO: 0 10*3/UL
NRBC BLD AUTO-RTO: 0 /100
PLATELET # BLD AUTO: 209 10E9/L (ref 150–450)
POTASSIUM SERPL-SCNC: 4.3 MMOL/L (ref 3.4–5.3)
PROT SERPL-MCNC: 7.7 G/DL (ref 6.8–8.8)
RBC # BLD AUTO: 5.04 10E12/L (ref 4.4–5.9)
SODIUM SERPL-SCNC: 139 MMOL/L (ref 133–144)
T4 FREE SERPL-MCNC: 0.94 NG/DL (ref 0.76–1.46)
TSH SERPL DL<=0.005 MIU/L-ACNC: 4.44 MU/L (ref 0.4–4)
WBC # BLD AUTO: 5.7 10E9/L (ref 4–11)

## 2019-11-15 PROCEDURE — 99213 OFFICE O/P EST LOW 20 MIN: CPT | Performed by: PHYSICIAN ASSISTANT

## 2019-11-15 PROCEDURE — 99207 ZZC NO CHARGE NURSE ONLY: CPT

## 2019-11-15 PROCEDURE — 36415 COLL VENOUS BLD VENIPUNCTURE: CPT | Performed by: FAMILY MEDICINE

## 2019-11-15 PROCEDURE — 90682 RIV4 VACC RECOMBINANT DNA IM: CPT

## 2019-11-15 PROCEDURE — 84439 ASSAY OF FREE THYROXINE: CPT | Performed by: FAMILY MEDICINE

## 2019-11-15 PROCEDURE — 90472 IMMUNIZATION ADMIN EACH ADD: CPT

## 2019-11-15 PROCEDURE — 80050 GENERAL HEALTH PANEL: CPT | Performed by: FAMILY MEDICINE

## 2019-11-15 PROCEDURE — 90715 TDAP VACCINE 7 YRS/> IM: CPT

## 2019-11-15 PROCEDURE — 90471 IMMUNIZATION ADMIN: CPT

## 2019-11-15 PROCEDURE — 87389 HIV-1 AG W/HIV-1&-2 AB AG IA: CPT | Performed by: FAMILY MEDICINE

## 2019-11-15 ASSESSMENT — PAIN SCALES - GENERAL: PAINLEVEL: SEVERE PAIN (7)

## 2019-11-15 ASSESSMENT — ASTHMA QUESTIONNAIRES: ACT_TOTALSCORE: 21

## 2019-11-15 ASSESSMENT — ANXIETY QUESTIONNAIRES: GAD7 TOTAL SCORE: 5

## 2019-11-15 ASSESSMENT — MIFFLIN-ST. JEOR: SCORE: 2003.85

## 2019-11-15 NOTE — TELEPHONE ENCOUNTER
Contacted patient to schedule injection  Date:11/29/19  Time:0800am  Dr. Chaudhry    Instructed pt to have H&P and  for procedure.

## 2019-11-15 NOTE — PROGRESS NOTES
[facility]    CHIEF COMPLAINT:   Pain  -Neck   -low back pain    INTERVAL HISTORY:  Last seen on 3/27/2019.        Recommendations/plan at the last visit included:  1. Physical Therapy:  NO   2. Clinical Health Psychologist:  NO    3. Diagnostic Studies:  none  4. Medication Management:  Continue current medications  5. Further procedures recommended: repeat cervical facet joint injections. We could try lumbar facet joint injections as well  6. Recommendations to PCP. Returning patient back, pain is under control      Follow up with this provider:  PRN     Since his last visit, Bulmaro Herrera reports:  -   He states that he has noticed more aching with the cooler weather. He states that he has been tossing and turning. He would like to proceed with the neck injections again. He also reports low back pain and is wondering if he can have injections for his low back as well.        Pain Information:   Pain quality:aching     Pain rating: intensity ranges from 1/10 to 10/10, and averages 3/10 on a 0-10 scale.   Pain today 6/10      CURRENT RELEVANT PAIN MEDICATIONS:   Amitriptyline-taking 3 at night  Ibuprofen-200mg, 1 at night  Robaxin 500mg-taking at night    Patient is using the medication as prescribed:  YES  Is your medication helpful? YES   Medication side effects? no side effect    Previous Medications: (H--helped; HI--Helped initially; SWH-- somewhat helpful, NH--No help; W--worse; SE--side effects)   Opiates: Tylenol #3 H SE wake up like with a hangover, dizzy  NSAIDS: Ibuprofen H  Muscle Relaxants: Flexeril NH SE sedation  Anti-migraine mediations: none  Anti-depressants: SE after coming off of them, feels like his personality was altered from them. While on them, felt like head was in a cloud. Amitriptyline ?   Sleep aids:None  Anxiolytics: None  Neuropathics: Gabapentin SE restless legs     Topicals: Lidocaine Patches SWH   Other medications not covered above:  Prednisone  H    Past Pain Treatments:  Pain Clinic:   No   PT: Yes for neck. Helped for about 1 day. Was doing exercises at home.   Psychologist: No  Relaxation techniques/biofeedback: No  Chiropractor: Yes, helps for 1/2 to 1 day  Acupuncture: Yes, NH one needle caused SE of significantly increased pain  Pharmacotherapy:          Opioids: Yes            Non-opioids:    Yes   TENs Unit: Yes, tried at chiropractor, makes more tense  Injections: Yes, C6-7 interlaminar epidural steroid injection 12/28/2018, had a pain flare for 1 week, then maybe it's been slightly helpful. 2-3 years ago, low back injection. Neither were very helpful.   Self-care:   Yes, tried Edgard Gel  Surgeries related to pain: No     [place] Board of Pharmacy Data Base Reviewed:    YES; As expected, no concern for misuse/abuse of controlled medications based on this report.      Medications:  Current Outpatient Medications   Medication Sig Dispense Refill     amitriptyline (ELAVIL) 25 MG tablet Take 2 tablets (50 mg) by mouth At Bedtime 60 tablet 5     atorvastatin (LIPITOR) 20 MG tablet Take 1 tablet (20 mg) by mouth daily 90 tablet 4     ibuprofen (ADVIL/MOTRIN) 200 MG tablet Take 200 mg by mouth every 4 hours as needed for mild pain       methocarbamol (ROBAXIN) 500 MG tablet Take 1 tablet (500 mg) by mouth 3 times daily 60 tablet 1     modafinil (PROVIGIL) 100 MG tablet Take 1 tablet (100 mg) by mouth daily 30 tablet 3     omeprazole (PRILOSEC) 40 MG DR capsule Take 1 capsule (40 mg) by mouth daily 90 capsule 3       Review of Systems: A 10-point review of systems was negative, with the exception of chronic pain issues, dizziness and cough.      Social History: Reviewed; unchanged from previous consultation.      Family history: Reviewed; unchanged from previous consultation.     PHYSICAL EXAM:     Vitals:   /84   Temp 98.3  F (36.8  C) (Temporal)   Ht 1.829 m (6')   Wt 111.6 kg (246 lb)   BMI 33.36 kg/m    Body mass index is 33.36 kg/m .  6'  "0\"  246 lbs 0 oz      Constitutional: healthy, alert and no distress  HEENT: Head atraumatic, normocephalic. Eyes without conjunctival injection or jaundice. Neck supple. No obvious neck masses.  Skin: No rash, lesions, or petechiae of exposed skin.   Psychiatric/mental status: Alert, without lethargy or stupor. Appropriate affect. Mood normal.   Musculoskeletal: No pain with extension of his back.    DIAGNOSTIC TESTS:  Imaging Studies:   No new imaging to review    Assessment:  Bulmaro Herrera is a 53 year old male who presents today for follow up regarding his:    1.  Cervical facet joint pain  2.  Chronic midline low back pain  3.  Myofascial pain    We will plan to repeat the cervical facet joint injections as this has been significantly helpful for the patient.  We also did talk about his low back.  We talked about doing an epidural versus a lumbar facet joint injection.  Looking at the patient's MRI from 2017 I would recommend doing an L5-S1 epidural.  I did look back at his previous injections and he did have an L5-S1 bilateral transforaminal epidural steroid injection.  I would recommend an interlaminar steroid injection.  Certainly if that is not helpful we could try the facet injections.      Plan:    Diagnosis reviewed, treatment option addressed, and risk/benifits discussed.  Self-care instructions given.  I am recommending a multidisciplinary treatment plan to help this patient better manage pain.      1. Physical Therapy:  NO   2. Clinical Health Psychologist:  NO    3. Diagnostic Studies:  none  4. Medication Management:  Continue current medications  5. Further procedures recommended: repeat cervical facet joint injections. L5-S1 KRYSTAL, if not helpful could try lumbar facet steroid injections.   6. Recommendations to PCP. See above    Follow up with this provider:  After his vacation    Total time spent face to face was 15 minutes and more than 50% of face to face time was spent in counseling and/or " coordination of care regarding the diagnosis and recommendations above.      Jacque Kay PA-C   [facility]

## 2019-11-15 NOTE — PROGRESS NOTES
Prior to immunization administration, verified patients identity using patient s name and date of birth. Please see Immunization Activity for additional information.     Screening Questionnaire for Adult Immunization    Are you sick today?   No   Do you have allergies to medications, food, a vaccine component or latex?   No   Have you ever had a serious reaction after receiving a vaccination?   No   Do you have a long-term health problem with heart disease, lung disease, asthma, kidney disease, metabolic disease (e.g. diabetes), anemia, or other blood disorder?   No   Do you have cancer, leukemia, HIV/AIDS, or any other immune system problem?   No   In the past 3 months, have you taken medications that affect  your immune system, such as prednisone, other steroids, or anticancer drugs; drugs for the treatment of rheumatoid arthritis, Crohn s disease, or psoriasis; or have you had radiation treatments?   No   Have you had a seizure, or a brain or other nervous system problem?   No   During the past year, have you received a transfusion of blood or blood     products, or been given immune (gamma) globulin or antiviral drug?   No   For women: Are you pregnant or is there a chance you could become        pregnant during the next month?   No   Have you received any vaccinations in the past 4 weeks?   No     Immunization questionnaire answers were all negative.        Per orders of Dr. Serrano, injection of Tdap, Flu given by Sole Jimenez CMA. Patient instructed to remain in clinic for 15 minutes afterwards, and to report any adverse reaction to me immediately.       Screening performed by Sole Jimenez CMA on 11/15/2019 at 8:57 AM.

## 2019-11-15 NOTE — PATIENT INSTRUCTIONS
After Visit Instructions:     Thank you for coming to [facility] for your care. It is my goal to partner with you to help you reach your optimal state of health.     I am recommending multidisciplinary care at this time.  The focus of care will be to continue gradual rehabilitation and pain management with medication adjustments as needed.    Continue daily self-care, identifying contributing factors, and monitoring variations in pain level. Continue to integrate self-care into your life.          Schedule follow-up with Jacque Kay PA-C after your vacation. You will need to make this appointment.   Procedures recommended: Start with the cervical facet joint injections, then schedule the lumbar epidural steroid injection in December. To call and schedule your procedure, you can call: [phone], then hit option 2    Medication recommendations:     Okay to take the Robaxin as needed at bedtime      Jacque Kay PA-C  [facility]    Contact information: [facility]  Clinic Number:  [phone]     Call with any questions about your care and for scheduling assistance.     Calls are returned Monday through Friday between 8 AM and 4:30 PM. We usually get back to you within 2 business days depending on the issue/request.    If we are prescribing your medications:    For opioid medication refills, call the clinic or send a [employer] message 7 days in advance.  Please include:    Name of requested medication    Name of the pharmacy.    For non-opioid medications, call your pharmacy directly to request a refill. Please allow 3-4 days to be processed.     Per [place] State Law:    All controlled substance prescriptions must be filled within 30 days of being written.      For those controlled substances allowing refills, pickup must occur within 30 days of last fill.      We believe regular attendance is key to your success in our  program!      Any time you are unable to keep your appointment we ask that you call us at least 24 hours in advance to cancel.This will allow us to offer the appointment time to another patient.   Multiple missed appointments may lead to dismissal from the clinic.

## 2019-11-18 ENCOUNTER — OFFICE VISIT (OUTPATIENT)
Dept: FAMILY MEDICINE | Facility: CLINIC | Age: 54
End: 2019-11-18
Payer: COMMERCIAL

## 2019-11-18 ENCOUNTER — HOSPITAL ENCOUNTER (OUTPATIENT)
Dept: GENERAL RADIOLOGY | Facility: CLINIC | Age: 54
Discharge: HOME | End: 2019-11-18
Attending: FAMILY MEDICINE | Admitting: FAMILY MEDICINE
Payer: COMMERCIAL

## 2019-11-18 VITALS
OXYGEN SATURATION: 99 % | DIASTOLIC BLOOD PRESSURE: 74 MMHG | BODY MASS INDEX: 33.32 KG/M2 | HEIGHT: 72 IN | RESPIRATION RATE: 18 BRPM | SYSTOLIC BLOOD PRESSURE: 120 MMHG | HEART RATE: 78 BPM | WEIGHT: 246 LBS | TEMPERATURE: 98.5 F

## 2019-11-18 DIAGNOSIS — R05.3 CHRONIC COUGHING: ICD-10-CM

## 2019-11-18 DIAGNOSIS — K21.9 GASTROESOPHAGEAL REFLUX DISEASE WITHOUT ESOPHAGITIS: ICD-10-CM

## 2019-11-18 DIAGNOSIS — Z01.818 PREOP GENERAL PHYSICAL EXAM: Primary | ICD-10-CM

## 2019-11-18 DIAGNOSIS — Z79.1 NSAID LONG-TERM USE: ICD-10-CM

## 2019-11-18 DIAGNOSIS — M50.20 HERNIATION OF INTERVERTEBRAL DISC OF CERVICAL SPINE WITHOUT RADICULOPATHY: ICD-10-CM

## 2019-11-18 DIAGNOSIS — M50.30 DDD (DEGENERATIVE DISC DISEASE), CERVICAL: ICD-10-CM

## 2019-11-18 DIAGNOSIS — E78.2 MIXED HYPERLIPIDEMIA: ICD-10-CM

## 2019-11-18 LAB — HIV 1+2 AB+HIV1 P24 AG SERPL QL IA: NONREACTIVE

## 2019-11-18 PROCEDURE — 71046 X-RAY EXAM CHEST 2 VIEWS: CPT | Mod: TC

## 2019-11-18 PROCEDURE — 99214 OFFICE O/P EST MOD 30 MIN: CPT | Performed by: FAMILY MEDICINE

## 2019-11-18 ASSESSMENT — MIFFLIN-ST. JEOR: SCORE: 2003.85

## 2019-11-18 NOTE — PROGRESS NOTES
[address] [address]  [phone]  Dept: [phone]    PRE-OP EVALUATION:  Today's date: 2019    Bulmaro Herrera (: 1965) presents for pre-operative evaluation assessment as requested by Dr. Chaudhry.  He requires evaluation and anesthesia risk assessment prior to undergoing surgery/procedure for treatment of neck pain .    Proposed Surgery/ Procedure: injection   Date of Surgery/ Procedure: 2019  Time of Surgery/ Procedure:   Hospital/Surgical Facility: [facility]  Fax number for surgical facility:   Primary Physician: Sandor Serrano  Type of Anesthesia Anticipated: General    Patient has a Health Care Directive or Living Will:  NO    1. NO - Do you have a history of heart attack, stroke, stent, bypass or surgery on an artery in the head, neck, heart or legs?  2. NO - Do you ever have any pain or discomfort in your chest?  3. NO - Do you have a history of  Heart Failure?  4. NO - Are you troubled by shortness of breath when: walking on the level, up a slight hill or at night?  5. NO - Do you currently have a cold, bronchitis or other respiratory infection?  6. NO - Do you have a cough, shortness of breath or wheezing?  7. NO - Do you sometimes get pains in the calves of your legs when you walk?  8. NO - Do you or anyone in your family have previous history of blood clots?  9. NO - Do you or does anyone in your family have a serious bleeding problem such as prolonged bleeding following surgeries or cuts?  10. NO - Have you ever had problems with anemia or been told to take iron pills?  11. NO - Have you had any abnormal blood loss such as black, tarry or bloody stools, or abnormal vaginal bleeding?  12. NO - Have you ever had a blood transfusion?  13. NO - Have you or any of your relatives ever had problems with anesthesia?  14. NO - Do you have sleep apnea, excessive snoring or daytime drowsiness?  15. NO - Do you have any prosthetic heart  valves?  16. NO - Do you have prosthetic joints?  17. NO - Is there any chance that you may be pregnant?      HPI:     HPI related to upcoming procedure: history of cervical spine degenerative disc disease.  Has had history in the past of neck injections and has had good pain relief in the past. He is interested in pursuing this again.        See problem list for active medical problems.  Problems all longstanding and stable, except as noted/documented.  See ROS for pertinent symptoms related to these conditions.      MEDICAL HISTORY:     Patient Active Problem List    Diagnosis Date Noted     Severe needle phobia 01/21/2019     Priority: Medium     Class: Chronic     likely to preclude him from having minimally invasive interventional pain procedures with minimal sedation. Would need deeper MAC sedation       Mixed hyperlipidemia 11/13/2018     Priority: Medium     ROBBY (obstructive sleep apnea) 11/13/2018     Priority: Medium     Herniation of intervertebral disc of cervical spine without radiculopathy 03/21/2017     Priority: Medium     Cervicalgia 03/21/2017     Priority: Medium     Chronic pain syndrome 04/05/2016     Priority: Medium     DDD, cervical. T#3, #60/mo.        DDD (degenerative disc disease), lumbar 12/20/2013     Priority: Medium     DDD (degenerative disc disease), cervical 12/20/2013     Priority: Medium     Pruritus ani 01/20/2010     Priority: Medium     Back pains      Priority: Medium     Problem list name updated by automated process. Provider to review and confirm       Esophageal reflux 04/13/2005     Priority: Medium      Past Medical History:   Diagnosis Date     Back pains      Depressive disorder, not elsewhere classified 1/28/2007    declines Rx     Disc degeneration 12/2008    see MRI -throaco-lumbar mild discogenic degenerative changes     Elevated LFT's 11/09    alt and ast     Other and unspecified hyperlipidemia 1/2007     Sleep disorder 9/09    CPAP     Tobacco use disorder       Past Surgical History:   Procedure Laterality Date     C NONSPECIFIC PROCEDURE      rt hand surgery - laceration/glass/tendons     COLONOSCOPY  07/07/08    adenomatous polyp repeat 5 years     COLONOSCOPY N/A 12/29/2017    Procedure: COMBINED COLONOSCOPY, SINGLE OR MULTIPLE BIOPSY/POLYPECTOMY BY BIOPSY;  Colonoscopy, Polypectomy by Biopsy;  Surgeon: Matt Reyes MD;  Location: [facility]     ENT SURGERY      for deviated septum     EXCISE LESION AXILLA  12/7/2018    Procedure: Excision Skin Tags Right Axilla;  Surgeon: Ayaan Chaney MD;  [facility]     EXCISE LESION BACK N/A 12/7/2018    Procedure: Excision Back Skin Lesion X Two;  Surgeon: Ayaan Chaney MD;  [facility]: [facility]     HC ECHO HEART XTHORACIC, STRESS/REST  6/2009    neg     INJECT EPIDURAL CERVICAL N/A 5/14/2015    Procedure: INJECT EPIDURAL CERVICAL;  Surgeon: Christian Chaudhry MD;  [facility]     INJECT EPIDURAL CERVICAL N/A 12/28/2018    Procedure: INJECT EPIDURAL CERVICAL 6-7;  Surgeon: Christian Chaudhry MD;  [facility]     INJECT FACET JOINT Bilateral 1/25/2019    Procedure: Cervical Facet Injections Cervical 5-6 and 6-7 Bilateral;  Surgeon: Christian Chaudhry MD;  [facility]: [facility]     ORTHOPEDIC SURGERY      Right hand     TONSILLECTOMY       Current Outpatient Medications   Medication Sig Dispense Refill     amitriptyline (ELAVIL) 25 MG tablet Take 2 tablets (50 mg) by mouth At Bedtime 60 tablet 5     atorvastatin (LIPITOR) 20 MG tablet Take 1 tablet (20 mg) by mouth daily 90 tablet 4     ibuprofen (ADVIL/MOTRIN) 200 MG tablet Take 200 mg by mouth every 4 hours as needed for mild pain       methocarbamol (ROBAXIN) 500 MG tablet Take 1 tablet (500 mg) by mouth 3 times daily 60 tablet 1     modafinil (PROVIGIL) 100 MG tablet Take 1 tablet (100 mg) by mouth daily 30 tablet 3     omeprazole (PRILOSEC) 40 MG DR capsule Take 1 capsule (40 mg) by mouth daily 90 capsule 3     OTC products: None, except as noted above    Allergies   Allergen  Reactions     Percocet [Oxycodone-Acetaminophen] Itching     Wasps [Hornets] Swelling      Latex Allergy: NO    Social History     Tobacco Use     Smoking status: Former Smoker     Packs/day: 1.50     Last attempt to quit: 2007     Years since quittin.7     Smokeless tobacco: Never Used   Substance Use Topics     Alcohol use: No     Frequency: Never     Comment: rare     History   Drug Use No       REVIEW OF SYSTEMS:   Constitutional, neuro, ENT, endocrine, pulmonary, cardiac, gastrointestinal, genitourinary, musculoskeletal, integument and psychiatric systems are negative, except as otherwise noted.    EXAM:   /74   Pulse 78   Temp 98.5  F (36.9  C) (Temporal)   Resp 18   Ht 1.829 m (6')   Wt 111.6 kg (246 lb)   SpO2 99%   BMI 33.36 kg/m      GENERAL APPEARANCE: healthy, alert and no distress     EYES: EOMI,  PERRL     HENT: ear canals and TM's normal and nose and mouth without ulcers or lesions     NECK: no adenopathy, no asymmetry, masses, or scars and thyroid normal to palpation     RESP: lungs clear to auscultation - no rales, rhonchi or wheezes     CV: regular rates and rhythm, normal S1 S2, no S3 or S4 and no murmur, click or rub     ABDOMEN:  soft, nontender, no HSM or masses and bowel sounds normal     MS: extremities normal- no gross deformities noted, no evidence of inflammation in joints, FROM in all extremities.     SKIN: no suspicious lesions or rashes     NEURO: Normal strength and tone, sensory exam grossly normal, mentation intact and speech normal     PSYCH: mentation appears normal. and affect normal/bright     LYMPHATICS: No cervical adenopathy    DIAGNOSTICS:   EKG: Not indicated due to non-vascular surgery and low risk of event (age <65 and without cardiac risk factors)    Recent Labs   Lab Test 11/15/19  0851 09/10/18  1930  02/10/16  0725  14  1735   HGB 16.0 16.4   < >  --    < >  --     236   < >  --    < >  --    INR  --   --   --   --   --  1.0     141   < >  --    < >  --    POTASSIUM 4.3 4.0   < >  --    < >  --    CR 1.12 1.06   < >  --    < >  --    A1C  --   --   --  5.4  --   --     < > = values in this interval not displayed.        IMPRESSION:   Reason for surgery/procedure:    DDD (degenerative disc disease), cervical  Herniation of intervertebral disc of cervical spine without radiculopathy    Diagnosis/reason for consult:   Mixed hyperlipidemia  Gastroesophageal reflux disease without esophagitis  Chronic coughing        The proposed surgical procedure is considered LOW risk.    REVISED CARDIAC RISK INDEX  The patient has the following serious cardiovascular risks for perioperative complications such as (MI, PE, VFib and 3  AV Block):  No serious cardiac risks  INTERPRETATION: 0 risks: Class I (very low risk - 0.4% complication rate)    The patient has the following additional risks for perioperative complications:  No identified additional risks      ICD-10-CM    1. Preop general physical exam Z01.818    2. DDD (degenerative disc disease), cervical M50.30    3. Herniation of intervertebral disc of cervical spine without radiculopathy M50.20    4. Mixed hyperlipidemia E78.2    5. Gastroesophageal reflux disease without esophagitis K21.9    6. Chronic coughing R05 XR Chest 2 Views       RECOMMENDATIONS:       --Patient is to take all scheduled medications on the day of surgery EXCEPT for modifications listed below.    Anticoagulant or Antiplatelet Medication Use  NSAIDS: Ibuprofen (Motrin):         Stop one day prior to surgery        APPROVAL GIVEN to proceed with proposed procedure, without further diagnostic evaluation       Signed Electronically by: Sandor Serrano MD    Copy of this evaluation report is provided to requesting physician.    [place] Preop Guidelines    Revised Cardiac Risk Index

## 2019-11-19 NOTE — RESULT ENCOUNTER NOTE
Bulmaro,  Your results are normal.  Please let me know if you have any questions.    Sincerely,  Dr. Serrano

## 2019-11-28 ENCOUNTER — ANESTHESIA EVENT (OUTPATIENT)
Dept: SURGERY | Facility: CLINIC | Age: 54
End: 2019-11-28
Payer: COMMERCIAL

## 2019-11-28 ASSESSMENT — LIFESTYLE VARIABLES: TOBACCO_USE: 1

## 2019-11-28 NOTE — ANESTHESIA PREPROCEDURE EVALUATION
Anesthesia Pre-Procedure Evaluation    Patient: Bulmaro Herrera   MRN: [id number] : 1965          Preoperative Diagnosis: Arthropathy of cervical facet joint [M47.812]    Procedure(s):  Cervical 5-6 Bilateral facet joint injection    Past Medical History:   Diagnosis Date     Back pains      Depressive disorder, not elsewhere classified 2007    declines Rx     Disc degeneration 2008    see MRI -throaco-lumbar mild discogenic degenerative changes     Elevated LFT's     alt and ast     Other and unspecified hyperlipidemia 2007     Sleep disorder     CPAP     Tobacco use disorder      Past Surgical History:   Procedure Laterality Date     C NONSPECIFIC PROCEDURE      rt hand surgery - laceration/glass/tendons     COLONOSCOPY  08    adenomatous polyp repeat 5 years     COLONOSCOPY N/A 2017    Procedure: COMBINED COLONOSCOPY, SINGLE OR MULTIPLE BIOPSY/POLYPECTOMY BY BIOPSY;  Colonoscopy, Polypectomy by Biopsy;  Surgeon: Matt Reyes MD;  Location:  GI     ENT SURGERY      for deviated septum     EXCISE LESION AXILLA  2018    Procedure: Excision Skin Tags Right Axilla;  Surgeon: Ayaan Chaney MD;  [facility]     EXCISE LESION BACK N/A 2018    Procedure: Excision Back Skin Lesion X Two;  Surgeon: Ayaan Chaney MD;  [facility]     HC ECHO HEART XTHORACIC, STRESS/REST  2009    neg     INJECT EPIDURAL CERVICAL N/A 2015    Procedure: INJECT EPIDURAL CERVICAL;  Surgeon: Christian Chaudhry MD;  [facility]     INJECT EPIDURAL CERVICAL N/A 2018    Procedure: INJECT EPIDURAL CERVICAL 6-7;  Surgeon: Christian Chaudhry MD;  [facility]     INJECT FACET JOINT Bilateral 2019    Procedure: Cervical Facet Injections Cervical 5-6 and 6-7 Bilateral;  Surgeon: Christian Chaudhry MD;  Location:  OR     ORTHOPEDIC SURGERY      Right hand     TONSILLECTOMY         Anesthesia Evaluation     . Pt has had prior anesthetic. Type: General and MAC    No history  of anesthetic complications          ROS/MED HX    ENT/Pulmonary:     (+)sleep apnea, tobacco use, Past use uses CPAP , . .    Neurologic:  - neg neurologic ROS     Cardiovascular:     (+) Dyslipidemia, hypertension----. : . . . :. . Previous cardiac testing date:results:date: results:ECG reviewed date:9/10/18 results:SR date: results:          METS/Exercise Tolerance:  >4 METS   Hematologic:  - neg hematologic  ROS       Musculoskeletal:   (+)  other musculoskeletal- DDD cervical spine with numbness down both arms on and off      GI/Hepatic:     (+) GERD Asymptomatic on medication,       Renal/Genitourinary:  - ROS Renal section negative       Endo:  - neg endo ROS       Psychiatric: Comment: Extreme anxiety and needle phobia     (+) psychiatric history depression and anxiety      Infectious Disease:  - neg infectious disease ROS       Malignancy:      - no malignancy   Other:    (+) No chance of pregnancy C-spine cleared: N/A, H/O Chronic Pain,H/O chronic opiod use , no other significant disability                         Physical Exam  Normal systems: cardiovascular, pulmonary and dental    Airway   Mallampati: II  TM distance: >3 FB  Neck ROM: limited    Dental     Cardiovascular   Rhythm and rate: regular and normal      Pulmonary    breath sounds clear to auscultation            Lab Results   Component Value Date    WBC 5.7 11/15/2019    HGB 16.0 11/15/2019    HCT 47.6 11/15/2019     11/15/2019    CRP <2.9 03/25/2015    SED 7 09/10/2012     11/15/2019    POTASSIUM 4.3 11/15/2019    CHLORIDE 104 11/15/2019    CO2 27 11/15/2019    BUN 19 11/15/2019    CR 1.12 11/15/2019    GLC 98 11/15/2019    WILAIM 9.0 11/15/2019    ALBUMIN 4.3 11/15/2019    PROTTOTAL 7.7 11/15/2019    ALT 57 11/15/2019    AST 34 11/15/2019    ALKPHOS 91 11/15/2019    BILITOTAL 0.9 11/15/2019    LIPASE 95 09/10/2018    AMYLASE 76 03/08/2014    PTT 31 06/06/2008    INR 1.0 02/17/2014    TSH 4.44 (H) 11/15/2019    T4 0.94 11/15/2019        Preop Vitals  BP Readings from Last 3 Encounters:   11/18/19 120/74   11/15/19 116/84   11/14/19 118/88    Pulse Readings from Last 3 Encounters:   11/18/19 78   11/14/19 121   02/26/19 109      Resp Readings from Last 3 Encounters:   11/18/19 18   11/14/19 16   01/25/19 16    SpO2 Readings from Last 3 Encounters:   11/18/19 99%   11/14/19 100%   02/26/19 94%      Temp Readings from Last 1 Encounters:   11/18/19 98.5  F (36.9  C) (Temporal)    Ht Readings from Last 1 Encounters:   11/18/19 1.829 m (6')      Wt Readings from Last 1 Encounters:   11/18/19 111.6 kg (246 lb)    Estimated body mass index is 33.36 kg/m  as calculated from the following:    Height as of 11/18/19: 1.829 m (6').    Weight as of 11/18/19: 111.6 kg (246 lb).       Anesthesia Plan      History & Physical Review  History and physical reviewed and following examination; no interval change.    ASA Status:  2 .    NPO Status:  > 8 hours    Plan for MAC with Propofol induction. Maintenance will be TIVA.  Reason for MAC:  Deep or markedly invasive procedure (G8)         Postoperative Care      Consents  Anesthetic plan, risks, benefits and alternatives discussed with:  Patient or representative and Patient.  Use of blood products discussed: No .   .                 SHIV Rushing CRNA

## 2019-11-29 ENCOUNTER — HOSPITAL ENCOUNTER (OUTPATIENT)
Dept: GENERAL RADIOLOGY | Facility: CLINIC | Age: 54
End: 2019-11-29
Attending: ANESTHESIOLOGY | Admitting: ANESTHESIOLOGY
Payer: COMMERCIAL

## 2019-11-29 ENCOUNTER — ANESTHESIA (OUTPATIENT)
Dept: SURGERY | Facility: CLINIC | Age: 54
End: 2019-11-29
Payer: COMMERCIAL

## 2019-11-29 ENCOUNTER — HOSPITAL ENCOUNTER (OUTPATIENT)
Facility: CLINIC | Age: 54
Discharge: HOME | End: 2019-11-29
Attending: ANESTHESIOLOGY | Admitting: ANESTHESIOLOGY
Payer: COMMERCIAL

## 2019-11-29 VITALS
OXYGEN SATURATION: 92 % | HEART RATE: 72 BPM | WEIGHT: 246 LBS | SYSTOLIC BLOOD PRESSURE: 117 MMHG | DIASTOLIC BLOOD PRESSURE: 90 MMHG | HEIGHT: 72 IN | RESPIRATION RATE: 16 BRPM | BODY MASS INDEX: 33.32 KG/M2 | TEMPERATURE: 97.9 F

## 2019-11-29 DIAGNOSIS — M54.2 PAIN OF CERVICAL FACET JOINT: ICD-10-CM

## 2019-11-29 PROCEDURE — 40000277 XR SURGERY CARM FLUORO LESS THAN 5 MIN W STILLS: Mod: TC

## 2019-11-29 PROCEDURE — 25000128 H RX IP 250 OP 636: Performed by: NURSE ANESTHETIST, CERTIFIED REGISTERED

## 2019-11-29 PROCEDURE — 64490 INJ PARAVERT F JNT C/T 1 LEV: CPT | Mod: 50 | Performed by: ANESTHESIOLOGY

## 2019-11-29 PROCEDURE — 25000128 H RX IP 250 OP 636: Performed by: ANESTHESIOLOGY

## 2019-11-29 PROCEDURE — 25000125 ZZHC RX 250: Performed by: NURSE ANESTHETIST, CERTIFIED REGISTERED

## 2019-11-29 PROCEDURE — 37000008 ZZH ANESTHESIA TECHNICAL FEE, 1ST 30 MIN: Performed by: ANESTHESIOLOGY

## 2019-11-29 PROCEDURE — 64491 INJ PARAVERT F JNT C/T 2 LEV: CPT | Mod: 50 | Performed by: ANESTHESIOLOGY

## 2019-11-29 RX ORDER — PROPOFOL 10 MG/ML
INJECTION, EMULSION INTRAVENOUS PRN
Status: DISCONTINUED | OUTPATIENT
Start: 2019-11-29 | End: 2019-11-29

## 2019-11-29 RX ORDER — IOPAMIDOL 612 MG/ML
INJECTION, SOLUTION INTRATHECAL PRN
Status: DISCONTINUED | OUTPATIENT
Start: 2019-11-29 | End: 2019-11-29 | Stop reason: HOSPADM

## 2019-11-29 RX ORDER — LIDOCAINE HYDROCHLORIDE 20 MG/ML
INJECTION, SOLUTION INFILTRATION; PERINEURAL PRN
Status: DISCONTINUED | OUTPATIENT
Start: 2019-11-29 | End: 2019-11-29

## 2019-11-29 RX ORDER — TRIAMCINOLONE ACETONIDE 40 MG/ML
INJECTION, SUSPENSION INTRA-ARTICULAR; INTRAMUSCULAR PRN
Status: DISCONTINUED | OUTPATIENT
Start: 2019-11-29 | End: 2019-11-29 | Stop reason: HOSPADM

## 2019-11-29 RX ORDER — LIDOCAINE 40 MG/G
CREAM TOPICAL
Status: DISCONTINUED | OUTPATIENT
Start: 2019-11-29 | End: 2019-11-29 | Stop reason: HOSPADM

## 2019-11-29 RX ADMIN — LIDOCAINE HYDROCHLORIDE 1 ML: 10 INJECTION, SOLUTION EPIDURAL; INFILTRATION; INTRACAUDAL; PERINEURAL at 07:35

## 2019-11-29 RX ADMIN — PROPOFOL 50 MG: 10 INJECTION, EMULSION INTRAVENOUS at 08:04

## 2019-11-29 RX ADMIN — MIDAZOLAM 2 MG: 1 INJECTION INTRAMUSCULAR; INTRAVENOUS at 07:54

## 2019-11-29 RX ADMIN — LIDOCAINE HYDROCHLORIDE 100 MG: 20 INJECTION, SOLUTION INFILTRATION; PERINEURAL at 08:01

## 2019-11-29 RX ADMIN — PROPOFOL 30 MG: 10 INJECTION, EMULSION INTRAVENOUS at 08:03

## 2019-11-29 RX ADMIN — PROPOFOL 40 MG: 10 INJECTION, EMULSION INTRAVENOUS at 08:05

## 2019-11-29 RX ADMIN — PROPOFOL 40 MG: 10 INJECTION, EMULSION INTRAVENOUS at 08:01

## 2019-11-29 ASSESSMENT — MIFFLIN-ST. JEOR: SCORE: 2003.85

## 2019-11-29 NOTE — ANESTHESIA POSTPROCEDURE EVALUATION
Patient: Bulmaro Herrera    Procedure(s):  Cervical 5-6 and 6-7 Bilateral facet joint injection    Diagnosis:Arthropathy of cervical facet joint [M47.812]  Diagnosis Additional Information: No value filed.    Anesthesia Type:  MAC    Note:  Anesthesia Post Evaluation    Patient location during evaluation: Phase 2 and Bedside  Patient participation: Able to fully participate in evaluation  Level of consciousness: awake and alert  Pain management: satisfactory to patient  Airway patency: patent  Cardiovascular status: stable  Respiratory status: spontaneous ventilation and room air  Hydration status: stable  PONV: none     Anesthetic complications: None    Comments: Appear to tolerate  MAC with IV sedation well without anesthesia related problems / complications noted.  Pain level satisfactory per patient. No N  /  V .  No complaints per patient.  Will follow as needed.        Last vitals:  Vitals:    11/29/19 0716   BP: 123/87   Resp: 18   Temp: 97.9  F (36.6  C)   SpO2: 94%         Electronically Signed By: SHIV Rushing CRNA  November 29, 2019  8:16 AM

## 2019-11-29 NOTE — ANESTHESIA CARE TRANSFER NOTE
Patient: Bulmaro Herrera    Procedure(s):  Cervical 5-6 and 6-7 Bilateral facet joint injection    Diagnosis: Arthropathy of cervical facet joint [M47.812]  Diagnosis Additional Information: No value filed.    Anesthesia Type:   MAC     Note:  Airway :Room Air  Patient transferred to:Phase II  Handoff Report: Identifed the Patient, Identified the Reponsible Provider, Reviewed the pertinent medical history, Discussed the surgical course, Reviewed Intra-OP anesthesia mangement and issues during anesthesia, Set expectations for post-procedure period and Allowed opportunity for questions and acknowledgement of understanding      Vitals: (Last set prior to Anesthesia Care Transfer)    CRNA VITALS  11/29/2019 0741 - 11/29/2019 0816      11/29/2019             SpO2:  95 %    Resp Rate (observed):  20                Electronically Signed By: SHIV Rushing CRNA  November 29, 2019  8:16 AM

## 2019-11-29 NOTE — OP NOTE
CHIEF COMPLAINT:  neck pain secondary to cervical spondylosis.  INTERVAL HISTORY:  I discussed the above note with the patient. I agree with above.  He did have a epidural injection in the neck and that did not help suggesting that the discs in his neck that have some degeneration are not the major pain generators.  Because of this we are going to diagnostically and therapeutically test his facet joints.  The complicating factor in his situation is that he has a severe needle phobia.  He needed very deep monitored anesthetic care in order to have the facet injection performed.  This would preclude him from having a radiofrequency ablation which requires fully awake medial branch blocks done under local anesthesia.  PHYSICAL EXAM:   Musculoskeletal: Strength of upper extremities is 5/5 bilaterally.  Pain on palpation of the   neck. Pain on   Rotation, extension and flexion of neck.   Neuro: No sensory perception differences of lower extremities.  PROCEDURE:     Left C5-C6, Right C5-C6, Left C6-C7 and Right C6-C7  Intra-articular zygopophyseal joint injection utilizing fluoroscopic guidance with contrast dye.   PROCEDURE DETAILS: After written informed consent was obtained from the patient, the patient was escorted to the procedure room.  The patient was placed in the sitting position.  A  time out  was conducted to verify the patient identity, procedure to be performed, side, site, allergies and any special requirements.  The skin over the neck was prepped and draped in normal sterile fashion. Fluoroscopy was used to identify the zygopophyseal joint with a lateral view.   The skin was anesthetized with 0.5 mL of 1% lidocaine with bicarbonate buffer.  A 25-gauge 2 inch Quincke needle was advanced under fluoroscopic guidance in the lateral approach until entry into the zygopophyseal joint was successful.  View was confirmed in AP view.  Then, <0.3 cc of Omnipaque contrast dye was injected producing a satisfactory  arthrogram without evidence of intrathecal or intravascular spread.  Then, a 1 mL solution of 5 mg dexamethasone and 0.5 mL of 2% lidocaine was incrementally injected into each  joint.  After injection of the medication, as the needle tip was withdrawn, it was flushed with local anesthetic. The patient was monitored with blood pressure and pulse oximetry machines with the assistance of an RN throughout the procedure.  The patient was alert and responsive to questions throughout the procedure.   The patient tolerated the procedure well and was observed in the post-procedural area.  The patient was dismissed without apparent complications.      DIAGNOSIS:  PLAN:  1. Performed repeat zygopophyseal joint injections.   He had about 9 to 10 months of pain relief from the last set of injections.  However, he has a severe needle phobia so he would not be a candidate for medial branch blocks or a radiofrequency ablation so if he does have positive diagnostic injections with his pain form then he would need to consider a cervical fusion if the facet injections do not provide long-term pain relief for him.     Christian Chaudhry MD  Diplomate of the American Board of Anesthesiology, Pain Medicine

## 2019-11-29 NOTE — DISCHARGE INSTRUCTIONS
Home Care Instructions                Procedure: Epidural injection or joint injection    Activity:    Rest today    Do not work today    Resume normal activity tomorrow    Pain:    You may experience soreness at the injection site for 1 to 3 days.    You may use an ice pack for 20 minutes every 2 hours for the first 24 hours    You may use a heating pad after the first 24 hours    You may use Tylenol  (acetaminophen) every 4 hours or other pain medicines as directed by your physician    Safety  Sedation medicine, if given may remain active for many hours.    It is important for the next 24 hours that you do not:    Drive a car    Operate machines or power tools    Consume alcohol, including beer    Sign any important papers or legal documents    You may experience numbness radiating into your legs or arms, (depending on the procedure location)  This numbness may last several hours.  Until the numb sensation returns to normal please use caution in walking, climbing stairs, stepping out of your vehicle, etc.    Common side effects of steroids:  Not everyone will experience corticosteroid side effects. If side effects are experienced they will gradually subside in the 7-10 day period following an injection.    Most common side effects include:    Flushed face and/or chest    Feeling of warmth, particularly in face but could be overall feeling of warmth    Increased blood sugar in diabetic patients    Menstrual irregularities may occur.  If taking hormone based birth control an alternate method of birth control is recommended    Sleep disturbances and/or mood swings are possible    Leg cramps    Please contact us if you have:  Severe pain   Fever more than 101.5 degrees Fahrenheit  Signs of infection (redness, swelling or drainage)      If you have questions during normal business hours (8am-5pm Monday-Friday) contact the [facility] at [phone]. If you need help after hours, we recommend that you go to a  hospital emergency room or dial 911.

## 2019-12-03 ENCOUNTER — TELEPHONE (OUTPATIENT)
Dept: FAMILY MEDICINE | Facility: CLINIC | Age: 54
End: 2019-12-03

## 2019-12-03 ENCOUNTER — TELEPHONE (OUTPATIENT)
Dept: SURGERY | Facility: CLINIC | Age: 54
End: 2019-12-03

## 2019-12-03 NOTE — TELEPHONE ENCOUNTER
Reason for Call:  Other call back    Detailed comments: Can you extend his pre op?  He is short by 2 days for his Janiga inj. Please call pt and let him know.     Phone Number Patient can be reached at: Home number on file [phone] (home)    Best Time: NA    Can we leave a detailed message on this number? YES    Call taken on 12/3/2019 at 9:05 AM by Sarah Pearson

## 2019-12-03 NOTE — TELEPHONE ENCOUNTER
Pt scheduled for Injection   Date:12/20/19  Time:2:30pm  Dr. Chaudhry    Instructed pt to have H&P and  for procedure.

## 2019-12-03 NOTE — PROGRESS NOTES
Patient's health status has remained the same since his past preoperative visit.  He is okay to proceed with procedure on 12/20/2019 with same instructions as this visit.    Sandor Serrano MD

## 2019-12-10 ENCOUNTER — MYC MEDICAL ADVICE (OUTPATIENT)
Dept: PALLIATIVE MEDICINE | Facility: CLINIC | Age: 54
End: 2019-12-10

## 2019-12-10 ENCOUNTER — MYC REFILL (OUTPATIENT)
Dept: FAMILY MEDICINE | Facility: CLINIC | Age: 54
End: 2019-12-10

## 2019-12-10 DIAGNOSIS — E78.2 MIXED HYPERLIPIDEMIA: ICD-10-CM

## 2019-12-10 DIAGNOSIS — K21.9 GASTROESOPHAGEAL REFLUX DISEASE WITHOUT ESOPHAGITIS: ICD-10-CM

## 2019-12-10 DIAGNOSIS — M79.18 MYOFASCIAL PAIN: ICD-10-CM

## 2019-12-10 RX ORDER — METHOCARBAMOL 500 MG/1
500 TABLET, FILM COATED ORAL 3 TIMES DAILY
Qty: 60 TABLET | Refills: 2 | Status: SHIPPED | OUTPATIENT
Start: 2019-12-10 | End: 2021-01-25

## 2019-12-10 RX ORDER — OMEPRAZOLE 40 MG/1
40 CAPSULE, DELAYED RELEASE ORAL DAILY
Qty: 90 CAPSULE | Refills: 3 | OUTPATIENT
Start: 2019-12-10

## 2019-12-10 RX ORDER — ATORVASTATIN CALCIUM 20 MG/1
20 TABLET, FILM COATED ORAL DAILY
Qty: 90 TABLET | Refills: 4 | OUTPATIENT
Start: 2019-12-10

## 2019-12-10 RX ORDER — METHOCARBAMOL 500 MG/1
500 TABLET, FILM COATED ORAL 3 TIMES DAILY
Qty: 60 TABLET | Refills: 1 | Status: CANCELLED | OUTPATIENT
Start: 2019-12-10

## 2019-12-10 NOTE — TELEPHONE ENCOUNTER
MyChart message received from Bulmaro Herrera requesting a refill of methocarbamol (ROBAXIN) 500 MG tablet.  Last refill: 9/26/19  # 60 with 0 refills at [facility].  Last office visit:  11/15/19  Next office visit:  None scheduled    This is an appropriate refill, and has been pended.     Lashonda Puga, [facility]

## 2019-12-10 NOTE — TELEPHONE ENCOUNTER
Omeprazole  Last Written Prescription Date:  11/14/2019  Last Fill Quantity: 90,  # refills: 3   Last office visit: 11/18/2019 with prescribing provider:     Future Office Visit:      Atorvastatin  Last Written Prescription Date:  11/14/2019  Last Fill Quantity: 90,  # refills: 4   Last office visit: 11/18/2019 with prescribing provider:     Future Office Visit:        medications filled for 1 year on 11/14/2019 Declined  Gaby Glil RN BSN

## 2019-12-19 ENCOUNTER — ANESTHESIA EVENT (OUTPATIENT)
Dept: SURGERY | Facility: CLINIC | Age: 54
End: 2019-12-19
Payer: COMMERCIAL

## 2019-12-19 RX ORDER — MODAFINIL 100 MG/1
100 TABLET ORAL DAILY
Qty: 30 TABLET | Refills: 3 | Status: CANCELLED | OUTPATIENT
Start: 2019-12-19

## 2019-12-19 ASSESSMENT — LIFESTYLE VARIABLES: TOBACCO_USE: 1

## 2019-12-20 ENCOUNTER — ANESTHESIA (OUTPATIENT)
Dept: SURGERY | Facility: CLINIC | Age: 54
End: 2019-12-20
Payer: COMMERCIAL

## 2019-12-20 ENCOUNTER — HOSPITAL ENCOUNTER (OUTPATIENT)
Dept: GENERAL RADIOLOGY | Facility: CLINIC | Age: 54
End: 2019-12-20
Attending: ANESTHESIOLOGY | Admitting: ANESTHESIOLOGY
Payer: COMMERCIAL

## 2019-12-20 ENCOUNTER — HOSPITAL ENCOUNTER (OUTPATIENT)
Facility: CLINIC | Age: 54
Discharge: HOME | End: 2019-12-20
Attending: ANESTHESIOLOGY | Admitting: ANESTHESIOLOGY
Payer: COMMERCIAL

## 2019-12-20 VITALS
SYSTOLIC BLOOD PRESSURE: 119 MMHG | RESPIRATION RATE: 18 BRPM | HEART RATE: 77 BPM | OXYGEN SATURATION: 94 % | DIASTOLIC BLOOD PRESSURE: 88 MMHG | TEMPERATURE: 98 F

## 2019-12-20 DIAGNOSIS — M54.50 CHRONIC MIDLINE LOW BACK PAIN WITHOUT SCIATICA: ICD-10-CM

## 2019-12-20 DIAGNOSIS — G89.29 CHRONIC MIDLINE LOW BACK PAIN WITHOUT SCIATICA: ICD-10-CM

## 2019-12-20 PROCEDURE — 25000128 H RX IP 250 OP 636: Performed by: ANESTHESIOLOGY

## 2019-12-20 PROCEDURE — 25000566 ZZH SEVOFLURANE, EA 15 MIN: Performed by: ANESTHESIOLOGY

## 2019-12-20 PROCEDURE — 40000277 XR SURGERY CARM FLUORO LESS THAN 5 MIN W STILLS: Mod: TC

## 2019-12-20 PROCEDURE — 62323 NJX INTERLAMINAR LMBR/SAC: CPT | Performed by: ANESTHESIOLOGY

## 2019-12-20 PROCEDURE — 37000008 ZZH ANESTHESIA TECHNICAL FEE, 1ST 30 MIN: Performed by: ANESTHESIOLOGY

## 2019-12-20 PROCEDURE — 25000125 ZZHC RX 250: Performed by: NURSE ANESTHETIST, CERTIFIED REGISTERED

## 2019-12-20 PROCEDURE — 25000128 H RX IP 250 OP 636: Performed by: NURSE ANESTHETIST, CERTIFIED REGISTERED

## 2019-12-20 RX ORDER — NALOXONE HYDROCHLORIDE 0.4 MG/ML
.1-.4 INJECTION, SOLUTION INTRAMUSCULAR; INTRAVENOUS; SUBCUTANEOUS
Status: DISCONTINUED | OUTPATIENT
Start: 2019-12-20 | End: 2019-12-20 | Stop reason: HOSPADM

## 2019-12-20 RX ORDER — SODIUM CHLORIDE, SODIUM LACTATE, POTASSIUM CHLORIDE, CALCIUM CHLORIDE 600; 310; 30; 20 MG/100ML; MG/100ML; MG/100ML; MG/100ML
INJECTION, SOLUTION INTRAVENOUS CONTINUOUS
Status: DISCONTINUED | OUTPATIENT
Start: 2019-12-20 | End: 2019-12-20 | Stop reason: HOSPADM

## 2019-12-20 RX ORDER — LIDOCAINE HYDROCHLORIDE 20 MG/ML
INJECTION, SOLUTION INFILTRATION; PERINEURAL PRN
Status: DISCONTINUED | OUTPATIENT
Start: 2019-12-20 | End: 2019-12-20

## 2019-12-20 RX ORDER — TRIAMCINOLONE ACETONIDE 40 MG/ML
INJECTION, SUSPENSION INTRA-ARTICULAR; INTRAMUSCULAR PRN
Status: DISCONTINUED | OUTPATIENT
Start: 2019-12-20 | End: 2019-12-20 | Stop reason: HOSPADM

## 2019-12-20 RX ORDER — LIDOCAINE 40 MG/G
CREAM TOPICAL
Status: DISCONTINUED | OUTPATIENT
Start: 2019-12-20 | End: 2019-12-20 | Stop reason: HOSPADM

## 2019-12-20 RX ORDER — ONDANSETRON 2 MG/ML
4 INJECTION INTRAMUSCULAR; INTRAVENOUS EVERY 30 MIN PRN
Status: DISCONTINUED | OUTPATIENT
Start: 2019-12-20 | End: 2019-12-20 | Stop reason: HOSPADM

## 2019-12-20 RX ORDER — ONDANSETRON 4 MG/1
4 TABLET, ORALLY DISINTEGRATING ORAL EVERY 30 MIN PRN
Status: DISCONTINUED | OUTPATIENT
Start: 2019-12-20 | End: 2019-12-20 | Stop reason: HOSPADM

## 2019-12-20 RX ORDER — IOPAMIDOL 612 MG/ML
INJECTION, SOLUTION INTRATHECAL PRN
Status: DISCONTINUED | OUTPATIENT
Start: 2019-12-20 | End: 2019-12-20 | Stop reason: HOSPADM

## 2019-12-20 RX ORDER — PROPOFOL 10 MG/ML
INJECTION, EMULSION INTRAVENOUS PRN
Status: DISCONTINUED | OUTPATIENT
Start: 2019-12-20 | End: 2019-12-20

## 2019-12-20 RX ADMIN — LIDOCAINE HYDROCHLORIDE 40 MG: 20 INJECTION, SOLUTION INFILTRATION; PERINEURAL at 07:35

## 2019-12-20 RX ADMIN — PROPOFOL 20 MG: 10 INJECTION, EMULSION INTRAVENOUS at 07:40

## 2019-12-20 RX ADMIN — LIDOCAINE HYDROCHLORIDE 1 ML: 10 INJECTION, SOLUTION EPIDURAL; INFILTRATION; INTRACAUDAL; PERINEURAL at 06:53

## 2019-12-20 RX ADMIN — PROPOFOL 60 MG: 10 INJECTION, EMULSION INTRAVENOUS at 07:38

## 2019-12-20 RX ADMIN — PROPOFOL 40 MG: 10 INJECTION, EMULSION INTRAVENOUS at 07:35

## 2019-12-20 NOTE — ANESTHESIA PREPROCEDURE EVALUATION
Anesthesia Pre-Procedure Evaluation    Patient: Bulmaro Herrera   MRN: [id number] : 1965          Preoperative Diagnosis: Chronic midline low back pain without sciatica [M54.5, G89.29]    Procedure(s):  Lumbar 5- Sacral 1 Epidural  Injection    Past Medical History:   Diagnosis Date     Back pains      Depressive disorder, not elsewhere classified 2007    declines Rx     Disc degeneration 2008    see MRI -throaco-lumbar mild discogenic degenerative changes     Elevated LFT's     alt and ast     Other and unspecified hyperlipidemia 2007     Sleep disorder     CPAP     Tobacco use disorder      Past Surgical History:   Procedure Laterality Date     C NONSPECIFIC PROCEDURE      rt hand surgery - laceration/glass/tendons     COLONOSCOPY  08    adenomatous polyp repeat 5 years     COLONOSCOPY N/A 2017    Procedure: COMBINED COLONOSCOPY, SINGLE OR MULTIPLE BIOPSY/POLYPECTOMY BY BIOPSY;  Colonoscopy, Polypectomy by Biopsy;  Surgeon: Matt Reyes MD;  Location: [facility]     ENT SURGERY      for deviated septum     EXCISE LESION AXILLA  2018    Procedure: Excision Skin Tags Right Axilla;  Surgeon: Ayaan Chaney MD;  Location: [facility]     EXCISE LESION BACK N/A 2018    Procedure: Excision Back Skin Lesion X Two;  Surgeon: Ayaan Chaney MD;  Location: [facility]     HC ECHO HEART XTHORACIC, STRESS/REST  2009    neg     INJECT EPIDURAL CERVICAL N/A 2015    Procedure: INJECT EPIDURAL CERVICAL;  Surgeon: Christian Chaudhry MD;  Location: [facility]     INJECT EPIDURAL CERVICAL N/A 2018    Procedure: INJECT EPIDURAL CERVICAL 6-7;  Surgeon: Christian Chaudhry MD;  Location: [facility]     INJECT FACET JOINT Bilateral 2019    Procedure: Cervical Facet Injections Cervical 5-6 and 6-7 Bilateral;  Surgeon: Christian Chaudhry MD;  Location[facility]     INJECT FACET JOINT Bilateral 2019    Procedure: Cervical 5-6 and 6-7 Bilateral facet joint injection;  Surgeon: Christian Chaudhry MD;   Location:  OR     ORTHOPEDIC SURGERY      Right hand     TONSILLECTOMY         Anesthesia Evaluation     . Pt has had prior anesthetic. Type: General and MAC    No history of anesthetic complications          ROS/MED HX    ENT/Pulmonary:     (+)sleep apnea, tobacco use, Past use uses CPAP , . .    Neurologic:  - neg neurologic ROS     Cardiovascular:     (+) Dyslipidemia, hypertension----. : . . . :. . Previous cardiac testing date:results:date: results:ECG reviewed date:9/10/18 results:SR date: results:          METS/Exercise Tolerance:  >4 METS   Hematologic:  - neg hematologic  ROS       Musculoskeletal:   (+)  other musculoskeletal- DDD cervical spine with numbness down both arms on and off- chronic back pain      GI/Hepatic:     (+) GERD Asymptomatic on medication,       Renal/Genitourinary:  - ROS Renal section negative       Endo:  - neg endo ROS       Psychiatric: Comment: Extreme anxiety and needle phobia     (+) psychiatric history depression and anxiety      Infectious Disease:  - neg infectious disease ROS       Malignancy:      - no malignancy   Other:    (+) No chance of pregnancy C-spine cleared: N/A, H/O Chronic Pain,H/O chronic opiod use , no other significant disability                         Physical Exam  Normal systems: cardiovascular, pulmonary and dental    Airway   Mallampati: II  TM distance: >3 FB  Neck ROM: full    Dental     Cardiovascular   Rhythm and rate: regular and normal      Pulmonary    breath sounds clear to auscultation            Lab Results   Component Value Date    WBC 5.7 11/15/2019    HGB 16.0 11/15/2019    HCT 47.6 11/15/2019     11/15/2019    CRP <2.9 03/25/2015    SED 7 09/10/2012     11/15/2019    POTASSIUM 4.3 11/15/2019    CHLORIDE 104 11/15/2019    CO2 27 11/15/2019    BUN 19 11/15/2019    CR 1.12 11/15/2019    GLC 98 11/15/2019    WILIAM 9.0 11/15/2019    ALBUMIN 4.3 11/15/2019    PROTTOTAL 7.7 11/15/2019    ALT 57 11/15/2019    AST 34 11/15/2019     ALKPHOS 91 11/15/2019    BILITOTAL 0.9 11/15/2019    LIPASE 95 09/10/2018    AMYLASE 76 03/08/2014    PTT 31 06/06/2008    INR 1.0 02/17/2014    TSH 4.44 (H) 11/15/2019    T4 0.94 11/15/2019       Preop Vitals  BP Readings from Last 3 Encounters:   11/29/19 (!) 117/90   11/18/19 120/74   11/15/19 116/84    Pulse Readings from Last 3 Encounters:   11/29/19 72   11/18/19 78   11/14/19 121      Resp Readings from Last 3 Encounters:   11/29/19 (P) 16   11/18/19 18   11/14/19 16    SpO2 Readings from Last 3 Encounters:   11/29/19 92%   11/18/19 99%   11/14/19 100%      Temp Readings from Last 1 Encounters:   11/29/19 97.9  F (36.6  C) (Oral)    Ht Readings from Last 1 Encounters:   11/29/19 1.829 m (6')      Wt Readings from Last 1 Encounters:   11/29/19 111.6 kg (246 lb)    Estimated body mass index is 33.36 kg/m  as calculated from the following:    Height as of 11/29/19: 1.829 m (6').    Weight as of 11/29/19: 111.6 kg (246 lb).       Anesthesia Plan      History & Physical Review  History and physical reviewed and following examination; no interval change.    ASA Status:  2 .    NPO Status:  > 8 hours    Plan for MAC Maintenance will be TIVA.  Reason for MAC:  Difficulty with conscious sedation (QS)         Postoperative Care      Consents  Anesthetic plan, risks, benefits and alternatives discussed with:  Patient.  Use of blood products discussed: No .   .                 SHIV Mcarthur CRNA

## 2019-12-20 NOTE — ANESTHESIA POSTPROCEDURE EVALUATION
Patient: Bulmaro Herrera    Procedure(s):  Lumbar 5- Sacral 1 Epidural  Injection bilatetral    Diagnosis:Chronic midline low back pain without sciatica [M54.5, G89.29]  Diagnosis Additional Information: No value filed.    Anesthesia Type:  MAC    Note:  Anesthesia Post Evaluation    Patient location during evaluation: Phase 2 and Bedside  Patient participation: Able to fully participate in evaluation  Level of consciousness: awake  Pain management: adequate  Cardiovascular status: acceptable, stable, hypotensive and blood pressure returned to baseline  Respiratory status: acceptable and room air  Hydration status: acceptable  PONV: none     Anesthetic complications: None          Last vitals:  Vitals:    12/20/19 0648   BP: 119/88   Pulse: 77   Resp: 18   Temp: 98  F (36.7  C)   SpO2: 94%         Electronically Signed By: SHIV Mcarthur CRNA  December 20, 2019  7:52 AM

## 2019-12-20 NOTE — ANESTHESIA CARE TRANSFER NOTE
Patient: Bulmaro Herrera    Procedure(s):  Lumbar 5- Sacral 1 Epidural  Injection bilatetral    Diagnosis: Chronic midline low back pain without sciatica [M54.5, G89.29]  Diagnosis Additional Information: No value filed.    Anesthesia Type:   MAC     Note:  Airway :Room Air  Patient transferred to:Phase II  Handoff Report: Identifed the Patient, Identified the Reponsible Provider, Reviewed the pertinent medical history, Discussed the surgical course, Reviewed Intra-OP anesthesia mangement and issues during anesthesia, Set expectations for post-procedure period and Allowed opportunity for questions and acknowledgement of understanding      Vitals: (Last set prior to Anesthesia Care Transfer)    CRNA VITALS  12/20/2019 0716 - 12/20/2019 0750      12/20/2019             SpO2:  96 %    Resp Rate (observed):  8                Electronically Signed By: SHIV Mcarthur CRNA  December 20, 2019  7:50 AM

## 2019-12-20 NOTE — OP NOTE
CHIEF COMPLAINT: Low back and buttock pain  PROCEDURE:L5-S1  Interlaminar epidural steroid injection using fluoroscopic guidance with contrast dye.   PROCEDURE DETAILS: After written informed consent was obtained from the patient, the patient was escorted to the procedure room.  The patient was placed in the prone position.  A  time out  was conducted to verify patient identity, procedure to be performed, side, site, allergies and any special requirements.  The skin over the neck and upper back region were prepped and draped in normal sterile fashion. Fluoroscopy was used to identify the interspace in an AP view and the skin was anesthetized with 2 mL of 1% lidocaine with bicarbonate buffer.  A 20-gauge 3-1/2 inch Tuohy needle was advanced using the loss of resistance technique with preservative free normal saline with fluoroscopic guidance. After negative aspiration for CSF and blood, 1.5 cc of Omnipaque contrast dye was injected revealing the appropriate epidurogram without evidence of intrathecal or intravascular spread. Following this, a 3-mL solution of 40 mg of triamcinolone with 2 cc preservative-free normal saline was slowly injected.  After injection of the medication, as the needle tip was withdrawn, it was flushed with local anesthetic.  The patient was monitored with blood pressure and pulse oximetry machines with the assistance of an RN throughout the procedure.  The patient was alert and responsive to questions throughout the procedure.   The patient tolerated the procedure well and was observed in the post-procedural area.  The patient was dismissed without apparent complications.     DIAGNOSIS:  1. Discogenic p ain from his L5-S1 disc with an intradiscal annular high intensity zone  PLAN:  1. Performed a L5-S1 interlaminar epidural steroid injection.   2. The patient was instructed to follow-up at the [place] [facility] if today's procedure is not helpful.  A high intensity zone in the annulus of  the disc correlates highly with discogenic pain.  These are notoriously difficult to treat with anything short of surgery.  I think the good news is that the rest of his back looks really good with very little degeneration above his L5-S1 level.  Today's injection is not helpful I would recommend seeing Dr. Tran for consultation for possible disc replacement.   Christian Chaudhry MD  Diplomate of the American Board of Anesthesiology, Pain Medicine

## 2019-12-20 NOTE — DISCHARGE INSTRUCTIONS
Home Care Instructions                Procedure: Epidural injection or joint injection    Activity:    Rest today    Do not work today    Resume normal activity tomorrow    Pain:    You may experience soreness at the injection site for 1 to 3 days.    You may use an ice pack for 20 minutes every 2 hours for the first 24 hours    You may use a heating pad after the first 24 hours    You may use Tylenol  (acetaminophen) every 4 hours or other pain medicines as directed by your physician    Safety  Sedation medicine, if given may remain active for many hours.    It is important for the next 24 hours that you do not:    Drive a car    Operate machines or power tools    Consume alcohol, including beer    Sign any important papers or legal documents    You may experience numbness radiating into your legs or arms, (depending on the procedure location)  This numbness may last several hours.  Until the numb sensation returns to normal please use caution in walking, climbing stairs, stepping out of your vehicle, etc.    Common side effects of steroids:  Not everyone will experience corticosteroid side effects. If side effects are experienced they will gradually subside in the 7-10 day period following an injection.    Most common side effects include:    Flushed face and/or chest    Feeling of warmth, particularly in face but could be overall feeling of warmth    Increased blood sugar in diabetic patients    Menstrual irregularities may occur.  If taking hormone based birth control an alternate method of birth control is recommended    Sleep disturbances and/or mood swings are possible    Leg cramps    Please contact us if you have:  Severe pain   Fever more than 101.5 degrees Fahrenheit  Signs of infection (redness, swelling or drainage)      If you have questions during normal business hours (8am-5pm Monday-Friday) contact the [facility] at [phone]. If you need help after hours, we recommend that you go to a  hospital emergency room or dial 911.

## 2020-02-07 RX ORDER — MODAFINIL 100 MG/1
100 TABLET ORAL DAILY
Qty: 30 TABLET | Refills: 3 | Status: CANCELLED | OUTPATIENT
Start: 2020-02-07

## 2020-03-04 ENCOUNTER — TELEPHONE (OUTPATIENT)
Dept: FAMILY MEDICINE | Facility: CLINIC | Age: 55
End: 2020-03-04

## 2020-03-04 NOTE — TELEPHONE ENCOUNTER
Reason for Call:  Other call back    Detailed comments: Hilda from [facility] is calling on behalf of Bulmaro to prescribe an opioid or some pain med for an infection, they cannot prescribe this anymore and ibuprofen and tylenol is not working anymore and cannot sleep at night.   Please advise    Phone Number Patient can be reached at: Other phone number:  [facility]- [phone]  [facility]- [phone]    Best Time: anytime    Can we leave a detailed message on this number? YES    Call taken on 3/4/2020 at 1:45 PM by Madi R. Seipel Vaccari

## 2020-03-05 ENCOUNTER — MYC MEDICAL ADVICE (OUTPATIENT)
Dept: FAMILY MEDICINE | Facility: CLINIC | Age: 55
End: 2020-03-05

## 2020-03-05 NOTE — TELEPHONE ENCOUNTER
Left message for call back. See msg. below.  Sole Jimenez CMA ([facility])

## 2020-03-05 NOTE — TELEPHONE ENCOUNTER
Patient will either need to see me for an appointment or find out why his dental office is unwilling to prescribe him a pain medication.  I would actually recommend he be seen by dentist for follow-up to see if there is a cause for increased pain.  Will have staff notify patient.     Sandor Serrano MD

## 2020-03-05 NOTE — TELEPHONE ENCOUNTER
Bulmaro's wife Laine (c2c on file) calls to check on the status of this message.  Please call and let her know yes or no so she can find another option.  Bulmaro is in terrible pain.

## 2020-03-05 NOTE — TELEPHONE ENCOUNTER
Bulmaro returns call and message per Dr Serrano is relayed.  Bulmaro states the dental office did give him antibiotics but they do not prescribe pain meds.      He was seen by his dentist yesterday and this is what they told him about the pain meds.  The infection is from an implant he had put in the top of his mouth.  The infection and pain goes from his jaw to sinus' and up into his eye.  He has been unable to get any sleep at night due to the pain.       First message is from his dentist to Dr Serrano to request pain meds for Bulmaro.

## 2020-03-05 NOTE — TELEPHONE ENCOUNTER
Called Bulmaro and he states the pain is alitttle better. He is taking tylenol 1000mg and IBu 800mg.  He is taking antibiotics.  MICHELLE

## 2020-03-06 ENCOUNTER — TELEPHONE (OUTPATIENT)
Dept: FAMILY MEDICINE | Facility: CLINIC | Age: 55
End: 2020-03-06

## 2020-03-06 ENCOUNTER — OFFICE VISIT (OUTPATIENT)
Dept: FAMILY MEDICINE | Facility: OTHER | Age: 55
End: 2020-03-06
Payer: COMMERCIAL

## 2020-03-06 VITALS
SYSTOLIC BLOOD PRESSURE: 128 MMHG | OXYGEN SATURATION: 95 % | DIASTOLIC BLOOD PRESSURE: 62 MMHG | BODY MASS INDEX: 35.14 KG/M2 | TEMPERATURE: 98.1 F | HEART RATE: 104 BPM | RESPIRATION RATE: 16 BRPM | HEIGHT: 71 IN | WEIGHT: 251 LBS

## 2020-03-06 DIAGNOSIS — E66.01 MORBID OBESITY (H): ICD-10-CM

## 2020-03-06 DIAGNOSIS — K04.7 DENTAL INFECTION: Primary | ICD-10-CM

## 2020-03-06 PROCEDURE — 99213 OFFICE O/P EST LOW 20 MIN: CPT | Performed by: INTERNAL MEDICINE

## 2020-03-06 RX ORDER — KETOROLAC TROMETHAMINE 10 MG/1
10 TABLET, FILM COATED ORAL EVERY 8 HOURS PRN
Qty: 9 TABLET | Refills: 0 | Status: SHIPPED | OUTPATIENT
Start: 2020-03-06 | End: 2021-01-14

## 2020-03-06 RX ORDER — ACETAMINOPHEN 500 MG
1000 TABLET ORAL EVERY MORNING
COMMUNITY

## 2020-03-06 ASSESSMENT — PAIN SCALES - GENERAL: PAINLEVEL: SEVERE PAIN (6)

## 2020-03-06 ASSESSMENT — MIFFLIN-ST. JEOR: SCORE: 2010.66

## 2020-03-06 NOTE — TELEPHONE ENCOUNTER
Please triage patient's symptoms. Dr. Serrano doesn't have any openings today, and it done at 1 pm.     Sole Jimenez CMA ([facility])

## 2020-03-06 NOTE — TELEPHONE ENCOUNTER
Called and spoke with patient wife. Patient does not have phone on him.  She reports patient continues to have pain. Advised Dr. Serrano is out of clinic at 1 pm and is unable to see patient today. Per previous note of Dr. Serrano patient needs to be seen for this. Offered to transfer to scheduling. Declined.    Gaby Gill RN BSN

## 2020-03-06 NOTE — PROGRESS NOTES
"Subjective     Bulmaro Herrera is a 54 year old male who presents to clinic today for the following health issues:    HPI   Chief Complaint   Patient presents with     Jaw Pain     right jaw. Had bone graft done at dentist last Thursday. Started having stabbing pain. Went into dentist today and was prescribed antibiotics. Dentist can not prescribe anything for pain and patient cannot sleep                         Chief Complaint    Patient had a dental procedure.    Patient has pain following dental procedure.    Patient saw dentist following a dental procedure.   Dentist gave patient antibiotics following a dental procedure.   Patient cannot get anything for pain from dentist because dentist \"does not prescribe pain pills\".    Patient cannot get into her regular doctor to get pain pills.    Patient makes appointment with provider who he has never seen to get pain pills.  Patient notes that he has probable \"dry socket\"   recommends patient follows up with dentist because  is not dentist.      /62 (BP Location: Right arm, Patient Position: Sitting, Cuff Size: Adult Large)   Pulse 104   Temp 98.1  F (36.7  C) (Temporal)   Resp 16   Ht 1.803 m (5' 11\")   Wt 113.9 kg (251 lb)   SpO2 95%   BMI 35.01 kg/m         Decision Making       1. Dental infection   Will give patient 3 days worth of Toradol with specific instructions to take with food.  Patient will follow-up with dentist or primary care provider on Monday    - ketorolac (TORADOL) 10 MG tablet; Take 1 tablet (10 mg) by mouth every 8 hours as needed for pain  Dispense: 9 tablet; Refill: 0         "

## 2020-03-09 ENCOUNTER — OFFICE VISIT (OUTPATIENT)
Dept: FAMILY MEDICINE | Facility: OTHER | Age: 55
End: 2020-03-09
Payer: COMMERCIAL

## 2020-03-09 ENCOUNTER — TELEPHONE (OUTPATIENT)
Dept: SURGERY | Facility: CLINIC | Age: 55
End: 2020-03-09

## 2020-03-09 ENCOUNTER — HOSPITAL ENCOUNTER (OUTPATIENT)
Facility: CLINIC | Age: 55
End: 2020-03-09
Attending: ANESTHESIOLOGY | Admitting: ANESTHESIOLOGY
Payer: COMMERCIAL

## 2020-03-09 ENCOUNTER — OFFICE VISIT (OUTPATIENT)
Dept: PALLIATIVE MEDICINE | Facility: CLINIC | Age: 55
End: 2020-03-09
Payer: COMMERCIAL

## 2020-03-09 VITALS
TEMPERATURE: 97.8 F | BODY MASS INDEX: 35.15 KG/M2 | SYSTOLIC BLOOD PRESSURE: 126 MMHG | DIASTOLIC BLOOD PRESSURE: 76 MMHG | WEIGHT: 252 LBS

## 2020-03-09 VITALS
BODY MASS INDEX: 35.29 KG/M2 | DIASTOLIC BLOOD PRESSURE: 74 MMHG | HEART RATE: 101 BPM | OXYGEN SATURATION: 94 % | WEIGHT: 252.1 LBS | SYSTOLIC BLOOD PRESSURE: 132 MMHG | TEMPERATURE: 98.1 F | RESPIRATION RATE: 16 BRPM | HEIGHT: 71 IN

## 2020-03-09 DIAGNOSIS — G47.33 OSA (OBSTRUCTIVE SLEEP APNEA): ICD-10-CM

## 2020-03-09 DIAGNOSIS — M79.18 MYOFASCIAL PAIN: ICD-10-CM

## 2020-03-09 DIAGNOSIS — M47.812 ARTHROPATHY OF CERVICAL FACET JOINT: Primary | ICD-10-CM

## 2020-03-09 DIAGNOSIS — K29.60 REFLUX GASTRITIS: ICD-10-CM

## 2020-03-09 DIAGNOSIS — E78.5 HYPERLIPIDEMIA LDL GOAL <130: ICD-10-CM

## 2020-03-09 DIAGNOSIS — Z01.818 PREOP GENERAL PHYSICAL EXAM: Primary | ICD-10-CM

## 2020-03-09 DIAGNOSIS — M50.20 HERNIATION OF INTERVERTEBRAL DISC OF CERVICAL SPINE WITHOUT RADICULOPATHY: ICD-10-CM

## 2020-03-09 DIAGNOSIS — M50.30 DDD (DEGENERATIVE DISC DISEASE), CERVICAL: ICD-10-CM

## 2020-03-09 PROCEDURE — 99213 OFFICE O/P EST LOW 20 MIN: CPT | Performed by: PHYSICIAN ASSISTANT

## 2020-03-09 PROCEDURE — 99213 OFFICE O/P EST LOW 20 MIN: CPT | Performed by: INTERNAL MEDICINE

## 2020-03-09 ASSESSMENT — PAIN SCALES - GENERAL
PAINLEVEL: NO PAIN (1)
PAINLEVEL: NO PAIN (1)

## 2020-03-09 ASSESSMENT — MIFFLIN-ST. JEOR: SCORE: 2015.65

## 2020-03-09 NOTE — PROGRESS NOTES
[facility]  [address] [facility] [address]  [phone]  Dept: [phone]    PRE-OP EVALUATION:  Today's date: 3/9/2020    Bulmaro Herrera (: 1965) presents for pre-operative evaluation assessment. He requires evaluation and anesthesia risk assessment prior to undergoing surgery/procedure for treatment of neck .    Primary Physician: Sandor Serrano  Type of Anesthesia Anticipated: Local with MAC    Patient has a Health Care Directive or Living Will:  NO    Preop Questions 3/9/2020   Who is doing your surgery? yuan   What are you having done? neck injection   Date of Surgery/Procedure: 2020   Facility or Hospital where procedure/surgery will be performed: [facility]   1.  Do you have a history of Heart attack, stroke, stent, coronary bypass surgery, or other heart surgery? No   2.  Do you ever have any pain or discomfort in your chest? No   3.  Do you have a history of  Heart Failure? No   4.   Are you troubled by shortness of breath when:  walking on a level surface, or up a slight hill, or at night? No   5.  Do you currently have a cold, bronchitis or other respiratory infection? No   6.  Do you have a cough, shortness of breath, or wheezing? No   7.  Do you sometimes get pains in the calves of your legs when you walk? No   8. Do you or anyone in your family have previous history of blood clots? No   9.  Do you or does anyone in your family have a serious bleeding problem such as prolonged bleeding following surgeries or cuts? No   10. Have you ever had problems with anemia or been told to take iron pills? No   11. Have you had any abnormal blood loss such as black, tarry or bloody stools? No   12. Have you ever had a blood transfusion? No   13. Have you or any of your relatives ever had problems with anesthesia? YES    14. Do you have sleep apnea, excessive snoring or daytime drowsiness? No   15. Do you have any prosthetic heart valves? No   16. Do you have  prosthetic joints? No         HPI:     HPI related to upcoming procedure: Patient has chronic neck pain due to degenerative disc disease in the cervical spine.  Anticipates epidural steroid injection for temporizing relief.      See problem list for active medical problems.  Problems all longstanding and stable, except as noted/documented.  See ROS for pertinent symptoms related to these conditions.      MEDICAL HISTORY:     Patient Active Problem List    Diagnosis Date Noted     Morbid obesity (H) 03/06/2020     Priority: Medium     NSAID long-term use 11/18/2019     Priority: Medium     Severe needle phobia 01/21/2019     Priority: Medium     Class: Chronic     likely to preclude him from having minimally invasive interventional pain procedures with minimal sedation. Would need deeper MAC sedation       Mixed hyperlipidemia 11/13/2018     Priority: Medium     ROBBY (obstructive sleep apnea) 11/13/2018     Priority: Medium     Herniation of intervertebral disc of cervical spine without radiculopathy 03/21/2017     Priority: Medium     Cervicalgia 03/21/2017     Priority: Medium     Chronic pain syndrome 04/05/2016     Priority: Medium     DDD, cervical. T#3, #60/mo.        DDD (degenerative disc disease), lumbar 12/20/2013     Priority: Medium     DDD (degenerative disc disease), cervical 12/20/2013     Priority: Medium     Pruritus ani 01/20/2010     Priority: Medium     Back pains      Priority: Medium     Problem list name updated by automated process. Provider to review and confirm       Esophageal reflux 04/13/2005     Priority: Medium      Past Medical History:   Diagnosis Date     Back pains      Depressive disorder, not elsewhere classified 1/28/2007    declines Rx     Disc degeneration 12/2008    see MRI -throaco-lumbar mild discogenic degenerative changes     Elevated LFT's 11/09    alt and ast     Other and unspecified hyperlipidemia 1/2007     Sleep disorder 9/09    CPAP     Tobacco use disorder      Past  Surgical History:   Procedure Laterality Date     C NONSPECIFIC PROCEDURE      rt hand surgery - laceration/glass/tendons     COLONOSCOPY  07/07/08    adenomatous polyp repeat 5 years     COLONOSCOPY N/A 12/29/2017    Procedure: COMBINED COLONOSCOPY, SINGLE OR MULTIPLE BIOPSY/POLYPECTOMY BY BIOPSY;  Colonoscopy, Polypectomy by Biopsy;  Surgeon: Matt Reyes MD;  Location: [facility]     ENT SURGERY      for deviated septum     EXCISE LESION AXILLA  12/7/2018    Procedure: Excision Skin Tags Right Axilla;  Surgeon: Ayaan Chaney MD;  Location: [facility]     EXCISE LESION BACK N/A 12/7/2018    Procedure: Excision Back Skin Lesion X Two;  Surgeon: Ayaan Chaney MD;  Location: [facility]     HC ECHO HEART XTHORACIC, STRESS/REST  6/2009    neg     INJECT EPIDURAL CERVICAL N/A 5/14/2015    Procedure: INJECT EPIDURAL CERVICAL;  Surgeon: Christian Chaudhry MD;  Location[facility]     INJECT EPIDURAL CERVICAL N/A 12/28/2018    Procedure: INJECT EPIDURAL CERVICAL 6-7;  Surgeon: Christian Chaudhry MD;  [facility]     INJECT EPIDURAL LUMBAR N/A 12/20/2019    Procedure: Lumbar 5- Sacral 1 Epidural  Injection bilatetral;  Surgeon: Christian Chaudhry MD;  [facility]     INJECT FACET JOINT Bilateral 1/25/2019    Procedure: Cervical Facet Injections Cervical 5-6 and 6-7 Bilateral;  Surgeon: Christian Chaudhry MD;  [facility]     INJECT FACET JOINT Bilateral 11/29/2019    Procedure: Cervical 5-6 and 6-7 Bilateral facet joint injection;  Surgeon: Christian Chaudhry MD;  Location[facility]     ORTHOPEDIC SURGERY      Right hand     TONSILLECTOMY       Current Outpatient Medications   Medication Sig Dispense Refill     acetaminophen (TYLENOL) 500 MG tablet Take 500-1,000 mg by mouth every 6 hours as needed for mild pain       amitriptyline (ELAVIL) 25 MG tablet Take 2 tablets (50 mg) by mouth At Bedtime 60 tablet 5     atorvastatin (LIPITOR) 20 MG tablet Take 1 tablet (20 mg) by mouth daily 90 tablet 4     ibuprofen (ADVIL/MOTRIN) 200 MG tablet  Take 200 mg by mouth every 4 hours as needed for mild pain       ketorolac (TORADOL) 10 MG tablet Take 1 tablet (10 mg) by mouth every 8 hours as needed for pain 9 tablet 0     omeprazole (PRILOSEC) 40 MG DR capsule Take 1 capsule (40 mg) by mouth daily 90 capsule 3     methocarbamol (ROBAXIN) 500 MG tablet Take 1 tablet (500 mg) by mouth 3 times daily 60 tablet 2     modafinil (PROVIGIL) 100 MG tablet Take 1 tablet (100 mg) by mouth daily (Patient not taking: Reported on 3/6/2020) 30 tablet 3     OTC products: None, except as noted above    Allergies   Allergen Reactions     Percocet [Oxycodone-Acetaminophen] Itching     Wasps [Hornets] Swelling      Latex Allergy: NO    Social History     Tobacco Use     Smoking status: Former Smoker     Packs/day: 1.50     Last attempt to quit: 2007     Years since quittin.0     Smokeless tobacco: Never Used   Substance Use Topics     Alcohol use: No     Frequency: Never     Comment: rare     History   Drug Use No       REVIEW OF SYSTEMS:   CONSTITUTIONAL: NEGATIVE for fever, chills, change in weight  INTEGUMENTARY/SKIN: NEGATIVE for worrisome rashes, moles or lesions  EYES: NEGATIVE for vision changes or irritation  ENT/MOUTH: NEGATIVE for ear, mouth and throat problems  RESP: NEGATIVE for significant cough or SOB  CV: NEGATIVE for chest pain, palpitations or peripheral edema  GI: NEGATIVE for nausea, abdominal pain, heartburn, or change in bowel habits  : NEGATIVE for frequency, dysuria, or hematuria  MUSCULOSKELETAL: Patient has ongoing neck pain secondary to degenerative disc disease.  NEURO: NEGATIVE for weakness, dizziness or paresthesias  ENDOCRINE: NEGATIVE for temperature intolerance, skin/hair changes  HEME: NEGATIVE for bleeding problems  PSYCHIATRIC: NEGATIVE for changes in mood or affect    EXAM:   /74 (BP Location: Right arm, Patient Position: Sitting, Cuff Size: Adult Large)   Pulse 101   Temp 98.1  F (36.7  C) (Temporal)   Resp 16   Ht 1.803  "yan (5' 11\")   Wt 114.4 kg (252 lb 1.6 oz)   SpO2 94%   BMI 35.16 kg/m      GENERAL APPEARANCE: healthy, alert and no distress     EYES: EOMI,  PERRL     HENT: ear canals and TM's normal and nose and mouth without ulcers or lesions     NECK: no adenopathy, no asymmetry, masses, or scars and thyroid normal to palpation     RESP: lungs clear to auscultation - no rales, rhonchi or wheezes     CV: regular rates and rhythm, normal S1 S2, no S3 or S4 and no murmur, click or rub     ABDOMEN:  soft, nontender, no HSM or masses and bowel sounds normal     MS: Cervical pain with rotation of the head neck is noted.  No radiculopathy is present at this time.     SKIN: no suspicious lesions or rashes     NEURO: Normal strength and tone, sensory exam grossly normal, mentation intact and speech normal     PSYCH: mentation appears normal. and affect normal/bright     LYMPHATICS: No cervical adenopathy    DIAGNOSTICS:   No labs or EKG required for low risk surgery (cataract, skin procedure, breast biopsy, etc)    Recent Labs   Lab Test 11/15/19  0851 09/10/18  1930  02/10/16  0725  02/17/14  1735   HGB 16.0 16.4   < >  --    < >  --     236   < >  --    < >  --    INR  --   --   --   --   --  1.0    141   < >  --    < >  --    POTASSIUM 4.3 4.0   < >  --    < >  --    CR 1.12 1.06   < >  --    < >  --    A1C  --   --   --  5.4  --   --     < > = values in this interval not displayed.        IMPRESSION:   Reason for surgery/procedure: Chronic neck pain requiring epidural steroid injection.  Diagnosis/reason for consult: Perioperative risk assessment in a pleasant 54-year-old male with:  1. Preop general physical exam    2. DDD (degenerative disc disease), cervical    3. Herniation of intervertebral disc of cervical spine without radiculopathy    4. ROBBY (obstructive sleep apnea)    5. Hyperlipidemia LDL goal <130    6. Reflux gastritis      The proposed surgical procedure is considered LOW risk.    REVISED CARDIAC RISK " INDEX  The patient has the following serious cardiovascular risks for perioperative complications such as (MI, PE, VFib and 3  AV Block):  No serious cardiac risks  INTERPRETATION: 0 risks: Class I (very low risk - 0.4% complication rate)    The patient has the following additional risks for perioperative complications:  No identified additional risks      ICD-10-CM    1. Preop general physical exam  Z01.818        RECOMMENDATIONS:         --Patient is to take all scheduled medications on the day of surgery EXCEPT for modifications listed below.  No aspirin or ibuprofen 5 days prior to the procedure.    APPROVAL GIVEN to proceed with proposed procedure, without further diagnostic evaluation       Signed Electronically by: Gustavo Farmer DO    Copy of this evaluation report is provided to requesting physician.    [place] Preop Guidelines    Revised Cardiac Risk Index

## 2020-03-09 NOTE — PATIENT INSTRUCTIONS
After Visit Instructions:     Thank you for coming to [facility] for your care. It is my goal to partner with you to help you reach your optimal state of health.     I am recommending multidisciplinary care at this time.  The focus of care will be to continue gradual rehabilitation and pain management with medication adjustments as needed.    Continue daily self-care, identifying contributing factors, and monitoring variations in pain level. Continue to integrate self-care into your life.        Schedule follow-up with Jacque Kay PA-C in 8-12 weeks. You will need to make this appointment.   Procedures recommended: bilateral C5-6 and C6-7 facet injections. To call and schedule your procedure, you can call: [phone], then hit option 2     Medication recommendations:     Continue Methocarbamol 500m tablet three times daily as needed      Jacque Kay PA-C  [facility]  [place]    Contact information: [facility]  Clinic Number:  [phone]     Call with any questions about your care and for scheduling assistance.     Calls are returned Monday through Friday between 8 AM and 4:30 PM. We usually get back to you within 2 business days depending on the issue/request.    If we are prescribing your medications:    For opioid medication refills, call the clinic or send a [employer] message 7 days in advance.  Please include:    Name of requested medication    Name of the pharmacy.    For non-opioid medications, call your pharmacy directly to request a refill. Please allow 3-4 days to be processed.     Per [place] State Law:    All controlled substance prescriptions must be filled within 30 days of being written.      For those controlled substances allowing refills, pickup must occur within 30 days of last fill.      We believe regular attendance is key to your success in our program!      Any time you are unable to keep your appointment  we ask that you call us at least 24 hours in advance to cancel.This will allow us to offer the appointment time to another patient.   Multiple missed appointments may lead to dismissal from the clinic.

## 2020-03-09 NOTE — TELEPHONE ENCOUNTER
Pt scheduled for KIMBERLY   Date: 3/26/20  Time: 1130  Dr. Chaudhry    Instructed pt to have H&P and  for procedure.

## 2020-03-09 NOTE — PROGRESS NOTES
[facility]    CHIEF COMPLAINT:   Pain  -Neck   -low back pain    INTERVAL HISTORY:  Last seen on 11/15/2019.        Recommendations/plan at the last visit included:  1. Physical Therapy:  NO   2. Clinical Health Psychologist:  NO    3. Diagnostic Studies:  none  4. Medication Management:  Continue current medications  5. Further procedures recommended: repeat cervical facet joint injections. L5-S1 KRYSTAL, if not helpful could try lumbar facet steroid injections.   6. Recommendations to PCP. See above     Follow up with this provider:  After his vacation    Since his last visit, uBlmaro Herrera reports:    He had C5-6 and C6-7 bilateral facet joint injections on 11/29/2019 and L5-S1 KRYSTAL on 12/20/2019.     He states that the neck injections were helpful until recently. He states that he is working on getting a dental implant and that caused an infection. This caused him to have issues with his neck. He states that whenever he gets sick or an infection this exacerbates his neck. He uses the robaxin as needed.      He states that the low back injection worked well for him.       Pain Information:   Pain quality: aching, miserable, gnawing and nagging     Pain rating: intensity ranges from 2/10 to 10/10, and averages 4/10 on a 0-10 scale.   Pain today 9/10      CURRENT RELEVANT PAIN MEDICATIONS:   Amitriptyline-taking 3 at night  Ibuprofen-200mg, 1 at night  Robaxin 500mg-taking at night    Patient is using the medication as prescribed:  YES  Is your medication helpful? YES   Medication side effects? no side effect    Previous Medications: (H--helped; HI--Helped initially; SWH-- somewhat helpful, NH--No help; W--worse; SE--side effects)   Opiates: Tylenol #3 H SE wake up like with a hangover, dizzy  NSAIDS: Ibuprofen H  Muscle Relaxants: Flexeril NH SE sedation  Anti-migraine mediations: none  Anti-depressants: SE after coming off of them, feels like his personality was altered from them. While  on them, felt like head was in a cloud. Amitriptyline ?   Sleep aids:None  Anxiolytics: None  Neuropathics: Gabapentin SE restless legs     Topicals: Lidocaine Patches SWH   Other medications not covered above:  Prednisone H    Past Pain Treatments:  Pain Clinic:   No   PT: Yes for neck. Helped for about 1 day. Was doing exercises at home.   Psychologist: No  Relaxation techniques/biofeedback: No  Chiropractor: Yes, helps for 1/2 to 1 day  Acupuncture: Yes, NH one needle caused SE of significantly increased pain  Pharmacotherapy:          Opioids: Yes            Non-opioids:    Yes   TENs Unit: Yes, tried at chiropractor, makes more tense  Injections:   -12/20/20119 L5-S1 KRYSTAL  -11/29/2019 bilateral C5-6 and C6-7 facet joint injections  -1/25/2019 bilateral C5-6 and C6-7 facet joint injections  -12/28/2018 C6-7 interlaminar epidural steroid injection , had a pain flare for 1 week, then maybe it's been slightly helpful.   -2-3 years ago, low back injection.  Self-care:   Yes, tried [place] Gel  Surgeries related to pain: No     [place] Board of Pharmacy Data Base Reviewed:    YES; As expected, no concern for misuse/abuse of controlled medications based on this report.      Medications:  Current Outpatient Medications   Medication Sig Dispense Refill     acetaminophen (TYLENOL) 500 MG tablet Take 500-1,000 mg by mouth every 6 hours as needed for mild pain       atorvastatin (LIPITOR) 20 MG tablet Take 1 tablet (20 mg) by mouth daily 90 tablet 4     ibuprofen (ADVIL/MOTRIN) 200 MG tablet Take 200 mg by mouth every 4 hours as needed for mild pain       ketorolac (TORADOL) 10 MG tablet Take 1 tablet (10 mg) by mouth every 8 hours as needed for pain 9 tablet 0     methocarbamol (ROBAXIN) 500 MG tablet Take 1 tablet (500 mg) by mouth 3 times daily 60 tablet 2     omeprazole (PRILOSEC) 40 MG DR capsule Take 1 capsule (40 mg) by mouth daily 90 capsule 3     amitriptyline (ELAVIL) 25 MG tablet Take 2 tablets (50 mg) by mouth  At Bedtime (Patient not taking: Reported on 3/6/2020) 60 tablet 5     modafinil (PROVIGIL) 100 MG tablet Take 1 tablet (100 mg) by mouth daily (Patient not taking: Reported on 3/6/2020) 30 tablet 3       Review of Systems: A 10-point review of systems was negative, with the exception of chronic pain issues.      Social History: Reviewed; unchanged from previous consultation.      Family history: Reviewed; unchanged from previous consultation.     PHYSICAL EXAM:     Vitals:   /76   Temp 97.8  F (36.6  C) (Temporal)   Wt 114.3 kg (252 lb)   BMI 35.15 kg/m    Body mass index is 35.15 kg/m .  Data Unavailable  252 lbs 0 oz      Constitutional: healthy, alert and no distress  HEENT: Head atraumatic, normocephalic. Eyes without conjunctival injection or jaundice. Neck supple. No obvious neck masses.  Skin: No rash, lesions, or petechiae of exposed skin.   Psychiatric/mental status: Alert, without lethargy or stupor. Appropriate affect. Mood normal.   Musculoskeletal: No pain with extension of his back.    DIAGNOSTIC TESTS:  Imaging Studies:   No new imaging to review    Assessment:  Bulmaro Herrera is a 53 year old male who presents today for follow up regarding his:    1.  Cervical facet arthropathy  2.  Myofascial pain    Patient's low back is been doing really well.  He states that his neck pain is been flared from a recent dental infection.  He would like to proceed with the injections again.  Discussed that we could do injections about 3-4 times per year.      Plan:    Diagnosis reviewed, treatment option addressed, and risk/benifits discussed.  Self-care instructions given.  I am recommending a multidisciplinary treatment plan to help this patient better manage pain.      1. Physical Therapy:  NO   2. Clinical Health Psychologist:  NO    3. Diagnostic Studies:  none  4. Medication Management:    1. Methocarbamol 500m tablet three times daily as needed  5. Further procedures recommended: repeat cervical  facet joint injections.   6. Recommendations to PCP. See above    Follow up with this provider:  8-12 weeks    Total time spent face to face was 10 minutes and more than 50% of face to face time was spent in counseling and/or coordination of care regarding the diagnosis and recommendations above.      Jacque Kay PA-C   [facility]

## 2020-03-18 ENCOUNTER — NURSE TRIAGE (OUTPATIENT)
Dept: FAMILY MEDICINE | Facility: CLINIC | Age: 55
End: 2020-03-18

## 2020-03-18 NOTE — TELEPHONE ENCOUNTER
Patient needs an appointment with Dr. Farmer for a chest x-ray, per nurse call. Please call to advise, Can call wife at #[phone], there is a consent to communicate on file

## 2020-03-18 NOTE — TELEPHONE ENCOUNTER
Patient informed of Dr Oscar message. States he will complete the [facility] visit. Michelle Frankel LPN........3/18/2020 2:43 PM

## 2020-03-18 NOTE — TELEPHONE ENCOUNTER
A chest x-ray in the absence of appropriate screening for the coronavirus is inappropriate.  In fact, chest x-ray is inappropriate unless the patient has been evaluated.    Please suggest the patient contacts on care for the appropriate coronavirus/Kovic 19 screening procedures.      Darian

## 2020-05-01 ENCOUNTER — MYC MEDICAL ADVICE (OUTPATIENT)
Dept: SLEEP MEDICINE | Facility: CLINIC | Age: 55
End: 2020-05-01

## 2020-05-04 ENCOUNTER — TELEPHONE (OUTPATIENT)
Dept: FAMILY MEDICINE | Facility: CLINIC | Age: 55
End: 2020-05-04

## 2020-05-04 NOTE — TELEPHONE ENCOUNTER
Prior Authorization Not Needed per Insurance    Medication: modafinil 100mg  Insurance Company: [employer] - Phone [phone] Fax [phone]  Expected CoPay:      Pharmacy Filling the Rx: [facility] [address]   Pharmacy Notified: Yes  Patient Notified: Yes  **Instructed pharmacy to notify patient when script is ready to /ship.**

## 2020-05-04 NOTE — TELEPHONE ENCOUNTER
Central Prior Authorization Team   Phone: [phone]      PA Initiation    Medication: modafinil 100mg  Insurance Company: [employer] - Phone [phone] Fax [phone]  Pharmacy Filling the Rx: [address]   Filling Pharmacy Phone: [phone]  Filling Pharmacy Fax:    Start Date: 5/4/2020

## 2020-05-04 NOTE — TELEPHONE ENCOUNTER
I called and left a VM informing the patient that his medication for Modafinil 100 Mg. Was refilled.

## 2020-05-04 NOTE — TELEPHONE ENCOUNTER
Prior Authorization Retail Medication Request    Medication/Dose: modafinil 100mg  ICD code (if different than what is on RX):     Previously Tried and Failed:     Rationale:       Insurance Name:  [employer]  Insurance ID:  [id number]      Pharmacy Information (if different than what is on RX)  Name:     Phone:

## 2020-05-22 ENCOUNTER — HOSPITAL ENCOUNTER (OUTPATIENT)
Facility: CLINIC | Age: 55
End: 2020-05-22
Attending: ANESTHESIOLOGY | Admitting: ANESTHESIOLOGY
Payer: COMMERCIAL

## 2020-05-22 DIAGNOSIS — Z11.59 ENCOUNTER FOR SCREENING FOR OTHER VIRAL DISEASES: Primary | ICD-10-CM

## 2020-05-22 NOTE — TELEPHONE ENCOUNTER
Pt rescheduled for   Date: 6/12/20  Time: 230pm  Dr. Chaudhry    Instructed pt to have H&P and  for procedure.  Patient informed of COVID testing process.

## 2020-07-14 NOTE — PROGRESS NOTES
Gertrude Eldridge 1994 female MRN: [id number]      ASSESSMENT/PLAN  Problem List Items Addressed This Visit        Endocrine    Hypothyroidism       Other    Healthcare maintenance - Primary      Other Visit Diagnoses     Screening, lipid        Screening for diabetes mellitus (DM)        Screening for cervical cancer            BP WNL   BMI as below   Update TSH  CMP + Lipids to screen for HLD, DM   HIV screening deferred   PAP: Pt requesting OBGYN -- referral provided, encouraged to schedule  Discussed HPV vaccine -- pt would like to think about it  No future appointments  SUBJECTIVE  CC: Physical Exam (patient interested in bloodwork to check thyroid)      HPI:  Gertrude Eldridge is a 27 y o  female who presents for annual physical  History reviewed and updated as below  No acute concerns  Review of Systems   Constitutional: Negative for unexpected weight change  HENT: Negative for congestion, ear pain, rhinorrhea and sore throat  Eyes: Negative for visual disturbance  Respiratory: Negative for cough, chest tightness and shortness of breath  Cardiovascular: Negative for chest pain, palpitations and leg swelling  Gastrointestinal: Negative for abdominal pain, constipation and diarrhea  Endocrine: Negative for polyuria  Genitourinary: Negative for dysuria and menstrual problem ("always been funky")  Neurological: Negative for dizziness and light-headedness  Psychiatric/Behavioral: Negative for sleep disturbance  Historical Information   The patient history was reviewed and updated as follows:    History reviewed  No pertinent past medical history    Past Surgical History:   Procedure Laterality Date    EYE SURGERY  2018     Family History   Problem Relation Age of Onset    Hypertension Mother     Diabetes Father         mellitus      Social History   Social History     Substance and Sexual Activity   Alcohol Use No    Comment: never drank alcohol     Social Lilliana Please inform Bulmaro/ or caretaker  that this result(s) is/are normal.  The cardiac stress test was normal-Thanks. KRYSTAL Rainey MD   History     Substance and Sexual Activity   Drug Use No     Social History     Tobacco Use   Smoking Status Never Smoker   Smokeless Tobacco Never Used       Medications:     Current Outpatient Medications:     levothyroxine 125 mcg tablet, Take 1 tablet (125 mcg total) by mouth daily, Disp: 90 tablet, Rfl: 3    ibuprofen (MOTRIN) 600 mg tablet, Take one tablet every 6 hours with food for pain x 1 week if needed, Disp: 21 tablet, Rfl: 0  No Known Allergies    OBJECTIVE    Vitals:   Vitals:    07/14/20 0809   BP: 118/84   Pulse: 81   Temp: (!) 96 9 °F (36 1 °C)   SpO2: 98%   Weight: 84 6 kg (186 lb 6 4 oz)   Height: 5' 2" (1 575 m)           Physical Exam   Constitutional: She appears well-developed and well-nourished  No distress  HENT:   Head: Normocephalic and atraumatic  Right Ear: External ear normal    Left Ear: External ear normal    Nose: Nose normal    Mouth/Throat: Oropharynx is clear and moist    Eyes: Conjunctivae are normal    Neck: No thyromegaly present  Cardiovascular: Normal rate and regular rhythm  Pulmonary/Chest: Effort normal and breath sounds normal  No respiratory distress  Abdominal: Soft  Bowel sounds are normal  She exhibits no distension  There is no tenderness  Musculoskeletal: She exhibits no edema  Lymphadenopathy:     She has no cervical adenopathy  Neurological: She is alert  Skin: Skin is warm and dry  Psychiatric: She has a normal mood and affect  Vitals reviewed  DO D[facility]   7/14/2020  8:12 AM      BMI Counseling: Body mass index is 34 09 kg/m²  The BMI is above normal  Nutrition recommendations include consuming healthier snacks, increasing intake of lean protein and reducing intake of saturated fat and trans fat  Exercise recommendations include exercising 3-5 times per week

## 2020-09-29 DIAGNOSIS — G47.10 HYPERSOMNIA: ICD-10-CM

## 2020-09-29 RX ORDER — MODAFINIL 100 MG/1
100 TABLET ORAL DAILY
Qty: 30 TABLET | Refills: 3 | Status: CANCELLED | OUTPATIENT
Start: 2020-09-29

## 2020-09-30 DIAGNOSIS — G47.10 HYPERSOMNIA: ICD-10-CM

## 2020-09-30 RX ORDER — MODAFINIL 100 MG/1
100 TABLET ORAL DAILY
Qty: 30 TABLET | Refills: 3 | Status: SHIPPED | OUTPATIENT
Start: 2020-09-30 | End: 2021-01-04

## 2020-09-30 NOTE — TELEPHONE ENCOUNTER
MODAFINIL   Last Written Prescription Date:  5/4/2020  Last Fill Quantity: 30 TABLET,  # refills: 3   Last office visit: 2/26/2019  Future Office Visit: NO     Requested Prescriptions   Pending Prescriptions Disp Refills     modafinil (PROVIGIL) 100 MG tablet 30 tablet 3     Sig: Take 1 tablet (100 mg) by mouth daily       There is no refill protocol information for this order              Moon Barfield on 9/30/2020 at 8:36 AM

## 2021-01-05 ENCOUNTER — MYC MEDICAL ADVICE (OUTPATIENT)
Dept: FAMILY MEDICINE | Facility: CLINIC | Age: 56
End: 2021-01-05

## 2021-01-05 DIAGNOSIS — K21.9 GASTROESOPHAGEAL REFLUX DISEASE WITHOUT ESOPHAGITIS: ICD-10-CM

## 2021-01-05 RX ORDER — OMEPRAZOLE 40 MG/1
CAPSULE, DELAYED RELEASE ORAL
Qty: 90 CAPSULE | Refills: 0 | Status: SHIPPED | OUTPATIENT
Start: 2021-01-05 | End: 2021-04-23

## 2021-01-05 NOTE — TELEPHONE ENCOUNTER
Prescription approved per [facility] Refill Protocol.    Maria L Parmra RN

## 2021-01-09 ENCOUNTER — HEALTH MAINTENANCE LETTER (OUTPATIENT)
Age: 56
End: 2021-01-09

## 2021-01-14 ENCOUNTER — OFFICE VISIT (OUTPATIENT)
Dept: INTERNAL MEDICINE | Facility: CLINIC | Age: 56
End: 2021-01-14
Payer: COMMERCIAL

## 2021-01-14 VITALS
WEIGHT: 253 LBS | HEIGHT: 72 IN | HEART RATE: 114 BPM | TEMPERATURE: 97.8 F | RESPIRATION RATE: 16 BRPM | DIASTOLIC BLOOD PRESSURE: 78 MMHG | SYSTOLIC BLOOD PRESSURE: 120 MMHG | OXYGEN SATURATION: 96 % | BODY MASS INDEX: 34.27 KG/M2

## 2021-01-14 DIAGNOSIS — E66.01 MORBID OBESITY (H): ICD-10-CM

## 2021-01-14 DIAGNOSIS — H69.93 DYSFUNCTION OF BOTH EUSTACHIAN TUBES: ICD-10-CM

## 2021-01-14 DIAGNOSIS — R05.9 COUGH: Primary | ICD-10-CM

## 2021-01-14 PROCEDURE — 99213 OFFICE O/P EST LOW 20 MIN: CPT | Performed by: INTERNAL MEDICINE

## 2021-01-14 RX ORDER — ALBUTEROL SULFATE 90 UG/1
2 AEROSOL, METERED RESPIRATORY (INHALATION) EVERY 6 HOURS
Qty: 1 INHALER | Refills: 0 | Status: SHIPPED | OUTPATIENT
Start: 2021-01-14 | End: 2023-09-06

## 2021-01-14 RX ORDER — PREDNISONE 20 MG/1
40 TABLET ORAL DAILY
Qty: 10 TABLET | Refills: 0 | Status: SHIPPED | OUTPATIENT
Start: 2021-01-14 | End: 2021-01-25

## 2021-01-14 RX ORDER — LORATADINE 10 MG/1
10 TABLET ORAL DAILY
Qty: 30 TABLET | Refills: 0 | Status: SHIPPED | OUTPATIENT
Start: 2021-01-14 | End: 2021-03-01

## 2021-01-14 ASSESSMENT — MIFFLIN-ST. JEOR: SCORE: 2030.6

## 2021-01-14 ASSESSMENT — PAIN SCALES - GENERAL: PAINLEVEL: NO PAIN (0)

## 2021-01-14 NOTE — PROGRESS NOTES
"Jemima Chamberlain is a 55 year old who presents to clinic today for the following health issues     HPI     Chief Complaint   Patient presents with     Dizziness     2+ week comes and goes     Nausea     comes and goes      Cough     comes and goes for 1 year. No cold symptoms        Dizziness  Onset/Duration: 2+ weeks   Description:   Do you feel faint: YES  Does it feel like the surroundings (bed, room) are moving: YES  Unsteady/off balance: YES  Have you passed out or fallen: no  Intensity: severe  Progression of Symptoms: intermittent  Accompanying Signs & Symptoms:  Heart palpitations or chest pain: no  Nausea, vomiting: YES, nausea no vomiting   Weakness or lack of coordination in arms or legs: no  Vision or speech changes: no  Numbness or tingling: no  Ringing in ears (Tinnitus): no  Hearing Loss: no  History:   Head trauma/concussion history: no  Previous similar symptoms: no  Recent bleeding history: no  Any new medications (BP?): no  Precipitating factors:   Worse with activity: YES  Worse with head movement: YES  Alleviating factors:   Does staying in a fixed position give relief: YES  Therapies tried and outcome: none     EMR reviewed including:             Complaint, History of Chief Complaint, Corresponding Review of Systems, and Complaint Specific Physical Examination.    #1   Intermittent vertigo.  Occasionally associated with nausea.  Worse when moving.  Worse when changing positions from laying down to sitting up.  No symptoms of hypotension.  Describes as very typical \"world spinning around\".  Exam: Nasal mucosa is markedly edematous, tympanic membranes are retracted bilaterally.  Moderate nystagmus is noted with head motion.  Patient has difficulty standing with eyes closed.    #2   Ongoing cough.  Nonproductive, worse in the morning.  No hemoptysis.  Not associated with dyspnea.  Exam: Lungs are clear without rubs, rales, rhonchi or wheezes. /78 (BP Location: Right arm, Patient " Position: Sitting, Cuff Size: Adult Large)   Pulse 114   Temp 97.8  F (36.6  C) (Temporal)   Resp 16   Ht 1.829 m (6')   Wt 114.8 kg (253 lb)   SpO2 96%   BMI 34.31 kg/m               Decision Making    Problem and Complexity     1. Dysfunction of both eustachian tubes  Suspect dysfunctional eustachian tubes causing persistent vertigo.  We will place the patient on antihistamine and a short course of prednisone.    2. Cough  Suspect as result of allergic rhinitis, as above, will use albuterol as anticipate there is a reactive component as well.  - predniSONE (DELTASONE) 20 MG tablet; Take 2 tablets (40 mg) by mouth daily  Dispense: 10 tablet; Refill: 0  - loratadine (CLARITIN) 10 MG tablet; Take 1 tablet (10 mg) by mouth daily  Dispense: 30 tablet; Refill: 0  - albuterol (PROAIR HFA/PROVENTIL HFA/VENTOLIN HFA) 108 (90 Base) MCG/ACT inhaler; Inhale 2 puffs into the lungs every 6 hours  Dispense: 1 Inhaler; Refill: 0    3. Morbid obesity (H)  Recommend weight loss to responsible caloric restriction and exercise as tolerated          ------------------------------------------------------------------------------------------------------------------------------  Data  1  Specialty (external) notes reviewed:   None    Tests reviewed:   None     Tests ordered:   None    Independent Historians Interview:   None    2  Review of outside Tests:   None    3   Verbal Discussion with Specialists:    None      ----------------------------------------------------------------------------------------------------------------------------------  Risk   Prescription drug management:   Yes                                High risk for toxicity:     Decision regarding surgery:    None     Social determinants of health:   None     Decision to withhold therapy:   None                               FOLLOW UP   I have asked the patient to make an appointment for followup with me in 3 weeks            I have carefully explained the  diagnosis and treatment options to the patient.  The patient has displayed an understanding of the above, and all subsequent questions were answered.      DO ANDREW Blandon    Portions of this note were produced using [employer]  Although every attempt at real-time proof reading has been made, occasional grammar/syntax errors may have been missed.

## 2021-01-15 ASSESSMENT — ASTHMA QUESTIONNAIRES: ACT_TOTALSCORE: 22

## 2021-01-25 ENCOUNTER — TELEPHONE (OUTPATIENT)
Dept: SURGERY | Facility: CLINIC | Age: 56
End: 2021-01-25

## 2021-01-25 ENCOUNTER — VIRTUAL VISIT (OUTPATIENT)
Dept: PALLIATIVE MEDICINE | Facility: CLINIC | Age: 56
End: 2021-01-25
Payer: COMMERCIAL

## 2021-01-25 DIAGNOSIS — M79.18 MYOFASCIAL PAIN: ICD-10-CM

## 2021-01-25 DIAGNOSIS — M47.812 ARTHROPATHY OF CERVICAL FACET JOINT: Primary | ICD-10-CM

## 2021-01-25 DIAGNOSIS — Z11.59 ENCOUNTER FOR SCREENING FOR OTHER VIRAL DISEASES: ICD-10-CM

## 2021-01-25 PROCEDURE — 99214 OFFICE O/P EST MOD 30 MIN: CPT | Mod: TEL | Performed by: PHYSICIAN ASSISTANT

## 2021-01-25 NOTE — PATIENT INSTRUCTIONS
After Visit Instructions:     Thank you for coming to [facility] for your care. It is my goal to partner with you to help you reach your optimal state of health.     I am recommending multidisciplinary care at this time.  The focus of care will be to continue gradual rehabilitation and pain management with medication adjustments as needed.    Continue daily self-care, identifying contributing factors, and monitoring variations in pain level. Continue to integrate self-care into your life.        Schedule follow-up with Jacque Kay PA-C as needed. You will need to make this appointment.     Procedures recommended:   Cervical facet joint injections. To call and schedule your procedure, you can call: [phone], then hit option 2      Jacque Kay PA-C  [facility]    Contact information: [facility]  Clinic Number:  [phone]     Call with any questions about your care and for scheduling assistance.     Calls are returned Monday through Friday between 8 AM and 4:30 PM. We usually get back to you within 2 business days depending on the issue/request.    If we are prescribing your medications:    For opioid medication refills, call the clinic or send a [employer] message 7 days in advance.  Please include:    Name of requested medication    Name of the pharmacy.    For non-opioid medications, call your pharmacy directly to request a refill. Please allow 3-4 days to be processed.     Per [place] State Law:    All controlled substance prescriptions must be filled within 30 days of being written.      For those controlled substances allowing refills, pickup must occur within 30 days of last fill.      We believe regular attendance is key to your success in our program!      Any time you are unable to keep your appointment we ask that you call us at least 24 hours in advance to cancel.This will allow us to offer the  appointment time to another patient.   Multiple missed appointments may lead to dismissal from the clinic.

## 2021-01-25 NOTE — TELEPHONE ENCOUNTER
Spoke to wife Laine to schedule pt for injection    Date:2/11/21  Time:0830am  Dr. Chaudhry    Instructed pt to have H&P and  for procedure.  Patient informed of COVID testing process.

## 2021-01-25 NOTE — PROGRESS NOTES
"Bulmaro is a 55 year old who is being evaluated via a billable telephone visit.      What phone number would you like to be contacted at? [phone]  How would you like to obtain your AVS? [facility]    CHIEF COMPLAINT:   Pain  -Neck   -low back pain    INTERVAL HISTORY:  Last seen on 2020.        Recommendations/plan at the last visit included:  1. Physical Therapy:  NO   2. Clinical Health Psychologist:  NO    3. Diagnostic Studies:  none  4. Medication Management:    1. Methocarbamol 500m tablet three times daily as needed  5. Further procedures recommended: repeat cervical facet joint injections.   6. Recommendations to PCP. See above     Follow up with this provider:  8-12 weeks    Since his last visit, Bulmaro FRANK Sharon reports:    He states that he had injections set up before the pandemic and then it got cancelled. He states that he is tossing and turning at night. He states that it feels like a \"toothache\" in his neck. He states that he has used Robaxin but does not feel that it does not work.       Pain Information:   Pain today 6/10      CURRENT RELEVANT PAIN MEDICATIONS:   Ibuprofen-200mg, 2 in AM  Tylenol 500mg-takes 1 in AM      Patient is using the medication as prescribed:  YES  Is your medication helpful? YES   Medication side effects? no side effect    Previous Medications: (H--helped; HI--Helped initially; SWH-- somewhat helpful, NH--No help; W--worse; SE--side effects)   Opiates: Tylenol #3 H SE wake up like with a hangover, dizzy  NSAIDS: Ibuprofen H SE upset stomach  Muscle Relaxants: Flexeril NH SE sedation, Robaxin NH  Anti-migraine mediations: none  Anti-depressants: SE after coming off of them, feels like his personality was altered from them. While on them, felt like head was in a cloud. Amitriptyline SE restless legs   Sleep aids:None  Anxiolytics: None  Neuropathics: Gabapentin SE restless legs     Topicals: Lidocaine Patches [facility]   Other " medications not covered above:  Prednisone H    Past Pain Treatments:  Pain Clinic:   No   PT: Yes for neck. Helped for about 1 day. Was doing exercises at home.   Psychologist: No  Relaxation techniques/biofeedback: No  Chiropractor: Yes, helps for 1/2 to 1 day  Acupuncture: Yes, NH one needle caused SE of significantly increased pain  Pharmacotherapy:          Opioids: Yes            Non-opioids:    Yes   TENs Unit: Yes, tried at chiropractor, makes more tense  Injections:   -12/20/20119 L5-S1 KRYSTAL  -11/29/2019 bilateral C5-6 and C6-7 facet joint injections  -1/25/2019 bilateral C5-6 and C6-7 facet joint injections  -12/28/2018 C6-7 interlaminar epidural steroid injection , had a pain flare for 1 week, then maybe it's been slightly helpful.   -2-3 years ago, low back injection.  Self-care:   Yes, tried [place] Gel  Surgeries related to pain: No     [place] Board of Pharmacy Data Base Reviewed:    NO      Medications:  Current Outpatient Medications   Medication Sig Dispense Refill     acetaminophen (TYLENOL) 500 MG tablet Take 500-1,000 mg by mouth every 6 hours as needed for mild pain       albuterol (PROAIR HFA/PROVENTIL HFA/VENTOLIN HFA) 108 (90 Base) MCG/ACT inhaler Inhale 2 puffs into the lungs every 6 hours 1 Inhaler 0     atorvastatin (LIPITOR) 20 MG tablet Take 1 tablet (20 mg) by mouth daily 90 tablet 4     ibuprofen (ADVIL/MOTRIN) 200 MG tablet Take 200 mg by mouth every 4 hours as needed for mild pain       loratadine (CLARITIN) 10 MG tablet Take 1 tablet (10 mg) by mouth daily 30 tablet 0     methocarbamol (ROBAXIN) 500 MG tablet Take 1 tablet (500 mg) by mouth 3 times daily (Patient not taking: Reported on 1/14/2021) 60 tablet 2     modafinil (PROVIGIL) 100 MG tablet Take 1 tablet (100 mg) by mouth daily 30 tablet 3     omeprazole (PRILOSEC) 40 MG DR capsule TAKE ONE CAPSULE BY MOUTH ONCE DAILY 90 capsule 0     predniSONE (DELTASONE) 20 MG tablet Take 2 tablets (40 mg) by mouth daily 10 tablet 0          PHYSICAL EXAM:     Vitals:   There were no vitals taken for this visit.  There is no height or weight on file to calculate BMI.  Data Unavailable  0 lbs 0 oz      DIAGNOSTIC TESTS:  Imaging Studies:   No new imaging to review    Assessment:  Bulmaro Herrera is a 55 year old male who presents today for follow up regarding his:    1.  Cervical facet arthropathy  2.  Myofascial pain    Worsening neck pain. Has been over a year since his last injection. Will place order to repeat.    Plan:    Diagnosis reviewed, treatment option addressed, and risk/benifits discussed.  Self-care instructions given.  I am recommending a multidisciplinary treatment plan to help this patient better manage pain.      1. Physical Therapy:  NO   2. Clinical Health Psychologist:  NO    3. Diagnostic Studies:  none  4. Medication Management:  No changes made today  5. Procedures: referred for cervical facet joint injections. Due to patient's severe needle phobia, CMBB preceding to RFA is not an option  6. Recommendations to PCP. See above    Follow up with this provider: as needed     The total TIME spent on this patient on the day of the appointment was 11 minutes.      Jacque Kay PA-C   [facility]

## 2021-01-27 ENCOUNTER — OFFICE VISIT (OUTPATIENT)
Dept: INTERNAL MEDICINE | Facility: CLINIC | Age: 56
End: 2021-01-27
Payer: COMMERCIAL

## 2021-01-27 VITALS
SYSTOLIC BLOOD PRESSURE: 122 MMHG | TEMPERATURE: 97.6 F | HEART RATE: 90 BPM | HEIGHT: 72 IN | RESPIRATION RATE: 18 BRPM | BODY MASS INDEX: 33.83 KG/M2 | DIASTOLIC BLOOD PRESSURE: 76 MMHG | OXYGEN SATURATION: 95 % | WEIGHT: 249.8 LBS

## 2021-01-27 DIAGNOSIS — Z01.818 PREOP GENERAL PHYSICAL EXAM: Primary | ICD-10-CM

## 2021-01-27 DIAGNOSIS — G47.33 OSA (OBSTRUCTIVE SLEEP APNEA): ICD-10-CM

## 2021-01-27 DIAGNOSIS — M50.20 HERNIATION OF INTERVERTEBRAL DISC OF CERVICAL SPINE WITHOUT RADICULOPATHY: ICD-10-CM

## 2021-01-27 DIAGNOSIS — M50.30 DDD (DEGENERATIVE DISC DISEASE), CERVICAL: ICD-10-CM

## 2021-01-27 PROCEDURE — 99214 OFFICE O/P EST MOD 30 MIN: CPT | Performed by: INTERNAL MEDICINE

## 2021-01-27 ASSESSMENT — MIFFLIN-ST. JEOR: SCORE: 2016.09

## 2021-01-27 ASSESSMENT — PAIN SCALES - GENERAL: PAINLEVEL: MILD PAIN (2)

## 2021-01-27 NOTE — PATIENT INSTRUCTIONS

## 2021-01-27 NOTE — PROGRESS NOTES
[address] [address]  Phone: [phone]  Primary Provider: No Ref-Primary, Physician  Pre-op Performing Provider: JOSE MARIA MATHEWS    PREOPERATIVE EVALUATION:  Today's date: 1/27/2021    Bulmaro Herrera is a 55 year old male who presents for a preoperative evaluation.    Surgical Information:  Surgery/Procedure: Cervical 5-6 and Cervical 6-7 Bilateral facet injections  Surgery Location:    Surgeon: Isidoro  Surgery Date: 2/11/21  Time of Surgery: 8:30 AM  Where patient plans to recover: At home with family  Fax number for surgical facility: Note does not need to be faxed, will be available electronically in Epic.    Type of Anesthesia Anticipated: Local with MAC    Subjective     HPI related to upcoming procedure:     Preop Questions 1/27/2021   1. Have you ever had a heart attack or stroke? No   2. Have you ever had surgery on your heart or blood vessels, such as a stent placement, a coronary artery bypass, or surgery on an artery in your head, neck, heart, or legs? No   3. Do you have chest pain with activity? No   4. Do you have a history of  heart failure? No   5. Do you currently have a cold, bronchitis or symptoms of other infection? No   6. Do you have a cough, shortness of breath, or wheezing? UNKNOWN    7. Do you or anyone in your family have previous history of blood clots? No   8. Do you or does anyone in your family have a serious bleeding problem such as prolonged bleeding following surgeries or cuts? No   9. Have you ever had problems with anemia or been told to take iron pills? No   10. Have you had any abnormal blood loss such as black, tarry or bloody stools? No   11. Have you ever had a blood transfusion? No   12. Are you willing to have a blood transfusion if it is medically needed before, during, or after your surgery? Yes   13. Have you or any of your relatives ever had problems with anesthesia? YES    14. Do you have sleep  apnea, excessive snoring or daytime drowsiness? No   15. Do you have any artifical heart valves or other implanted medical devices like a pacemaker, defibrillator, or continuous glucose monitor? No   16. Do you have artificial joints? No   17. Are you allergic to latex? No       Health Care Directive:  Patient does not have a Health Care Directive or Living Will: Discussed advance care planning with patient; however, patient declined at this time.    Preoperative Review of :   reviewed - no record of controlled substances prescribed.      Status of Chronic Conditions:  See problem list for active medical problems.  Problems all longstanding and stable, except as noted/documented.  See ROS for pertinent symptoms related to these conditions.      Review of Systems  CONSTITUTIONAL: NEGATIVE for fever, chills, change in weight  INTEGUMENTARY/SKIN: NEGATIVE for worrisome rashes, moles or lesions  EYES: NEGATIVE for vision changes or irritation  ENT/MOUTH: NEGATIVE for ear, mouth and throat problems  RESP: NEGATIVE for significant cough or SOB  BREAST: NEGATIVE for masses, tenderness or discharge  CV: NEGATIVE for chest pain, palpitations or peripheral edema  GI: NEGATIVE for nausea, abdominal pain, heartburn, or change in bowel habits  : NEGATIVE for frequency, dysuria, or hematuria  MUSCULOSKELETAL: No cervical pain.  Occasional radiation of discomfort into the shoulders.  NEURO: NEGATIVE for weakness, dizziness or paresthesias  ENDOCRINE: NEGATIVE for temperature intolerance, skin/hair changes  HEME: NEGATIVE for bleeding problems  PSYCHIATRIC: NEGATIVE for changes in mood or affect    Patient Active Problem List    Diagnosis Date Noted     Morbid obesity (H) 03/06/2020     Priority: Medium     NSAID long-term use 11/18/2019     Priority: Medium     Severe needle phobia 01/21/2019     Priority: Medium     Class: Chronic     likely to preclude him from having minimally invasive interventional pain procedures  with minimal sedation. Would need deeper MAC sedation       Mixed hyperlipidemia 11/13/2018     Priority: Medium     ROBBY (obstructive sleep apnea) 11/13/2018     Priority: Medium     Herniation of intervertebral disc of cervical spine without radiculopathy 03/21/2017     Priority: Medium     Cervicalgia 03/21/2017     Priority: Medium     Chronic pain syndrome 04/05/2016     Priority: Medium     DDD, cervical. T#3, #60/mo.        DDD (degenerative disc disease), lumbar 12/20/2013     Priority: Medium     DDD (degenerative disc disease), cervical 12/20/2013     Priority: Medium     Pruritus ani 01/20/2010     Priority: Medium     Back pains      Priority: Medium     Problem list name updated by automated process. Provider to review and confirm       Esophageal reflux 04/13/2005     Priority: Medium      Past Medical History:   Diagnosis Date     Back pains      Depressive disorder, not elsewhere classified 1/28/2007    declines Rx     Disc degeneration 12/2008    see MRI -throaco-lumbar mild discogenic degenerative changes     Elevated LFT's 11/09    alt and ast     Other and unspecified hyperlipidemia 1/2007     Sleep disorder 9/09    CPAP     Tobacco use disorder      Past Surgical History:   Procedure Laterality Date     COLONOSCOPY  07/07/08    adenomatous polyp repeat 5 years     COLONOSCOPY N/A 12/29/2017    Procedure: COMBINED COLONOSCOPY, SINGLE OR MULTIPLE BIOPSY/POLYPECTOMY BY BIOPSY;  Colonoscopy, Polypectomy by Biopsy;  Surgeon: Matt Reyes MD;  Location:  GI     ENT SURGERY      for deviated septum     EXCISE LESION AXILLA  12/7/2018    Procedure: Excision Skin Tags Right Axilla;  Surgeon: Ayaan Chaney MD;  Location: [facility] OR     EXCISE LESION BACK N/A 12/7/2018    Procedure: Excision Back Skin Lesion X Two;  Surgeon: Ayaan Chaney MD;  Location:  OR     HC ECHO HEART XTHORACIC, STRESS/REST  6/2009    neg     INJECT EPIDURAL CERVICAL N/A 5/14/2015    Procedure: INJECT EPIDURAL CERVICAL;   Surgeon: Christian Chaudhry MD;  Location: [facility]     INJECT EPIDURAL CERVICAL N/A 2018    Procedure: INJECT EPIDURAL CERVICAL 6-7;  Surgeon: Christian Chaudhry MD;  Location: [facility]     INJECT EPIDURAL LUMBAR N/A 2019    Procedure: Lumbar 5- Sacral 1 Epidural  Injection bilatetral;  Surgeon: Christian Chaudhry MD;  Location: [facility]     INJECT FACET JOINT Bilateral 2019    Procedure: Cervical Facet Injections Cervical 5-6 and 6-7 Bilateral;  Surgeon: Christian Chaudhry MD;  Location: [facility]     INJECT FACET JOINT Bilateral 2019    Procedure: Cervical 5-6 and 6-7 Bilateral facet joint injection;  Surgeon: Christian Chaudhry MD;  Location: [facility]     ORTHOPEDIC SURGERY      Right hand     TONSILLECTOMY       ZZC NONSPECIFIC PROCEDURE      rt hand surgery - laceration/glass/tendons     Current Outpatient Medications   Medication Sig Dispense Refill     acetaminophen (TYLENOL) 500 MG tablet Take 500-1,000 mg by mouth every 6 hours as needed for mild pain       atorvastatin (LIPITOR) 20 MG tablet Take 1 tablet (20 mg) by mouth daily 90 tablet 4     ibuprofen (ADVIL/MOTRIN) 200 MG tablet Take 200 mg by mouth every 4 hours as needed for mild pain       loratadine (CLARITIN) 10 MG tablet Take 1 tablet (10 mg) by mouth daily 30 tablet 0     modafinil (PROVIGIL) 100 MG tablet Take 1 tablet (100 mg) by mouth daily 30 tablet 3     omeprazole (PRILOSEC) 40 MG DR capsule TAKE ONE CAPSULE BY MOUTH ONCE DAILY 90 capsule 0     albuterol (PROAIR HFA/PROVENTIL HFA/VENTOLIN HFA) 108 (90 Base) MCG/ACT inhaler Inhale 2 puffs into the lungs every 6 hours (Patient not taking: Reported on 2021) 1 Inhaler 0       Allergies   Allergen Reactions     Percocet [Oxycodone-Acetaminophen] Itching     Wasps [Hornets] Swelling        Social History     Tobacco Use     Smoking status: Former Smoker     Packs/day: 1.50     Quit date: 2007     Years since quittin.9     Smokeless tobacco: Never Used   Substance Use Topics     Alcohol  use: No     Frequency: Never     Comment: rare     Family History   Problem Relation Age of Onset     Breast Cancer Mother      Arthritis Mother         joint ?     Depression Father      Cancer Maternal Grandmother      C.A.D. Maternal Grandfather      Cardiovascular Maternal Grandfather      Gynecology Paternal Grandmother          giving birth     Prostate Cancer Paternal Grandfather      Genetic Disorder Brother         joint pain?     Depression Brother      Cardiovascular Son         congenital heart defect -       Prostate Cancer Other          age 70's     Diabetes Other      History   Drug Use No         Objective     /76 (BP Location: Right arm, Patient Position: Sitting, Cuff Size: Adult Large)   Pulse 90   Temp 97.6  F (36.4  C) (Temporal)   Resp 18   Ht 1.829 m (6')   Wt 113.3 kg (249 lb 12.8 oz)   SpO2 95%   BMI 33.88 kg/m      Physical Exam    GENERAL APPEARANCE: healthy, alert and no distress     EYES: EOMI,  PERRL     HENT: ear canals and TM's normal and nose and mouth without ulcers or lesions     NECK: no adenopathy, no asymmetry, masses, or scars and thyroid normal to palpation     RESP: lungs clear to auscultation - no rales, rhonchi or wheezes     CV: regular rates and rhythm, normal S1 S2, no S3 or S4 and no murmur, click or rub     ABDOMEN:  soft, nontender, no HSM or masses and bowel sounds normal     MS: Decreased cervical range of motion due to discomfort.  No weakness in the arms.  No signs of radiculopathy at this time.     SKIN: no suspicious lesions or rashes     NEURO: Normal strength and tone, sensory exam grossly normal, mentation intact and speech normal     PSYCH: mentation appears normal. and affect normal/bright     LYMPHATICS: No cervical adenopathy    Recent Labs   Lab Test 11/15/19  0851   HGB 16.0         POTASSIUM 4.3   CR 1.12        Diagnostics:  No labs were ordered during this visit.   No EKG required for low risk surgery  (cataract, skin procedure, breast biopsy, etc).    Revised Cardiac Risk Index (RCRI):  The patient has the following serious cardiovascular risks for perioperative complications:   - No serious cardiac risks = 0 points     RCRI Interpretation: 0 points: Class I (very low risk - 0.4% complication rate)    1. Preop general physical exam    2. DDD (degenerative disc disease), cervical    3. Herniation of intervertebral disc of cervical spine without radiculopathy    4. ROBBY (obstructive sleep apnea)  Avoid leaving patient sedated in the supine position.         Risks and Recommendations:  The patient has the following additional risks and recommendations for perioperative complications:   - No identified additional risk factors other than previously addressed    Medication Instructions:  Patient is to take all scheduled medications on the day of surgery    RECOMMENDATION:  APPROVAL GIVEN to proceed with proposed procedure, without further diagnostic evaluation.    Signed Electronically by: Gustavo Farmer DO    Copy of this evaluation report is provided to requesting physician.    Preop [facility] Preop Guidelines    Revised Cardiac Risk Index

## 2021-01-27 NOTE — LETTER
My Asthma Action Plan    Name: Bulmaro Herrera   YOB: 1965  Date: 1/27/2021   My doctor: Gustavo Farmer, DO   My clinic: [facility]        My Rescue Medicine:   Albuterol inhaler (Proair/Ventolin/Proventil HFA)  2-4 puffs EVERY 4 HOURS as needed. Use a spacer if recommended by your provider.   My Asthma Severity:   Intermittent / Exercise Induced  Know your asthma triggers: Patient is unaware of triggers             GREEN ZONE   Good Control    I feel good    No cough or wheeze    Can work, sleep and play without asthma symptoms       Take your asthma control medicine every day.     1. If exercise triggers your asthma, take your rescue medication    15 minutes before exercise or sports, and    During exercise if you have asthma symptoms  2. Spacer to use with inhaler: If you have a spacer, make sure to use it with your inhaler             YELLOW ZONE Getting Worse  I have ANY of these:    I do not feel good    Cough or wheeze    Chest feels tight    Wake up at night   1. Keep taking your Green Zone medications  2. Start taking your rescue medicine:    every 20 minutes for up to 1 hour. Then every 4 hours for 24-48 hours.  3. If you stay in the Yellow Zone for more than 12-24 hours, contact your doctor.  4. If you do not return to the Green Zone in 12-24 hours or you get worse, start taking your oral steroid medicine if prescribed by your provider.           RED ZONE Medical Alert - Get Help  I have ANY of these:    I feel awful    Medicine is not helping    Breathing getting harder    Trouble walking or talking    Nose opens wide to breathe       1. Take your rescue medicine NOW  2. If your provider has prescribed an oral steroid medicine, start taking it NOW  3. Call your doctor NOW  4. If you are still in the Red Zone after 20 minutes and you have not reached your doctor:    Take your rescue medicine again and    Call 911 or go to the emergency room right  away    See your regular doctor within 2 weeks of an Emergency Room or Urgent Care visit for follow-up treatment.          Annual Reminders:  Meet with Asthma Educator,  Flu Shot in the Fall, consider Pneumonia Vaccination for patients with asthma (aged 19 and older).    Pharmacy:    [facility] - [facility]  [place] [facility], [place] - [address]    Electronically signed by Gustavo Farmer, DO   Date: 01/27/21                    Asthma Triggers  How To Control Things That Make Your Asthma Worse    Triggers are things that make your asthma worse.  Look at the list below to help you find your triggers and   what you can do about them. You can help prevent asthma flare-ups by staying away from your triggers.      Trigger                                                          What you can do   Cigarette Smoke  Tobacco smoke can make asthma worse. Do not allow smoking in your home, car or around you.  Be sure no one smokes at a child s day care or school.  If you smoke, ask your health care provider for ways to help you quit.  Ask family members to quit too.  Ask your health care provider for a referral to Quit Plan to help you quit smoking, or call 7-557-956-PLAN.     Colds, Flu, Bronchitis  These are common triggers of asthma. Wash your hands often.  Don t touch your eyes, nose or mouth.  Get a flu shot every year.     Dust Mites  These are tiny bugs that live in cloth or carpet. They are too small to see. Wash sheets and blankets in hot water every week.   Encase pillows and mattress in dust mite proof covers.  Avoid having carpet if you can. If you have carpet, vacuum weekly.   Use a dust mask and HEPA vacuum.   Pollen and Outdoor Mold  Some people are allergic to trees, grass, or weed pollen, or molds. Try to keep your windows closed.  Limit time out doors when pollen count is high.   Ask you health care provider about taking medicine  during allergy season.     Animal Dander  Some people are allergic to skin flakes, urine or saliva from pets with fur or feathers. Keep pets with fur or feathers out of your home.    If you can t keep the pet outdoors, then keep the pet out of your bedroom.  Keep the bedroom door closed.  Keep pets off cloth furniture and away from stuffed toys.     Mice, Rats, and Cockroaches  Some people are allergic to the waste from these pests.   Cover food and garbage.  Clean up spills and food crumbs.  Store grease in the refrigerator.   Keep food out of the bedroom.   Indoor Mold  This can be a trigger if your home has high moisture. Fix leaking faucets, pipes, or other sources of water.   Clean moldy surfaces.  Dehumidify basement if it is damp and smelly.   Smoke, Strong Odors, and Sprays  These can reduce air quality. Stay away from strong odors and sprays, such as perfume, powder, hair spray, paints, smoke incense, paint, cleaning products, candles and new carpet.   Exercise or Sports  Some people with asthma have this trigger. Be active!  Ask your doctor about taking medicine before sports or exercise to prevent symptoms.    Warm up for 5-10 minutes before and after sports or exercise.     Other Triggers of Asthma  Cold air:  Cover your nose and mouth with a scarf.  Sometimes laughing or crying can be a trigger.  Some medicines and food can trigger asthma.

## 2021-01-27 NOTE — H&P (VIEW-ONLY)
[address] [address]  Phone: [phone]  Primary Provider: No Ref-Primary, Physician  Pre-op Performing Provider: JOSE MARIA MATHEWS    PREOPERATIVE EVALUATION:  Today's date: 1/27/2021    Bulmaro Herrera is a 55 year old male who presents for a preoperative evaluation.    Surgical Information:  Surgery/Procedure: Cervical 5-6 and Cervical 6-7 Bilateral facet injections  Surgery Location:    Surgeon: Isidoro  Surgery Date: 2/11/21  Time of Surgery: 8:30 AM  Where patient plans to recover: At home with family  Fax number for surgical facility: Note does not need to be faxed, will be available electronically in Epic.    Type of Anesthesia Anticipated: Local with MAC    Subjective     HPI related to upcoming procedure:     Preop Questions 1/27/2021   1. Have you ever had a heart attack or stroke? No   2. Have you ever had surgery on your heart or blood vessels, such as a stent placement, a coronary artery bypass, or surgery on an artery in your head, neck, heart, or legs? No   3. Do you have chest pain with activity? No   4. Do you have a history of  heart failure? No   5. Do you currently have a cold, bronchitis or symptoms of other infection? No   6. Do you have a cough, shortness of breath, or wheezing? UNKNOWN    7. Do you or anyone in your family have previous history of blood clots? No   8. Do you or does anyone in your family have a serious bleeding problem such as prolonged bleeding following surgeries or cuts? No   9. Have you ever had problems with anemia or been told to take iron pills? No   10. Have you had any abnormal blood loss such as black, tarry or bloody stools? No   11. Have you ever had a blood transfusion? No   12. Are you willing to have a blood transfusion if it is medically needed before, during, or after your surgery? Yes   13. Have you or any of your relatives ever had problems with anesthesia? YES    14. Do you have sleep  apnea, excessive snoring or daytime drowsiness? No   15. Do you have any artifical heart valves or other implanted medical devices like a pacemaker, defibrillator, or continuous glucose monitor? No   16. Do you have artificial joints? No   17. Are you allergic to latex? No       Health Care Directive:  Patient does not have a Health Care Directive or Living Will: Discussed advance care planning with patient; however, patient declined at this time.    Preoperative Review of :   reviewed - no record of controlled substances prescribed.      Status of Chronic Conditions:  See problem list for active medical problems.  Problems all longstanding and stable, except as noted/documented.  See ROS for pertinent symptoms related to these conditions.      Review of Systems  CONSTITUTIONAL: NEGATIVE for fever, chills, change in weight  INTEGUMENTARY/SKIN: NEGATIVE for worrisome rashes, moles or lesions  EYES: NEGATIVE for vision changes or irritation  ENT/MOUTH: NEGATIVE for ear, mouth and throat problems  RESP: NEGATIVE for significant cough or SOB  BREAST: NEGATIVE for masses, tenderness or discharge  CV: NEGATIVE for chest pain, palpitations or peripheral edema  GI: NEGATIVE for nausea, abdominal pain, heartburn, or change in bowel habits  : NEGATIVE for frequency, dysuria, or hematuria  MUSCULOSKELETAL: No cervical pain.  Occasional radiation of discomfort into the shoulders.  NEURO: NEGATIVE for weakness, dizziness or paresthesias  ENDOCRINE: NEGATIVE for temperature intolerance, skin/hair changes  HEME: NEGATIVE for bleeding problems  PSYCHIATRIC: NEGATIVE for changes in mood or affect    Patient Active Problem List    Diagnosis Date Noted     Morbid obesity (H) 03/06/2020     Priority: Medium     NSAID long-term use 11/18/2019     Priority: Medium     Severe needle phobia 01/21/2019     Priority: Medium     Class: Chronic     likely to preclude him from having minimally invasive interventional pain procedures  with minimal sedation. Would need deeper MAC sedation       Mixed hyperlipidemia 11/13/2018     Priority: Medium     ROBBY (obstructive sleep apnea) 11/13/2018     Priority: Medium     Herniation of intervertebral disc of cervical spine without radiculopathy 03/21/2017     Priority: Medium     Cervicalgia 03/21/2017     Priority: Medium     Chronic pain syndrome 04/05/2016     Priority: Medium     DDD, cervical. T#3, #60/mo.        DDD (degenerative disc disease), lumbar 12/20/2013     Priority: Medium     DDD (degenerative disc disease), cervical 12/20/2013     Priority: Medium     Pruritus ani 01/20/2010     Priority: Medium     Back pains      Priority: Medium     Problem list name updated by automated process. Provider to review and confirm       Esophageal reflux 04/13/2005     Priority: Medium      Past Medical History:   Diagnosis Date     Back pains      Depressive disorder, not elsewhere classified 1/28/2007    declines Rx     Disc degeneration 12/2008    see MRI -throaco-lumbar mild discogenic degenerative changes     Elevated LFT's 11/09    alt and ast     Other and unspecified hyperlipidemia 1/2007     Sleep disorder 9/09    CPAP     Tobacco use disorder      Past Surgical History:   Procedure Laterality Date     COLONOSCOPY  07/07/08    adenomatous polyp repeat 5 years     COLONOSCOPY N/A 12/29/2017    Procedure: COMBINED COLONOSCOPY, SINGLE OR MULTIPLE BIOPSY/POLYPECTOMY BY BIOPSY;  Colonoscopy, Polypectomy by Biopsy;  Surgeon: Matt Reyes MD;  Location:  GI     ENT SURGERY      for deviated septum     EXCISE LESION AXILLA  12/7/2018    Procedure: Excision Skin Tags Right Axilla;  Surgeon: Ayaan Chaney MD;  Location: [facility]     EXCISE LESION BACK N/A 12/7/2018    Procedure: Excision Back Skin Lesion X Two;  Surgeon: Ayaan Chaney MD;  Location:  OR     HC ECHO HEART XTHORACIC, STRESS/REST  6/2009    neg     INJECT EPIDURAL CERVICAL N/A 5/14/2015    Procedure: INJECT EPIDURAL CERVICAL;   Surgeon: Christian Chaudhry MD;  Location: [facility]     INJECT EPIDURAL CERVICAL N/A 2018    Procedure: INJECT EPIDURAL CERVICAL 6-7;  Surgeon: Christian Chaudhry MD;  Location: [facility]     INJECT EPIDURAL LUMBAR N/A 2019    Procedure: Lumbar 5- Sacral 1 Epidural  Injection bilatetral;  Surgeon: Christian Chaudhry MD;  Location: [facility]     INJECT FACET JOINT Bilateral 2019    Procedure: Cervical Facet Injections Cervical 5-6 and 6-7 Bilateral;  Surgeon: Christian Chaudhry MD;  Location: [facility]     INJECT FACET JOINT Bilateral 2019    Procedure: Cervical 5-6 and 6-7 Bilateral facet joint injection;  Surgeon: Christian Chaudhry MD;  Location: [facility]     ORTHOPEDIC SURGERY      Right hand     TONSILLECTOMY       ZZC NONSPECIFIC PROCEDURE      rt hand surgery - laceration/glass/tendons     Current Outpatient Medications   Medication Sig Dispense Refill     acetaminophen (TYLENOL) 500 MG tablet Take 500-1,000 mg by mouth every 6 hours as needed for mild pain       atorvastatin (LIPITOR) 20 MG tablet Take 1 tablet (20 mg) by mouth daily 90 tablet 4     ibuprofen (ADVIL/MOTRIN) 200 MG tablet Take 200 mg by mouth every 4 hours as needed for mild pain       loratadine (CLARITIN) 10 MG tablet Take 1 tablet (10 mg) by mouth daily 30 tablet 0     modafinil (PROVIGIL) 100 MG tablet Take 1 tablet (100 mg) by mouth daily 30 tablet 3     omeprazole (PRILOSEC) 40 MG DR capsule TAKE ONE CAPSULE BY MOUTH ONCE DAILY 90 capsule 0     albuterol (PROAIR HFA/PROVENTIL HFA/VENTOLIN HFA) 108 (90 Base) MCG/ACT inhaler Inhale 2 puffs into the lungs every 6 hours (Patient not taking: Reported on 2021) 1 Inhaler 0       Allergies   Allergen Reactions     Percocet [Oxycodone-Acetaminophen] Itching     Wasps [Hornets] Swelling        Social History     Tobacco Use     Smoking status: Former Smoker     Packs/day: 1.50     Quit date: 2007     Years since quittin.9     Smokeless tobacco: Never Used   Substance Use Topics     Alcohol  use: No     Frequency: Never     Comment: rare     Family History   Problem Relation Age of Onset     Breast Cancer Mother      Arthritis Mother         joint ?     Depression Father      Cancer Maternal Grandmother      C.A.D. Maternal Grandfather      Cardiovascular Maternal Grandfather      Gynecology Paternal Grandmother          giving birth     Prostate Cancer Paternal Grandfather      Genetic Disorder Brother         joint pain?     Depression Brother      Cardiovascular Son         congenital heart defect -       Prostate Cancer Other          age 70's     Diabetes Other      History   Drug Use No         Objective     /76 (BP Location: Right arm, Patient Position: Sitting, Cuff Size: Adult Large)   Pulse 90   Temp 97.6  F (36.4  C) (Temporal)   Resp 18   Ht 1.829 m (6')   Wt 113.3 kg (249 lb 12.8 oz)   SpO2 95%   BMI 33.88 kg/m      Physical Exam    GENERAL APPEARANCE: healthy, alert and no distress     EYES: EOMI,  PERRL     HENT: ear canals and TM's normal and nose and mouth without ulcers or lesions     NECK: no adenopathy, no asymmetry, masses, or scars and thyroid normal to palpation     RESP: lungs clear to auscultation - no rales, rhonchi or wheezes     CV: regular rates and rhythm, normal S1 S2, no S3 or S4 and no murmur, click or rub     ABDOMEN:  soft, nontender, no HSM or masses and bowel sounds normal     MS: Decreased cervical range of motion due to discomfort.  No weakness in the arms.  No signs of radiculopathy at this time.     SKIN: no suspicious lesions or rashes     NEURO: Normal strength and tone, sensory exam grossly normal, mentation intact and speech normal     PSYCH: mentation appears normal. and affect normal/bright     LYMPHATICS: No cervical adenopathy    Recent Labs   Lab Test 11/15/19  0851   HGB 16.0         POTASSIUM 4.3   CR 1.12        Diagnostics:  No labs were ordered during this visit.   No EKG required for low risk surgery  (cataract, skin procedure, breast biopsy, etc).    Revised Cardiac Risk Index (RCRI):  The patient has the following serious cardiovascular risks for perioperative complications:   - No serious cardiac risks = 0 points     RCRI Interpretation: 0 points: Class I (very low risk - 0.4% complication rate)    1. Preop general physical exam    2. DDD (degenerative disc disease), cervical    3. Herniation of intervertebral disc of cervical spine without radiculopathy    4. ROBBY (obstructive sleep apnea)  Avoid leaving patient sedated in the supine position.         Risks and Recommendations:  The patient has the following additional risks and recommendations for perioperative complications:   - No identified additional risk factors other than previously addressed    Medication Instructions:  Patient is to take all scheduled medications on the day of surgery    RECOMMENDATION:  APPROVAL GIVEN to proceed with proposed procedure, without further diagnostic evaluation.    Signed Electronically by: Gustavo Farmer DO    Copy of this evaluation report is provided to requesting physician.    Preop [facility] Preop Guidelines    Revised Cardiac Risk Index

## 2021-02-07 DIAGNOSIS — Z11.59 ENCOUNTER FOR SCREENING FOR OTHER VIRAL DISEASES: ICD-10-CM

## 2021-02-07 LAB
SARS-COV-2 RNA RESP QL NAA+PROBE: NORMAL
SPECIMEN SOURCE: NORMAL

## 2021-02-07 PROCEDURE — 87635 SARS-COV-2 COVID-19 AMP PRB: CPT | Performed by: ANESTHESIOLOGY

## 2021-02-08 ENCOUNTER — APPOINTMENT (OUTPATIENT)
Dept: CT IMAGING | Facility: CLINIC | Age: 56
End: 2021-02-08
Attending: EMERGENCY MEDICINE
Payer: COMMERCIAL

## 2021-02-08 ENCOUNTER — HOSPITAL ENCOUNTER (EMERGENCY)
Facility: CLINIC | Age: 56
Discharge: HOME | End: 2021-02-08
Attending: EMERGENCY MEDICINE | Admitting: EMERGENCY MEDICINE
Payer: COMMERCIAL

## 2021-02-08 VITALS
BODY MASS INDEX: 34.86 KG/M2 | HEIGHT: 71 IN | SYSTOLIC BLOOD PRESSURE: 140 MMHG | WEIGHT: 249 LBS | TEMPERATURE: 98 F | DIASTOLIC BLOOD PRESSURE: 96 MMHG | RESPIRATION RATE: 20 BRPM | HEART RATE: 80 BPM | OXYGEN SATURATION: 99 %

## 2021-02-08 DIAGNOSIS — F41.9 ANXIETY: ICD-10-CM

## 2021-02-08 DIAGNOSIS — R42 DIZZINESS: ICD-10-CM

## 2021-02-08 LAB
ALBUMIN SERPL-MCNC: 4.5 G/DL (ref 3.4–5)
ALBUMIN UR-MCNC: NEGATIVE MG/DL
ALP SERPL-CCNC: 88 U/L (ref 40–150)
ALT SERPL W P-5'-P-CCNC: 96 U/L (ref 0–70)
ANION GAP SERPL CALCULATED.3IONS-SCNC: 2 MMOL/L (ref 3–14)
APPEARANCE UR: CLEAR
AST SERPL W P-5'-P-CCNC: 42 U/L (ref 0–45)
BASOPHILS # BLD AUTO: 0.1 10E9/L (ref 0–0.2)
BASOPHILS NFR BLD AUTO: 1.1 %
BILIRUB SERPL-MCNC: 1 MG/DL (ref 0.2–1.3)
BILIRUB UR QL STRIP: NEGATIVE
BUN SERPL-MCNC: 17 MG/DL (ref 7–30)
CALCIUM SERPL-MCNC: 9.6 MG/DL (ref 8.5–10.1)
CHLORIDE SERPL-SCNC: 108 MMOL/L (ref 94–109)
CO2 SERPL-SCNC: 30 MMOL/L (ref 20–32)
COLOR UR AUTO: YELLOW
CREAT SERPL-MCNC: 0.94 MG/DL (ref 0.66–1.25)
D DIMER PPP FEU-MCNC: 0.3 UG/ML FEU (ref 0–0.5)
DIFFERENTIAL METHOD BLD: NORMAL
EOSINOPHIL NFR BLD AUTO: 0.9 %
ERYTHROCYTE [DISTWIDTH] IN BLOOD BY AUTOMATED COUNT: 12.3 % (ref 10–15)
GFR SERPL CREATININE-BSD FRML MDRD: >90 ML/MIN/{1.73_M2}
GLUCOSE SERPL-MCNC: 105 MG/DL (ref 70–99)
GLUCOSE UR STRIP-MCNC: NEGATIVE MG/DL
HCT VFR BLD AUTO: 47.2 % (ref 40–53)
HGB BLD-MCNC: 16.3 G/DL (ref 13.3–17.7)
HGB UR QL STRIP: NEGATIVE
IMM GRANULOCYTES # BLD: 0.1 10E9/L (ref 0–0.4)
IMM GRANULOCYTES NFR BLD: 1.1 %
KETONES UR STRIP-MCNC: NEGATIVE MG/DL
LABORATORY COMMENT REPORT: NORMAL
LEUKOCYTE ESTERASE UR QL STRIP: NEGATIVE
LYMPHOCYTES # BLD AUTO: 2.1 10E9/L (ref 0.8–5.3)
LYMPHOCYTES NFR BLD AUTO: 37.3 %
MAGNESIUM SERPL-MCNC: 2.2 MG/DL (ref 1.6–2.3)
MCH RBC QN AUTO: 31.8 PG (ref 26.5–33)
MCHC RBC AUTO-ENTMCNC: 34.5 G/DL (ref 31.5–36.5)
MCV RBC AUTO: 92 FL (ref 78–100)
MONOCYTES # BLD AUTO: 0.4 10E9/L (ref 0–1.3)
MONOCYTES NFR BLD AUTO: 7.1 %
MUCOUS THREADS #/AREA URNS LPF: PRESENT /LPF
NEUTROPHILS # BLD AUTO: 2.9 10E9/L (ref 1.6–8.3)
NEUTROPHILS NFR BLD AUTO: 52.5 %
NITRATE UR QL: NEGATIVE
NRBC # BLD AUTO: 0 10*3/UL
NRBC BLD AUTO-RTO: 0 /100
PH UR STRIP: 5 PH (ref 5–7)
PHOSPHATE SERPL-MCNC: 3.5 MG/DL (ref 2.5–4.5)
PLATELET # BLD AUTO: 202 10E9/L (ref 150–450)
POTASSIUM SERPL-SCNC: 4.1 MMOL/L (ref 3.4–5.3)
PROT SERPL-MCNC: 7.7 G/DL (ref 6.8–8.8)
RBC # BLD AUTO: 5.12 10E12/L (ref 4.4–5.9)
RBC #/AREA URNS AUTO: 0 /HPF (ref 0–2)
SARS-COV-2 RNA RESP QL NAA+PROBE: NEGATIVE
SODIUM SERPL-SCNC: 140 MMOL/L (ref 133–144)
SOURCE: ABNORMAL
SP GR UR STRIP: 1.01 (ref 1–1.03)
SPECIMEN SOURCE: NORMAL
TROPONIN I SERPL-MCNC: <0.015 UG/L (ref 0–0.04)
TSH SERPL DL<=0.005 MIU/L-ACNC: 3.27 MU/L (ref 0.4–4)
UROBILINOGEN UR STRIP-MCNC: 0 MG/DL (ref 0–2)
WBC # BLD AUTO: 5.5 10E9/L (ref 4–11)
WBC #/AREA URNS AUTO: 0 /HPF (ref 0–5)

## 2021-02-08 PROCEDURE — 250N000009 HC RX 250: Performed by: RADIOLOGY

## 2021-02-08 PROCEDURE — 85379 FIBRIN DEGRADATION QUANT: CPT | Performed by: EMERGENCY MEDICINE

## 2021-02-08 PROCEDURE — 83735 ASSAY OF MAGNESIUM: CPT | Performed by: EMERGENCY MEDICINE

## 2021-02-08 PROCEDURE — 70450 CT HEAD/BRAIN W/O DYE: CPT

## 2021-02-08 PROCEDURE — 85025 COMPLETE CBC W/AUTO DIFF WBC: CPT | Performed by: EMERGENCY MEDICINE

## 2021-02-08 PROCEDURE — 70496 CT ANGIOGRAPHY HEAD: CPT

## 2021-02-08 PROCEDURE — 96360 HYDRATION IV INFUSION INIT: CPT | Mod: 59 | Performed by: EMERGENCY MEDICINE

## 2021-02-08 PROCEDURE — 99285 EMERGENCY DEPT VISIT HI MDM: CPT | Mod: 25 | Performed by: EMERGENCY MEDICINE

## 2021-02-08 PROCEDURE — 96361 HYDRATE IV INFUSION ADD-ON: CPT | Performed by: EMERGENCY MEDICINE

## 2021-02-08 PROCEDURE — 84100 ASSAY OF PHOSPHORUS: CPT | Performed by: EMERGENCY MEDICINE

## 2021-02-08 PROCEDURE — 258N000003 HC RX IP 258 OP 636: Performed by: EMERGENCY MEDICINE

## 2021-02-08 PROCEDURE — 93005 ELECTROCARDIOGRAM TRACING: CPT | Performed by: EMERGENCY MEDICINE

## 2021-02-08 PROCEDURE — 84443 ASSAY THYROID STIM HORMONE: CPT | Performed by: EMERGENCY MEDICINE

## 2021-02-08 PROCEDURE — 84484 ASSAY OF TROPONIN QUANT: CPT | Performed by: EMERGENCY MEDICINE

## 2021-02-08 PROCEDURE — 250N000011 HC RX IP 250 OP 636: Performed by: RADIOLOGY

## 2021-02-08 PROCEDURE — 99285 EMERGENCY DEPT VISIT HI MDM: CPT | Performed by: EMERGENCY MEDICINE

## 2021-02-08 PROCEDURE — 80053 COMPREHEN METABOLIC PANEL: CPT | Performed by: EMERGENCY MEDICINE

## 2021-02-08 PROCEDURE — 81001 URINALYSIS AUTO W/SCOPE: CPT | Performed by: EMERGENCY MEDICINE

## 2021-02-08 RX ORDER — IOPAMIDOL 755 MG/ML
100 INJECTION, SOLUTION INTRAVASCULAR ONCE
Status: COMPLETED | OUTPATIENT
Start: 2021-02-08 | End: 2021-02-08

## 2021-02-08 RX ADMIN — IOPAMIDOL 80 ML: 755 INJECTION, SOLUTION INTRAVENOUS at 13:44

## 2021-02-08 RX ADMIN — SODIUM CHLORIDE 1000 ML: 9 INJECTION, SOLUTION INTRAVENOUS at 12:12

## 2021-02-08 RX ADMIN — SODIUM CHLORIDE 100 ML: 9 INJECTION, SOLUTION INTRAVENOUS at 13:45

## 2021-02-08 ASSESSMENT — MIFFLIN-ST. JEOR: SCORE: 1996.59

## 2021-02-08 NOTE — DISCHARGE INSTRUCTIONS
Your labs and head CT were all completely normal.    I am not finding anything emergent at this point regarding your symptoms.    I would like you to make an appointment to see your primary care provider for close follow-up and reevaluation.    If you have any significant worsening, changes or concerns return at any time.    I hope that you start to feel much better quickly!!

## 2021-02-08 NOTE — ED PROVIDER NOTES
"  History     Chief Complaint   Patient presents with     Dizziness     HPI   This is a 55-year-old male, history of chronic pain secondary to cervical degenerative disc disease, hyperlipidemia, ROBBY, GERD, chronic low back pain presenting with dizziness.  Patient was on vacation in [place] over the Christmas/New Year's holiday.  2 days before they were to return home he started having symptoms of nausea and then intermittent dizziness.  The symptoms continued after they returned home and he followed up with his primary care provider on 1/14/2020.  He was diagnosed with vertigo thought secondary to eustachian tube dysfunction and was started on prednisone and loratadine.  Patient does not feel that this is helped his dizziness but his wife noted that he had a chronic cough at night that has gone away.  His dizziness has been increasing in frequency lately.  The dizziness seems to last just for very short periods of time.  There is nothing that seems to specifically bring it on or alleviated.  He denies vision changes. No headache, fever, chills, sinus pain or pressure or drainage, ear pain, abdominal pain or vomiting at home.     Reports that his son has noticed that he is occasionally slurring words recently, but he has not noticed this himself. The patient endorses trouble finding words sometimes. He knows what he wants to say, but cannot think of the words to describe.       Also endorses left sided chest \"flutters\" that he describes as sharp, very brief in length, maybe a second. Not exertional, no associated shortness of breath.      Patient endorses chronic neck pain and has a history of multiple cervical bulging discs that he receives cervical joint injections. He is unsure if these symptoms could be related to his chronic neck pain.  He is scheduled for a steroid injection on 2/11/2021 by Dr. Chaudhry.    Allergies:  Allergies   Allergen Reactions     Percocet [Oxycodone-Acetaminophen] Itching     Wasps " [Hornets] Swelling       Problem List:    Patient Active Problem List    Diagnosis Date Noted     Morbid obesity (H) 03/06/2020     Priority: Medium     NSAID long-term use 11/18/2019     Priority: Medium     Severe needle phobia 01/21/2019     Priority: Medium     Class: Chronic     likely to preclude him from having minimally invasive interventional pain procedures with minimal sedation. Would need deeper MAC sedation       Mixed hyperlipidemia 11/13/2018     Priority: Medium     ROBBY (obstructive sleep apnea) 11/13/2018     Priority: Medium     Herniation of intervertebral disc of cervical spine without radiculopathy 03/21/2017     Priority: Medium     Cervicalgia 03/21/2017     Priority: Medium     Chronic pain syndrome 04/05/2016     Priority: Medium     DDD, cervical. T#3, #60/mo.        DDD (degenerative disc disease), lumbar 12/20/2013     Priority: Medium     DDD (degenerative disc disease), cervical 12/20/2013     Priority: Medium     Pruritus ani 01/20/2010     Priority: Medium     Back pains      Priority: Medium     Problem list name updated by automated process. Provider to review and confirm       Esophageal reflux 04/13/2005     Priority: Medium        Past Medical History:    Past Medical History:   Diagnosis Date     Back pains      Depressive disorder, not elsewhere classified 1/28/2007     Disc degeneration 12/2008     Elevated LFT's 11/09     Other and unspecified hyperlipidemia 1/2007     Sleep disorder 9/09     Tobacco use disorder        Past Surgical History:    Past Surgical History:   Procedure Laterality Date     COLONOSCOPY  07/07/08    adenomatous polyp repeat 5 years     COLONOSCOPY N/A 12/29/2017    Procedure: COMBINED COLONOSCOPY, SINGLE OR MULTIPLE BIOPSY/POLYPECTOMY BY BIOPSY;  Colonoscopy, Polypectomy by Biopsy;  Surgeon: Matt Reyes MD;  Location:  [facility]     ENT SURGERY      for deviated septum     EXCISE LESION AXILLA  12/7/2018    Procedure: Excision Skin Tags Right Axilla;   Surgeon: Ayaan Chaney MD;  Location: [facility]     EXCISE LESION BACK N/A 2018    Procedure: Excision Back Skin Lesion X Two;  Surgeon: Ayaan Chaney MD;  Location: [facility]     HC ECHO HEART XTHORACIC, STRESS/REST  2009    neg     INJECT EPIDURAL CERVICAL N/A 2015    Procedure: INJECT EPIDURAL CERVICAL;  Surgeon: Christian Chaudhry MD;  Location: [facility]     INJECT EPIDURAL CERVICAL N/A 2018    Procedure: INJECT EPIDURAL CERVICAL 6-7;  Surgeon: Christian Chaudhry MD;  Location: [facility]     INJECT EPIDURAL LUMBAR N/A 2019    Procedure: Lumbar 5- Sacral 1 Epidural  Injection bilatetral;  Surgeon: Christian Chaudhry MD;  Location: [facility]     INJECT FACET JOINT Bilateral 2019    Procedure: Cervical Facet Injections Cervical 5-6 and 6-7 Bilateral;  Surgeon: Christian Chaudhry MD;  Location: [facility]     INJECT FACET JOINT Bilateral 2019    Procedure: Cervical 5-6 and 6-7 Bilateral facet joint injection;  Surgeon: Christian Chaudhry MD;  Location: [facility]     ORTHOPEDIC SURGERY      Right hand     TONSILLECTOMY       ZZC NONSPECIFIC PROCEDURE      rt hand surgery - laceration/glass/tendons       Family History:    Family History   Problem Relation Age of Onset     Breast Cancer Mother      Arthritis Mother         joint ?     Depression Father      Cancer Maternal Grandmother      C.A.D. Maternal Grandfather      Cardiovascular Maternal Grandfather      Gynecology Paternal Grandmother          giving birth     Prostate Cancer Paternal Grandfather      Genetic Disorder Brother         joint pain?     Depression Brother      Cardiovascular Son         congenital heart defect -       Prostate Cancer Other          age 70's     Diabetes Other        Social History:  Marital Status:   [2]  Social History     Tobacco Use     Smoking status: Former Smoker     Packs/day: 1.50     Quit date: 2007     Years since quittin.9     Smokeless tobacco: Never Used   Substance Use Topics      "Alcohol use: No     Frequency: Never     Comment: rare     Drug use: No        Medications:         acetaminophen (TYLENOL) 500 MG tablet       atorvastatin (LIPITOR) 20 MG tablet       ibuprofen (ADVIL/MOTRIN) 200 MG tablet       loratadine (CLARITIN) 10 MG tablet       modafinil (PROVIGIL) 100 MG tablet       omeprazole (PRILOSEC) 40 MG DR capsule        Review of Systems   All other systems reviewed and are negative.      Physical Exam   BP: (!) 148/100  Pulse: 83  Temp: 98  F (36.7  C)  Resp: 17  Height: 180.3 cm (5' 11\")  Weight: 112.9 kg (249 lb)  SpO2: 95 %  Lying Orthostatic BP: 139/98  Lying Orthostatic Pulse: 79 bpm  Sitting Orthostatic BP: 131/94  Sitting Orthostatic Pulse: 85 bpm  Standing Orthostatic BP: 127/97  Standing Orthostatic Pulse: 98 bpm      Physical Exam  Vitals signs and nursing note reviewed.   Constitutional:       General: He is awake.      Appearance: He is obese.      Comments: Very anxious, talkative male lying in the bed   HENT:      Head: Normocephalic and atraumatic.      Right Ear: Tympanic membrane, ear canal and external ear normal.      Left Ear: Tympanic membrane, ear canal and external ear normal.      Nose: Nose normal.      Mouth/Throat:      Mouth: Mucous membranes are moist.      Pharynx: Oropharynx is clear.   Eyes:      General: Lids are normal.         Right eye: No discharge.         Left eye: No discharge.      Extraocular Movements: Extraocular movements intact.      Conjunctiva/sclera: Conjunctivae normal.      Pupils: Pupils are equal, round, and reactive to light.      Comments: No obvious nystagmus   Neck:      Musculoskeletal: Full passive range of motion without pain and normal range of motion.      Thyroid: No thyromegaly.      Trachea: No tracheal deviation.   Cardiovascular:      Rate and Rhythm: Normal rate and regular rhythm.      Pulses: Normal pulses.      Heart sounds: Normal heart sounds, S1 normal and S2 normal. No murmur. No friction rub. No gallop. " No S3 or S4 sounds.    Pulmonary:      Effort: Pulmonary effort is normal. No respiratory distress.      Breath sounds: Normal breath sounds. No wheezing, rhonchi or rales.   Abdominal:      General: Bowel sounds are normal.      Palpations: There is no mass.      Tenderness: There is no abdominal tenderness. There is no guarding or rebound.   Musculoskeletal: Normal range of motion.         General: No tenderness.      Right lower leg: No edema.      Left lower leg: No edema.   Skin:     General: Skin is warm and dry.      Coloration: Skin is not pale.   Neurological:      General: No focal deficit present.      Mental Status: He is alert and oriented to person, place, and time.      Cranial Nerves: Cranial nerves are intact. No facial asymmetry.      Sensory: Sensation is intact.      Motor: Motor function is intact. No weakness.      Coordination: Coordination is intact. Finger-Nose-Finger Test normal.      Gait: Gait is intact.      Comments: No pronator drift.   Psychiatric:         Mood and Affect: Mood is anxious.         Speech: Speech normal.         Behavior: Behavior normal. Behavior is cooperative.         Thought Content: Thought content normal.         Judgment: Judgment normal.         ED Course (with Medical Decision Making)    Pt seen and examined by me.  RN and EPIC notes reviewed.      Patient with dizziness symptoms, nausea.  He was thought to possibly have some inner ear issues and was started on prednisone but this has not improved his symptoms.  On exam, he is mildly hypertensive.  Orthostatics were done and there is some slight changes noted.    I am going to check basic labs.  Start IV fluids.  Head CT.  EKG was done which showed normal sinus rhythm, rate of 73.  He has poor R wave progression that is nonspecific.        Procedures    Results for orders placed or performed during the hospital encounter of 02/08/21 (from the past 24 hour(s))   UA with Microscopic   Result Value Ref Range     Color Urine Yellow     Appearance Urine Clear     Glucose Urine Negative NEG^Negative mg/dL    Bilirubin Urine Negative NEG^Negative    Ketones Urine Negative NEG^Negative mg/dL    Specific Gravity Urine 1.011 1.003 - 1.035    Blood Urine Negative NEG^Negative    pH Urine 5.0 5.0 - 7.0 pH    Protein Albumin Urine Negative NEG^Negative mg/dL    Urobilinogen mg/dL 0.0 0.0 - 2.0 mg/dL    Nitrite Urine Negative NEG^Negative    Leukocyte Esterase Urine Negative NEG^Negative    Source Midstream Urine     WBC Urine 0 0 - 5 /HPF    RBC Urine 0 0 - 2 /HPF    Mucous Urine Present (A) NEG^Negative /LPF   CBC with platelets differential   Result Value Ref Range    WBC 5.5 4.0 - 11.0 10e9/L    RBC Count 5.12 4.4 - 5.9 10e12/L    Hemoglobin 16.3 13.3 - 17.7 g/dL    Hematocrit 47.2 40.0 - 53.0 %    MCV 92 78 - 100 fl    MCH 31.8 26.5 - 33.0 pg    MCHC 34.5 31.5 - 36.5 g/dL    RDW 12.3 10.0 - 15.0 %    Platelet Count 202 150 - 450 10e9/L    Diff Method Automated Method     % Neutrophils 52.5 %    % Lymphocytes 37.3 %    % Monocytes 7.1 %    % Eosinophils 0.9 %    % Basophils 1.1 %    % Immature Granulocytes 1.1 %    Nucleated RBCs 0 0 /100    Absolute Neutrophil 2.9 1.6 - 8.3 10e9/L    Absolute Lymphocytes 2.1 0.8 - 5.3 10e9/L    Absolute Monocytes 0.4 0.0 - 1.3 10e9/L    Absolute Basophils 0.1 0.0 - 0.2 10e9/L    Abs Immature Granulocytes 0.1 0 - 0.4 10e9/L    Absolute Nucleated RBC 0.0    Comprehensive metabolic panel   Result Value Ref Range    Sodium 140 133 - 144 mmol/L    Potassium 4.1 3.4 - 5.3 mmol/L    Chloride 108 94 - 109 mmol/L    Carbon Dioxide 30 20 - 32 mmol/L    Anion Gap 2 (L) 3 - 14 mmol/L    Glucose 105 (H) 70 - 99 mg/dL    Urea Nitrogen 17 7 - 30 mg/dL    Creatinine 0.94 0.66 - 1.25 mg/dL    GFR Estimate >90 >60 mL/min/[1.73_m2]    GFR Estimate If Black >90 >60 mL/min/[1.73_m2]    Calcium 9.6 8.5 - 10.1 mg/dL    Bilirubin Total 1.0 0.2 - 1.3 mg/dL    Albumin 4.5 3.4 - 5.0 g/dL    Protein Total 7.7 6.8 - 8.8 g/dL     Alkaline Phosphatase 88 40 - 150 U/L    ALT 96 (H) 0 - 70 U/L    AST 42 0 - 45 U/L   Magnesium   Result Value Ref Range    Magnesium 2.2 1.6 - 2.3 mg/dL   Phosphorus   Result Value Ref Range    Phosphorus 3.5 2.5 - 4.5 mg/dL   D dimer quantitative   Result Value Ref Range    D Dimer 0.3 0.0 - 0.50 ug/ml FEU   Troponin I   Result Value Ref Range    Troponin I ES <0.015 0.000 - 0.045 ug/L   TSH   Result Value Ref Range    TSH 3.27 0.40 - 4.00 mU/L   Head CT w/o contrast    Narrative    CT SCAN OF THE HEAD WITHOUT CONTRAST   2/8/2021 1:49 PM     HISTORY: Dizziness    TECHNIQUE:  Axial images of the head and coronal reformations without  IV contrast material. Radiation dose for this scan was reduced using  automated exposure control, adjustment of the mA and/or kV according  to patient size, or iterative reconstruction technique.    COMPARISON: Head CT 2/17/2014.    FINDINGS: There is no evidence of intracranial hemorrhage, mass, acute  infarct or anomaly. The ventricles are normal in size, shape and  configuration. The brain parenchyma and subarachnoid spaces are  normal.     The visualized portions of the sinuses and mastoids appear normal. The  bony calvarium and bones of the skull base appear intact.  Chronic-appearing fracture of the right nasal bone.      Impression    IMPRESSION:   No evidence of acute intracranial hemorrhage, mass, or  herniation.     CTA Head Neck with Contrast    Narrative    CT ANGIOGRAM OF THE HEAD AND NECK WITH CONTRAST  2/8/2021 1:59 PM     HISTORY: Vertigo.    TECHNIQUE:  CT angiography with an injection of Isovue-370, 80mL IV  with scans through the head and neck. Images were transferred to a  separate 3-D workstation where multiplanar reformations and 3-D images  were created. Estimates of carotid stenoses are made relative to the  distal internal carotid artery diameters except as noted. Radiation  dose for this scan was reduced using automated exposure control,  adjustment of the  mA and/or kV according to patient size, or iterative  reconstruction technique.     COMPARISON: None.     CT HEAD FINDINGS: No contrast enhancing lesions. Cerebral blood flow  is grossly normal.     CT ANGIOGRAM HEAD FINDINGS:  The major intracranial arteries including  the proximal branches of the anterior cerebral, middle cerebral, and  posterior cerebral arteries appear patent without vascular cutoff. No  aneurysm identified. No significant stenosis. Venous circulation is  unremarkable.     The P1 segment of the left posterior cerebral artery is not visualized  and is likely hypoplastic or congenitally absent. The left posterior  cerebral artery is supplied by the patent left posterior communicating  artery.    CT ANGIOGRAM NECK FINDINGS:   Normal origin of the great vessels from the aortic arch.     Right carotid artery: The right common and internal carotid arteries  are patent. No significant stenosis or atherosclerotic disease in the  carotid artery.     Left carotid artery: The left common and internal carotid arteries are  patent. No significant stenosis or atherosclerotic disease in the  carotid artery.     Vertebral arteries: Vertebral arteries are patent without evidence of  dissection. No significant stenosis.     Other findings: None.       Impression    IMPRESSION: Patent arteries in the head and neck without vascular  cutoff. No evidence of dissection. No aneurysm identified. No  significant stenosis.       Medications   0.9% sodium chloride BOLUS (0 mLs Intravenous Stopped 2/8/21 1428)   sodium chloride (PF) 0.9% PF flush 3 mL (3 mLs Intravenous Given 2/8/21 1344)   iopamidol (ISOVUE-370) solution 100 mL (80 mLs Intravenous Given 2/8/21 1344)   sodium chloride 0.9 % bag 500mL for CT scan flush use (100 mLs As instructed Given 2/8/21 1345)       Assessments & Plan (with Medical Decision Making)  Patient presents with approximately 5 weeks of episodic nausea and dizziness. He saw his PCP for these  symptoms 3 weeks ago and was prescribed prednisone and allergy medications for suspected allergic rhinitis and dysfunctional eustachian tubes causing persistent vertigo. This did not change his symptoms.     Here in the emergency department, the patient is anxious appearing, vitally stable. Orthostatic blood pressures revealed increase HR from 79 to 98 going from laying to standing, /94 to 127/97. Because of patient's vague dizziness and recent word finding/speech issues, CT of head and neck were obtained, which were both negative for acute pathology. His lab work here including TSH, troponin, d-dimer, phos, magnesium, CMP, CBC and UA are all completely normal.      There is no emergent cause for his symptoms at this point and he is safe for discharge. Patient does not have a diagnosed history of anxiety, but it is possible that this anxiety today could be contributing to him symptoms. Discussed the findings with the patient and he is in agreement with the plan.  He has an appointment with cardiology and will see them tomorrow.  He can follow up with his PCP and return to the emergency department for new or worsening symptoms.              I have reviewed the findings, diagnosis, plan and need for follow up with the patient.    Current Discharge Medication List          Final diagnoses:   Dizziness   Anxiety     This document serves as a record of the services and decisions personally performed and made by Dr. Esha Anthony MD.  It was created on his/her behave by Katelin Erazo, a trained medical student.  The creation of this record is based on the provider's personal observations and the statements of the patient.     Katelin Erazo, MS3  February 8, 2021    I saw, interviewed, examined the patient with the medical student.  Plan formulated as above.  Additions to this document added as needed.  Esha Anthony MD     2/8/2021   [facility] EMERGENCY DEPT     Esha Anthony,  MD  02/09/21 0030

## 2021-02-08 NOTE — ED TRIAGE NOTES
"Dizziness for about 1 month.  Flew to vacation at the end of December for 16 days at the end of vacation he started feeling nauseated and dizzy.  Got home and dizziness got worse.  Did go to clinic and was put on allergy pills and steroids.  Did not feel like steroids helped.  Doesn't feel well and dizzy on and off.  Also gets a few \"sharp\" pains across his chest at times.  Unsure of some is anxiety. Has disc problems in his neck and is unsure if related as he is suppose to get injections this week Thursday morning  "

## 2021-02-09 ENCOUNTER — OFFICE VISIT (OUTPATIENT)
Dept: CARDIOLOGY | Facility: CLINIC | Age: 56
End: 2021-02-09
Payer: COMMERCIAL

## 2021-02-09 VITALS
HEIGHT: 72 IN | DIASTOLIC BLOOD PRESSURE: 90 MMHG | HEART RATE: 102 BPM | WEIGHT: 257 LBS | BODY MASS INDEX: 34.81 KG/M2 | OXYGEN SATURATION: 94 % | SYSTOLIC BLOOD PRESSURE: 130 MMHG

## 2021-02-09 DIAGNOSIS — R06.09 DYSPNEA ON EXERTION: ICD-10-CM

## 2021-02-09 DIAGNOSIS — R07.9 CHEST PAIN, UNSPECIFIED TYPE: Primary | ICD-10-CM

## 2021-02-09 DIAGNOSIS — E78.00 HYPERCHOLESTEROLEMIA: ICD-10-CM

## 2021-02-09 DIAGNOSIS — R42 DIZZINESS: ICD-10-CM

## 2021-02-09 DIAGNOSIS — R53.83 FATIGUE, UNSPECIFIED TYPE: ICD-10-CM

## 2021-02-09 PROCEDURE — 99204 OFFICE O/P NEW MOD 45 MIN: CPT | Performed by: INTERNAL MEDICINE

## 2021-02-09 ASSESSMENT — MIFFLIN-ST. JEOR: SCORE: 2048.74

## 2021-02-09 NOTE — PROGRESS NOTES
HPI and Plan:   See dictation    Orders Placed This Encounter   Procedures     Lipid Profile     ALT     Follow-Up with Cardiac Advanced Practice Provider     Exercise Stress Echocardiogram       No orders of the defined types were placed in this encounter.      There are no discontinued medications.      Encounter Diagnoses   Name Primary?     Chest pain, unspecified type Yes     Dyspnea on exertion      Dizziness      Fatigue, unspecified type      Hypercholesterolemia        CURRENT MEDICATIONS:  Current Outpatient Medications   Medication Sig Dispense Refill     acetaminophen (TYLENOL) 500 MG tablet Take 1,000 mg by mouth every morning        atorvastatin (LIPITOR) 20 MG tablet Take 1 tablet (20 mg) by mouth daily 90 tablet 4     ibuprofen (ADVIL/MOTRIN) 200 MG tablet Take 400 mg by mouth every morning        loratadine (CLARITIN) 10 MG tablet Take 1 tablet (10 mg) by mouth daily 30 tablet 0     modafinil (PROVIGIL) 100 MG tablet Take 1 tablet (100 mg) by mouth daily 30 tablet 3     omeprazole (PRILOSEC) 40 MG DR capsule TAKE ONE CAPSULE BY MOUTH ONCE DAILY 90 capsule 0       ALLERGIES     Allergies   Allergen Reactions     Percocet [Oxycodone-Acetaminophen] Itching     Wasps [Hornets] Swelling       PAST MEDICAL HISTORY:  Past Medical History:   Diagnosis Date     Back pains      Depressive disorder, not elsewhere classified 1/28/2007    declines Rx     Disc degeneration 12/2008    see MRI -throaco-lumbar mild discogenic degenerative changes     Elevated LFT's 11/09    alt and ast     Other and unspecified hyperlipidemia 1/2007     Sleep disorder 9/09    CPAP     Tobacco use disorder        PAST SURGICAL HISTORY:  Past Surgical History:   Procedure Laterality Date     COLONOSCOPY  07/07/08    adenomatous polyp repeat 5 years     COLONOSCOPY N/A 12/29/2017    Procedure: COMBINED COLONOSCOPY, SINGLE OR MULTIPLE BIOPSY/POLYPECTOMY BY BIOPSY;  Colonoscopy, Polypectomy by Biopsy;  Surgeon: Matt Reyes MD;   [facility]     ENT SURGERY      for deviated septum     EXCISE LESION AXILLA  2018    Procedure: Excision Skin Tags Right Axilla;  Surgeon: Ayaan Chaney MD;  [facility]     EXCISE LESION BACK N/A 2018    Procedure: Excision Back Skin Lesion X Two;  Surgeon: Ayaan Chaney MD;  [facility]     HC ECHO HEART XTHORACIC, STRESS/REST  2009    neg     INJECT EPIDURAL CERVICAL N/A 2015    Procedure: INJECT EPIDURAL CERVICAL;  Surgeon: Christian Chaudhry MD;  [facility]     INJECT EPIDURAL CERVICAL N/A 2018    Procedure: INJECT EPIDURAL CERVICAL 6-7;  Surgeon: Christian Chaudhry MD;  [facility]     INJECT EPIDURAL LUMBAR N/A 2019    Procedure: Lumbar 5- Sacral 1 Epidural  Injection bilatetral;  Surgeon: Christian Chaudhry MD;  [facility]     INJECT FACET JOINT Bilateral 2019    Procedure: Cervical Facet Injections Cervical 5-6 and 6-7 Bilateral;  Surgeon: Christian Chaudhry MD;  [facility]     INJECT FACET JOINT Bilateral 2019    Procedure: Cervical 5-6 and 6-7 Bilateral facet joint injection;  Surgeon: Christian Chaudhry MD;  [facility]     ORTHOPEDIC SURGERY      Right hand     TONSILLECTOMY       ZZC NONSPECIFIC PROCEDURE      rt hand surgery - laceration/glass/tendons       FAMILY HISTORY:  Family History   Problem Relation Age of Onset     Breast Cancer Mother      Arthritis Mother         joint ?     Depression Father      Cancer Maternal Grandmother      C.A.D. Maternal Grandfather      Cardiovascular Maternal Grandfather      Gynecology Paternal Grandmother          giving birth     Prostate Cancer Paternal Grandfather      Genetic Disorder Brother         joint pain?     Depression Brother      Cardiovascular Son         congenital heart defect -       Prostate Cancer Other          age 70's     Diabetes Other        SOCIAL HISTORY:  Social History     Socioeconomic History     Marital status:      Spouse name: Not on  file     Number of children: Not on file     Years of education: Not on file     Highest education level: Not on file   Occupational History     Occupation:      Employer: SELF   Social Needs     Financial resource strain: Not on file     Food insecurity     Worry: Not on file     Inability: Not on file     Transportation needs     Medical: Not on file     Non-medical: Not on file   Tobacco Use     Smoking status: Former Smoker     Packs/day: 1.50     Quit date: 2007     Years since quittin.9     Smokeless tobacco: Never Used   Substance and Sexual Activity     Alcohol use: No     Frequency: Never     Comment: rare     Drug use: No     Sexual activity: Yes     Partners: Female   Lifestyle     Physical activity     Days per week: Not on file     Minutes per session: Not on file     Stress: Not on file   Relationships     Social connections     Talks on phone: Not on file     Gets together: Not on file     Attends Jehovah's witness service: Not on file     Active member of club or organization: Not on file     Attends meetings of clubs or organizations: Not on file     Relationship status: Not on file     Intimate partner violence     Fear of current or ex partner: Not on file     Emotionally abused: Not on file     Physically abused: Not on file     Forced sexual activity: Not on file   Other Topics Concern     Parent/sibling w/ CABG, MI or angioplasty before 65F 55M? No   Social History Narrative    Dairy/d a lot, 1 gallon of milk per day plus cheese and other servings     Caffeine 48 oz. coffee per day    Exercise none    Sunscreen used - No    Seatbelts used - Yes    Working smoke/CO detectors in the home - Yes    Guns stored in the home - No    Self Breast Exams - NA    Self Testicular Exam - No    Eye Exam up to date - No    Dental Exam up to date - Yes    Pap Smear up to date - NA    Mammogram up to date - NA    PSA up to date - No    Dexa Scan up to date - NA    Flex Sig / Colonoscopy up to  date - No    Immunizations up to date - No    Abuse: Current or Past(Physical, Sexual or Emotional)- No    Do you feel safe in your environment - Yes    [place]        Lives with spouse and her parents.       Review of Systems:  Skin:  Negative       Eyes:  Positive for glasses    ENT:  Negative      Respiratory:  Positive for dyspnea on exertion;shortness of breath     Cardiovascular:    Positive for;chest pain;heaviness;dizziness;lightheadedness;fatigue    Gastroenterology: Negative      Genitourinary:  Negative      Musculoskeletal:  Positive for back pain;neck pain buldging disc  Neurologic:  Positive for numbness or tingling of feet    Psychiatric:  Positive for sleep disturbances    Heme/Lymph/Imm:  Positive for allergies    Endocrine:  Negative        Physical Exam:  Vitals: BP (!) 130/90 (BP Location: Left arm, Patient Position: Fowlers, Cuff Size: Adult Large)   Pulse 102   Ht 1.829 m (6')   Wt 116.6 kg (257 lb)   SpO2 94%   BMI 34.86 kg/m      Constitutional:  cooperative, alert and oriented, well developed, well nourished, in no acute distress appears anxious      Skin:  warm and dry to the touch, no apparent skin lesions or masses noted   pacemaker incision in the left infraclavicular area was well-healed      Head:  normocephalic, no masses or lesions        Eyes:  pupils equal and round, conjunctivae and lids unremarkable, sclera white, no xanthalasma, EOMS intact, no nystagmus        Lymph:      ENT:  no pallor or cyanosis, dentition good        Neck:  carotid pulses are full and equal bilaterally, JVP normal, no carotid bruit        Respiratory:  normal breath sounds, clear to auscultation, normal A-P diameter, normal symmetry, normal respiratory excursion, no use of accessory muscles         Cardiac: regular rhythm, normal S1/S2, no S3 or S4, apical impulse not displaced, no murmurs, gallops or rubs                                                         GI:  abdomen soft;BS normoactive         Extremities and Muscular Skeletal:  no deformities, clubbing, cyanosis, erythema observed;no edema              Neurological:  no gross motor deficits;affect appropriate        Psych:  oriented to time, person and place        CC  No referring provider defined for this encounter.

## 2021-02-09 NOTE — LETTER
2/9/2021    Physician No Ref-Primary  No address on file    RE: Bulmaro Herrera       Dear Colleague,    I had the pleasure of seeing Bulmaro Herrera in the [facility].    HPI and Plan:   See dictation    Orders Placed This Encounter   Procedures     Lipid Profile     ALT     Follow-Up with Cardiac Advanced Practice Provider     Exercise Stress Echocardiogram       No orders of the defined types were placed in this encounter.      There are no discontinued medications.      Encounter Diagnoses   Name Primary?     Chest pain, unspecified type Yes     Dyspnea on exertion      Dizziness      Fatigue, unspecified type      Hypercholesterolemia        CURRENT MEDICATIONS:  Current Outpatient Medications   Medication Sig Dispense Refill     acetaminophen (TYLENOL) 500 MG tablet Take 1,000 mg by mouth every morning        atorvastatin (LIPITOR) 20 MG tablet Take 1 tablet (20 mg) by mouth daily 90 tablet 4     ibuprofen (ADVIL/MOTRIN) 200 MG tablet Take 400 mg by mouth every morning        loratadine (CLARITIN) 10 MG tablet Take 1 tablet (10 mg) by mouth daily 30 tablet 0     modafinil (PROVIGIL) 100 MG tablet Take 1 tablet (100 mg) by mouth daily 30 tablet 3     omeprazole (PRILOSEC) 40 MG DR capsule TAKE ONE CAPSULE BY MOUTH ONCE DAILY 90 capsule 0       ALLERGIES     Allergies   Allergen Reactions     Percocet [Oxycodone-Acetaminophen] Itching     Wasps [Hornets] Swelling       PAST MEDICAL HISTORY:  Past Medical History:   Diagnosis Date     Back pains      Depressive disorder, not elsewhere classified 1/28/2007    declines Rx     Disc degeneration 12/2008    see MRI -throaco-lumbar mild discogenic degenerative changes     Elevated LFT's 11/09    alt and ast     Other and unspecified hyperlipidemia 1/2007     Sleep disorder 9/09    CPAP     Tobacco use disorder        PAST SURGICAL HISTORY:  Past Surgical History:   Procedure Laterality Date     COLONOSCOPY  07/07/08     adenomatous polyp repeat 5 years     COLONOSCOPY N/A 2017    Procedure: COMBINED COLONOSCOPY, SINGLE OR MULTIPLE BIOPSY/POLYPECTOMY BY BIOPSY;  Colonoscopy, Polypectomy by Biopsy;  Surgeon: Matt Reyes MD;  [facility]     ENT SURGERY      for deviated septum     EXCISE LESION AXILLA  2018    Procedure: Excision Skin Tags Right Axilla;  Surgeon: Ayaan Chaney MD;  [facility]     EXCISE LESION BACK N/A 2018    Procedure: Excision Back Skin Lesion X Two;  Surgeon: Ayaan Chaney MD;  [facility]     HC ECHO HEART XTHORACIC, STRESS/REST  2009    neg     INJECT EPIDURAL CERVICAL N/A 2015    Procedure: INJECT EPIDURAL CERVICAL;  Surgeon: Christian Chaudhry MD;  [facility]     INJECT EPIDURAL CERVICAL N/A 2018    Procedure: INJECT EPIDURAL CERVICAL 6-7;  Surgeon: Christian Chaudhry MD;  [facility]     INJECT EPIDURAL LUMBAR N/A 2019    Procedure: Lumbar 5- Sacral 1 Epidural  Injection bilatetral;  Surgeon: Christian Chaudhry MD;  [facility]     INJECT FACET JOINT Bilateral 2019    Procedure: Cervical Facet Injections Cervical 5-6 and 6-7 Bilateral;  Surgeon: Christian Chaudhry MD;  [facility]     INJECT FACET JOINT Bilateral 2019    Procedure: Cervical 5-6 and 6-7 Bilateral facet joint injection;  Surgeon: Christian Chaudhry MD;  [facility]     ORTHOPEDIC SURGERY      Right hand     TONSILLECTOMY       ZZC NONSPECIFIC PROCEDURE      rt hand surgery - laceration/glass/tendons       FAMILY HISTORY:  Family History   Problem Relation Age of Onset     Breast Cancer Mother      Arthritis Mother         joint ?     Depression Father      Cancer Maternal Grandmother      C.A.D. Maternal Grandfather      Cardiovascular Maternal Grandfather      Gynecology Paternal Grandmother          giving birth     Prostate Cancer Paternal Grandfather      Genetic Disorder Brother         joint pain?     Depression Brother      Cardiovascular Son          congenital heart defect -       Prostate Cancer Other          age 70's     Diabetes Other        SOCIAL HISTORY:  Social History     Socioeconomic History     Marital status:      Spouse name: Not on file     Number of children: Not on file     Years of education: Not on file     Highest education level: Not on file   Occupational History     Occupation:      Employer: SELF   Social Needs     Financial resource strain: Not on file     Food insecurity     Worry: Not on file     Inability: Not on file     Transportation needs     Medical: Not on file     Non-medical: Not on file   Tobacco Use     Smoking status: Former Smoker     Packs/day: 1.50     Quit date: 2007     Years since quittin.9     Smokeless tobacco: Never Used   Substance and Sexual Activity     Alcohol use: No     Frequency: Never     Comment: rare     Drug use: No     Sexual activity: Yes     Partners: Female   Lifestyle     Physical activity     Days per week: Not on file     Minutes per session: Not on file     Stress: Not on file   Relationships     Social connections     Talks on phone: Not on file     Gets together: Not on file     Attends Faith service: Not on file     Active member of club or organization: Not on file     Attends meetings of clubs or organizations: Not on file     Relationship status: Not on file     Intimate partner violence     Fear of current or ex partner: Not on file     Emotionally abused: Not on file     Physically abused: Not on file     Forced sexual activity: Not on file   Other Topics Concern     Parent/sibling w/ CABG, MI or angioplasty before 65F 55M? No   Social History Narrative    Dairy/d a lot, 1 gallon of milk per day plus cheese and other servings     Caffeine 48 oz. coffee per day    Exercise none    Sunscreen used - No    Seatbelts used - Yes    Working smoke/CO detectors in the home - Yes    Guns stored in the home - No    Self Breast Exams - NA    Self  Testicular Exam - No    Eye Exam up to date - No    Dental Exam up to date - Yes    Pap Smear up to date - NA    Mammogram up to date - NA    PSA up to date - No    Dexa Scan up to date - NA    Flex Sig / Colonoscopy up to date - No    Immunizations up to date - No    Abuse: Current or Past(Physical, Sexual or Emotional)- No    Do you feel safe in your environment - Yes    [place]        Lives with spouse and her parents.       Review of Systems:  Skin:  Negative       Eyes:  Positive for glasses    ENT:  Negative      Respiratory:  Positive for dyspnea on exertion;shortness of breath     Cardiovascular:    Positive for;chest pain;heaviness;dizziness;lightheadedness;fatigue    Gastroenterology: Negative      Genitourinary:  Negative      Musculoskeletal:  Positive for back pain;neck pain buldging disc  Neurologic:  Positive for numbness or tingling of feet    Psychiatric:  Positive for sleep disturbances    Heme/Lymph/Imm:  Positive for allergies    Endocrine:  Negative        Physical Exam:  Vitals: BP (!) 130/90 (BP Location: Left arm, Patient Position: Fowlers, Cuff Size: Adult Large)   Pulse 102   Ht 1.829 m (6')   Wt 116.6 kg (257 lb)   SpO2 94%   BMI 34.86 kg/m      Constitutional:  cooperative, alert and oriented, well developed, well nourished, in no acute distress appears anxious      Skin:  warm and dry to the touch, no apparent skin lesions or masses noted   pacemaker incision in the left infraclavicular area was well-healed      Head:  normocephalic, no masses or lesions        Eyes:  pupils equal and round, conjunctivae and lids unremarkable, sclera white, no xanthalasma, EOMS intact, no nystagmus        Lymph:      ENT:  no pallor or cyanosis, dentition good        Neck:  carotid pulses are full and equal bilaterally, JVP normal, no carotid bruit        Respiratory:  normal breath sounds, clear to auscultation, normal A-P diameter, normal symmetry, normal respiratory excursion, no use of accessory  muscles         Cardiac: regular rhythm, normal S1/S2, no S3 or S4, apical impulse not displaced, no murmurs, gallops or rubs                                                         GI:  abdomen soft;BS normoactive        Extremities and Muscular Skeletal:  no deformities, clubbing, cyanosis, erythema observed;no edema              Neurological:  no gross motor deficits;affect appropriate        Psych:  oriented to time, person and place        CC  No referring provider defined for this encounter.                  Thank you for allowing me to participate in the care of your patient.      Sincerely,     LEIGH REIS MD     [facility]  cc:   No referring provider defined for this encounter.

## 2021-02-10 ENCOUNTER — HOSPITAL ENCOUNTER (OUTPATIENT)
Dept: CARDIOLOGY | Facility: CLINIC | Age: 56
Discharge: HOME | End: 2021-02-10
Attending: INTERNAL MEDICINE | Admitting: INTERNAL MEDICINE
Payer: COMMERCIAL

## 2021-02-10 ENCOUNTER — ANESTHESIA EVENT (OUTPATIENT)
Dept: SURGERY | Facility: CLINIC | Age: 56
End: 2021-02-10
Payer: COMMERCIAL

## 2021-02-10 DIAGNOSIS — R07.9 CHEST PAIN, UNSPECIFIED TYPE: ICD-10-CM

## 2021-02-10 DIAGNOSIS — E78.00 HYPERCHOLESTEROLEMIA: ICD-10-CM

## 2021-02-10 DIAGNOSIS — R06.09 DYSPNEA ON EXERTION: ICD-10-CM

## 2021-02-10 LAB
ALT SERPL W P-5'-P-CCNC: 76 U/L (ref 0–70)
CHOLEST SERPL-MCNC: 243 MG/DL
HDLC SERPL-MCNC: 37 MG/DL
LDLC SERPL CALC-MCNC: 137 MG/DL
NONHDLC SERPL-MCNC: 206 MG/DL
TRIGL SERPL-MCNC: 345 MG/DL

## 2021-02-10 PROCEDURE — 93016 CV STRESS TEST SUPVJ ONLY: CPT | Performed by: INTERNAL MEDICINE

## 2021-02-10 PROCEDURE — 80061 LIPID PANEL: CPT | Performed by: INTERNAL MEDICINE

## 2021-02-10 PROCEDURE — 93325 DOPPLER ECHO COLOR FLOW MAPG: CPT | Mod: 26 | Performed by: INTERNAL MEDICINE

## 2021-02-10 PROCEDURE — 93325 DOPPLER ECHO COLOR FLOW MAPG: CPT | Mod: TC

## 2021-02-10 PROCEDURE — 93350 STRESS TTE ONLY: CPT | Mod: 26 | Performed by: INTERNAL MEDICINE

## 2021-02-10 PROCEDURE — 93018 CV STRESS TEST I&R ONLY: CPT | Performed by: INTERNAL MEDICINE

## 2021-02-10 PROCEDURE — 36415 COLL VENOUS BLD VENIPUNCTURE: CPT | Performed by: INTERNAL MEDICINE

## 2021-02-10 PROCEDURE — 255N000002 HC RX 255 OP 636: Performed by: INTERNAL MEDICINE

## 2021-02-10 PROCEDURE — 84460 ALANINE AMINO (ALT) (SGPT): CPT | Performed by: INTERNAL MEDICINE

## 2021-02-10 PROCEDURE — 93321 DOPPLER ECHO F-UP/LMTD STD: CPT | Mod: 26 | Performed by: INTERNAL MEDICINE

## 2021-02-10 RX ADMIN — HUMAN ALBUMIN MICROSPHERES AND PERFLUTREN 5 ML: 10; .22 INJECTION, SOLUTION INTRAVENOUS at 14:29

## 2021-02-10 ASSESSMENT — LIFESTYLE VARIABLES: TOBACCO_USE: 1

## 2021-02-10 NOTE — PROGRESS NOTES
Service Date: 02/09/2021      HISTORY OF PRESENT ILLNESS:  I had the opportunity to see Mr. Bulmaro Herrera in Cardiology Clinic today at [facility] in [place] for consultation regarding chest pain symptoms, shortness of breath, and fatigue.  Mr. Herrera is a 55-year-old gentleman with no prior cardiac history who has been experiencing a variety of different but concerning symptoms recently.  Although his symptoms are somewhat challenging to understand, he does describe predominantly left-sided chest pains in different locations as well as some right-sided chest pains.  These seem to occur at various times, both at rest and with exertion.  He feels that he gets short of breath quickly with exertion.  He has difficulty climbing a flight of stairs because he feels like he just ran a marathon when he gets to the top.  He has had some lightheadedness episodes but denies feeling any specific palpitations during those times and has not had syncope.  He describes it as a similar feeling to drinking 4-5 beers.  He is quick to point out that he does not drink alcohol regularly.      His only cardiac workup in the past was a stress test done with Lexiscan and nuclear perfusion imaging in 2018 that was normal.  He does have quite significant dyslipidemia.  His cholesterol was 278 in 2018, with an LDL of 171 and HDL 33.  His triglycerides were 440.      He was recently seen in the emergency room for these concerns on 02/08/2021.  His troponin level was normal.  His TSH was normal.  He had a normal basic metabolic panel and normal CBC.  His D-dimer was normal as well.      I reviewed his 12-lead ECG, which demonstrated sinus tachycardia without other abnormality.  The only abnormal finding was an ALT of 96.  Even his COVID test was normal.      He tells me that his family history is significant for cardiac disease as well but has difficulty being specific about those types of cardiac problems.      He quit  smoking about 14 years ago.  He does not have diabetes and has not been diagnosed with hypertension.      PHYSICAL EXAMINATION:  On examination here today, his blood pressure is 130/90, heart rate 102 and weight 257 pounds.  His heart rate may be high because of the use of Provigil.  His lungs are clear.  Heart rhythm is regular.  There are no cardiac murmurs or carotid bruits.      IMPRESSIONS:  Mr. Jazzy Herrera is a 55-year-old gentleman with history of smoking, which he quit 14 years ago, and significant dyslipidemia who now presents with concerns regarding chest pain and shortness of breath, primarily.  He has had some lightheaded episodes as well, in fact, had one in the office today.  I checked his pulse while he was experiencing those symptoms and it was unchanged, regular, and slightly fast.      He does have some cardiac risk factors including history of smoking and dyslipidemia as well as possibly a family history of heart disease.      I suggested we start our evaluation with a stress echocardiogram and proceed with additional evaluation as warranted based on that study.      MD LEIGH Lepe MD, [facility]             D: 2021   T: 2021   MT: HAILE      Name:     JAZZY HERRERA   MRN:      [id number]        Account:      [id number]   :      1965           Service Date: 2021      Document: [id number]

## 2021-02-11 ENCOUNTER — ANESTHESIA (OUTPATIENT)
Dept: SURGERY | Facility: CLINIC | Age: 56
End: 2021-02-11
Payer: COMMERCIAL

## 2021-02-11 ENCOUNTER — HOSPITAL ENCOUNTER (OUTPATIENT)
Dept: GENERAL RADIOLOGY | Facility: CLINIC | Age: 56
End: 2021-02-11
Attending: ANESTHESIOLOGY | Admitting: ANESTHESIOLOGY
Payer: COMMERCIAL

## 2021-02-11 ENCOUNTER — HOSPITAL ENCOUNTER (OUTPATIENT)
Facility: CLINIC | Age: 56
Discharge: HOME | End: 2021-02-11
Attending: ANESTHESIOLOGY | Admitting: ANESTHESIOLOGY
Payer: COMMERCIAL

## 2021-02-11 VITALS
HEART RATE: 65 BPM | OXYGEN SATURATION: 94 % | DIASTOLIC BLOOD PRESSURE: 82 MMHG | BODY MASS INDEX: 34.82 KG/M2 | TEMPERATURE: 98.1 F | HEIGHT: 72 IN | RESPIRATION RATE: 18 BRPM | SYSTOLIC BLOOD PRESSURE: 112 MMHG | WEIGHT: 257.06 LBS

## 2021-02-11 DIAGNOSIS — M47.812 ARTHROPATHY OF CERVICAL FACET JOINT: ICD-10-CM

## 2021-02-11 PROCEDURE — 64490 INJ PARAVERT F JNT C/T 1 LEV: CPT | Mod: 50 | Performed by: ANESTHESIOLOGY

## 2021-02-11 PROCEDURE — 370N000017 HC ANESTHESIA TECHNICAL FEE, PER MIN: Performed by: ANESTHESIOLOGY

## 2021-02-11 PROCEDURE — 250N000009 HC RX 250: Performed by: NURSE ANESTHETIST, CERTIFIED REGISTERED

## 2021-02-11 PROCEDURE — 64490 INJ PARAVERT F JNT C/T 1 LEV: CPT | Performed by: ANESTHESIOLOGY

## 2021-02-11 PROCEDURE — 250N000011 HC RX IP 250 OP 636: Performed by: NURSE ANESTHETIST, CERTIFIED REGISTERED

## 2021-02-11 PROCEDURE — 64491 INJ PARAVERT F JNT C/T 2 LEV: CPT | Performed by: ANESTHESIOLOGY

## 2021-02-11 PROCEDURE — 250N000011 HC RX IP 250 OP 636: Performed by: ANESTHESIOLOGY

## 2021-02-11 PROCEDURE — 64491 INJ PARAVERT F JNT C/T 2 LEV: CPT | Mod: LT | Performed by: ANESTHESIOLOGY

## 2021-02-11 PROCEDURE — 999N000179 XR SURGERY CARM FLUORO LESS THAN 5 MIN W STILLS: Mod: TC

## 2021-02-11 RX ORDER — IOPAMIDOL 612 MG/ML
INJECTION, SOLUTION INTRATHECAL PRN
Status: DISCONTINUED | OUTPATIENT
Start: 2021-02-11 | End: 2021-02-11 | Stop reason: HOSPADM

## 2021-02-11 RX ORDER — PROPOFOL 10 MG/ML
INJECTION, EMULSION INTRAVENOUS PRN
Status: DISCONTINUED | OUTPATIENT
Start: 2021-02-11 | End: 2021-02-11

## 2021-02-11 RX ORDER — LIDOCAINE 40 MG/G
CREAM TOPICAL
Status: DISCONTINUED | OUTPATIENT
Start: 2021-02-11 | End: 2021-02-11 | Stop reason: HOSPADM

## 2021-02-11 RX ORDER — BUPIVACAINE HYDROCHLORIDE 7.5 MG/ML
INJECTION, SOLUTION EPIDURAL; RETROBULBAR PRN
Status: DISCONTINUED | OUTPATIENT
Start: 2021-02-11 | End: 2021-02-11 | Stop reason: HOSPADM

## 2021-02-11 RX ORDER — LIDOCAINE HYDROCHLORIDE 20 MG/ML
INJECTION, SOLUTION INFILTRATION; PERINEURAL PRN
Status: DISCONTINUED | OUTPATIENT
Start: 2021-02-11 | End: 2021-02-11

## 2021-02-11 RX ORDER — TRIAMCINOLONE ACETONIDE 40 MG/ML
INJECTION, SUSPENSION INTRA-ARTICULAR; INTRAMUSCULAR PRN
Status: DISCONTINUED | OUTPATIENT
Start: 2021-02-11 | End: 2021-02-11 | Stop reason: HOSPADM

## 2021-02-11 RX ADMIN — PROPOFOL 20 MG: 10 INJECTION, EMULSION INTRAVENOUS at 08:51

## 2021-02-11 RX ADMIN — PROPOFOL 50 MG: 10 INJECTION, EMULSION INTRAVENOUS at 08:47

## 2021-02-11 RX ADMIN — PROPOFOL 50 MG: 10 INJECTION, EMULSION INTRAVENOUS at 08:45

## 2021-02-11 RX ADMIN — Medication 10 MCG: at 08:43

## 2021-02-11 RX ADMIN — PROPOFOL 40 MG: 10 INJECTION, EMULSION INTRAVENOUS at 08:49

## 2021-02-11 RX ADMIN — PROPOFOL 50 MG: 10 INJECTION, EMULSION INTRAVENOUS at 08:44

## 2021-02-11 RX ADMIN — LIDOCAINE HYDROCHLORIDE 40 MG: 20 INJECTION, SOLUTION INFILTRATION; PERINEURAL at 08:44

## 2021-02-11 RX ADMIN — PROPOFOL 50 MG: 10 INJECTION, EMULSION INTRAVENOUS at 08:46

## 2021-02-11 RX ADMIN — Medication 10 MCG: at 08:39

## 2021-02-11 RX ADMIN — LIDOCAINE HYDROCHLORIDE 1 ML: 10 INJECTION, SOLUTION EPIDURAL; INFILTRATION; INTRACAUDAL; PERINEURAL at 07:41

## 2021-02-11 ASSESSMENT — MIFFLIN-ST. JEOR: SCORE: 2049

## 2021-02-11 NOTE — OP NOTE
Christian Chaudhry MD (Physician)      CHIEF COMPLAINT:  neck pain secondary to cervical spondylosis.  INTERVAL HISTORY:  I discussed the above note with the patient. I agree with above.  He did have a epidural injection in the neck and that did not help suggesting that the discs in his neck that have some degeneration are not the major pain generators.  Because of this we are going to diagnostically and therapeutically test his facet joints.  The complicating factor in his situation is that he has a severe needle phobia.  He needed very deep monitored anesthetic care in order to have the facet injection performed.  This would preclude him from having a radiofrequency ablation which requires fully awake medial branch blocks done under local anesthesia.  PHYSICAL EXAM:   Musculoskeletal: Strength of upper extremities is 5/5 bilaterally.  Pain on palpation of the   neck. Pain on   Rotation, extension and flexion of neck.   Neuro: No sensory perception differences of lower extremities.  PROCEDURE:     Left C5-C6, Right C5-C6, Left C6-C7 and Right C6-C7  Intra-articular zygopophyseal joint injection utilizing fluoroscopic guidance with contrast dye.   PROCEDURE DETAILS: After written informed consent was obtained from the patient, the patient was escorted to the procedure room.  The patient was placed in the sitting position.  A  time out  was conducted to verify the patient identity, procedure to be performed, side, site, allergies and any special requirements.  The skin over the neck was prepped and draped in normal sterile fashion. Fluoroscopy was used to identify the zygopophyseal joint with a lateral view.   The skin was anesthetized with 0.5 mL of 1% lidocaine with bicarbonate buffer.  A 25-gauge 2 inch Quincke needle was advanced under fluoroscopic guidance in the lateral approach until entry into the zygopophyseal joint was successful.  View was confirmed in AP view.  Then, <0.3 cc of Omnipaque contrast dye was  injected producing a satisfactory arthrogram without evidence of intrathecal or intravascular spread.  Then, a 1 mL solution of 5 mg dexamethasone and 0.5 mL of 2% lidocaine was incrementally injected into each  joint.  After injection of the medication, as the needle tip was withdrawn, it was flushed with local anesthetic. The patient was monitored with blood pressure and pulse oximetry machines with the assistance of an RN throughout the procedure.  The patient was alert and responsive to questions throughout the procedure.   The patient tolerated the procedure well and was observed in the post-procedural area.  The patient was dismissed without apparent complications.     Blood loss: Less than 2 cc     DIAGNOSIS:  1.  Cervical spondylosis secondary to a symptomatic facet syndrome causing axial neck pain with a history of long-term pain relief from previous facet injections  2.  Severe needle phobia    PLAN:  1. Performed repeat zygopophyseal joint injections.   He had about 9 to 10 months of pain relief from the last set of injections.  However, he has a severe needle phobia so he would not be a candidate for medial branch blocks or a radiofrequency ablation so if he does have positive diagnostic injections with his pain form then he would need to consider a cervical fusion if the facet injections do not provide long-term pain relief for him.     Christian Chaudhry MD  Diplomate of the American Board of Anesthesiology, Pain Medicine

## 2021-02-11 NOTE — ANESTHESIA PREPROCEDURE EVALUATION
Anesthesia Pre-Procedure Evaluation    Patient: Bulmaro Herrera   MRN: [id number] : 1965          Preoperative Diagnosis: Chronic midline low back pain without sciatica [M54.5, G89.29]    Procedure(s):  Lumbar 5- Sacral 1 Epidural  Injection    Past Medical History:   Diagnosis Date     Back pains      Depressive disorder, not elsewhere classified 2007    declines Rx     Disc degeneration 2008    see MRI -throaco-lumbar mild discogenic degenerative changes     Elevated LFT's     alt and ast     Other and unspecified hyperlipidemia 2007     Sleep disorder     CPAP     Tobacco use disorder      Past Surgical History:   Procedure Laterality Date     COLONOSCOPY  08    adenomatous polyp repeat 5 years     COLONOSCOPY N/A 2017    Procedure: COMBINED COLONOSCOPY, SINGLE OR MULTIPLE BIOPSY/POLYPECTOMY BY BIOPSY;  Colonoscopy, Polypectomy by Biopsy;  Surgeon: Matt Reyes MD;  Location: [facility]     ENT SURGERY      for deviated septum     EXCISE LESION AXILLA  2018    Procedure: Excision Skin Tags Right Axilla;  Surgeon: Ayaan Chaney MD;  Location: [facility]     EXCISE LESION BACK N/A 2018    Procedure: Excision Back Skin Lesion X Two;  Surgeon: Ayaan Chaney MD;  Location: [facility]     HC ECHO HEART XTHORACIC, STRESS/REST  2009    neg     INJECT EPIDURAL CERVICAL N/A 2015    Procedure: INJECT EPIDURAL CERVICAL;  Surgeon: Christian Chaudhry MD;  Location: [facility]     INJECT EPIDURAL CERVICAL N/A 2018    Procedure: INJECT EPIDURAL CERVICAL 6-7;  Surgeon: Christian Chaudhry MD;  Location[facility]     INJECT EPIDURAL LUMBAR N/A 2019    Procedure: Lumbar 5- Sacral 1 Epidural  Injection bilatetral;  Surgeon: Christian Chaudhry MD;  [facility]     INJECT FACET JOINT Bilateral 2019    Procedure: Cervical Facet Injections Cervical 5-6 and 6-7 Bilateral;  Surgeon: Christian Chaudhry MD;  Location[facility]     INJECT FACET JOINT Bilateral 2019    Procedure: Cervical  5-6 and 6-7 Bilateral facet joint injection;  Surgeon: Christian Chaudhry MD;  Location: [facility]     ORTHOPEDIC SURGERY      Right hand     TONSILLECTOMY       ZZC NONSPECIFIC PROCEDURE      rt hand surgery - laceration/glass/tendons       Anesthesia Evaluation     . Pt has had prior anesthetic. Type: General and MAC.    No history of anesthetic complications          ROS/MED HX    ENT/Pulmonary:     (+) sleep apnea, uses CPAP, tobacco use, Past use,     Neurologic:  - neg neurologic ROS     Cardiovascular:     (+) Dyslipidemia hypertension-----Previous cardiac testing   Echo: Date: 2/9/21 Results:  Interpretation Summary  The patient exercised 7:26.  The patient exhibited no chest pain during exercise.  This was a normal stress EKG with no evidence of stress-induced ischemia.  Normal left ventricular function and wall motion at rest and post-stress  Stress Test: Date: Results:    ECG Reviewed: Date: 9/10/18- 2/8/21 Results:  SR  2/8/21- SR  Cath: Date: Results:        METS/Exercise Tolerance:  >4 METS   Hematologic:  - neg hematologic  ROS       Musculoskeletal:   (+) other musculoskeletal- DDD cervical spine with numbness down both arms on and off- chronic back pain,       GI/Hepatic:     (+) GERD, Asymptomatic on medication,       Renal/Genitourinary:  - neg Renal ROS       Endo:  - neg endo ROS       Psychiatric: Comment: Extreme anxiety and needle phobia     (+) psychiatric history depression and anxiety H/O chronic opiod use .       Infectious Disease:  - neg infectious disease ROS       Malignancy:       Other:    (+) , H/O Chronic Pain,                        Physical Exam  Normal systems: cardiovascular, pulmonary and dental    Airway   Mallampati: II  TM distance: >3 FB  Neck ROM: full    Dental     Cardiovascular   Rhythm and rate: regular and normal      Pulmonary    breath sounds clear to auscultation            Lab Results   Component Value Date    WBC 5.5 02/08/2021    HGB 16.3 02/08/2021    HCT 47.2  02/08/2021     02/08/2021    CRP <2.9 03/25/2015    SED 7 09/10/2012     02/08/2021    POTASSIUM 4.1 02/08/2021    CHLORIDE 108 02/08/2021    CO2 30 02/08/2021    BUN 17 02/08/2021    CR 0.94 02/08/2021     (H) 02/08/2021    WILIAM 9.6 02/08/2021    PHOS 3.5 02/08/2021    MAG 2.2 02/08/2021    ALBUMIN 4.5 02/08/2021    PROTTOTAL 7.7 02/08/2021    ALT 76 (H) 02/10/2021    AST 42 02/08/2021    ALKPHOS 88 02/08/2021    BILITOTAL 1.0 02/08/2021    LIPASE 95 09/10/2018    AMYLASE 76 03/08/2014    PTT 31 06/06/2008    INR 1.0 02/17/2014    TSH 3.27 02/08/2021    T4 0.94 11/15/2019       Preop Vitals  BP Readings from Last 3 Encounters:   02/09/21 (!) 130/90   02/08/21 (!) 140/96   01/27/21 122/76    Pulse Readings from Last 3 Encounters:   02/09/21 102   02/08/21 80   01/27/21 90      Resp Readings from Last 3 Encounters:   02/08/21 20   01/27/21 18   01/14/21 16    SpO2 Readings from Last 3 Encounters:   02/09/21 94%   02/08/21 99%   01/27/21 95%      Temp Readings from Last 1 Encounters:   02/08/21 98  F (36.7  C) (Oral)    Ht Readings from Last 1 Encounters:   02/09/21 1.829 m (6')      Wt Readings from Last 1 Encounters:   02/09/21 116.6 kg (257 lb)    Estimated body mass index is 34.86 kg/m  as calculated from the following:    Height as of 2/9/21: 1.829 m (6').    Weight as of 2/9/21: 116.6 kg (257 lb).       Anesthesia Plan     ASA Status:  2  H&P review: History and physical reviewed and following examination; no interval change.     NPO Status: > 8 hours  Anesthesia Plan Type Discussed: MAC  Justification for MAC: Difficulty with conscious sedation (QS)Maintenance: TIVA.      Postoperative Care      Consents  Anesthetic plan, risks, benefits and alternatives discussed with:  Patient.  Use of blood products discussed: No . Use of blood products discussed: No .  .                 SHIV Mcarthur CRNA    Anesthesia Evaluation   Pt has had prior anesthetic. Type: General and MAC.    No history of  anesthetic complications       ROS/MED HX  ENT/Pulmonary:     (+) sleep apnea, uses CPAP, tobacco use, Past use,     Neurologic:  - neg neurologic ROS     Cardiovascular:     (+) Dyslipidemia hypertension-----Previous cardiac testing   Echo: Date: 2/9/21 Results:  Interpretation Summary  The patient exercised 7:26.  The patient exhibited no chest pain during exercise.  This was a normal stress EKG with no evidence of stress-induced ischemia.  Normal left ventricular function and wall motion at rest and post-stress  Stress Test: Date: Results:    ECG Reviewed: Date: 9/10/18- 2/8/21 Results:  SR  2/8/21- SR  Cath: Date: Results:      METS/Exercise Tolerance: >4 METS    Hematologic:  - neg hematologic  ROS     Musculoskeletal:   (+) other musculoskeletal- DDD cervical spine with numbness down both arms on and off- chronic back pain,     GI/Hepatic:     (+) GERD, Asymptomatic on medication,     Renal/Genitourinary:  - neg Renal ROS     Endo:  - neg endo ROS     Psychiatric/Substance Use: Comment: Extreme anxiety and needle phobia     (+) psychiatric history depression and anxiety H/O chronic opiod use .     Infectious Disease:  - neg infectious disease ROS     Malignancy:  - neg malignancy ROS     Other:      (+) , H/O Chronic Pain, (-) Any chance pregnant and Other Significant Disability         Physical Exam    Airway  airway exam normal      Mallampati: II   TM distance: >3 FB   Neck ROM: full     Respiratory Devices and Support         Dental  no notable dental history         Cardiovascular   cardiovascular exam normal       Rhythm and rate: regular and normal     Pulmonary   pulmonary exam normal        breath sounds clear to auscultation             Anesthesia Plan    ASA Status:  2   NPO Status:  NPO Appropriate    Anesthesia Type: MAC.     - Reason for MAC: Difficulty with conscious sedation (QS)      Maintenance: TIVA.        Consents    Anesthesia Plan(s) and associated risks, benefits, and realistic  alternatives discussed. Questions answered and patient/representative(s) expressed understanding.     - Discussed with:  Patient    Use of blood products discussed: No .     Postoperative Care            Comments:       H&P review: History and physical reviewed and following examination; no interval change.

## 2021-02-11 NOTE — DISCHARGE INSTRUCTIONS
Home Care Instructions                Procedure: Epidural injection or joint injection    Activity:    Rest today    Do not work today    Resume normal activity tomorrow    Pain:    You may experience soreness at the injection site for 1 to 3 days.    You may use an ice pack for 20 minutes every 2 hours for the first 24 hours    You may use a heating pad after the first 24 hours    You may use Tylenol  (acetaminophen) every 4 hours or other pain medicines as directed by your physician    Safety  Sedation medicine, if given may remain active for many hours.    It is important for the next 24 hours that you do not:    Drive a car    Operate machines or power tools    Consume alcohol, including beer    Sign any important papers or legal documents    You may experience numbness radiating into your legs or arms, (depending on the procedure location)  This numbness may last several hours.  Until the numb sensation returns to normal please use caution in walking, climbing stairs, stepping out of your vehicle, etc.    Common side effects of steroids:  Not everyone will experience corticosteroid side effects. If side effects are experienced they will gradually subside in the 7-10 day period following an injection.    Most common side effects include:    Flushed face and/or chest    Feeling of warmth, particularly in face but could be overall feeling of warmth    Increased blood sugar in diabetic patients    Menstrual irregularities may occur.  If taking hormone based birth control an alternate method of birth control is recommended    Sleep disturbances and/or mood swings are possible    Leg cramps    Please contact us if you have:  Severe pain   Fever more than 101.5 degrees Fahrenheit  Signs of infection (redness, swelling or drainage)      If you have questions during normal business hours (8am-5pm Monday-Friday) contact the [facility] at [phone]. If you need help after hours, we recommend that you go to a  [facility] emergency room or dial 911.    [facility]   Adult Discharge Orders & Instructions     For 24 hours after surgery    1. Get plenty of rest.  A responsible adult must stay with you for at least 24 hours after you leave the hospital.   2. Do not drive or use heavy equipment.  If you have weakness or tingling, don't drive or use heavy equipment until this feeling goes away.  3. Do not drink alcohol.  4. Avoid strenuous or risky activities.  Ask for help when climbing stairs.   5. You may feel lightheaded.  If so, sit for a few minutes before standing.  Have someone help you get up.   6. You may have a slight fever. Call the doctor if your fever is over 100 F (37.7 C) (taken under the tongue) or lasts longer than 24 hours.  7. You may have a dry mouth, a sore throat, muscle aches or trouble sleeping.  These should go away after 24 hours.  8. Do not make important or legal decisions.  We don t expect you to have any problems from the surgery or treatment you had today. Just in case, here s what to do if you have pain, upset stomach (nausea), bleeding or infection:  Pain:  Take medicines your doctor has prescribed or over-the-counter medicine they have suggested. Resting and using ice packs can help, too. For surgery on an arm or leg, raise it on a pillow to ease swelling. Call your doctor if these methods don t work.  Copyright [employer], Licensed under [employer]  Upset stomach (nausea):  Take anti-nausea medicine approved by your doctor. Drink clear liquids like apple juice, ginger ale, broth or 7-Up. Be sure to drink enough fluids. Rest can help, too. Move to normal foods when you re ready. Bleeding:  In the first 24 hours, you may see a little blood on your dressing, about the size of a quarter. You don t need to worry about this much blood, but if the blood spot keeps getting bigger:    Put pressure on the wound if you can, AND    Call your doctor.  Copyright [employer],  Licensed under [employer]  Fever/Infection: Please call your doctor if you have any of these signs:    Redness    Swelling    Wound feels warm    Pain gets worse    Bad-smelling fluid leaks from wound    Fever or chills  Call your doctor for any of the followin.  It has been over 8 to 10 hours since surgery and you are still not able to urinate (pass water).    2.  Headache for over 24 hours.    3.  Numbness, tingling or weakness in your legs the day after surgery (if you had spinal anesthesia).    24 Hour Nurse advice line: [phone]

## 2021-02-11 NOTE — ANESTHESIA CARE TRANSFER NOTE
Patient: Bulmaro Herrera    Procedure(s):  Cervical 5-6 and Cervical 6-7 Bilateral facet injections    Diagnosis: Arthropathy of cervical facet joint [M47.812]  Diagnosis Additional Information: No value filed.    Anesthesia Type:   MAC     Note:    Oropharynx: oropharynx clear of all foreign objects and spontaneously breathing  Level of Consciousness: drowsy  Oxygen Supplementation: face mask    Independent Airway: airway patency satisfactory and stable  Dentition: dentition unchanged  Vital Signs Stable: post-procedure vital signs reviewed and stable  Report to RN Given: handoff report given  Patient transferred to: Phase II    Handoff Report: Identifed the Patient, Identified the Reponsible Provider, Reviewed the pertinent medical history, Discussed the surgical course, Reviewed Intra-OP anesthesia mangement and issues during anesthesia, Set expectations for post-procedure period and Allowed opportunity for questions and acknowledgement of understanding      Vitals: (Last set prior to Anesthesia Care Transfer)  CRNA VITALS  2/11/2021 0829 - 2/11/2021 0902      2/11/2021             Pulse:  79    SpO2:  94 %    Resp Rate (observed):  20        Electronically Signed By: SHIV Mcarthur CRNA  February 11, 2021  9:02 AM

## 2021-02-11 NOTE — ANESTHESIA POSTPROCEDURE EVALUATION
Patient: Bulmaro Herrera    Procedure(s):  Cervical 5-6 and Cervical 6-7 Bilateral facet injections    Diagnosis:Arthropathy of cervical facet joint [M47.812]  Diagnosis Additional Information: No value filed.    Anesthesia Type:  MAC    Note:  Disposition: Outpatient   Postop Pain Control: Uneventful            Sign Out: Well controlled pain   PONV: No   Neuro/Psych: Uneventful            Sign Out: Acceptable/Baseline neuro status   Airway/Respiratory: Uneventful            Sign Out: Acceptable/Baseline resp. status   CV/Hemodynamics: Uneventful            Sign Out: Acceptable CV status   Other NRE: NONE   DID A NON-ROUTINE EVENT OCCUR? No    Event details/Postop Comments:  Pt was happy with anesthesia care.  No complications.  I will follow up with the pt if needed.         Last vitals:  Vitals:    02/11/21 0734 02/11/21 0901   BP: (!) 136/92 95/50   Pulse:  75   Resp: 18 18   Temp: 98.1  F (36.7  C)    SpO2:  91%       Last vitals prior to Anesthesia Care Transfer:  CRNA VITALS  2/11/2021 0829 - 2/11/2021 0906      2/11/2021             Pulse:  79    SpO2:  94 %    Resp Rate (observed):  20          Electronically Signed By: SHIV Mcarthur CRNA  February 11, 2021  9:06 AM

## 2021-02-25 ENCOUNTER — OFFICE VISIT (OUTPATIENT)
Dept: CARDIOLOGY | Facility: CLINIC | Age: 56
End: 2021-02-25
Attending: INTERNAL MEDICINE
Payer: COMMERCIAL

## 2021-02-25 ENCOUNTER — HOSPITAL ENCOUNTER (OUTPATIENT)
Dept: CARDIOLOGY | Facility: CLINIC | Age: 56
Discharge: HOME | End: 2021-02-25
Attending: PHYSICIAN ASSISTANT | Admitting: PHYSICIAN ASSISTANT
Payer: COMMERCIAL

## 2021-02-25 VITALS
DIASTOLIC BLOOD PRESSURE: 80 MMHG | WEIGHT: 257 LBS | HEART RATE: 87 BPM | BODY MASS INDEX: 34.86 KG/M2 | OXYGEN SATURATION: 92 % | SYSTOLIC BLOOD PRESSURE: 130 MMHG

## 2021-02-25 DIAGNOSIS — R06.09 DYSPNEA ON EXERTION: ICD-10-CM

## 2021-02-25 DIAGNOSIS — R00.2 PALPITATIONS: ICD-10-CM

## 2021-02-25 DIAGNOSIS — R00.2 PALPITATIONS: Primary | ICD-10-CM

## 2021-02-25 DIAGNOSIS — R07.9 CHEST PAIN, UNSPECIFIED TYPE: ICD-10-CM

## 2021-02-25 PROCEDURE — 99215 OFFICE O/P EST HI 40 MIN: CPT | Performed by: PHYSICIAN ASSISTANT

## 2021-02-25 PROCEDURE — 93242 EXT ECG>48HR<7D RECORDING: CPT

## 2021-02-25 RX ORDER — METOPROLOL TARTRATE 50 MG
TABLET ORAL
Qty: 2 TABLET | Refills: 0 | Status: SHIPPED | OUTPATIENT
Start: 2021-02-25 | End: 2021-03-25

## 2021-02-25 ASSESSMENT — PAIN SCALES - GENERAL: PAINLEVEL: NO PAIN (0)

## 2021-02-25 NOTE — PROGRESS NOTES
Service Date: 02/25/2021      PRIMARY CARDIOLOGIST:  Dr. Stephens.      REASON FOR VISIT:  Stress test followup.      HISTORY OF PRESENT ILLNESS:  Mr. 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  In addition, he has dyspnea on exertion that is out of proportion to what he would expect.  He states when he climbs a flight of stairs, he feels like he just ran a marathon when he gets to the top.  He endorses left-sided chest pains that he feels as sharp in his left pectoral area and sometimes in his shoulder as well as in his mid chest.  These last 2-5 minutes and then resolve.  He has noticed his shortness of breath for years.  In looking back, he had PFTs done even in 2017 that were not normal.  They showed some restriction.  He was given an albuterol inhaler and he did not have any response to this.  He tried that about a week just recently.  He also describes episodes of lightheadedness when he stands up that are something like a head rush that is not vertiginous.  It doesn't necessarily feel bad, but it doesn't feel normal either.  He had a stress echocardiogram done.  He did not develop chest pain on this.  He went 7-1/2 minutes and he achieved his target heart rate.  No EKG changes and no stress-induced ischemia.  He achieved 8 METs.  His blood pressure increased appropriately with exercise.  His heart was otherwise normal in structure and function.      He comes in today and unfortunately he continues to have all these same sensations.  He is wondering if it is all in his head, or his dad thinks he worries too much, but nothing has changed.  He reminds me that he has a strong family history of heart problems including his mom, Katherine, who I see, who has a cardiomyopathy, they lost a child who had a  heart defect and  shortly after birth, his wife miscarried at 4 months, and his mother has had many other health issues.  Because of this, when he gets these symptoms, it is particularly bothersome to him.  He also notes that sometimes his heart races.  This often occurs when he has this chest discomfort.  He has not had syncope or presyncope.      SOCIAL HISTORY:  He is  to our , Tita, they have 3 children, 2 boys and a girl and then lost 2, one at 4 months gestation and the other shortly after birth.  He has a history of tobacco but quit about 14 years ago, with about perhaps a 20-pack-year history.  Rare alcohol use.      PHYSICAL EXAMINATION:   GENERAL:  Exam reveals a well-developed, well-nourished gentleman in no acute distress.   HEENT:  Normocephalic, atraumatic.   HEART:  Regular rate and rhythm, no murmur, rub or gallop.   LUNGS:  Clear without wheezes, rales or rhonchi.   EXTREMITIES:  Trace peripheral edema.   NECK:  No JVP at 35 degrees.   SKIN:  Warm and dry.      ASSESSMENT AND PLAN:   1.  Atypical chest pain in this gentleman with a negative stress echocardiogram but ongoing symptoms that are concerning to him, and more importantly perhaps is his profound dyspnea on exertion.  He did have abnormal PFTs in 2017 that demonstrated some restriction, and it is quite possible this has progressed.  Albuterol inhaler has not necessarily been helpful.  Given he also has palpitations and has a strong family history, we will get a coronary CT.  With this, we will also get a good picture of his lungs and will be able to see if there are any abnormalities there.  Depending on what that shows, I would consider sending him again for repeat PFTs or perhaps pulmonary testing or, if need be, consider right heart cath on him for a complete workup.  Because his heart rate typically runs fairly high at baseline, I am going to have him take a 50 mg dose of Lopressor the night before the CT scan and a  50 mg dose the morning of the CT scan.  Another thing to consider would be possible some degree of HFpEF, and I wonder if he would benefit from a low dose of a diuretic or spironolactone.   2.  Palpitations as above.  We will get a Zio Patch to further delineate.   3.  Dyslipidemia, markedly elevated with a total cholesterol 278, HDL 33, triglycerides 440,  and mildly elevated ALT at 76.  He has been on Lipitor, and I have asked him to pause this for 2 weeks and we will see if his fatigue changes, his breathing changes or his pain changes.  If it does, my next step would be to start him on a tiny dose of Crestor at like 5 mg a day.  I also discussed with him he needs to watch much more closely what he eats and consider some vegetarian meals, limiting cheese, becerril and butter.      At this point, I am not particularly worried about his ALT and that can be followed.      We will see what we can discover and continue working through this patient's dyspnea on exertion in a strategic fashion.  It was chevy to meet this delightful patient and participate in his care.  Please do not hesitate to call with questions or concerns.      FRANCOIS Sanders PA-C             D: 2021   T: 2021   MT: HAILE      Name:     JAZZY SIDDIQI   MRN:      [id number]        Account:      [id number]   :      1965           Service Date: 2021      Document: [id number]

## 2021-02-25 NOTE — LETTER
2/25/2021      RE: Bulmaro Herrera  [address]  [facility] [address]     HPI and Plan:   See dictation    Orders this Visit:  Orders Placed This Encounter   Procedures     CTA Angiogram coronary artery     Follow-Up with Cardiologist     Leadless EKG Monitor 3 to 7 Days     Orders Placed This Encounter   Medications     metoprolol tartrate (LOPRESSOR) 50 MG tablet     Sig: Take 1 pill the night before the CT scan and 1 pill the morning of the CT scan.     Dispense:  2 tablet     Refill:  0     There are no discontinued medications.      Encounter Diagnoses   Name Primary?     Chest pain, unspecified type      Dyspnea on exertion      Palpitations Yes       CURRENT MEDICATIONS:  Current Outpatient Medications   Medication Sig Dispense Refill     acetaminophen (TYLENOL) 500 MG tablet Take 1,000 mg by mouth every morning        atorvastatin (LIPITOR) 20 MG tablet Take 1 tablet (20 mg) by mouth daily 90 tablet 4     ibuprofen (ADVIL/MOTRIN) 200 MG tablet Take 400 mg by mouth every morning        loratadine (CLARITIN) 10 MG tablet Take 1 tablet (10 mg) by mouth daily 30 tablet 0     metoprolol tartrate (LOPRESSOR) 50 MG tablet Take 1 pill the night before the CT scan and 1 pill the morning of the CT scan. 2 tablet 0     modafinil (PROVIGIL) 100 MG tablet Take 1 tablet (100 mg) by mouth daily 30 tablet 3     omeprazole (PRILOSEC) 40 MG DR capsule TAKE ONE CAPSULE BY MOUTH ONCE DAILY 90 capsule 0       ALLERGIES     Allergies   Allergen Reactions     Percocet [Oxycodone-Acetaminophen] Itching     Wasps [Hornets] Swelling       PAST MEDICAL HISTORY:  Past Medical History:   Diagnosis Date     Back pains      Depressive disorder, not elsewhere classified 1/28/2007    declines Rx     Disc degeneration 12/2008    see MRI -throaco-lumbar mild discogenic degenerative changes     Elevated LFT's 11/09    alt and ast     Other and unspecified hyperlipidemia 1/2007     Sleep disorder 9/09    CPAP     Tobacco use disorder         PAST SURGICAL HISTORY:  Past Surgical History:   Procedure Laterality Date     COLONOSCOPY  07/07/08    adenomatous polyp repeat 5 years     COLONOSCOPY N/A 12/29/2017    Procedure: COMBINED COLONOSCOPY, SINGLE OR MULTIPLE BIOPSY/POLYPECTOMY BY BIOPSY;  Colonoscopy, Polypectomy by Biopsy;  Surgeon: Matt Reyes MD;  Location: [facility]     ENT SURGERY      for deviated septum     EXCISE LESION AXILLA  12/7/2018    Procedure: Excision Skin Tags Right Axilla;  Surgeon: Ayaan Chaney MD;  Location: [facility]     EXCISE LESION BACK N/A 12/7/2018    Procedure: Excision Back Skin Lesion X Two;  Surgeon: Ayaan Chaney MD;  Location: [facility]     HC ECHO HEART XTHORACIC, STRESS/REST  6/2009    neg     INJECT EPIDURAL CERVICAL N/A 5/14/2015    Procedure: INJECT EPIDURAL CERVICAL;  Surgeon: Christian Chaudhry MD;  Location[facility]     INJECT EPIDURAL CERVICAL N/A 12/28/2018    Procedure: INJECT EPIDURAL CERVICAL 6-7;  Surgeon: Christian Chaudhry MD;  [facility]     INJECT EPIDURAL LUMBAR N/A 12/20/2019    Procedure: Lumbar 5- Sacral 1 Epidural  Injection bilatetral;  Surgeon: Christian Chaudhry MD;  Location[facility]     INJECT FACET JOINT Bilateral 1/25/2019    Procedure: Cervical Facet Injections Cervical 5-6 and 6-7 Bilateral;  Surgeon: Christian Chaudhry MD;  [facility]     INJECT FACET JOINT Bilateral 11/29/2019    Procedure: Cervical 5-6 and 6-7 Bilateral facet joint injection;  Surgeon: Christian Chaudhry MD;  [facility]     INJECT FACET JOINT Bilateral 2/11/2021    Procedure: Cervical 5-6 and Cervical 6-7 Bilateral facet injections;  Surgeon: Christian Chaudhry MD;  Location: [facility]     ORTHOPEDIC SURGERY      Right hand     TONSILLECTOMY       ZZC NONSPECIFIC PROCEDURE      rt hand surgery - laceration/glass/tendons       FAMILY HISTORY:  Family History   Problem Relation Age of Onset     Breast Cancer Mother      Arthritis Mother         joint ?     Depression Father      Cancer Maternal Grandmother      C.A.D.  Maternal Grandfather      Cardiovascular Maternal Grandfather      Gynecology Paternal Grandmother          giving birth     Prostate Cancer Paternal Grandfather      Genetic Disorder Brother         joint pain?     Depression Brother      Cardiovascular Son         congenital heart defect -       Prostate Cancer Other          age 70's     Diabetes Other        SOCIAL HISTORY:  Social History     Socioeconomic History     Marital status:      Spouse name: None     Number of children: None     Years of education: None     Highest education level: None   Occupational History     Occupation:      Employer: SELF   Social Needs     Financial resource strain: None     Food insecurity     Worry: None     Inability: None     Transportation needs     Medical: None     Non-medical: None   Tobacco Use     Smoking status: Former Smoker     Packs/day: 1.50     Quit date: 2007     Years since quittin.0     Smokeless tobacco: Never Used   Substance and Sexual Activity     Alcohol use: No     Frequency: Never     Comment: rare     Drug use: No     Sexual activity: Yes     Partners: Female   Lifestyle     Physical activity     Days per week: None     Minutes per session: None     Stress: None   Relationships     Social connections     Talks on phone: None     Gets together: None     Attends Oriental orthodox service: None     Active member of club or organization: None     Attends meetings of clubs or organizations: None     Relationship status: None     Intimate partner violence     Fear of current or ex partner: None     Emotionally abused: None     Physically abused: None     Forced sexual activity: None   Other Topics Concern     Parent/sibling w/ CABG, MI or angioplasty before 65F 55M? No   Social History Narrative    Dairy/d a lot, 1 gallon of milk per day plus cheese and other servings     Caffeine 48 oz. coffee per day    Exercise none    Sunscreen used - No    Seatbelts used - Yes     Working smoke/CO detectors in the home - Yes    Guns stored in the home - No    Self Breast Exams - NA    Self Testicular Exam - No    Eye Exam up to date - No    Dental Exam up to date - Yes    Pap Smear up to date - NA    Mammogram up to date - NA    PSA up to date - No    Dexa Scan up to date - NA    Flex Sig / Colonoscopy up to date - No    Immunizations up to date - No    Abuse: Current or Past(Physical, Sexual or Emotional)- No    Do you feel safe in your environment - Yes    Tms,cma        Lives with spouse and her parents.       Review of Systems:  Skin:  Negative     Eyes:  Positive for glasses  ENT:  Negative    Respiratory:  Negative    Cardiovascular:  Negative Positive for;chest pain;palpitations;edema;lightheadedness;dizziness  Gastroenterology: Negative    Genitourinary:  Negative    Musculoskeletal:  Positive for neck pain  Neurologic:  Positive for    Psychiatric:  Negative    Heme/Lymph/Imm:  Positive for allergies  Endocrine:  Negative      Physical Exam:  Vitals: /80   Pulse 87   Wt 116.6 kg (257 lb)   SpO2 92%   BMI 34.86 kg/m     Please refer to dictation for physical exam    Recent Lab Results:  LIPID RESULTS:  Lab Results   Component Value Date    CHOL 243 (H) 02/10/2021    HDL 37 (L) 02/10/2021     (H) 02/10/2021    TRIG 345 (H) 02/10/2021    CHOLHDLRATIO 7.8 (H) 07/03/2015       LIVER ENZYME RESULTS:  Lab Results   Component Value Date    AST 42 02/08/2021    ALT 76 (H) 02/10/2021       CBC RESULTS:  Lab Results   Component Value Date    WBC 5.5 02/08/2021    RBC 5.12 02/08/2021    HGB 16.3 02/08/2021    HCT 47.2 02/08/2021    MCV 92 02/08/2021    MCH 31.8 02/08/2021    MCHC 34.5 02/08/2021    RDW 12.3 02/08/2021     02/08/2021       BMP RESULTS:  Lab Results   Component Value Date     02/08/2021    POTASSIUM 4.1 02/08/2021    CHLORIDE 108 02/08/2021    CO2 30 02/08/2021    ANIONGAP 2 (L) 02/08/2021     (H) 02/08/2021    BUN 17 02/08/2021    CR 0.94  02/08/2021    GFRESTIMATED >90 02/08/2021    GFRESTBLACK >90 02/08/2021    WILIAM 9.6 02/08/2021        A1C RESULTS:  Lab Results   Component Value Date    A1C 5.4 02/10/2016       INR RESULTS:  Lab Results   Component Value Date    INR 1.0 02/17/2014    INR 1.00 06/06/2008       Carlos Stephens MD  [id number] [facility] [id number]  [facility]  [place] [id number]          55 minutes spent on the date of the encounter doing chart review, review of test results, interpretation of tests, patient visit, documentation and discussion with family       Service Date: 02/25/2021      PRIMARY CARDIOLOGIST:  Dr. Stephens.      REASON FOR VISIT:  Stress test followup.      HISTORY OF PRESENT ILLNESS:  Mr. 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  In addition, he has dyspnea on exertion that is out of proportion to what he would expect.  He states when he climbs a flight of stairs, he feels like he just ran a marathon when he gets to the top.  He endorses left-sided chest pains that he feels as sharp in his left pectoral area and sometimes in his shoulder as well as in his mid chest.  These last 2-5 minutes and then resolve.  He has noticed his shortness of breath for years.  In looking back, he had PFTs done even in 2017 that were not normal.  They showed some restriction.  He was given an albuterol inhaler and he did not have any response to this.  He tried that about a week just recently.  He also describes episodes of lightheadedness when he stands up that are something like a head rush that is not vertiginous.  It doesn't necessarily feel bad, but it doesn't feel normal either.  He had a stress echocardiogram done.  He did not develop chest pain on this.  He went 7-1/2 minutes and he achieved his target heart rate.  No EKG changes  and no stress-induced ischemia.  He achieved 8 METs.  His blood pressure increased appropriately with exercise.  His heart was otherwise normal in structure and function.      He comes in today and unfortunately he continues to have all these same sensations.  He is wondering if it is all in his head, or his dad thinks he worries too much, but nothing has changed.  He reminds me that he has a strong family history of heart problems including his mom, Katherine, who I see, who has a cardiomyopathy, they lost a child who had a heart defect and  shortly after birth, his wife miscarried at 4 months, and his mother has had many other health issues.  Because of this, when he gets these symptoms, it is particularly bothersome to him.  He also notes that sometimes his heart races.  This often occurs when he has this chest discomfort.  He has not had syncope or presyncope.      SOCIAL HISTORY:  He is  to our , Tita, they have 3 children, 2 boys and a girl and then lost 2, one at 4 months gestation and the other shortly after birth.  He has a history of tobacco but quit about 14 years ago, with about perhaps a 20-pack-year history.  Rare alcohol use.      PHYSICAL EXAMINATION:   GENERAL:  Exam reveals a well-developed, well-nourished gentleman in no acute distress.   HEENT:  Normocephalic, atraumatic.   HEART:  Regular rate and rhythm, no murmur, rub or gallop.   LUNGS:  Clear without wheezes, rales or rhonchi.   EXTREMITIES:  Trace peripheral edema.   NECK:  No JVP at 35 degrees.   SKIN:  Warm and dry.      ASSESSMENT AND PLAN:   1.  Atypical chest pain in this gentleman with a negative stress echocardiogram but ongoing symptoms that are concerning to him, and more importantly perhaps is his profound dyspnea on exertion.  He did have abnormal PFTs in 2017 that demonstrated some restriction, and it is quite possible this has progressed.  Albuterol inhaler has not necessarily been helpful.  Given he also has  palpitations and has a strong family history, we will get a coronary CT.  With this, we will also get a good picture of his lungs and will be able to see if there are any abnormalities there.  Depending on what that shows, I would consider sending him again for repeat PFTs or perhaps pulmonary testing or, if need be, consider right heart cath on him for a complete workup.  Because his heart rate typically runs fairly high at baseline, I am going to have him take a 50 mg dose of Lopressor the night before the CT scan and a 50 mg dose the morning of the CT scan.  Another thing to consider would be possible some degree of HFpEF, and I wonder if he would benefit from a low dose of a diuretic or spironolactone.   2.  Palpitations as above.  We will get a Zio Patch to further delineate.   3.  Dyslipidemia, markedly elevated with a total cholesterol 278, HDL 33, triglycerides 440,  and mildly elevated ALT at 76.  He has been on Lipitor, and I have asked him to pause this for 2 weeks and we will see if his fatigue changes, his breathing changes or his pain changes.  If it does, my next step would be to start him on a tiny dose of Crestor at like 5 mg a day.  I also discussed with him he needs to watch much more closely what he eats and consider some vegetarian meals, limiting cheese, becerril and butter.      At this point, I am not particularly worried about his ALT and that can be followed.      We will see what we can discover and continue working through this patient's dyspnea on exertion in a strategic fashion.  It was chevy to meet this delightful patient and participate in his care.  Please do not hesitate to call with questions or concerns.      FRANCOIS Sanders PA-C             D: 2021   T: 2021   MT: HAILE      Name:     JAZZY SIDDIQI   MRN:      [id number]        Account:      [id number]   :      1965           Service Date: 2021      Document: [id number]

## 2021-02-25 NOTE — Clinical Note
2/25/2021    Physician No Ref-Primary  No address on file    RE: Bulmaro Herrera       Dear Colleague,    I had the pleasure of seeing Bulmaro Herrera in the [facility].    [id number]  55 minutes spent on the date of the encounter doing chart review, review of test results, interpretation of tests, patient visit, documentation and discussion with family     HPI and Plan:   See dictation    Orders this Visit:  Orders Placed This Encounter   Procedures     CTA Angiogram coronary artery     Follow-Up with Cardiologist     Leadless EKG Monitor 3 to 7 Days     Orders Placed This Encounter   Medications     metoprolol tartrate (LOPRESSOR) 50 MG tablet     Sig: Take 1 pill the night before the CT scan and 1 pill the morning of the CT scan.     Dispense:  2 tablet     Refill:  0     There are no discontinued medications.      Encounter Diagnoses   Name Primary?     Chest pain, unspecified type      Dyspnea on exertion      Palpitations Yes       CURRENT MEDICATIONS:  Current Outpatient Medications   Medication Sig Dispense Refill     acetaminophen (TYLENOL) 500 MG tablet Take 1,000 mg by mouth every morning        atorvastatin (LIPITOR) 20 MG tablet Take 1 tablet (20 mg) by mouth daily 90 tablet 4     ibuprofen (ADVIL/MOTRIN) 200 MG tablet Take 400 mg by mouth every morning        loratadine (CLARITIN) 10 MG tablet Take 1 tablet (10 mg) by mouth daily 30 tablet 0     metoprolol tartrate (LOPRESSOR) 50 MG tablet Take 1 pill the night before the CT scan and 1 pill the morning of the CT scan. 2 tablet 0     modafinil (PROVIGIL) 100 MG tablet Take 1 tablet (100 mg) by mouth daily 30 tablet 3     omeprazole (PRILOSEC) 40 MG DR capsule TAKE ONE CAPSULE BY MOUTH ONCE DAILY 90 capsule 0       ALLERGIES     Allergies   Allergen Reactions     Percocet [Oxycodone-Acetaminophen] Itching     Wasps [Hornets] Swelling       PAST MEDICAL HISTORY:  Past Medical History:   Diagnosis Date      Back pains      Depressive disorder, not elsewhere classified 1/28/2007    declines Rx     Disc degeneration 12/2008    see MRI -throaco-lumbar mild discogenic degenerative changes     Elevated LFT's 11/09    alt and ast     Other and unspecified hyperlipidemia 1/2007     Sleep disorder 9/09    CPAP     Tobacco use disorder        PAST SURGICAL HISTORY:  Past Surgical History:   Procedure Laterality Date     COLONOSCOPY  07/07/08    adenomatous polyp repeat 5 years     COLONOSCOPY N/A 12/29/2017    Procedure: COMBINED COLONOSCOPY, SINGLE OR MULTIPLE BIOPSY/POLYPECTOMY BY BIOPSY;  Colonoscopy, Polypectomy by Biopsy;  Surgeon: Matt Reyes MD;  Location:  GI     ENT SURGERY      for deviated septum     EXCISE LESION AXILLA  12/7/2018    Procedure: Excision Skin Tags Right Axilla;  Surgeon: Ayaan Chaney MD;  [facility]     EXCISE LESION BACK N/A 12/7/2018    Procedure: Excision Back Skin Lesion X Two;  Surgeon: Ayaan Chaney MD;  [facility]     HC ECHO HEART XTHORACIC, STRESS/REST  6/2009    neg     INJECT EPIDURAL CERVICAL N/A 5/14/2015    Procedure: INJECT EPIDURAL CERVICAL;  Surgeon: Christian Chaudhry MD;  [facility]     INJECT EPIDURAL CERVICAL N/A 12/28/2018    Procedure: INJECT EPIDURAL CERVICAL 6-7;  Surgeon: Christian Chaudhry MD;  [facility]     INJECT EPIDURAL LUMBAR N/A 12/20/2019    Procedure: Lumbar 5- Sacral 1 Epidural  Injection bilatetral;  Surgeon: Christian Chaudhry MD;  [facility]     INJECT FACET JOINT Bilateral 1/25/2019    Procedure: Cervical Facet Injections Cervical 5-6 and 6-7 Bilateral;  Surgeon: Christian Chaudhry MD;  [facility]     INJECT FACET JOINT Bilateral 11/29/2019    Procedure: Cervical 5-6 and 6-7 Bilateral facet joint injection;  Surgeon: Christian Chaudhry MD;  [facility]     INJECT FACET JOINT Bilateral 2/11/2021    Procedure: Cervical 5-6 and Cervical 6-7 Bilateral facet injections;  Surgeon: Christian Chaudhry MD;  [facility]     ORTHOPEDIC  SURGERY      Right hand     TONSILLECTOMY       ZZC NONSPECIFIC PROCEDURE      rt hand surgery - laceration/glass/tendons       FAMILY HISTORY:  Family History   Problem Relation Age of Onset     Breast Cancer Mother      Arthritis Mother         joint ?     Depression Father      Cancer Maternal Grandmother      C.A.D. Maternal Grandfather      Cardiovascular Maternal Grandfather      Gynecology Paternal Grandmother          giving birth     Prostate Cancer Paternal Grandfather      Genetic Disorder Brother         joint pain?     Depression Brother      Cardiovascular Son         congenital heart defect -       Prostate Cancer Other          age 70's     Diabetes Other        SOCIAL HISTORY:  Social History     Socioeconomic History     Marital status:      Spouse name: None     Number of children: None     Years of education: None     Highest education level: None   Occupational History     Occupation:      Employer: SELF   Social Needs     Financial resource strain: None     Food insecurity     Worry: None     Inability: None     Transportation needs     Medical: None     Non-medical: None   Tobacco Use     Smoking status: Former Smoker     Packs/day: 1.50     Quit date: 2007     Years since quittin.0     Smokeless tobacco: Never Used   Substance and Sexual Activity     Alcohol use: No     Frequency: Never     Comment: rare     Drug use: No     Sexual activity: Yes     Partners: Female   Lifestyle     Physical activity     Days per week: None     Minutes per session: None     Stress: None   Relationships     Social connections     Talks on phone: None     Gets together: None     Attends Gnosticism service: None     Active member of club or organization: None     Attends meetings of clubs or organizations: None     Relationship status: None     Intimate partner violence     Fear of current or ex partner: None     Emotionally abused: None     Physically abused: None      Forced sexual activity: None   Other Topics Concern     Parent/sibling w/ CABG, MI or angioplasty before 65F 55M? No   Social History Narrative    Dairy/d a lot, 1 gallon of milk per day plus cheese and other servings     Caffeine 48 oz. coffee per day    Exercise none    Sunscreen used - No    Seatbelts used - Yes    Working smoke/CO detectors in the home - Yes    Guns stored in the home - No    Self Breast Exams - NA    Self Testicular Exam - No    Eye Exam up to date - No    Dental Exam up to date - Yes    Pap Smear up to date - NA    Mammogram up to date - NA    PSA up to date - No    Dexa Scan up to date - NA    Flex Sig / Colonoscopy up to date - No    Immunizations up to date - No    Abuse: Current or Past(Physical, Sexual or Emotional)- No    Do you feel safe in your environment - Yes    Eric,liberty        Lives with spouse and her parents.       Review of Systems:  Skin:  Negative     Eyes:  Positive for glasses  ENT:  Negative    Respiratory:  Negative    Cardiovascular:  Negative Positive for;chest pain;palpitations;edema;lightheadedness;dizziness  Gastroenterology: Negative    Genitourinary:  Negative    Musculoskeletal:  Positive for neck pain  Neurologic:  Positive for    Psychiatric:  Negative    Heme/Lymph/Imm:  Positive for allergies  Endocrine:  Negative      Physical Exam:  Vitals: /80   Pulse 87   Wt 116.6 kg (257 lb)   SpO2 92%   BMI 34.86 kg/m     Please refer to dictation for physical exam    Recent Lab Results:  LIPID RESULTS:  Lab Results   Component Value Date    CHOL 243 (H) 02/10/2021    HDL 37 (L) 02/10/2021     (H) 02/10/2021    TRIG 345 (H) 02/10/2021    CHOLHDLRATIO 7.8 (H) 07/03/2015       LIVER ENZYME RESULTS:  Lab Results   Component Value Date    AST 42 02/08/2021    ALT 76 (H) 02/10/2021       CBC RESULTS:  Lab Results   Component Value Date    WBC 5.5 02/08/2021    RBC 5.12 02/08/2021    HGB 16.3 02/08/2021    HCT 47.2 02/08/2021    MCV 92 02/08/2021    MCH 31.8  02/08/2021    MCHC 34.5 02/08/2021    RDW 12.3 02/08/2021     02/08/2021       BMP RESULTS:  Lab Results   Component Value Date     02/08/2021    POTASSIUM 4.1 02/08/2021    CHLORIDE 108 02/08/2021    CO2 30 02/08/2021    ANIONGAP 2 (L) 02/08/2021     (H) 02/08/2021    BUN 17 02/08/2021    CR 0.94 02/08/2021    GFRESTIMATED >90 02/08/2021    GFRESTBLACK >90 02/08/2021    WILIAM 9.6 02/08/2021        A1C RESULTS:  Lab Results   Component Value Date    A1C 5.4 02/10/2016       INR RESULTS:  Lab Results   Component Value Date    INR 1.0 02/17/2014    INR 1.00 06/06/2008           CC  Carlos Stephens MD  [address] [facility] [address] [facility] [address] [facility] [address]  [place] [address]        Service Date: 02/25/2021      PRIMARY CARDIOLOGIST:  Dr. Stephens.      REASON FOR VISIT:  Stress test followup.      HISTORY OF PRESENT ILLNESS:  Mr. 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  In addition, he has dyspnea on exertion that is out of proportion to what he would expect.  He states when he climbs a flight of stairs, he feels like he just ran a marathon when he gets to the top.  He endorses left-sided chest pains that he feels as sharp in his left pectoral area and sometimes in his shoulder as well as in his mid chest.  These last 2-5 minutes and then resolve.  He has noticed his shortness of breath for years.  In looking back, he had PFTs done even in 2017 that were not normal.  They showed some restriction.  He was given an albuterol inhaler and he did not have any response to this.  He tried that about a week just recently.  He also describes episodes of lightheadedness when he stands up that are something like a head rush that is not vertiginous.  It doesn't necessarily feel bad, but it doesn't feel normal either.  He had a  stress echocardiogram done.  He did not develop chest pain on this.  He went 7-1/2 minutes and he achieved his target heart rate.  No EKG changes and no stress-induced ischemia.  He achieved 8 METs.  His blood pressure increased appropriately with exercise.  His heart was otherwise normal in structure and function.      He comes in today and unfortunately he continues to have all these same sensations.  He is wondering if it is all in his head, or his dad thinks he worries too much, but nothing has changed.  He reminds me that he has a strong family history of heart problems including his mom, Katherine, who I see, who has a cardiomyopathy, they lost a child who had a heart defect and  shortly after birth, his wife miscarried at 4 months, and his mother has had many other health issues.  Because of this, when he gets these symptoms, it is particularly bothersome to him.  He also notes that sometimes his heart races.  This often occurs when he has this chest discomfort.  He has not had syncope or presyncope.      SOCIAL HISTORY:  He is  to our , Tita, they have 3 children, 2 boys and a girl and then lost 2, one at 4 months gestation and the other shortly after birth.  He has a history of tobacco but quit about 14 years ago, with about perhaps a 20-pack-year history.  Rare alcohol use.      PHYSICAL EXAMINATION:   GENERAL:  Exam reveals a well-developed, well-nourished gentleman in no acute distress.   HEENT:  Normocephalic, atraumatic.   HEART:  Regular rate and rhythm, no murmur, rub or gallop.   LUNGS:  Clear without wheezes, rales or rhonchi.   EXTREMITIES:  Trace peripheral edema.   NECK:  No JVP at 35 degrees.   SKIN:  Warm and dry.      ASSESSMENT AND PLAN:   1.  Atypical chest pain in this gentleman with a negative stress echocardiogram but ongoing symptoms that are concerning to him, and more importantly perhaps is his profound dyspnea on exertion.  He did have abnormal PFTs in 2017 that  demonstrated some restriction, and it is quite possible this has progressed.  Albuterol inhaler has not necessarily been helpful.  Given he also has palpitations and has a strong family history, we will get a coronary CT.  With this, we will also get a good picture of his lungs and will be able to see if there are any abnormalities there.  Depending on what that shows, I would consider sending him again for repeat PFTs or perhaps pulmonary testing or, if need be, consider right heart cath on him for a complete workup.  Because his heart rate typically runs fairly high at baseline, I am going to have him take a 50 mg dose of Lopressor the night before the CT scan and a 50 mg dose the morning of the CT scan.  Another thing to consider would be possible some degree of HFpEF, and I wonder if he would benefit from a low dose of a diuretic or spironolactone.   2.  Palpitations as above.  We will get a Zio Patch to further delineate.   3.  Dyslipidemia, markedly elevated with a total cholesterol 278, HDL 33, triglycerides 440,  and mildly elevated ALT at 76.  He has been on Lipitor, and I have asked him to pause this for 2 weeks and we will see if his fatigue changes, his breathing changes or his pain changes.  If it does, my next step would be to start him on a tiny dose of Crestor at like 5 mg a day.  I also discussed with him he needs to watch much more closely what he eats and consider some vegetarian meals, limiting cheese, becerril and butter.      At this point, I am not particularly worried about his ALT and that can be followed.      We will see what we can discover and continue working through this patient's dyspnea on exertion in a strategic fashion.  It was chevy to meet this delightful patient and participate in his care.  Please do not hesitate to call with questions or concerns.      FRANCOIS Sanders PA-C             D: 02/25/2021   T: 02/25/2021   MT: HAILE      Name:      JAZZY SIDDIQI   MRN:      [id number]        Account:      [id number]   :      1965           Service Date: 2021      Document: [id number]        Thank you for allowing me to participate in the care of your patient.      Sincerely,     Kiera Yan PA-C     [facility]  cc:   Carlos Stephens MD  [address]  [place] [id number]

## 2021-02-25 NOTE — PATIENT INSTRUCTIONS
Thanks for coming into [facility] today.    We discussed: we'll do additional testing to see if we can figure out where the chest pain and palpitations are coming from.      We'll set up a CT scan to look at your arteries.  The night before the CT scan and the morning of, please take 1 tablet of metoprolol 50 mg.    No other medication changes today.      We'll also have you wear a heart monitor to look at all your heart beats.      Follow up: with me in about 1 month.        Please call the clinic at  [phone] with any questions or concerns and my our nurses will be happy to help.    Please call [phone] for scheduling.      Reminder: Please bring in all current medications, over the counter supplements and vitamin bottles to your next appointment.

## 2021-02-25 NOTE — PROGRESS NOTES
[id number]  55 minutes spent on the date of the encounter doing chart review, review of test results, interpretation of tests, patient visit, documentation and discussion with family     HPI and Plan:   See dictation    Orders this Visit:  Orders Placed This Encounter   Procedures     CTA Angiogram coronary artery     Follow-Up with Cardiologist     Leadless EKG Monitor 3 to 7 Days     Orders Placed This Encounter   Medications     metoprolol tartrate (LOPRESSOR) 50 MG tablet     Sig: Take 1 pill the night before the CT scan and 1 pill the morning of the CT scan.     Dispense:  2 tablet     Refill:  0     There are no discontinued medications.      Encounter Diagnoses   Name Primary?     Chest pain, unspecified type      Dyspnea on exertion      Palpitations Yes       CURRENT MEDICATIONS:  Current Outpatient Medications   Medication Sig Dispense Refill     acetaminophen (TYLENOL) 500 MG tablet Take 1,000 mg by mouth every morning        atorvastatin (LIPITOR) 20 MG tablet Take 1 tablet (20 mg) by mouth daily 90 tablet 4     ibuprofen (ADVIL/MOTRIN) 200 MG tablet Take 400 mg by mouth every morning        loratadine (CLARITIN) 10 MG tablet Take 1 tablet (10 mg) by mouth daily 30 tablet 0     metoprolol tartrate (LOPRESSOR) 50 MG tablet Take 1 pill the night before the CT scan and 1 pill the morning of the CT scan. 2 tablet 0     modafinil (PROVIGIL) 100 MG tablet Take 1 tablet (100 mg) by mouth daily 30 tablet 3     omeprazole (PRILOSEC) 40 MG DR capsule TAKE ONE CAPSULE BY MOUTH ONCE DAILY 90 capsule 0       ALLERGIES     Allergies   Allergen Reactions     Percocet [Oxycodone-Acetaminophen] Itching     Wasps [Hornets] Swelling       PAST MEDICAL HISTORY:  Past Medical History:   Diagnosis Date     Back pains      Depressive disorder, not elsewhere classified 1/28/2007    declines Rx     Disc degeneration 12/2008    see MRI -throaco-lumbar mild discogenic degenerative changes     Elevated LFT's 11/09    alt and ast      Other and unspecified hyperlipidemia 1/2007     Sleep disorder 9/09    CPAP     Tobacco use disorder        PAST SURGICAL HISTORY:  Past Surgical History:   Procedure Laterality Date     COLONOSCOPY  07/07/08    adenomatous polyp repeat 5 years     COLONOSCOPY N/A 12/29/2017    Procedure: COMBINED COLONOSCOPY, SINGLE OR MULTIPLE BIOPSY/POLYPECTOMY BY BIOPSY;  Colonoscopy, Polypectomy by Biopsy;  Surgeon: Matt Reyes MD;  [facility]     ENT SURGERY      for deviated septum     EXCISE LESION AXILLA  12/7/2018    Procedure: Excision Skin Tags Right Axilla;  Surgeon: Ayaan Chaney MD;  [facility]     EXCISE LESION BACK N/A 12/7/2018    Procedure: Excision Back Skin Lesion X Two;  Surgeon: Ayaan Chaney MD;  [facility]     HC ECHO HEART XTHORACIC, STRESS/REST  6/2009    neg     INJECT EPIDURAL CERVICAL N/A 5/14/2015    Procedure: INJECT EPIDURAL CERVICAL;  Surgeon: Christian Chaudhry MD;  [facility]     INJECT EPIDURAL CERVICAL N/A 12/28/2018    Procedure: INJECT EPIDURAL CERVICAL 6-7;  Surgeon: Christian Chaudhry MD;  [facility]     INJECT EPIDURAL LUMBAR N/A 12/20/2019    Procedure: Lumbar 5- Sacral 1 Epidural  Injection bilatetral;  Surgeon: Christian Chaudhry MD;  [facility]     INJECT FACET JOINT Bilateral 1/25/2019    Procedure: Cervical Facet Injections Cervical 5-6 and 6-7 Bilateral;  Surgeon: Christian Chaudhry MD;  [facility]     INJECT FACET JOINT Bilateral 11/29/2019    Procedure: Cervical 5-6 and 6-7 Bilateral facet joint injection;  Surgeon: Christian Chaudhry MD;  [facility]     INJECT FACET JOINT Bilateral 2/11/2021    Procedure: Cervical 5-6 and Cervical 6-7 Bilateral facet injections;  Surgeon: Christian Chaudhry MD;  [facility]     ORTHOPEDIC SURGERY      Right hand     TONSILLECTOMY       ZZC NONSPECIFIC PROCEDURE      rt hand surgery - laceration/glass/tendons       FAMILY HISTORY:  Family History   Problem Relation Age of Onset     Breast Cancer Mother       Arthritis Mother         joint ?     Depression Father      Cancer Maternal Grandmother      GITA Maternal Grandfather      Cardiovascular Maternal Grandfather      Gynecology Paternal Grandmother          giving birth     Prostate Cancer Paternal Grandfather      Genetic Disorder Brother         joint pain?     Depression Brother      Cardiovascular Son         congenital heart defect -       Prostate Cancer Other          age 70's     Diabetes Other        SOCIAL HISTORY:  Social History     Socioeconomic History     Marital status:      Spouse name: None     Number of children: None     Years of education: None     Highest education level: None   Occupational History     Occupation:      Employer: SELF   Social Needs     Financial resource strain: None     Food insecurity     Worry: None     Inability: None     Transportation needs     Medical: None     Non-medical: None   Tobacco Use     Smoking status: Former Smoker     Packs/day: 1.50     Quit date: 2007     Years since quittin.0     Smokeless tobacco: Never Used   Substance and Sexual Activity     Alcohol use: No     Frequency: Never     Comment: rare     Drug use: No     Sexual activity: Yes     Partners: Female   Lifestyle     Physical activity     Days per week: None     Minutes per session: None     Stress: None   Relationships     Social connections     Talks on phone: None     Gets together: None     Attends Yarsani service: None     Active member of club or organization: None     Attends meetings of clubs or organizations: None     Relationship status: None     Intimate partner violence     Fear of current or ex partner: None     Emotionally abused: None     Physically abused: None     Forced sexual activity: None   Other Topics Concern     Parent/sibling w/ CABG, MI or angioplasty before 65F 55M? No   Social History Narrative    Dairy/d a lot, 1 gallon of milk per day plus cheese and other servings      Caffeine 48 oz. coffee per day    Exercise none    Sunscreen used - No    Seatbelts used - Yes    Working smoke/CO detectors in the home - Yes    Guns stored in the home - No    Self Breast Exams - NA    Self Testicular Exam - No    Eye Exam up to date - No    Dental Exam up to date - Yes    Pap Smear up to date - NA    Mammogram up to date - NA    PSA up to date - No    Dexa Scan up to date - NA    Flex Sig / Colonoscopy up to date - No    Immunizations up to date - No    Abuse: Current or Past(Physical, Sexual or Emotional)- No    Do you feel safe in your environment - Yes    Tms,cma        Lives with spouse and her parents.       Review of Systems:  Skin:  Negative     Eyes:  Positive for glasses  ENT:  Negative    Respiratory:  Negative    Cardiovascular:  Negative Positive for;chest pain;palpitations;edema;lightheadedness;dizziness  Gastroenterology: Negative    Genitourinary:  Negative    Musculoskeletal:  Positive for neck pain  Neurologic:  Positive for    Psychiatric:  Negative    Heme/Lymph/Imm:  Positive for allergies  Endocrine:  Negative      Physical Exam:  Vitals: /80   Pulse 87   Wt 116.6 kg (257 lb)   SpO2 92%   BMI 34.86 kg/m     Please refer to dictation for physical exam    Recent Lab Results:  LIPID RESULTS:  Lab Results   Component Value Date    CHOL 243 (H) 02/10/2021    HDL 37 (L) 02/10/2021     (H) 02/10/2021    TRIG 345 (H) 02/10/2021    CHOLHDLRATIO 7.8 (H) 07/03/2015       LIVER ENZYME RESULTS:  Lab Results   Component Value Date    AST 42 02/08/2021    ALT 76 (H) 02/10/2021       CBC RESULTS:  Lab Results   Component Value Date    WBC 5.5 02/08/2021    RBC 5.12 02/08/2021    HGB 16.3 02/08/2021    HCT 47.2 02/08/2021    MCV 92 02/08/2021    MCH 31.8 02/08/2021    MCHC 34.5 02/08/2021    RDW 12.3 02/08/2021     02/08/2021       BMP RESULTS:  Lab Results   Component Value Date     02/08/2021    POTASSIUM 4.1 02/08/2021    CHLORIDE 108 02/08/2021    CO2 30  02/08/2021    ANIONGAP 2 (L) 02/08/2021     (H) 02/08/2021    BUN 17 02/08/2021    CR 0.94 02/08/2021    GFRESTIMATED >90 02/08/2021    GFRESTBLACK >90 02/08/2021    WILIAM 9.6 02/08/2021        A1C RESULTS:  Lab Results   Component Value Date    A1C 5.4 02/10/2016       INR RESULTS:  Lab Results   Component Value Date    INR 1.0 02/17/2014    INR 1.00 06/06/2008           CC  Carlos Stephens MD  [address]  [place] [id number]

## 2021-02-26 DIAGNOSIS — R05.9 COUGH: ICD-10-CM

## 2021-03-01 RX ORDER — LORATADINE 10 MG/1
10 TABLET ORAL DAILY
Qty: 30 TABLET | Refills: 0 | Status: SHIPPED | OUTPATIENT
Start: 2021-03-01 | End: 2021-04-23

## 2021-03-01 NOTE — TELEPHONE ENCOUNTER
Prescription approved per [facility] Refill Protocol.  Azeb Hurt RN

## 2021-03-10 ENCOUNTER — HOSPITAL ENCOUNTER (OUTPATIENT)
Dept: CARDIOLOGY | Facility: CLINIC | Age: 56
Discharge: HOME | End: 2021-03-10
Attending: PHYSICIAN ASSISTANT | Admitting: PHYSICIAN ASSISTANT
Payer: COMMERCIAL

## 2021-03-10 VITALS
RESPIRATION RATE: 16 BRPM | DIASTOLIC BLOOD PRESSURE: 70 MMHG | SYSTOLIC BLOOD PRESSURE: 102 MMHG | HEART RATE: 56 BPM | OXYGEN SATURATION: 97 %

## 2021-03-10 DIAGNOSIS — R06.09 DYSPNEA ON EXERTION: ICD-10-CM

## 2021-03-10 DIAGNOSIS — R07.9 CHEST PAIN, UNSPECIFIED TYPE: ICD-10-CM

## 2021-03-10 PROCEDURE — 250N000013 HC RX MED GY IP 250 OP 250 PS 637: Performed by: PHYSICIAN ASSISTANT

## 2021-03-10 PROCEDURE — 258N000003 HC RX IP 258 OP 636: Performed by: PHYSICIAN ASSISTANT

## 2021-03-10 PROCEDURE — 75574 CT ANGIO HRT W/3D IMAGE: CPT | Mod: 26 | Performed by: INTERNAL MEDICINE

## 2021-03-10 PROCEDURE — 250N000011 HC RX IP 250 OP 636: Performed by: PHYSICIAN ASSISTANT

## 2021-03-10 PROCEDURE — 75574 CT ANGIO HRT W/3D IMAGE: CPT

## 2021-03-10 RX ORDER — ACYCLOVIR 200 MG/1
0-1 CAPSULE ORAL
Status: DISCONTINUED | OUTPATIENT
Start: 2021-03-10 | End: 2021-03-11 | Stop reason: HOSPADM

## 2021-03-10 RX ORDER — METHYLPREDNISOLONE SODIUM SUCCINATE 125 MG/2ML
125 INJECTION, POWDER, LYOPHILIZED, FOR SOLUTION INTRAMUSCULAR; INTRAVENOUS
Status: DISCONTINUED | OUTPATIENT
Start: 2021-03-10 | End: 2021-03-11 | Stop reason: HOSPADM

## 2021-03-10 RX ORDER — METOPROLOL TARTRATE 25 MG/1
25-100 TABLET, FILM COATED ORAL
Status: COMPLETED | OUTPATIENT
Start: 2021-03-10 | End: 2021-03-10

## 2021-03-10 RX ORDER — DILTIAZEM HYDROCHLORIDE 5 MG/ML
10-15 INJECTION INTRAVENOUS
Status: DISCONTINUED | OUTPATIENT
Start: 2021-03-10 | End: 2021-03-11 | Stop reason: HOSPADM

## 2021-03-10 RX ORDER — DIPHENHYDRAMINE HYDROCHLORIDE 50 MG/ML
25-50 INJECTION INTRAMUSCULAR; INTRAVENOUS
Status: DISCONTINUED | OUTPATIENT
Start: 2021-03-10 | End: 2021-03-11 | Stop reason: HOSPADM

## 2021-03-10 RX ORDER — DILTIAZEM HCL 60 MG
120 TABLET ORAL
Status: DISCONTINUED | OUTPATIENT
Start: 2021-03-10 | End: 2021-03-11 | Stop reason: HOSPADM

## 2021-03-10 RX ORDER — ONDANSETRON 2 MG/ML
4 INJECTION INTRAMUSCULAR; INTRAVENOUS
Status: DISCONTINUED | OUTPATIENT
Start: 2021-03-10 | End: 2021-03-11 | Stop reason: HOSPADM

## 2021-03-10 RX ORDER — METOPROLOL TARTRATE 1 MG/ML
5-15 INJECTION, SOLUTION INTRAVENOUS
Status: DISCONTINUED | OUTPATIENT
Start: 2021-03-10 | End: 2021-03-11 | Stop reason: HOSPADM

## 2021-03-10 RX ORDER — IVABRADINE 5 MG/1
5-15 TABLET, FILM COATED ORAL
Status: DISCONTINUED | OUTPATIENT
Start: 2021-03-10 | End: 2021-03-11 | Stop reason: HOSPADM

## 2021-03-10 RX ORDER — IOPAMIDOL 755 MG/ML
500 INJECTION, SOLUTION INTRAVASCULAR ONCE
Status: COMPLETED | OUTPATIENT
Start: 2021-03-10 | End: 2021-03-10

## 2021-03-10 RX ORDER — DIPHENHYDRAMINE HCL 25 MG
25 CAPSULE ORAL
Status: DISCONTINUED | OUTPATIENT
Start: 2021-03-10 | End: 2021-03-11 | Stop reason: HOSPADM

## 2021-03-10 RX ORDER — NITROGLYCERIN 0.4 MG/1
0.4 TABLET SUBLINGUAL
Status: DISCONTINUED | OUTPATIENT
Start: 2021-03-10 | End: 2021-03-11 | Stop reason: HOSPADM

## 2021-03-10 RX ADMIN — SODIUM CHLORIDE 400 ML: 9 INJECTION, SOLUTION INTRAVENOUS at 08:47

## 2021-03-10 RX ADMIN — SODIUM CHLORIDE 100 ML: 9 INJECTION, SOLUTION INTRAVENOUS at 09:25

## 2021-03-10 RX ADMIN — IOPAMIDOL 120 ML: 755 INJECTION, SOLUTION INTRAVENOUS at 09:25

## 2021-03-10 RX ADMIN — METOPROLOL TARTRATE 50 MG: 50 TABLET, FILM COATED ORAL at 08:10

## 2021-03-10 RX ADMIN — NITROGLYCERIN 0.4 MG: 0.4 TABLET SUBLINGUAL at 09:12

## 2021-03-11 ENCOUNTER — CARE COORDINATION (OUTPATIENT)
Dept: CARDIOLOGY | Facility: CLINIC | Age: 56
End: 2021-03-11

## 2021-03-11 DIAGNOSIS — I25.10 CAD (CORONARY ARTERY DISEASE): ICD-10-CM

## 2021-03-11 DIAGNOSIS — R07.9 CHEST PAIN, UNSPECIFIED TYPE: Primary | ICD-10-CM

## 2021-03-11 RX ORDER — ROSUVASTATIN CALCIUM 5 MG/1
2.5 TABLET, COATED ORAL EVERY OTHER DAY
Qty: 45 TABLET | Refills: 1 | Status: SHIPPED | OUTPATIENT
Start: 2021-03-11 | End: 2021-03-25

## 2021-03-11 NOTE — PROGRESS NOTES
Have not heard back from pt. Noted pt is active on MyChart. Sent pt [email or web address] message with information and sent Rx for Crestor to pharmacy.     Adwoa Lackey RN, BSN, CHFN  03/11/21 at 12:08 PM       Me  to Bumlaro Herrera        3/11/21 12:05 PM  Tigre Chamberlain!      I tried calling earlier today and left you a message, but thought I would send you a MyChart message as well, in case this is an easier method of communication for you!      CHELSEA Sanders reviewed your CTA results. It does show mild nonocclusive coronary artery disease, which means you have some calcium build up, but it is not blocking blood flow, which is good news. There are no significant narrowings that would be causing your chest pain.      Kiera also heard you had some difficulty with the CTA and last night/this am. How are you doing? Are you still having stomach issues or dizziness now?      Given you do have some calcium build up, Kiera would like you on a statin. Your wife shared that you did feel better off the statin. Kiera would like to try starting Crestor 2.5mg every OTHER day, once your stomach issues improve.      If you want to discuss verbally, please feel free to give me a call. I am at [phone] today, otherwise, feel free to send me a [email or web address] message back as well!      I sent a prescription for the Crestor to your pharmacy, but again, only start this once you are feeling better.      Thanks so much,      TONEY Orona   Nurse for CHELSEA Sanders

## 2021-03-11 NOTE — PROGRESS NOTES
More, Kiera Mei PA-C   3/11/2021  8:43 AM CST      Non occlusive CAD, no significant narrowing that would cause cp.  Discussed with wife this am- pt had significant bradycardia during study with hypotension and diaphoresis.  He developed diarrhea later in the afternoon and into this morning.  She also noted he felt better off of the statin.  Given non occlusive cad, should start crestor 2.5 mg every other day when his gi sx improve.     **Called pt to review, no answer. LVM asking for call back to see how he is doing today and review CTA results from 3/10/21.     Adwoa Lackey RN, BSN, CHFN  03/11/21 at 9:45 AM

## 2021-03-11 NOTE — PROGRESS NOTES
Pt returned call, reviewed results and recommendations from CHELSEA Leung. Pt reports he is feeling a little better, hasn't had diarrhea since 2-3am but still feels a little nauseated today. Reviewed if not feeling better in next few days to let us know. Pt stated understanding.  Pt also said wife said something about his lungs on test. Reviewed with CHELSEA Leung, Ct showed enlarged lymph nodes and granulomas, will send to PCP to review, but CHELSEA Leung thinks might just be from something getting into lungs at one point, but should be causing chest pain. Reviewed with pt.     Rx for Crestor sent to pharmacy. Pt aware to start once feeling improved.     Adwoa Lackey, RN, BSN, CHFN  03/11/21 at 1:35 PM

## 2021-03-25 ENCOUNTER — OFFICE VISIT (OUTPATIENT)
Dept: CARDIOLOGY | Facility: CLINIC | Age: 56
End: 2021-03-25
Attending: PHYSICIAN ASSISTANT
Payer: COMMERCIAL

## 2021-03-25 VITALS
RESPIRATION RATE: 19 BRPM | HEIGHT: 72 IN | SYSTOLIC BLOOD PRESSURE: 132 MMHG | WEIGHT: 257.8 LBS | OXYGEN SATURATION: 95 % | HEART RATE: 81 BPM | DIASTOLIC BLOOD PRESSURE: 88 MMHG | BODY MASS INDEX: 34.92 KG/M2

## 2021-03-25 DIAGNOSIS — E78.00 HYPERCHOLESTEROLEMIA: Primary | ICD-10-CM

## 2021-03-25 DIAGNOSIS — G47.10 HYPERSOMNIA: ICD-10-CM

## 2021-03-25 DIAGNOSIS — R06.09 DYSPNEA ON EXERTION: ICD-10-CM

## 2021-03-25 DIAGNOSIS — R07.9 CHEST PAIN, UNSPECIFIED TYPE: ICD-10-CM

## 2021-03-25 DIAGNOSIS — I25.10 CORONARY ARTERY DISEASE INVOLVING NATIVE CORONARY ARTERY OF NATIVE HEART WITHOUT ANGINA PECTORIS: ICD-10-CM

## 2021-03-25 PROCEDURE — 99214 OFFICE O/P EST MOD 30 MIN: CPT | Performed by: PHYSICIAN ASSISTANT

## 2021-03-25 RX ORDER — ROSUVASTATIN CALCIUM 5 MG/1
2.5 TABLET, COATED ORAL WEEKLY
Qty: 45 TABLET | Refills: 1 | Status: SHIPPED | OUTPATIENT
Start: 2021-03-25 | End: 2021-04-23

## 2021-03-25 RX ORDER — ASPIRIN 81 MG/1
81 TABLET, CHEWABLE ORAL DAILY
COMMUNITY
Start: 2021-03-25

## 2021-03-25 ASSESSMENT — MIFFLIN-ST. JEOR: SCORE: 2052.37

## 2021-03-25 NOTE — PATIENT INSTRUCTIONS
Thanks for coming into [facility] today.    We discussed: your studies so far look good.  I'm wondering if you have some asthma.      Medication changes:  Decrease your rosuvastatin to 1/2 pill once a week.    We'll do breathing tests to see if you have some underlying asthma.  I'll get those results and likely send them to Dr. Farmer to see you need an inhaler to take everyday.        Follow up: with me in 6 months with cholesterol labs at that time.    Please call the clinic at  [phone] with any questions or concerns and my our nurses will be happy to help.    Please call [phone] for scheduling.      Reminder: Please bring in all current medications, over the counter supplements and vitamin bottles to your next appointment.

## 2021-03-25 NOTE — LETTER
3/25/2021    Gustavo Farmer, DO  [address] [place] [address]    RE: Bulmaro Herrera       Dear Colleague,    I had the pleasure of seeing Bulmaro Herrera in the [facility].    [id number]      HPI and Plan:   See dictation    Orders this Visit:  Orders Placed This Encounter   Procedures     Lipid Profile     ALT     Follow-Up with Cardiac Advanced Practice Provider     General PFT Lab (Please always keep checked)     Pulmonary Function Test     Orders Placed This Encounter   Medications     rosuvastatin (CRESTOR) 5 MG tablet     Sig: Take 0.5 tablets (2.5 mg) by mouth once a week     Dispense:  45 tablet     Refill:  1     Dose adjustment only- DO NOT FILL!     aspirin (ASA) 81 MG chewable tablet     Sig: Take 1 tablet (81 mg) by mouth daily     Medications Discontinued During This Encounter   Medication Reason     metoprolol tartrate (LOPRESSOR) 50 MG tablet      rosuvastatin (CRESTOR) 5 MG tablet          Encounter Diagnoses   Name Primary?     Chest pain, unspecified type      Dyspnea on exertion      CAD (coronary artery disease)      Hypercholesterolemia Yes     Hypersomnia      Coronary artery disease involving native coronary artery of native heart without angina pectoris        CURRENT MEDICATIONS:  Current Outpatient Medications   Medication Sig Dispense Refill     acetaminophen (TYLENOL) 500 MG tablet Take 1,000 mg by mouth every morning        aspirin (ASA) 81 MG chewable tablet Take 1 tablet (81 mg) by mouth daily       ibuprofen (ADVIL/MOTRIN) 200 MG tablet Take 400 mg by mouth every morning        loratadine (CLARITIN) 10 MG tablet TAKE 1 TABLET (10 MG) BY MOUTH DAILY 30 tablet 0     modafinil (PROVIGIL) 100 MG tablet Take 1 tablet (100 mg) by mouth daily 30 tablet 3     omeprazole (PRILOSEC) 40 MG DR capsule TAKE ONE CAPSULE BY MOUTH ONCE DAILY 90 capsule 0     rosuvastatin (CRESTOR) 5 MG tablet Take 0.5 tablets (2.5 mg) by mouth once  I have reviewed and confirmed nurses' notes... a week 45 tablet 1       ALLERGIES     Allergies   Allergen Reactions     Percocet [Oxycodone-Acetaminophen] Itching     Wasps [Hornets] Swelling       PAST MEDICAL HISTORY:  Past Medical History:   Diagnosis Date     Back pains      Depressive disorder, not elsewhere classified 1/28/2007    declines Rx     Disc degeneration 12/2008    see MRI -throaco-lumbar mild discogenic degenerative changes     Elevated LFT's 11/09    alt and ast     Other and unspecified hyperlipidemia 1/2007     Sleep disorder 9/09    CPAP     Tobacco use disorder        PAST SURGICAL HISTORY:  Past Surgical History:   Procedure Laterality Date     COLONOSCOPY  07/07/08    adenomatous polyp repeat 5 years     COLONOSCOPY N/A 12/29/2017    Procedure: COMBINED COLONOSCOPY, SINGLE OR MULTIPLE BIOPSY/POLYPECTOMY BY BIOPSY;  Colonoscopy, Polypectomy by Biopsy;  Surgeon: Matt Reyes MD;  Location:  GI     ENT SURGERY      for deviated septum     EXCISE LESION AXILLA  12/7/2018    Procedure: Excision Skin Tags Right Axilla;  Surgeon: Ayaan Chaney MD;  [facility]     EXCISE LESION BACK N/A 12/7/2018    Procedure: Excision Back Skin Lesion X Two;  Surgeon: Ayaan Chaney MD;  Location: [facility]     HC ECHO HEART XTHORACIC, STRESS/REST  6/2009    neg     INJECT EPIDURAL CERVICAL N/A 5/14/2015    Procedure: INJECT EPIDURAL CERVICAL;  Surgeon: Christian Chaudhry MD;  [facility]     INJECT EPIDURAL CERVICAL N/A 12/28/2018    Procedure: INJECT EPIDURAL CERVICAL 6-7;  Surgeon: Christian Chaudhry MD;  [facility]     INJECT EPIDURAL LUMBAR N/A 12/20/2019    Procedure: Lumbar 5- Sacral 1 Epidural  Injection bilatetral;  Surgeon: Christian Chaudhry MD;  [facility]     INJECT FACET JOINT Bilateral 1/25/2019    Procedure: Cervical Facet Injections Cervical 5-6 and 6-7 Bilateral;  Surgeon: Christian Chaudhry MD;  [facility]     INJECT FACET JOINT Bilateral 11/29/2019    Procedure: Cervical 5-6 and 6-7 Bilateral facet joint injection;   Surgeon: Christian Chaudhry MD;  [facility]     INJECT FACET JOINT Bilateral 2021    Procedure: Cervical 5-6 and Cervical 6-7 Bilateral facet injections;  Surgeon: Christian Chaudhry MD;  [facility]     ORTHOPEDIC SURGERY      Right hand     TONSILLECTOMY       ZZC NONSPECIFIC PROCEDURE      rt hand surgery - laceration/glass/tendons       FAMILY HISTORY:  Family History   Problem Relation Age of Onset     Breast Cancer Mother      Arthritis Mother         joint ?     Depression Father      Cancer Maternal Grandmother      C.A.D. Maternal Grandfather      Cardiovascular Maternal Grandfather      Gynecology Paternal Grandmother          giving birth     Prostate Cancer Paternal Grandfather      Genetic Disorder Brother         joint pain?     Depression Brother      Cardiovascular Son         congenital heart defect -       Prostate Cancer Other          age 70's     Diabetes Other        SOCIAL HISTORY:  Social History     Socioeconomic History     Marital status:      Spouse name: None     Number of children: None     Years of education: None     Highest education level: None   Occupational History     Occupation:      Employer: SELF   Social Needs     Financial resource strain: None     Food insecurity     Worry: None     Inability: None     Transportation needs     Medical: None     Non-medical: None   Tobacco Use     Smoking status: Former Smoker     Packs/day: 1.50     Quit date: 2007     Years since quittin.1     Smokeless tobacco: Never Used   Substance and Sexual Activity     Alcohol use: No     Frequency: Never     Comment: rare     Drug use: No     Sexual activity: Yes     Partners: Female   Lifestyle     Physical activity     Days per week: None     Minutes per session: None     Stress: None   Relationships     Social connections     Talks on phone: None     Gets together: None     Attends Yazdanism service: None     Active member of club or  organization: None     Attends meetings of clubs or organizations: None     Relationship status: None     Intimate partner violence     Fear of current or ex partner: None     Emotionally abused: None     Physically abused: None     Forced sexual activity: None   Other Topics Concern     Parent/sibling w/ CABG, MI or angioplasty before 65F 55M? No   Social History Narrative    Dairy/d a lot, 1 gallon of milk per day plus cheese and other servings     Caffeine 48 oz. coffee per day    Exercise none    Sunscreen used - No    Seatbelts used - Yes    Working smoke/CO detectors in the home - Yes    Guns stored in the home - No    Self Breast Exams - NA    Self Testicular Exam - No    Eye Exam up to date - No    Dental Exam up to date - Yes    Pap Smear up to date - NA    Mammogram up to date - NA    PSA up to date - No    Dexa Scan up to date - NA    Flex Sig / Colonoscopy up to date - No    Immunizations up to date - No    Abuse: Current or Past(Physical, Sexual or Emotional)- No    Do you feel safe in your environment - Yes    Tms,cma        Lives with spouse and her parents.       Review of Systems:  Skin:        Eyes:       ENT:       Respiratory:  Negative shortness of breath;cough;wheezing  Cardiovascular:  chest pain;Negative;edema;syncope or near-syncope;dizziness Positive for;palpitations;lightheadedness;fatigue  Gastroenterology:      Genitourinary:       Musculoskeletal:       Neurologic:  Negative numbness or tingling of feet  Psychiatric:       Heme/Lymph/Imm:  Positive for allergies  Endocrine:         Physical Exam:  Vitals: /88 (BP Location: Right arm, Patient Position: Sitting, Cuff Size: Adult Large)   Pulse 81   Resp 19   Ht 1.829 m (6')   Wt 116.9 kg (257 lb 12.8 oz)   SpO2 95%   BMI 34.96 kg/m     Please refer to dictation for physical exam    Recent Lab Results:  LIPID RESULTS:  Lab Results   Component Value Date    CHOL 243 (H) 02/10/2021    HDL 37 (L) 02/10/2021     (H)  02/10/2021    TRIG 345 (H) 02/10/2021    CHOLHDLRATIO 7.8 (H) 07/03/2015       LIVER ENZYME RESULTS:  Lab Results   Component Value Date    AST 42 02/08/2021    ALT 76 (H) 02/10/2021       CBC RESULTS:  Lab Results   Component Value Date    WBC 5.5 02/08/2021    RBC 5.12 02/08/2021    HGB 16.3 02/08/2021    HCT 47.2 02/08/2021    MCV 92 02/08/2021    MCH 31.8 02/08/2021    MCHC 34.5 02/08/2021    RDW 12.3 02/08/2021     02/08/2021       BMP RESULTS:  Lab Results   Component Value Date     02/08/2021    POTASSIUM 4.1 02/08/2021    CHLORIDE 108 02/08/2021    CO2 30 02/08/2021    ANIONGAP 2 (L) 02/08/2021     (H) 02/08/2021    BUN 17 02/08/2021    CR 0.94 02/08/2021    GFRESTIMATED >90 02/08/2021    GFRESTBLACK >90 02/08/2021    WILIAM 9.6 02/08/2021        A1C RESULTS:  Lab Results   Component Value Date    A1C 5.4 02/10/2016       INR RESULTS:  Lab Results   Component Value Date    INR 1.0 02/17/2014    INR 1.00 06/06/2008           Thank you for allowing me to participate in the care of your patient.      Sincerely,     Kiera Yan PA-C     [facility]  cc:   Kiera Yan PA-C  [address]  [place] [id number]

## 2021-03-26 NOTE — PROGRESS NOTES
[id number]      HPI and Plan:   See dictation    Orders this Visit:  Orders Placed This Encounter   Procedures     Lipid Profile     ALT     Follow-Up with Cardiac Advanced Practice Provider     General PFT Lab (Please always keep checked)     Pulmonary Function Test     Orders Placed This Encounter   Medications     rosuvastatin (CRESTOR) 5 MG tablet     Sig: Take 0.5 tablets (2.5 mg) by mouth once a week     Dispense:  45 tablet     Refill:  1     Dose adjustment only- DO NOT FILL!     aspirin (ASA) 81 MG chewable tablet     Sig: Take 1 tablet (81 mg) by mouth daily     Medications Discontinued During This Encounter   Medication Reason     metoprolol tartrate (LOPRESSOR) 50 MG tablet      rosuvastatin (CRESTOR) 5 MG tablet          Encounter Diagnoses   Name Primary?     Chest pain, unspecified type      Dyspnea on exertion      CAD (coronary artery disease)      Hypercholesterolemia Yes     Hypersomnia      Coronary artery disease involving native coronary artery of native heart without angina pectoris        CURRENT MEDICATIONS:  Current Outpatient Medications   Medication Sig Dispense Refill     acetaminophen (TYLENOL) 500 MG tablet Take 1,000 mg by mouth every morning        aspirin (ASA) 81 MG chewable tablet Take 1 tablet (81 mg) by mouth daily       ibuprofen (ADVIL/MOTRIN) 200 MG tablet Take 400 mg by mouth every morning        loratadine (CLARITIN) 10 MG tablet TAKE 1 TABLET (10 MG) BY MOUTH DAILY 30 tablet 0     modafinil (PROVIGIL) 100 MG tablet Take 1 tablet (100 mg) by mouth daily 30 tablet 3     omeprazole (PRILOSEC) 40 MG DR capsule TAKE ONE CAPSULE BY MOUTH ONCE DAILY 90 capsule 0     rosuvastatin (CRESTOR) 5 MG tablet Take 0.5 tablets (2.5 mg) by mouth once a week 45 tablet 1       ALLERGIES     Allergies   Allergen Reactions     Percocet [Oxycodone-Acetaminophen] Itching     Wasps [Hornets] Swelling       PAST MEDICAL HISTORY:  Past Medical History:   Diagnosis Date     Back pains      Depressive  disorder, not elsewhere classified 1/28/2007    declines Rx     Disc degeneration 12/2008    see MRI -throaco-lumbar mild discogenic degenerative changes     Elevated LFT's 11/09    alt and ast     Other and unspecified hyperlipidemia 1/2007     Sleep disorder 9/09    CPAP     Tobacco use disorder        PAST SURGICAL HISTORY:  Past Surgical History:   Procedure Laterality Date     COLONOSCOPY  07/07/08    adenomatous polyp repeat 5 years     COLONOSCOPY N/A 12/29/2017    Procedure: COMBINED COLONOSCOPY, SINGLE OR MULTIPLE BIOPSY/POLYPECTOMY BY BIOPSY;  Colonoscopy, Polypectomy by Biopsy;  Surgeon: Matt Reyes MD;  Location:  GI     ENT SURGERY      for deviated septum     EXCISE LESION AXILLA  12/7/2018    Procedure: Excision Skin Tags Right Axilla;  Surgeon: Ayaan Chaney MD;  [facility]     EXCISE LESION BACK N/A 12/7/2018    Procedure: Excision Back Skin Lesion X Two;  Surgeon: Ayaan Chaney MD;  [facility]     HC ECHO HEART XTHORACIC, STRESS/REST  6/2009    neg     INJECT EPIDURAL CERVICAL N/A 5/14/2015    Procedure: INJECT EPIDURAL CERVICAL;  Surgeon: Christian Chaudhry MD;  [facility]     INJECT EPIDURAL CERVICAL N/A 12/28/2018    Procedure: INJECT EPIDURAL CERVICAL 6-7;  Surgeon: Christian Chaudhry MD;  [facility]     INJECT EPIDURAL LUMBAR N/A 12/20/2019    Procedure: Lumbar 5- Sacral 1 Epidural  Injection bilatetral;  Surgeon: Christian Chaudhry MD;  [facility]     INJECT FACET JOINT Bilateral 1/25/2019    Procedure: Cervical Facet Injections Cervical 5-6 and 6-7 Bilateral;  Surgeon: Christian Chaudhry MD;  [facility]     INJECT FACET JOINT Bilateral 11/29/2019    Procedure: Cervical 5-6 and 6-7 Bilateral facet joint injection;  Surgeon: Christian Chaudhry MD;  [facility]     INJECT FACET JOINT Bilateral 2/11/2021    Procedure: Cervical 5-6 and Cervical 6-7 Bilateral facet injections;  Surgeon: Christian Chaudhry MD;  Location:  OR     ORTHOPEDIC SURGERY      Right hand      TONSILLECTOMY       ZZC NONSPECIFIC PROCEDURE      rt hand surgery - laceration/glass/tendons       FAMILY HISTORY:  Family History   Problem Relation Age of Onset     Breast Cancer Mother      Arthritis Mother         joint ?     Depression Father      Cancer Maternal Grandmother      C.A.D. Maternal Grandfather      Cardiovascular Maternal Grandfather      Gynecology Paternal Grandmother          giving birth     Prostate Cancer Paternal Grandfather      Genetic Disorder Brother         joint pain?     Depression Brother      Cardiovascular Son         congenital heart defect -       Prostate Cancer Other          age 70's     Diabetes Other        SOCIAL HISTORY:  Social History     Socioeconomic History     Marital status:      Spouse name: None     Number of children: None     Years of education: None     Highest education level: None   Occupational History     Occupation:      Employer: SELF   Social Needs     Financial resource strain: None     Food insecurity     Worry: None     Inability: None     Transportation needs     Medical: None     Non-medical: None   Tobacco Use     Smoking status: Former Smoker     Packs/day: 1.50     Quit date: 2007     Years since quittin.1     Smokeless tobacco: Never Used   Substance and Sexual Activity     Alcohol use: No     Frequency: Never     Comment: rare     Drug use: No     Sexual activity: Yes     Partners: Female   Lifestyle     Physical activity     Days per week: None     Minutes per session: None     Stress: None   Relationships     Social connections     Talks on phone: None     Gets together: None     Attends Sikhism service: None     Active member of club or organization: None     Attends meetings of clubs or organizations: None     Relationship status: None     Intimate partner violence     Fear of current or ex partner: None     Emotionally abused: None     Physically abused: None     Forced sexual activity:  None   Other Topics Concern     Parent/sibling w/ CABG, MI or angioplasty before 65F 55M? No   Social History Narrative    Dairy/d a lot, 1 gallon of milk per day plus cheese and other servings     Caffeine 48 oz. coffee per day    Exercise none    Sunscreen used - No    Seatbelts used - Yes    Working smoke/CO detectors in the home - Yes    Guns stored in the home - No    Self Breast Exams - NA    Self Testicular Exam - No    Eye Exam up to date - No    Dental Exam up to date - Yes    Pap Smear up to date - NA    Mammogram up to date - NA    PSA up to date - No    Dexa Scan up to date - NA    Flex Sig / Colonoscopy up to date - No    Immunizations up to date - No    Abuse: Current or Past(Physical, Sexual or Emotional)- No    Do you feel safe in your environment - Yes    Eric,liberty        Lives with spouse and her parents.       Review of Systems:  Skin:        Eyes:       ENT:       Respiratory:  Negative shortness of breath;cough;wheezing  Cardiovascular:  chest pain;Negative;edema;syncope or near-syncope;dizziness Positive for;palpitations;lightheadedness;fatigue  Gastroenterology:      Genitourinary:       Musculoskeletal:       Neurologic:  Negative numbness or tingling of feet  Psychiatric:       Heme/Lymph/Imm:  Positive for allergies  Endocrine:         Physical Exam:  Vitals: /88 (BP Location: Right arm, Patient Position: Sitting, Cuff Size: Adult Large)   Pulse 81   Resp 19   Ht 1.829 m (6')   Wt 116.9 kg (257 lb 12.8 oz)   SpO2 95%   BMI 34.96 kg/m     Please refer to dictation for physical exam    Recent Lab Results:  LIPID RESULTS:  Lab Results   Component Value Date    CHOL 243 (H) 02/10/2021    HDL 37 (L) 02/10/2021     (H) 02/10/2021    TRIG 345 (H) 02/10/2021    CHOLHDLRATIO 7.8 (H) 07/03/2015       LIVER ENZYME RESULTS:  Lab Results   Component Value Date    AST 42 02/08/2021    ALT 76 (H) 02/10/2021       CBC RESULTS:  Lab Results   Component Value Date    WBC 5.5 02/08/2021     RBC 5.12 02/08/2021    HGB 16.3 02/08/2021    HCT 47.2 02/08/2021    MCV 92 02/08/2021    MCH 31.8 02/08/2021    MCHC 34.5 02/08/2021    RDW 12.3 02/08/2021     02/08/2021       BMP RESULTS:  Lab Results   Component Value Date     02/08/2021    POTASSIUM 4.1 02/08/2021    CHLORIDE 108 02/08/2021    CO2 30 02/08/2021    ANIONGAP 2 (L) 02/08/2021     (H) 02/08/2021    BUN 17 02/08/2021    CR 0.94 02/08/2021    GFRESTIMATED >90 02/08/2021    GFRESTBLACK >90 02/08/2021    WILIAM 9.6 02/08/2021        A1C RESULTS:  Lab Results   Component Value Date    A1C 5.4 02/10/2016       INR RESULTS:  Lab Results   Component Value Date    INR 1.0 02/17/2014    INR 1.00 06/06/2008           CC  Kiera Yan, PA-C  [address]  [place] [address]

## 2021-03-26 NOTE — PROGRESS NOTES
Service Date: 03/25/2021      CLINIC VISIT       PRIMARY CARDIOLOGIST:  Carlos Stephens MD      REASON FOR VISIT:  Chest pain, shortness of breath followup.      HISTORY OF PRESENT ILLNESS:  Mr. 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  He underwent a stress echocardiogram that was normal without stress-induced ischemia and achieved 8 METs.  When I saw him back, he continued to have a stress test, and given a difficult family history, not necessarily coronary artery disease, but other dysfunction, we elected to do a coronary CT.  This showed nonocclusive coronary artery disease with a calcium score of 41, 0 in the left main, 19 in the LAD, 20 in the circumflex and 2 in the RCA.  This put him in the 56th percentile.  Overread of the CT showed calcified hilar and mediastinal lymph nodes along with calcified granuloma in the lower left lobe, all compatible with benign granulomatous disease.  In addition, we did a Zio patch.  This showed rare PACs and PVCs with a normal heart rate.        He is here today to discuss these issues.  His chest pain is improved, and although he still has it sometimes, he thinks it is coming from the cervical spine.  He continues to have significant shortness of breath, though.  If he climbs the stairs, he is absolutely winded.  Interestingly, when he took his metoprolol dose prior to his CT scan, he became profoundly short of breath and could not walk across the skyway at [facility] without stopping to rest.  This profound shortness of breath actually resolved as that medication wore off.  He also notes that when we held his Lipitor, he felt much better.  His fatigue resolved.  We started him on Crestor 2.5 mg every other day, and with that, the fatigue  came back immediately.  He continues to struggle with shortness of breath and has intermittent wheezing.  He did have a bronchodilator response to his PFTs about 3 years ago.  He said he tried albuterol, and it did not do any good.  He has never been on a long-acting inhaler from his memory.  He also continues to have intermittent palpitations.  They seem to be more when he is stressed out and are stable.      SOCIAL HISTORY:  He is  to our  in [facility].  They have 3 children, 2 boys and a girl.  They have lost 2 children, one at 4 months' gestation and another shortly after birth.  That has been quite traumatic for them.  He has a history of tobacco, quit 14 years ago, 20 pack year history.  Rare alcohol use.      PHYSICAL EXAMINATION:   GENERAL:  Well-developed, well-nourished gentleman in no acute distress.   HEENT:  Normocephalic and atraumatic.   HEART:  Regular rate and rhythm.  I do not appreciate a murmur, rub or gallop.   LUNGS:  Clear without wheezes, rales or rhonchi.   EXTREMITIES:  Without peripheral edema.   SKIN:  Warm and dry.      ASSESSMENT AND PLAN:   1.  Shortness of breath and dyspnea on exertion with some wheezing with PFTs about 4 years ago showing some response to bronchodilation.  I have ordered repeat PFTs for him, and if they are abnormal, I will send them to Dr. Farmer.  He also has abnormal findings on his CT scan that I will request Dr. Farmer to comment on.  I doubt that the calcified lymph nodes if any of them are contributing to this, but I would like to see someone with more expertise in this area to review it.  I do not think his shortness of breath has a cardiac cause, as he has now had a normal stress echocardiogram and nonocclusive coronary artery disease on coronary CT.   2.  Nonocclusive coronary artery disease with the 56th percentile.  I have asked him today to start taking a baby aspirin, and he is agreeable to that.  He does have elevated  cholesterol, and ideally we would have his LDL less than 100.  Given the findings on his CT, I would like him to try Crestor just 2.5 mg once a week to see if we can help with that.  If he is unable to tolerate it, he then just needs working much more on improving exercise and diet.   3.  Palpitations.  Just PACs and PVCs on monitor.      We will continue with his workup again and defer most of his followup care to his primary.  We will check back with him in about 6 months, sooner with concerns.      Thank you for allowing me to participate in this patient's care.      FRANCOIS Sanders PA-C             D: 2021   T: 2021   MT: marisel      Name:     JAZZY SIDDIQI   MRN:      [id number]        Account:      [id number]   :      1965           Service Date: 2021      Document: [id number]

## 2021-04-12 ENCOUNTER — MYC MEDICAL ADVICE (OUTPATIENT)
Dept: INTERNAL MEDICINE | Facility: CLINIC | Age: 56
End: 2021-04-12

## 2021-04-12 NOTE — TELEPHONE ENCOUNTER
Called and spoke to the patient. He said awhile back he was in to see Dr. Farmer and found out he had an ear infection. Patient was treated. Patient said he is wondering if his ear infection ever cleared up. He said at times he feels like he has an ear plug in. He said when he talks it feels like he has a plug in his ear. The right side of his neck has also been bothering him. Some pain not all the time. Patient denies chest pain or trouble breathing.     Patient has Wednesday off of work. He is wondering if Dr. Farmer can see him and check if the infection is cleared up in his ear. Patient is scheduled to be seen Wednesday.     QUE AllenN, RN  [facility]

## 2021-04-14 ENCOUNTER — OFFICE VISIT (OUTPATIENT)
Dept: INTERNAL MEDICINE | Facility: CLINIC | Age: 56
End: 2021-04-14
Payer: COMMERCIAL

## 2021-04-14 VITALS
SYSTOLIC BLOOD PRESSURE: 130 MMHG | WEIGHT: 257 LBS | RESPIRATION RATE: 18 BRPM | DIASTOLIC BLOOD PRESSURE: 82 MMHG | HEART RATE: 100 BPM | TEMPERATURE: 97.6 F | BODY MASS INDEX: 34.86 KG/M2 | OXYGEN SATURATION: 95 %

## 2021-04-14 DIAGNOSIS — H61.21 IMPACTED CERUMEN OF RIGHT EAR: Primary | ICD-10-CM

## 2021-04-14 DIAGNOSIS — S16.1XXA STRAIN OF NECK MUSCLE, INITIAL ENCOUNTER: ICD-10-CM

## 2021-04-14 PROCEDURE — 99213 OFFICE O/P EST LOW 20 MIN: CPT | Performed by: INTERNAL MEDICINE

## 2021-04-14 ASSESSMENT — PAIN SCALES - GENERAL: PAINLEVEL: NO PAIN (0)

## 2021-04-14 NOTE — PROGRESS NOTES
Jemima Chamberlain is a 55 year old who presents for the following health issues     HPI     Chief Complaint   Patient presents with     Neck Pain     right neck pain 1 wk. was treated for ear infection in Jan. ear feels plugged         EMR reviewed including:             Complaint, History of Chief Complaint, Corresponding Review of Systems, and Complaint Specific Physical Examination.    #1   Patient had recent neck discomfort.  Started about a week ago but has not improved significantly.  Relates that on the right side of the neck.  Very muscular in origin with exacerbations with cervical or head motion.  No radiculopathy noted.  No syncope or difficulty swallowing.       Exam:   MS: Minimal crepitance is noted in the cervical spine.  Some paraspinal tightness is noted bilaterally.. No deformity is present. Muscle strength is appropriate and equal bilaterally. No acute joint erythema or swelling is present.      #2   Cerumen presence.  Mild cerumen present in right ear.  Left ear demonstrates no cerumen.  No evidence of infection noted either side.  Pharynx is clear without exudate.       Exam:   ENT: Pharynx is non-erythemous, minimal PND, no significant nasal obstruction, TM's not red or retracted, hearing intact bilaterally.  Some nonobstructing wax is noted in the right external auditory canal.  No carotid bruits are heard. No JVD seen. Thyroid is not nodular or enlarged.        Vital Signs:   /82 (BP Location: Right arm, Patient Position: Sitting, Cuff Size: Adult Large)   Pulse 100   Temp 97.6  F (36.4  C) (Temporal)   Resp 18   Wt 116.6 kg (257 lb)   SpO2 95%   BMI 34.86 kg/m               Decision Making    Problem and Complexity     1. Impacted cerumen of right ear  Recommend lavage with warm water once or twice a week using a bulb syringe in the shower.    2. Strain of neck muscle, initial encounter  Recommend moist heat.  Symptoms markedly improved over the previous couple days.           ------------------------------------------------------------------------------------------------------------------------------  Data    4=1/3 5=2/3    1  >3  Specialty (external) notes reviewed:   None    Tests ordered (by provider) reviewed:   None     Tests ordered (by provider):   None    Independent Historians Interview:   None    2  Review of outside (other providers) Tests:   None    3   Verbal Discussion with Specialists:    None      ----------------------------------------------------------------------------------------------------------------------------------  Risk   Prescription drug management:   None                                High risk for toxicity:     Decision regarding surgery:    None     Social determinants of health:   None     Decision to withhold therapy:   None                                 FOLLOW UP   I have asked the patient to make an appointment for followup with me as needed or in 4 months                  I have carefully explained the diagnosis and treatment options to the patient.  The patient has displayed an understanding of the above, and all subsequent questions were answered.      DO ANDREW Blandon    Portions of this note were produced using [employer]  Although every attempt at real-time proof reading has been made, occasional grammar/syntax errors may have been missed.

## 2021-04-23 ENCOUNTER — MYC REFILL (OUTPATIENT)
Dept: FAMILY MEDICINE | Facility: CLINIC | Age: 56
End: 2021-04-23

## 2021-04-23 ENCOUNTER — MYC REFILL (OUTPATIENT)
Dept: INTERNAL MEDICINE | Facility: CLINIC | Age: 56
End: 2021-04-23

## 2021-04-23 DIAGNOSIS — R05.9 COUGH: ICD-10-CM

## 2021-04-23 DIAGNOSIS — K21.9 GASTROESOPHAGEAL REFLUX DISEASE WITHOUT ESOPHAGITIS: ICD-10-CM

## 2021-04-23 RX ORDER — OMEPRAZOLE 40 MG/1
40 CAPSULE, DELAYED RELEASE ORAL DAILY
Qty: 90 CAPSULE | Refills: 0 | Status: SHIPPED | OUTPATIENT
Start: 2021-04-23 | End: 2021-07-05

## 2021-04-23 RX ORDER — LORATADINE 10 MG/1
10 TABLET ORAL DAILY
Qty: 90 TABLET | Refills: 0 | Status: SHIPPED | OUTPATIENT
Start: 2021-04-23 | End: 2021-08-05

## 2021-04-23 NOTE — TELEPHONE ENCOUNTER
"  Requested Prescriptions   Pending Prescriptions Disp Refills     omeprazole (PRILOSEC) 40 MG DR capsule 90 capsule 0         PPI Protocol Failed - 4/23/2021  7:52 AM        Failed - Recent (12 mo) or future (30 days) visit within the authorizing provider's specialty     Patient has had an office visit with the authorizing provider or a provider within the authorizing providers department within the previous 12 mos or has a future within next 30 days. See \"Patient Info\" tab in inbasket, or \"Choose Columns\" in Meds & Orders section of the refill encounter.              Passed - Not on Clopidogrel (unless Pantoprazole ordered)        Passed - No diagnosis of osteoporosis on record        Passed - Medication is active on med list        Passed - Patient is age 18 or older         Patient HAS been seen in past 12 months with PCP  Prescription approved per [facility] Refill Protocol.  Amanda Shaw RN'    "

## 2021-04-23 NOTE — TELEPHONE ENCOUNTER
Prescription approved per [facility] Refill Protocol.    QUE AllenN, RN  [facility]

## 2021-04-27 ENCOUNTER — HOSPITAL ENCOUNTER (OUTPATIENT)
Dept: RESPIRATORY THERAPY | Facility: CLINIC | Age: 56
Discharge: HOME | End: 2021-04-27
Attending: PHYSICIAN ASSISTANT | Admitting: PHYSICIAN ASSISTANT
Payer: COMMERCIAL

## 2021-04-27 DIAGNOSIS — R06.09 DYSPNEA ON EXERTION: ICD-10-CM

## 2021-04-27 PROCEDURE — 94729 DIFFUSING CAPACITY: CPT

## 2021-04-27 PROCEDURE — 250N000009 HC RX 250

## 2021-04-27 PROCEDURE — 94060 EVALUATION OF WHEEZING: CPT

## 2021-04-27 PROCEDURE — 94726 PLETHYSMOGRAPHY LUNG VOLUMES: CPT

## 2021-04-27 RX ORDER — ALBUTEROL SULFATE 0.83 MG/ML
2.5 SOLUTION RESPIRATORY (INHALATION) ONCE
Status: COMPLETED | OUTPATIENT
Start: 2021-04-27 | End: 2021-04-27

## 2021-04-27 RX ADMIN — ALBUTEROL SULFATE 2.5 MG: 2.5 SOLUTION RESPIRATORY (INHALATION) at 14:10

## 2021-04-27 NOTE — DISCHARGE INSTRUCTIONS
Thank you for completing pulmonary function testing today.  All results will be scanned into your epic results for your doctor to review.  Please resume taking all your current prescribed medications and diet as directed by your provider.   If you have not heard from your provider about your testing within two weeks and do not have a follow-up appointment scheduled with them please contact your provider about any questions you have concerning your testing.   Thank you  [facility] Pulmonary Function [facility]

## 2021-04-27 NOTE — PROGRESS NOTES
Pre-Bronch    Post-Bronch      Actual Pred %Pred  Actual %Pred %Chng    ---- SPIROMETRY ----                    FVC (L) 4.32 5.24 82   3.48 66 -19      FEV1 (L) 3.51 4.08 86   2.96 72 -15      FEV1/FVC (%) 81 78     85   +4      FEV1/SVC (%) 82 74                FEV1/FEV6 (%) 81 80     85   +4      FEF Max (L/sec) 9.33 10.13 92   8.28 81 -11      FEF 25-75% (L/sec) 3.51 3.57 98   1.16 32 -66      FIVC (L) 3.70       2.87   -22      FIF Max (L/sec) 5.18 8.17 63   3.09 37 -40      Expiratory Time (sec) 6.39       6.39   +0      ----LUNG MECHANICS                     MVV (L/min)   156                MEP (cmH2O)                    MIP (cmH2O)                    ---- LUNG VOLUMES ----                    SVC (L) 4.30 5.50 78              IC (L) 3.54 4.44 79              ERV (L) 0.76 1.07 71              FRC(Pleth) (L) 2.39 3.67 65              RV (Pleth) (L) 1.63 2.33 69              TLC (Pleth) (L) 5.93 7.53 78              RV/TLC (Pleth) (%) 27 35                ---- DIFFUSION ----                    DLCOunc (ml/min/mmHg)   30.65                DLCOcor (ml/min/mmHg)   30.65                DL/VA (ml/min/mmHg/L)   4.33                VA (L)   7.07                IVC (L)                    Hgb (gm/dL)   12-18

## 2021-04-28 LAB
DLCOUNC-%PRED-PRE: 78 %
DLCOUNC-PRE: 24.17 ML/MIN/MMHG
DLCOUNC-PRED: 30.65 ML/MIN/MMHG
ERV-%PRED-PRE: 71 %
ERV-PRE: 0.76 L
ERV-PRED: 1.07 L
EXPTIME-PRE: 6.39 SEC
FEF2575-%PRED-POST: 32 %
FEF2575-%PRED-PRE: 98 %
FEF2575-POST: 1.16 L/SEC
FEF2575-PRE: 3.51 L/SEC
FEF2575-PRED: 3.57 L/SEC
FEFMAX-%PRED-PRE: 92 %
FEFMAX-PRE: 9.33 L/SEC
FEFMAX-PRED: 10.13 L/SEC
FEV1-%PRED-PRE: 86 %
FEV1-PRE: 3.51 L
FEV1FEV6-PRE: 81 %
FEV1FEV6-PRED: 80 %
FEV1FVC-PRE: 81 %
FEV1FVC-PRED: 78 %
FEV1SVC-PRE: 82 %
FEV1SVC-PRED: 74 %
FIFMAX-PRE: 5.18 L/SEC
FRCPLETH-%PRED-PRE: 65 %
FRCPLETH-PRE: 2.39 L
FRCPLETH-PRED: 3.67 L
FVC-%PRED-PRE: 82 %
FVC-PRE: 4.32 L
FVC-PRED: 5.24 L
GAW-%PRED-PRE: 105 %
GAW-PRE: 1.08 L/S/CMH2O
GAW-PRED: 1.03 L/S/CMH2O
IC-%PRED-PRE: 79 %
IC-PRE: 3.54 L
IC-PRED: 4.44 L
RVPLETH-%PRED-PRE: 69 %
RVPLETH-PRE: 1.63 L
RVPLETH-PRED: 2.33 L
SGAW-%PRED-PRE: 430 %
SGAW-PRE: 0.36 1/CMH2O*S
SGAW-PRED: 0.08 1/CMH2O*S
SRAW-%PRED-PRE: 59 %
SRAW-PRE: 2.83 CMH2O*S
SRAW-PRED: 4.76 CMH2O*S
TLCPLETH-%PRED-PRE: 78 %
TLCPLETH-PRE: 5.93 L
TLCPLETH-PRED: 7.53 L
VA-%PRED-PRE: 56 %
VA-PRE: 4.03 L
VC-%PRED-PRE: 78 %
VC-PRE: 4.3 L
VC-PRED: 5.5 L

## 2021-04-29 ENCOUNTER — MYC MEDICAL ADVICE (OUTPATIENT)
Dept: OTOLARYNGOLOGY | Facility: CLINIC | Age: 56
End: 2021-04-29

## 2021-04-29 DIAGNOSIS — G47.10 HYPERSOMNIA: Primary | ICD-10-CM

## 2021-04-30 RX ORDER — MODAFINIL 200 MG/1
200 TABLET ORAL DAILY
Qty: 30 TABLET | Refills: 1 | Status: SHIPPED | OUTPATIENT
Start: 2021-04-30 | End: 2021-05-30

## 2021-04-30 NOTE — TELEPHONE ENCOUNTER
I have increased Provigil dose to 200 mg that is next dose up. The Rx is in [facility].  Jeffrey Stark MD

## 2021-05-10 ENCOUNTER — TELEPHONE (OUTPATIENT)
Dept: CARDIOLOGY | Facility: CLINIC | Age: 56
End: 2021-05-10

## 2021-05-10 NOTE — TELEPHONE ENCOUNTER
----- Message from Caleb Yan PA-C sent at 5/10/2021  3:01 PM CDT -----  Regarding: pft results  Emelina,   Will you please let Bulmaro know that his PFT results are only mildly abnormal- not likely enough to cause his sob.   I recommend he discuss with Dr. Farmer further.    Thanks,   caleb

## 2021-06-03 ENCOUNTER — MYC REFILL (OUTPATIENT)
Dept: OTOLARYNGOLOGY | Facility: CLINIC | Age: 56
End: 2021-06-03

## 2021-06-03 DIAGNOSIS — G47.10 HYPERSOMNIA: ICD-10-CM

## 2021-06-03 RX ORDER — MODAFINIL 200 MG/1
200 TABLET ORAL DAILY
Qty: 30 TABLET | Refills: 1 | Status: CANCELLED | OUTPATIENT
Start: 2021-06-03

## 2021-07-05 ENCOUNTER — MYC MEDICAL ADVICE (OUTPATIENT)
Dept: INTERNAL MEDICINE | Facility: CLINIC | Age: 56
End: 2021-07-05

## 2021-07-05 DIAGNOSIS — K21.9 GASTROESOPHAGEAL REFLUX DISEASE WITHOUT ESOPHAGITIS: ICD-10-CM

## 2021-07-05 RX ORDER — OMEPRAZOLE 40 MG/1
CAPSULE, DELAYED RELEASE ORAL
Qty: 90 CAPSULE | Refills: 1 | Status: SHIPPED | OUTPATIENT
Start: 2021-07-05 | End: 2021-12-17

## 2021-07-05 NOTE — TELEPHONE ENCOUNTER
My Chart sent, prescription found in refill pool.  Medication approved and sent to pharmacy.    Amanda Shaw RN

## 2021-07-05 NOTE — TELEPHONE ENCOUNTER
"  Requested Prescriptions   Pending Prescriptions Disp Refills     omeprazole (PRILOSEC) 40 MG DR capsule [Pharmacy Med Name: OMEPRAZOLE 40MG CPDR] 90 capsule 0     Sig: TAKE 1 CAPSULE (40 MG) BY MOUTH DAILY   Last Written Prescription Date:  4/23/2021  Last Fill Quantity: 90,  # refills: 0   Last office visit: 4/14/2021 with prescribing provider:     Future Office Visit:        PPI Protocol Failed - 7/5/2021  8:26 AM        Failed - Recent (12 mo) or future (30 days) visit within the authorizing provider's specialty     Patient has had an office visit with the authorizing provider or a provider within the authorizing providers department within the previous 12 mos or has a future within next 30 days. See \"Patient Info\" tab in inbasket, or \"Choose Columns\" in Meds & Orders section of the refill encounter.            Passed - Not on Clopidogrel (unless Pantoprazole ordered)        Passed - No diagnosis of osteoporosis on record        Passed - Medication is active on med list        Passed - Patient is age 18 or older         Patient has been seen in the past year as documented above.  Prescription approved per [facility] Refill Protocol.  Amanda Shaw RN      "

## 2021-08-03 ENCOUNTER — MYC REFILL (OUTPATIENT)
Dept: INTERNAL MEDICINE | Facility: CLINIC | Age: 56
End: 2021-08-03

## 2021-08-03 DIAGNOSIS — R05.9 COUGH: ICD-10-CM

## 2021-08-05 ENCOUNTER — ANCILLARY PROCEDURE (OUTPATIENT)
Dept: GENERAL RADIOLOGY | Facility: CLINIC | Age: 56
End: 2021-08-05
Attending: ORTHOPAEDIC SURGERY
Payer: COMMERCIAL

## 2021-08-05 ENCOUNTER — OFFICE VISIT (OUTPATIENT)
Dept: ORTHOPEDICS | Facility: CLINIC | Age: 56
End: 2021-08-05
Payer: COMMERCIAL

## 2021-08-05 VITALS
HEIGHT: 72 IN | BODY MASS INDEX: 33.7 KG/M2 | WEIGHT: 248.8 LBS | SYSTOLIC BLOOD PRESSURE: 120 MMHG | DIASTOLIC BLOOD PRESSURE: 80 MMHG

## 2021-08-05 DIAGNOSIS — M25.512 CHRONIC LEFT SHOULDER PAIN: Primary | ICD-10-CM

## 2021-08-05 DIAGNOSIS — M75.102 ROTATOR CUFF SYNDROME OF LEFT SHOULDER: ICD-10-CM

## 2021-08-05 DIAGNOSIS — M25.512 LEFT SHOULDER PAIN: ICD-10-CM

## 2021-08-05 DIAGNOSIS — M79.89 SOFT TISSUE MASS: ICD-10-CM

## 2021-08-05 DIAGNOSIS — G89.29 CHRONIC LEFT SHOULDER PAIN: Primary | ICD-10-CM

## 2021-08-05 PROCEDURE — 73030 X-RAY EXAM OF SHOULDER: CPT | Mod: TC | Performed by: RADIOLOGY

## 2021-08-05 PROCEDURE — 99204 OFFICE O/P NEW MOD 45 MIN: CPT | Performed by: NURSE PRACTITIONER

## 2021-08-05 RX ORDER — LORATADINE 10 MG/1
10 TABLET ORAL DAILY
Qty: 90 TABLET | Refills: 0 | Status: SHIPPED | OUTPATIENT
Start: 2021-08-05 | End: 2023-03-02

## 2021-08-05 RX ORDER — LORATADINE 10 MG/1
10 TABLET ORAL DAILY
Qty: 90 TABLET | Refills: 0 | OUTPATIENT
Start: 2021-08-05

## 2021-08-05 ASSESSMENT — MIFFLIN-ST. JEOR: SCORE: 2006.55

## 2021-08-05 ASSESSMENT — PAIN SCALES - GENERAL: PAINLEVEL: NO PAIN (0)

## 2021-08-05 NOTE — TELEPHONE ENCOUNTER
Pending Prescriptions:                       Disp   Refills    loratadine (CLARITIN) 10 MG tablet [Pharm*90 tab*0            Sig: TAKE 1 TABLET (10 MG) BY MOUTH DAILY    Medication is being filled for 1 time camacho refill only due to:  Patient is due for medication follow up    Please call and help schedule.  Thank you!    Alanna Ziegler RN on 8/5/2021 at 2:19 PM

## 2021-08-05 NOTE — LETTER
8/5/2021         RE: Bulmaro Herrera  [address]  [facility] [facility] [address]        Dear Colleague,    Thank you for referring your patient, Bulmaro Herrera, to the [facility]. Please see a copy of my visit note below.    ORTHOPEDIC CONSULT      Chief Complaint: Bulmaro Herrera is a 56 year old male who is being seen for   Chief Complaint   Patient presents with     Shoulder Pain     left shoulder pain     Consult       History of Present Illness:   Bulmaro Herrera is a 56 year old male who is seen in consultation at the request of self for evaluation of left shoulder pain and a mass to the left upper arm.  He reports has had shoulder pain off and on for atleast 2-3 years.  Progressively getting worse. Last 2 weeks has been severely flared up. No trauma that can he recall though is very active and is a .  The pain is lateral shoulder that at times radiates to the elbow, worse with abduction, forward flexion >90 degrees. Any attempted overhead lifting and reaching behind the back.  Feels he has been progressively loosing strength and motion to the shoulder.  No radicular symptoms including No numbness/tingling,     #2: states has a mass to his left lateral upper arm for about 3 years. Does not think it is growing, or if it has has been slowly progressing. No trauma. Can be painful at times. No wounds, no erythema.         Patient's past medical, surgical, social and family histories reviewed.     Past Medical History:   Diagnosis Date     Back pains      Depressive disorder, not elsewhere classified 1/28/2007    declines Rx     Disc degeneration 12/2008    see MRI -throaco-lumbar mild discogenic degenerative changes     Elevated LFT's 11/09    alt and ast     Other and unspecified hyperlipidemia 1/2007     Sleep disorder 9/09    CPAP     Tobacco use disorder        Past Surgical History:   Procedure Laterality Date     COLONOSCOPY  07/07/08    adenomatous polyp repeat 5 years      COLONOSCOPY N/A 12/29/2017    Procedure: COMBINED COLONOSCOPY, SINGLE OR MULTIPLE BIOPSY/POLYPECTOMY BY BIOPSY;  Colonoscopy, Polypectomy by Biopsy;  Surgeon: Matt Reyes MD;  Location: [facility]     ENT SURGERY      for deviated septum     EXCISE LESION AXILLA  12/7/2018    Procedure: Excision Skin Tags Right Axilla;  Surgeon: Ayaan Chaney MD;  Location: [facility]     EXCISE LESION BACK N/A 12/7/2018    Procedure: Excision Back Skin Lesion X Two;  Surgeon: Ayaan Chaney MD;  Location: [facility]     HC ECHO HEART XTHORACIC, STRESS/REST  6/2009    neg     INJECT EPIDURAL CERVICAL N/A 5/14/2015    Procedure: INJECT EPIDURAL CERVICAL;  Surgeon: Christian Chaudhry MD;  Location: [facility]     INJECT EPIDURAL CERVICAL N/A 12/28/2018    Procedure: INJECT EPIDURAL CERVICAL 6-7;  Surgeon: Christian Chaudhry MD;  Location: [facility]     INJECT EPIDURAL LUMBAR N/A 12/20/2019    Procedure: Lumbar 5- Sacral 1 Epidural  Injection bilatetral;  Surgeon: Christian Chaudhry MD;  Location: [facility]     INJECT FACET JOINT Bilateral 1/25/2019    Procedure: Cervical Facet Injections Cervical 5-6 and 6-7 Bilateral;  Surgeon: Christian Chaudhry MD;  Location: [facility]     INJECT FACET JOINT Bilateral 11/29/2019    Procedure: Cervical 5-6 and 6-7 Bilateral facet joint injection;  Surgeon: Christian Chaudhry MD;  Location: [facility]     INJECT FACET JOINT Bilateral 2/11/2021    Procedure: Cervical 5-6 and Cervical 6-7 Bilateral facet injections;  Surgeon: Christian Chaudhry MD;  Location: [facility]     ORTHOPEDIC SURGERY      Right hand     TONSILLECTOMY       ZZC NONSPECIFIC PROCEDURE      rt hand surgery - laceration/glass/tendons       Medications:  acetaminophen (TYLENOL) 500 MG tablet, Take 1,000 mg by mouth every morning   ibuprofen (ADVIL/MOTRIN) 200 MG tablet, Take 400 mg by mouth every morning   loratadine (CLARITIN) 10 MG tablet, TAKE 1 TABLET (10 MG) BY MOUTH DAILY  modafinil (PROVIGIL) 200 MG tablet, Take 1 tablet (200 mg) by mouth daily  omeprazole (PRILOSEC) 40 MG  DR capsule, TAKE 1 CAPSULE (40 MG) BY MOUTH DAILY  rosuvastatin (CRESTOR) 5 MG tablet, Take 5 mg by mouth every other day  aspirin (ASA) 81 MG chewable tablet, Take 1 tablet (81 mg) by mouth daily (Patient not taking: Reported on 2021)  [DISCONTINUED] albuterol (PROAIR HFA/PROVENTIL HFA/VENTOLIN HFA) 108 (90 Base) MCG/ACT inhaler, Inhale 2 puffs into the lungs every 6 hours (Patient not taking: Reported on 2021)    No current facility-administered medications on file prior to visit.      Allergies   Allergen Reactions     Percocet [Oxycodone-Acetaminophen] Itching     Wasps [Hornets] Swelling       Social History     Occupational History     Occupation:      Employer: SELF   Tobacco Use     Smoking status: Former Smoker     Packs/day: 1.50     Quit date: 2007     Years since quittin.4     Smokeless tobacco: Never Used   Substance and Sexual Activity     Alcohol use: No     Comment: rare     Drug use: No     Sexual activity: Yes     Partners: Female       Family History   Problem Relation Age of Onset     Breast Cancer Mother      Arthritis Mother         joint ?     Depression Father      Cancer Maternal Grandmother      C.A.D. Maternal Grandfather      Cardiovascular Maternal Grandfather      Gynecology Paternal Grandmother          giving birth     Prostate Cancer Paternal Grandfather      Genetic Disorder Brother         joint pain?     Depression Brother      Cardiovascular Son         congenital heart defect -       Prostate Cancer Other          age 70's     Diabetes Other        REVIEW OF SYSTEMS  10 point review systems performed otherwise negative as noted as per history of present illness.    Physical Exam:  Vitals: /80   Ht 1.829 m (6')   Wt 112.9 kg (248 lb 12.8 oz)   BMI 33.74 kg/m    BMI= Body mass index is 33.74 kg/m .  Constitutional: healthy, alert and no acute distress   Psychiatric: mentation appears normal and affect  normal/bright  NEURO: no focal deficits  RESP: Normal with easy respirations and no use of accessory muscles to breathe, no audible wheezing or retractions  CV: LUE:   no edema         Regular rate and rhythm by palpation  SKIN: No erythema, rashes, excoriation, or breakdown. No evidence of infection. , Visable mass: approximately 0.5cm by 0.5cm round soft skin colored mass, superficial. Semi-mobile, mildly tender. No evidence of infection. No open areas.  No fluctuance.   JOINT/EXTREMITIES: left shoulder: no deformity or effusion. AROM 130/90/40/side pocket.  PROM did not tolerate past 150/100.  Significant pain with attempted abduction and IR. Soft endpoints.  Significant weakness and pain with supraspinatus testing, mild pain with stern, infraspinatus. Unable to test subscapularis due to lack of motion and guarding.  No AC joint tenderness. Proximal bicipital grove tenderness.  Negative cross body. Negative speed's. Negative jergensen's.     Lymph: no appreciated lymphedema  GAIT: not tested     Diagnostic Modalities:  left shoulder X-ray: Well preserved glenohumeral articular space. degenerative changes to AC joint  No fracture, dislocation and or lesion.   Independent visualization of the images was performed.      Impression: chronic left shoulder pain  left shoulder rotator cuff pain and weakness with testing  Left upper extremity soft tissue mass    Plan:  All of the above pertinent physical exam and imaging modalities findings was reviewed with Bulmaro and his wife.  No specific trauma but has been ongoing for multiple years despite conservative therapy.  Pain and symptoms are progressive and now to the point of difficulty with motion and overhead. Given the lack of trauma and impingement findings discussed steroid injection and physical therapy and if no improvement then MRI versus MRI to start with.  With the length of symptoms and severity patient and spouse would like to start with MRI.  He is also  adamantly opposed to any steroid injection as has hx of poor tolerance of injections. With the weakness to rotator cuff, this is reasonable.      #2: discussed the soft tissue mass. Been present for bout 3 years. No significant changes. No red flags. Probable lipoma. Patient and wife would like further imaging. This is reasonable. Already obtaining a shoulder MRI will see if they are able to extend the view to the mass. If not ordered a separate humerus MRI. Depending on findings could consider general surgery referral for removal.     Return to clinic to discuss test results, or sooner as needed for changes.  Re-x-ray on return: No    SHIV Pena, CNP  Orthopedic Surgery          Again, thank you for allowing me to participate in the care of your patient.        Sincerely,        SHIV Kauffman CNP

## 2021-08-05 NOTE — PROGRESS NOTES
ORTHOPEDIC CONSULT      Chief Complaint: Bulmaro Herrera is a 56 year old male who is being seen for   Chief Complaint   Patient presents with     Shoulder Pain     left shoulder pain     Consult       History of Present Illness:   Bulmaro Herrera is a 56 year old male who is seen in consultation at the request of self for evaluation of left shoulder pain and a mass to the left upper arm.  He reports has had shoulder pain off and on for atleast 2-3 years.  Progressively getting worse. Last 2 weeks has been severely flared up. No trauma that can he recall though is very active and is a .  The pain is lateral shoulder that at times radiates to the elbow, worse with abduction, forward flexion >90 degrees. Any attempted overhead lifting and reaching behind the back.  Feels he has been progressively loosing strength and motion to the shoulder.  No radicular symptoms including No numbness/tingling,     #2: states has a mass to his left lateral upper arm for about 3 years. Does not think it is growing, or if it has has been slowly progressing. No trauma. Can be painful at times. No wounds, no erythema.         Patient's past medical, surgical, social and family histories reviewed.     Past Medical History:   Diagnosis Date     Back pains      Depressive disorder, not elsewhere classified 1/28/2007    declines Rx     Disc degeneration 12/2008    see MRI -throaco-lumbar mild discogenic degenerative changes     Elevated LFT's 11/09    alt and ast     Other and unspecified hyperlipidemia 1/2007     Sleep disorder 9/09    CPAP     Tobacco use disorder        Past Surgical History:   Procedure Laterality Date     COLONOSCOPY  07/07/08    adenomatous polyp repeat 5 years     COLONOSCOPY N/A 12/29/2017    Procedure: COMBINED COLONOSCOPY, SINGLE OR MULTIPLE BIOPSY/POLYPECTOMY BY BIOPSY;  Colonoscopy, Polypectomy by Biopsy;  Surgeon: Matt Reyes MD;  Location:  [facility]     ENT SURGERY      for deviated septum     EXCISE  LESION AXILLA  12/7/2018    Procedure: Excision Skin Tags Right Axilla;  Surgeon: Ayaan Chaney MD;  Location: [facility]     EXCISE LESION BACK N/A 12/7/2018    Procedure: Excision Back Skin Lesion X Two;  Surgeon: Ayaan Chaney MD;  Location: [facility]     HC ECHO HEART XTHORACIC, STRESS/REST  6/2009    neg     INJECT EPIDURAL CERVICAL N/A 5/14/2015    Procedure: INJECT EPIDURAL CERVICAL;  Surgeon: Christian Chaudhry MD;  Location: [facility]     INJECT EPIDURAL CERVICAL N/A 12/28/2018    Procedure: INJECT EPIDURAL CERVICAL 6-7;  Surgeon: Christian Chaudhry MD;  Location: [facility]     INJECT EPIDURAL LUMBAR N/A 12/20/2019    Procedure: Lumbar 5- Sacral 1 Epidural  Injection bilatetral;  Surgeon: Christian Chaudhry MD;  Location: [facility]     INJECT FACET JOINT Bilateral 1/25/2019    Procedure: Cervical Facet Injections Cervical 5-6 and 6-7 Bilateral;  Surgeon: Christian Chaudrhy MD;  Location: [facility]     INJECT FACET JOINT Bilateral 11/29/2019    Procedure: Cervical 5-6 and 6-7 Bilateral facet joint injection;  Surgeon: Christian Chaudhry MD;  [facility]     INJECT FACET JOINT Bilateral 2/11/2021    Procedure: Cervical 5-6 and Cervical 6-7 Bilateral facet injections;  Surgeon: Christian Chaudhry MD;  Location: [facility]     ORTHOPEDIC SURGERY      Right hand     TONSILLECTOMY       ZZC NONSPECIFIC PROCEDURE      rt hand surgery - laceration/glass/tendons       Medications:  acetaminophen (TYLENOL) 500 MG tablet, Take 1,000 mg by mouth every morning   ibuprofen (ADVIL/MOTRIN) 200 MG tablet, Take 400 mg by mouth every morning   loratadine (CLARITIN) 10 MG tablet, TAKE 1 TABLET (10 MG) BY MOUTH DAILY  modafinil (PROVIGIL) 200 MG tablet, Take 1 tablet (200 mg) by mouth daily  omeprazole (PRILOSEC) 40 MG DR capsule, TAKE 1 CAPSULE (40 MG) BY MOUTH DAILY  rosuvastatin (CRESTOR) 5 MG tablet, Take 5 mg by mouth every other day  aspirin (ASA) 81 MG chewable tablet, Take 1 tablet (81 mg) by mouth daily (Patient not taking: Reported on  2021)  [DISCONTINUED] albuterol (PROAIR HFA/PROVENTIL HFA/VENTOLIN HFA) 108 (90 Base) MCG/ACT inhaler, Inhale 2 puffs into the lungs every 6 hours (Patient not taking: Reported on 2021)    No current facility-administered medications on file prior to visit.      Allergies   Allergen Reactions     Percocet [Oxycodone-Acetaminophen] Itching     Wasps [Hornets] Swelling       Social History     Occupational History     Occupation:      Employer: SELF   Tobacco Use     Smoking status: Former Smoker     Packs/day: 1.50     Quit date: 2007     Years since quittin.4     Smokeless tobacco: Never Used   Substance and Sexual Activity     Alcohol use: No     Comment: rare     Drug use: No     Sexual activity: Yes     Partners: Female       Family History   Problem Relation Age of Onset     Breast Cancer Mother      Arthritis Mother         joint ?     Depression Father      Cancer Maternal Grandmother      C.A.D. Maternal Grandfather      Cardiovascular Maternal Grandfather      Gynecology Paternal Grandmother          giving birth     Prostate Cancer Paternal Grandfather      Genetic Disorder Brother         joint pain?     Depression Brother      Cardiovascular Son         congenital heart defect -       Prostate Cancer Other          age 70's     Diabetes Other        REVIEW OF SYSTEMS  10 point review systems performed otherwise negative as noted as per history of present illness.    Physical Exam:  Vitals: /80   Ht 1.829 m (6')   Wt 112.9 kg (248 lb 12.8 oz)   BMI 33.74 kg/m    BMI= Body mass index is 33.74 kg/m .  Constitutional: healthy, alert and no acute distress   Psychiatric: mentation appears normal and affect normal/bright  NEURO: no focal deficits  RESP: Normal with easy respirations and no use of accessory muscles to breathe, no audible wheezing or retractions  CV: LUE:   no edema         Regular rate and rhythm by palpation  SKIN: No erythema, rashes,  excoriation, or breakdown. No evidence of infection. , Visable mass: approximately 0.5cm by 0.5cm round soft skin colored mass, superficial. Semi-mobile, mildly tender. No evidence of infection. No open areas.  No fluctuance.   JOINT/EXTREMITIES: left shoulder: no deformity or effusion. AROM 130/90/40/side pocket.  PROM did not tolerate past 150/100.  Significant pain with attempted abduction and IR. Soft endpoints.  Significant weakness and pain with supraspinatus testing, mild pain with stern, infraspinatus. Unable to test subscapularis due to lack of motion and guarding.  No AC joint tenderness. Proximal bicipital grove tenderness.  Negative cross body. Negative speed's. Negative jergensen's.     Lymph: no appreciated lymphedema  GAIT: not tested     Diagnostic Modalities:  left shoulder X-ray: Well preserved glenohumeral articular space. degenerative changes to AC joint  No fracture, dislocation and or lesion.   Independent visualization of the images was performed.      Impression: chronic left shoulder pain  left shoulder rotator cuff pain and weakness with testing  Left upper extremity soft tissue mass    Plan:  All of the above pertinent physical exam and imaging modalities findings was reviewed with Bulmaro and his wife.  No specific trauma but has been ongoing for multiple years despite conservative therapy.  Pain and symptoms are progressive and now to the point of difficulty with motion and overhead. Given the lack of trauma and impingement findings discussed steroid injection and physical therapy and if no improvement then MRI versus MRI to start with.  With the length of symptoms and severity patient and spouse would like to start with MRI.  He is also adamantly opposed to any steroid injection as has hx of poor tolerance of injections. With the weakness to rotator cuff, this is reasonable.      #2: discussed the soft tissue mass. Been present for bout 3 years. No significant changes. No red flags.  Probable lipoma. Patient and wife would like further imaging. This is reasonable. Already obtaining a shoulder MRI will see if they are able to extend the view to the mass. If not ordered a separate humerus MRI. Depending on findings could consider general surgery referral for removal.     Return to clinic to discuss test results, or sooner as needed for changes.  Re-x-ray on return: No    SHIV Pena, CNP  Orthopedic Surgery

## 2021-08-11 ENCOUNTER — HOSPITAL ENCOUNTER (OUTPATIENT)
Dept: MRI IMAGING | Facility: CLINIC | Age: 56
End: 2021-08-11
Attending: NURSE PRACTITIONER
Payer: COMMERCIAL

## 2021-08-11 DIAGNOSIS — M79.89 SOFT TISSUE MASS: ICD-10-CM

## 2021-08-11 DIAGNOSIS — G89.29 CHRONIC LEFT SHOULDER PAIN: ICD-10-CM

## 2021-08-11 DIAGNOSIS — M25.512 CHRONIC LEFT SHOULDER PAIN: ICD-10-CM

## 2021-08-11 DIAGNOSIS — M75.102 ROTATOR CUFF SYNDROME OF LEFT SHOULDER: ICD-10-CM

## 2021-08-11 PROCEDURE — 73218 MRI UPPER EXTREMITY W/O DYE: CPT | Mod: LT

## 2021-08-11 PROCEDURE — 73221 MRI JOINT UPR EXTREM W/O DYE: CPT | Mod: LT

## 2021-08-11 PROCEDURE — 73221 MRI JOINT UPR EXTREM W/O DYE: CPT | Mod: 26 | Performed by: RADIOLOGY

## 2021-08-11 PROCEDURE — 73218 MRI UPPER EXTREMITY W/O DYE: CPT | Mod: 26 | Performed by: RADIOLOGY

## 2021-08-18 ENCOUNTER — TELEPHONE (OUTPATIENT)
Dept: ORTHOPEDICS | Facility: CLINIC | Age: 56
End: 2021-08-18

## 2021-08-18 DIAGNOSIS — M75.112 PARTIAL NONTRAUMATIC TEAR OF LEFT ROTATOR CUFF: ICD-10-CM

## 2021-08-18 DIAGNOSIS — M94.212 CHONDROMALACIA OF LEFT SHOULDER: ICD-10-CM

## 2021-08-18 DIAGNOSIS — G89.29 CHRONIC LEFT SHOULDER PAIN: Primary | ICD-10-CM

## 2021-08-18 DIAGNOSIS — M25.512 CHRONIC LEFT SHOULDER PAIN: Primary | ICD-10-CM

## 2021-08-19 ENCOUNTER — TELEPHONE (OUTPATIENT)
Dept: ORTHOPEDICS | Facility: CLINIC | Age: 56
End: 2021-08-19

## 2021-08-19 NOTE — TELEPHONE ENCOUNTER
Spoke with spouse regarding the humerus MRI and the mass.  Reviewed results.    Per radiologist:  1. Small focal area of increased T2 signal at the region of interest,  favored to represent a small vascular conglomeration. No discrete mass  identified. Ultrasound could be considered for further assessment.     2. Visualized musculature of the arm is unremarkable including the  distal insertion of the deltoid, and the origin of the brachialis near  the region of interest.     3. Please see separate dictation for full assessment of the shoulder  and rotator cuff.    Agree with radiologist.      Patient and spouse may think it may have been there for at least 6 years when they were looking back at older pictures; the mass has not changed sized, and not bothersome.      Discussed option of further watchful waiting versus ultrasound and they would prefer further watchful waiting.    SHIV Pena, CNP  Orthopedic Surgery

## 2021-08-19 NOTE — TELEPHONE ENCOUNTER
Called and spoke with patient's spouse.  Discussed MRI results.      Per radiologist  1. Inferior glenohumeral labral tear with associated prominent  paralabral cysts. No evidence of quadrilateral space impingement.  2. Low-grade partial-thickness tear or rotator cuff.    a. On a background of tendinosis, focal low grade intrasubstance tear  of the supraspinatus/infraspinatus junction of fibers at the  footprint, and low-grade intrasubstance tear of the supraspinatus  distal to the myotendinous junction with medial delamination tear  extension proximally.    b. Subscapularis tendinosis with focal low-grade bursal sided tear of  the middle fibers at the footprint.   c. No muscle bulk loss.  3. 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  Reviewed imaging. Agree with radiologist    Given the grade IV chondromalacia with degenerative tearing of labrum and no full thickness rotator cuff tear recommend starting with conservative care.  Discussed physical therapy. Patient would like to start with this.    Discussed steroid injection. At this point patient would not like this option as he has a fear of injections.  Recommended to contact the clinic if he changes his mind.  Given his anxiety and fear with injections would recommend starting with a glenohumeral injection by radiology as opposed to an office based subacromial injection then referring to radiology if not improving given the chondromalacia.  Subacromial office based injection would be an option though if that is what the patient would prefer.      physical therapy order placed.      Follow-up PRN.    SHIV Pena, CNP  Orthopedic Surgery

## 2021-08-30 DIAGNOSIS — E78.00 HYPERCHOLESTEROLEMIA: ICD-10-CM

## 2021-08-30 RX ORDER — ROSUVASTATIN CALCIUM 5 MG/1
5 TABLET, COATED ORAL DAILY
Qty: 90 TABLET | Refills: 3 | Status: SHIPPED | OUTPATIENT
Start: 2021-08-30 | End: 2022-01-20

## 2021-08-30 NOTE — TELEPHONE ENCOUNTER
Patient due for fasting labs and follow up with CHELSEA Sanders in September. Notified patient's spouse about scheduling follow up.

## 2021-09-01 ENCOUNTER — HOSPITAL ENCOUNTER (OUTPATIENT)
Dept: PHYSICAL THERAPY | Facility: CLINIC | Age: 56
Setting detail: THERAPIES SERIES
End: 2021-09-01
Attending: NURSE PRACTITIONER
Payer: COMMERCIAL

## 2021-09-01 DIAGNOSIS — G89.29 CHRONIC LEFT SHOULDER PAIN: ICD-10-CM

## 2021-09-01 DIAGNOSIS — M75.112 PARTIAL NONTRAUMATIC TEAR OF LEFT ROTATOR CUFF: ICD-10-CM

## 2021-09-01 DIAGNOSIS — M25.512 CHRONIC LEFT SHOULDER PAIN: ICD-10-CM

## 2021-09-01 DIAGNOSIS — M94.212 CHONDROMALACIA OF LEFT SHOULDER: ICD-10-CM

## 2021-09-01 PROCEDURE — 97162 PT EVAL MOD COMPLEX 30 MIN: CPT | Mod: GP | Performed by: PHYSICAL THERAPIST

## 2021-09-06 NOTE — PROGRESS NOTES
09/01/21 1648   General Information   Type of Visit Initial OP Ortho PT Evaluation   Start of Care Date 09/01/21   Referring Physician SHIV Jimenez CNP   Orders Evaluate and Treat   Date of Order 08/18/21   Certification Required? No   Medical Diagnosis Chronic left shoulder pain (M25.512, G89.29) and Chondromalacia of left shoulder (M94.212) and Partial nontraumatic tear of left rotator cuff (M75.112)   Surgical/Medical history reviewed Yes   Precautions/Limitations no known precautions/limitations   Weight-Bearing Status - LUE full weight-bearing   Weight-Bearing Status - RUE full weight-bearing   Weight-Bearing Status - LLE full weight-bearing   Weight-Bearing Status - RLE full weight-bearing   General Information Comments PMH: Back pain, Depressive disorder, Disc degeneration, Elevated LFTs, Hyperlipidemia, Sleep disorder, Tobacco use disorder.  PSH: Colonoscopy in 2008, 2017; ENT surgery for deviated septum, Excision skin tags right axilla 2018, Excision back skin lesion 2018, Echo heart thoracic 2009, Cervical epidural injection May 2015 and Dec 2018, Lumbar epidural injection Dec 2019, Bilateral cervical facet injection Jan 2019 and Feb 2021, Orthopedic surgery right hand, Tonsillectomy        Present No   Body Part(s)   Body Part(s) Shoulder   Presentation and Etiology   Pertinent history of current problem (include personal factors and/or comorbidities that impact the POC) Patient presents with L shoulder pain that started about a month and a half ago.  He just woke up with pain one day.  He thought maybe he slept on it wrong, but it hasn't gotten any better.  He can't sleep on his left side and reports feeling like the arm is being pulled out of the socket.  Lifting to a certain point (above 90 deg) is very painful, but he's fine with motions below that level.  MRI from 8/12/21 showed grade IV chondromalacia with degenerative tear of labrum and partial-thickness  "rotator cuff tear.  Patient previously dealt with rotator cuff problems with his right shoulder.  He was not interested in any steroid injections, as he has a lot of anxiety and fear regarding them.       Impairments A. Pain;D. Decreased ROM;E. Decreased flexibility;F. Decreased strength and endurance   Functional Limitations perform activities of daily living;perform required work activities;perform desired leisure / sports activities   Symptom Location L shoulder - points to areas of bicipital groove, supraspinatus tendon insertion, superior GH joint near acromion    How/Where did it occur Other  (Just woke up with pain one day )   Onset date of current episode/exacerbation 08/18/21  (Date of order )   Chronicity New   Pain rating (0-10 point scale) Best (/10);Worst (/10)  (Currently 0/10 )   Best (/10) 0/10   Worst (/10) 10/10   Pain quality H. Other   Pain quality comment When it's moved in \"just the right way\" it can feel sharp, shooting and almost like an electric shock    Frequency of pain/symptoms C. With activity   Pain/symptoms exacerbated by M. Other   Pain exacerbation comment Any motion above 90 deg (both flexion and abduction motions - extension motions are not painful    Pain/symptoms eased by K. Other   Pain eased by comment Rest/not lifting arm above the 90 deg plane, ice was helpful at first,    Progression of symptoms since onset: Improved  (Improved slightly but not back to 'normal')   Current / Previous Interventions   Diagnostic Tests: MRI   MRI Results Results   MRI results MRI L shoulder 8/11/21: Impression: 1. Inferior glenohumeral labral tear with associated prominent paralabral cysts. No evidence of quadrilateral space impingement.  2. Low-grade partial-thickness tear or rotator cuff.  a. On a background of tendinosis, focal low grade intrasubstance tear of the supraspinatus/infraspinatus junction of fibers at the    Prior Level of Function   Prior Level of Function-Mobility Independent "   Prior Level of Function-ADLs Independent   Current Level of Function   Patient role/employment history A. 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 No   Have you fallen and had an injury in the past year? No   Is patient a fall risk? No   Abuse Screen (yes response referral indicated)   Feels Unsafe at Home or Work/School no   Feels Threatened by Someone no   Does Anyone Try to Keep You From Having Contact with Others or Doing Things Outside Your Home? no   Physical Signs of Abuse Present no   Functional Scales   Functional Scales Other   Other Scales  SPADI score 81.54% (106/130)   Shoulder Objective Findings   Side (if bilateral, select both right and left) Left   Cheng-Elfego Test Positive   Crossover Test Positive L    Shoulder Special Tests Comments Special testing limited d/t pain with overhead motions    Palpation Tender to palpation of bicipital groove, supraspinatus tendon insertion, superior GH joint near acromion    Left Shoulder Flexion AROM 82 deg before pain started    Left Shoulder Abduction AROM 68 deg before pain started (at 40 deg started feeling sore)   Left Shoulder ER AROM 66 deg   Left Shoulder IR AROM Thumb to T10    Planned Therapy Interventions   Planned Therapy Interventions balance training;gait training;joint mobilization;manual therapy;neuromuscular re-education;ROM;strengthening   Planned Modality Interventions   Planned Modality Interventions Cryotherapy;TENS;Ultrasound;Hot packs   Planned Modality Interventions Comments Modalities as needed for symptom relief    Clinical Impression   Criteria for Skilled Therapeutic Interventions Met yes, treatment indicated   PT Diagnosis L shoulder pain, decreased L shoulder AROM, decreased L shoulder strength    Influenced by the following impairments L shoulder pain, decreased L shoulder AROM, decreased L shoulder strength   Functional limitations due to impairments Limited overhead motions, reduced functional mobility   Clinical Presentation Evolving/Changing   Clinical Presentation Rationale Based on observation, history, evaluation and clinical judgment.    Clinical Decision Making (Complexity) Moderate complexity   Therapy Frequency 1 time/week   Predicted Duration of Therapy Intervention (days/wks) 12 weeks   Risk & Benefits of therapy have been explained Yes   Patient, Family & other staff in agreement with plan of care Yes   Clinical Impression Comments Patient presents with signs and symptoms consistent with referring diagnosis of chronic left shoulder pain.  He demonstrates L shoulder pain, decreased L shoulder AROM, decreased L shoulder strength.  Functionally, he has increased pain and difficulty with activities requiring overhead motions and/or abduction and flexion above 90 degrees.  Patient will benefit from skilled physical therapy interventions to address physical impairments for return to functional activities.    Education Assessment   Preferred Learning Style Listening;Reading;Demonstration;Pictures/video   Barriers to Learning No barriers   ORTHO GOALS   PT Ortho Eval Goals 1;2;3   Ortho Goal 1   Goal Identifier 1   Goal Description Patient will improve SPADI score from 106/130 (81.54%) to at least 13/130 in order to demonstrate only 10% impact of shoulder pain and dysfunction on everyday life.     Target Date 11/29/21   Ortho Goal 2   Goal Identifier 2   Goal Description Patient will increase L shoulder flexion and abduction from 82 deg to at least 170 deg (flexion) and from 68 deg to at least 170 deg (abduction) without pain in order to facilitate return to prior level of function.     Target Date 11/29/21   Ortho Goal 3   Goal Identifier 3   Goal Description Patient will demonstrate 5/5 strength  with L UE in order to facilitate return to prior level of function and prevent further injury.   Target Date 11/29/21   Total Evaluation Time   PT Alda, Moderate Complexity Minutes (99013) 42           Brianna Garcia PT, DPT, [facility]  Physical Therapist     Phone: [phone]  Email: [email or web address]

## 2021-09-15 ENCOUNTER — HOSPITAL ENCOUNTER (OUTPATIENT)
Dept: PHYSICAL THERAPY | Facility: CLINIC | Age: 56
Setting detail: THERAPIES SERIES
End: 2021-09-15
Attending: NURSE PRACTITIONER
Payer: COMMERCIAL

## 2021-09-15 PROCEDURE — 97140 MANUAL THERAPY 1/> REGIONS: CPT | Mod: GP | Performed by: PHYSICAL THERAPIST

## 2021-09-15 PROCEDURE — 97110 THERAPEUTIC EXERCISES: CPT | Mod: GP | Performed by: PHYSICAL THERAPIST

## 2021-10-05 ENCOUNTER — VIRTUAL VISIT (OUTPATIENT)
Dept: PALLIATIVE MEDICINE | Facility: CLINIC | Age: 56
End: 2021-10-05
Payer: COMMERCIAL

## 2021-10-05 DIAGNOSIS — M75.112 PARTIAL NONTRAUMATIC TEAR OF LEFT ROTATOR CUFF: ICD-10-CM

## 2021-10-05 DIAGNOSIS — M75.102 ROTATOR CUFF SYNDROME OF LEFT SHOULDER: ICD-10-CM

## 2021-10-05 DIAGNOSIS — M47.812 ARTHROPATHY OF CERVICAL FACET JOINT: Primary | ICD-10-CM

## 2021-10-05 DIAGNOSIS — M94.212 CHONDROMALACIA OF LEFT SHOULDER: ICD-10-CM

## 2021-10-05 PROCEDURE — 99213 OFFICE O/P EST LOW 20 MIN: CPT | Mod: TEL | Performed by: PHYSICIAN ASSISTANT

## 2021-10-05 NOTE — PROGRESS NOTES
Bulmaro is a 56 year old who is being evaluated via a billable telephone visit.    Is Pt currently in [place]? Yes    NOTE:  If Pt is not in [place], Appointment needs to be canceled and rescheduled.      What phone number would you like to be contacted at? [phone]   How would you like to obtain your AVS? [facility]    CHIEF COMPLAINT:   Pain  -Neck   -low back pain    INTERVAL HISTORY:  Last seen on 1/25/21.        Recommendations/plan at the last visit included:  1. Physical Therapy:  NO   2. Clinical Health Psychologist:  NO    3. Diagnostic Studies:  none  4. Medication Management:  No changes made today  5. Procedures: referred for cervical facet joint injections. Due to patient's severe needle phobia, CMBB preceding to RFA is not an option  6. Recommendations to PCP. See above     Follow up with this provider: as needed     Since his last visit, Bulmaro Herrera reports:    That he needs injections in his neck. He states that he was also wondering if he could get a shoulder injection at the same time. He has 4 rips in his shoulder and the cartilage is gone. He states that between that and his neck he has been having issus sleeping. He has a severe needle phobia.         Pain Information:   Pain today 7/10      CURRENT RELEVANT PAIN MEDICATIONS:   Ibuprofen-200mg, 2 in AM  Tylenol 500mg-takes 1 in AM      Patient is using the medication as prescribed:  YES  Is your medication helpful? YES   Medication side effects? no side effect    Previous Medications: (H--helped; HI--Helped initially; SWH-- somewhat helpful, NH--No help; W--worse; SE--side effects)   Opiates: Tylenol #3 H SE wake up like with a hangover, dizzy  NSAIDS: Ibuprofen H SE upset stomach  Muscle Relaxants: Flexeril NH SE sedation, Robaxin NH  Anti-migraine mediations: none  Anti-depressants: SE after coming off of them, feels like his personality was altered from them. While on them, felt like head was in a  cloud. Amitriptyline SE restless legs   Sleep aids:None  Anxiolytics: None  Neuropathics: Gabapentin SE restless legs     Topicals: Lidocaine Patches SWH   Other medications not covered above:  Prednisone H    Past Pain Treatments:  Pain Clinic:   No   PT: Yes for neck. Helped for about 1 day. Was doing exercises at home.   Psychologist: No  Relaxation techniques/biofeedback: No  Chiropractor: Yes, helps for 1/2 to 1 day  Acupuncture: Yes, NH one needle caused SE of significantly increased pain  Pharmacotherapy:          Opioids: Yes            Non-opioids:    Yes   TENs Unit: Yes, tried at chiropractor, makes more tense  Injections:   -12/20/20119 L5-S1 KRYSTAL  -11/29/2019 bilateral C5-6 and C6-7 facet joint injections  -1/25/2019 bilateral C5-6 and C6-7 facet joint injections  -12/28/2018 C6-7 interlaminar epidural steroid injection , had a pain flare for 1 week, then maybe it's been slightly helpful.   -2-3 years ago, low back injection.  Self-care:   Yes, tried Salem Gel  Surgeries related to pain: No     [place] Board of Pharmacy Data Base Reviewed:    NO      Medications:  Current Outpatient Medications   Medication Sig Dispense Refill     acetaminophen (TYLENOL) 500 MG tablet Take 1,000 mg by mouth every morning        aspirin (ASA) 81 MG chewable tablet Take 1 tablet (81 mg) by mouth daily (Patient not taking: Reported on 8/5/2021)       ibuprofen (ADVIL/MOTRIN) 200 MG tablet Take 400 mg by mouth every morning        loratadine (CLARITIN) 10 MG tablet TAKE 1 TABLET (10 MG) BY MOUTH DAILY 90 tablet 0     modafinil (PROVIGIL) 200 MG tablet Take 1 tablet (200 mg) by mouth daily 30 tablet 3     omeprazole (PRILOSEC) 40 MG DR capsule TAKE 1 CAPSULE (40 MG) BY MOUTH DAILY 90 capsule 1     rosuvastatin (CRESTOR) 5 MG tablet Take 1 tablet (5 mg) by mouth daily 90 tablet 3         PHYSICAL EXAM:     Vitals:   There were no vitals taken for this visit.  There is no height or weight on file to calculate BMI.  Data  Unavailable  0 lbs 0 oz      DIAGNOSTIC TESTS:  Imaging Studies:   No new imaging to review    Assessment:  Bulmaro Herrera is a 55 year old male who presents today for follow up regarding his:    1.  Cervical facet arthropathy  2.  Partial nontraumatic tear of left rotator cuff  3. Chondromalacia of left shoulder  4. Rotator cuff syndrome of left shoulder    Worsening neck pain. Referred for cervical facet joint injections. He would like to try the shoulder injection as well. He has a severe needle phobia however and is unable to get injections without sedation. Will place order to have left shoulder joint injection as well as the same time as facet injection. Patient aware that Dr. Chaudhry may not do these injections and it may not be able to be done at the same time as his other injections.     Plan:    Diagnosis reviewed, treatment option addressed, and risk/benifits discussed.  Self-care instructions given.  I am recommending a multidisciplinary treatment plan to help this patient better manage pain.      1. Physical Therapy:  NO   2. Clinical Health Psychologist:  NO    3. Diagnostic Studies:  none  4. Medication Management:  No changes made today  5. Procedures: referred for cervical facet joint injections. Due to patient's severe needle phobia, CMBB preceding to RFA is not an option. Referred for left glenohumeral joint injection as recommended by orthopedics. Request made for these to be done at the same time.   6. Recommendations to PCP. See above    Follow up with this provider: PRN    The total TIME spent on this patient on the day of the appointment was 12 minutes.    Time spent preparing to see the patient (reviewing records and tests) 1 minutes  Time spend face to face (or on the phone) with the patient 7 minutes  Time spent ordering tests, medications, procedures and referrals 0 minutes  Time spent Referring and communicating with other healthcare professionals 0 minutes  Time spent documenting clinical  information in Epic 4 minutes        Jacque Kay PA-C   [facility] [place]

## 2021-10-05 NOTE — PATIENT INSTRUCTIONS
After Visit Instructions:     Thank you for coming to [facility] for your care. It is my goal to partner with you to help you reach your optimal state of health.     I am recommending multidisciplinary care at this time.  The focus of care will be to continue gradual rehabilitation and pain management with medication adjustments as needed.    Continue daily self-care, identifying contributing factors, and monitoring variations in pain level. Continue to integrate self-care into your life.          Schedule follow-up with Jacque Kay PA-C as needed. You will need to make this appointment.     Procedures recommended: referred for cervical facet steroid injections and left glenohumeral joint injection. To call and schedule your procedure, you can call: [phone], then hit option 2         Jacque Kay PA-C  [facility]    Contact information: [facility]  Clinic Number:  [phone]     Call with any questions about your care and for scheduling assistance.     Calls are returned Monday through Friday between 8 AM and 4:30 PM. We usually get back to you within 2 business days depending on the issue/request.    If we are prescribing your medications:    For opioid medication refills, call the clinic or send a [facility] message 7 days in advance.  Please include:    Name of requested medication    Name of the pharmacy.    For non-opioid medications, call your pharmacy directly to request a refill. Please allow 3-4 days to be processed.     Per [place] State Law:    All controlled substance prescriptions must be filled within 30 days of being written.      For those controlled substances allowing refills, pickup must occur within 30 days of last fill.      We believe regular attendance is key to your success in our program!      Any time you are unable to keep your appointment we ask that you call us at  least 24 hours in advance to cancel.This will allow us to offer the appointment time to another patient.   Multiple missed appointments may lead to dismissal from the clinic.

## 2021-10-06 ENCOUNTER — HOSPITAL ENCOUNTER (OUTPATIENT)
Dept: PHYSICAL THERAPY | Facility: CLINIC | Age: 56
Setting detail: THERAPIES SERIES
End: 2021-10-06
Attending: NURSE PRACTITIONER
Payer: COMMERCIAL

## 2021-10-06 PROCEDURE — 97110 THERAPEUTIC EXERCISES: CPT | Mod: GP | Performed by: PHYSICAL THERAPIST

## 2021-10-07 ENCOUNTER — TELEPHONE (OUTPATIENT)
Dept: PALLIATIVE MEDICINE | Facility: CLINIC | Age: 56
End: 2021-10-07

## 2021-10-07 DIAGNOSIS — Z11.59 ENCOUNTER FOR SCREENING FOR OTHER VIRAL DISEASES: ICD-10-CM

## 2021-10-07 NOTE — TELEPHONE ENCOUNTER
Pt scheduled for injections   Date: 10/19/21  Time: 1230    Dr. Chaudhry    Instructed pt to have H&P and  for procedure.  Patient informed of COVID testing process.

## 2021-10-14 ENCOUNTER — OFFICE VISIT (OUTPATIENT)
Dept: INTERNAL MEDICINE | Facility: CLINIC | Age: 56
End: 2021-10-14
Payer: COMMERCIAL

## 2021-10-14 VITALS
BODY MASS INDEX: 34.94 KG/M2 | HEART RATE: 98 BPM | OXYGEN SATURATION: 95 % | RESPIRATION RATE: 16 BRPM | DIASTOLIC BLOOD PRESSURE: 76 MMHG | WEIGHT: 257.6 LBS | TEMPERATURE: 98.3 F | SYSTOLIC BLOOD PRESSURE: 124 MMHG

## 2021-10-14 DIAGNOSIS — Z01.818 PREOP GENERAL PHYSICAL EXAM: Primary | ICD-10-CM

## 2021-10-14 DIAGNOSIS — M50.30 DDD (DEGENERATIVE DISC DISEASE), CERVICAL: ICD-10-CM

## 2021-10-14 DIAGNOSIS — M50.20 HERNIATION OF INTERVERTEBRAL DISC OF CERVICAL SPINE WITHOUT RADICULOPATHY: ICD-10-CM

## 2021-10-14 DIAGNOSIS — G47.33 OSA (OBSTRUCTIVE SLEEP APNEA): ICD-10-CM

## 2021-10-14 DIAGNOSIS — M54.2 CERVICALGIA: ICD-10-CM

## 2021-10-14 DIAGNOSIS — M75.102 ROTATOR CUFF SYNDROME OF LEFT SHOULDER: ICD-10-CM

## 2021-10-14 DIAGNOSIS — F40.231 SEVERE NEEDLE PHOBIA: Chronic | ICD-10-CM

## 2021-10-14 PROCEDURE — 99214 OFFICE O/P EST MOD 30 MIN: CPT | Performed by: INTERNAL MEDICINE

## 2021-10-14 ASSESSMENT — PAIN SCALES - GENERAL: PAINLEVEL: MILD PAIN (3)

## 2021-10-14 NOTE — PROGRESS NOTES
[address] [address]  Phone: [phone]  Primary Provider: Gustavo Farmer  Pre-op Performing Provider: GUSTAVO FARMER      PREOPERATIVE EVALUATION:  Today's date: 10/14/2021    Bulmaro Herrera is a 56 year old male who presents for a preoperative evaluation.    Surgical Information:  Surgery/Procedure: Cervical spine injections   Surgery Location: [facility]   Surgeon: Isidoro  Surgery Date: 10/19/21  Time of Surgery: 12:30pm   Where patient plans to recover: At home with family  Fax number for surgical facility: Note does not need to be faxed, will be available electronically in Epic.    Type of Anesthesia Anticipated: Local with MAC    Assessment & Plan     The proposed surgical procedure is considered LOW risk.    Preop general physical exam    Severe needle phobia    Cervicalgia    ROBBY (obstructive sleep apnea)    Herniation of intervertebral disc of cervical spine without radiculopathy    DDD (degenerative disc disease), cervical    Rotator cuff syndrome of left shoulder       Implanted Device:      Risks and Recommendations:  The patient has the following additional risks and recommendations for perioperative complications:   - No identified additional risk factors other than previously addressed    Medication Instructions:   - aspirin: Discontinue aspirin 7-10 days prior to procedure to reduce bleeding risk. It should be resumed postoperatively.     RECOMMENDATION:  APPROVAL GIVEN to proceed with proposed procedure, without further diagnostic evaluation.    Review of external notes as documented above     Subjective     HPI related to upcoming procedure: Is chronic and persistent cervical pain.  Is anticipating epidural injection for pain management in face of his chronic degenerative cervical disc disease.      Preop Questions 1/27/2021   1. Have you ever had a heart attack or stroke? No   2. Have you ever had surgery on your  heart or blood vessels, such as a stent placement, a coronary artery bypass, or surgery on an artery in your head, neck, heart, or legs? No   3. Do you have chest pain with activity? No   4. Do you have a history of  heart failure? No   5. Do you currently have a cold, bronchitis or symptoms of other infection? No   6. Do you have a cough, shortness of breath, or wheezing? UNKNOWN    7. Do you or anyone in your family have previous history of blood clots? No   8. Do you or does anyone in your family have a serious bleeding problem such as prolonged bleeding following surgeries or cuts? No   9. Have you ever had problems with anemia or been told to take iron pills? No   10. Have you had any abnormal blood loss such as black, tarry or bloody stools? No   11. Have you ever had a blood transfusion? No   12. Are you willing to have a blood transfusion if it is medically needed before, during, or after your surgery? Yes   13. Have you or any of your relatives ever had problems with anesthesia? YES    14. Do you have sleep apnea, excessive snoring or daytime drowsiness? No   15. Do you have any artifical heart valves or other implanted medical devices like a pacemaker, defibrillator, or continuous glucose monitor? No   16. Do you have artificial joints? No   17. Are you allergic to latex? No     Health Care Directive:  Patient does not have a Health Care Directive or Living Will: Discussed advance care planning with patient; however, patient declined at this time.    Preoperative Review of :   reviewed - controlled substances reflected in medication list.      Status of Chronic Conditions:  See problem list for active medical problems.  Problems all longstanding and stable, except as noted/documented.  See ROS for pertinent symptoms related to these conditions.      Review of Systems  CONSTITUTIONAL: NEGATIVE for fever, chills, change in weight  INTEGUMENTARY/SKIN: NEGATIVE for worrisome rashes, moles or  lesions  EYES: NEGATIVE for vision changes or irritation  ENT/MOUTH: NEGATIVE for ear, mouth and throat problems  RESP: NEGATIVE for significant cough or SOB  CV: NEGATIVE for chest pain, palpitations or peripheral edema  GI: NEGATIVE for nausea, abdominal pain, heartburn, or change in bowel habits  : NEGATIVE for frequency, dysuria, or hematuria  MUSCULOSKELETAL:Chronic neck and shoulder pain.  No radicular pain at this time.  NEURO: NEGATIVE for weakness, dizziness or paresthesias  ENDOCRINE: NEGATIVE for temperature intolerance, skin/hair changes  HEME: NEGATIVE for bleeding problems  PSYCHIATRIC: NEGATIVE for changes in mood or affect    Patient Active Problem List    Diagnosis Date Noted     Chondromalacia of left shoulder 08/18/2021     Priority: Medium     Partial nontraumatic tear of left rotator cuff 08/18/2021     Priority: Medium     Soft tissue mass 08/05/2021     Priority: Medium     Rotator cuff syndrome of left shoulder 08/05/2021     Priority: Medium     Chronic left shoulder pain 08/05/2021     Priority: Medium     Morbid obesity (H) 03/06/2020     Priority: Medium     NSAID long-term use 11/18/2019     Priority: Medium     Severe needle phobia 01/21/2019     Priority: Medium     Class: Chronic     likely to preclude him from having minimally invasive interventional pain procedures with minimal sedation. Would need deeper MAC sedation       Mixed hyperlipidemia 11/13/2018     Priority: Medium     ROBBY (obstructive sleep apnea) 11/13/2018     Priority: Medium     Herniation of intervertebral disc of cervical spine without radiculopathy 03/21/2017     Priority: Medium     Cervicalgia 03/21/2017     Priority: Medium     Chronic pain syndrome 04/05/2016     Priority: Medium     DDD, cervical. T#3, #60/mo.        DDD (degenerative disc disease), lumbar 12/20/2013     Priority: Medium     DDD (degenerative disc disease), cervical 12/20/2013     Priority: Medium     Pruritus ani 01/20/2010     Priority:  Medium     Back pains      Priority: Medium     Problem list name updated by automated process. Provider to review and confirm       Esophageal reflux 04/13/2005     Priority: Medium      Past Medical History:   Diagnosis Date     Back pains      Depressive disorder, not elsewhere classified 1/28/2007    declines Rx     Disc degeneration 12/2008    see MRI -throaco-lumbar mild discogenic degenerative changes     Elevated LFT's 11/09    alt and ast     Other and unspecified hyperlipidemia 1/2007     Sleep disorder 9/09    CPAP     Tobacco use disorder      Past Surgical History:   Procedure Laterality Date     COLONOSCOPY  07/07/08    adenomatous polyp repeat 5 years     COLONOSCOPY N/A 12/29/2017    Procedure: COMBINED COLONOSCOPY, SINGLE OR MULTIPLE BIOPSY/POLYPECTOMY BY BIOPSY;  Colonoscopy, Polypectomy by Biopsy;  Surgeon: Matt Reyes MD;  Location: [facility]     ENT SURGERY      for deviated septum     EXCISE LESION AXILLA  12/7/2018    Procedure: Excision Skin Tags Right Axilla;  Surgeon: Ayaan Chaney MD;  Location: [facility]     EXCISE LESION BACK N/A 12/7/2018    Procedure: Excision Back Skin Lesion X Two;  Surgeon: Ayaan Chaney MD;  Location: [facility]     HC ECHO HEART XTHORACIC, STRESS/REST  6/2009    neg     INJECT EPIDURAL CERVICAL N/A 5/14/2015    Procedure: INJECT EPIDURAL CERVICAL;  Surgeon: Christian Chaudhry MD;  Location: [facility]     INJECT EPIDURAL CERVICAL N/A 12/28/2018    Procedure: INJECT EPIDURAL CERVICAL 6-7;  Surgeon: Christian Chaudhry MD;  Location: [facility]     INJECT EPIDURAL LUMBAR N/A 12/20/2019    Procedure: Lumbar 5- Sacral 1 Epidural  Injection bilatetral;  Surgeon: Christian Chaudhry MD;  Location: [facility]     INJECT FACET JOINT Bilateral 1/25/2019    Procedure: Cervical Facet Injections Cervical 5-6 and 6-7 Bilateral;  Surgeon: Christian Chaudhry MD;  Location: [facility]     INJECT FACET JOINT Bilateral 11/29/2019    Procedure: Cervical 5-6 and 6-7 Bilateral facet joint injection;  Surgeon: Isidoro  Christian CHAUHAN MD;  Location: [facility]     INJECT FACET JOINT Bilateral 2021    Procedure: Cervical 5-6 and Cervical 6-7 Bilateral facet injections;  Surgeon: Christian Chaudhry MD;  Location: [facility]     ORTHOPEDIC SURGERY      Right hand     TONSILLECTOMY       ZZC NONSPECIFIC PROCEDURE      rt hand surgery - laceration/glass/tendons     Current Outpatient Medications   Medication Sig Dispense Refill     acetaminophen (TYLENOL) 500 MG tablet Take 1,000 mg by mouth every morning        ibuprofen (ADVIL/MOTRIN) 200 MG tablet Take 400 mg by mouth every morning        loratadine (CLARITIN) 10 MG tablet TAKE 1 TABLET (10 MG) BY MOUTH DAILY 90 tablet 0     modafinil (PROVIGIL) 200 MG tablet Take 1 tablet (200 mg) by mouth daily 30 tablet 3     omeprazole (PRILOSEC) 40 MG DR capsule TAKE 1 CAPSULE (40 MG) BY MOUTH DAILY 90 capsule 1     rosuvastatin (CRESTOR) 5 MG tablet Take 1 tablet (5 mg) by mouth daily 90 tablet 3     aspirin (ASA) 81 MG chewable tablet Take 1 tablet (81 mg) by mouth daily (Patient not taking: Reported on 2021)         Allergies   Allergen Reactions     Percocet [Oxycodone-Acetaminophen] Itching     Wasps [Hornets] Swelling        Social History     Tobacco Use     Smoking status: Former Smoker     Packs/day: 1.50     Quit date: 2007     Years since quittin.6     Smokeless tobacco: Never Used   Substance Use Topics     Alcohol use: No     Comment: rare     Family History   Problem Relation Age of Onset     Breast Cancer Mother      Arthritis Mother         joint ?     Depression Father      Cancer Maternal Grandmother      C.A.D. Maternal Grandfather      Cardiovascular Maternal Grandfather      Gynecology Paternal Grandmother          giving birth     Prostate Cancer Paternal Grandfather      Genetic Disorder Brother         joint pain?     Depression Brother      Cardiovascular Son         congenital heart defect -       Prostate Cancer Other          age 70's     Diabetes Other       History   Drug Use No         Objective     /76 (BP Location: Right arm, Patient Position: Sitting, Cuff Size: Adult Large)   Pulse 98   Temp 98.3  F (36.8  C) (Temporal)   Resp 16   Wt 116.8 kg (257 lb 9.6 oz)   SpO2 95%   BMI 34.94 kg/m      Physical Exam    GENERAL APPEARANCE: healthy, alert and no distress     EYES: EOMI,  PERRL     HENT: ear canals and TM's normal and nose and mouth without ulcers or lesions     NECK: no adenopathy, no asymmetry, masses, or scars and thyroid normal to palpation     RESP: lungs clear to auscultation - no rales, rhonchi or wheezes     CV: regular rates and rhythm, normal S1 S2, no S3 or S4 and no murmur, click or rub     ABDOMEN:  soft, nontender, no HSM or masses and bowel sounds normal     MS: extremities normal- no gross deformities noted, no evidence of inflammation in joints, FROM in all extremities.     SKIN: no suspicious lesions or rashes     NEURO: Normal strength and tone, sensory exam grossly normal, mentation intact and speech normal     PSYCH: mentation appears normal. and affect normal/bright     LYMPHATICS: No cervical adenopathy    Recent Labs   Lab Test 02/08/21  1213 11/15/19  0851   HGB 16.3 16.0    209    139   POTASSIUM 4.1 4.3   CR 0.94 1.12        Diagnostics:  No labs were ordered during this visit.   No EKG required for low risk surgery (cataract, skin procedure, breast biopsy, etc).    Revised Cardiac Risk Index (RCRI):  The patient has the following serious cardiovascular risks for perioperative complications:   - No serious cardiac risks = 0 points     RCRI Interpretation: 0 points: Class I (very low risk - 0.4% complication rate)            Time spent With/On Patient  Length of time   32 minutes      Chart reviewed  yes    Review of outside records yes    Review of test results yes    Interpretation of results yes     Patient visit  yes  Documentation  no  Discussion with family no  Discussion with providers no            Signed Electronically by: Gustavo Farmer DO  Copy of this evaluation report is provided to requesting physician.

## 2021-10-14 NOTE — PATIENT INSTRUCTIONS

## 2021-10-14 NOTE — H&P (VIEW-ONLY)
[address] [address]  Phone: [phone]  Primary Provider: Gustavo Farmer  Pre-op Performing Provider: GUSTAVO FARMER      PREOPERATIVE EVALUATION:  Today's date: 10/14/2021    Bulmaro Herrera is a 56 year old male who presents for a preoperative evaluation.    Surgical Information:  Surgery/Procedure: Cervical spine injections   Surgery Location: [facility]   Surgeon: Isidoro  Surgery Date: 10/19/21  Time of Surgery: 12:30pm   Where patient plans to recover: At home with family  Fax number for surgical facility: Note does not need to be faxed, will be available electronically in Epic.    Type of Anesthesia Anticipated: Local with MAC    Assessment & Plan     The proposed surgical procedure is considered LOW risk.    Preop general physical exam    Severe needle phobia    Cervicalgia    ROBBY (obstructive sleep apnea)    Herniation of intervertebral disc of cervical spine without radiculopathy    DDD (degenerative disc disease), cervical    Rotator cuff syndrome of left shoulder       Implanted Device:      Risks and Recommendations:  The patient has the following additional risks and recommendations for perioperative complications:   - No identified additional risk factors other than previously addressed    Medication Instructions:   - aspirin: Discontinue aspirin 7-10 days prior to procedure to reduce bleeding risk. It should be resumed postoperatively.     RECOMMENDATION:  APPROVAL GIVEN to proceed with proposed procedure, without further diagnostic evaluation.    Review of external notes as documented above     Subjective     HPI related to upcoming procedure: Is chronic and persistent cervical pain.  Is anticipating epidural injection for pain management in face of his chronic degenerative cervical disc disease.      Preop Questions 1/27/2021   1. Have you ever had a heart attack or stroke? No   2. Have you ever had surgery on your  heart or blood vessels, such as a stent placement, a coronary artery bypass, or surgery on an artery in your head, neck, heart, or legs? No   3. Do you have chest pain with activity? No   4. Do you have a history of  heart failure? No   5. Do you currently have a cold, bronchitis or symptoms of other infection? No   6. Do you have a cough, shortness of breath, or wheezing? UNKNOWN    7. Do you or anyone in your family have previous history of blood clots? No   8. Do you or does anyone in your family have a serious bleeding problem such as prolonged bleeding following surgeries or cuts? No   9. Have you ever had problems with anemia or been told to take iron pills? No   10. Have you had any abnormal blood loss such as black, tarry or bloody stools? No   11. Have you ever had a blood transfusion? No   12. Are you willing to have a blood transfusion if it is medically needed before, during, or after your surgery? Yes   13. Have you or any of your relatives ever had problems with anesthesia? YES    14. Do you have sleep apnea, excessive snoring or daytime drowsiness? No   15. Do you have any artifical heart valves or other implanted medical devices like a pacemaker, defibrillator, or continuous glucose monitor? No   16. Do you have artificial joints? No   17. Are you allergic to latex? No     Health Care Directive:  Patient does not have a Health Care Directive or Living Will: Discussed advance care planning with patient; however, patient declined at this time.    Preoperative Review of :   reviewed - controlled substances reflected in medication list.      Status of Chronic Conditions:  See problem list for active medical problems.  Problems all longstanding and stable, except as noted/documented.  See ROS for pertinent symptoms related to these conditions.      Review of Systems  CONSTITUTIONAL: NEGATIVE for fever, chills, change in weight  INTEGUMENTARY/SKIN: NEGATIVE for worrisome rashes, moles or  lesions  EYES: NEGATIVE for vision changes or irritation  ENT/MOUTH: NEGATIVE for ear, mouth and throat problems  RESP: NEGATIVE for significant cough or SOB  CV: NEGATIVE for chest pain, palpitations or peripheral edema  GI: NEGATIVE for nausea, abdominal pain, heartburn, or change in bowel habits  : NEGATIVE for frequency, dysuria, or hematuria  MUSCULOSKELETAL:Chronic neck and shoulder pain.  No radicular pain at this time.  NEURO: NEGATIVE for weakness, dizziness or paresthesias  ENDOCRINE: NEGATIVE for temperature intolerance, skin/hair changes  HEME: NEGATIVE for bleeding problems  PSYCHIATRIC: NEGATIVE for changes in mood or affect    Patient Active Problem List    Diagnosis Date Noted     Chondromalacia of left shoulder 08/18/2021     Priority: Medium     Partial nontraumatic tear of left rotator cuff 08/18/2021     Priority: Medium     Soft tissue mass 08/05/2021     Priority: Medium     Rotator cuff syndrome of left shoulder 08/05/2021     Priority: Medium     Chronic left shoulder pain 08/05/2021     Priority: Medium     Morbid obesity (H) 03/06/2020     Priority: Medium     NSAID long-term use 11/18/2019     Priority: Medium     Severe needle phobia 01/21/2019     Priority: Medium     Class: Chronic     likely to preclude him from having minimally invasive interventional pain procedures with minimal sedation. Would need deeper MAC sedation       Mixed hyperlipidemia 11/13/2018     Priority: Medium     ROBBY (obstructive sleep apnea) 11/13/2018     Priority: Medium     Herniation of intervertebral disc of cervical spine without radiculopathy 03/21/2017     Priority: Medium     Cervicalgia 03/21/2017     Priority: Medium     Chronic pain syndrome 04/05/2016     Priority: Medium     DDD, cervical. T#3, #60/mo.        DDD (degenerative disc disease), lumbar 12/20/2013     Priority: Medium     DDD (degenerative disc disease), cervical 12/20/2013     Priority: Medium     Pruritus ani 01/20/2010     Priority:  Medium     Back pains      Priority: Medium     Problem list name updated by automated process. Provider to review and confirm       Esophageal reflux 04/13/2005     Priority: Medium      Past Medical History:   Diagnosis Date     Back pains      Depressive disorder, not elsewhere classified 1/28/2007    declines Rx     Disc degeneration 12/2008    see MRI -throaco-lumbar mild discogenic degenerative changes     Elevated LFT's 11/09    alt and ast     Other and unspecified hyperlipidemia 1/2007     Sleep disorder 9/09    CPAP     Tobacco use disorder      Past Surgical History:   Procedure Laterality Date     COLONOSCOPY  07/07/08    adenomatous polyp repeat 5 years     COLONOSCOPY N/A 12/29/2017    Procedure: COMBINED COLONOSCOPY, SINGLE OR MULTIPLE BIOPSY/POLYPECTOMY BY BIOPSY;  Colonoscopy, Polypectomy by Biopsy;  Surgeon: Matt Reyes MD;  Location: [facility]     ENT SURGERY      for deviated septum     EXCISE LESION AXILLA  12/7/2018    Procedure: Excision Skin Tags Right Axilla;  Surgeon: Ayaan Chaney MD;  [facility]     EXCISE LESION BACK N/A 12/7/2018    Procedure: Excision Back Skin Lesion X Two;  Surgeon: Ayaan Chaney MD;  Location: [facility]     HC ECHO HEART XTHORACIC, STRESS/REST  6/2009    neg     INJECT EPIDURAL CERVICAL N/A 5/14/2015    Procedure: INJECT EPIDURAL CERVICAL;  Surgeon: Christian Chaudhry MD;  [facility]     INJECT EPIDURAL CERVICAL N/A 12/28/2018    Procedure: INJECT EPIDURAL CERVICAL 6-7;  Surgeon: Christian Chaudhry MD;  [facility]     INJECT EPIDURAL LUMBAR N/A 12/20/2019    Procedure: Lumbar 5- Sacral 1 Epidural  Injection bilatetral;  Surgeon: Christian Chaudhry MD;  [facility]     INJECT FACET JOINT Bilateral 1/25/2019    Procedure: Cervical Facet Injections Cervical 5-6 and 6-7 Bilateral;  Surgeon: Christian Chaudhry MD;  [facility]     INJECT FACET JOINT Bilateral 11/29/2019    Procedure: Cervical 5-6 and 6-7 Bilateral facet joint injection;  Surgeon: Isidoro  Christian CHAUHAN MD;  [facility]     INJECT FACET JOINT Bilateral 2021    Procedure: Cervical 5-6 and Cervical 6-7 Bilateral facet injections;  Surgeon: Christian Chaudhry MD;  [facility]     ORTHOPEDIC SURGERY      Right hand     TONSILLECTOMY       ZZC NONSPECIFIC PROCEDURE      rt hand surgery - laceration/glass/tendons     Current Outpatient Medications   Medication Sig Dispense Refill     acetaminophen (TYLENOL) 500 MG tablet Take 1,000 mg by mouth every morning        ibuprofen (ADVIL/MOTRIN) 200 MG tablet Take 400 mg by mouth every morning        loratadine (CLARITIN) 10 MG tablet TAKE 1 TABLET (10 MG) BY MOUTH DAILY 90 tablet 0     modafinil (PROVIGIL) 200 MG tablet Take 1 tablet (200 mg) by mouth daily 30 tablet 3     omeprazole (PRILOSEC) 40 MG DR capsule TAKE 1 CAPSULE (40 MG) BY MOUTH DAILY 90 capsule 1     rosuvastatin (CRESTOR) 5 MG tablet Take 1 tablet (5 mg) by mouth daily 90 tablet 3     aspirin (ASA) 81 MG chewable tablet Take 1 tablet (81 mg) by mouth daily (Patient not taking: Reported on 2021)         Allergies   Allergen Reactions     Percocet [Oxycodone-Acetaminophen] Itching     Wasps [Hornets] Swelling        Social History     Tobacco Use     Smoking status: Former Smoker     Packs/day: 1.50     Quit date: 2007     Years since quittin.6     Smokeless tobacco: Never Used   Substance Use Topics     Alcohol use: No     Comment: rare     Family History   Problem Relation Age of Onset     Breast Cancer Mother      Arthritis Mother         joint ?     Depression Father      Cancer Maternal Grandmother      C.A.D. Maternal Grandfather      Cardiovascular Maternal Grandfather      Gynecology Paternal Grandmother          giving birth     Prostate Cancer Paternal Grandfather      Genetic Disorder Brother         joint pain?     Depression Brother      Cardiovascular Son         congenital heart defect -       Prostate Cancer Other          age 70's     Diabetes Other       History   Drug Use No         Objective     /76 (BP Location: Right arm, Patient Position: Sitting, Cuff Size: Adult Large)   Pulse 98   Temp 98.3  F (36.8  C) (Temporal)   Resp 16   Wt 116.8 kg (257 lb 9.6 oz)   SpO2 95%   BMI 34.94 kg/m      Physical Exam    GENERAL APPEARANCE: healthy, alert and no distress     EYES: EOMI,  PERRL     HENT: ear canals and TM's normal and nose and mouth without ulcers or lesions     NECK: no adenopathy, no asymmetry, masses, or scars and thyroid normal to palpation     RESP: lungs clear to auscultation - no rales, rhonchi or wheezes     CV: regular rates and rhythm, normal S1 S2, no S3 or S4 and no murmur, click or rub     ABDOMEN:  soft, nontender, no HSM or masses and bowel sounds normal     MS: extremities normal- no gross deformities noted, no evidence of inflammation in joints, FROM in all extremities.     SKIN: no suspicious lesions or rashes     NEURO: Normal strength and tone, sensory exam grossly normal, mentation intact and speech normal     PSYCH: mentation appears normal. and affect normal/bright     LYMPHATICS: No cervical adenopathy    Recent Labs   Lab Test 02/08/21  1213 11/15/19  0851   HGB 16.3 16.0    209    139   POTASSIUM 4.1 4.3   CR 0.94 1.12        Diagnostics:  No labs were ordered during this visit.   No EKG required for low risk surgery (cataract, skin procedure, breast biopsy, etc).    Revised Cardiac Risk Index (RCRI):  The patient has the following serious cardiovascular risks for perioperative complications:   - No serious cardiac risks = 0 points     RCRI Interpretation: 0 points: Class I (very low risk - 0.4% complication rate)            Time spent With/On Patient  Length of time   32 minutes      Chart reviewed  yes    Review of outside records yes    Review of test results yes    Interpretation of results yes     Patient visit  yes  Documentation  no  Discussion with family no  Discussion with providers no            Signed Electronically by: Gustavo Farmer DO  Copy of this evaluation report is provided to requesting physician.

## 2021-10-17 ENCOUNTER — LAB (OUTPATIENT)
Dept: LAB | Facility: CLINIC | Age: 56
End: 2021-10-17
Payer: COMMERCIAL

## 2021-10-17 DIAGNOSIS — Z11.59 ENCOUNTER FOR SCREENING FOR OTHER VIRAL DISEASES: ICD-10-CM

## 2021-10-17 PROCEDURE — U0005 INFEC AGEN DETEC AMPLI PROBE: HCPCS

## 2021-10-17 PROCEDURE — U0003 INFECTIOUS AGENT DETECTION BY NUCLEIC ACID (DNA OR RNA); SEVERE ACUTE RESPIRATORY SYNDROME CORONAVIRUS 2 (SARS-COV-2) (CORONAVIRUS DISEASE [COVID-19]), AMPLIFIED PROBE TECHNIQUE, MAKING USE OF HIGH THROUGHPUT TECHNOLOGIES AS DESCRIBED BY CMS-2020-01-R: HCPCS

## 2021-10-18 LAB — SARS-COV-2 RNA RESP QL NAA+PROBE: NEGATIVE

## 2021-10-19 ENCOUNTER — HOSPITAL ENCOUNTER (OUTPATIENT)
Dept: GENERAL RADIOLOGY | Facility: CLINIC | Age: 56
End: 2021-10-19
Attending: ANESTHESIOLOGY | Admitting: ANESTHESIOLOGY
Payer: COMMERCIAL

## 2021-10-19 ENCOUNTER — HOSPITAL ENCOUNTER (OUTPATIENT)
Facility: CLINIC | Age: 56
Discharge: HOME | End: 2021-10-19
Attending: ANESTHESIOLOGY | Admitting: ANESTHESIOLOGY
Payer: COMMERCIAL

## 2021-10-19 ENCOUNTER — ANESTHESIA (OUTPATIENT)
Dept: SURGERY | Facility: CLINIC | Age: 56
End: 2021-10-19
Payer: COMMERCIAL

## 2021-10-19 ENCOUNTER — APPOINTMENT (OUTPATIENT)
Dept: GENERAL RADIOLOGY | Facility: CLINIC | Age: 56
End: 2021-10-19
Attending: ANESTHESIOLOGY
Payer: COMMERCIAL

## 2021-10-19 ENCOUNTER — ANESTHESIA EVENT (OUTPATIENT)
Dept: SURGERY | Facility: CLINIC | Age: 56
End: 2021-10-19
Payer: COMMERCIAL

## 2021-10-19 VITALS
SYSTOLIC BLOOD PRESSURE: 123 MMHG | BODY MASS INDEX: 34.81 KG/M2 | TEMPERATURE: 98.3 F | HEIGHT: 72 IN | DIASTOLIC BLOOD PRESSURE: 92 MMHG | HEART RATE: 65 BPM | WEIGHT: 257 LBS | OXYGEN SATURATION: 95 % | RESPIRATION RATE: 16 BRPM

## 2021-10-19 DIAGNOSIS — M25.512 LEFT SHOULDER PAIN, UNSPECIFIED CHRONICITY: ICD-10-CM

## 2021-10-19 DIAGNOSIS — M47.812 ARTHROPATHY OF CERVICAL FACET JOINT: ICD-10-CM

## 2021-10-19 PROCEDURE — 250N000009 HC RX 250: Performed by: NURSE ANESTHETIST, CERTIFIED REGISTERED

## 2021-10-19 PROCEDURE — 20610 DRAIN/INJ JOINT/BURSA W/O US: CPT | Mod: LT | Performed by: ANESTHESIOLOGY

## 2021-10-19 PROCEDURE — 258N000003 HC RX IP 258 OP 636: Performed by: NURSE ANESTHETIST, CERTIFIED REGISTERED

## 2021-10-19 PROCEDURE — 250N000011 HC RX IP 250 OP 636: Performed by: NURSE ANESTHETIST, CERTIFIED REGISTERED

## 2021-10-19 PROCEDURE — 77002 NEEDLE LOCALIZATION BY XRAY: CPT | Mod: TC,XS

## 2021-10-19 PROCEDURE — 999N000179 XR SURGERY CARM FLUORO LESS THAN 5 MIN W STILLS

## 2021-10-19 PROCEDURE — 370N000017 HC ANESTHESIA TECHNICAL FEE, PER MIN: Performed by: ANESTHESIOLOGY

## 2021-10-19 PROCEDURE — 250N000009 HC RX 250: Performed by: ANESTHESIOLOGY

## 2021-10-19 PROCEDURE — 64491 INJ PARAVERT F JNT C/T 2 LEV: CPT | Performed by: ANESTHESIOLOGY

## 2021-10-19 PROCEDURE — 250N000011 HC RX IP 250 OP 636: Performed by: ANESTHESIOLOGY

## 2021-10-19 PROCEDURE — 64492 INJ PARAVERT F JNT C/T 3 LEV: CPT | Performed by: ANESTHESIOLOGY

## 2021-10-19 PROCEDURE — 64490 INJ PARAVERT F JNT C/T 1 LEV: CPT | Mod: 50 | Performed by: ANESTHESIOLOGY

## 2021-10-19 PROCEDURE — 64491 INJ PARAVERT F JNT C/T 2 LEV: CPT | Mod: 50 | Performed by: ANESTHESIOLOGY

## 2021-10-19 PROCEDURE — 20610 DRAIN/INJ JOINT/BURSA W/O US: CPT | Mod: XS | Performed by: ANESTHESIOLOGY

## 2021-10-19 RX ORDER — LIDOCAINE HYDROCHLORIDE 20 MG/ML
INJECTION, SOLUTION INFILTRATION; PERINEURAL PRN
Status: DISCONTINUED | OUTPATIENT
Start: 2021-10-19 | End: 2021-10-19

## 2021-10-19 RX ORDER — BUPIVACAINE HYDROCHLORIDE 5 MG/ML
INJECTION, SOLUTION EPIDURAL; INTRACAUDAL PRN
Status: DISCONTINUED | OUTPATIENT
Start: 2021-10-19 | End: 2021-10-19 | Stop reason: HOSPADM

## 2021-10-19 RX ORDER — METHYLPREDNISOLONE ACETATE 80 MG/ML
INJECTION, SUSPENSION INTRA-ARTICULAR; INTRALESIONAL; INTRAMUSCULAR; SOFT TISSUE PRN
Status: DISCONTINUED | OUTPATIENT
Start: 2021-10-19 | End: 2021-10-19 | Stop reason: HOSPADM

## 2021-10-19 RX ORDER — IOPAMIDOL 612 MG/ML
INJECTION, SOLUTION INTRATHECAL PRN
Status: DISCONTINUED | OUTPATIENT
Start: 2021-10-19 | End: 2021-10-19 | Stop reason: HOSPADM

## 2021-10-19 RX ORDER — BUPIVACAINE HYDROCHLORIDE 7.5 MG/ML
INJECTION, SOLUTION EPIDURAL; RETROBULBAR PRN
Status: DISCONTINUED | OUTPATIENT
Start: 2021-10-19 | End: 2021-10-19 | Stop reason: HOSPADM

## 2021-10-19 RX ORDER — PROPOFOL 10 MG/ML
INJECTION, EMULSION INTRAVENOUS PRN
Status: DISCONTINUED | OUTPATIENT
Start: 2021-10-19 | End: 2021-10-19

## 2021-10-19 RX ADMIN — DEXMEDETOMIDINE HYDROCHLORIDE 10 MCG: 100 INJECTION, SOLUTION INTRAVENOUS at 12:30

## 2021-10-19 RX ADMIN — PROPOFOL 80 MG: 10 INJECTION, EMULSION INTRAVENOUS at 12:34

## 2021-10-19 RX ADMIN — PROPOFOL 20 MG: 10 INJECTION, EMULSION INTRAVENOUS at 12:41

## 2021-10-19 RX ADMIN — LIDOCAINE HYDROCHLORIDE 60 MG: 20 INJECTION, SOLUTION INFILTRATION; PERINEURAL at 12:34

## 2021-10-19 RX ADMIN — DEXMEDETOMIDINE HYDROCHLORIDE 10 MCG: 100 INJECTION, SOLUTION INTRAVENOUS at 12:35

## 2021-10-19 RX ADMIN — PROPOFOL 40 MG: 10 INJECTION, EMULSION INTRAVENOUS at 12:45

## 2021-10-19 RX ADMIN — PROPOFOL 20 MG: 10 INJECTION, EMULSION INTRAVENOUS at 12:36

## 2021-10-19 RX ADMIN — PROPOFOL 20 MG: 10 INJECTION, EMULSION INTRAVENOUS at 12:39

## 2021-10-19 RX ADMIN — PROPOFOL 20 MG: 10 INJECTION, EMULSION INTRAVENOUS at 12:43

## 2021-10-19 ASSESSMENT — LIFESTYLE VARIABLES: TOBACCO_USE: 1

## 2021-10-19 ASSESSMENT — MIFFLIN-ST. JEOR: SCORE: 2043.74

## 2021-10-19 NOTE — OP NOTE
CHIEF COMPLAINT:   1. neck pain secondary to cervical spondylosis.  2.  Left shoulder pain secondary to glenohumeral joint degeneration and a left shoulder labral tear  INTERVAL HISTORY:  I discussed the above note with the patient. I agree with above.  He did have a epidural injection in the neck and that did not help suggesting that the discs in his neck that have some degeneration are not the major pain generators.  Because of this we are going to diagnostically and therapeutically test his facet joints.  The complicating factor in his situation is that he has a severe needle phobia.  He needed very deep monitored anesthetic care in order to have the facet injection performed.  This would preclude him from having a radiofrequency ablation which requires fully awake medial branch blocks done under local anesthesia.  PHYSICAL EXAM:   Musculoskeletal: Strength of upper extremities is 5/5 bilaterally.  Pain on palpation of the   neck. Pain on   Rotation, extension and flexion of neck.   Neuro: No sensory perception differences of lower extremities.  PROCEDURE number #1:    1. Left C5-C6, Right C5-C6, Left C6-C7 and Right C6-C7  Intra-articular zygopophyseal joint injection utilizing fluoroscopic guidance with contrast dye.     PROCEDURE DETAILS: After written informed consent was obtained from the patient, the patient was escorted to the procedure room.  The patient was placed in the sitting position.  A  time out  was conducted to verify the patient identity, procedure to be performed, side, site, allergies and any special requirements.  The skin over the neck was prepped and draped in normal sterile fashion. Fluoroscopy was used to identify the zygopophyseal joint with a lateral view.   The skin was anesthetized with 0.5 mL of 1% lidocaine with bicarbonate buffer.  A 25-gauge 2 inch Quincke needle was advanced under fluoroscopic guidance in the lateral approach until entry into the zygopophyseal joint was  successful.  View was confirmed in AP view.  Then, <0.3 cc of Omnipaque contrast dye was injected producing a satisfactory arthrogram without evidence of intrathecal or intravascular spread.  Then, a 1 mL solution of 5 mg dexamethasone and 0.5 mL of 2% lidocaine was incrementally injected into each  joint.  After injection of the medication, as the needle tip was withdrawn, it was flushed with local anesthetic. The patient was monitored with blood pressure and pulse oximetry machines with the assistance of an RN throughout the procedure.  The patient was alert and responsive to questions throughout the procedure.   The patient tolerated the procedure well and was observed in the post-procedural area.  The patient was dismissed without apparent complications.      PROCEDURE number #2:    Procedure details: After his neck was treated we moved over to his left shoulder.  Left shoulder posterior approach was prepped with ChloraPrep.  1% lidocaine was used for skin.  I then advanced a 22-gauge Quincke needle into his left glenohumeral joint.  Initially contrast was injected which did not show proper spread throughout the joint and after several needle tip manipulations and contrast injection, I achieved a proper arthrogram of the left glenohumeral joint.  I then injected 8 cc of 0.25% bupivacaine with 4 mg/cc of Depo-Medrol.  The needle was then removed and a sterile bandage placed over the needle insertion site.    Blood loss: Less than 2 cc     DIAGNOSIS:  1.  Cervical spondylosis secondary to a symptomatic facet syndrome causing axial neck pain with a history of long-term pain relief from previous facet injections  2.  Severe needle phobia  3.  Labral tear left shoulder inferior     PLAN:  1. Performed repeat zygopophyseal joint injections.   He had about 9 to 10 months of pain relief from the last set of injections.  However, he has a severe needle phobia so he would not be a candidate for medial branch blocks or a  radiofrequency ablation so if he does have positive diagnostic injections with his pain form then he would need to consider a cervical fusion if the facet injections do not provide long-term pain relief for him.  2.          I completed a left shoulder injection as well.  He does have a few other tears of his rotator cuff but the goal was to treat his glenohumeral joint.     Christian Chaudhry MD  Diplomate of the American Board of Anesthesiology, Pain Medicine

## 2021-10-19 NOTE — ANESTHESIA PREPROCEDURE EVALUATION
Anesthesia Pre-Procedure Evaluation    Patient: Bulmaro Herrera   MRN: [id number] : 1965        Preoperative Diagnosis: Arthropathy of cervical facet joint [M47.812]    Procedure : Procedure(s):  Cervical 5-6 and Cervical 6-7 Bilateral facet injections  Glenohumeral joint injection left shoulder          Past Medical History:   Diagnosis Date     Back pains      Depressive disorder, not elsewhere classified 2007    declines Rx     Disc degeneration 2008    see MRI -throaco-lumbar mild discogenic degenerative changes     Elevated LFT's     alt and ast     Other and unspecified hyperlipidemia 2007     Sleep disorder     CPAP     Tobacco use disorder       Past Surgical History:   Procedure Laterality Date     COLONOSCOPY  08    adenomatous polyp repeat 5 years     COLONOSCOPY N/A 2017    Procedure: COMBINED COLONOSCOPY, SINGLE OR MULTIPLE BIOPSY/POLYPECTOMY BY BIOPSY;  Colonoscopy, Polypectomy by Biopsy;  Surgeon: Matt Reyes MD;  Location:  [facility]     ENT SURGERY      for deviated septum     EXCISE LESION AXILLA  2018    Procedure: Excision Skin Tags Right Axilla;  Surgeon: Ayaan Chaney MD;  [facility]     EXCISE LESION BACK N/A 2018    Procedure: Excision Back Skin Lesion X Two;  Surgeon: Ayaan Chaney MD;  [facility]     HC ECHO HEART XTHORACIC, STRESS/REST  2009    neg     INJECT EPIDURAL CERVICAL N/A 2015    Procedure: INJECT EPIDURAL CERVICAL;  Surgeon: Christian Chaudhry MD;  [facility]     INJECT EPIDURAL CERVICAL N/A 2018    Procedure: INJECT EPIDURAL CERVICAL 6-7;  Surgeon: Christian Chaudhry MD;  [facility]     INJECT EPIDURAL LUMBAR N/A 2019    Procedure: Lumbar 5- Sacral 1 Epidural  Injection bilatetral;  Surgeon: Christian Chaudhry MD;  [facility]     INJECT FACET JOINT Bilateral 2019    Procedure: Cervical Facet Injections Cervical 5-6 and 6-7 Bilateral;  Surgeon: Christian Chaudhry MD;  [facility]      INJECT FACET JOINT Bilateral 2019    Procedure: Cervical 5-6 and 6-7 Bilateral facet joint injection;  Surgeon: Christian Chaudhry MD;  [facility]     INJECT FACET JOINT Bilateral 2021    Procedure: Cervical 5-6 and Cervical 6-7 Bilateral facet injections;  Surgeon: Christian Chaudhry MD;  [facility]     ORTHOPEDIC SURGERY      Right hand     TONSILLECTOMY       ZZC NONSPECIFIC PROCEDURE      rt hand surgery - laceration/glass/tendons      Allergies   Allergen Reactions     Percocet [Oxycodone-Acetaminophen] Itching     Wasps [Hornets] Swelling      Social History     Tobacco Use     Smoking status: Former Smoker     Packs/day: 1.50     Quit date: 2007     Years since quittin.6     Smokeless tobacco: Never Used   Substance Use Topics     Alcohol use: No     Comment: rare      Wt Readings from Last 1 Encounters:   10/14/21 116.8 kg (257 lb 9.6 oz)        Anesthesia Evaluation   Pt has had prior anesthetic. Type: MAC.    No history of anesthetic complications       ROS/MED HX  ENT/Pulmonary:     (+) sleep apnea, tobacco use, Past use,     Neurologic:  - neg neurologic ROS     Cardiovascular:  - neg cardiovascular ROS     METS/Exercise Tolerance:     Hematologic:  - neg hematologic  ROS     Musculoskeletal:       GI/Hepatic:     (+) GERD, Asymptomatic on medication,     Renal/Genitourinary:       Endo:     (+) Obesity,     Psychiatric/Substance Use:  - neg psychiatric ROS     Infectious Disease:       Malignancy:       Other:      (+) , H/O Chronic Pain,        Physical Exam    Airway  airway exam normal      Mallampati: II   TM distance: > 3 FB   Neck ROM: full   Mouth opening: > 3 cm    Respiratory Devices and Support         Dental  no notable dental history         Cardiovascular   cardiovascular exam normal          Pulmonary   pulmonary exam normal                OUTSIDE LABS:  CBC:   Lab Results   Component Value Date    WBC 5.5 2021    WBC 5.7 11/15/2019    HGB 16.3 2021     HGB 16.0 11/15/2019    HCT 47.2 02/08/2021    HCT 47.6 11/15/2019     02/08/2021     11/15/2019     BMP:   Lab Results   Component Value Date     02/08/2021     11/15/2019    POTASSIUM 4.1 02/08/2021    POTASSIUM 4.3 11/15/2019    CHLORIDE 108 02/08/2021    CHLORIDE 104 11/15/2019    CO2 30 02/08/2021    CO2 27 11/15/2019    BUN 17 02/08/2021    BUN 19 11/15/2019    CR 0.94 02/08/2021    CR 1.12 11/15/2019     (H) 02/08/2021    GLC 98 11/15/2019     COAGS:   Lab Results   Component Value Date    PTT 31 06/06/2008    INR 1.0 02/17/2014     POC: No results found for: BGM, HCG, HCGS  HEPATIC:   Lab Results   Component Value Date    ALBUMIN 4.5 02/08/2021    PROTTOTAL 7.7 02/08/2021    ALT 76 (H) 02/10/2021    AST 42 02/08/2021    ALKPHOS 88 02/08/2021    BILITOTAL 1.0 02/08/2021     OTHER:   Lab Results   Component Value Date    PH 8.0 (H) 05/07/2002    A1C 5.4 02/10/2016    WILIAM 9.6 02/08/2021    PHOS 3.5 02/08/2021    MAG 2.2 02/08/2021    LIPASE 95 09/10/2018    AMYLASE 76 03/08/2014    TSH 3.27 02/08/2021    T4 0.94 11/15/2019    CRP <2.9 03/25/2015    SED 7 09/10/2012       Anesthesia Plan    ASA Status:  2   NPO Status:  NPO Appropriate    Anesthesia Type: MAC.     - Reason for MAC: straight local not clinically adequate   Induction: Propofol, Intravenous.   Maintenance: TIVA.        Consents    Anesthesia Plan(s) and associated risks, benefits, and realistic alternatives discussed. Questions answered and patient/representative(s) expressed understanding.     - Discussed with:  Patient      - Extended Intubation/Ventilatory Support Discussed: No.      - Patient is DNR/DNI Status: No    Use of blood products discussed: No .     Postoperative Care            Comments:    The risks and benefits of anesthesia, and the alternatives where applicable, have been discussed with the patient, and they wish to proceed.               Navi Gilmore APRN CRNA

## 2021-10-19 NOTE — DISCHARGE INSTRUCTIONS
Home Care Instructions                Procedure: Epidural injection or joint injection    Activity:    Rest today    Do not work today    Resume normal activity tomorrow  Pain:    You may experience soreness at the injection site for 1 to 3 days.    You may use an ice pack for 20 minutes every 2 hours for the first 24 hours    You may use a heating pad after the first 24 hours    You may use Tylenol  (acetaminophen) every 4 hours or other pain medicines as directed by your physician  Safety  Sedation medicine, if given may remain active for many hours.    It is important for the next 24 hours that you do not:    Drive a car    Operate machines or power tools    Consume alcohol, including beer    Sign any important papers or legal documents    You may experience numbness radiating into your legs or arms, (depending on the procedure location)  This numbness may last several hours.  Until the numb sensation returns to normal please use caution in walking, climbing stairs, stepping out of your vehicle, etc.    Common side effects of steroids:  Not everyone will experience corticosteroid side effects. If side effects are experienced they will gradually subside in the 7-10 day period following an injection.    Most common side effects include:    Feeling of warmth, particularly in face but could be overall feeling of warmth    Increased blood sugar in diabetic patients    Menstrual irregularities may occur.  If taking hormone based birth control an alternate method of birth control is recommended    Sleep disturbances and/or mood swings are possible    Leg cramps    Please contact us if you have:    Fever more than 101.5 degrees Fahrenheit  Signs of infection (redness, swelling or drainage)      PLEASE GO TO THE NEAREST EMERGENCY ROOM IF YOU HAVE:   --Increasing numbness or weakness in your arms or legs or increasingly severe pain (greater than 4 hours after your injection)      If you have questions during normal  business hours (8am-5pm Monday-Friday) contact central scheduling at [phone] to access the [facility] . If you need help after hours, we recommend that you go to a hospital emergency room or dial 911.

## 2021-10-19 NOTE — ANESTHESIA CARE TRANSFER NOTE
Patient: Bulmaro Herrera    Procedure: Procedure(s):  Cervical 5-6 and Cervical 6-7 Bilateral facet injections  Glenohumeral joint injection left shoulder       Diagnosis: Arthropathy of cervical facet joint [M47.812]  Diagnosis Additional Information: No value filed.    Anesthesia Type:   MAC     Note:    Oropharynx: oropharynx clear of all foreign objects and spontaneously breathing  Level of Consciousness: awake  Oxygen Supplementation: room air    Independent Airway: airway patency satisfactory and stable  Dentition: dentition unchanged  Vital Signs Stable: post-procedure vital signs reviewed and stable  Report to RN Given: handoff report given  Patient transferred to: Phase II    Handoff Report: Identifed the Patient, Identified the Reponsible Provider, Reviewed the pertinent medical history, Discussed the surgical course, Reviewed Intra-OP anesthesia mangement and issues during anesthesia, Set expectations for post-procedure period and Allowed opportunity for questions and acknowledgement of understanding      Vitals:  Vitals Value Taken Time   /63 10/19/21 1310   Temp     Pulse 68 10/19/21 1310   Resp 14 10/19/21 1300   SpO2 95 % 10/19/21 1311   Vitals shown include unvalidated device data.    Electronically Signed By: SHIV Ramírez CRNA  October 19, 2021  1:13 PM

## 2021-10-19 NOTE — ANESTHESIA POSTPROCEDURE EVALUATION
Patient: Bulmaro Herrera    Procedure: Procedure(s):  Cervical 5-6 and Cervical 6-7 Bilateral facet injections  Glenohumeral joint injection left shoulder       Diagnosis:Arthropathy of cervical facet joint [M47.812]  Diagnosis Additional Information: No value filed.    Anesthesia Type:  MAC    Note:  Disposition: Outpatient   Postop Pain Control: Uneventful            Sign Out: Well controlled pain   PONV: No   Neuro/Psych: Uneventful            Sign Out: Acceptable/Baseline neuro status   Airway/Respiratory: Uneventful            Sign Out: Acceptable/Baseline resp. status   CV/Hemodynamics: Uneventful            Sign Out: Acceptable CV status   Other NRE: NONE   DID A NON-ROUTINE EVENT OCCUR? No    Event details/Postop Comments:  Pt was happy with anesthesia care.  No complications.  I will follow up with the pt if needed.           Last vitals:  Vitals Value Taken Time   /63 10/19/21 1310   Temp     Pulse 68 10/19/21 1310   Resp 14 10/19/21 1300   SpO2 95 % 10/19/21 1312   Vitals shown include unvalidated device data.    Electronically Signed By: SHIV Ramírez CRNA  October 19, 2021  1:13 PM

## 2021-10-23 ENCOUNTER — HEALTH MAINTENANCE LETTER (OUTPATIENT)
Age: 56
End: 2021-10-23

## 2021-11-10 ENCOUNTER — MYC MEDICAL ADVICE (OUTPATIENT)
Dept: INTERNAL MEDICINE | Facility: CLINIC | Age: 56
End: 2021-11-10
Payer: COMMERCIAL

## 2021-11-10 DIAGNOSIS — F41.9 ANXIETY: Primary | ICD-10-CM

## 2021-11-10 RX ORDER — DIAZEPAM 5 MG
5 TABLET ORAL EVERY 6 HOURS PRN
Qty: 2 TABLET | Refills: 0 | Status: SHIPPED | OUTPATIENT
Start: 2021-11-10 | End: 2021-12-02

## 2021-12-01 ENCOUNTER — TELEPHONE (OUTPATIENT)
Dept: INTERNAL MEDICINE | Facility: CLINIC | Age: 56
End: 2021-12-01

## 2021-12-01 NOTE — TELEPHONE ENCOUNTER
Wife, Laine, is calling and very upset that they never got a call back about this appointment and patient was at home all day.  Now they are unable to have any sort of treatment today and are very upset.  She is hoping Dr. Hurt will prescribe an abx for an inner ear infection.  As he has had this in the past and his ear is in pain.    He is scheduled tomorrow, 12/2/21, with PCP in case nothing is prescribed tonight as they do not want to go to the ED.  They are happy to pay for an Evisit or virtual visit for treatment.  Routing to Dr. Hurt high priority.  Katelin Velásquez, QUEN, RN

## 2021-12-01 NOTE — TELEPHONE ENCOUNTER
Reason for Call:  Other appointment    Detailed comments: Patient feels like he has an inner ear infection with dizzy spells and wondering if he can get a phone visit or an in person visit. Or if a medication could called in? Uses [facility] pharmacy     Phone Number Patient can be reached at: Home number on file [phone] (home)    Best Time: any    Can we leave a detailed message on this number? YES    Call taken on 12/1/2021 at 8:59 AM by Katelin Matson

## 2021-12-02 ENCOUNTER — OFFICE VISIT (OUTPATIENT)
Dept: INTERNAL MEDICINE | Facility: CLINIC | Age: 56
End: 2021-12-02
Payer: COMMERCIAL

## 2021-12-02 VITALS
HEART RATE: 116 BPM | TEMPERATURE: 96.8 F | WEIGHT: 249.6 LBS | DIASTOLIC BLOOD PRESSURE: 84 MMHG | SYSTOLIC BLOOD PRESSURE: 128 MMHG | OXYGEN SATURATION: 95 % | RESPIRATION RATE: 18 BRPM | BODY MASS INDEX: 33.85 KG/M2

## 2021-12-02 DIAGNOSIS — H66.001 NON-RECURRENT ACUTE SUPPURATIVE OTITIS MEDIA OF RIGHT EAR WITHOUT SPONTANEOUS RUPTURE OF TYMPANIC MEMBRANE: Primary | ICD-10-CM

## 2021-12-02 PROCEDURE — 99213 OFFICE O/P EST LOW 20 MIN: CPT | Performed by: INTERNAL MEDICINE

## 2021-12-02 RX ORDER — MECLIZINE HYDROCHLORIDE 25 MG/1
25 TABLET ORAL 3 TIMES DAILY PRN
Qty: 30 TABLET | Refills: 0 | Status: SHIPPED | OUTPATIENT
Start: 2021-12-02 | End: 2021-12-31

## 2021-12-02 RX ORDER — AZITHROMYCIN 250 MG/1
TABLET, FILM COATED ORAL
Qty: 6 TABLET | Refills: 0 | Status: SHIPPED | OUTPATIENT
Start: 2021-12-02 | End: 2021-12-07

## 2021-12-02 ASSESSMENT — PAIN SCALES - GENERAL: PAINLEVEL: NO PAIN (0)

## 2021-12-02 NOTE — PROGRESS NOTES
"      Jemima Chamberlain is a 56 year old who presents for the following health issues     HPI     Chief Complaint   Patient presents with     Dizziness     started yesterday morning. Extreme dizziness, light pain bilateral neck. H/O ear infections causing dizziness        EMR reviewed including:             Complaint, History of Chief Complaint, Corresponding Review of Systems, and Complaint Specific Physical Examination.    #1   Severe vertigo and right ear pain and pressure.  Present for the last 2 days.  No history of ear trauma or injury.  Hearing somewhat muffled on the right.  No secondary falls or injuries.        Exam:   ENT: Pharynx is non-erythemous, minimal PND, no significant nasal obstruction, right tympanic membrane is markedly retracted, fluid levels are seen.,  And the tympanic membrane is darkened and dusky.  Contralateral ear is not affected.  No carotid bruits are heard. No JVD seen. Thyroid is not nodular or enlarged.  Patient has difficulty standing with eyes closed.  Significant nystagmus is noted with \"follow my finger\"        Patient has been interviewed, applicable history and applied review of systems have been performed.    Vital Signs:   /84 (BP Location: Right arm, Patient Position: Sitting, Cuff Size: Adult Large)   Pulse 116   Temp 96.8  F (36  C) (Temporal)   Resp 18   Wt 113.2 kg (249 lb 9.6 oz)   SpO2 95%   BMI 33.85 kg/m        Decision Making    Problem and Complexity     1. Non-recurrent acute suppurative otitis media of right ear without spontaneous rupture of tympanic membrane  Start on azithromycin.  Tylenol, fluids, rest.  Meclizine as needed for symptomatic vertigo.  - azithromycin (ZITHROMAX) 250 MG tablet; Take 2 tablets (500 mg) by mouth daily for 1 day, THEN 1 tablet (250 mg) daily for 4 days.  Dispense: 6 tablet; Refill: 0  - meclizine (ANTIVERT) 25 MG tablet; Take 1 tablet (25 mg) by mouth 3 times daily as needed for dizziness  Dispense: 30 tablet; Refill: " 0                                FOLLOW UP   I have asked the patient to make an appointment for followup with me in 1 week if not markedly improved otherwise we will follow-up for routine health maintenance is scheduled        I have carefully explained the diagnosis and treatment options to the patient.  The patient has displayed an understanding of the above, and all subsequent questions were answered.      DO ANDREW Blandon    Portions of this note were produced using [employer]  Although every attempt at real-time proof reading has been made, occasional grammar/syntax errors may have been missed.

## 2021-12-15 ENCOUNTER — MYC MEDICAL ADVICE (OUTPATIENT)
Dept: INTERNAL MEDICINE | Facility: CLINIC | Age: 56
End: 2021-12-15
Payer: COMMERCIAL

## 2021-12-15 DIAGNOSIS — K21.9 GASTROESOPHAGEAL REFLUX DISEASE WITHOUT ESOPHAGITIS: ICD-10-CM

## 2021-12-17 RX ORDER — OMEPRAZOLE 40 MG/1
CAPSULE, DELAYED RELEASE ORAL
Qty: 90 CAPSULE | Refills: 1 | Status: SHIPPED | OUTPATIENT
Start: 2021-12-17 | End: 2022-06-24

## 2021-12-27 ENCOUNTER — OFFICE VISIT (OUTPATIENT)
Dept: PALLIATIVE MEDICINE | Facility: CLINIC | Age: 56
End: 2021-12-27
Payer: COMMERCIAL

## 2021-12-27 VITALS
WEIGHT: 249 LBS | BODY MASS INDEX: 33.72 KG/M2 | HEIGHT: 72 IN | TEMPERATURE: 98.5 F | SYSTOLIC BLOOD PRESSURE: 130 MMHG | DIASTOLIC BLOOD PRESSURE: 86 MMHG

## 2021-12-27 DIAGNOSIS — M48.02 NEURAL FORAMINAL STENOSIS OF CERVICAL SPINE: ICD-10-CM

## 2021-12-27 DIAGNOSIS — M47.812 ARTHROPATHY OF CERVICAL FACET JOINT: Primary | ICD-10-CM

## 2021-12-27 DIAGNOSIS — M54.2 CERVICALGIA: ICD-10-CM

## 2021-12-27 PROCEDURE — 99214 OFFICE O/P EST MOD 30 MIN: CPT | Performed by: PHYSICIAN ASSISTANT

## 2021-12-27 ASSESSMENT — MIFFLIN-ST. JEOR: SCORE: 2007.46

## 2021-12-27 ASSESSMENT — PAIN SCALES - GENERAL: PAINLEVEL: EXTREME PAIN (8)

## 2021-12-27 NOTE — PROGRESS NOTES
[facility]    CHIEF COMPLAINT:   Pain  -Neck   -low back pain    INTERVAL HISTORY:  Last seen on 10/5/21.        Recommendations/plan at the last visit included:  1. Physical Therapy:  NO   2. Clinical Health Psychologist:  NO    3. Diagnostic Studies:  none  4. Medication Management:  No changes made today  5. Procedures: referred for cervical facet joint injections. Due to patient's severe needle phobia, CMBB preceding to RFA is not an option. Referred for left glenohumeral joint injection as recommended by orthopedics. Request made for these to be done at the same time.   6. Recommendations to PCP. See above     Follow up with this provider: PRN    Since his last visit, Bulmaro Herrera reports:    The last injection did not help. He states that he continues to have issues with his shoulder as well. He states that his muscle is constantly sore in the shoulder. He states that when he goes to sleep he has a sensation that the shoulder is being pulled out the socket. He states that his neck is aching constantly as well. He states that he is not getting any sleep as well.       Pain Information:   Pain today 8/10      CURRENT RELEVANT PAIN MEDICATIONS:   Ibuprofen-200mg, 2 in AM  Tylenol 500mg-takes 1 in AM      Patient is using the medication as prescribed:  YES  Is your medication helpful? YES   Medication side effects? no side effect    Previous Medications: (H--helped; HI--Helped initially; SWH-- somewhat helpful, NH--No help; W--worse; SE--side effects)   Opiates: Tylenol #3 H SE wake up like with a hangover, dizzy  NSAIDS: Ibuprofen H SE upset stomach  Muscle Relaxants: Flexeril NH SE sedation, Robaxin NH  Anti-migraine mediations: none  Anti-depressants: SE after coming off of them, feels like his personality was altered from them. While on them, felt like head was in a cloud. Amitriptyline SE restless legs   Sleep aids:None  Anxiolytics: None  Neuropathics: Gabapentin SE restless  legs     Topicals: Lidocaine Patches SWH   Other medications not covered above:  Prednisone H    Past Pain Treatments:  Pain Clinic:   No   PT: Yes for neck. Helped for about 1 day. Was doing exercises at home.   Psychologist: No  Relaxation techniques/biofeedback: No  Chiropractor: Yes, helps for 1/2 to 1 day  Acupuncture: Yes, NH one needle caused SE of significantly increased pain  Pharmacotherapy:          Opioids: Yes            Non-opioids:    Yes   TENs Unit: Yes, tried at chiropractor, makes more tense  Injections:   -10/19/2021 Left C5-C6, Right C5-C6, Left C6-C7 and Right C6-C7 Intra-articular zygopophyseal joint injection and left glenohumeral joint injection   -02/11/2021 Cervical 5-6 and Cervical 6-7 Bilateral facet injections  -12/20/2019 L5-S1 KRYSTAL  -11/29/2019 bilateral C5-6 and C6-7 facet joint injections  -1/25/2019 bilateral C5-6 and C6-7 facet joint injections  -12/28/2018 C6-7 interlaminar epidural steroid injection , had a pain flare for 1 week, then maybe it's been slightly helpful.   -2-3 years ago, low back injection.  Self-care:   Yes, tried Polk Gel  Surgeries related to pain: No     [place] Board of Pharmacy Data Base Reviewed:    NO      Medications:  Current Outpatient Medications   Medication Sig Dispense Refill     acetaminophen (TYLENOL) 500 MG tablet Take 1,000 mg by mouth every morning        acetaminophen-codeine (TYLENOL #3) 300-30 MG tablet Take 1 tablet by mouth nightly as needed for severe pain 10 tablet 0     aspirin (ASA) 81 MG chewable tablet Take 1 tablet (81 mg) by mouth daily       ibuprofen (ADVIL/MOTRIN) 200 MG tablet Take 400 mg by mouth every morning        loratadine (CLARITIN) 10 MG tablet TAKE 1 TABLET (10 MG) BY MOUTH DAILY 90 tablet 0     meclizine (ANTIVERT) 25 MG tablet Take 1 tablet (25 mg) by mouth 3 times daily as needed for dizziness 30 tablet 0     modafinil (PROVIGIL) 200 MG tablet Take 1 tablet (200 mg) by mouth daily 30 tablet 3     omeprazole  "(PRILOSEC) 40 MG DR capsule TAKE 1 CAPSULE (40 MG) BY MOUTH DAILY 90 capsule 1     rosuvastatin (CRESTOR) 5 MG tablet Take 1 tablet (5 mg) by mouth daily 90 tablet 3         PHYSICAL EXAM:     Vitals:   /86   Temp 98.5  F (36.9  C) (Temporal)   Ht 1.829 m (6')   Wt 112.9 kg (249 lb)   BMI 33.77 kg/m    Body mass index is 33.77 kg/m .  6' 0\"  249 lbs 0 oz      DIAGNOSTIC TESTS:  Imaging Studies:   No new imaging to review       Assessment:  Bulmaro Herrera is a 56 year old male who presents today for follow up regarding his:    1.  Cervical facet arthropathy  2.  Cervicalgia  3. Neural foraminal stenosis of cervical spine    Worsening pain in his neck. Unfortunately he has failed injection therapy and physical therapy. We did discuss updating his cervical spine MRI but at this time, I think he should follow up with neurosurgery. I will give him a one time prescription for Tylenol #3 for the severe pain.     Plan:    Diagnosis reviewed, treatment option addressed, and risk/benifits discussed.  Self-care instructions given.  I am recommending a multidisciplinary treatment plan to help this patient better manage pain.      1. Physical Therapy:  NO   2. Clinical Health Psychologist:  NO    3. Diagnostic Studies:  None-I do think he needs an updated MRI, but will leave this decision up to the neurosurgery group  4. Medication Management:    1. Tylenol #3: 1 tab at bedtime as needed. This is a one time prescription for 10 tablets.   5. Procedures: Patient has failed cervical facet injections. Unable to do a CMBB preceding to a RFA due to patient's severe needle phobia.   6. Referral to see neurosurgery placed   7. Recommendations to PCP. See above    Follow up with this provider:  PRN. I did discuss with the patient that I will be leaving the pain clinic at the end of December. We discussed the options of following up with one of my colleagues ([place] or [place]) or another pain clinic (there are 2 in [place] and 2 " others in [place]). I would recommend that he get the neurosurgeons opinion first. If surgery is not an option, and further chronic pain management is recommended, discussed that medication management for chronic pain can be discussed.           Jacque Kay PA-C   [facility] [place] [facility]

## 2021-12-27 NOTE — PATIENT INSTRUCTIONS
After Visit Instructions:     Thank you for coming to [facility] for your care. It is my goal to partner with you to help you reach your optimal state of health.     I am recommending multidisciplinary care at this time.  The focus of care will be to continue gradual rehabilitation and pain management with medication adjustments as needed.    Continue daily self-care, identifying contributing factors, and monitoring variations in pain level. Continue to integrate self-care into your life.          Schedule follow-up with pain management as needed. You will need to make this appointment.     Referrals: neurosurgery     Medication recommendations:     One time prescription for Tylenol with codeine 30-300mg: take 1 tablet at bedtime as needed. Watch total acetaminophen dosing. Do not exceed 3000mg in 24 hours. This medication is an opiate. It may affect your judgement and thinking making it unsafe to drive or use machinery. You should see how this medication affects you prior to doing these tasks.       Jacque Kay PA-C  [facility]  [place]    Contact information: [facility] [place]  Clinic Number:  [phone]     Call with any questions about your care and for scheduling assistance.     Calls are returned Monday through Friday between 8 AM and 4:30 PM. We usually get back to you within 2 business days depending on the issue/request.    If we are prescribing your medications:    For opioid medication refills, call the clinic or send a [employer] message 7 days in advance.  Please include:    Name of requested medication    Name of the pharmacy.    For non-opioid medications, call your pharmacy directly to request a refill. Please allow 3-4 days to be processed.     Per [place] State Law:    All controlled substance prescriptions must be filled within 30 days of being written.      For those controlled substances allowing  refills, pickup must occur within 30 days of last fill.      We believe regular attendance is key to your success in our program!      Any time you are unable to keep your appointment we ask that you call us at least 24 hours in advance to cancel.This will allow us to offer the appointment time to another patient.   Multiple missed appointments may lead to dismissal from the clinic.

## 2021-12-29 NOTE — PROGRESS NOTES
[facility]    Outpatient Physical Therapy Discharge Note  Patient: Bulmaro Herrera  : 1965    Beginning/End Dates of Reporting Period:  2021 to 10/6/2021    Referring Provider: SHIV Jimenez CNP    Therapy Diagnosis: L shoulder pain, decreased L shoulder AROM, decreased L shoulder strength      Client Self Report: Reports he's doing a lot better overall, but will still have bad days.  Still wakes him up at night when he rolls onto his left side.  It's more of an annoyance than anything.  The KT tape really helped, has continued to apply it on his own.  Spoke with pain management yesterday about getting a cortisone injection in his shoulder at the same time he gets his neck injections.      Objective Measurements:  Objective Measure: Pain   Details: -2/10 L shoulder start of session          Goals:  Goal Identifier 1   Goal Description Patient will improve SPADI score from 106/130 (81.54%) to at least 13/130 in order to demonstrate only 10% impact of shoulder pain and dysfunction on everyday life.     Target Date 21   Date Met      Progress (detail required for progress note):       Goal Identifier 2   Goal Description Patient will increase L shoulder flexion and abduction from 82 deg to at least 170 deg (flexion) and from 68 deg to at least 170 deg (abduction) without pain in order to facilitate return to prior level of function.     Target Date 21   Date Met      Progress (detail required for progress note):       Goal Identifier 3   Goal Description Patient will demonstrate 5/5 strength with L UE in order to facilitate return to prior level of function and prevent further injury.   Target Date 21   Date Met      Progress (detail required for progress note):         Plan:  Discharge from therapy.    Discharge:    Reason for Discharge: Patient has failed to schedule  further appointments.    Equipment Issued: N/A    Discharge Plan: Patient to continue home program.        Brianna Garcia PT, DPT, ABIGAIL-MARI  [facility]  Physical Therapist     Phone: [phone]  Email: [email or web address]

## 2021-12-31 ENCOUNTER — MYC MEDICAL ADVICE (OUTPATIENT)
Dept: INTERNAL MEDICINE | Facility: CLINIC | Age: 56
End: 2021-12-31
Payer: COMMERCIAL

## 2021-12-31 ENCOUNTER — E-VISIT (OUTPATIENT)
Dept: FAMILY MEDICINE | Facility: CLINIC | Age: 56
End: 2021-12-31
Payer: COMMERCIAL

## 2021-12-31 DIAGNOSIS — H66.001 NON-RECURRENT ACUTE SUPPURATIVE OTITIS MEDIA OF RIGHT EAR WITHOUT SPONTANEOUS RUPTURE OF TYMPANIC MEMBRANE: Primary | ICD-10-CM

## 2021-12-31 PROCEDURE — 99421 OL DIG E/M SVC 5-10 MIN: CPT | Performed by: INTERNAL MEDICINE

## 2021-12-31 RX ORDER — AMOXICILLIN 875 MG
875 TABLET ORAL 2 TIMES DAILY
Qty: 20 TABLET | Refills: 0 | Status: SHIPPED | OUTPATIENT
Start: 2021-12-31 | End: 2022-01-20

## 2021-12-31 RX ORDER — MECLIZINE HYDROCHLORIDE 25 MG/1
25 TABLET ORAL 3 TIMES DAILY PRN
Qty: 30 TABLET | Refills: 0 | Status: SHIPPED | OUTPATIENT
Start: 2021-12-31 | End: 2022-07-07

## 2021-12-31 NOTE — PATIENT INSTRUCTIONS
Thank you for choosing us for your care. I have placed an order for a prescription so that you can start treatment. View your full visit summary for details by clicking on the link below. Your pharmacist will able to address any questions you may have about the medication.     If you're not feeling better within 5-7 days, please schedule an appointment.  You can schedule an appointment right here in [facility], or call [phone]  If the visit is for the same symptoms as your eVisit, we'll refund the cost of your eVisit if seen within seven days.

## 2022-01-03 ENCOUNTER — MYC MEDICAL ADVICE (OUTPATIENT)
Dept: INTERNAL MEDICINE | Facility: CLINIC | Age: 57
End: 2022-01-03
Payer: COMMERCIAL

## 2022-01-03 DIAGNOSIS — M54.2 CERVICALGIA: ICD-10-CM

## 2022-01-03 DIAGNOSIS — M47.812 ARTHROPATHY OF CERVICAL FACET JOINT: ICD-10-CM

## 2022-01-03 DIAGNOSIS — M48.02 NEURAL FORAMINAL STENOSIS OF CERVICAL SPINE: ICD-10-CM

## 2022-01-03 DIAGNOSIS — M54.12 CERVICAL RADICULOPATHY: ICD-10-CM

## 2022-01-03 DIAGNOSIS — M54.16 LUMBAR RADICULOPATHY: ICD-10-CM

## 2022-01-03 DIAGNOSIS — M54.2 CERVICALGIA: Primary | ICD-10-CM

## 2022-01-03 NOTE — TELEPHONE ENCOUNTER
Received call from patient requesting refill(s) of acetaminophen-codeine (TYLENOL #3) 300-30 MG tablet     Last dispensed from pharmacy on 12/27/21    Patient's last office/virtual visit by prescribing provider on 12/27/21  Next office/virtual appointment scheduled for none    Last urine drug screen date none  Current opioid agreement on file (completed within the last year) No Date of opioid agreement: none    E-prescribe to [facility]  pharmacy    Will route to nursing [place] for review and preparation of prescription(s).

## 2022-01-04 ENCOUNTER — OFFICE VISIT (OUTPATIENT)
Dept: INTERNAL MEDICINE | Facility: CLINIC | Age: 57
End: 2022-01-04
Payer: COMMERCIAL

## 2022-01-04 VITALS
SYSTOLIC BLOOD PRESSURE: 130 MMHG | TEMPERATURE: 97.6 F | RESPIRATION RATE: 16 BRPM | HEART RATE: 104 BPM | OXYGEN SATURATION: 96 % | BODY MASS INDEX: 35.4 KG/M2 | DIASTOLIC BLOOD PRESSURE: 84 MMHG | WEIGHT: 261 LBS

## 2022-01-04 DIAGNOSIS — H81.13 BENIGN PAROXYSMAL POSITIONAL VERTIGO DUE TO BILATERAL VESTIBULAR DISORDER: Primary | ICD-10-CM

## 2022-01-04 DIAGNOSIS — G47.33 OSA (OBSTRUCTIVE SLEEP APNEA): ICD-10-CM

## 2022-01-04 PROCEDURE — 99214 OFFICE O/P EST MOD 30 MIN: CPT | Performed by: INTERNAL MEDICINE

## 2022-01-04 RX ORDER — PREDNISONE 20 MG/1
20 TABLET ORAL DAILY
Qty: 5 TABLET | Refills: 0 | Status: SHIPPED | OUTPATIENT
Start: 2022-01-04 | End: 2022-01-20

## 2022-01-04 ASSESSMENT — PAIN SCALES - GENERAL: PAINLEVEL: NO PAIN (0)

## 2022-01-04 NOTE — PROGRESS NOTES
Jemima reddy is a 56 year old who presents for the following health issues     HPI     Chief Complaint   Patient presents with     Dizziness     f/u-still having problems      EMR reviewed including:             Complaint, History of Chief Complaint, Corresponding Review of Systems, and Complaint Specific Physical Examination.    #1   Persistent vertigo  Had acute otitis media last month.  No further pain or discomfort in the ears.  Vertigo is worse when he turns his head quickly.  It is not intermittent and not constant.  No history of ear trauma or loss of hearing.  No residual upper respiratory infection symptoms.   Denies nasal congestion, posterior nasal drainage, fever, chills etc.        Exam:   ENT: Pharynx is non-erythemous, minimal PND, no significant nasal obstruction, TM's not red but continue to be retracted.  Hearing intact bilaterally. No carotid bruits are heard. No JVD seen. Thyroid is not nodular or enlarged.  Mild nystagmus is noted bilaterally.   LUNGS: clear bilaterally, airflow is brisk, no intercostal retraction or stridor is noted. No coughing is noted during visit.   HEART:  regular without rubs, clicks, gallops, or murmurs. PMI is nondisplaced. Upstrokes are brisk. S1,S2 are heard.        #2   Ongoing obstructive sleep apnea  Uses CPAP  Notes no ear popping or discomfort secondary to positive pressure  He is sleeping through the night without difficulty  Denies daytime fatigue        Exam:  Neck is bulbous and palate is somewhat petulant.        Patient has been interviewed, applicable history and applied review of systems have been performed.    Vital Signs:   /84   Pulse 104   Temp 97.6  F (36.4  C) (Temporal)   Resp 16   Wt 118.4 kg (261 lb)   SpO2 96%   BMI 35.40 kg/m        Decision Making    Problem and Complexity     1. Benign paroxysmal positional vertigo due to bilateral vestibular disorder  We will place patient on a short course of prednisone.  Recommend  continued antihistamine usage.  Recommend against decongestant usage  Will recommend continued meclizine on as-needed basis.    2. ROBBY (obstructive sleep apnea)  Continue CPAP as current.                                FOLLOW UP   I have asked the patient to make an appointment for followup with me in 2 weeks by phone        I have carefully explained the diagnosis and treatment options to the patient.  The patient has displayed an understanding of the above, and all subsequent questions were answered.      DO ANDREW Blandon    Portions of this note were produced using [employer]  Although every attempt at real-time proof reading has been made, occasional grammar/syntax errors may have been missed.

## 2022-01-04 NOTE — TELEPHONE ENCOUNTER
Outreach X1. Left a  requesting call back. Provided call back number. The T3 was a 1 time prescription.    Plan from 12/27/21     I did discuss with the patient that I will be leaving the pain clinic at the end of December. We discussed the options of following up with one of my colleagues ([place] or [place]) or another pain clinic (there are 2 in [place] and 2 others in [place]). I would recommend that he get the neurosurgeons opinion first. If surgery is not an option, and further chronic pain management is recommended, discussed that medication management for chronic pain can be discussed.     Hilda Harris, QUEN, RN  Care Coordinator  [facility]

## 2022-01-04 NOTE — TELEPHONE ENCOUNTER
Pt returning a phone call to nursing. Pt states he does not need the medication and he did not request it.    Sophia CARDONA    [facility]

## 2022-01-16 ENCOUNTER — LAB (OUTPATIENT)
Dept: LAB | Facility: CLINIC | Age: 57
End: 2022-01-16
Payer: COMMERCIAL

## 2022-01-16 DIAGNOSIS — E78.00 HYPERCHOLESTEROLEMIA: ICD-10-CM

## 2022-01-16 LAB
ALT SERPL W P-5'-P-CCNC: 205 U/L (ref 0–70)
CHOLEST SERPL-MCNC: 192 MG/DL
FASTING STATUS PATIENT QL REPORTED: YES
HDLC SERPL-MCNC: 31 MG/DL
LDLC SERPL CALC-MCNC: 112 MG/DL
NONHDLC SERPL-MCNC: 161 MG/DL
TRIGL SERPL-MCNC: 247 MG/DL

## 2022-01-16 PROCEDURE — 36415 COLL VENOUS BLD VENIPUNCTURE: CPT

## 2022-01-16 PROCEDURE — 84460 ALANINE AMINO (ALT) (SGPT): CPT | Performed by: PHYSICIAN ASSISTANT

## 2022-01-16 PROCEDURE — 80061 LIPID PANEL: CPT | Performed by: PHYSICIAN ASSISTANT

## 2022-01-17 DIAGNOSIS — E78.00 HYPERCHOLESTEROLEMIA: Primary | ICD-10-CM

## 2022-01-17 DIAGNOSIS — I25.10 CORONARY ARTERY DISEASE INVOLVING NATIVE CORONARY ARTERY OF NATIVE HEART WITHOUT ANGINA PECTORIS: ICD-10-CM

## 2022-01-18 ENCOUNTER — MYC MEDICAL ADVICE (OUTPATIENT)
Dept: INTERNAL MEDICINE | Facility: CLINIC | Age: 57
End: 2022-01-18

## 2022-01-18 ENCOUNTER — LAB (OUTPATIENT)
Dept: FAMILY MEDICINE | Facility: CLINIC | Age: 57
End: 2022-01-18
Payer: COMMERCIAL

## 2022-01-18 ENCOUNTER — OFFICE VISIT (OUTPATIENT)
Dept: CARDIOLOGY | Facility: CLINIC | Age: 57
End: 2022-01-18
Attending: PHYSICIAN ASSISTANT
Payer: COMMERCIAL

## 2022-01-18 VITALS
BODY MASS INDEX: 33.1 KG/M2 | DIASTOLIC BLOOD PRESSURE: 86 MMHG | HEART RATE: 90 BPM | OXYGEN SATURATION: 94 % | HEIGHT: 72 IN | SYSTOLIC BLOOD PRESSURE: 110 MMHG | WEIGHT: 244.4 LBS

## 2022-01-18 DIAGNOSIS — R50.9 FEBRILE ILLNESS: ICD-10-CM

## 2022-01-18 DIAGNOSIS — R50.9 FEBRILE ILLNESS: Primary | ICD-10-CM

## 2022-01-18 DIAGNOSIS — E78.00 HYPERCHOLESTEROLEMIA: ICD-10-CM

## 2022-01-18 DIAGNOSIS — I25.10 CORONARY ARTERY DISEASE INVOLVING NATIVE CORONARY ARTERY OF NATIVE HEART WITHOUT ANGINA PECTORIS: ICD-10-CM

## 2022-01-18 DIAGNOSIS — R73.09 ELEVATED GLUCOSE: Primary | ICD-10-CM

## 2022-01-18 LAB
ALBUMIN SERPL-MCNC: 3.9 G/DL (ref 3.4–5)
ALP SERPL-CCNC: 97 U/L (ref 40–150)
ALT SERPL W P-5'-P-CCNC: 165 U/L (ref 0–70)
ANION GAP SERPL CALCULATED.3IONS-SCNC: 6 MMOL/L (ref 3–14)
AST SERPL W P-5'-P-CCNC: 63 U/L (ref 0–45)
BILIRUB SERPL-MCNC: 1.1 MG/DL (ref 0.2–1.3)
BUN SERPL-MCNC: 15 MG/DL (ref 7–30)
CALCIUM SERPL-MCNC: 8.9 MG/DL (ref 8.5–10.1)
CHLORIDE BLD-SCNC: 108 MMOL/L (ref 94–109)
CO2 SERPL-SCNC: 25 MMOL/L (ref 20–32)
CREAT SERPL-MCNC: 0.85 MG/DL (ref 0.66–1.25)
GFR SERPL CREATININE-BSD FRML MDRD: >90 ML/MIN/1.73M2
GLUCOSE BLD-MCNC: 96 MG/DL (ref 70–99)
POTASSIUM BLD-SCNC: 3.9 MMOL/L (ref 3.4–5.3)
PROT SERPL-MCNC: 7.5 G/DL (ref 6.8–8.8)
SODIUM SERPL-SCNC: 139 MMOL/L (ref 133–144)

## 2022-01-18 PROCEDURE — U0005 INFEC AGEN DETEC AMPLI PROBE: HCPCS | Mod: 90

## 2022-01-18 PROCEDURE — 36415 COLL VENOUS BLD VENIPUNCTURE: CPT | Performed by: PHYSICIAN ASSISTANT

## 2022-01-18 PROCEDURE — 99000 SPECIMEN HANDLING OFFICE-LAB: CPT

## 2022-01-18 PROCEDURE — U0003 INFECTIOUS AGENT DETECTION BY NUCLEIC ACID (DNA OR RNA); SEVERE ACUTE RESPIRATORY SYNDROME CORONAVIRUS 2 (SARS-COV-2) (CORONAVIRUS DISEASE [COVID-19]), AMPLIFIED PROBE TECHNIQUE, MAKING USE OF HIGH THROUGHPUT TECHNOLOGIES AS DESCRIBED BY CMS-2020-01-R: HCPCS | Mod: 90

## 2022-01-18 PROCEDURE — 99214 OFFICE O/P EST MOD 30 MIN: CPT | Performed by: PHYSICIAN ASSISTANT

## 2022-01-18 PROCEDURE — 80053 COMPREHEN METABOLIC PANEL: CPT | Performed by: PHYSICIAN ASSISTANT

## 2022-01-18 ASSESSMENT — MIFFLIN-ST. JEOR: SCORE: 1986.59

## 2022-01-18 NOTE — LETTER
1/18/2022       RE: Bulmaro Herrera  [address]  [facility] [address]     Dear Colleague,    Thank you for referring your patient, Bulmaro Herrera, to the [facility] [place] at [facility]. Please see a copy of my visit note below.    [id number]      HPI and Plan:   See dictation    Orders this Visit:  Orders Placed This Encounter   Procedures     Symptomatic; Yes; 1/8/2022 COVID-19 Virus (Coronavirus) by PCR Nasopharyngeal     Lipid Profile     ALT     Follow-Up with Cardiologist     No orders of the defined types were placed in this encounter.    There are no discontinued medications.      Encounter Diagnoses   Name Primary?     Hypercholesterolemia      Coronary artery disease involving native coronary artery of native heart without angina pectoris      Febrile illness Yes       CURRENT MEDICATIONS:  Current Outpatient Medications   Medication Sig Dispense Refill     acetaminophen (TYLENOL) 500 MG tablet Take 1,000 mg by mouth every morning        aspirin (ASA) 81 MG chewable tablet Take 1 tablet (81 mg) by mouth daily       ibuprofen (ADVIL/MOTRIN) 200 MG tablet Take 400 mg by mouth every morning        loratadine (CLARITIN) 10 MG tablet TAKE 1 TABLET (10 MG) BY MOUTH DAILY 90 tablet 0     modafinil (PROVIGIL) 200 MG tablet Take 1 tablet (200 mg) by mouth daily 30 tablet 3     predniSONE (DELTASONE) 20 MG tablet Take 1 tablet (20 mg) by mouth daily 5 tablet 0     rosuvastatin (CRESTOR) 5 MG tablet Take 1 tablet (5 mg) by mouth daily 90 tablet 3     amoxicillin (AMOXIL) 875 MG tablet Take 1 tablet (875 mg) by mouth 2 times daily (Patient not taking: Reported on 1/18/2022) 20 tablet 0     meclizine (ANTIVERT) 25 MG tablet Take 1 tablet (25 mg) by mouth 3 times daily as needed for dizziness (Patient not taking: Reported on 1/18/2022) 30 tablet 0     omeprazole (PRILOSEC) 40 MG DR capsule TAKE 1 CAPSULE (40 MG) BY MOUTH DAILY (Patient not taking:  Reported on 1/18/2022) 90 capsule 1       ALLERGIES     Allergies   Allergen Reactions     Percocet [Oxycodone-Acetaminophen] Itching     Wasps [Hornets] Swelling       PAST MEDICAL HISTORY:  Past Medical History:   Diagnosis Date     Back pains      Depressive disorder, not elsewhere classified 1/28/2007    declines Rx     Disc degeneration 12/2008    see MRI -throaco-lumbar mild discogenic degenerative changes     Elevated LFT's 11/09    alt and ast     Other and unspecified hyperlipidemia 1/2007     Sleep disorder 9/09    CPAP     Tobacco use disorder        PAST SURGICAL HISTORY:  Past Surgical History:   Procedure Laterality Date     COLONOSCOPY  07/07/08    adenomatous polyp repeat 5 years     COLONOSCOPY N/A 12/29/2017    Procedure: COMBINED COLONOSCOPY, SINGLE OR MULTIPLE BIOPSY/POLYPECTOMY BY BIOPSY;  Colonoscopy, Polypectomy by Biopsy;  Surgeon: Matt Reyes MD;  Location:  GI     ENT SURGERY      for deviated septum     EXCISE LESION AXILLA  12/7/2018    Procedure: Excision Skin Tags Right Axilla;  Surgeon: Ayaan Chaney MD;  [facility]     EXCISE LESION BACK N/A 12/7/2018    Procedure: Excision Back Skin Lesion X Two;  Surgeon: Ayaan Chaney MD;  [facility]     INJECT EPIDURAL CERVICAL N/A 5/14/2015    Procedure: INJECT EPIDURAL CERVICAL;  Surgeon: Christian Chaudhry MD;  [facility]     INJECT EPIDURAL CERVICAL N/A 12/28/2018    Procedure: INJECT EPIDURAL CERVICAL 6-7;  Surgeon: Christian Chaudhry MD;  [facility]     INJECT EPIDURAL LUMBAR N/A 12/20/2019    Procedure: Lumbar 5- Sacral 1 Epidural  Injection bilatetral;  Surgeon: Christian Chaudhry MD;  [facility]     INJECT FACET JOINT Bilateral 1/25/2019    Procedure: Cervical Facet Injections Cervical 5-6 and 6-7 Bilateral;  Surgeon: Christian Chaudhry MD;  [facility]     INJECT FACET JOINT Bilateral 11/29/2019    Procedure: Cervical 5-6 and 6-7 Bilateral facet joint injection;  Surgeon: Christian Chaudhry MD;  [facility]     INJECT FACET JOINT Bilateral 2021    Procedure: Cervical 5-6 and Cervical 6-7 Bilateral facet injections;  Surgeon: Christian Chaudhry MD;  [facility]     INJECT FACET JOINT Bilateral 10/19/2021    Procedure: Left C5-C6, Right C5-C6, Left C6-C7 and Right C6-C7 Intra-articular zygopophyseal joint injection utilizing fluoroscopic guidance with contrast dye;  Surgeon: Christian Chaudhry MD;  [facility]     INJECT STEROID (LOCATION) Left 10/19/2021    Procedure: Glenohumeral joint injection left shoulder;  Surgeon: Christian Chaudhry MD;  [facility]     ORTHOPEDIC SURGERY      Right hand     TONSILLECTOMY       ZZC NONSPECIFIC PROCEDURE      rt hand surgery - laceration/glass/tendons     ZZHC ECHO HEART XTHORACIC, STRESS/REST  2009    neg       FAMILY HISTORY:  Family History   Problem Relation Age of Onset     Breast Cancer Mother      Arthritis Mother         joint ?     Depression Father      Cancer Maternal Grandmother      C.A.D. Maternal Grandfather      Cardiovascular Maternal Grandfather      Gynecology Paternal Grandmother          giving birth     Prostate Cancer Paternal Grandfather      Genetic Disorder Brother         joint pain?     Depression Brother      Cardiovascular Son         congenital heart defect -       Prostate Cancer Other          age 70's     Diabetes Other        SOCIAL HISTORY:  Social History     Socioeconomic History     Marital status:      Spouse name: Not on file     Number of children: Not on file     Years of education: Not on file     Highest education level: Not on file   Occupational History     Occupation:      Employer: SELF   Tobacco Use     Smoking status: Former Smoker     Packs/day: 1.50     Quit date: 2007     Years since quittin.9     Smokeless tobacco: Never Used   Substance and Sexual Activity     Alcohol use: No     Comment: rare     Drug use: No     Sexual activity: Yes     Partners: Female   Other  Topics Concern     Parent/sibling w/ CABG, MI or angioplasty before 65F 55M? No   Social History Narrative    Dairy/d a lot, 1 gallon of milk per day plus cheese and other servings     Caffeine 48 oz. coffee per day    Exercise none    Sunscreen used - No    Seatbelts used - Yes    Working smoke/CO detectors in the home - Yes    Guns stored in the home - No    Self Breast Exams - NA    Self Testicular Exam - No    Eye Exam up to date - No    Dental Exam up to date - Yes    Pap Smear up to date - NA    Mammogram up to date - NA    PSA up to date - No    Dexa Scan up to date - NA    Flex Sig / Colonoscopy up to date - No    Immunizations up to date - No    Abuse: Current or Past(Physical, Sexual or Emotional)- No    Do you feel safe in your environment - Yes    [place]        Lives with spouse and her parents.     Social Determinants of Health     Financial Resource Strain: Not on file   Food Insecurity: Not on file   Transportation Needs: Not on file   Physical Activity: Not on file   Stress: Not on file   Social Connections: Not on file   Intimate Partner Violence: Not on file   Housing Stability: Not on file       Review of Systems:  Skin:  Negative     Eyes:  Positive for glasses  ENT:  Negative    Respiratory:  Positive for dyspnea on exertion;cough  Cardiovascular:  Negative for;edema;chest pain;palpitations Positive for;fatigue;exercise intolerance;dizziness  Gastroenterology: Positive for nausea  Genitourinary:  Negative    Musculoskeletal:  Positive for neck pain  Neurologic:  Negative    Psychiatric:  Positive for sleep disturbances  Heme/Lymph/Imm:  Positive for allergies  Endocrine:  Negative      Physical Exam:  Vitals: /86 (BP Location: Right arm, Patient Position: Sitting, Cuff Size: Adult Large)   Pulse 90   Ht 1.829 m (6')   Wt 110.9 kg (244 lb 6.4 oz)   SpO2 94%   BMI 33.15 kg/m     Please refer to dictation for physical exam    Recent Lab Results: all reviewed today  CBC RESULTS:  Lab  Results   Component Value Date    WBC 5.5 02/08/2021    RBC 5.12 02/08/2021    HGB 16.3 02/08/2021    HCT 47.2 02/08/2021    MCV 92 02/08/2021    MCH 31.8 02/08/2021    MCHC 34.5 02/08/2021    RDW 12.3 02/08/2021     02/08/2021       BMP RESULTS:  Lab Results   Component Value Date     02/08/2021    POTASSIUM 4.1 02/08/2021    CHLORIDE 108 02/08/2021    CO2 30 02/08/2021    ANIONGAP 2 (L) 02/08/2021     (H) 02/08/2021    BUN 17 02/08/2021    CR 0.94 02/08/2021    GFRESTIMATED >90 02/08/2021    GFRESTBLACK >90 02/08/2021    WILIAM 9.6 02/08/2021        INR RESULTS:  Lab Results   Component Value Date    INR 1.0 02/17/2014    INR 1.00 06/06/2008           CC  Kiera Yan PA-C  [address]  [place] [address]        Service Date: 01/18/2022    POOR AUDIO    PRIMARY CARDIOLOGIST:  Dr. Stephens.    REASON FOR VISIT:  Shortness of breath, chest pain followup, fatigue, febrile illness.    HISTORY OF PRESENT ILLNESS:  Mr. Herrera is a delightful 56-year-old gentleman with past medical history significant for tobacco use, longstanding dyspnea on exertion, hyperlipidemia, herniation of the cervical spine and severe phobia of needles, who had presented initially with atypical chest discomfort and shortness of breath.  He underwent stress echo which was normal without stress-induced ischemia and achieved 8 METs.  He unfortunately has a family history of his mom having a severe cardiomyopathy, likely secondary to chemotherapy and we elected to do a coronary CT.  This showed nonocclusive coronary artery disease with calcium score of 41, 0 in the left main, 19 in the LAD, and 20 in the circumflex, and 2 in the RCA.  This put him in the 56th percentile.  His overread of the CT showed calcified hilar and mediastinal lymph nodes along with calcified granuloma, all compatible with granulomatous disease.  Zio Patch showed just rare PACs and PVCs.  We worked on getting his cholesterol improved, and I am  seeing him back for routine followup.    Up until mid December, he was feeling well.  His chest discomfort had resolved and his shortness of breath had also resolved.  He struggled with some dizziness that was likely related to an inner ear infection.    Unfortunately, more recently, approximately 10 days ago, he developed profound fatigue and has intermittently felt febrile, diaphoretic and completely wiped out where he cannot even get out of bed.  He has had chills and a loss of taste or smell but no shortness of breath.  He has had minimal diarrhea, and he typically has kind of an upset stomach that is improved with Tums.  This is not really worse than before.  He had lipids done on the 16th.  He also overall feels very weak and fatigued.    SOCIAL HISTORY:  He is  to our , Laine.  They have 3 children, 2 boys and a girl.  No one recently has had COVID.  They have lost 2 children, one at 4 months gestation and another shortly after birth that was quite traumatic for them.  History of tobacco, quit 14 years ago with a 20-pack-year history.  Rare alcohol use.    PHYSICAL EXAMINATION:  GENERAL:  Ill-appearing gentleman in no acute distress.  Of note, his blood pressure is 110/86, his pulse is 90.  His O2 sats are 94% on room air, respirations are approximately 12.  HEART:  Regular in rate and rhythm.  I do not appreciate murmur, rub or gallop.  LUNGS:  Clear, without wheezes, rales, or rhonchi.  EXTREMITIES:  Without peripheral edema.  ABDOMEN:  Has positive bowel tones.  It is soft and nontender throughout.  There is no guarding.  I do not feel his liver border.    Last labs show an ALT of 205, , HDL 31, total cholesterol 192, triglycerides 247, all much improved from previous.    ASSESSMENT AND PLAN:    1.  Febrile illness with severe fatigue, aches, loss of taste and smell, likely consistent with COVID in this patient who is not vaccinated due to severe needle phobia.  I suspect he is  going on about day 10 of the illness.  He is agreeable to testing today and isolating.  At this point, his vitals are all stable and I do not think he needs to be hospitalized or in the ER and he can continue to recover at home.  If it is positive, I will send that to his primary care provider for them to work on the home COVID care program and monitor him there.  2.  Elevated ALT at 205 with the patient being off Crestor for a week at that time.  His abdominal exam is benign, and he has normal bowel and bladder function.  His complete metabolic panel is pending at this time.  Given a benign exam and lack of symptoms, will follow up on this.  I suspect this may be statin related, although I am not clear at this time.  He will continue to hold his Crestor.  He does not have a history of heavy alcohol use.  We did not discuss that today.  3.  History of shortness of breath and dyspnea on exertion, resolved prior to these episodes, although he does not admit to any shortness of breath at this time.  4.  Nonocclusive coronary artery disease, on aspirin.  Will restart statin depending on liver function.  5.  Palpitations with PACs and PVCs on monitor.    I have directed this patient to present to the ER if his symptoms worsen at all or he develops any GI symptoms.  He will follow up with Dr. Stephens in May or .  We will address his Crestor in the interim.    Thank you for allowing me to participate in this delightful patient's care.      Kiera Yan PA-C        D: 2022   T: 2022   MT: agustín    Name:     JAZZY SIDDIQI  MRN:      [id number]        Account:      [id number]   :      1965           Service Date: 2022       Document: [id number]

## 2022-01-18 NOTE — PATIENT INSTRUCTIONS
Thanks for coming into [facility] clinic today.    We discussed: please get a covid test, we'll call with those results and the results of your other labs.      Medication changes:  Stop taking your crestor/ rosuvastatin until we get more liver results.      Follow up: with Dr. Stephens in May or June.  We'll call sooner with recommendations.       Please call the clinic at  [phone] with any questions or concerns and my our nurses will be happy to help.    Please call [phone] for scheduling.      Reminder: Please bring in all current medications, over the counter supplements and vitamin bottles to your next appointment.

## 2022-01-18 NOTE — PROGRESS NOTES
[id number]      HPI and Plan:   See dictation    Orders this Visit:  Orders Placed This Encounter   Procedures     Symptomatic; Yes; 1/8/2022 COVID-19 Virus (Coronavirus) by PCR Nasopharyngeal     Lipid Profile     ALT     Follow-Up with Cardiologist     No orders of the defined types were placed in this encounter.    There are no discontinued medications.      Encounter Diagnoses   Name Primary?     Hypercholesterolemia      Coronary artery disease involving native coronary artery of native heart without angina pectoris      Febrile illness Yes       CURRENT MEDICATIONS:  Current Outpatient Medications   Medication Sig Dispense Refill     acetaminophen (TYLENOL) 500 MG tablet Take 1,000 mg by mouth every morning        aspirin (ASA) 81 MG chewable tablet Take 1 tablet (81 mg) by mouth daily       ibuprofen (ADVIL/MOTRIN) 200 MG tablet Take 400 mg by mouth every morning        loratadine (CLARITIN) 10 MG tablet TAKE 1 TABLET (10 MG) BY MOUTH DAILY 90 tablet 0     modafinil (PROVIGIL) 200 MG tablet Take 1 tablet (200 mg) by mouth daily 30 tablet 3     predniSONE (DELTASONE) 20 MG tablet Take 1 tablet (20 mg) by mouth daily 5 tablet 0     rosuvastatin (CRESTOR) 5 MG tablet Take 1 tablet (5 mg) by mouth daily 90 tablet 3     amoxicillin (AMOXIL) 875 MG tablet Take 1 tablet (875 mg) by mouth 2 times daily (Patient not taking: Reported on 1/18/2022) 20 tablet 0     meclizine (ANTIVERT) 25 MG tablet Take 1 tablet (25 mg) by mouth 3 times daily as needed for dizziness (Patient not taking: Reported on 1/18/2022) 30 tablet 0     omeprazole (PRILOSEC) 40 MG DR capsule TAKE 1 CAPSULE (40 MG) BY MOUTH DAILY (Patient not taking: Reported on 1/18/2022) 90 capsule 1       ALLERGIES     Allergies   Allergen Reactions     Percocet [Oxycodone-Acetaminophen] Itching     Wasps [Hornets] Swelling       PAST MEDICAL HISTORY:  Past Medical History:   Diagnosis Date     Back pains      Depressive disorder, not elsewhere classified 1/28/2007     declines Rx     Disc degeneration 12/2008    see MRI -throaco-lumbar mild discogenic degenerative changes     Elevated LFT's 11/09    alt and ast     Other and unspecified hyperlipidemia 1/2007     Sleep disorder 9/09    CPAP     Tobacco use disorder        PAST SURGICAL HISTORY:  Past Surgical History:   Procedure Laterality Date     COLONOSCOPY  07/07/08    adenomatous polyp repeat 5 years     COLONOSCOPY N/A 12/29/2017    Procedure: COMBINED COLONOSCOPY, SINGLE OR MULTIPLE BIOPSY/POLYPECTOMY BY BIOPSY;  Colonoscopy, Polypectomy by Biopsy;  Surgeon: Matt Reyes MD;  Location:  GI     ENT SURGERY      for deviated septum     EXCISE LESION AXILLA  12/7/2018    Procedure: Excision Skin Tags Right Axilla;  Surgeon: Ayaan Chaney MD;  [facility]     EXCISE LESION BACK N/A 12/7/2018    Procedure: Excision Back Skin Lesion X Two;  Surgeon: Ayaan Chaney MD;  [facility]     INJECT EPIDURAL CERVICAL N/A 5/14/2015    Procedure: INJECT EPIDURAL CERVICAL;  Surgeon: Christian Chaudhry MD;  [facility]     INJECT EPIDURAL CERVICAL N/A 12/28/2018    Procedure: INJECT EPIDURAL CERVICAL 6-7;  Surgeon: Christian Chaudhry MD;  [facility]     INJECT EPIDURAL LUMBAR N/A 12/20/2019    Procedure: Lumbar 5- Sacral 1 Epidural  Injection bilatetral;  Surgeon: Christian Chaudhry MD;  [facility]     INJECT FACET JOINT Bilateral 1/25/2019    Procedure: Cervical Facet Injections Cervical 5-6 and 6-7 Bilateral;  Surgeon: Christian Chaudhry MD;  [facility]     INJECT FACET JOINT Bilateral 11/29/2019    Procedure: Cervical 5-6 and 6-7 Bilateral facet joint injection;  Surgeon: Christian Chaudhry MD;  [facility]     INJECT FACET JOINT Bilateral 2/11/2021    Procedure: Cervical 5-6 and Cervical 6-7 Bilateral facet injections;  Surgeon: Christian Chaudhry MD;  [facility]     INJECT FACET JOINT Bilateral 10/19/2021    Procedure: Left C5-C6, Right C5-C6, Left C6-C7 and Right C6-C7 Intra-articular zygopophyseal  joint injection utilizing fluoroscopic guidance with contrast dye;  Surgeon: Christian Chaudhry MD;  [facility]     INJECT STEROID (LOCATION) Left 10/19/2021    Procedure: Glenohumeral joint injection left shoulder;  Surgeon: Christian Chaudhry MD;  [facility]: [facility]     ORTHOPEDIC SURGERY      Right hand     TONSILLECTOMY       ZZC NONSPECIFIC PROCEDURE      rt hand surgery - laceration/glass/tendons     ZZHC ECHO HEART XTHORACIC, STRESS/REST  2009    neg       FAMILY HISTORY:  Family History   Problem Relation Age of Onset     Breast Cancer Mother      Arthritis Mother         joint ?     Depression Father      Cancer Maternal Grandmother      C.A.D. Maternal Grandfather      Cardiovascular Maternal Grandfather      Gynecology Paternal Grandmother          giving birth     Prostate Cancer Paternal Grandfather      Genetic Disorder Brother         joint pain?     Depression Brother      Cardiovascular Son         congenital heart defect -       Prostate Cancer Other          age 70's     Diabetes Other        SOCIAL HISTORY:  Social History     Socioeconomic History     Marital status:      Spouse name: Not on file     Number of children: Not on file     Years of education: Not on file     Highest education level: Not on file   Occupational History     Occupation:      Employer: SELF   Tobacco Use     Smoking status: Former Smoker     Packs/day: 1.50     Quit date: 2007     Years since quittin.9     Smokeless tobacco: Never Used   Substance and Sexual Activity     Alcohol use: No     Comment: rare     Drug use: No     Sexual activity: Yes     Partners: Female   Other Topics Concern     Parent/sibling w/ CABG, MI or angioplasty before 65F 55M? No   Social History Narrative    Dairy/d a lot, 1 gallon of milk per day plus cheese and other servings     Caffeine 48 oz. coffee per day    Exercise none    Sunscreen used - No    Seatbelts used - Yes    Working smoke/CO  detectors in the home - Yes    Guns stored in the home - No    Self Breast Exams - NA    Self Testicular Exam - No    Eye Exam up to date - No    Dental Exam up to date - Yes    Pap Smear up to date - NA    Mammogram up to date - NA    PSA up to date - No    Dexa Scan up to date - NA    Flex Sig / Colonoscopy up to date - No    Immunizations up to date - No    Abuse: Current or Past(Physical, Sexual or Emotional)- No    Do you feel safe in your environment - Yes    Tms,cma        Lives with spouse and her parents.     Social Determinants of Health     Financial Resource Strain: Not on file   Food Insecurity: Not on file   Transportation Needs: Not on file   Physical Activity: Not on file   Stress: Not on file   Social Connections: Not on file   Intimate Partner Violence: Not on file   Housing Stability: Not on file       Review of Systems:  Skin:  Negative     Eyes:  Positive for glasses  ENT:  Negative    Respiratory:  Positive for dyspnea on exertion;cough  Cardiovascular:  Negative for;edema;chest pain;palpitations Positive for;fatigue;exercise intolerance;dizziness  Gastroenterology: Positive for nausea  Genitourinary:  Negative    Musculoskeletal:  Positive for neck pain  Neurologic:  Negative    Psychiatric:  Positive for sleep disturbances  Heme/Lymph/Imm:  Positive for allergies  Endocrine:  Negative      Physical Exam:  Vitals: /86 (BP Location: Right arm, Patient Position: Sitting, Cuff Size: Adult Large)   Pulse 90   Ht 1.829 m (6')   Wt 110.9 kg (244 lb 6.4 oz)   SpO2 94%   BMI 33.15 kg/m     Please refer to dictation for physical exam    Recent Lab Results: all reviewed today  CBC RESULTS:  Lab Results   Component Value Date    WBC 5.5 02/08/2021    RBC 5.12 02/08/2021    HGB 16.3 02/08/2021    HCT 47.2 02/08/2021    MCV 92 02/08/2021    MCH 31.8 02/08/2021    MCHC 34.5 02/08/2021    RDW 12.3 02/08/2021     02/08/2021       BMP RESULTS:  Lab Results   Component Value Date      02/08/2021    POTASSIUM 4.1 02/08/2021    CHLORIDE 108 02/08/2021    CO2 30 02/08/2021    ANIONGAP 2 (L) 02/08/2021     (H) 02/08/2021    BUN 17 02/08/2021    CR 0.94 02/08/2021    GFRESTIMATED >90 02/08/2021    GFRESTBLACK >90 02/08/2021    WILIAM 9.6 02/08/2021        INR RESULTS:  Lab Results   Component Value Date    INR 1.0 02/17/2014    INR 1.00 06/06/2008           CC  Kiera Yan PASTIVEN  [address]  [place] [id number]

## 2022-01-18 NOTE — LETTER
1/18/2022    Gustavo Farmer, DO  [address] [place] [address]    RE: Bulmaro Herrera       Dear Colleague,     I had the pleasure of seeing Bulmaro Herrera in the [facility].  [id number]      HPI and Plan:   See dictation    Orders this Visit:  Orders Placed This Encounter   Procedures     Symptomatic; Yes; 1/8/2022 COVID-19 Virus (Coronavirus) by PCR Nasopharyngeal     Lipid Profile     ALT     Follow-Up with Cardiologist     No orders of the defined types were placed in this encounter.    There are no discontinued medications.      Encounter Diagnoses   Name Primary?     Hypercholesterolemia      Coronary artery disease involving native coronary artery of native heart without angina pectoris      Febrile illness Yes       CURRENT MEDICATIONS:  Current Outpatient Medications   Medication Sig Dispense Refill     acetaminophen (TYLENOL) 500 MG tablet Take 1,000 mg by mouth every morning        aspirin (ASA) 81 MG chewable tablet Take 1 tablet (81 mg) by mouth daily       ibuprofen (ADVIL/MOTRIN) 200 MG tablet Take 400 mg by mouth every morning        loratadine (CLARITIN) 10 MG tablet TAKE 1 TABLET (10 MG) BY MOUTH DAILY 90 tablet 0     modafinil (PROVIGIL) 200 MG tablet Take 1 tablet (200 mg) by mouth daily 30 tablet 3     predniSONE (DELTASONE) 20 MG tablet Take 1 tablet (20 mg) by mouth daily 5 tablet 0     rosuvastatin (CRESTOR) 5 MG tablet Take 1 tablet (5 mg) by mouth daily 90 tablet 3     amoxicillin (AMOXIL) 875 MG tablet Take 1 tablet (875 mg) by mouth 2 times daily (Patient not taking: Reported on 1/18/2022) 20 tablet 0     meclizine (ANTIVERT) 25 MG tablet Take 1 tablet (25 mg) by mouth 3 times daily as needed for dizziness (Patient not taking: Reported on 1/18/2022) 30 tablet 0     omeprazole (PRILOSEC) 40 MG DR capsule TAKE 1 CAPSULE (40 MG) BY MOUTH DAILY (Patient not taking: Reported on 1/18/2022) 90 capsule 1       ALLERGIES     Allergies   Allergen Reactions      Percocet [Oxycodone-Acetaminophen] Itching     Wasps [Hornets] Swelling       PAST MEDICAL HISTORY:  Past Medical History:   Diagnosis Date     Back pains      Depressive disorder, not elsewhere classified 1/28/2007    declines Rx     Disc degeneration 12/2008    see MRI -throaco-lumbar mild discogenic degenerative changes     Elevated LFT's 11/09    alt and ast     Other and unspecified hyperlipidemia 1/2007     Sleep disorder 9/09    CPAP     Tobacco use disorder        PAST SURGICAL HISTORY:  Past Surgical History:   Procedure Laterality Date     COLONOSCOPY  07/07/08    adenomatous polyp repeat 5 years     COLONOSCOPY N/A 12/29/2017    Procedure: COMBINED COLONOSCOPY, SINGLE OR MULTIPLE BIOPSY/POLYPECTOMY BY BIOPSY;  Colonoscopy, Polypectomy by Biopsy;  Surgeon: Matt Reyes MD;  Location:  GI     ENT SURGERY      for deviated septum     EXCISE LESION AXILLA  12/7/2018    Procedure: Excision Skin Tags Right Axilla;  Surgeon: Ayaan Chaney MD;  Location: [facility]     EXCISE LESION BACK N/A 12/7/2018    Procedure: Excision Back Skin Lesion X Two;  Surgeon: Ayaan Chaney MD;  Location: [facility]     INJECT EPIDURAL CERVICAL N/A 5/14/2015    Procedure: INJECT EPIDURAL CERVICAL;  Surgeon: Christian Chaudhry MD;  Location: [facility]     INJECT EPIDURAL CERVICAL N/A 12/28/2018    Procedure: INJECT EPIDURAL CERVICAL 6-7;  Surgeon: Christian Chaudhry MD;  [facility]: [facility]     INJECT EPIDURAL LUMBAR N/A 12/20/2019    Procedure: Lumbar 5- Sacral 1 Epidural  Injection bilatetral;  Surgeon: Christian Chaudhry MD;  Location: [facility]     INJECT FACET JOINT Bilateral 1/25/2019    Procedure: Cervical Facet Injections Cervical 5-6 and 6-7 Bilateral;  Surgeon: Christian Chaudhry MD;  [facility]     INJECT FACET JOINT Bilateral 11/29/2019    Procedure: Cervical 5-6 and 6-7 Bilateral facet joint injection;  Surgeon: Christian Chaudhry MD;  [facility]     INJECT FACET JOINT Bilateral 2/11/2021    Procedure: Cervical 5-6 and Cervical 6-7  Bilateral facet injections;  Surgeon: Christian Chaudhry MD;  [facility]: [facility]     INJECT FACET JOINT Bilateral 10/19/2021    Procedure: Left C5-C6, Right C5-C6, Left C6-C7 and Right C6-C7 Intra-articular zygopophyseal joint injection utilizing fluoroscopic guidance with contrast dye;  Surgeon: Christian Chaudhry MD;  Location: [facility]     INJECT STEROID (LOCATION) Left 10/19/2021    Procedure: Glenohumeral joint injection left shoulder;  Surgeon: Christian Chaudhry MD;  Location: [facility]     ORTHOPEDIC SURGERY      Right hand     TONSILLECTOMY       ZZC NONSPECIFIC PROCEDURE      rt hand surgery - laceration/glass/tendons     ZZHC ECHO HEART XTHORACIC, STRESS/REST  2009    neg       FAMILY HISTORY:  Family History   Problem Relation Age of Onset     Breast Cancer Mother      Arthritis Mother         joint ?     Depression Father      Cancer Maternal Grandmother      C.A.D. Maternal Grandfather      Cardiovascular Maternal Grandfather      Gynecology Paternal Grandmother          giving birth     Prostate Cancer Paternal Grandfather      Genetic Disorder Brother         joint pain?     Depression Brother      Cardiovascular Son         congenital heart defect -       Prostate Cancer Other          age 70's     Diabetes Other        SOCIAL HISTORY:  Social History     Socioeconomic History     Marital status:      Spouse name: Not on file     Number of children: Not on file     Years of education: Not on file     Highest education level: Not on file   Occupational History     Occupation:      Employer: SELF   Tobacco Use     Smoking status: Former Smoker     Packs/day: 1.50     Quit date: 2007     Years since quittin.9     Smokeless tobacco: Never Used   Substance and Sexual Activity     Alcohol use: No     Comment: rare     Drug use: No     Sexual activity: Yes     Partners: Female   Other Topics Concern     Parent/sibling w/ CABG, MI or angioplasty before 65F 55M? No   Social  History Narrative    Dairy/d a lot, 1 gallon of milk per day plus cheese and other servings     Caffeine 48 oz. coffee per day    Exercise none    Sunscreen used - No    Seatbelts used - Yes    Working smoke/CO detectors in the home - Yes    Guns stored in the home - No    Self Breast Exams - NA    Self Testicular Exam - No    Eye Exam up to date - No    Dental Exam up to date - Yes    Pap Smear up to date - NA    Mammogram up to date - NA    PSA up to date - No    Dexa Scan up to date - NA    Flex Sig / Colonoscopy up to date - No    Immunizations up to date - No    Abuse: Current or Past(Physical, Sexual or Emotional)- No    Do you feel safe in your environment - Yes    Tms,cma        Lives with spouse and her parents.     Social Determinants of Health     Financial Resource Strain: Not on file   Food Insecurity: Not on file   Transportation Needs: Not on file   Physical Activity: Not on file   Stress: Not on file   Social Connections: Not on file   Intimate Partner Violence: Not on file   Housing Stability: Not on file       Review of Systems:  Skin:  Negative     Eyes:  Positive for glasses  ENT:  Negative    Respiratory:  Positive for dyspnea on exertion;cough  Cardiovascular:  Negative for;edema;chest pain;palpitations Positive for;fatigue;exercise intolerance;dizziness  Gastroenterology: Positive for nausea  Genitourinary:  Negative    Musculoskeletal:  Positive for neck pain  Neurologic:  Negative    Psychiatric:  Positive for sleep disturbances  Heme/Lymph/Imm:  Positive for allergies  Endocrine:  Negative      Physical Exam:  Vitals: /86 (BP Location: Right arm, Patient Position: Sitting, Cuff Size: Adult Large)   Pulse 90   Ht 1.829 m (6')   Wt 110.9 kg (244 lb 6.4 oz)   SpO2 94%   BMI 33.15 kg/m     Please refer to dictation for physical exam    Recent Lab Results: all reviewed today  CBC RESULTS:  Lab Results   Component Value Date    WBC 5.5 02/08/2021    RBC 5.12 02/08/2021    HGB 16.3  02/08/2021    HCT 47.2 02/08/2021    MCV 92 02/08/2021    MCH 31.8 02/08/2021    MCHC 34.5 02/08/2021    RDW 12.3 02/08/2021     02/08/2021       BMP RESULTS:  Lab Results   Component Value Date     02/08/2021    POTASSIUM 4.1 02/08/2021    CHLORIDE 108 02/08/2021    CO2 30 02/08/2021    ANIONGAP 2 (L) 02/08/2021     (H) 02/08/2021    BUN 17 02/08/2021    CR 0.94 02/08/2021    GFRESTIMATED >90 02/08/2021    GFRESTBLACK >90 02/08/2021    WILIAM 9.6 02/08/2021        INR RESULTS:  Lab Results   Component Value Date    INR 1.0 02/17/2014    INR 1.00 06/06/2008           CC  Kiera Yan PA-C  [address]  [place] [id number]          Thank you for allowing me to participate in the care of your patient.      Sincerely,     Kiera Yan PA-C     [facility]  cc:   Kiera Yan PA-C  [address]  [place] [id number]

## 2022-01-19 ENCOUNTER — MYC MEDICAL ADVICE (OUTPATIENT)
Dept: INTERNAL MEDICINE | Facility: CLINIC | Age: 57
End: 2022-01-19
Payer: COMMERCIAL

## 2022-01-19 ENCOUNTER — TELEPHONE (OUTPATIENT)
Dept: CARDIOLOGY | Facility: CLINIC | Age: 57
End: 2022-01-19
Payer: COMMERCIAL

## 2022-01-19 LAB
SARS-COV-2 RNA RESP QL NAA+PROBE: DETECTED
SARS-COV-2 RNA RESP QL NAA+PROBE: NORMAL

## 2022-01-19 NOTE — PROGRESS NOTES
Service Date: 01/18/2022    POOR AUDIO    PRIMARY CARDIOLOGIST:  Dr. Stephens.    REASON FOR VISIT:  Shortness of breath, chest pain followup, fatigue, febrile illness.    HISTORY OF PRESENT ILLNESS:  Mr. Herrera is a delightful 56-year-old gentleman with past medical history significant for tobacco use, longstanding dyspnea on exertion, hyperlipidemia, herniation of the cervical spine and severe phobia of needles, who had presented initially with atypical chest discomfort and shortness of breath.  He underwent stress echo which was normal without stress-induced ischemia and achieved 8 METs.  He unfortunately has a family history of his mom having a severe cardiomyopathy, likely secondary to chemotherapy and we elected to do a coronary CT.  This showed nonocclusive coronary artery disease with calcium score of 41, 0 in the left main, 19 in the LAD, and 20 in the circumflex, and 2 in the RCA.  This put him in the 56th percentile.  His overread of the CT showed calcified hilar and mediastinal lymph nodes along with calcified granuloma, all compatible with granulomatous disease.  Zio Patch showed just rare PACs and PVCs.  We worked on getting his cholesterol improved, and I am seeing him back for routine followup.    Up until mid December, he was feeling well.  His chest discomfort had resolved and his shortness of breath had also resolved.  He struggled with some dizziness that was likely related to an inner ear infection.    Unfortunately, more recently, approximately 10 days ago, he developed profound fatigue and has intermittently felt febrile, diaphoretic and completely wiped out where he cannot even get out of bed.  He has had chills and a loss of taste or smell but no shortness of breath.  He has had minimal diarrhea, and he typically has kind of an upset stomach that is improved with Tums.  This is not really worse than before.  He had lipids done on the 16th.  He also overall feels very weak and  fatigued.    SOCIAL HISTORY:  He is  to our , Laine.  They have 3 children, 2 boys and a girl.  No one recently has had COVID.  They have lost 2 children, one at 4 months gestation and another shortly after birth that was quite traumatic for them.  History of tobacco, quit 14 years ago with a 20-pack-year history.  Rare alcohol use.    PHYSICAL EXAMINATION:  GENERAL:  Ill-appearing gentleman in no acute distress.  Of note, his blood pressure is 110/86, his pulse is 90.  His O2 sats are 94% on room air, respirations are approximately 12.  HEART:  Regular in rate and rhythm.  I do not appreciate murmur, rub or gallop.  LUNGS:  Clear, without wheezes, rales, or rhonchi.  EXTREMITIES:  Without peripheral edema.  ABDOMEN:  Has positive bowel tones.  It is soft and nontender throughout.  There is no guarding.  I do not feel his liver border.    Last labs show an ALT of 205, , HDL 31, total cholesterol 192, triglycerides 247, all much improved from previous.    ASSESSMENT AND PLAN:    1.  Febrile illness with severe fatigue, aches, loss of taste and smell, likely consistent with COVID in this patient who is not vaccinated due to severe needle phobia.  I suspect he is going on about day 10 of the illness.  He is agreeable to testing today and isolating.  At this point, his vitals are all stable and I do not think he needs to be hospitalized or in the ER and he can continue to recover at home.  If it is positive, I will send that to his primary care provider for them to work on the home COVID care program and monitor him there.  2.  Elevated ALT at 205 with the patient being off Crestor for a week at that time.  His abdominal exam is benign, and he has normal bowel and bladder function.  His complete metabolic panel is pending at this time.  Given a benign exam and lack of symptoms, will follow up on this.  I suspect this may be statin related, although I am not clear at this time.  He will continue  to hold his Crestor.  He does not have a history of heavy alcohol use.  We did not discuss that today.  3.  History of shortness of breath and dyspnea on exertion, resolved prior to these episodes, although he does not admit to any shortness of breath at this time.  4.  Nonocclusive coronary artery disease, on aspirin.  Will restart statin depending on liver function.  5.  Palpitations with PACs and PVCs on monitor.    I have directed this patient to present to the ER if his symptoms worsen at all or he develops any GI symptoms.  He will follow up with Dr. Stephens in May or .  We will address his Crestor in the interim.    Thank you for allowing me to participate in this delightful patient's care.    Kiera Yan PA-C        D: 2022   T: 2022   MT: agustín    Name:     JAZZY SIDDIQIMiguel  MRN:      [id number]        Account:      [id number]   :      1965           Service Date: 2022       Document: [id number]

## 2022-01-19 NOTE — TELEPHONE ENCOUNTER
COVID test now returned positive.  Called pt with results.  He still feels poorly, home O2 93-96% but just no energy or appetite. He had arranged f/u with Dr. Farmer tomorrow in person but should be switched to phone or virtual.  Pts wife is a  and can help switch in am.  To ED if o2 sat <90%.  Pt agreeable.    Copy Emelina Estrella RN as fyi

## 2022-01-20 ENCOUNTER — VIRTUAL VISIT (OUTPATIENT)
Dept: INTERNAL MEDICINE | Facility: CLINIC | Age: 57
End: 2022-01-20
Payer: COMMERCIAL

## 2022-01-20 DIAGNOSIS — R42 VERTIGO: ICD-10-CM

## 2022-01-20 DIAGNOSIS — U07.1 INFECTION DUE TO 2019 NOVEL CORONAVIRUS: Primary | ICD-10-CM

## 2022-01-20 PROBLEM — E66.01 MORBID OBESITY (H): Status: RESOLVED | Noted: 2020-03-06 | Resolved: 2022-01-20

## 2022-01-20 PROCEDURE — 99213 OFFICE O/P EST LOW 20 MIN: CPT | Mod: TEL | Performed by: INTERNAL MEDICINE

## 2022-01-20 NOTE — PROGRESS NOTES
Barry is a 56 year old who is being evaluated via a billable telephone visit.      What phone number would you like to be contacted at? [phone]  How would you like to obtain your AVS? Jaqueline Onofre   barry is a 56 year old who presents for the following health issues     HPI     Chief Complaint   Patient presents with     Dizziness     follow up, still not improving. Tested positive for covid 1/18/22          EMR reviewed including:             Complaint, History of Chief Complaint, Corresponding Review of Systems, and Complaint Specific Physical Examination.    #1   Dizziness  Recent ear infection  Was doing better  Now has COVID.    Dizziness somewhat worse.        Exam:  There is no exam.  This was a telephone call.  He did cough quite a bit.  He seemed quite alert and appropriate            #2   COVID  Mild shortness of breath  Fatigue  Low-grade fever  More fatigue  Dry cough  No acute distress.        Exam:  As above        Patient has been interviewed, applicable history and applied review of systems have been performed.    Vital Signs:   There were no vitals taken for this visit.      Decision Making    Problem and Complexity     1. Infection due to 2019 novel coronavirus  Tylenol, ibuprofen, rest, fluids, isolation.  Suggested the patient contact the [facility] regarding acute intervention such as monoclonal antibody candidacy.    2. Vertigo  improved.  But then worsened associated with COVID.        Telephone time: 12 minutes                        FOLLOW UP   I have asked the patient to make an appointment for followup with me in 2 weeks by phone or as needed        I have carefully explained the diagnosis and treatment options to the patient.  The patient has displayed an understanding of the above, and all subsequent questions were answered.      DO ANDREW Blandon    Portions of this note were produced using [employer]  Although every attempt at  real-time proof reading has been made, occasional grammar/syntax errors may have been missed.

## 2022-02-11 ENCOUNTER — OFFICE VISIT (OUTPATIENT)
Dept: NEUROSURGERY | Facility: CLINIC | Age: 57
End: 2022-02-11
Attending: PHYSICIAN ASSISTANT
Payer: COMMERCIAL

## 2022-02-11 VITALS
SYSTOLIC BLOOD PRESSURE: 108 MMHG | DIASTOLIC BLOOD PRESSURE: 78 MMHG | HEIGHT: 72 IN | WEIGHT: 248.7 LBS | BODY MASS INDEX: 33.69 KG/M2 | TEMPERATURE: 97.6 F

## 2022-02-11 DIAGNOSIS — M47.812 ARTHROPATHY OF CERVICAL FACET JOINT: ICD-10-CM

## 2022-02-11 DIAGNOSIS — M54.2 CERVICALGIA: ICD-10-CM

## 2022-02-11 DIAGNOSIS — M48.02 NEURAL FORAMINAL STENOSIS OF CERVICAL SPINE: ICD-10-CM

## 2022-02-11 PROCEDURE — 99203 OFFICE O/P NEW LOW 30 MIN: CPT | Performed by: PHYSICIAN ASSISTANT

## 2022-02-11 RX ORDER — METHYLPREDNISOLONE 4 MG
TABLET, DOSE PACK ORAL
Qty: 21 TABLET | Refills: 0 | Status: SHIPPED | OUTPATIENT
Start: 2022-02-11 | End: 2022-03-28

## 2022-02-11 ASSESSMENT — PAIN SCALES - GENERAL: PAINLEVEL: SEVERE PAIN (6)

## 2022-02-11 ASSESSMENT — MIFFLIN-ST. JEOR: SCORE: 2006.1

## 2022-02-11 NOTE — PROGRESS NOTES
Dr. Demetrius Tran  [facility]  Neurosurgery Clinic Visit      CC: neck and back pain    Primary care Provider: Gustavo Farmer    Referring Provider:  Jacque Kay PA-C      Reason For Visit:   I was asked to consult on the patient for neck and back pain.      HPI: Bulmaro Herrera is a 56 year old male who presents for evaluation of his chief complaint of severe neck pain as well as back pain, to a slightly lesser degree.  He has had years of gradually worsening neck pain which she has been treating with facet injections with Dr. Chaudhry.  He has been getting C5-6 and C6-7 bilateral facet injections for the last couple of years, initially with good symptomatic improvement, but unfortunately now with diminishing returns.  He states the last round of injections he got did not do anything for him.  Per Dr. Chaudhry's notes, he would be a good candidate to try an ablation, but unfortunately has a needle phobia that would be prohibitive for him.  He states there is a certain point in his neck that he can squeeze and notes a significant improvement in his symptoms.  Otherwise, he has worsening neck pain throughout the day and difficulty sleeping at night because of it.  He has tried multiple medications including anti-inflammatories and Tylenol threes.    He also has low back pain, and paresthesias in his feet bilaterally.    Past Medical History:   Diagnosis Date     Back pains      Depressive disorder, not elsewhere classified 1/28/2007    declines Rx     Disc degeneration 12/2008    see MRI -throaco-lumbar mild discogenic degenerative changes     Elevated LFT's 11/09    alt and ast     Other and unspecified hyperlipidemia 1/2007     Sleep disorder 9/09    CPAP     Tobacco use disorder        Past Medical History reviewed with patient during visit.    Past Surgical History:   Procedure Laterality Date     COLONOSCOPY  07/07/08    adenomatous polyp repeat 5 years     COLONOSCOPY N/A  12/29/2017    Procedure: COMBINED COLONOSCOPY, SINGLE OR MULTIPLE BIOPSY/POLYPECTOMY BY BIOPSY;  Colonoscopy, Polypectomy by Biopsy;  Surgeon: Matt Reyes MD;  [facility]: [facility]     ENT SURGERY      for deviated septum     EXCISE LESION AXILLA  12/7/2018    Procedure: Excision Skin Tags Right Axilla;  Surgeon: Ayaan Chaney MD;  [facility]     EXCISE LESION BACK N/A 12/7/2018    Procedure: Excision Back Skin Lesion X Two;  Surgeon: Ayaan Chaney MD;  [facility]     INJECT EPIDURAL CERVICAL N/A 5/14/2015    Procedure: INJECT EPIDURAL CERVICAL;  Surgeon: Christian Chaudhry MD;  [facility]     INJECT EPIDURAL CERVICAL N/A 12/28/2018    Procedure: INJECT EPIDURAL CERVICAL 6-7;  Surgeon: Christian Chaudhry MD;  [facility]     INJECT EPIDURAL LUMBAR N/A 12/20/2019    Procedure: Lumbar 5- Sacral 1 Epidural  Injection bilatetral;  Surgeon: Christian Chaudhry MD;  [facility]     INJECT FACET JOINT Bilateral 1/25/2019    Procedure: Cervical Facet Injections Cervical 5-6 and 6-7 Bilateral;  Surgeon: Christian Chaudhry MD;  [facility]     INJECT FACET JOINT Bilateral 11/29/2019    Procedure: Cervical 5-6 and 6-7 Bilateral facet joint injection;  Surgeon: Christian Chaudhry MD;  [facility]     INJECT FACET JOINT Bilateral 2/11/2021    Procedure: Cervical 5-6 and Cervical 6-7 Bilateral facet injections;  Surgeon: Christian Chaudhry MD;  [facility]     INJECT FACET JOINT Bilateral 10/19/2021    Procedure: Left C5-C6, Right C5-C6, Left C6-C7 and Right C6-C7 Intra-articular zygopophyseal joint injection utilizing fluoroscopic guidance with contrast dye;  Surgeon: Christian Chaudhry MD;  [facility]     INJECT STEROID (LOCATION) Left 10/19/2021    Procedure: Glenohumeral joint injection left shoulder;  Surgeon: Christian Chaudhry MD;  [facility]     ORTHOPEDIC SURGERY      Right hand     TONSILLECTOMY       ZZC NONSPECIFIC PROCEDURE      rt hand surgery - laceration/glass/tendons     ZZHC ECHO HEART  XTHORACIC, STRESS/REST  6/2009    neg     Past Surgical History reviewed with patient during visit.    Current Outpatient Medications   Medication     modafinil (PROVIGIL) 200 MG tablet     acetaminophen (TYLENOL) 500 MG tablet     aspirin (ASA) 81 MG chewable tablet     ibuprofen (ADVIL/MOTRIN) 200 MG tablet     loratadine (CLARITIN) 10 MG tablet     meclizine (ANTIVERT) 25 MG tablet     omeprazole (PRILOSEC) 40 MG DR capsule     No current facility-administered medications for this visit.       Allergies   Allergen Reactions     Percocet [Oxycodone-Acetaminophen] Itching     Wasps [Hornets] Swelling       Social History     Socioeconomic History     Marital status:      Spouse name: None     Number of children: None     Years of education: None     Highest education level: None   Occupational History     Occupation:      Employer: SELF   Tobacco Use     Smoking status: Former Smoker     Packs/day: 1.50     Quit date: 2/14/2007     Years since quitting: 15.0     Smokeless tobacco: Never Used   Substance and Sexual Activity     Alcohol use: No     Comment: rare     Drug use: No     Sexual activity: Yes     Partners: Female   Other Topics Concern     Parent/sibling w/ CABG, MI or angioplasty before 65F 55M? No   Social History Narrative    Dairy/d a lot, 1 gallon of milk per day plus cheese and other servings     Caffeine 48 oz. coffee per day    Exercise none    Sunscreen used - No    Seatbelts used - Yes    Working smoke/CO detectors in the home - Yes    Guns stored in the home - No    Self Breast Exams - NA    Self Testicular Exam - No    Eye Exam up to date - No    Dental Exam up to date - Yes    Pap Smear up to date - NA    Mammogram up to date - NA    PSA up to date - No    Dexa Scan up to date - NA    Flex Sig / Colonoscopy up to date - No    Immunizations up to date - No    Abuse: Current or Past(Physical, Sexual or Emotional)- No    Do you feel safe in your environment - Yes    Tms,cma         Lives with spouse and her parents.     Social Determinants of Health     Financial Resource Strain: Not on file   Food Insecurity: Not on file   Transportation Needs: Not on file   Physical Activity: Not on file   Stress: Not on file   Social Connections: Not on file   Intimate Partner Violence: Not on file   Housing Stability: Not on file       Family History   Problem Relation Age of Onset     Breast Cancer Mother      Arthritis Mother         joint ?     Depression Father      Cancer Maternal Grandmother      C.A.D. Maternal Grandfather      Cardiovascular Maternal Grandfather      Gynecology Paternal Grandmother          giving birth     Prostate Cancer Paternal Grandfather      Genetic Disorder Brother         joint pain?     Depression Brother      Cardiovascular Son         congenital heart defect -       Prostate Cancer Other          age 70's     Diabetes Other           ROS: 10 point ROS neg other than the symptoms noted above in the HPI.    Vital Signs: /78 (BP Location: Right arm, Patient Position: Sitting, Cuff Size: Adult Large)   Temp 97.6  F (36.4  C) (Temporal)   Ht 1.829 m (6')   Wt 112.8 kg (248 lb 11.2 oz)   BMI 33.73 kg/m      Examination:  Constitutional:  Alert, well nourished, NAD.  HEENT: Normocephalic, atraumatic.   Pulmonary:  Without shortness of breath, normal effort.   Lymph: no lymphadenopathy to low back or LE.   Integumentary: Skin is free of rashes or lesions.   Cardiovascular:  No pitting edema of BLE.    Psych: Normal affect, no apparent distress    Neurological:  Awake  Alert  Oriented x 3  Speech clear  Cranial nerves II - XII grossly intact  Motor exam   Shoulder Abduction:  Right:  5/5   Left:  5/5  Biceps:                      Right:  5/5   Left:  5/5  Triceps:                     Right:  5/5   Left:  5/5  Wrist Extensors:       Right:  5/5   Left:  5/5  Wrist Flexors:           Right:  5/5   Left:  5/5  Intrinsics:                   Right:   5/5   Left:  5/5  Sensation normal to bilateral upper and lower extremities.    Reflexes are 2+ in the patellar and Achilles. There is no clonus. Downgoing Babinski.    Reflexes are 2+ in the brachial radialis and triceps. Negative Cristofer sign bilaterally.    Finger to Nose smooth  Pronator drift negative    Musculoskeletal:  Gait: Able to stand from a seated position. Normal non-antalgic, non-myelopathic     He does show normal range of motion, but notes that he does have pain with turning his head side to side.    Imaging:   MRI of the cervical and lumbar spine was reviewed in the office today.  He does have mild disc bulging at C6-7, but without any foraminal stenosis or nerve impingement.  He does have facet arthropathy at C5-6 and C6-7 bilaterally.    His lumbar MRI was also reviewed.  He has disc degeneration at L5-S1, with some mild disc collapse and Modic endplate changes.  Minimal foraminal stenosis bilaterally noted.  No nerve impingement is seen.    Assessment/Plan:     Cervicalgia  Cervical facet arthropathy  Low back pain  L5-S1 disc degeneration  Lower extremity paresthesias      Bulmaro Herrera is a 56 year old male.  I did have a discussion with the patient regarding his symptoms.  He has had years of gradually worsening neck pain, which initially responded nicely to facet injections at C5-6 and C6-7 bilaterally, but these are no longer helpful.  He has a severe needle phobia, and would be unable to tolerate an ablation procedure.  He does have the potential option of scheduling a follow-up with Dr. Tran to discuss a possible fusion.  He would like to take some time and think about what his options are.  I sent a Medrol Dosepak over to his pharmacy.      Regarding his low back, I think he would benefit from getting EMGs done in the lower extremities.  He is describing numbness in his feet bilaterally, but there is no nerve compression on his MRI that would correlate with this.          Mervin Hernandez  [facility]  [facility]  [address] [place] [id number]    Tel [phone]  Pager [phone]      The use of Dragon/Good Photo dictation services may have been used to construct the content in this note; any grammatical or spelling errors are non-intentional. Please contact the author of this note directly if you are in need of any clarification.

## 2022-02-11 NOTE — LETTER
2/11/2022         RE: Bulmaro Herrera  [address]  [facility] [address]        Dear Colleague,    Thank you for referring your patient, Bulmaro Herrera, to the [facility] [place]. Please see a copy of my visit note below.    Bulmaro Herrera is a 56 year old male who presents for:  Chief Complaint   Patient presents with     Neurologic Problem     Cervical: Arthropathy        Initial Vitals:  Ht 6' (1.829 m)   Wt 248 lb 11.2 oz (112.8 kg)   BMI 33.73 kg/m   Estimated body mass index is 33.73 kg/m  as calculated from the following:    Height as of this encounter: 6' (1.829 m).    Weight as of this encounter: 248 lb 11.2 oz (112.8 kg).. Body surface area is 2.39 meters squared. BP completed using cuff size: large  Data Unavailable    Nursing Comments: Pt. States pain is at a 6 out of 10. Back and neck.     Istau Tran  [facility]  Neurosurgery Clinic Visit      CC: neck and back pain    Primary care Provider: Gustavo Farmer    Referring Provider:  Jacque Kay PA-C      Reason For Visit:   I was asked to consult on the patient for neck and back pain.      HPI: Bulmaro Herrera is a 56 year old male who presents for evaluation of his chief complaint of severe neck pain as well as back pain, to a slightly lesser degree.  He has had years of gradually worsening neck pain which she has been treating with facet injections with Dr. Chaudhry.  He has been getting C5-6 and C6-7 bilateral facet injections for the last couple of years, initially with good symptomatic improvement, but unfortunately now with diminishing returns.  He states the last round of injections he got did not do anything for him.  Per Dr. Chaudhry's notes, he would be a good candidate to try an ablation, but unfortunately has a needle phobia that would be prohibitive for him.  He states there is a certain point in his neck that he can squeeze and notes a  significant improvement in his symptoms.  Otherwise, he has worsening neck pain throughout the day and difficulty sleeping at night because of it.  He has tried multiple medications including anti-inflammatories and Tylenol threes.    He also has low back pain, and paresthesias in his feet bilaterally.    Past Medical History:   Diagnosis Date     Back pains      Depressive disorder, not elsewhere classified 1/28/2007    declines Rx     Disc degeneration 12/2008    see MRI -throaco-lumbar mild discogenic degenerative changes     Elevated LFT's 11/09    alt and ast     Other and unspecified hyperlipidemia 1/2007     Sleep disorder 9/09    CPAP     Tobacco use disorder        Past Medical History reviewed with patient during visit.    Past Surgical History:   Procedure Laterality Date     COLONOSCOPY  07/07/08    adenomatous polyp repeat 5 years     COLONOSCOPY N/A 12/29/2017    Procedure: COMBINED COLONOSCOPY, SINGLE OR MULTIPLE BIOPSY/POLYPECTOMY BY BIOPSY;  Colonoscopy, Polypectomy by Biopsy;  Surgeon: Matt Reyes MD;  Location:  GI     ENT SURGERY      for deviated septum     EXCISE LESION AXILLA  12/7/2018    Procedure: Excision Skin Tags Right Axilla;  Surgeon: Ayaan Chaney MD;  Location:  OR     EXCISE LESION BACK N/A 12/7/2018    Procedure: Excision Back Skin Lesion X Two;  Surgeon: Ayaan Chaney MD;  [facility]     INJECT EPIDURAL CERVICAL N/A 5/14/2015    Procedure: INJECT EPIDURAL CERVICAL;  Surgeon: Christian Chaudhry MD;  [facility]     INJECT EPIDURAL CERVICAL N/A 12/28/2018    Procedure: INJECT EPIDURAL CERVICAL 6-7;  Surgeon: Christian Chaudhry MD;  [facility]     INJECT EPIDURAL LUMBAR N/A 12/20/2019    Procedure: Lumbar 5- Sacral 1 Epidural  Injection bilatetral;  Surgeon: Christian Chaudhry MD;  [facility]     INJECT FACET JOINT Bilateral 1/25/2019    Procedure: Cervical Facet Injections Cervical 5-6 and 6-7 Bilateral;  Surgeon: Christian Chaudhry MD;  [facility]      INJECT FACET JOINT Bilateral 11/29/2019    Procedure: Cervical 5-6 and 6-7 Bilateral facet joint injection;  Surgeon: Christian Chaudhry MD;  [facility]     INJECT FACET JOINT Bilateral 2/11/2021    Procedure: Cervical 5-6 and Cervical 6-7 Bilateral facet injections;  Surgeon: Christian Chaudhry MD;  [facility]     INJECT FACET JOINT Bilateral 10/19/2021    Procedure: Left C5-C6, Right C5-C6, Left C6-C7 and Right C6-C7 Intra-articular zygopophyseal joint injection utilizing fluoroscopic guidance with contrast dye;  Surgeon: Christian Chaudhry MD;  [facility]     INJECT STEROID (LOCATION) Left 10/19/2021    Procedure: Glenohumeral joint injection left shoulder;  Surgeon: Christian Chaudhry MD;  [facility]     ORTHOPEDIC SURGERY      Right hand     TONSILLECTOMY       ZZC NONSPECIFIC PROCEDURE      rt hand surgery - laceration/glass/tendons     [facility]  [facility]    neg     Past Surgical History reviewed with patient during visit.    Current Outpatient Medications   Medication     modafinil (PROVIGIL) 200 MG tablet     acetaminophen (TYLENOL) 500 MG tablet     aspirin (ASA) 81 MG chewable tablet     ibuprofen (ADVIL/MOTRIN) 200 MG tablet     loratadine (CLARITIN) 10 MG tablet     meclizine (ANTIVERT) 25 MG tablet     omeprazole (PRILOSEC) 40 MG DR capsule     No current facility-administered medications for this visit.       Allergies   Allergen Reactions     Percocet [Oxycodone-Acetaminophen] Itching     Wasps [Hornets] Swelling       Social History     Socioeconomic History     Marital status:      Spouse name: None     Number of children: None     Years of education: None     Highest education level: None   Occupational History     Occupation:      Employer: SELF   Tobacco Use     Smoking status: Former Smoker     Packs/day: 1.50     Quit date: 2/14/2007     Years since quitting: 15.0     Smokeless tobacco: Never Used   Substance and Sexual Activity      Alcohol use: No     Comment: rare     Drug use: No     Sexual activity: Yes     Partners: Female   Other Topics Concern     Parent/sibling w/ CABG, MI or angioplasty before 65F 55M? No   Social History Narrative    Dairy/d a lot, 1 gallon of milk per day plus cheese and other servings     Caffeine 48 oz. coffee per day    Exercise none    Sunscreen used - No    Seatbelts used - Yes    Working smoke/CO detectors in the home - Yes    Guns stored in the home - No    Self Breast Exams - NA    Self Testicular Exam - No    Eye Exam up to date - No    Dental Exam up to date - Yes    Pap Smear up to date - NA    Mammogram up to date - NA    PSA up to date - No    Dexa Scan up to date - NA    Flex Sig / Colonoscopy up to date - No    Immunizations up to date - No    Abuse: Current or Past(Physical, Sexual or Emotional)- No    Do you feel safe in your environment - Yes    Tms,cma        Lives with spouse and her parents.     Social Determinants of Health     Financial Resource Strain: Not on file   Food Insecurity: Not on file   Transportation Needs: Not on file   Physical Activity: Not on file   Stress: Not on file   Social Connections: Not on file   Intimate Partner Violence: Not on file   Housing Stability: Not on file       Family History   Problem Relation Age of Onset     Breast Cancer Mother      Arthritis Mother         joint ?     Depression Father      Cancer Maternal Grandmother      C.A.D. Maternal Grandfather      Cardiovascular Maternal Grandfather      Gynecology Paternal Grandmother          giving birth     Prostate Cancer Paternal Grandfather      Genetic Disorder Brother         joint pain?     Depression Brother      Cardiovascular Son         congenital heart defect -       Prostate Cancer Other          age 70's     Diabetes Other           ROS: 10 point ROS neg other than the symptoms noted above in the HPI.    Vital Signs: /78 (BP Location: Right arm, Patient Position: Sitting,  Cuff Size: Adult Large)   Temp 97.6  F (36.4  C) (Temporal)   Ht 1.829 m (6')   Wt 112.8 kg (248 lb 11.2 oz)   BMI 33.73 kg/m      Examination:  Constitutional:  Alert, well nourished, NAD.  HEENT: Normocephalic, atraumatic.   Pulmonary:  Without shortness of breath, normal effort.   Lymph: no lymphadenopathy to low back or LE.   Integumentary: Skin is free of rashes or lesions.   Cardiovascular:  No pitting edema of BLE.    Psych: Normal affect, no apparent distress    Neurological:  Awake  Alert  Oriented x 3  Speech clear  Cranial nerves II - XII grossly intact  Motor exam   Shoulder Abduction:  Right:  5/5   Left:  5/5  Biceps:                      Right:  5/5   Left:  5/5  Triceps:                     Right:  5/5   Left:  5/5  Wrist Extensors:       Right:  5/5   Left:  5/5  Wrist Flexors:           Right:  5/5   Left:  5/5  Intrinsics:                   Right:  5/5   Left:  5/5  Sensation normal to bilateral upper and lower extremities.    Reflexes are 2+ in the patellar and Achilles. There is no clonus. Downgoing Babinski.    Reflexes are 2+ in the brachial radialis and triceps. Negative Cristofer sign bilaterally.    Finger to Nose smooth  Pronator drift negative    Musculoskeletal:  Gait: Able to stand from a seated position. Normal non-antalgic, non-myelopathic     He does show normal range of motion, but notes that he does have pain with turning his head side to side.    Imaging:   MRI of the cervical and lumbar spine was reviewed in the office today.  He does have mild disc bulging at C6-7, but without any foraminal stenosis or nerve impingement.  He does have facet arthropathy at C5-6 and C6-7 bilaterally.    His lumbar MRI was also reviewed.  He has disc degeneration at L5-S1, with some mild disc collapse and Modic endplate changes.  Minimal foraminal stenosis bilaterally noted.  No nerve impingement is seen.    Assessment/Plan:     Cervicalgia  Cervical facet arthropathy  Low back pain  L5-S1 disc  degeneration  Lower extremity paresthesias      Bulmaro Herrera is a 56 year old male.  I did have a discussion with the patient regarding his symptoms.  He has had years of gradually worsening neck pain, which initially responded nicely to facet injections at C5-6 and C6-7 bilaterally, but these are no longer helpful.  He has a severe needle phobia, and would be unable to tolerate an ablation procedure.  He does have the potential option of scheduling a follow-up with Dr. Tran to discuss a possible fusion.  He would like to take some time and think about what his options are.  I sent a Medrol Dosepak over to his pharmacy.      Regarding his low back, I think he would benefit from getting EMGs done in the lower extremities.  He is describing numbness in his feet bilaterally, but there is no nerve compression on his MRI that would correlate with this.          Mervin Hernandez PA-C  [facility] Neurosurgery  [address] [address]    Tel [phone]  Pager [phone]      The use of Dragon/IEMO dictation services may have been used to construct the content in this note; any grammatical or spelling errors are non-intentional. Please contact the author of this note directly if you are in need of any clarification.        Again, thank you for allowing me to participate in the care of your patient.        Sincerely,        Mervin Hernandez PA-C

## 2022-02-11 NOTE — PROGRESS NOTES
Bulmaro Herrera is a 56 year old male who presents for:  Chief Complaint   Patient presents with     Neurologic Problem     Cervical: Arthropathy        Initial Vitals:  Ht 6' (1.829 m)   Wt 248 lb 11.2 oz (112.8 kg)   BMI 33.73 kg/m   Estimated body mass index is 33.73 kg/m  as calculated from the following:    Height as of this encounter: 6' (1.829 m).    Weight as of this encounter: 248 lb 11.2 oz (112.8 kg).. Body surface area is 2.39 meters squared. BP completed using cuff size: large  Data Unavailable    Nursing Comments: Pt. States pain is at a 6 out of 10. Back and neck.     Isatu Solomon

## 2022-02-12 ENCOUNTER — HEALTH MAINTENANCE LETTER (OUTPATIENT)
Age: 57
End: 2022-02-12

## 2022-02-14 PROBLEM — E66.01 MORBID OBESITY (H): Status: ACTIVE | Noted: 2022-02-14

## 2022-02-14 PROBLEM — E66.01 MORBID OBESITY (H): Status: RESOLVED | Noted: 2022-02-14 | Resolved: 2022-02-14

## 2022-03-28 ENCOUNTER — OFFICE VISIT (OUTPATIENT)
Dept: SURGERY | Facility: CLINIC | Age: 57
End: 2022-03-28
Payer: COMMERCIAL

## 2022-03-28 VITALS
HEIGHT: 72 IN | BODY MASS INDEX: 35.08 KG/M2 | RESPIRATION RATE: 18 BRPM | TEMPERATURE: 97 F | WEIGHT: 259 LBS | DIASTOLIC BLOOD PRESSURE: 82 MMHG | OXYGEN SATURATION: 100 % | SYSTOLIC BLOOD PRESSURE: 122 MMHG | HEART RATE: 96 BPM

## 2022-03-28 DIAGNOSIS — L98.9 BACK SKIN LESION: Primary | ICD-10-CM

## 2022-03-28 PROCEDURE — 99202 OFFICE O/P NEW SF 15 MIN: CPT | Performed by: SPECIALIST

## 2022-03-28 ASSESSMENT — PAIN SCALES - GENERAL: PAINLEVEL: NO PAIN (0)

## 2022-03-28 NOTE — PROGRESS NOTES
Follow-up for skin lesions      Subjective:  Patient is a 56-year-old white male last seen by me in 2018 for multiple benign skin lesions.  His wife noticed some new lesions on the back and they were concerned about it.  Denies any bleeding from the lesions today but they complain there are crusty      Objective:  B/P: 122/82, T: 97, P: 96, R: 18  Back: 2 areas of what appear to be support keratosis no obvious pigmented lesions.    Assessment/plan:  This is a 56-year-old gentleman known to me for back skin lesions who returns with 2 lesions on his back.  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  He would like them excised under MAC anesthesia as last time.  After discussion with patient times for an excision under MAC anesthesia.   The procedure, risks, benefits, and alternatives were discussed and the patient agrees to proceed.  He will be scheduled the near future.    Ayaan Chaney MD, FACS

## 2022-03-28 NOTE — LETTER
3/28/2022         RE: Bulmaro Herrera  [address]  [facility] [address]        Dear Colleague,    Thank you for referring your patient, Bulmaro Herrera, to the [facility]. Please see a copy of my visit note below.    Follow-up for skin lesions      Subjective:  Patient is a 56-year-old white male last seen by me in 2018 for multiple benign skin lesions.  His wife noticed some new lesions on the back and they were concerned about it.  Denies any bleeding from the lesions today but they complain there are crusty      Objective:  B/P: 122/82, T: 97, P: 96, R: 18  Back: 2 areas of what appear to be support keratosis no obvious pigmented lesions.    Assessment/plan:  This is a 56-year-old gentleman known to me for back skin lesions who returns with 2 lesions on his back.  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  He would like them excised under MAC anesthesia as last time.  After discussion with patient times for an excision under MAC anesthesia.   The procedure, risks, benefits, and alternatives were discussed and the patient agrees to proceed.  He will be scheduled the near future.    Ayaan Chaney MD, FACS      Again, thank you for allowing me to participate in the care of your patient.        Sincerely,        Ayaan Chaney MD

## 2022-03-28 NOTE — TELEPHONE ENCOUNTER
DIAGNOSIS: L shoulder TKA consult   APPOINTMENT DATE: 04/14/2022   NOTES STATUS DETAILS   OFFICE NOTE from referring provider N/A    OFFICE NOTE from other specialist Internal 08/05/2021 Dr Hernandez [facility]   09/01/2021 Lou Garcia physical therapy [facility]   DISCHARGE SUMMARY from hospital N/A    DISCHARGE REPORT from the ER N/A    OPERATIVE REPORT N/A    MEDICATION LIST N/A    EMG (for Spine) N/A    IMPLANT RECORD/STICKER N/A    LABS     CBC/DIFF N/A    CULTURES N/A    INJECTIONS DONE IN RADIOLOGY Internal 10/19/2021 Glenohumeral joint injection left shoulder   MRI Internal 08/11/2021 LFT shoulder, upper humerus   CT SCAN N/A    XRAYS (IMAGES & REPORTS) Internal 08/05/2021 LFT shoulder   TUMOR     PATHOLOGY  Slides & report N/A

## 2022-03-29 ENCOUNTER — TELEPHONE (OUTPATIENT)
Dept: SURGERY | Facility: OTHER | Age: 57
End: 2022-03-29
Payer: COMMERCIAL

## 2022-03-29 NOTE — TELEPHONE ENCOUNTER
Type of surgery: EXCISION, MASS, BACK    Location of surgery: [facility]  Date and time of surgery: 4/15  Surgeon: Shiv  Pre-Op Appt Date: 4/12  Post-Op Appt Date: Pt declined to schedule   Packet sent out: Yes  Pre-cert/Authorization completed:  Not Applicable  Date: na  Covid pos 1/18/22, no test needed

## 2022-04-12 ENCOUNTER — OFFICE VISIT (OUTPATIENT)
Dept: INTERNAL MEDICINE | Facility: CLINIC | Age: 57
End: 2022-04-12
Payer: COMMERCIAL

## 2022-04-12 VITALS
OXYGEN SATURATION: 97 % | BODY MASS INDEX: 34.72 KG/M2 | DIASTOLIC BLOOD PRESSURE: 88 MMHG | HEART RATE: 90 BPM | RESPIRATION RATE: 18 BRPM | WEIGHT: 256 LBS | TEMPERATURE: 96.7 F | SYSTOLIC BLOOD PRESSURE: 132 MMHG

## 2022-04-12 DIAGNOSIS — L98.9 SKIN LESION: ICD-10-CM

## 2022-04-12 DIAGNOSIS — Z01.818 PREOP GENERAL PHYSICAL EXAM: Primary | ICD-10-CM

## 2022-04-12 PROCEDURE — 99213 OFFICE O/P EST LOW 20 MIN: CPT | Performed by: INTERNAL MEDICINE

## 2022-04-12 ASSESSMENT — PAIN SCALES - GENERAL: PAINLEVEL: NO PAIN (0)

## 2022-04-12 NOTE — PATIENT INSTRUCTIONS
Preparing for Your Surgery  Getting started  A nurse will call you to review your health history and instructions. They will give you an arrival time based on your scheduled surgery time. Please be ready to share:    Your doctor's clinic name and phone number    Your medical, surgical and anesthesia history    A list of allergies and sensitivities    A list of medicines, including herbal treatments and over-the-counter drugs    Whether the patient has a legal guardian (ask how to send us the papers in advance)  Please tell us if you're pregnant--or if there's any chance you might be pregnant. Some surgeries may injure a fetus (unborn baby), so they require a pregnancy test. Surgeries that are safe for a fetus don't always need a test, and you can choose whether to have one.   If you have a child who's having surgery, please ask for a copy of Preparing for Your Child's Surgery.    Preparing for surgery    Within 30 days of surgery: Have a pre-op exam (sometimes called an H&P, or History and Physical). This can be done at a clinic or pre-operative center.  ? If you're having a , you may not need this exam. Talk to your care team.    At your pre-op exam, talk to your care team about all medicines you take. If you need to stop any medicines before surgery, ask when to start taking them again.  ? We do this for your safety. Many medicines can make you bleed too much during surgery. Some change how well surgery (anesthesia) drugs work.    Call your insurance company to let them know you're having surgery. (If you don't have insurance, call [phone].)    Call your clinic if there's any change in your health. This includes signs of a cold or flu (sore throat, runny nose, cough, rash, fever). It also includes a scrape or scratch near the surgery site.    If you have questions on the day of surgery, call your hospital or surgery center.  COVID testing  You may need to be tested for COVID-19 before having  surgery. If so, your surgical team will give you instructions for scheduling this test, separate from your preoperative history and physical.  Eating and drinking guidelines  For your safety: Unless your surgeon tells you otherwise, follow the guidelines below.    Eat and drink as usual until 8 hours before surgery. After that, no food or milk.    Drink clear liquids until 2 hours before surgery. These are liquids you can see through, like water, Gatorade and Propel Water. You may also have black coffee and tea (no cream or milk).    Nothing by mouth within 2 hours of surgery. This includes gum, candy and breath mints.    If you drink alcohol: Stop drinking it the night before surgery.    If your care team tells you to take medicine on the morning of surgery, it's okay to take it with a sip of water.  Preventing infection    Shower or bathe the night before and morning of your surgery. Follow the instructions your clinic gave you. (If no instructions, use regular soap.)    Don't shave or clip hair near your surgery site. We'll remove the hair if needed.    Don't smoke or vape the morning of surgery. You may chew nicotine gum up to 2 hours before surgery. A nicotine patch is okay.  ? Note: Some surgeries require you to completely quit smoking and nicotine. Check with your surgeon.    Your care team will make every effort to keep you safe from infection. We will:  ? Clean our hands often with soap and water (or an alcohol-based hand rub).  ? Clean the skin at your surgery site with a special soap that kills germs.  ? Give you a special gown to keep you warm. (Cold raises the risk of infection.)  ? Wear special hair covers, masks, gowns and gloves during surgery.  ? Give antibiotic medicine, if prescribed. Not all surgeries need antibiotics.  What to bring on the day of surgery    Photo ID and insurance card    Copy of your health care directive, if you have one    Glasses and hearing aides (bring cases)  ? You can't  wear contacts during surgery    Inhaler and eye drops, if you use them (tell us about these when you arrive)    CPAP machine or breathing device, if you use them    A few personal items, if spending the night    If you have . . .  ? A pacemaker, ICD (cardiac defibrillator) or other implant: Bring the ID card.  ? An implanted stimulator: Bring the remote control.  ? A legal guardian: Bring a copy of the certified (court-stamped) guardianship papers.  Please remove any jewelry, including body piercings. Leave jewelry and other valuables at home.  If you're going home the day of surgery    You must have a responsible adult drive you home. They should stay with you overnight as well.    If you don't have someone to stay with you, and you aren't safe to go home alone, we may keep you overnight. Insurance often won't pay for this.  After surgery  If it's hard to control your pain or you need more pain medicine, please call your surgeon's office.  Questions?   If you have any questions for your care team, list them here: _________________________________________________________________________________________________________________________________________________________________________ ____________________________________ ____________________________________ ____________________________________  For informational purposes only. Not to replace the advice of your health care provider. Copyright   2003, 2019 [facility]. All rights reserved. Clinically reviewed by Paulina Ivan MD. [employer] [id number] - REV 07/21.

## 2022-04-12 NOTE — H&P (VIEW-ONLY)
[address] [address]  Phone: [phone]  Primary Provider: Gustavo Farmer  Pre-op Performing Provider: GUSTAVO FARMER      PREOPERATIVE EVALUATION:  Today's date: 4/12/2022    Bulmaro Herrera is a 56 year old male who presents for a preoperative evaluation.    Surgical Information:  Surgery/Procedure: Excisions skin lesions of back  Surgery Location: [facility]   Surgeon: Shiv   Surgery Date: 4/15/22  Time of Surgery: 10:15 am  Where patient plans to recover: At home with family  Fax number for surgical facility: Note does not need to be faxed, will be available electronically in Epic.    Type of Anesthesia Anticipated: Local with MAC    Assessment & Plan     The proposed surgical procedure is considered LOW risk.    Preop general physical exam    Skin lesion           Risks and Recommendations:  The patient has the following additional risks and recommendations for perioperative complications:   - No identified additional risk factors other than previously addressed    Medication Instructions:  Patient is to take all scheduled medications on the day of surgery.  Has already discontinued aspirin    RECOMMENDATION:  APPROVAL GIVEN to proceed with proposed procedure, without further diagnostic evaluation.    Review of external notes as documented above                   Subjective     HPI related to upcoming procedure: Patient has multiple skin lesions on his upper extremities and back which required excision.  He prefers to do this under conscious sedation.      Preop Questions 4/12/2022   1. Have you ever had a heart attack or stroke? No   2. Have you ever had surgery on your heart or blood vessels, such as a stent placement, a coronary artery bypass, or surgery on an artery in your head, neck, heart, or legs? No   3. Do you have chest pain with activity? No   4. Do you have a history of  heart failure? No   5. Do you currently  have a cold, bronchitis or symptoms of other infection? No   6. Do you have a cough, shortness of breath, or wheezing? No   7. Do you or anyone in your family have previous history of blood clots? No   8. Do you or does anyone in your family have a serious bleeding problem such as prolonged bleeding following surgeries or cuts? No   9. Have you ever had problems with anemia or been told to take iron pills? No   10. Have you had any abnormal blood loss such as black, tarry or bloody stools? No   11. Have you ever had a blood transfusion? No   12. Are you willing to have a blood transfusion if it is medically needed before, during, or after your surgery? Yes   13. Have you or any of your relatives ever had problems with anesthesia? YES - mother had problems waking   14. Do you have sleep apnea, excessive snoring or daytime drowsiness? No   15. Do you have any artifical heart valves or other implanted medical devices like a pacemaker, defibrillator, or continuous glucose monitor? No   16. Do you have artificial joints? No   17. Are you allergic to latex? No     Health Care Directive:  Patient does not have a Health Care Directive or Living Will: Discussed advance care planning with patient; however, patient declined at this time.    Preoperative Review of :   reviewed - no record of controlled substances prescribed.      Status of Chronic Conditions:  See problem list for active medical problems.  Problems all longstanding and stable, except as noted/documented.  See ROS for pertinent symptoms related to these conditions.      Review of Systems  CONSTITUTIONAL: NEGATIVE for fever, chills, change in weight  INTEGUMENTARY/SKIN: Multiple skin lesions.  Some appear to be modestly atypical.  Refer to surgical provider for biopsy diagnosis  EYES: NEGATIVE for vision changes or irritation  ENT/MOUTH: NEGATIVE for ear, mouth and throat problems  RESP: NEGATIVE for significant cough or SOB  CV: NEGATIVE for chest pain,  palpitations or peripheral edema  GI: NEGATIVE for nausea, abdominal pain, heartburn, or change in bowel habits  : NEGATIVE for frequency, dysuria, or hematuria  MUSCULOSKELETAL: NEGATIVE for significant arthralgias or myalgia  NEURO: NEGATIVE for weakness, dizziness or paresthesias  ENDOCRINE: NEGATIVE for temperature intolerance, skin/hair changes  HEME: NEGATIVE for bleeding problems  PSYCHIATRIC: NEGATIVE for changes in mood or affect    Patient Active Problem List    Diagnosis Date Noted     Chondromalacia of left shoulder 08/18/2021     Priority: Medium     Partial nontraumatic tear of left rotator cuff 08/18/2021     Priority: Medium     Soft tissue mass 08/05/2021     Priority: Medium     Rotator cuff syndrome of left shoulder 08/05/2021     Priority: Medium     Chronic left shoulder pain 08/05/2021     Priority: Medium     NSAID long-term use 11/18/2019     Priority: Medium     Severe needle phobia 01/21/2019     Priority: Medium     Class: Chronic     likely to preclude him from having minimally invasive interventional pain procedures with minimal sedation. Would need deeper MAC sedation       Mixed hyperlipidemia 11/13/2018     Priority: Medium     ROBBY (obstructive sleep apnea) 11/13/2018     Priority: Medium     Herniation of intervertebral disc of cervical spine without radiculopathy 03/21/2017     Priority: Medium     Cervicalgia 03/21/2017     Priority: Medium     Chronic pain syndrome 04/05/2016     Priority: Medium     DDD, cervical. T#3, #60/mo.        DDD (degenerative disc disease), lumbar 12/20/2013     Priority: Medium     DDD (degenerative disc disease), cervical 12/20/2013     Priority: Medium     Pruritus ani 01/20/2010     Priority: Medium     Back pains      Priority: Medium     Problem list name updated by automated process. Provider to review and confirm       Esophageal reflux 04/13/2005     Priority: Medium      Past Medical History:   Diagnosis Date     Back pains      Depressive  disorder, not elsewhere classified 1/28/2007    declines Rx     Disc degeneration 12/2008    see MRI -throaco-lumbar mild discogenic degenerative changes     Elevated LFT's 11/09    alt and ast     Other and unspecified hyperlipidemia 1/2007     Sleep disorder 9/09    CPAP     Tobacco use disorder      Past Surgical History:   Procedure Laterality Date     COLONOSCOPY  07/07/08    adenomatous polyp repeat 5 years     COLONOSCOPY N/A 12/29/2017    Procedure: COMBINED COLONOSCOPY, SINGLE OR MULTIPLE BIOPSY/POLYPECTOMY BY BIOPSY;  Colonoscopy, Polypectomy by Biopsy;  Surgeon: Matt Reyes MD;  Location:  GI     ENT SURGERY      for deviated septum     EXCISE LESION AXILLA  12/7/2018    Procedure: Excision Skin Tags Right Axilla;  Surgeon: Ayaan Chaney MD;  Location: [facility]     EXCISE LESION BACK N/A 12/7/2018    Procedure: Excision Back Skin Lesion X Two;  Surgeon: Ayaan Chaney MD;  Location: [facility]     INJECT EPIDURAL CERVICAL N/A 5/14/2015    Procedure: INJECT EPIDURAL CERVICAL;  Surgeon: Christian Chaudhry MD;  Location: [facility]     INJECT EPIDURAL CERVICAL N/A 12/28/2018    Procedure: INJECT EPIDURAL CERVICAL 6-7;  Surgeon: Christian Chaudhry MD;  Location: [facility]     INJECT EPIDURAL LUMBAR N/A 12/20/2019    Procedure: Lumbar 5- Sacral 1 Epidural  Injection bilatetral;  Surgeon: Christian Chaudhry MD;  Location: [facility]     INJECT FACET JOINT Bilateral 1/25/2019    Procedure: Cervical Facet Injections Cervical 5-6 and 6-7 Bilateral;  Surgeon: Christian Chaudhry MD;  Location[facility]     INJECT FACET JOINT Bilateral 11/29/2019    Procedure: Cervical 5-6 and 6-7 Bilateral facet joint injection;  Surgeon: Christian Chaudhry MD;  [facility]     INJECT FACET JOINT Bilateral 2/11/2021    Procedure: Cervical 5-6 and Cervical 6-7 Bilateral facet injections;  Surgeon: Christian Chaudhry MD;  [facility]     INJECT FACET JOINT Bilateral 10/19/2021    Procedure: Left C5-C6, Right C5-C6, Left C6-C7 and Right C6-C7  Intra-articular zygopophyseal joint injection utilizing fluoroscopic guidance with contrast dye;  Surgeon: Christian Chaudhry MD;  Location: [facility]     INJECT STEROID (LOCATION) Left 10/19/2021    Procedure: Glenohumeral joint injection left shoulder;  Surgeon: Christian Chaudhry MD;  Location: [facility]     ORTHOPEDIC SURGERY      Right hand     TONSILLECTOMY       ZZC NONSPECIFIC PROCEDURE      rt hand surgery - laceration/glass/tendons     ZZHC ECHO HEART XTHORACIC, STRESS/REST  2009    neg     Current Outpatient Medications   Medication Sig Dispense Refill     acetaminophen (TYLENOL) 500 MG tablet Take 1,000 mg by mouth every morning       ibuprofen (ADVIL/MOTRIN) 200 MG tablet Take 400 mg by mouth every morning       loratadine (CLARITIN) 10 MG tablet TAKE 1 TABLET (10 MG) BY MOUTH DAILY 90 tablet 0     meclizine (ANTIVERT) 25 MG tablet Take 1 tablet (25 mg) by mouth 3 times daily as needed for dizziness 30 tablet 0     omeprazole (PRILOSEC) 40 MG DR capsule TAKE 1 CAPSULE (40 MG) BY MOUTH DAILY 90 capsule 1     aspirin (ASA) 81 MG chewable tablet Take 1 tablet (81 mg) by mouth daily (Patient not taking: Reported on 2022)         Allergies   Allergen Reactions     Percocet [Oxycodone-Acetaminophen] Itching     Wasps [Hornets] Swelling        Social History     Tobacco Use     Smoking status: Former Smoker     Packs/day: 1.50     Quit date: 2007     Years since quitting: 15.1     Smokeless tobacco: Never Used   Substance Use Topics     Alcohol use: No     Comment: rare     Family History   Problem Relation Age of Onset     Breast Cancer Mother      Arthritis Mother         joint ?     Depression Father      Cancer Maternal Grandmother      C.A.D. Maternal Grandfather      Cardiovascular Maternal Grandfather      Gynecology Paternal Grandmother          giving birth     Prostate Cancer Paternal Grandfather      Genetic Disorder Brother         joint pain?     Depression Brother      Cardiovascular Son          congenital heart defect -       Prostate Cancer Other          age 70's     Diabetes Other      History   Drug Use No         Objective     /88 (BP Location: Right arm, Patient Position: Sitting, Cuff Size: Adult Large)   Pulse 90   Temp (!) 96.7  F (35.9  C) (Temporal)   Resp 18   Wt 116.1 kg (256 lb)   SpO2 97%   BMI 34.72 kg/m      Physical Exam    GENERAL APPEARANCE: healthy, alert and no distress     EYES: EOMI, PERRL     HENT: ear canals and TM's normal and nose and mouth without ulcers or lesions     NECK: no adenopathy, no asymmetry, masses, or scars and thyroid normal to palpation     RESP: lungs clear to auscultation - no rales, rhonchi or wheezes     CV: regular rates and rhythm, normal S1 S2, no S3 or S4 and no murmur, click or rub     ABDOMEN:  soft, nontender, no HSM or masses and bowel sounds normal     MS: extremities normal- no gross deformities noted, no evidence of inflammation in joints, FROM in all extremities.     SKIN: Multiple skin lesions as described.     NEURO: Normal strength and tone, sensory exam grossly normal, mentation intact and speech normal     PSYCH: mentation appears normal. and affect normal/bright     LYMPHATICS: No cervical adenopathy    Recent Labs   Lab Test 22  1449 21  1213   HGB  --  16.3   PLT  --  202    140   POTASSIUM 3.9 4.1   CR 0.85 0.94        Diagnostics:  No labs were ordered during this visit.   No EKG required for low risk surgery (cataract, skin procedure, breast biopsy, etc).    Revised Cardiac Risk Index (RCRI):  The patient has the following serious cardiovascular risks for perioperative complications:   - No serious cardiac risks = 0 points     RCRI Interpretation: 0 points: Class I (very low risk - 0.4% complication rate)           Signed Electronically by: Gustavo Farmer DO  Copy of this evaluation report is provided to requesting physician.

## 2022-04-12 NOTE — PROGRESS NOTES
[address] [address]  Phone: [phone]  Primary Provider: Gustavo aFrmer  Pre-op Performing Provider: GUSTAVO FARMER      PREOPERATIVE EVALUATION:  Today's date: 4/12/2022    Bulmaro Herrera is a 56 year old male who presents for a preoperative evaluation.    Surgical Information:  Surgery/Procedure: Excisions skin lesions of back  Surgery Location: [facility]   Surgeon: Shiv   Surgery Date: 4/15/22  Time of Surgery: 10:15 am  Where patient plans to recover: At home with family  Fax number for surgical facility: Note does not need to be faxed, will be available electronically in Epic.    Type of Anesthesia Anticipated: Local with MAC    Assessment & Plan     The proposed surgical procedure is considered LOW risk.    Preop general physical exam    Skin lesion           Risks and Recommendations:  The patient has the following additional risks and recommendations for perioperative complications:   - No identified additional risk factors other than previously addressed    Medication Instructions:  Patient is to take all scheduled medications on the day of surgery.  Has already discontinued aspirin    RECOMMENDATION:  APPROVAL GIVEN to proceed with proposed procedure, without further diagnostic evaluation.    Review of external notes as documented above                   Subjective     HPI related to upcoming procedure: Patient has multiple skin lesions on his upper extremities and back which required excision.  He prefers to do this under conscious sedation.      Preop Questions 4/12/2022   1. Have you ever had a heart attack or stroke? No   2. Have you ever had surgery on your heart or blood vessels, such as a stent placement, a coronary artery bypass, or surgery on an artery in your head, neck, heart, or legs? No   3. Do you have chest pain with activity? No   4. Do you have a history of  heart failure? No   5. Do you currently  have a cold, bronchitis or symptoms of other infection? No   6. Do you have a cough, shortness of breath, or wheezing? No   7. Do you or anyone in your family have previous history of blood clots? No   8. Do you or does anyone in your family have a serious bleeding problem such as prolonged bleeding following surgeries or cuts? No   9. Have you ever had problems with anemia or been told to take iron pills? No   10. Have you had any abnormal blood loss such as black, tarry or bloody stools? No   11. Have you ever had a blood transfusion? No   12. Are you willing to have a blood transfusion if it is medically needed before, during, or after your surgery? Yes   13. Have you or any of your relatives ever had problems with anesthesia? YES - mother had problems waking   14. Do you have sleep apnea, excessive snoring or daytime drowsiness? No   15. Do you have any artifical heart valves or other implanted medical devices like a pacemaker, defibrillator, or continuous glucose monitor? No   16. Do you have artificial joints? No   17. Are you allergic to latex? No     Health Care Directive:  Patient does not have a Health Care Directive or Living Will: Discussed advance care planning with patient; however, patient declined at this time.    Preoperative Review of :   reviewed - no record of controlled substances prescribed.      Status of Chronic Conditions:  See problem list for active medical problems.  Problems all longstanding and stable, except as noted/documented.  See ROS for pertinent symptoms related to these conditions.      Review of Systems  CONSTITUTIONAL: NEGATIVE for fever, chills, change in weight  INTEGUMENTARY/SKIN: Multiple skin lesions.  Some appear to be modestly atypical.  Refer to surgical provider for biopsy diagnosis  EYES: NEGATIVE for vision changes or irritation  ENT/MOUTH: NEGATIVE for ear, mouth and throat problems  RESP: NEGATIVE for significant cough or SOB  CV: NEGATIVE for chest pain,  palpitations or peripheral edema  GI: NEGATIVE for nausea, abdominal pain, heartburn, or change in bowel habits  : NEGATIVE for frequency, dysuria, or hematuria  MUSCULOSKELETAL: NEGATIVE for significant arthralgias or myalgia  NEURO: NEGATIVE for weakness, dizziness or paresthesias  ENDOCRINE: NEGATIVE for temperature intolerance, skin/hair changes  HEME: NEGATIVE for bleeding problems  PSYCHIATRIC: NEGATIVE for changes in mood or affect    Patient Active Problem List    Diagnosis Date Noted     Chondromalacia of left shoulder 08/18/2021     Priority: Medium     Partial nontraumatic tear of left rotator cuff 08/18/2021     Priority: Medium     Soft tissue mass 08/05/2021     Priority: Medium     Rotator cuff syndrome of left shoulder 08/05/2021     Priority: Medium     Chronic left shoulder pain 08/05/2021     Priority: Medium     NSAID long-term use 11/18/2019     Priority: Medium     Severe needle phobia 01/21/2019     Priority: Medium     Class: Chronic     likely to preclude him from having minimally invasive interventional pain procedures with minimal sedation. Would need deeper MAC sedation       Mixed hyperlipidemia 11/13/2018     Priority: Medium     ROBBY (obstructive sleep apnea) 11/13/2018     Priority: Medium     Herniation of intervertebral disc of cervical spine without radiculopathy 03/21/2017     Priority: Medium     Cervicalgia 03/21/2017     Priority: Medium     Chronic pain syndrome 04/05/2016     Priority: Medium     DDD, cervical. T#3, #60/mo.        DDD (degenerative disc disease), lumbar 12/20/2013     Priority: Medium     DDD (degenerative disc disease), cervical 12/20/2013     Priority: Medium     Pruritus ani 01/20/2010     Priority: Medium     Back pains      Priority: Medium     Problem list name updated by automated process. Provider to review and confirm       Esophageal reflux 04/13/2005     Priority: Medium      Past Medical History:   Diagnosis Date     Back pains      Depressive  disorder, not elsewhere classified 1/28/2007    declines Rx     Disc degeneration 12/2008    see MRI -throaco-lumbar mild discogenic degenerative changes     Elevated LFT's 11/09    alt and ast     Other and unspecified hyperlipidemia 1/2007     Sleep disorder 9/09    CPAP     Tobacco use disorder      Past Surgical History:   Procedure Laterality Date     COLONOSCOPY  07/07/08    adenomatous polyp repeat 5 years     COLONOSCOPY N/A 12/29/2017    Procedure: COMBINED COLONOSCOPY, SINGLE OR MULTIPLE BIOPSY/POLYPECTOMY BY BIOPSY;  Colonoscopy, Polypectomy by Biopsy;  Surgeon: Matt Reyes MD;  Location:  GI     ENT SURGERY      for deviated septum     EXCISE LESION AXILLA  12/7/2018    Procedure: Excision Skin Tags Right Axilla;  Surgeon: Ayaan Chaney MD;  Location: [facility]     EXCISE LESION BACK N/A 12/7/2018    Procedure: Excision Back Skin Lesion X Two;  Surgeon: Ayaan Chaney MD;  [facility]     INJECT EPIDURAL CERVICAL N/A 5/14/2015    Procedure: INJECT EPIDURAL CERVICAL;  Surgeon: Christian Chaudhry MD;  [facility]     INJECT EPIDURAL CERVICAL N/A 12/28/2018    Procedure: INJECT EPIDURAL CERVICAL 6-7;  Surgeon: Christian Chaudhry MD;  [facility]     INJECT EPIDURAL LUMBAR N/A 12/20/2019    Procedure: Lumbar 5- Sacral 1 Epidural  Injection bilatetral;  Surgeon: Christian Chaudhry MD;  [facility]     INJECT FACET JOINT Bilateral 1/25/2019    Procedure: Cervical Facet Injections Cervical 5-6 and 6-7 Bilateral;  Surgeon: Christian Chaudhry MD;  [facility]     INJECT FACET JOINT Bilateral 11/29/2019    Procedure: Cervical 5-6 and 6-7 Bilateral facet joint injection;  Surgeon: Christian Chaudhry MD;  [facility]     INJECT FACET JOINT Bilateral 2/11/2021    Procedure: Cervical 5-6 and Cervical 6-7 Bilateral facet injections;  Surgeon: Christian Chaudhry MD;  [facility]     INJECT FACET JOINT Bilateral 10/19/2021    Procedure: Left C5-C6, Right C5-C6, Left C6-C7 and Right C6-C7  Intra-articular zygopophyseal joint injection utilizing fluoroscopic guidance with contrast dye;  Surgeon: Christian Chaudhry MD;  [facility]     INJECT STEROID (LOCATION) Left 10/19/2021    Procedure: Glenohumeral joint injection left shoulder;  Surgeon: Christian Chaudhry MD;  Location: [facility]     ORTHOPEDIC SURGERY      Right hand     TONSILLECTOMY       ZZC NONSPECIFIC PROCEDURE      rt hand surgery - laceration/glass/tendons     ZZHC ECHO HEART XTHORACIC, STRESS/REST  2009    neg     Current Outpatient Medications   Medication Sig Dispense Refill     acetaminophen (TYLENOL) 500 MG tablet Take 1,000 mg by mouth every morning       ibuprofen (ADVIL/MOTRIN) 200 MG tablet Take 400 mg by mouth every morning       loratadine (CLARITIN) 10 MG tablet TAKE 1 TABLET (10 MG) BY MOUTH DAILY 90 tablet 0     meclizine (ANTIVERT) 25 MG tablet Take 1 tablet (25 mg) by mouth 3 times daily as needed for dizziness 30 tablet 0     omeprazole (PRILOSEC) 40 MG DR capsule TAKE 1 CAPSULE (40 MG) BY MOUTH DAILY 90 capsule 1     aspirin (ASA) 81 MG chewable tablet Take 1 tablet (81 mg) by mouth daily (Patient not taking: Reported on 2022)         Allergies   Allergen Reactions     Percocet [Oxycodone-Acetaminophen] Itching     Wasps [Hornets] Swelling        Social History     Tobacco Use     Smoking status: Former Smoker     Packs/day: 1.50     Quit date: 2007     Years since quitting: 15.1     Smokeless tobacco: Never Used   Substance Use Topics     Alcohol use: No     Comment: rare     Family History   Problem Relation Age of Onset     Breast Cancer Mother      Arthritis Mother         joint ?     Depression Father      Cancer Maternal Grandmother      C.A.D. Maternal Grandfather      Cardiovascular Maternal Grandfather      Gynecology Paternal Grandmother          giving birth     Prostate Cancer Paternal Grandfather      Genetic Disorder Brother         joint pain?     Depression Brother      Cardiovascular Son          congenital heart defect -       Prostate Cancer Other          age 70's     Diabetes Other      History   Drug Use No         Objective     /88 (BP Location: Right arm, Patient Position: Sitting, Cuff Size: Adult Large)   Pulse 90   Temp (!) 96.7  F (35.9  C) (Temporal)   Resp 18   Wt 116.1 kg (256 lb)   SpO2 97%   BMI 34.72 kg/m      Physical Exam    GENERAL APPEARANCE: healthy, alert and no distress     EYES: EOMI, PERRL     HENT: ear canals and TM's normal and nose and mouth without ulcers or lesions     NECK: no adenopathy, no asymmetry, masses, or scars and thyroid normal to palpation     RESP: lungs clear to auscultation - no rales, rhonchi or wheezes     CV: regular rates and rhythm, normal S1 S2, no S3 or S4 and no murmur, click or rub     ABDOMEN:  soft, nontender, no HSM or masses and bowel sounds normal     MS: extremities normal- no gross deformities noted, no evidence of inflammation in joints, FROM in all extremities.     SKIN: Multiple skin lesions as described.     NEURO: Normal strength and tone, sensory exam grossly normal, mentation intact and speech normal     PSYCH: mentation appears normal. and affect normal/bright     LYMPHATICS: No cervical adenopathy    Recent Labs   Lab Test 22  1449 21  1213   HGB  --  16.3   PLT  --  202    140   POTASSIUM 3.9 4.1   CR 0.85 0.94        Diagnostics:  No labs were ordered during this visit.   No EKG required for low risk surgery (cataract, skin procedure, breast biopsy, etc).    Revised Cardiac Risk Index (RCRI):  The patient has the following serious cardiovascular risks for perioperative complications:   - No serious cardiac risks = 0 points     RCRI Interpretation: 0 points: Class I (very low risk - 0.4% complication rate)           Signed Electronically by: Gustavo Farmer DO  Copy of this evaluation report is provided to requesting physician.          Addendum: May 10, 2022  Please extend the above  history and physical to include upcoming injections scheduled by me 20th and May 26, 2022.  Due to the low associated risk of the procedures, the above-noted preoperative examination is adequate.    Darian

## 2022-04-13 ENCOUNTER — MYC MEDICAL ADVICE (OUTPATIENT)
Dept: INTERNAL MEDICINE | Facility: CLINIC | Age: 57
End: 2022-04-13
Payer: COMMERCIAL

## 2022-04-13 DIAGNOSIS — J01.00 ACUTE NON-RECURRENT MAXILLARY SINUSITIS: Primary | ICD-10-CM

## 2022-04-13 RX ORDER — AMOXICILLIN 500 MG/1
500 CAPSULE ORAL 2 TIMES DAILY
Qty: 14 CAPSULE | Refills: 0 | Status: SHIPPED | OUTPATIENT
Start: 2022-04-13 | End: 2022-07-07

## 2022-04-13 NOTE — TELEPHONE ENCOUNTER
Patient states he was to get an antibiotic from his pre-op with PCP.  See MyChart.  QUE EasleyN, RN

## 2022-04-14 ENCOUNTER — PRE VISIT (OUTPATIENT)
Dept: ORTHOPEDICS | Facility: CLINIC | Age: 57
End: 2022-04-14

## 2022-04-14 ENCOUNTER — OFFICE VISIT (OUTPATIENT)
Dept: ORTHOPEDICS | Facility: CLINIC | Age: 57
End: 2022-04-14
Payer: COMMERCIAL

## 2022-04-14 ENCOUNTER — ANESTHESIA EVENT (OUTPATIENT)
Dept: SURGERY | Facility: CLINIC | Age: 57
End: 2022-04-14
Payer: COMMERCIAL

## 2022-04-14 VITALS — WEIGHT: 253 LBS | HEIGHT: 72 IN | BODY MASS INDEX: 34.27 KG/M2

## 2022-04-14 DIAGNOSIS — M71.9 DISORDER OF BURSAE AND TENDONS IN SHOULDER REGION: Primary | ICD-10-CM

## 2022-04-14 DIAGNOSIS — M67.919 DISORDER OF BURSAE AND TENDONS IN SHOULDER REGION: Primary | ICD-10-CM

## 2022-04-14 PROCEDURE — 99214 OFFICE O/P EST MOD 30 MIN: CPT | Performed by: ORTHOPAEDIC SURGERY

## 2022-04-14 ASSESSMENT — LIFESTYLE VARIABLES: TOBACCO_USE: 1

## 2022-04-14 NOTE — LETTER
4/14/2022         RE: Bulmaro Herrera  [address]  [facility] [address]        Dear Colleague,    Thank you for referring your patient, Bulmaro Herrera, to the [facility]. Please see a copy of my visit note below.    Chief Concern: Consult (Left shoulder pain)    History of Present Illness:   Bulmaro Herrera is a right handed 56 year old male who presents today for evaluation of left shoulder pain. Patient reports shoulder issues since July of 2021. He woke with pain and believed that he slept on the arm wrong, but pain has persisted. He was seen in clinic in [place] and was referred to physical therapy. He saw therapy and was doing exercises but was not improving. He received a left glenohumeral corticosteroid injection on 10/19/2021, which provided relief of his pain. Sleep is disrupted by pain as he rolls over onto his left side. Range of motion is significantly limited. His arm fatigues very quickly. Patient also notes a feeling that the shoulder comes out of place, which is helped by using Kinesio tape. His symptoms persist today. Patient does regular heavy lifting for work.     Review of Systems:   A 10-point review of systems was obtained and is negative except for as noted in the HPI.     Medications:      acetaminophen (TYLENOL) 500 MG tablet, Take 1,000 mg by mouth every morning, Disp: , Rfl:      amoxicillin (AMOXIL) 500 MG capsule, Take 1 capsule (500 mg) by mouth 2 times daily, Disp: 14 capsule, Rfl: 0     aspirin (ASA) 81 MG chewable tablet, Take 1 tablet (81 mg) by mouth daily (Patient not taking: Reported on 4/12/2022), Disp: , Rfl:      ibuprofen (ADVIL/MOTRIN) 200 MG tablet, Take 400 mg by mouth every morning, Disp: , Rfl:      loratadine (CLARITIN) 10 MG tablet, TAKE 1 TABLET (10 MG) BY MOUTH DAILY, Disp: 90 tablet, Rfl: 0     meclizine (ANTIVERT) 25 MG tablet, Take 1 tablet (25 mg) by mouth 3 times daily as needed for dizziness, Disp: 30 tablet, Rfl: 0     omeprazole  (PRILOSEC) 40 MG DR capsule, TAKE 1 CAPSULE (40 MG) BY MOUTH DAILY, Disp: 90 capsule, Rfl: 1    Allergies:  Percocet [oxycodone-acetaminophen] and Wasps [hornets]     Past Medical History:  Back pains  Depressive disorder  Disc degenerative  Sleep disorder  Tobacco use disorder    Past Surgical History:  ENT surgery  Right hand surgery  Tonsillectomy     Social History:  Patient lives with wife.  Works as a . He admits to former tobacco use and admits to rare alcohol use.     Family History:  Breast cancer, arthritis - mother  Depression - father     Physical Examination:  Adult male in no acute distress. Articulates and communicates with normal affect.  Respirations even and unlabored  Focused upper extremity exam: Skin intact over the extremity. No sergio deformity or asymmetry on inspection. CMS intact into the hand. Forward elevation actively to 80 and passively to 95 on the left and 170 on the right. External rotation to 40 on the left and 75 on the right. Internal rotation to L5 on the left. Biceps groove pain but no AC joint pain.     Imaging:   Radiographs of the left shoulder - 3 views (08/05/2021)  Mild acromioclavicular degenerative changes. Glenohumeral joint is unremarkable. No evidence of acute fracture or dislocation.  Reading per radiology.     MRI of the left shoulder (08/11/2021)  1. Inferior glenohumeral labral tear with associated prominent paralabral cysts. No evidence of quadrilateral space impingement.  2. Low-grade partial-thickness tear of rotator cuff.    a. On a background of tendinosis, focal low grade intrasubstance tear of the supraspinatus/infraspinatus junction of fibers at the footprint, and low-grade intrasubstance tear of the supraspinatus distal to the myotendinous junction with medial delamination tear extension proximally.    b. Subscapularis tendinosis with focal low-grade bursal sided tear of the middle fibers at the footprint.   c. No muscle bulk loss.  3. Grade IV  chondromalacia of the glenohumeral joint.  Reading per radiology.     I have independently reviewed the above imaging studies; the results were discussed with the patient.     Assessment:   1. Left glenohumeral chondromalacia   2. Left long head biceps disease    Plan:   We reviewed his imaging and symptoms in detail and discussed the risks and benefits of the treatment options available to him at this time. Reassurance was provided that there are no indications for total shoulder replacement at this time. We discussed that his symptoms seem to be coming from the early arthritic changes of his glenohumeral and treatment for this at this time would be conservative management with glenohumeral corticosteroid injections and stretching program. If symptoms persist despite injection and stretching, we could consider or discuss a biceps tenotomy. Due to patient's strong aversion to needles, he would need to be sedated for the procedure, which cannot be performed here in clinic. I will refer him to my colleague Dr. Brand who should be able to perform the glenohumeral injection under sedation. Patient will follow-up with physical therapy after the injection. He may take over the counter antiinflammatories for pain. The patient voiced understanding of the information discussed and all questions were answered. He will follow-up with me as needed pending his response to injection.     Scribe Disclosure:  I, Miguelangel De La Cruz, am serving as a scribe to document services personally performed by Kat Fleming MD at this visit, based upon the provider's statements to me. All documentation has been reviewed by the aforementioned provider prior to being entered into the official medical record.     IMiguelangel, a scribe, prepared the chart for today's encounter.       Again, thank you for allowing me to participate in the care of your patient.        Sincerely,        Kat Fleming MD

## 2022-04-14 NOTE — ANESTHESIA PREPROCEDURE EVALUATION
Anesthesia Pre-Procedure Evaluation    Patient: Bulmaro Herrera   MRN: [id number] : 1965        Procedure : Procedure(s):  Excisions skin lesions of back          Past Medical History:   Diagnosis Date     Back pains      Depressive disorder, not elsewhere classified 2007    declines Rx     Disc degeneration 2008    see MRI -throaco-lumbar mild discogenic degenerative changes     Elevated LFT's     alt and ast     Other and unspecified hyperlipidemia 2007     Sleep disorder     CPAP     Tobacco use disorder       Past Surgical History:   Procedure Laterality Date     COLONOSCOPY  08    adenomatous polyp repeat 5 years     COLONOSCOPY N/A 2017    Procedure: COMBINED COLONOSCOPY, SINGLE OR MULTIPLE BIOPSY/POLYPECTOMY BY BIOPSY;  Colonoscopy, Polypectomy by Biopsy;  Surgeon: Matt Reyes MD;  Location: [facility]     ENT SURGERY      for deviated septum     EXCISE LESION AXILLA  2018    Procedure: Excision Skin Tags Right Axilla;  Surgeon: Ayaan Chaney MD;  [facility]     EXCISE LESION BACK N/A 2018    Procedure: Excision Back Skin Lesion X Two;  Surgeon: Ayaan Chaney MD;  [facility]     INJECT EPIDURAL CERVICAL N/A 2015    Procedure: INJECT EPIDURAL CERVICAL;  Surgeon: Christian Chaudhry MD;  [facility]     INJECT EPIDURAL CERVICAL N/A 2018    Procedure: INJECT EPIDURAL CERVICAL 6-7;  Surgeon: Christian Chaudhry MD;  [facility]     INJECT EPIDURAL LUMBAR N/A 2019    Procedure: Lumbar 5- Sacral 1 Epidural  Injection bilatetral;  Surgeon: Christian Chaudhry MD;  [facility]     INJECT FACET JOINT Bilateral 2019    Procedure: Cervical Facet Injections Cervical 5-6 and 6-7 Bilateral;  Surgeon: Christian Chaudhry MD;  [facility]     INJECT FACET JOINT Bilateral 2019    Procedure: Cervical 5-6 and 6-7 Bilateral facet joint injection;  Surgeon: Christian Chaudhry MD;  [facility]     INJECT FACET JOINT Bilateral 2021     Procedure: Cervical 5-6 and Cervical 6-7 Bilateral facet injections;  Surgeon: Christian Chaudhry MD;  [facility]     INJECT FACET JOINT Bilateral 10/19/2021    Procedure: Left C5-C6, Right C5-C6, Left C6-C7 and Right C6-C7 Intra-articular zygopophyseal joint injection utilizing fluoroscopic guidance with contrast dye;  Surgeon: Christian Chaudhry MD;  [facility]     INJECT STEROID (LOCATION) Left 10/19/2021    Procedure: Glenohumeral joint injection left shoulder;  Surgeon: Christian Chaudhry MD;  [facility]     ORTHOPEDIC SURGERY      Right hand     TONSILLECTOMY       ZZC NONSPECIFIC PROCEDURE      rt hand surgery - laceration/glass/tendons     ZZHC ECHO HEART XTHORACIC, STRESS/REST  6/2009    neg      Allergies   Allergen Reactions     Percocet [Oxycodone-Acetaminophen] Itching     Wasps [Hornets] Swelling      Social History     Tobacco Use     Smoking status: Former Smoker     Packs/day: 1.50     Quit date: 2/14/2007     Years since quitting: 15.1     Smokeless tobacco: Never Used   Substance Use Topics     Alcohol use: No     Comment: rare      Wt Readings from Last 1 Encounters:   04/14/22 114.8 kg (253 lb)        Anesthesia Evaluation   Pt has had prior anesthetic. Type: MAC.        ROS/MED HX  ENT/Pulmonary:     (+) sleep apnea, uses CPAP, tobacco use, Past use,     Neurologic: Comment: DDD and chronic back pain - neg neurologic ROS     Cardiovascular: Comment: ECHO 02/09/21: Interpretation Summary  The patient exhibited no chest pain during exercise.  This was a normal stress EKG with no evidence of stress-induced ischemia.  Normal left ventricular function and wall motion at rest and post-stress.  CTA 03/10/21: CORONARY ANGIOGRAPHY   1. Mild circumflex disease.  2. Trivial left anterior descending artery and right coronary artery disease.    (+) Dyslipidemia -----    METS/Exercise Tolerance: >4 METS    Hematologic: Comments: Chronic NSAID use - neg hematologic  ROS     Musculoskeletal: Comment:  Chronicpain r/t L shoulder pain d/t rotator cuff syndrome; DDD and cervicalgia     Chronic pain syndrome     Herniation of intervertebral disc of cervical spine without radiculopathy      GI/Hepatic:     (+) GERD, Asymptomatic on medication,     Renal/Genitourinary:  - neg Renal ROS     Endo:     (+) Obesity,     Psychiatric/Substance Use:     (+) psychiatric history depression     Infectious Disease:  - neg infectious disease ROS     Malignancy:  - neg malignancy ROS     Other:  - neg other ROS    (+) , H/O Chronic Pain,        Physical Exam    Airway  airway exam normal      Mallampati: II   TM distance: > 3 FB   Neck ROM: full   Mouth opening: > 3 cm    Respiratory Devices and Support         Dental  no notable dental history         Cardiovascular   cardiovascular exam normal          Pulmonary   pulmonary exam normal                OUTSIDE LABS:  CBC:   Lab Results   Component Value Date    WBC 5.5 02/08/2021    WBC 5.7 11/15/2019    HGB 16.3 02/08/2021    HGB 16.0 11/15/2019    HCT 47.2 02/08/2021    HCT 47.6 11/15/2019     02/08/2021     11/15/2019     BMP:   Lab Results   Component Value Date     01/18/2022     02/08/2021    POTASSIUM 3.9 01/18/2022    POTASSIUM 4.1 02/08/2021    CHLORIDE 108 01/18/2022    CHLORIDE 108 02/08/2021    CO2 25 01/18/2022    CO2 30 02/08/2021    BUN 15 01/18/2022    BUN 17 02/08/2021    CR 0.85 01/18/2022    CR 0.94 02/08/2021    GLC 96 01/18/2022     (H) 02/08/2021     COAGS:   Lab Results   Component Value Date    PTT 31 06/06/2008    INR 1.0 02/17/2014     POC: No results found for: BGM, HCG, HCGS  HEPATIC:   Lab Results   Component Value Date    ALBUMIN 3.9 01/18/2022    PROTTOTAL 7.5 01/18/2022     (H) 01/18/2022    AST 63 (H) 01/18/2022    ALKPHOS 97 01/18/2022    BILITOTAL 1.1 01/18/2022     OTHER:   Lab Results   Component Value Date    PH 8.0 (H) 05/07/2002    A1C 5.4 02/10/2016    WILIAM 8.9 01/18/2022    PHOS 3.5 02/08/2021    MAG  2.2 02/08/2021    LIPASE 95 09/10/2018    AMYLASE 76 03/08/2014    TSH 3.27 02/08/2021    T4 0.94 11/15/2019    CRP <2.9 03/25/2015    SED 7 09/10/2012       Anesthesia Plan    ASA Status:  2   NPO Status:  NPO Appropriate    Anesthesia Type: MAC.   Induction: Intravenous, Propofol.   Maintenance: TIVA.        Consents         - Extended Intubation/Ventilatory Support Discussed: No.      - Patient is DNR/DNI Status: No    Use of blood products discussed: No .     Postoperative Care    Pain management: IV analgesics, Oral pain medications.   PONV prophylaxis: Ondansetron (or other 5HT-3)     Comments:    Other Comments: The risks and benefits of anesthesia, and the alternatives where applicable, have been discussed with the patient, and they wish to proceed.            Martha Esqueda

## 2022-04-14 NOTE — NURSING NOTE
Reason For Visit:   Chief Complaint   Patient presents with     Consult     Left shoulder pain       PCP: Gustavo Farmer  Ref: Gallo Hernandez CNP    ?  No  Occupation .  Currently working? Yes.  Work status?  Full time.  Date of injury: July 2021  Type of injury: No inciting incident.  Date of surgery: No history of shoulder surgeries; no recent shoulder cortisone injections; cortisone injection 10/2021  Smoker: No  Request smoking cessation information: No    Right hand dominant    SANE score  Affected shoulder: Left  Right shoulder SANE: 100  Left shoulder SANE: 60    Ht 1.829 m (6')   Wt 114.8 kg (253 lb)   BMI 34.31 kg/m      Marion Weiner ATC

## 2022-04-14 NOTE — PROGRESS NOTES
Chief Concern: Consult (Left shoulder pain)    History of Present Illness:   Bulmaro Herrera is a right handed 56 year old male who presents today for evaluation of left shoulder pain. Patient reports shoulder issues since July of 2021. He woke with pain and believed that he slept on the arm wrong, but pain has persisted. He was seen in clinic in [place] and was referred to physical therapy. He saw therapy and was doing exercises but was not improving. He received a left glenohumeral corticosteroid injection on 10/19/2021, which provided relief of his pain. Sleep is disrupted by pain as he rolls over onto his left side. Range of motion is significantly limited. His arm fatigues very quickly. Patient also notes a feeling that the shoulder comes out of place, which is helped by using Kinesio tape. His symptoms persist today. Patient does regular heavy lifting for work.     Review of Systems:   A 10-point review of systems was obtained and is negative except for as noted in the HPI.     Medications:      acetaminophen (TYLENOL) 500 MG tablet, Take 1,000 mg by mouth every morning, Disp: , Rfl:      amoxicillin (AMOXIL) 500 MG capsule, Take 1 capsule (500 mg) by mouth 2 times daily, Disp: 14 capsule, Rfl: 0     aspirin (ASA) 81 MG chewable tablet, Take 1 tablet (81 mg) by mouth daily (Patient not taking: Reported on 4/12/2022), Disp: , Rfl:      ibuprofen (ADVIL/MOTRIN) 200 MG tablet, Take 400 mg by mouth every morning, Disp: , Rfl:      loratadine (CLARITIN) 10 MG tablet, TAKE 1 TABLET (10 MG) BY MOUTH DAILY, Disp: 90 tablet, Rfl: 0     meclizine (ANTIVERT) 25 MG tablet, Take 1 tablet (25 mg) by mouth 3 times daily as needed for dizziness, Disp: 30 tablet, Rfl: 0     omeprazole (PRILOSEC) 40 MG DR capsule, TAKE 1 CAPSULE (40 MG) BY MOUTH DAILY, Disp: 90 capsule, Rfl: 1    Allergies:  Percocet [oxycodone-acetaminophen] and Wasps [hornets]     Past Medical History:  Back pains  Depressive disorder  Disc  degenerative  Sleep disorder  Tobacco use disorder    Past Surgical History:  ENT surgery  Right hand surgery  Tonsillectomy     Social History:  Patient lives with wife.  Works as a . He admits to former tobacco use and admits to rare alcohol use.     Family History:  Breast cancer, arthritis - mother  Depression - father     Physical Examination:  Adult male in no acute distress. Articulates and communicates with normal affect.  Respirations even and unlabored  Focused upper extremity exam: Skin intact over the extremity. No sergio deformity or asymmetry on inspection. CMS intact into the hand. Forward elevation actively to 80 and passively to 95 on the left and 170 on the right. External rotation to 40 on the left and 75 on the right. Internal rotation to L5 on the left. Biceps groove pain but no AC joint pain.     Imaging:   Radiographs of the left shoulder - 3 views (08/05/2021)  Mild acromioclavicular degenerative changes. Glenohumeral joint is unremarkable. No evidence of acute fracture or dislocation.  Reading per radiology.     MRI of the left shoulder (08/11/2021)  1. Inferior glenohumeral labral tear with associated prominent paralabral cysts. No evidence of quadrilateral space impingement.  2. Low-grade partial-thickness tear of rotator cuff.    a. On a background of tendinosis, focal low grade intrasubstance tear of the supraspinatus/infraspinatus junction of fibers at the footprint, and low-grade intrasubstance tear of the supraspinatus distal to the myotendinous junction with medial delamination tear extension proximally.    b. Subscapularis tendinosis with focal low-grade bursal sided tear of the middle fibers at the footprint.   c. No muscle bulk loss.  3. 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  Reading per radiology.     I have independently reviewed the above imaging studies; the results were discussed with the patient.     Assessment:   1. Left glenohumeral chondromalacia    2. Left long head biceps disease    Plan:   We reviewed his imaging and symptoms in detail and discussed the risks and benefits of the treatment options available to him at this time. Reassurance was provided that there are no indications for total shoulder replacement at this time. We discussed that his symptoms seem to be coming from the early arthritic changes of his glenohumeral and treatment for this at this time would be conservative management with glenohumeral corticosteroid injections and stretching program. If symptoms persist despite injection and stretching, we could consider or discuss a biceps tenotomy. Due to patient's strong aversion to needles, he would need to be sedated for the procedure, which cannot be performed here in clinic. I will refer him to my colleague Dr. Brand who should be able to perform the glenohumeral injection under sedation. Patient will follow-up with physical therapy after the injection. He may take over the counter antiinflammatories for pain. The patient voiced understanding of the information discussed and all questions were answered. He will follow-up with me as needed pending his response to injection.     Scribe Disclosure:  Miguelangel STRICKLAND, am serving as a scribe to document services personally performed by Kat Fleming MD at this visit, based upon the provider's statements to me. All documentation has been reviewed by the aforementioned provider prior to being entered into the official medical record.     Miguelangel STRICKLAND, a scribe, prepared the chart for today's encounter.

## 2022-04-15 ENCOUNTER — ANESTHESIA (OUTPATIENT)
Dept: SURGERY | Facility: CLINIC | Age: 57
End: 2022-04-15
Payer: COMMERCIAL

## 2022-04-15 ENCOUNTER — HOSPITAL ENCOUNTER (OUTPATIENT)
Facility: CLINIC | Age: 57
Discharge: HOME | End: 2022-04-15
Attending: SPECIALIST | Admitting: SPECIALIST
Payer: COMMERCIAL

## 2022-04-15 VITALS
SYSTOLIC BLOOD PRESSURE: 108 MMHG | RESPIRATION RATE: 16 BRPM | OXYGEN SATURATION: 95 % | HEIGHT: 72 IN | BODY MASS INDEX: 34.27 KG/M2 | WEIGHT: 253 LBS | DIASTOLIC BLOOD PRESSURE: 71 MMHG | HEART RATE: 55 BPM | TEMPERATURE: 97 F

## 2022-04-15 PROCEDURE — 88305 TISSUE EXAM BY PATHOLOGIST: CPT | Mod: 26 | Performed by: PATHOLOGY

## 2022-04-15 PROCEDURE — 250N000009 HC RX 250: Performed by: NURSE ANESTHETIST, CERTIFIED REGISTERED

## 2022-04-15 PROCEDURE — 11402 EXC TR-EXT B9+MARG 1.1-2 CM: CPT | Mod: XS | Performed by: SPECIALIST

## 2022-04-15 PROCEDURE — 370N000017 HC ANESTHESIA TECHNICAL FEE, PER MIN: Performed by: SPECIALIST

## 2022-04-15 PROCEDURE — 272N000001 HC OR GENERAL SUPPLY STERILE: Performed by: SPECIALIST

## 2022-04-15 PROCEDURE — 250N000011 HC RX IP 250 OP 636: Performed by: NURSE ANESTHETIST, CERTIFIED REGISTERED

## 2022-04-15 PROCEDURE — 258N000003 HC RX IP 258 OP 636: Performed by: NURSE ANESTHETIST, CERTIFIED REGISTERED

## 2022-04-15 PROCEDURE — 11401 EXC TR-EXT B9+MARG 0.6-1 CM: CPT | Mod: 51 | Performed by: SPECIALIST

## 2022-04-15 PROCEDURE — 250N000011 HC RX IP 250 OP 636: Performed by: SPECIALIST

## 2022-04-15 PROCEDURE — 88342 IMHCHEM/IMCYTCHM 1ST ANTB: CPT | Mod: 26 | Performed by: PATHOLOGY

## 2022-04-15 PROCEDURE — 710N000012 HC RECOVERY PHASE 2, PER MINUTE: Performed by: SPECIALIST

## 2022-04-15 PROCEDURE — 250N000009 HC RX 250: Performed by: SPECIALIST

## 2022-04-15 PROCEDURE — 88342 IMHCHEM/IMCYTCHM 1ST ANTB: CPT | Mod: TC | Performed by: SPECIALIST

## 2022-04-15 PROCEDURE — 999N000141 HC STATISTIC PRE-PROCEDURE NURSING ASSESSMENT: Performed by: SPECIALIST

## 2022-04-15 PROCEDURE — 360N000075 HC SURGERY LEVEL 2, PER MIN: Performed by: SPECIALIST

## 2022-04-15 RX ORDER — CEFAZOLIN SODIUM/WATER 2 G/20 ML
2 SYRINGE (ML) INTRAVENOUS
Status: COMPLETED | OUTPATIENT
Start: 2022-04-15 | End: 2022-04-15

## 2022-04-15 RX ORDER — CEFAZOLIN SODIUM/WATER 2 G/20 ML
2 SYRINGE (ML) INTRAVENOUS SEE ADMIN INSTRUCTIONS
Status: DISCONTINUED | OUTPATIENT
Start: 2022-04-15 | End: 2022-04-15 | Stop reason: HOSPADM

## 2022-04-15 RX ORDER — LIDOCAINE HYDROCHLORIDE 20 MG/ML
INJECTION, SOLUTION INFILTRATION; PERINEURAL PRN
Status: DISCONTINUED | OUTPATIENT
Start: 2022-04-15 | End: 2022-04-15

## 2022-04-15 RX ORDER — LIDOCAINE HYDROCHLORIDE AND EPINEPHRINE 10; 10 MG/ML; UG/ML
INJECTION, SOLUTION INFILTRATION; PERINEURAL PRN
Status: DISCONTINUED | OUTPATIENT
Start: 2022-04-15 | End: 2022-04-15 | Stop reason: HOSPADM

## 2022-04-15 RX ORDER — FENTANYL CITRATE 50 UG/ML
INJECTION, SOLUTION INTRAMUSCULAR; INTRAVENOUS PRN
Status: DISCONTINUED | OUTPATIENT
Start: 2022-04-15 | End: 2022-04-15

## 2022-04-15 RX ORDER — BUPIVACAINE HYDROCHLORIDE AND EPINEPHRINE 2.5; 5 MG/ML; UG/ML
INJECTION, SOLUTION INFILTRATION; PERINEURAL PRN
Status: DISCONTINUED | OUTPATIENT
Start: 2022-04-15 | End: 2022-04-15 | Stop reason: HOSPADM

## 2022-04-15 RX ORDER — ONDANSETRON 2 MG/ML
INJECTION INTRAMUSCULAR; INTRAVENOUS PRN
Status: DISCONTINUED | OUTPATIENT
Start: 2022-04-15 | End: 2022-04-15

## 2022-04-15 RX ORDER — PROPOFOL 10 MG/ML
INJECTION, EMULSION INTRAVENOUS CONTINUOUS PRN
Status: DISCONTINUED | OUTPATIENT
Start: 2022-04-15 | End: 2022-04-15

## 2022-04-15 RX ORDER — SODIUM CHLORIDE, SODIUM LACTATE, POTASSIUM CHLORIDE, CALCIUM CHLORIDE 600; 310; 30; 20 MG/100ML; MG/100ML; MG/100ML; MG/100ML
INJECTION, SOLUTION INTRAVENOUS CONTINUOUS
Status: DISCONTINUED | OUTPATIENT
Start: 2022-04-15 | End: 2022-04-15 | Stop reason: HOSPADM

## 2022-04-15 RX ORDER — PROPOFOL 10 MG/ML
INJECTION, EMULSION INTRAVENOUS PRN
Status: DISCONTINUED | OUTPATIENT
Start: 2022-04-15 | End: 2022-04-15

## 2022-04-15 RX ORDER — LIDOCAINE 40 MG/G
CREAM TOPICAL
Status: DISCONTINUED | OUTPATIENT
Start: 2022-04-15 | End: 2022-04-15 | Stop reason: HOSPADM

## 2022-04-15 RX ORDER — IBUPROFEN 600 MG/1
600 TABLET, FILM COATED ORAL
Status: DISCONTINUED | OUTPATIENT
Start: 2022-04-15 | End: 2022-04-15 | Stop reason: HOSPADM

## 2022-04-15 RX ADMIN — LIDOCAINE HYDROCHLORIDE 1 ML: 10 INJECTION, SOLUTION EPIDURAL; INFILTRATION; INTRACAUDAL; PERINEURAL at 09:08

## 2022-04-15 RX ADMIN — FENTANYL CITRATE 50 MCG: 50 INJECTION, SOLUTION INTRAMUSCULAR; INTRAVENOUS at 10:12

## 2022-04-15 RX ADMIN — Medication 2 G: at 10:07

## 2022-04-15 RX ADMIN — ONDANSETRON 4 MG: 2 INJECTION INTRAMUSCULAR; INTRAVENOUS at 10:30

## 2022-04-15 RX ADMIN — PROPOFOL 150 MCG/KG/MIN: 10 INJECTION, EMULSION INTRAVENOUS at 10:12

## 2022-04-15 RX ADMIN — MIDAZOLAM 2 MG: 1 INJECTION INTRAMUSCULAR; INTRAVENOUS at 10:07

## 2022-04-15 RX ADMIN — LIDOCAINE HYDROCHLORIDE 80 MG: 20 INJECTION, SOLUTION INFILTRATION; PERINEURAL at 10:12

## 2022-04-15 RX ADMIN — SODIUM CHLORIDE, POTASSIUM CHLORIDE, SODIUM LACTATE AND CALCIUM CHLORIDE: 600; 310; 30; 20 INJECTION, SOLUTION INTRAVENOUS at 09:08

## 2022-04-15 RX ADMIN — PROPOFOL 40 MG: 10 INJECTION, EMULSION INTRAVENOUS at 10:14

## 2022-04-15 NOTE — ANESTHESIA POSTPROCEDURE EVALUATION
Patient: Bulmaro Herrera    Procedure: Procedure(s):  Excisions skin lesions of back       Anesthesia Type:  MAC    Note:  Disposition: Outpatient   Postop Pain Control: Uneventful            Sign Out: Well controlled pain   PONV: No   Neuro/Psych: Uneventful            Sign Out: Acceptable/Baseline neuro status   Airway/Respiratory: Uneventful            Sign Out: Acceptable/Baseline resp. status   CV/Hemodynamics: Uneventful            Sign Out: Acceptable CV status   Other NRE: NONE   DID A NON-ROUTINE EVENT OCCUR? No    Event details/Postop Comments:  Pt was happy with anesthesia care.  No complications.  I will follow up with the pt if needed.           Last vitals:  Vitals Value Taken Time   /69 04/15/22 1100   Temp 95.9  F (35.5  C) 04/15/22 1108   Pulse 71 04/15/22 1100   Resp     SpO2 93 % 04/15/22 1108   Vitals shown include unvalidated device data.    Electronically Signed By: SHIV Hoff CRNA  April 15, 2022  11:09 AM

## 2022-04-15 NOTE — ANESTHESIA CARE TRANSFER NOTE
Patient: Bulmaro Herrera    Procedure: Procedure(s):  Excisions skin lesions of back       Diagnosis: Back skin lesion [L98.9]  Diagnosis Additional Information: No value filed.    Anesthesia Type:   MAC     Note:    Oropharynx: oropharynx clear of all foreign objects and spontaneously breathing  Level of Consciousness: drowsy  Oxygen Supplementation: room air    Independent Airway: airway patency satisfactory and stable  Dentition: dentition unchanged  Vital Signs Stable: post-procedure vital signs reviewed and stable  Report to RN Given: handoff report given  Patient transferred to: Phase II    Handoff Report: Identifed the Patient, Identified the Reponsible Provider, Reviewed the pertinent medical history, Discussed the surgical course, Reviewed Intra-OP anesthesia mangement and issues during anesthesia, Set expectations for post-procedure period and Allowed opportunity for questions and acknowledgement of understanding      Vitals:  Vitals Value Taken Time   BP     Temp     Pulse     Resp     SpO2 92 % 04/15/22 1058   Vitals shown include unvalidated device data.    Electronically Signed By: SHIV Hoff CRNA  April 15, 2022  10:59 AM

## 2022-04-15 NOTE — BRIEF OP NOTE
[facility]    Brief Operative Note    Pre-operative diagnosis: Back skin lesion [L98.9]  - multiple  Post-operative diagnosis Same as pre-operative diagnosis    Procedure: Procedure(s):  Excisions skin lesions of back  Surgeon: Surgeon(s) and Role:     * Ayaan Chaney MD - Primary  Anesthesia: Monitor Anesthesia Care   Estimated Blood Loss: Minimal    Drains: None  Specimens:   ID Type Source Tests Collected by Time Destination   1 : Right arm lesion Tissue Arm, Right SURGICAL PATHOLOGY EXAM Ayaan Chaney MD 4/15/2022 10:29 AM    2 : Right lower back mass Tissue Back, Lower SURGICAL PATHOLOGY EXAM Ayaan Chaney MD 4/15/2022 10:33 AM    3 : Left shoulder lesion  Tissue Shoulder, Left SURGICAL PATHOLOGY EXAM Ayaan Chaney MD 4/15/2022 10:41 AM    4 : Left arm lesion Tissue Arm, Left SURGICAL PATHOLOGY EXAM Ayaan Chaney MD 4/15/2022 10:43 AM      Findings:   Skin lesion right arm, right lower back, left shoulder and left arm.  Complications: None.  Implants: * No implants in log *      Ayaan Chaney MD, FACS    #[id number]

## 2022-04-15 NOTE — OP NOTE
Procedure Date: 04/15/2022    PREOPERATIVE DIAGNOSIS:  Multiple skin lesions of the back and upper arms.    POSTOPERATIVE DIAGNOSIS:  Multiple skin lesions of the back and upper arms.    PROCEDURE PERFORMED:     1.  Excision of right arm skin lesion with 1.5 cm ellipse.  2.  Excision of right lower back skin lesion with 2 cm ellipse.  3.  Excision of left shoulder skin lesion with 1.5 cm ellipse.  4.  Excision of left arm skin lesion with 1 cm ellipse.    SURGEON:  Ayaan Chaney MD, FACS    ANESTHESIA:  MAC.    INDICATIONS FOR PROCEDURE:  A 56-year-old gentleman who we previously removed some skin lesions on many years ago.  His wife noticed some had changed on his back and was concerned about them and wanted them excised.     OPERATIVE FINDINGS:  Included multiple skin lesions as stated above with excision ellipses as stated above.    DESCRIPTION OF PROCEDURE:  With the cooperation of the patient and his wife in the preoperative holding, his back and two areas on his arms were marked.  He was then taken to the operating room and placed on the table in the prone position.  After receiving some sedation, his back and upper arms were prepped and draped in sterile fashion.  Timeout was performed confirming the day and the patient as well as the procedure to be performed.  Each of the lesions was infiltrated with local anesthetic and after adequate analgesia was achieved, all the lesions were excised using a 15 blade and submitted as specimen with measurements as stated above.  The incisions were all then closed with 3-0 Vicryl and 4-0 Monocryl followed by Exofin glue.  The patient was then taken from the operating room to recovery in stable condition to be sent home.    Ayaan Chaney MD, FACS        D: 04/15/2022   T: 04/15/2022   MT: [id number]    Name:     JAZZY SIDDIQI  MRN:      [id number]        Account:        [id number]   :      1965           Procedure Date: 04/15/2022     Document: [id number]

## 2022-04-15 NOTE — DISCHARGE INSTRUCTIONS
[facility]   Adult Discharge Orders & Instructions Following Anesthesia    For 24 hours after surgery    Get plenty of rest.  A responsible adult must stay with you for at least 24 hours after you leave the hospital.   Do not drive or use heavy equipment.  If you have weakness or tingling, don't drive or use heavy equipment until this feeling goes away.  Do not drink alcohol.  Avoid strenuous or risky activities.  Ask for help when climbing stairs.   You may feel lightheaded.  IF so, sit for a few minutes before standing.  Have someone help you get up.   If you have nausea (feel sick to your stomach): Drink only clear liquids such as apple juice, ginger ale, broth or 7-Up.  Rest may also help.  Be sure to drink enough fluids.  Move to a regular diet as you feel able.  You may have a slight fever. Call the doctor if your fever is over 100 F (37.7 C) (taken under the tongue) or lasts longer than 24 hours.  You may have a dry mouth, a sore throat, muscle aches or trouble sleeping.  These should go away after 24 hours.  Do not make important or legal decisions.   Call your doctor for any of the followin.  Signs of infection (fever, growing tenderness at the surgery site, a large amount of drainage or bleeding, severe pain, foul-smelling drainage, redness, swelling).    To contact a doctor, call clinic number: [phone] or After Hours Nurse Advice Line: [phone]

## 2022-04-15 NOTE — INTERVAL H&P NOTE
I have reviewed the surgical (or preoperative) H&P that is linked to this encounter, and examined the patient. There are no significant changes    Clinical Conditions Present on Arrival:  Clinically Significant Risk Factors Present on Admission                  # Platelet Defect: home medication list includes an antiplatelet medication  # Obesity: Estimated body mass index is 34.31 kg/m  as calculated from the following:    Height as of this encounter: 1.829 m (6').    Weight as of this encounter: 114.8 kg (253 lb).

## 2022-04-18 DIAGNOSIS — M19.012 ARTHROSIS OF LEFT SHOULDER: Primary | ICD-10-CM

## 2022-04-18 RX ORDER — LIDOCAINE 40 MG/G
CREAM TOPICAL
Status: CANCELLED | OUTPATIENT
Start: 2022-04-18

## 2022-04-19 ENCOUNTER — HOSPITAL ENCOUNTER (OUTPATIENT)
Facility: AMBULATORY SURGERY CENTER | Age: 57
End: 2022-04-19
Payer: COMMERCIAL

## 2022-04-19 ENCOUNTER — TELEPHONE (OUTPATIENT)
Dept: ANESTHESIOLOGY | Facility: CLINIC | Age: 57
End: 2022-04-19
Payer: COMMERCIAL

## 2022-04-19 DIAGNOSIS — M19.012 ARTHROSIS OF LEFT SHOULDER: ICD-10-CM

## 2022-04-19 LAB
PATH REPORT.COMMENTS IMP SPEC: NORMAL
PATH REPORT.COMMENTS IMP SPEC: NORMAL
PATH REPORT.FINAL DX SPEC: NORMAL
PATH REPORT.GROSS SPEC: NORMAL
PATH REPORT.MICROSCOPIC SPEC OTHER STN: NORMAL
PATH REPORT.MICROSCOPIC SPEC OTHER STN: NORMAL
PATH REPORT.RELEVANT HX SPEC: NORMAL
PHOTO IMAGE: NORMAL

## 2022-04-27 DIAGNOSIS — Z11.59 ENCOUNTER FOR SCREENING FOR OTHER VIRAL DISEASES: Primary | ICD-10-CM

## 2022-04-27 NOTE — TELEPHONE ENCOUNTER
Called patient to schedule injection in [facility] per [place] . MG is closer to pt.     Pt would like to have general anesthesia instead of moderate sedatation as he has high anxiety with needles. He is aware he would need a H&P with sedation but had one 2 weeks ago and is wondering if Dr. Brand is willing to update on his own.  Sent message to Dr. Brand and nurses.     Patient is aware writer will follow up with him once writer hears back from provider.

## 2022-04-28 ENCOUNTER — OFFICE VISIT (OUTPATIENT)
Dept: NEUROSURGERY | Facility: CLINIC | Age: 57
End: 2022-04-28
Payer: COMMERCIAL

## 2022-04-28 VITALS
DIASTOLIC BLOOD PRESSURE: 80 MMHG | BODY MASS INDEX: 34.54 KG/M2 | HEIGHT: 72 IN | WEIGHT: 255 LBS | SYSTOLIC BLOOD PRESSURE: 136 MMHG

## 2022-04-28 DIAGNOSIS — M47.812 ARTHROPATHY OF CERVICAL FACET JOINT: Primary | ICD-10-CM

## 2022-04-28 DIAGNOSIS — M51.9 DISC DISORDER OF LUMBAR REGION: ICD-10-CM

## 2022-04-28 PROCEDURE — 99214 OFFICE O/P EST MOD 30 MIN: CPT | Performed by: NEUROLOGICAL SURGERY

## 2022-04-28 NOTE — PROGRESS NOTES
Patient requires sedation for all injection. I called Dr. Chaudhry about patient receiving MBB/RFA with sedation and found out that since patient has [employer] insurance, this shouldn't be an issue. Orders placed.    Writer called patient's wife per patient request and reviewed the two injection orders and the RFA series.    Katelin Glez RN on 4/28/2022 at 4:25 PM

## 2022-04-28 NOTE — PROGRESS NOTES
Bulmaro Herrera is a 56 year old male who presents for evaluation of his chief complaint of severe neck pain as well as back pain, to a slightly lesser degree.  He has had years of gradually worsening neck pain which she has been treating with facet injections with Dr. Chaudhry.  He has been getting C5-6 and C6-7 bilateral facet injections for the last couple of years, initially with good symptomatic improvement, but unfortunately now with diminishing returns.  He states the last round of injections he got did not do anything for him.  Per Dr. Chaudhry's notes, he would be a good candidate to try an ablation, but unfortunately has a needle phobia that would be prohibitive for him.  He states there is a certain point in his neck that he can squeeze and notes a significant improvement in his symptoms.  Otherwise, he has worsening neck pain throughout the day and difficulty sleeping at night because of it.  He has tried multiple medications including anti-inflammatories and Tylenol threes.    He also has low back pain, and paresthesias in his feet bilaterally.     Returns for follow up.  Continued neck and back pain.  New MR Cervical shows slight progression of disc pathology at C5-6 and C6-7 without any high grade canal stenosis.  MR Lumbar shows L5-S1 disc degeneration with modic changes and foraminal stenosis.      Past Medical History:   Diagnosis Date     Back pains      Depressive disorder, not elsewhere classified 1/28/2007    declines Rx     Disc degeneration 12/2008    see MRI -throaco-lumbar mild discogenic degenerative changes     Elevated LFT's 11/09    alt and ast     Other and unspecified hyperlipidemia 1/2007     Sleep disorder 9/09    CPAP     Tobacco use disorder        Past Medical History reviewed with patient during visit.    Past Surgical History:   Procedure Laterality Date     COLONOSCOPY  07/07/08    adenomatous polyp repeat 5 years     COLONOSCOPY N/A 12/29/2017    Procedure: COMBINED COLONOSCOPY,  SINGLE OR MULTIPLE BIOPSY/POLYPECTOMY BY BIOPSY;  Colonoscopy, Polypectomy by Biopsy;  Surgeon: Matt Reyes MD;  [facility]     ENT SURGERY      for deviated septum     EXCISE LESION AXILLA  12/7/2018    Procedure: Excision Skin Tags Right Axilla;  Surgeon: Ayaan Chaney MD;  [facility]     EXCISE LESION BACK N/A 12/7/2018    Procedure: Excision Back Skin Lesion X Two;  Surgeon: Ayaan Chaney MD;  [facility]     EXCISE MASS BACK N/A 4/15/2022    Procedure: Excisions skin lesions of back;  Surgeon: Ayaan Chaney MD;  [facility]     INJECT EPIDURAL CERVICAL N/A 5/14/2015    Procedure: INJECT EPIDURAL CERVICAL;  Surgeon: Christian Chaudhry MD;  [facility]     INJECT EPIDURAL CERVICAL N/A 12/28/2018    Procedure: INJECT EPIDURAL CERVICAL 6-7;  Surgeon: Christian Chaudhry MD;  [facility]     INJECT EPIDURAL LUMBAR N/A 12/20/2019    Procedure: Lumbar 5- Sacral 1 Epidural  Injection bilatetral;  Surgeon: Christian Chaudhry MD;  [facility]     INJECT FACET JOINT Bilateral 1/25/2019    Procedure: Cervical Facet Injections Cervical 5-6 and 6-7 Bilateral;  Surgeon: Christian Chaudhry MD;  [facility]     INJECT FACET JOINT Bilateral 11/29/2019    Procedure: Cervical 5-6 and 6-7 Bilateral facet joint injection;  Surgeon: Christian Chaudhry MD;  [facility]     INJECT FACET JOINT Bilateral 2/11/2021    Procedure: Cervical 5-6 and Cervical 6-7 Bilateral facet injections;  Surgeon: Christian Chaudhry MD;  [facility]     INJECT FACET JOINT Bilateral 10/19/2021    Procedure: Left C5-C6, Right C5-C6, Left C6-C7 and Right C6-C7 Intra-articular zygopophyseal joint injection utilizing fluoroscopic guidance with contrast dye;  Surgeon: Christian Chaudhry MD;  [facility]     INJECT STEROID (LOCATION) Left 10/19/2021    Procedure: Glenohumeral joint injection left shoulder;  Surgeon: Christian Chaudhry MD;  [facility]     ORTHOPEDIC SURGERY      Right hand     TONSILLECTOMY       [facility]  NONSPECIFIC PROCEDURE      rt hand surgery - laceration/glass/tendons     [facility]  [facility]    neg     Past Surgical History reviewed with patient during visit.    Current Outpatient Medications   Medication     acetaminophen (TYLENOL) 500 MG tablet     amoxicillin (AMOXIL) 500 MG capsule     ibuprofen (ADVIL/MOTRIN) 200 MG tablet     loratadine (CLARITIN) 10 MG tablet     meclizine (ANTIVERT) 25 MG tablet     omeprazole (PRILOSEC) 40 MG DR capsule     aspirin (ASA) 81 MG chewable tablet     No current facility-administered medications for this visit.       Allergies   Allergen Reactions     Percocet [Oxycodone-Acetaminophen] Itching     Wasps [Hornets] Swelling       Social History     Socioeconomic History     Marital status:      Spouse name: None     Number of children: None     Years of education: None     Highest education level: None   Occupational History     Occupation:      Employer: SELF   Tobacco Use     Smoking status: Former Smoker     Packs/day: 1.50     Quit date: 2/14/2007     Years since quitting: 15.0     Smokeless tobacco: Never Used   Substance and Sexual Activity     Alcohol use: No     Comment: rare     Drug use: No     Sexual activity: Yes     Partners: Female   Other Topics Concern     Parent/sibling w/ CABG, MI or angioplasty before 65F 55M? No   Social History Narrative    Dairy/d a lot, 1 gallon of milk per day plus cheese and other servings     Caffeine 48 oz. coffee per day    Exercise none    Sunscreen used - No    Seatbelts used - Yes    Working smoke/CO detectors in the home - Yes    Guns stored in the home - No    Self Breast Exams - NA    Self Testicular Exam - No    Eye Exam up to date - No    Dental Exam up to date - Yes    Pap Smear up to date - NA    Mammogram up to date - NA    PSA up to date - No    Dexa Scan up to date - NA    Flex Sig / Colonoscopy up to date - No    Immunizations up to date - No    Abuse: Current or  Past(Physical, Sexual or Emotional)- No    Do you feel safe in your environment - Yes    liberty Reyes        Lives with spouse and her parents.     Social Determinants of Health     Financial Resource Strain: Not on file   Food Insecurity: Not on file   Transportation Needs: Not on file   Physical Activity: Not on file   Stress: Not on file   Social Connections: Not on file   Intimate Partner Violence: Not on file   Housing Stability: Not on file       Family History   Problem Relation Age of Onset     Breast Cancer Mother      Arthritis Mother         joint ?     Depression Father      Cancer Maternal Grandmother      C.A.D. Maternal Grandfather      Cardiovascular Maternal Grandfather      Gynecology Paternal Grandmother          giving birth     Prostate Cancer Paternal Grandfather      Genetic Disorder Brother         joint pain?     Depression Brother      Cardiovascular Son         congenital heart defect -       Prostate Cancer Other          age 70's     Diabetes Other           ROS: 10 point ROS neg other than the symptoms noted above in the HPI.    Vital Signs: /80 (BP Location: Right arm, Patient Position: Sitting, Cuff Size: Adult Regular)   Ht 1.829 m (6')   Wt 115.7 kg (255 lb)   BMI 34.58 kg/m      Examination:  Constitutional:  Alert, well nourished, NAD.  HEENT: Normocephalic, atraumatic.   Pulmonary:  Without shortness of breath, normal effort.   Lymph: no lymphadenopathy to low back or LE.   Integumentary: Skin is free of rashes or lesions.   Cardiovascular:  No pitting edema of BLE.    Psych: Normal affect, no apparent distress    Neurological:  Awake  Alert  Oriented x 3  Speech clear  Cranial nerves II - XII grossly intact  Motor exam   Shoulder Abduction:  Right:  5/5   Left:  5/5  Biceps:                      Right:  5/5   Left:  5/5  Triceps:                     Right:  5/5   Left:  5/5  Wrist Extensors:       Right:  5/5   Left:  5/5  Wrist Flexors:           Right:  5/5    Left:  5/5  Intrinsics:                   Right:  5/5   Left:  5/5  Sensation normal to bilateral upper and lower extremities.    Reflexes are 2+ in the patellar and Achilles. There is no clonus. Downgoing Babinski.    Reflexes are 2+ in the brachial radialis and triceps. Negative Cristofer sign bilaterally.    Finger to Nose smooth  Pronator drift negative    Musculoskeletal:  Gait: Able to stand from a seated position. Normal non-antalgic, non-myelopathic     He does show normal range of motion, but notes that he does have pain with turning his head side to side.    Imaging:   MRI of the cervical and lumbar spine was reviewed in the office today.  He does have mild disc bulging at C6-7, but without any foraminal stenosis or nerve impingement.  He does have facet arthropathy at C5-6 and C6-7 bilaterally.    His lumbar MRI was also reviewed.  He has disc degeneration at L5-S1, with some mild disc collapse and Modic endplate changes.  Minimal foraminal stenosis bilaterally noted.  No nerve impingement is seen.    Assessment/Plan:     Cervicalgia  Cervical facet arthropathy  Low back pain  L5-S1 disc degeneration  Lower extremity paresthesias      Bulmaro Herrera is a 56 year old male.  I did have a discussion with the patient regarding his symptoms.  He has had years of gradually worsening neck pain, which initially responded nicely to facet injections at C5-6 and C6-7 bilaterally    Will plan for Cervical RFA  L5-S1 bilateral transforaminal KRYSTAL

## 2022-04-28 NOTE — LETTER
4/28/2022         RE: Bulmaro Herrera  [address]  [facility] [id number]        Dear Colleague,    Thank you for referring your patient, Bulmaro Herrera, to the [facility] [place]. Please see a copy of my visit note below.    Bulmaro Herrera is a 56 year old male who presents for:  Chief Complaint   Patient presents with     Neurologic Problem     Cervical / Lumbar         Initial Vitals:  /80 (BP Location: Right arm, Patient Position: Sitting, Cuff Size: Adult Regular)   Ht 6' (1.829 m)   Wt 255 lb (115.7 kg)   BMI 34.58 kg/m   Estimated body mass index is 34.58 kg/m  as calculated from the following:    Height as of this encounter: 6' (1.829 m).    Weight as of this encounter: 255 lb (115.7 kg).. Body surface area is 2.42 meters squared. BP completed using cuff size: regular  Data Unavailable    Nursing Comments:     Christina Rosenthal      Patient requires sedation for all injection. I called Dr. Chaudhry about patient receiving MBB/RFA with sedation and found out that since patient has preferred one insurance, this shouldn't be an issue. Orders placed.    Writer called patient's wife per patient request and reviewed the two injection orders and the RFA series.    Katelin Glez RN on 4/28/2022 at 4:25 PM      Bulmaro Herrera is a 56 year old male who presents for evaluation of his chief complaint of severe neck pain as well as back pain, to a slightly lesser degree.  He has had years of gradually worsening neck pain which she has been treating with facet injections with Dr. Chaudhry.  He has been getting C5-6 and C6-7 bilateral facet injections for the last couple of years, initially with good symptomatic improvement, but unfortunately now with diminishing returns.  He states the last round of injections he got did not do anything for him.  Per Dr. Chaudhry's notes, he would be a good candidate to try an ablation, but unfortunately has a needle phobia that would be prohibitive for  him.  He states there is a certain point in his neck that he can squeeze and notes a significant improvement in his symptoms.  Otherwise, he has worsening neck pain throughout the day and difficulty sleeping at night because of it.  He has tried multiple medications including anti-inflammatories and Tylenol threes.    He also has low back pain, and paresthesias in his feet bilaterally.     Returns for follow up.  Continued neck and back pain.  New MR Cervical shows slight progression of disc pathology at C5-6 and C6-7 without any high grade canal stenosis.  MR Lumbar shows L5-S1 disc degeneration with modic changes and foraminal stenosis.      Past Medical History:   Diagnosis Date     Back pains      Depressive disorder, not elsewhere classified 1/28/2007    declines Rx     Disc degeneration 12/2008    see MRI -throaco-lumbar mild discogenic degenerative changes     Elevated LFT's 11/09    alt and ast     Other and unspecified hyperlipidemia 1/2007     Sleep disorder 9/09    CPAP     Tobacco use disorder        Past Medical History reviewed with patient during visit.    Past Surgical History:   Procedure Laterality Date     COLONOSCOPY  07/07/08    adenomatous polyp repeat 5 years     COLONOSCOPY N/A 12/29/2017    Procedure: COMBINED COLONOSCOPY, SINGLE OR MULTIPLE BIOPSY/POLYPECTOMY BY BIOPSY;  Colonoscopy, Polypectomy by Biopsy;  Surgeon: Matt Reyes MD;  [facility]     ENT SURGERY      for deviated septum     EXCISE LESION AXILLA  12/7/2018    Procedure: Excision Skin Tags Right Axilla;  Surgeon: Ayaan Chaney MD;  [facility]     EXCISE LESION BACK N/A 12/7/2018    Procedure: Excision Back Skin Lesion X Two;  Surgeon: Ayaan Chaney MD;  [facility]     EXCISE MASS BACK N/A 4/15/2022    Procedure: Excisions skin lesions of back;  Surgeon: Ayaan Chaney MD;  [facility]     INJECT EPIDURAL CERVICAL N/A 5/14/2015    Procedure: INJECT EPIDURAL CERVICAL;  Surgeon: Christian Chaudhry MD;   [facility]     INJECT EPIDURAL CERVICAL N/A 12/28/2018    Procedure: INJECT EPIDURAL CERVICAL 6-7;  Surgeon: Christian Chaudhry MD;  [facility]     INJECT EPIDURAL LUMBAR N/A 12/20/2019    Procedure: Lumbar 5- Sacral 1 Epidural  Injection bilatetral;  Surgeon: Christian Chaudhry MD;  [facility]     INJECT FACET JOINT Bilateral 1/25/2019    Procedure: Cervical Facet Injections Cervical 5-6 and 6-7 Bilateral;  Surgeon: Christian Chaudhry MD;  [facility]     INJECT FACET JOINT Bilateral 11/29/2019    Procedure: Cervical 5-6 and 6-7 Bilateral facet joint injection;  Surgeon: Christian Chaudhry MD;  [facility]     INJECT FACET JOINT Bilateral 2/11/2021    Procedure: Cervical 5-6 and Cervical 6-7 Bilateral facet injections;  Surgeon: Christian Chaudhry MD;  [facility]     INJECT FACET JOINT Bilateral 10/19/2021    Procedure: Left C5-C6, Right C5-C6, Left C6-C7 and Right C6-C7 Intra-articular zygopophyseal joint injection utilizing fluoroscopic guidance with contrast dye;  Surgeon: Christian Chaudhry MD;  [facility]     INJECT STEROID (LOCATION) Left 10/19/2021    Procedure: Glenohumeral joint injection left shoulder;  Surgeon: Christian Chaudhry MD;  [facility]     ORTHOPEDIC SURGERY      Right hand     TONSILLECTOMY       [facility] NONSPECIFIC PROCEDURE      rt hand surgery - laceration/glass/tendons     [facility] ECHO HEART XTHORACIC, STRESS/REST  6/2009    neg     Past Surgical History reviewed with patient during visit.    Current Outpatient Medications   Medication     acetaminophen (TYLENOL) 500 MG tablet     amoxicillin (AMOXIL) 500 MG capsule     ibuprofen (ADVIL/MOTRIN) 200 MG tablet     loratadine (CLARITIN) 10 MG tablet     meclizine (ANTIVERT) 25 MG tablet     omeprazole (PRILOSEC) 40 MG DR capsule     aspirin (ASA) 81 MG chewable tablet     No current facility-administered medications for this visit.       Allergies   Allergen Reactions     Percocet [Oxycodone-Acetaminophen] Itching     Wasps  [Hornets] Swelling       Social History     Socioeconomic History     Marital status:      Spouse name: None     Number of children: None     Years of education: None     Highest education level: None   Occupational History     Occupation:      Employer: SELF   Tobacco Use     Smoking status: Former Smoker     Packs/day: 1.50     Quit date: 2/14/2007     Years since quitting: 15.0     Smokeless tobacco: Never Used   Substance and Sexual Activity     Alcohol use: No     Comment: rare     Drug use: No     Sexual activity: Yes     Partners: Female   Other Topics Concern     Parent/sibling w/ CABG, MI or angioplasty before 65F 55M? No   Social History Narrative    Dairy/d a lot, 1 gallon of milk per day plus cheese and other servings     Caffeine 48 oz. coffee per day    Exercise none    Sunscreen used - No    Seatbelts used - Yes    Working smoke/CO detectors in the home - Yes    Guns stored in the home - No    Self Breast Exams - NA    Self Testicular Exam - No    Eye Exam up to date - No    Dental Exam up to date - Yes    Pap Smear up to date - NA    Mammogram up to date - NA    PSA up to date - No    Dexa Scan up to date - NA    Flex Sig / Colonoscopy up to date - No    Immunizations up to date - No    Abuse: Current or Past(Physical, Sexual or Emotional)- No    Do you feel safe in your environment - Yes    Tms,cma        Lives with spouse and her parents.     Social Determinants of Health     Financial Resource Strain: Not on file   Food Insecurity: Not on file   Transportation Needs: Not on file   Physical Activity: Not on file   Stress: Not on file   Social Connections: Not on file   Intimate Partner Violence: Not on file   Housing Stability: Not on file       Family History   Problem Relation Age of Onset     Breast Cancer Mother      Arthritis Mother         joint ?     Depression Father      Cancer Maternal Grandmother      C.A.D. Maternal Grandfather      Cardiovascular Maternal  Grandfather      Gynecology Paternal Grandmother          giving birth     Prostate Cancer Paternal Grandfather      Genetic Disorder Brother         joint pain?     Depression Brother      Cardiovascular Son         congenital heart defect -       Prostate Cancer Other          age 70's     Diabetes Other           ROS: 10 point ROS neg other than the symptoms noted above in the HPI.    Vital Signs: /80 (BP Location: Right arm, Patient Position: Sitting, Cuff Size: Adult Regular)   Ht 1.829 m (6')   Wt 115.7 kg (255 lb)   BMI 34.58 kg/m      Examination:  Constitutional:  Alert, well nourished, NAD.  HEENT: Normocephalic, atraumatic.   Pulmonary:  Without shortness of breath, normal effort.   Lymph: no lymphadenopathy to low back or LE.   Integumentary: Skin is free of rashes or lesions.   Cardiovascular:  No pitting edema of BLE.    Psych: Normal affect, no apparent distress    Neurological:  Awake  Alert  Oriented x 3  Speech clear  Cranial nerves II - XII grossly intact  Motor exam   Shoulder Abduction:  Right:  5/5   Left:  5/5  Biceps:                      Right:  5/5   Left:  5/5  Triceps:                     Right:  5/5   Left:  5/5  Wrist Extensors:       Right:  5/5   Left:  5/5  Wrist Flexors:           Right:  5/5   Left:  5/5  Intrinsics:                   Right:  5/5   Left:  5/5  Sensation normal to bilateral upper and lower extremities.    Reflexes are 2+ in the patellar and Achilles. There is no clonus. Downgoing Babinski.    Reflexes are 2+ in the brachial radialis and triceps. Negative Cristofer sign bilaterally.    Finger to Nose smooth  Pronator drift negative    Musculoskeletal:  Gait: Able to stand from a seated position. Normal non-antalgic, non-myelopathic     He does show normal range of motion, but notes that he does have pain with turning his head side to side.    Imaging:   MRI of the cervical and lumbar spine was reviewed in the office today.  He does have mild  disc bulging at C6-7, but without any foraminal stenosis or nerve impingement.  He does have facet arthropathy at C5-6 and C6-7 bilaterally.    His lumbar MRI was also reviewed.  He has disc degeneration at L5-S1, with some mild disc collapse and Modic endplate changes.  Minimal foraminal stenosis bilaterally noted.  No nerve impingement is seen.    Assessment/Plan:     Cervicalgia  Cervical facet arthropathy  Low back pain  L5-S1 disc degeneration  Lower extremity paresthesias      Bulmaro Herrera is a 56 year old male.  I did have a discussion with the patient regarding his symptoms.  He has had years of gradually worsening neck pain, which initially responded nicely to facet injections at C5-6 and C6-7 bilaterally    Will plan for Cervical RFA  L5-S1 bilateral transforaminal KRYSTAL        Again, thank you for allowing me to participate in the care of your patient.        Sincerely,        Demetrius Tran MD

## 2022-04-28 NOTE — PATIENT INSTRUCTIONS
Ordered a bilateral L5-S1 transforaminal Epidural Steroid Injection in [place]. [facility] will call you within 48 hours to schedule this. If you don't here from them, please schedule by calling Operating Room scheduling team s phone number: [phone], option 2. If symptoms continue 2 weeks after injections, call our clinic and talk to a nurse.    Ordered a C4, 5, 6 Medial Branch Block/Radiofrequency Ablation in [place]. [facility] will call you within 48 hours to schedule this. If you don't here from them, please schedule by calling Operating Room scheduling team s phone number: [phone], option 2.  Katelin will look into this and call you with next steps on this.      TONEY Thomason  --  [facility]  Phone: [phone]  Fax: [phone]

## 2022-04-28 NOTE — PROGRESS NOTES
Bulmaro Herrera is a 56 year old male who presents for:  Chief Complaint   Patient presents with     Neurologic Problem     Cervical / Lumbar         Initial Vitals:  /80 (BP Location: Right arm, Patient Position: Sitting, Cuff Size: Adult Regular)   Ht 6' (1.829 m)   Wt 255 lb (115.7 kg)   BMI 34.58 kg/m   Estimated body mass index is 34.58 kg/m  as calculated from the following:    Height as of this encounter: 6' (1.829 m).    Weight as of this encounter: 255 lb (115.7 kg).. Body surface area is 2.42 meters squared. BP completed using cuff size: regular  Data Unavailable    Nursing Comments:     Christina Rosenthal

## 2022-04-29 ENCOUNTER — TELEPHONE (OUTPATIENT)
Dept: PALLIATIVE MEDICINE | Facility: CLINIC | Age: 57
End: 2022-04-29
Payer: COMMERCIAL

## 2022-04-29 DIAGNOSIS — Z11.59 ENCOUNTER FOR SCREENING FOR OTHER VIRAL DISEASES: Primary | ICD-10-CM

## 2022-04-29 NOTE — TELEPHONE ENCOUNTER
Pt scheduled for LESI   Date:5/20/22  Time:3pm  Dr. Chaudhry    Instructed pt to have H&P and  for procedure.  Patient informed of COVID testing process.      Pt scheduled for MBB   Date:5/26/22  Time:2pm  Dr. Chaudhry    Instructed pt to have H&P and  for procedure.  Patient informed of COVID testing process.

## 2022-05-01 DIAGNOSIS — Z11.59 ENCOUNTER FOR SCREENING FOR OTHER VIRAL DISEASES: Primary | ICD-10-CM

## 2022-05-02 ENCOUNTER — TELEPHONE (OUTPATIENT)
Dept: PALLIATIVE MEDICINE | Facility: CLINIC | Age: 57
End: 2022-05-02
Payer: COMMERCIAL

## 2022-05-02 NOTE — TELEPHONE ENCOUNTER
Per Dr. Brand, he does not perform any injections under general anesthesia. Patient will need to have this done elsewhere and with another provider.       Raquel Kingston, RNCC  Neurology/Neurosurgery/PM&R

## 2022-05-02 NOTE — TELEPHONE ENCOUNTER
Message routed to Dr. Brand to advise if this could be performed under general anesthesia with provider at  location. Orders will have to be updated and patient may need another H&P.       From: Charo Arraiza   Sent: 4/27/2022  10:08 AM CDT   To: Gerardo Brand MD, Katey Lacey LPN, *     Good morning,     Patient wishes to come to  as it is closer to home than [place].   He is requesting general anesthesia. He said he cannot be awake and see any needles as he has severe anxiety.   He also is aware of needing a H&P before if he gets anesthesia, but says he had one 2 weeks ago and is wondering if Dr. Brand will update it.   Please let me know your thoughts.     Thank you!   Juany Kingston, TONEYCC  Neurology/Neurosurgery/PM&R

## 2022-05-03 NOTE — TELEPHONE ENCOUNTER
Called and left voicemail for patient. Explained that Dr. Brand does not perform any procedures under general anesthesia. He does use moderate sedation, if that would work for patient. Otherwise, patient should speak with the referring provider to find a solution that works for them.

## 2022-05-08 ENCOUNTER — LAB (OUTPATIENT)
Dept: LAB | Facility: CLINIC | Age: 57
End: 2022-05-08
Payer: COMMERCIAL

## 2022-05-08 DIAGNOSIS — Z11.59 ENCOUNTER FOR SCREENING FOR OTHER VIRAL DISEASES: ICD-10-CM

## 2022-05-08 DIAGNOSIS — E78.00 HYPERCHOLESTEROLEMIA: ICD-10-CM

## 2022-05-08 DIAGNOSIS — R73.09 ELEVATED GLUCOSE: ICD-10-CM

## 2022-05-08 LAB
ALT SERPL W P-5'-P-CCNC: 84 U/L (ref 0–70)
CHOLEST SERPL-MCNC: 299 MG/DL
FASTING STATUS PATIENT QL REPORTED: YES
HBA1C MFR BLD: 5.8 % (ref 0–5.6)
HDLC SERPL-MCNC: 35 MG/DL
LDLC SERPL CALC-MCNC: 214 MG/DL
NONHDLC SERPL-MCNC: 264 MG/DL
TRIGL SERPL-MCNC: 251 MG/DL

## 2022-05-08 PROCEDURE — 83036 HEMOGLOBIN GLYCOSYLATED A1C: CPT

## 2022-05-08 PROCEDURE — 80061 LIPID PANEL: CPT | Performed by: PHYSICIAN ASSISTANT

## 2022-05-08 PROCEDURE — 36415 COLL VENOUS BLD VENIPUNCTURE: CPT

## 2022-05-08 PROCEDURE — 84460 ALANINE AMINO (ALT) (SGPT): CPT | Performed by: PHYSICIAN ASSISTANT

## 2022-05-09 ENCOUNTER — MYC MEDICAL ADVICE (OUTPATIENT)
Dept: INTERNAL MEDICINE | Facility: CLINIC | Age: 57
End: 2022-05-09
Payer: COMMERCIAL

## 2022-05-10 ENCOUNTER — OFFICE VISIT (OUTPATIENT)
Dept: CARDIOLOGY | Facility: CLINIC | Age: 57
End: 2022-05-10
Payer: COMMERCIAL

## 2022-05-10 VITALS
HEIGHT: 72 IN | BODY MASS INDEX: 34.62 KG/M2 | WEIGHT: 255.6 LBS | OXYGEN SATURATION: 96 % | SYSTOLIC BLOOD PRESSURE: 134 MMHG | DIASTOLIC BLOOD PRESSURE: 86 MMHG | HEART RATE: 84 BPM

## 2022-05-10 DIAGNOSIS — E78.00 HYPERCHOLESTEROLEMIA: ICD-10-CM

## 2022-05-10 DIAGNOSIS — R42 DIZZINESS: Primary | ICD-10-CM

## 2022-05-10 DIAGNOSIS — I25.10 CORONARY ARTERY DISEASE INVOLVING NATIVE CORONARY ARTERY OF NATIVE HEART WITHOUT ANGINA PECTORIS: ICD-10-CM

## 2022-05-10 PROCEDURE — 99215 OFFICE O/P EST HI 40 MIN: CPT | Performed by: PHYSICIAN ASSISTANT

## 2022-05-10 NOTE — PROGRESS NOTES
Service Date: 05/10/2022    PRIMARY CARDIOLOGIST:  Carlos Stephens MD    REASON FOR VISIT:  Dyslipidemia, nonocclusive coronary artery disease followup.    HISTORY OF PRESENT ILLNESS:  Mr. Herrera is a delightful 56-year-old gentleman with past medical history significant for tobacco abuse, longstanding dyspnea on exertion, hyperlipidemia, herniation of the cervical spine and severe phobia to needles who had presented initially to Dr. Stephens with atypical chest discomfort and shortness of breath.  He underwent stress echo, which was normal without stress-induced ischemia and achieved 8 metabolic equivalents.  He has a strong family history for cardiomyopathy in his mother that we feel is likely secondary to chemotherapy.  We did a coronary CT that showed a calcium score of 41 with 0 in the left main, 19 in the LAD, 20 in the circumflex and 2 in the RCA.  This put him in the 56th percentile.  There was nonocclusive plaque primarily in the circumflex, but some minimal amount in the other vessels, none of which would be enough to contribute to his symptoms.  His overread of the CT showed some calcified hilar and mediastinal lymph nodes compatible with granulomatous disease.  This had been worked up previously.  His Zio Patch just showed rare PACs and PVCs.  Unfortunately, he developed COVID in January, and when I last saw him, he was in the middle of COVID and had elevated liver numbers.  I held his Crestor and we are seeing him back today with assessment of liver and lipids.    Today, he says he feels kind of off overall.  He continues to have intermittent chest pain that he describes as sharp pain in his left upper pectoral area that lasts anywhere from 2 to 90 seconds, happens spontaneously and resolves on its own.  It improves if he puts pressure there.  He also described some dizziness.  This is a couple different things.  When he was turning around in the exam room today, he felt the room spinning, but he also just  feels off.  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  He sometimes is so short of breath he can hardly make it.  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  He does not have any bowel or bladder concerns.  He occasionally has some nausea.  This is improved by taking omeprazole.    SOCIAL HISTORY:  He is  to our , Laine.  They have 3 children, 2 boys and a girl.  They have lost 2 children, one at 4 months' gestation and the other shortly after birth, which was very traumatizing.  History of tobacco, quit 14 years ago, just 20-pack-year history.  About 6 alcoholic drinks a year.    PHYSICAL EXAMINATION:    GENERAL:  Well-developed, well-nourished gentleman in no acute distress.  HEENT:  Normocephalic, atraumatic.  HEART:  Regular in rate and rhythm.  I do not appreciate murmur, rub or gallop.  LUNGS:  Clear, without wheezes, rales, or rhonchi.  ABDOMEN:  Soft and nontender.  EXTREMITIES:  Without peripheral edema.  SKIN:  Warm and dry.    ASSESSMENT AND PLAN:    1.  Dizziness.  There is some degree of what sounds like inner ear and other perhaps related to nerve pain and just overall feeling of unwell.  I have referred him for physical therapy for possible treatment of BPPV.  He is agreeable to that.  He will decide what timing he wants to do that as he potentially has upcoming nerve ablation as well.  2.  Dyspnea on exertion that seems completely resolved with certain compression to his neck.  We agreed that we will work on getting that resolved and if it is not improved, could consider additional workup.  He has already had a negative CTA, stress echo, PFTs and a Zio Patch, so the next step would probably be something like a right  heart catheterization or perhaps just a plain echocardiogram knowing how afraid he is of needles.  3.  Nonocclusive coronary disease.  Appropriately on aspirin.  Statin is being held given liver function.  I think his chest pain is atypical and likely noncardiac and we discussed that today.  4.  Dyslipidemia, markedly elevated on labs showing a , HDL 35, total cholesterol 299, triglycerides 251.  Unfortunately, ALT remains elevated at 84.  I have reached out to his primary care provider to see what other liver workup he would like to do.  We will continue to hold the statin at this time, but when acceptable to primary care, would restart Crestor 2.5 mg a day and slowly up titrate from there, keeping a close eye on liver function perhaps at the 6-week to the 3-month sofia.    Overall, we will have him see Dr. Stephens back in 6 months.  We will let him work through his other issues including nerve pain, possible BPPV and elevated liver numbers.    Thank you for allowing me to participate in this delightful patient's care.    Kiera Yan PA-C        D: 05/10/2022   T: 05/10/2022   MT: MONICA    Name:     JAZZY SIDDIQI  MRN:      [id number]        Account:      [id number]   :      1965           Service Date: 05/10/2022       Document: [id number]

## 2022-05-10 NOTE — PATIENT INSTRUCTIONS
Thanks for coming into [facility] today.    We discussed: your liver number is just above normal range.  I'd like those numbers normal before we restart your crestor.      We'll send you to physical therapy to see if they can help with the dizziness.      Follow up: with Dr. Stephens in 6 months.    I'll send a note to Dr. Farmer about your liver numbers.    If your breathing doesn't improve with the nerve treatments, let us know and we can look at other things.        Please call the clinic at  [phone] with any questions or concerns and my our nurses will be happy to help.    Please call [phone] for scheduling.      Reminder: Please bring in all current medications, over the counter supplements and vitamin bottles to your next appointment.

## 2022-05-10 NOTE — H&P (VIEW-ONLY)
Service Date: 05/10/2022    PRIMARY CARDIOLOGIST:  Carlos Stephens MD    REASON FOR VISIT:  Dyslipidemia, nonocclusive coronary artery disease followup.    HISTORY OF PRESENT ILLNESS:  Mr. Herrera is a delightful 56-year-old gentleman with past medical history significant for tobacco abuse, longstanding dyspnea on exertion, hyperlipidemia, herniation of the cervical spine and severe phobia to needles who had presented initially to Dr. Stephens with atypical chest discomfort and shortness of breath.  He underwent stress echo, which was normal without stress-induced ischemia and achieved 8 metabolic equivalents.  He has a strong family history for cardiomyopathy in his mother that we feel is likely secondary to chemotherapy.  We did a coronary CT that showed a calcium score of 41 with 0 in the left main, 19 in the LAD, 20 in the circumflex and 2 in the RCA.  This put him in the 56th percentile.  There was nonocclusive plaque primarily in the circumflex, but some minimal amount in the other vessels, none of which would be enough to contribute to his symptoms.  His overread of the CT showed some calcified hilar and mediastinal lymph nodes compatible with granulomatous disease.  This had been worked up previously.  His Zio Patch just showed rare PACs and PVCs.  Unfortunately, he developed COVID in January, and when I last saw him, he was in the middle of COVID and had elevated liver numbers.  I held his Crestor and we are seeing him back today with assessment of liver and lipids.    Today, he says he feels kind of off overall.  He continues to have intermittent chest pain that he describes as sharp pain in his left upper pectoral area that lasts anywhere from 2 to 90 seconds, happens spontaneously and resolves on its own.  It improves if he puts pressure there.  He also described some dizziness.  This is a couple different things.  When he was turning around in the exam room today, he felt the room spinning, but he also just  feels off.  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  He sometimes is so short of breath he can hardly make it.  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  He does not have any bowel or bladder concerns.  He occasionally has some nausea.  This is improved by taking omeprazole.    SOCIAL HISTORY:  He is  to our , Laine.  They have 3 children, 2 boys and a girl.  They have lost 2 children, one at 4 months' gestation and the other shortly after birth, which was very traumatizing.  History of tobacco, quit 14 years ago, just 20-pack-year history.  About 6 alcoholic drinks a year.    PHYSICAL EXAMINATION:    GENERAL:  Well-developed, well-nourished gentleman in no acute distress.  HEENT:  Normocephalic, atraumatic.  HEART:  Regular in rate and rhythm.  I do not appreciate murmur, rub or gallop.  LUNGS:  Clear, without wheezes, rales, or rhonchi.  ABDOMEN:  Soft and nontender.  EXTREMITIES:  Without peripheral edema.  SKIN:  Warm and dry.    ASSESSMENT AND PLAN:    1.  Dizziness.  There is some degree of what sounds like inner ear and other perhaps related to nerve pain and just overall feeling of unwell.  I have referred him for physical therapy for possible treatment of BPPV.  He is agreeable to that.  He will decide what timing he wants to do that as he potentially has upcoming nerve ablation as well.  2.  Dyspnea on exertion that seems completely resolved with certain compression to his neck.  We agreed that we will work on getting that resolved and if it is not improved, could consider additional workup.  He has already had a negative CTA, stress echo, PFTs and a Zio Patch, so the next step would probably be something like a right  heart catheterization or perhaps just a plain echocardiogram knowing how afraid he is of needles.  3.  Nonocclusive coronary disease.  Appropriately on aspirin.  Statin is being held given liver function.  I think his chest pain is atypical and likely noncardiac and we discussed that today.  4.  Dyslipidemia, markedly elevated on labs showing a , HDL 35, total cholesterol 299, triglycerides 251.  Unfortunately, ALT remains elevated at 84.  I have reached out to his primary care provider to see what other liver workup he would like to do.  We will continue to hold the statin at this time, but when acceptable to primary care, would restart Crestor 2.5 mg a day and slowly up titrate from there, keeping a close eye on liver function perhaps at the 6-week to the 3-month sofia.    Overall, we will have him see Dr. Stephens back in 6 months.  We will let him work through his other issues including nerve pain, possible BPPV and elevated liver numbers.    Thank you for allowing me to participate in this delightful patient's care.    Kiera Yan PA-C        D: 05/10/2022   T: 05/10/2022   MT: MONICA    Name:     JAZZY SIDDIQI  MRN:      [id number]        Account:      [id number]   :      1965           Service Date: 05/10/2022       Document: [id number]

## 2022-05-10 NOTE — LETTER
5/10/2022    Gustavo Farmer, DO  [address] [place] [address]    RE: Bulmaro Herrera       Dear Colleague,     I had the pleasure of seeing Bulmaro Herrera in the [facility].  [id number]      HPI and Plan:   See dictation    Orders this Visit:  Orders Placed This Encounter   Procedures     ALT     Physical Therapy Referral     Follow-Up with Cardiology     No orders of the defined types were placed in this encounter.    There are no discontinued medications.      Encounter Diagnoses   Name Primary?     Hypercholesterolemia      Coronary artery disease involving native coronary artery of native heart without angina pectoris      Dizziness Yes       CURRENT MEDICATIONS:  Current Outpatient Medications   Medication Sig Dispense Refill     acetaminophen (TYLENOL) 500 MG tablet Take 1,000 mg by mouth every morning       aspirin (ASA) 81 MG chewable tablet Take 1 tablet (81 mg) by mouth daily       ibuprofen (ADVIL/MOTRIN) 200 MG tablet Take 400 mg by mouth every morning       loratadine (CLARITIN) 10 MG tablet TAKE 1 TABLET (10 MG) BY MOUTH DAILY 90 tablet 0     meclizine (ANTIVERT) 25 MG tablet Take 1 tablet (25 mg) by mouth 3 times daily as needed for dizziness 30 tablet 0     omeprazole (PRILOSEC) 40 MG DR capsule TAKE 1 CAPSULE (40 MG) BY MOUTH DAILY 90 capsule 1     amoxicillin (AMOXIL) 500 MG capsule Take 1 capsule (500 mg) by mouth 2 times daily (Patient not taking: Reported on 5/10/2022) 14 capsule 0       ALLERGIES     Allergies   Allergen Reactions     Percocet [Oxycodone-Acetaminophen] Itching     Wasps [Hornets] Swelling       PAST MEDICAL HISTORY:  Past Medical History:   Diagnosis Date     Back pains      Depressive disorder, not elsewhere classified 1/28/2007    declines Rx     Disc degeneration 12/2008    see MRI -throaco-lumbar mild discogenic degenerative changes     Elevated LFT's 11/09    alt and ast     Other and unspecified hyperlipidemia 1/2007     Sleep disorder  9/09    CPAP     Tobacco use disorder        PAST SURGICAL HISTORY:  Past Surgical History:   Procedure Laterality Date     COLONOSCOPY  07/07/08    adenomatous polyp repeat 5 years     COLONOSCOPY N/A 12/29/2017    Procedure: COMBINED COLONOSCOPY, SINGLE OR MULTIPLE BIOPSY/POLYPECTOMY BY BIOPSY;  Colonoscopy, Polypectomy by Biopsy;  Surgeon: Matt Reyes MD;  Location: [facility]     ENT SURGERY      for deviated septum     EXCISE LESION AXILLA  12/7/2018    Procedure: Excision Skin Tags Right Axilla;  Surgeon: Ayaan Chaney MD;  Location[facility]     EXCISE LESION BACK N/A 12/7/2018    Procedure: Excision Back Skin Lesion X Two;  Surgeon: Ayaan Chaney MD;  [facility]     EXCISE MASS BACK N/A 4/15/2022    Procedure: Excisions skin lesions of back;  Surgeon: Ayaan Chaney MD;  [facility]     INJECT EPIDURAL CERVICAL N/A 5/14/2015    Procedure: INJECT EPIDURAL CERVICAL;  Surgeon: Christian Chaudhry MD;  [facility]     INJECT EPIDURAL CERVICAL N/A 12/28/2018    Procedure: INJECT EPIDURAL CERVICAL 6-7;  Surgeon: Christian Chaudhry MD;  [facility]     INJECT EPIDURAL LUMBAR N/A 12/20/2019    Procedure: Lumbar 5- Sacral 1 Epidural  Injection bilatetral;  Surgeon: Christian Chaudhry MD;  [facility]     INJECT FACET JOINT Bilateral 1/25/2019    Procedure: Cervical Facet Injections Cervical 5-6 and 6-7 Bilateral;  Surgeon: Christian Chaudhry MD;  [facility]     INJECT FACET JOINT Bilateral 11/29/2019    Procedure: Cervical 5-6 and 6-7 Bilateral facet joint injection;  Surgeon: Christian Chaudhry MD;  [facility]     INJECT FACET JOINT Bilateral 2/11/2021    Procedure: Cervical 5-6 and Cervical 6-7 Bilateral facet injections;  Surgeon: Christian Chaudhry MD;  [facility]     INJECT FACET JOINT Bilateral 10/19/2021    Procedure: Left C5-C6, Right C5-C6, Left C6-C7 and Right C6-C7 Intra-articular zygopophyseal joint injection utilizing fluoroscopic guidance with contrast dye;  Surgeon: Christian Chaudhry  MD GISSEL;  Location: [facility]     INJECT STEROID (LOCATION) Left 10/19/2021    Procedure: Glenohumeral joint injection left shoulder;  Surgeon: Christian Chaudhry MD;  [facility]     ORTHOPEDIC SURGERY      Right hand     TONSILLECTOMY       ZZC NONSPECIFIC PROCEDURE      rt hand surgery - laceration/glass/tendons     ZZHC ECHO HEART XTHORACIC, STRESS/REST  2009    neg       FAMILY HISTORY:  Family History   Problem Relation Age of Onset     Breast Cancer Mother      Arthritis Mother         joint ?     Depression Father      Cancer Maternal Grandmother      C.A.D. Maternal Grandfather      Cardiovascular Maternal Grandfather      Gynecology Paternal Grandmother          giving birth     Prostate Cancer Paternal Grandfather      Genetic Disorder Brother         joint pain?     Depression Brother      Cardiovascular Son         congenital heart defect -       Prostate Cancer Other          age 70's     Diabetes Other        SOCIAL HISTORY:  Social History     Socioeconomic History     Marital status:      Spouse name: None     Number of children: None     Years of education: None     Highest education level: None   Occupational History     Occupation:      Employer: SELF   Tobacco Use     Smoking status: Former Smoker     Packs/day: 1.50     Quit date: 2007     Years since quitting: 15.2     Smokeless tobacco: Never Used   Substance and Sexual Activity     Alcohol use: No     Comment: rare     Drug use: No     Sexual activity: Yes     Partners: Female   Other Topics Concern     Parent/sibling w/ CABG, MI or angioplasty before 65F 55M? No   Social History Narrative    Dairy/d a lot, 1 gallon of milk per day plus cheese and other servings     Caffeine 48 oz. coffee per day    Exercise none    Sunscreen used - No    Seatbelts used - Yes    Working smoke/CO detectors in the home - Yes    Guns stored in the home - No    Self Breast Exams - NA    Self Testicular Exam - No    Eye  Exam up to date - No    Dental Exam up to date - Yes    Pap Smear up to date - NA    Mammogram up to date - NA    PSA up to date - No    Dexa Scan up to date - NA    Flex Sig / Colonoscopy up to date - No    Immunizations up to date - No    Abuse: Current or Past(Physical, Sexual or Emotional)- No    Do you feel safe in your environment - Yes    Tms,liberty        Lives with spouse and her parents.       Review of Systems:  Skin:        Eyes:  Positive for glasses  ENT:       Respiratory:  Positive for dyspnea on exertion  Cardiovascular:  Negative;palpitations;chest pain;edema dizziness;lightheadedness;Positive for  Gastroenterology:      Genitourinary:       Musculoskeletal:       Neurologic:  Negative numbness or tingling of hands;numbness or tingling of feet  Psychiatric:       Heme/Lymph/Imm:       Endocrine:         Physical Exam:  Vitals: /86 (BP Location: Right arm, Patient Position: Sitting, Cuff Size: Adult Large)   Pulse 84   Ht 1.829 m (6')   Wt 115.9 kg (255 lb 9.6 oz)   SpO2 96%   BMI 34.67 kg/m     Please refer to dictation for physical exam    Recent Lab Results: all reviewed today  CBC RESULTS:  Lab Results   Component Value Date    WBC 5.5 02/08/2021    RBC 5.12 02/08/2021    HGB 16.3 02/08/2021    HCT 47.2 02/08/2021    MCV 92 02/08/2021    MCH 31.8 02/08/2021    MCHC 34.5 02/08/2021    RDW 12.3 02/08/2021     02/08/2021       BMP RESULTS:  Lab Results   Component Value Date     01/18/2022     02/08/2021    POTASSIUM 3.9 01/18/2022    POTASSIUM 4.1 02/08/2021    CHLORIDE 108 01/18/2022    CHLORIDE 108 02/08/2021    CO2 25 01/18/2022    CO2 30 02/08/2021    ANIONGAP 6 01/18/2022    ANIONGAP 2 (L) 02/08/2021    GLC 96 01/18/2022     (H) 02/08/2021    BUN 15 01/18/2022    BUN 17 02/08/2021    CR 0.85 01/18/2022    CR 0.94 02/08/2021    GFRESTIMATED >90 01/18/2022    GFRESTIMATED >90 02/08/2021    GFRESTBLACK >90 02/08/2021    WILIAM 8.9 01/18/2022    WILIAM 9.6 02/08/2021         INR RESULTS:  Lab Results   Component Value Date    INR 1.0 02/17/2014    INR 1.00 06/06/2008           CC  Kiera Yan PA-C  [address]  [place] [id number]        Service Date: 05/10/2022    PRIMARY CARDIOLOGIST:  Carlos Stephens MD    REASON FOR VISIT:  Dyslipidemia, nonocclusive coronary artery disease followup.    HISTORY OF PRESENT ILLNESS:  Mr. Herrera is a delightful 56-year-old gentleman with past medical history significant for tobacco abuse, longstanding dyspnea on exertion, hyperlipidemia, herniation of the cervical spine and severe phobia to needles who had presented initially to Dr. Stephens with atypical chest discomfort and shortness of breath.  He underwent stress echo, which was normal without stress-induced ischemia and achieved 8 metabolic equivalents.  He has a strong family history for cardiomyopathy in his mother that we feel is likely secondary to chemotherapy.  We did a coronary CT that showed a calcium score of 41 with 0 in the left main, 19 in the LAD, 20 in the circumflex and 2 in the RCA.  This put him in the 56th percentile.  There was nonocclusive plaque primarily in the circumflex, but some minimal amount in the other vessels, none of which would be enough to contribute to his symptoms.  His overread of the CT showed some calcified hilar and mediastinal lymph nodes compatible with granulomatous disease.  This had been worked up previously.  His Zio Patch just showed rare PACs and PVCs.  Unfortunately, he developed COVID in January, and when I last saw him, he was in the middle of COVID and had elevated liver numbers.  I held his Crestor and we are seeing him back today with assessment of liver and lipids.    Today, he says he feels kind of off overall.  He continues to have intermittent chest pain that he describes as sharp pain in his left upper pectoral area that lasts anywhere from 2 to 90 seconds, happens spontaneously and resolves on its own.  It improves if  he puts pressure there.  He also described some dizziness.  This is a couple different things.  When he was turning around in the exam room today, he felt the room spinning, but he also just feels off.  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  He sometimes is so short of breath he can hardly make it.  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  He does not have any bowel or bladder concerns.  He occasionally has some nausea.  This is improved by taking omeprazole.    SOCIAL HISTORY:  He is  to our , Laine.  They have 3 children, 2 boys and a girl.  They have lost 2 children, one at 4 months' gestation and the other shortly after birth, which was very traumatizing.  History of tobacco, quit 14 years ago, just 20-pack-year history.  About 6 alcoholic drinks a year.    PHYSICAL EXAMINATION:    GENERAL:  Well-developed, well-nourished gentleman in no acute distress.  HEENT:  Normocephalic, atraumatic.  HEART:  Regular in rate and rhythm.  I do not appreciate murmur, rub or gallop.  LUNGS:  Clear, without wheezes, rales, or rhonchi.  ABDOMEN:  Soft and nontender.  EXTREMITIES:  Without peripheral edema.  SKIN:  Warm and dry.    ASSESSMENT AND PLAN:    1.  Dizziness.  There is some degree of what sounds like inner ear and other perhaps related to nerve pain and just overall feeling of unwell.  I have referred him for physical therapy for possible treatment of BPPV.  He is agreeable to that.  He will decide what timing he wants to do that as he potentially has upcoming nerve ablation as well.  2.  Dyspnea on exertion that seems completely resolved with certain compression to his neck.  We agreed that we will work on getting that resolved  and if it is not improved, could consider additional workup.  He has already had a negative CTA, stress echo, PFTs and a Zio Patch, so the next step would probably be something like a right heart catheterization or perhaps just a plain echocardiogram knowing how afraid he is of needles.  3.  Nonocclusive coronary disease.  Appropriately on aspirin.  Statin is being held given liver function.  I think his chest pain is atypical and likely noncardiac and we discussed that today.  4.  Dyslipidemia, markedly elevated on labs showing a , HDL 35, total cholesterol 299, triglycerides 251.  Unfortunately, ALT remains elevated at 84.  I have reached out to his primary care provider to see what other liver workup he would like to do.  We will continue to hold the statin at this time, but when acceptable to primary care, would restart Crestor 2.5 mg a day and slowly up titrate from there, keeping a close eye on liver function perhaps at the 6-week to the 3-month sofia.    Overall, we will have him see Dr. Stephens back in 6 months.  We will let him work through his other issues including nerve pain, possible BPPV and elevated liver numbers.    Thank you for allowing me to participate in this delightful patient's care.    Kiera Yan PA-C        D: 05/10/2022   T: 05/10/2022   MT: MONICA    Name:     JAZZY SIDDIQI  MRN:      [id number]        Account:      [id number]   :      1965           Service Date: 05/10/2022       Document: [id number]    Thank you for allowing me to participate in the care of your patient.      Sincerely,     Kiera Yan PA-C     [facility]  cc:   Kiera Yan PA-C  [address]  [place] [address]

## 2022-05-10 NOTE — H&P (VIEW-ONLY)
Service Date: 05/10/2022    PRIMARY CARDIOLOGIST:  Carlos Stephens MD    REASON FOR VISIT:  Dyslipidemia, nonocclusive coronary artery disease followup.    HISTORY OF PRESENT ILLNESS:  Mr. Herrera is a delightful 56-year-old gentleman with past medical history significant for tobacco abuse, longstanding dyspnea on exertion, hyperlipidemia, herniation of the cervical spine and severe phobia to needles who had presented initially to Dr. Stephens with atypical chest discomfort and shortness of breath.  He underwent stress echo, which was normal without stress-induced ischemia and achieved 8 metabolic equivalents.  He has a strong family history for cardiomyopathy in his mother that we feel is likely secondary to chemotherapy.  We did a coronary CT that showed a calcium score of 41 with 0 in the left main, 19 in the LAD, 20 in the circumflex and 2 in the RCA.  This put him in the 56th percentile.  There was nonocclusive plaque primarily in the circumflex, but some minimal amount in the other vessels, none of which would be enough to contribute to his symptoms.  His overread of the CT showed some calcified hilar and mediastinal lymph nodes compatible with granulomatous disease.  This had been worked up previously.  His Zio Patch just showed rare PACs and PVCs.  Unfortunately, he developed COVID in January, and when I last saw him, he was in the middle of COVID and had elevated liver numbers.  I held his Crestor and we are seeing him back today with assessment of liver and lipids.    Today, he says he feels kind of off overall.  He continues to have intermittent chest pain that he describes as sharp pain in his left upper pectoral area that lasts anywhere from 2 to 90 seconds, happens spontaneously and resolves on its own.  It improves if he puts pressure there.  He also described some dizziness.  This is a couple different things.  When he was turning around in the exam room today, he felt the room spinning, but he also just  feels off.  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  He sometimes is so short of breath he can hardly make it.  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  He does not have any bowel or bladder concerns.  He occasionally has some nausea.  This is improved by taking omeprazole.    SOCIAL HISTORY:  He is  to our , Laine.  They have 3 children, 2 boys and a girl.  They have lost 2 children, one at 4 months' gestation and the other shortly after birth, which was very traumatizing.  History of tobacco, quit 14 years ago, just 20-pack-year history.  About 6 alcoholic drinks a year.    PHYSICAL EXAMINATION:    GENERAL:  Well-developed, well-nourished gentleman in no acute distress.  HEENT:  Normocephalic, atraumatic.  HEART:  Regular in rate and rhythm.  I do not appreciate murmur, rub or gallop.  LUNGS:  Clear, without wheezes, rales, or rhonchi.  ABDOMEN:  Soft and nontender.  EXTREMITIES:  Without peripheral edema.  SKIN:  Warm and dry.    ASSESSMENT AND PLAN:    1.  Dizziness.  There is some degree of what sounds like inner ear and other perhaps related to nerve pain and just overall feeling of unwell.  I have referred him for physical therapy for possible treatment of BPPV.  He is agreeable to that.  He will decide what timing he wants to do that as he potentially has upcoming nerve ablation as well.  2.  Dyspnea on exertion that seems completely resolved with certain compression to his neck.  We agreed that we will work on getting that resolved and if it is not improved, could consider additional workup.  He has already had a negative CTA, stress echo, PFTs and a Zio Patch, so the next step would probably be something like a right  heart catheterization or perhaps just a plain echocardiogram knowing how afraid he is of needles.  3.  Nonocclusive coronary disease.  Appropriately on aspirin.  Statin is being held given liver function.  I think his chest pain is atypical and likely noncardiac and we discussed that today.  4.  Dyslipidemia, markedly elevated on labs showing a , HDL 35, total cholesterol 299, triglycerides 251.  Unfortunately, ALT remains elevated at 84.  I have reached out to his primary care provider to see what other liver workup he would like to do.  We will continue to hold the statin at this time, but when acceptable to primary care, would restart Crestor 2.5 mg a day and slowly up titrate from there, keeping a close eye on liver function perhaps at the 6-week to the 3-month sofia.    Overall, we will have him see Dr. Stephens back in 6 months.  We will let him work through his other issues including nerve pain, possible BPPV and elevated liver numbers.    Thank you for allowing me to participate in this delightful patient's care.    Kiera Yan PA-C        D: 05/10/2022   T: 05/10/2022   MT: MONICA    Name:     JAZZY SIDDIQI  MRN:      [id number]        Account:      [id number]   :      1965           Service Date: 05/10/2022       Document: [id number]

## 2022-05-10 NOTE — PROGRESS NOTES
[id number]      HPI and Plan:   See dictation    Orders this Visit:  Orders Placed This Encounter   Procedures     ALT     Physical Therapy Referral     Follow-Up with Cardiology     No orders of the defined types were placed in this encounter.    There are no discontinued medications.      Encounter Diagnoses   Name Primary?     Hypercholesterolemia      Coronary artery disease involving native coronary artery of native heart without angina pectoris      Dizziness Yes       CURRENT MEDICATIONS:  Current Outpatient Medications   Medication Sig Dispense Refill     acetaminophen (TYLENOL) 500 MG tablet Take 1,000 mg by mouth every morning       aspirin (ASA) 81 MG chewable tablet Take 1 tablet (81 mg) by mouth daily       ibuprofen (ADVIL/MOTRIN) 200 MG tablet Take 400 mg by mouth every morning       loratadine (CLARITIN) 10 MG tablet TAKE 1 TABLET (10 MG) BY MOUTH DAILY 90 tablet 0     meclizine (ANTIVERT) 25 MG tablet Take 1 tablet (25 mg) by mouth 3 times daily as needed for dizziness 30 tablet 0     omeprazole (PRILOSEC) 40 MG DR capsule TAKE 1 CAPSULE (40 MG) BY MOUTH DAILY 90 capsule 1     amoxicillin (AMOXIL) 500 MG capsule Take 1 capsule (500 mg) by mouth 2 times daily (Patient not taking: Reported on 5/10/2022) 14 capsule 0       ALLERGIES     Allergies   Allergen Reactions     Percocet [Oxycodone-Acetaminophen] Itching     Wasps [Hornets] Swelling       PAST MEDICAL HISTORY:  Past Medical History:   Diagnosis Date     Back pains      Depressive disorder, not elsewhere classified 1/28/2007    declines Rx     Disc degeneration 12/2008    see MRI -throaco-lumbar mild discogenic degenerative changes     Elevated LFT's 11/09    alt and ast     Other and unspecified hyperlipidemia 1/2007     Sleep disorder 9/09    CPAP     Tobacco use disorder        PAST SURGICAL HISTORY:  Past Surgical History:   Procedure Laterality Date     COLONOSCOPY  07/07/08    adenomatous polyp repeat 5 years     COLONOSCOPY N/A 12/29/2017     Procedure: COMBINED COLONOSCOPY, SINGLE OR MULTIPLE BIOPSY/POLYPECTOMY BY BIOPSY;  Colonoscopy, Polypectomy by Biopsy;  Surgeon: Matt Reyes MD;  [facility]     ENT SURGERY      for deviated septum     EXCISE LESION AXILLA  12/7/2018    Procedure: Excision Skin Tags Right Axilla;  Surgeon: Ayaan Chaney MD;  [facility]     EXCISE LESION BACK N/A 12/7/2018    Procedure: Excision Back Skin Lesion X Two;  Surgeon: Ayaan Chaney MD;  [facility]     EXCISE MASS BACK N/A 4/15/2022    Procedure: Excisions skin lesions of back;  Surgeon: Ayaan Chaney MD;  [facility]     INJECT EPIDURAL CERVICAL N/A 5/14/2015    Procedure: INJECT EPIDURAL CERVICAL;  Surgeon: Christian Chaudhry MD;  [facility]     INJECT EPIDURAL CERVICAL N/A 12/28/2018    Procedure: INJECT EPIDURAL CERVICAL 6-7;  Surgeon: Christian Chaudhry MD;  [facility]     INJECT EPIDURAL LUMBAR N/A 12/20/2019    Procedure: Lumbar 5- Sacral 1 Epidural  Injection bilatetral;  Surgeon: Christian Chaudhry MD;  [facility]     INJECT FACET JOINT Bilateral 1/25/2019    Procedure: Cervical Facet Injections Cervical 5-6 and 6-7 Bilateral;  Surgeon: Christian Chaudhry MD;  [facility]     INJECT FACET JOINT Bilateral 11/29/2019    Procedure: Cervical 5-6 and 6-7 Bilateral facet joint injection;  Surgeon: Christian Chaudhry MD;  [facility]     INJECT FACET JOINT Bilateral 2/11/2021    Procedure: Cervical 5-6 and Cervical 6-7 Bilateral facet injections;  Surgeon: Christian Chaudhry MD;  [facility]     INJECT FACET JOINT Bilateral 10/19/2021    Procedure: Left C5-C6, Right C5-C6, Left C6-C7 and Right C6-C7 Intra-articular zygopophyseal joint injection utilizing fluoroscopic guidance with contrast dye;  Surgeon: Christian Chaudhry MD;  [facility]     INJECT STEROID (LOCATION) Left 10/19/2021    Procedure: Glenohumeral joint injection left shoulder;  Surgeon: Christian Chaudhry MD;  [facility]     ORTHOPEDIC SURGERY      Right  hand     TONSILLECTOMY       ZZC NONSPECIFIC PROCEDURE      rt hand surgery - laceration/glass/tendons     ZZHC ECHO HEART XTHORACIC, STRESS/REST  2009    neg       FAMILY HISTORY:  Family History   Problem Relation Age of Onset     Breast Cancer Mother      Arthritis Mother         joint ?     Depression Father      Cancer Maternal Grandmother      C.A.D. Maternal Grandfather      Cardiovascular Maternal Grandfather      Gynecology Paternal Grandmother          giving birth     Prostate Cancer Paternal Grandfather      Genetic Disorder Brother         joint pain?     Depression Brother      Cardiovascular Son         congenital heart defect -       Prostate Cancer Other          age 70's     Diabetes Other        SOCIAL HISTORY:  Social History     Socioeconomic History     Marital status:      Spouse name: None     Number of children: None     Years of education: None     Highest education level: None   Occupational History     Occupation:      Employer: SELF   Tobacco Use     Smoking status: Former Smoker     Packs/day: 1.50     Quit date: 2007     Years since quitting: 15.2     Smokeless tobacco: Never Used   Substance and Sexual Activity     Alcohol use: No     Comment: rare     Drug use: No     Sexual activity: Yes     Partners: Female   Other Topics Concern     Parent/sibling w/ CABG, MI or angioplasty before 65F 55M? No   Social History Narrative    Dairy/d a lot, 1 gallon of milk per day plus cheese and other servings     Caffeine 48 oz. coffee per day    Exercise none    Sunscreen used - No    Seatbelts used - Yes    Working smoke/CO detectors in the home - Yes    Guns stored in the home - No    Self Breast Exams - NA    Self Testicular Exam - No    Eye Exam up to date - No    Dental Exam up to date - Yes    Pap Smear up to date - NA    Mammogram up to date - NA    PSA up to date - No    Dexa Scan up to date - NA    Flex Sig / Colonoscopy up to date - No     Immunizations up to date - No    Abuse: Current or Past(Physical, Sexual or Emotional)- No    Do you feel safe in your environment - Yes    [place]        Lives with spouse and her parents.       Review of Systems:  Skin:        Eyes:  Positive for glasses  ENT:       Respiratory:  Positive for dyspnea on exertion  Cardiovascular:  Negative;palpitations;chest pain;edema dizziness;lightheadedness;Positive for  Gastroenterology:      Genitourinary:       Musculoskeletal:       Neurologic:  Negative numbness or tingling of hands;numbness or tingling of feet  Psychiatric:       Heme/Lymph/Imm:       Endocrine:         Physical Exam:  Vitals: /86 (BP Location: Right arm, Patient Position: Sitting, Cuff Size: Adult Large)   Pulse 84   Ht 1.829 m (6')   Wt 115.9 kg (255 lb 9.6 oz)   SpO2 96%   BMI 34.67 kg/m     Please refer to dictation for physical exam    Recent Lab Results: all reviewed today  CBC RESULTS:  Lab Results   Component Value Date    WBC 5.5 02/08/2021    RBC 5.12 02/08/2021    HGB 16.3 02/08/2021    HCT 47.2 02/08/2021    MCV 92 02/08/2021    MCH 31.8 02/08/2021    MCHC 34.5 02/08/2021    RDW 12.3 02/08/2021     02/08/2021       BMP RESULTS:  Lab Results   Component Value Date     01/18/2022     02/08/2021    POTASSIUM 3.9 01/18/2022    POTASSIUM 4.1 02/08/2021    CHLORIDE 108 01/18/2022    CHLORIDE 108 02/08/2021    CO2 25 01/18/2022    CO2 30 02/08/2021    ANIONGAP 6 01/18/2022    ANIONGAP 2 (L) 02/08/2021    GLC 96 01/18/2022     (H) 02/08/2021    BUN 15 01/18/2022    BUN 17 02/08/2021    CR 0.85 01/18/2022    CR 0.94 02/08/2021    GFRESTIMATED >90 01/18/2022    GFRESTIMATED >90 02/08/2021    GFRESTBLACK >90 02/08/2021    WILIAM 8.9 01/18/2022    WILIAM 9.6 02/08/2021        INR RESULTS:  Lab Results   Component Value Date    INR 1.0 02/17/2014    INR 1.00 06/06/2008           CC  Kiera Yan PAYawC  [id number]  [place] [id number]

## 2022-05-11 ENCOUNTER — TELEPHONE (OUTPATIENT)
Dept: CARDIOLOGY | Facility: CLINIC | Age: 57
End: 2022-05-11
Payer: COMMERCIAL

## 2022-05-11 DIAGNOSIS — E78.00 HYPERCHOLESTEROLEMIA: Primary | ICD-10-CM

## 2022-05-11 DIAGNOSIS — I25.10 CORONARY ARTERY DISEASE INVOLVING NATIVE CORONARY ARTERY OF NATIVE HEART WITHOUT ANGINA PECTORIS: ICD-10-CM

## 2022-05-11 RX ORDER — ROSUVASTATIN CALCIUM 5 MG/1
5 TABLET, COATED ORAL EVERY OTHER DAY
Qty: 30 TABLET | Refills: 4 | Status: SHIPPED | OUTPATIENT
Start: 2022-05-11 | End: 2023-02-13

## 2022-05-11 NOTE — TELEPHONE ENCOUNTER
Dr. Farmer does not recommend further w/u of liver at this time and recommends restarting crestor.  Let's have him restart crestor at 5 mg a day with plans on repeat lipid and alt about 1 month after starting.  So, he should decide when he's willing to restart based on when he thinks he'll be up for doing labs again.   Kiera Yan PA-C 5/11/2022 10:28 AM

## 2022-05-11 NOTE — TELEPHONE ENCOUNTER
----- Message from Gustavo Farmer DO sent at 5/10/2022  4:15 PM CDT -----  Regarding: RE: ongoing elev ALT    I think that I would restart the statin.  If his LFTs increase again, we will have to reconsider.  I suspect it was the COVID and not the Crestor.  Given his current cholesterol, I think the greater risk is in not treating him.    Darian          ----- Message -----  From: Farrah, Kiera Mei PA-C  Sent: 5/10/2022   3:58 PM CDT  To: Gustavo Farmer DO  Subject: ongoing elev ALT                                 Dr. Farmer,   I saw Bulmaro back today for f/u of his non occlusive CAD and dyslipidemia.  I've been holding his crestor since Jan as his ALT was elev with covid.  It still remains slightly outside normal at 86.  Cholesterol is terrible, but we can hold crestor longer.    Any w/u necessary or that you'd like to do for his alt?  Only GI sx I got was some nausea that's improved with omemprazole.  Pt drinks maybe 6 drinks with etoh in a year.      Thanks,   Kiera

## 2022-05-11 NOTE — TELEPHONE ENCOUNTER
Gave patient's spouse (C2C on file) update on dosing for crestor.       Farrah, FRANCOIS Kaminski Brenna M RN  Caller: Unspecified (Today, 10:26 AM)  Yes, ok to start with 5 mg every other day.

## 2022-05-11 NOTE — TELEPHONE ENCOUNTER
Updated patient and spouse with CHELSEA Sanders and Dr. Farmer recommendations. Patient wondering if he should go back on Crestor 5mg every other day? This is what he was previously doing before he held the med.   Will route back to CHELSEA Sanders.

## 2022-05-17 PROCEDURE — U0005 INFEC AGEN DETEC AMPLI PROBE: HCPCS

## 2022-05-17 PROCEDURE — U0003 INFECTIOUS AGENT DETECTION BY NUCLEIC ACID (DNA OR RNA); SEVERE ACUTE RESPIRATORY SYNDROME CORONAVIRUS 2 (SARS-COV-2) (CORONAVIRUS DISEASE [COVID-19]), AMPLIFIED PROBE TECHNIQUE, MAKING USE OF HIGH THROUGHPUT TECHNOLOGIES AS DESCRIBED BY CMS-2020-01-R: HCPCS

## 2022-05-18 LAB — SARS-COV-2 RNA RESP QL NAA+PROBE: NEGATIVE

## 2022-05-20 ENCOUNTER — HOSPITAL ENCOUNTER (OUTPATIENT)
Dept: GENERAL RADIOLOGY | Facility: CLINIC | Age: 57
Discharge: HOME | End: 2022-05-20
Attending: ANESTHESIOLOGY | Admitting: ANESTHESIOLOGY
Payer: COMMERCIAL

## 2022-05-20 ENCOUNTER — HOSPITAL ENCOUNTER (OUTPATIENT)
Facility: CLINIC | Age: 57
Discharge: HOME | End: 2022-05-20
Attending: ANESTHESIOLOGY | Admitting: ANESTHESIOLOGY
Payer: COMMERCIAL

## 2022-05-20 ENCOUNTER — ANESTHESIA (OUTPATIENT)
Dept: SURGERY | Facility: CLINIC | Age: 57
End: 2022-05-20
Payer: COMMERCIAL

## 2022-05-20 ENCOUNTER — APPOINTMENT (OUTPATIENT)
Dept: GENERAL RADIOLOGY | Facility: CLINIC | Age: 57
End: 2022-05-20
Attending: ANESTHESIOLOGY
Payer: COMMERCIAL

## 2022-05-20 ENCOUNTER — ANESTHESIA EVENT (OUTPATIENT)
Dept: SURGERY | Facility: CLINIC | Age: 57
End: 2022-05-20
Payer: COMMERCIAL

## 2022-05-20 VITALS
SYSTOLIC BLOOD PRESSURE: 111 MMHG | OXYGEN SATURATION: 98 % | RESPIRATION RATE: 18 BRPM | TEMPERATURE: 98.2 F | HEART RATE: 65 BPM | DIASTOLIC BLOOD PRESSURE: 84 MMHG

## 2022-05-20 DIAGNOSIS — M51.9 DISC DISORDER OF LUMBAR REGION: ICD-10-CM

## 2022-05-20 PROCEDURE — 250N000011 HC RX IP 250 OP 636: Performed by: ANESTHESIOLOGY

## 2022-05-20 PROCEDURE — 370N000017 HC ANESTHESIA TECHNICAL FEE, PER MIN: Performed by: ANESTHESIOLOGY

## 2022-05-20 PROCEDURE — 250N000009 HC RX 250: Performed by: ANESTHESIOLOGY

## 2022-05-20 PROCEDURE — 250N000011 HC RX IP 250 OP 636: Performed by: NURSE ANESTHETIST, CERTIFIED REGISTERED

## 2022-05-20 PROCEDURE — 77002 NEEDLE LOCALIZATION BY XRAY: CPT | Mod: TC

## 2022-05-20 PROCEDURE — 250N000009 HC RX 250: Performed by: NURSE ANESTHETIST, CERTIFIED REGISTERED

## 2022-05-20 PROCEDURE — 999N000179 XR SURGERY CARM FLUORO LESS THAN 5 MIN W STILLS: Mod: TC

## 2022-05-20 PROCEDURE — 64483 NJX AA&/STRD TFRM EPI L/S 1: CPT | Performed by: ANESTHESIOLOGY

## 2022-05-20 PROCEDURE — 64483 NJX AA&/STRD TFRM EPI L/S 1: CPT | Mod: 50 | Performed by: ANESTHESIOLOGY

## 2022-05-20 PROCEDURE — 20610 DRAIN/INJ JOINT/BURSA W/O US: CPT | Performed by: ANESTHESIOLOGY

## 2022-05-20 RX ORDER — TRIAMCINOLONE ACETONIDE 40 MG/ML
INJECTION, SUSPENSION INTRA-ARTICULAR; INTRAMUSCULAR PRN
Status: DISCONTINUED | OUTPATIENT
Start: 2022-05-20 | End: 2022-05-20 | Stop reason: HOSPADM

## 2022-05-20 RX ORDER — BUPIVACAINE HYDROCHLORIDE 5 MG/ML
INJECTION, SOLUTION PERINEURAL PRN
Status: DISCONTINUED | OUTPATIENT
Start: 2022-05-20 | End: 2022-05-20 | Stop reason: HOSPADM

## 2022-05-20 RX ORDER — MODAFINIL 200 MG/1
200 TABLET ORAL DAILY
COMMUNITY
Start: 2022-05-03 | End: 2022-09-06

## 2022-05-20 RX ORDER — CLINDAMYCIN HCL 300 MG
300 CAPSULE ORAL
COMMUNITY
Start: 2022-05-17 | End: 2023-01-04

## 2022-05-20 RX ORDER — IOPAMIDOL 612 MG/ML
INJECTION, SOLUTION INTRATHECAL PRN
Status: DISCONTINUED | OUTPATIENT
Start: 2022-05-20 | End: 2022-05-20 | Stop reason: HOSPADM

## 2022-05-20 RX ORDER — PROPOFOL 10 MG/ML
INJECTION, EMULSION INTRAVENOUS PRN
Status: DISCONTINUED | OUTPATIENT
Start: 2022-05-20 | End: 2022-05-20

## 2022-05-20 RX ORDER — LIDOCAINE HYDROCHLORIDE 20 MG/ML
INJECTION, SOLUTION INFILTRATION; PERINEURAL PRN
Status: DISCONTINUED | OUTPATIENT
Start: 2022-05-20 | End: 2022-05-20

## 2022-05-20 RX ADMIN — LIDOCAINE HYDROCHLORIDE 1 ML: 10 INJECTION, SOLUTION EPIDURAL; INFILTRATION; INTRACAUDAL; PERINEURAL at 14:20

## 2022-05-20 RX ADMIN — PROPOFOL 50 MG: 10 INJECTION, EMULSION INTRAVENOUS at 15:05

## 2022-05-20 RX ADMIN — PROPOFOL 50 MG: 10 INJECTION, EMULSION INTRAVENOUS at 15:07

## 2022-05-20 RX ADMIN — PROPOFOL 100 MG: 10 INJECTION, EMULSION INTRAVENOUS at 15:03

## 2022-05-20 RX ADMIN — PROPOFOL 70 MG: 10 INJECTION, EMULSION INTRAVENOUS at 15:11

## 2022-05-20 RX ADMIN — LIDOCAINE HYDROCHLORIDE 40 MG: 20 INJECTION, SOLUTION INFILTRATION; PERINEURAL at 15:03

## 2022-05-20 ASSESSMENT — LIFESTYLE VARIABLES: TOBACCO_USE: 1

## 2022-05-20 NOTE — INTERVAL H&P NOTE
I have reviewed the surgical (or preoperative) H&P that is linked to this encounter, and examined the patient. There are no significant changes    Clinical Conditions Present on Arrival:  Clinically Significant Risk Factors Present on Admission                  # Platelet Defect: home medication list includes an antiplatelet medication  # Obesity: Estimated body mass index is 34.67 kg/m  as calculated from the following:    Height as of 5/10/22: 1.829 m (6').    Weight as of 5/10/22: 115.9 kg (255 lb 9.6 oz).

## 2022-05-20 NOTE — ANESTHESIA PREPROCEDURE EVALUATION
Anesthesia Pre-Procedure Evaluation    Patient: Bulmaro Herrera   MRN: [id number] : 1965        Preoperative Diagnosis: Arthropathy of cervical facet joint [M47.812]    Procedure : Procedure(s):  Cervical 5-6 and Cervical 6-7 Bilateral facet injections  Glenohumeral joint injection left shoulder          Past Medical History:   Diagnosis Date     Back pains      Depressive disorder, not elsewhere classified 2007    declines Rx     Disc degeneration 2008    see MRI -throaco-lumbar mild discogenic degenerative changes     Elevated LFT's     alt and ast     Other and unspecified hyperlipidemia 2007     Sleep disorder     CPAP     Tobacco use disorder       Past Surgical History:   Procedure Laterality Date     COLONOSCOPY  08    adenomatous polyp repeat 5 years     COLONOSCOPY N/A 2017    Procedure: COMBINED COLONOSCOPY, SINGLE OR MULTIPLE BIOPSY/POLYPECTOMY BY BIOPSY;  Colonoscopy, Polypectomy by Biopsy;  Surgeon: Matt Reyes MD;  [facility]     ENT SURGERY      for deviated septum     EXCISE LESION AXILLA  2018    Procedure: Excision Skin Tags Right Axilla;  Surgeon: Ayaan Chaney MD;  [facility]     EXCISE LESION BACK N/A 2018    Procedure: Excision Back Skin Lesion X Two;  Surgeon: Ayaan Chaney MD;  [facility]     EXCISE MASS BACK N/A 4/15/2022    Procedure: Excisions skin lesions of back;  Surgeon: Ayaan Chaney MD;  [facility]     INJECT EPIDURAL CERVICAL N/A 2015    Procedure: INJECT EPIDURAL CERVICAL;  Surgeon: Christian Chaudhry MD;  [facility]     INJECT EPIDURAL CERVICAL N/A 2018    Procedure: INJECT EPIDURAL CERVICAL 6-7;  Surgeon: Christian Chaudhry MD;  [facility]     INJECT EPIDURAL LUMBAR N/A 2019    Procedure: Lumbar 5- Sacral 1 Epidural  Injection bilatetral;  Surgeon: Christian Chaudhry MD;  [facility]     INJECT FACET JOINT Bilateral 2019    Procedure: Cervical Facet Injections Cervical  5-6 and 6-7 Bilateral;  Surgeon: Christian Chaudhry MD;  [facility]     INJECT FACET JOINT Bilateral 11/29/2019    Procedure: Cervical 5-6 and 6-7 Bilateral facet joint injection;  Surgeon: Christian Chaudhry MD;  [facility]     INJECT FACET JOINT Bilateral 2/11/2021    Procedure: Cervical 5-6 and Cervical 6-7 Bilateral facet injections;  Surgeon: Christian Chaudhry MD;  [facility]     INJECT FACET JOINT Bilateral 10/19/2021    Procedure: Left C5-C6, Right C5-C6, Left C6-C7 and Right C6-C7 Intra-articular zygopophyseal joint injection utilizing fluoroscopic guidance with contrast dye;  Surgeon: Christian Chaudhry MD;  [facility]     INJECT STEROID (LOCATION) Left 10/19/2021    Procedure: Glenohumeral joint injection left shoulder;  Surgeon: Christian Chaudhry MD;  [facility]     ORTHOPEDIC SURGERY      Right hand     TONSILLECTOMY       ZZC NONSPECIFIC PROCEDURE      rt hand surgery - laceration/glass/tendons     ZZHC ECHO HEART XTHORACIC, STRESS/REST  6/2009    neg      Allergies   Allergen Reactions     Percocet [Oxycodone-Acetaminophen] Itching     Wasps [Hornets] Swelling      Social History     Tobacco Use     Smoking status: Former Smoker     Packs/day: 1.50     Quit date: 2/14/2007     Years since quitting: 15.2     Smokeless tobacco: Never Used   Substance Use Topics     Alcohol use: No     Comment: rare      Wt Readings from Last 1 Encounters:   05/10/22 115.9 kg (255 lb 9.6 oz)        Anesthesia Evaluation   Pt has had prior anesthetic. Type: MAC.    No history of anesthetic complications       ROS/MED HX  ENT/Pulmonary:     (+) sleep apnea, tobacco use, Past use,     Neurologic:  - neg neurologic ROS     Cardiovascular:  - neg cardiovascular ROS     METS/Exercise Tolerance:     Hematologic:  - neg hematologic  ROS     Musculoskeletal:       GI/Hepatic:     (+) GERD, Asymptomatic on medication,     Renal/Genitourinary:       Endo:     (+) Obesity,     Psychiatric/Substance Use:  - neg psychiatric  ROS     Infectious Disease:       Malignancy:       Other:      (+) , H/O Chronic Pain,        Physical Exam    Airway  airway exam normal      Mallampati: II   TM distance: > 3 FB   Neck ROM: full   Mouth opening: > 3 cm    Respiratory Devices and Support         Dental  no notable dental history         Cardiovascular   cardiovascular exam normal          Pulmonary   pulmonary exam normal                OUTSIDE LABS:  CBC:   Lab Results   Component Value Date    WBC 5.5 02/08/2021    WBC 5.7 11/15/2019    HGB 16.3 02/08/2021    HGB 16.0 11/15/2019    HCT 47.2 02/08/2021    HCT 47.6 11/15/2019     02/08/2021     11/15/2019     BMP:   Lab Results   Component Value Date     01/18/2022     02/08/2021    POTASSIUM 3.9 01/18/2022    POTASSIUM 4.1 02/08/2021    CHLORIDE 108 01/18/2022    CHLORIDE 108 02/08/2021    CO2 25 01/18/2022    CO2 30 02/08/2021    BUN 15 01/18/2022    BUN 17 02/08/2021    CR 0.85 01/18/2022    CR 0.94 02/08/2021    GLC 96 01/18/2022     (H) 02/08/2021     COAGS:   Lab Results   Component Value Date    PTT 31 06/06/2008    INR 1.0 02/17/2014     POC: No results found for: BGM, HCG, HCGS  HEPATIC:   Lab Results   Component Value Date    ALBUMIN 3.9 01/18/2022    PROTTOTAL 7.5 01/18/2022    ALT 84 (H) 05/08/2022    AST 63 (H) 01/18/2022    ALKPHOS 97 01/18/2022    BILITOTAL 1.1 01/18/2022     OTHER:   Lab Results   Component Value Date    PH 8.0 (H) 05/07/2002    A1C 5.8 (H) 05/08/2022    WILIAM 8.9 01/18/2022    PHOS 3.5 02/08/2021    MAG 2.2 02/08/2021    LIPASE 95 09/10/2018    AMYLASE 76 03/08/2014    TSH 3.27 02/08/2021    T4 0.94 11/15/2019    CRP <2.9 03/25/2015    SED 7 09/10/2012       Anesthesia Plan    ASA Status:  2   NPO Status:  NPO Appropriate    Anesthesia Type: MAC.     - Reason for MAC: straight local not clinically adequate   Induction: Propofol, Intravenous.   Maintenance: TIVA.        Consents    Anesthesia Plan(s) and associated risks, benefits,  and realistic alternatives discussed. Questions answered and patient/representative(s) expressed understanding.     - Discussed: Risks, Benefits and Alternatives for BOTH SEDATION and the PROCEDURE were discussed     - Discussed with:  Patient      - Extended Intubation/Ventilatory Support Discussed: No.      - Patient is DNR/DNI Status: No    Use of blood products discussed: No .     Postoperative Care            Comments:    Other Comments: The risks and benefits of anesthesia, and the alternatives where applicable, have been discussed with the patient, and they wish to proceed.                   Jimmie Posadas, APRN CRNA

## 2022-05-20 NOTE — DISCHARGE INSTRUCTIONS

## 2022-05-20 NOTE — ANESTHESIA CARE TRANSFER NOTE
Patient: Bulmaro Herrera    Procedure: Procedure(s):  Bilateral Lumbar 5- Sacral 1 transforaminal Epidural Steroid Injection  Left shoulder glenohumeral steroid inj with local       Diagnosis: Disc disorder of lumbar region [M51.9]  Diagnosis Additional Information: No value filed.    Anesthesia Type:   MAC     Note:    Oropharynx: oropharynx clear of all foreign objects and spontaneously breathing  Level of Consciousness: drowsy  Oxygen Supplementation: face mask    Independent Airway: airway patency satisfactory and stable  Dentition: dentition unchanged  Vital Signs Stable: post-procedure vital signs reviewed and stable  Report to RN Given: handoff report given  Patient transferred to: Phase II    Handoff Report: Identifed the Patient, Identified the Reponsible Provider, Reviewed the pertinent medical history, Discussed the surgical course, Reviewed Intra-OP anesthesia mangement and issues during anesthesia, Set expectations for post-procedure period and Allowed opportunity for questions and acknowledgement of understanding      Vitals:  Vitals Value Taken Time   BP     Temp     Pulse     Resp     SpO2         Electronically Signed By: SHIV Alex CRNA  May 20, 2022  3:17 PM

## 2022-05-20 NOTE — OP NOTE
CHIEF COMPLAINT:   1. neck pain secondary to cervical spondylosis.  2.  Left shoulder pain secondary to glenohumeral joint degeneration and a left shoulder labral tear  3.  Low back pain with severe disc degeneration at L5-S1 causing back pain and leg pains  PROCEDURE number #1:    1.  Bilateral L5-S1 transforaminal epidural injections utilizing fluoroscopic guidance with contrast dye.      PROCEDURE DETAILS: After written informed consent was obtained from the patient, the patient was escorted to the procedure room.  The patient was placed in the sitting position.  A  time out  was conducted to verify the patient identity, procedure to be performed, side, site, allergies and any special requirements.  The skin over the neck was prepped and draped in normal sterile fashion. Fluoroscopy was used to identify the zygopophyseal joint with a lateral view.   The skin was anesthetized with 0.5 mL of 1% lidocaine with bicarbonate buffer.  2 separate 22-gauge Quincke needles were advanced into the foraminal openings from the oblique views and walked into the posterior aspect of the foramen at L5-S1 in the lateral fluoroscopic image.  In the AP image Omnipaque contrast was injected which showed proper spread in the epidural space no evidence of any intravascular, intrathecal, intraneural injection.  I then injected 20 mg of Depo-Medrol with 3 cc of preservative-free normal saline into each foraminal opening with good dispersion into his epidural space.     PROCEDURE number #2:     Procedure details: After his neck was treated we moved over to his left shoulder.  Left shoulder posterior approach was prepped with ChloraPrep.  1% lidocaine was used for skin.  I then advanced a 22-gauge Quincke needle into his left glenohumeral joint.  Initially contrast was injected which did not show proper spread throughout the joint and after several needle tip manipulations and contrast injection, I achieved a proper arthrogram of the left  glenohumeral joint.  I then injected 8 cc of 0.25% bupivacaine with 4 mg/cc of Depo-Medrol.  The needle was then removed and a sterile bandage placed over the needle insertion site.     Blood loss: Less than 2 cc     DIAGNOSIS:  1.  Cervical spondylosis secondary to a symptomatic facet syndrome causing axial neck pain with a history of long-term pain relief from previous facet injections  2.  Severe needle phobia  3.  Labral tear left shoulder inferior  4.  Severe disc degeneration at L5-S1.     PLAN:  1. Performed bilateral L5-S1 epidural injections.  He really does not want to have low back surgery however he has severe degeneration at L5-S1 causing lumbar radiculopathy as well as axial back pain.  2.          I completed a left shoulder injection as well.  He does have a few other tears of his rotator cuff but the goal was to treat his glenohumeral joint.  Last time we perform this shoulder injection he actually got very high-grade pain relief which allowed him to sleep and perform his job as a .   3.  Due to the severe needle phobia it may be almost impossible to do medial branch blocks on him except for the uppermost segments of the cervical spine given that he will have to lie in the prone position if he receives monitored anesthetic care.  Nonetheless he is set up to have the medial branch blocks and I will do my best in order to get the lowest levels possible which corresponds to his previous successful intra-articular facet injections.  Christian Chaudhry MD  Diplomate of the American Board of Anesthesiology, Pain Medicine

## 2022-05-20 NOTE — ANESTHESIA POSTPROCEDURE EVALUATION
Patient: Bulmaro Herrera    Procedure: Procedure(s):  Bilateral Lumbar 5- Sacral 1 transforaminal Epidural Steroid Injection  Left shoulder glenohumeral steroid inj with local       Anesthesia Type:  MAC    Note:  Disposition: Outpatient   Postop Pain Control: Uneventful            Sign Out: Well controlled pain   PONV: No   Neuro/Psych: Uneventful            Sign Out: Acceptable/Baseline neuro status   Airway/Respiratory: Uneventful            Sign Out: Acceptable/Baseline resp. status   CV/Hemodynamics: Uneventful            Sign Out: Acceptable CV status   Other NRE: NONE   DID A NON-ROUTINE EVENT OCCUR? No    Event details/Postop Comments:  Pt was happy with anesthesia care.  No complications.  I will follow up with the pt if needed.           Last vitals:  Vitals Value Taken Time   /84 05/20/22 1540   Temp     Pulse 65 05/20/22 1540   Resp     SpO2 99 % 05/20/22 1541   Vitals shown include unvalidated device data.    Electronically Signed By: SHIV Alex CRNA  May 20, 2022  4:31 PM

## 2022-05-23 ENCOUNTER — MYC MEDICAL ADVICE (OUTPATIENT)
Dept: SURGERY | Facility: CLINIC | Age: 57
End: 2022-05-23
Payer: COMMERCIAL

## 2022-05-23 ENCOUNTER — LAB (OUTPATIENT)
Dept: LAB | Facility: CLINIC | Age: 57
End: 2022-05-23
Payer: COMMERCIAL

## 2022-05-23 ENCOUNTER — ANESTHESIA EVENT (OUTPATIENT)
Dept: SURGERY | Facility: CLINIC | Age: 57
End: 2022-05-23
Payer: COMMERCIAL

## 2022-05-23 DIAGNOSIS — Z11.59 ENCOUNTER FOR SCREENING FOR OTHER VIRAL DISEASES: ICD-10-CM

## 2022-05-23 PROCEDURE — U0003 INFECTIOUS AGENT DETECTION BY NUCLEIC ACID (DNA OR RNA); SEVERE ACUTE RESPIRATORY SYNDROME CORONAVIRUS 2 (SARS-COV-2) (CORONAVIRUS DISEASE [COVID-19]), AMPLIFIED PROBE TECHNIQUE, MAKING USE OF HIGH THROUGHPUT TECHNOLOGIES AS DESCRIBED BY CMS-2020-01-R: HCPCS

## 2022-05-23 PROCEDURE — U0005 INFEC AGEN DETEC AMPLI PROBE: HCPCS

## 2022-05-24 LAB — SARS-COV-2 RNA RESP QL NAA+PROBE: NEGATIVE

## 2022-05-25 ASSESSMENT — LIFESTYLE VARIABLES: TOBACCO_USE: 1

## 2022-05-26 ENCOUNTER — HOSPITAL ENCOUNTER (OUTPATIENT)
Dept: GENERAL RADIOLOGY | Facility: CLINIC | Age: 57
Discharge: HOME | End: 2022-05-26
Attending: ANESTHESIOLOGY | Admitting: ANESTHESIOLOGY
Payer: COMMERCIAL

## 2022-05-26 ENCOUNTER — HOSPITAL ENCOUNTER (OUTPATIENT)
Facility: CLINIC | Age: 57
Discharge: HOME | End: 2022-05-26
Attending: ANESTHESIOLOGY | Admitting: ANESTHESIOLOGY
Payer: COMMERCIAL

## 2022-05-26 ENCOUNTER — ANESTHESIA (OUTPATIENT)
Dept: SURGERY | Facility: CLINIC | Age: 57
End: 2022-05-26
Payer: COMMERCIAL

## 2022-05-26 VITALS
OXYGEN SATURATION: 96 % | DIASTOLIC BLOOD PRESSURE: 88 MMHG | WEIGHT: 255.6 LBS | TEMPERATURE: 98.2 F | HEART RATE: 67 BPM | BODY MASS INDEX: 34.67 KG/M2 | SYSTOLIC BLOOD PRESSURE: 120 MMHG | RESPIRATION RATE: 18 BRPM

## 2022-05-26 DIAGNOSIS — M47.812 ARTHROPATHY OF CERVICAL FACET JOINT: ICD-10-CM

## 2022-05-26 PROCEDURE — 64491 INJ PARAVERT F JNT C/T 2 LEV: CPT | Mod: 50 | Performed by: ANESTHESIOLOGY

## 2022-05-26 PROCEDURE — 64491 INJ PARAVERT F JNT C/T 2 LEV: CPT | Performed by: ANESTHESIOLOGY

## 2022-05-26 PROCEDURE — 999N000179 XR FLUORO NEEDLE PLACEMENT SPINE (WITH PROCEDURE)

## 2022-05-26 PROCEDURE — 250N000009 HC RX 250: Performed by: NURSE ANESTHETIST, CERTIFIED REGISTERED

## 2022-05-26 PROCEDURE — 64490 INJ PARAVERT F JNT C/T 1 LEV: CPT | Mod: 50 | Performed by: ANESTHESIOLOGY

## 2022-05-26 PROCEDURE — 64490 INJ PARAVERT F JNT C/T 1 LEV: CPT | Performed by: ANESTHESIOLOGY

## 2022-05-26 PROCEDURE — 250N000011 HC RX IP 250 OP 636: Performed by: NURSE ANESTHETIST, CERTIFIED REGISTERED

## 2022-05-26 PROCEDURE — 64492 INJ PARAVERT F JNT C/T 3 LEV: CPT | Performed by: ANESTHESIOLOGY

## 2022-05-26 PROCEDURE — 250N000011 HC RX IP 250 OP 636: Performed by: ANESTHESIOLOGY

## 2022-05-26 PROCEDURE — 370N000017 HC ANESTHESIA TECHNICAL FEE, PER MIN: Performed by: ANESTHESIOLOGY

## 2022-05-26 RX ORDER — LIDOCAINE HYDROCHLORIDE 20 MG/ML
INJECTION, SOLUTION INFILTRATION; PERINEURAL PRN
Status: DISCONTINUED | OUTPATIENT
Start: 2022-05-26 | End: 2022-05-26

## 2022-05-26 RX ORDER — IOPAMIDOL 612 MG/ML
INJECTION, SOLUTION INTRATHECAL PRN
Status: DISCONTINUED | OUTPATIENT
Start: 2022-05-26 | End: 2022-05-26 | Stop reason: HOSPADM

## 2022-05-26 RX ORDER — LIDOCAINE 40 MG/G
CREAM TOPICAL
Status: DISCONTINUED | OUTPATIENT
Start: 2022-05-26 | End: 2022-05-26 | Stop reason: HOSPADM

## 2022-05-26 RX ORDER — BUPIVACAINE HYDROCHLORIDE 7.5 MG/ML
INJECTION, SOLUTION EPIDURAL; RETROBULBAR PRN
Status: DISCONTINUED | OUTPATIENT
Start: 2022-05-26 | End: 2022-05-26 | Stop reason: HOSPADM

## 2022-05-26 RX ORDER — PROPOFOL 10 MG/ML
INJECTION, EMULSION INTRAVENOUS PRN
Status: DISCONTINUED | OUTPATIENT
Start: 2022-05-26 | End: 2022-05-26

## 2022-05-26 RX ADMIN — LIDOCAINE HYDROCHLORIDE 40 MG: 20 INJECTION, SOLUTION INFILTRATION; PERINEURAL at 14:14

## 2022-05-26 RX ADMIN — PROPOFOL 50 MG: 10 INJECTION, EMULSION INTRAVENOUS at 14:07

## 2022-05-26 RX ADMIN — PROPOFOL 50 MG: 10 INJECTION, EMULSION INTRAVENOUS at 14:10

## 2022-05-26 RX ADMIN — PROPOFOL 50 MG: 10 INJECTION, EMULSION INTRAVENOUS at 14:17

## 2022-05-26 RX ADMIN — PROPOFOL 30 MG: 10 INJECTION, EMULSION INTRAVENOUS at 14:25

## 2022-05-26 RX ADMIN — PROPOFOL 50 MG: 10 INJECTION, EMULSION INTRAVENOUS at 14:13

## 2022-05-26 RX ADMIN — PROPOFOL 50 MG: 10 INJECTION, EMULSION INTRAVENOUS at 14:23

## 2022-05-26 RX ADMIN — LIDOCAINE HYDROCHLORIDE 60 MG: 20 INJECTION, SOLUTION INFILTRATION; PERINEURAL at 14:07

## 2022-05-26 RX ADMIN — PROPOFOL 50 MG: 10 INJECTION, EMULSION INTRAVENOUS at 14:21

## 2022-05-26 NOTE — ANESTHESIA CARE TRANSFER NOTE
Patient: Bulmaro Herrera    Procedure: Procedure(s):  Cervical 4, 5, 6 Medial Branch Block       Diagnosis: Arthropathy of cervical facet joint [M47.812]  Diagnosis Additional Information: No value filed.    Anesthesia Type:   MAC     Note:    Oropharynx: oropharynx clear of all foreign objects and spontaneously breathing  Level of Consciousness: drowsy  Oxygen Supplementation: room air    Independent Airway: airway patency satisfactory and stable  Dentition: dentition unchanged  Vital Signs Stable: post-procedure vital signs reviewed and stable  Report to RN Given: handoff report given  Patient transferred to: Phase II    Handoff Report: Identifed the Patient, Identified the Reponsible Provider, Reviewed the pertinent medical history, Discussed the surgical course, Reviewed Intra-OP anesthesia mangement and issues during anesthesia, Set expectations for post-procedure period and Allowed opportunity for questions and acknowledgement of understanding      Vitals:  Vitals Value Taken Time   BP     Temp     Pulse     Resp     SpO2         Electronically Signed By: SHIV Hoff CRNA  May 26, 2022  2:32 PM

## 2022-05-26 NOTE — INTERVAL H&P NOTE
I have reviewed the surgical (or preoperative) H&P that is linked to this encounter, and examined the patient. There are no significant changes    Clinical Conditions Present on Arrival:  Clinically Significant Risk Factors Present on Admission                  # Platelet Defect: home medication list includes an antiplatelet medication  # Obesity: Estimated body mass index is 34.67 kg/m  as calculated from the following:    Height as of 5/10/22: 1.829 m (6').    Weight as of this encounter: 115.9 kg (255 lb 9.6 oz).

## 2022-05-26 NOTE — DISCHARGE INSTRUCTIONS

## 2022-05-26 NOTE — OP NOTE
CHIEF COMPLAINT:  Neck pain secondary to cervical spondylosis  INTERVAL HISTORY:     The history was discussed with the patient. I agree with above. Risks were discussed including risks of infection, bleeding, nerve injury, no help , or worse pain and they were willing to take these risks and proceed. They also understand this is a strictly diagnostic block.    PROCEDURE:  Cervical Medial branch blocks of the bilateral  C4, C5, C6 levels utilizing fluoroscopic guidance with contrast dye.   PROCEDURE DETAILS: After written informed consent was obtained from the patient, the patient was escorted to the procedure room.  The patient was placed in the sitting position. A time out was conducted to verify the patient identity, procedure to be performed, side, site, allergies and any special requirements.  The skin over the neck was prepped and draped in normal sterile fashion. Fluoroscopy was used to identify the mid point of the articular pillar with a lateral view.   The skin was anesthetized with 0.5 mL of 1% lidocaine with bicarbonate buffer.  A 25-gauge 3.5 inch Quincke needle was advanced under fluoroscopic guidance.  <0.3 cc of Omnipaque contrast dye was injected without evidence of intrathecal or intravascular spread.  A- 0.5 mL solution of 0.75% bupivacaine was injected at each articular pillar.  Then, the needle was removed.   Anesthesia: Monitored anesthetic care.  Patient has failed previous injections due to severe needle phobia.  He required a very deep monitored anesthetic care for the procedure otherwise he would not have been able to complete this procedure.    BLOOD LOSS: less than 5 cc    DIAGNOSIS:  1. Neck pain secondary to cervical spondylosis  2.  Severe needle phobia  PLAN:  1. Performed bilateral C4, C5, C6  medial branch blocks to treat the   facet joints with 0.75% bupivacaine.  We will have the patient fill out an injection report. If there is benefit with medial branch block with 0.75%  bupivacaine, will use 4% lidocaine for the 2nd diagnostic medial branch block.  If both blocks are diagnostically positive, I will proceed with radiofrequency neurolysis.  For the screening test with bupivacaine we are looking for % pain relief for at least 2 hours to be considered diagnostically positive.  For a lidocaine challenge confirmatory block we are looking for 1 hour in the % range.  2. The patient will fax or mail in their pain relief form to     Christian Chaudhry MD  Diplomate of the American Board of Anesthesiology, Pain Medicine

## 2022-05-26 NOTE — ANESTHESIA POSTPROCEDURE EVALUATION
Patient: Bulmaro Herrera    Procedure: Procedure(s):  Cervical 4, 5, 6 Medial Branch Block       Anesthesia Type:  MAC    Note:  Disposition: Outpatient   Postop Pain Control: Uneventful            Sign Out: Well controlled pain   PONV: No   Neuro/Psych: Uneventful            Sign Out: Acceptable/Baseline neuro status   Airway/Respiratory: Uneventful            Sign Out: Acceptable/Baseline resp. status   CV/Hemodynamics: Uneventful            Sign Out: Acceptable CV status   Other NRE: NONE   DID A NON-ROUTINE EVENT OCCUR? No    Event details/Postop Comments:  Pt was happy with anesthesia care.  No complications.  I will follow up with the pt if needed.           Last vitals:  Vitals Value Taken Time   /88 05/26/22 1500   Temp     Pulse 67 05/26/22 1500   Resp     SpO2 96 % 05/26/22 1501   Vitals shown include unvalidated device data.    Electronically Signed By: SHIV Hoff CRNA  May 26, 2022  3:02 PM

## 2022-06-02 ENCOUNTER — MYC MEDICAL ADVICE (OUTPATIENT)
Dept: INTERNAL MEDICINE | Facility: CLINIC | Age: 57
End: 2022-06-02
Payer: COMMERCIAL

## 2022-06-02 DIAGNOSIS — F41.9 ANXIETY: Primary | ICD-10-CM

## 2022-06-02 RX ORDER — DIAZEPAM 5 MG
TABLET ORAL
Qty: 2 TABLET | Refills: 0 | Status: SHIPPED | OUTPATIENT
Start: 2022-06-02 | End: 2023-01-04

## 2022-06-03 ENCOUNTER — TELEPHONE (OUTPATIENT)
Dept: PALLIATIVE MEDICINE | Facility: CLINIC | Age: 57
End: 2022-06-03
Payer: COMMERCIAL

## 2022-06-03 DIAGNOSIS — M47.812 CERVICAL SPONDYLOSIS WITHOUT MYELOPATHY: Primary | ICD-10-CM

## 2022-06-03 NOTE — TELEPHONE ENCOUNTER
Pt scheduled for MBB   Date:7/14/22  Time:330pm  Dr. Chaudhry    Instructed pt to have H&P and  for procedure.  Patient informed of COVID testing process.

## 2022-06-22 DIAGNOSIS — K21.9 GASTROESOPHAGEAL REFLUX DISEASE WITHOUT ESOPHAGITIS: ICD-10-CM

## 2022-06-24 RX ORDER — OMEPRAZOLE 40 MG/1
CAPSULE, DELAYED RELEASE ORAL
Qty: 90 CAPSULE | Refills: 0 | Status: SHIPPED | OUTPATIENT
Start: 2022-06-24 | End: 2022-09-12

## 2022-06-24 NOTE — TELEPHONE ENCOUNTER
prilosec  Prescription approved per [facility] Refill Protocol.    Amanda Shaw RN

## 2022-07-07 ENCOUNTER — OFFICE VISIT (OUTPATIENT)
Dept: INTERNAL MEDICINE | Facility: CLINIC | Age: 57
End: 2022-07-07
Payer: COMMERCIAL

## 2022-07-07 VITALS
HEART RATE: 111 BPM | SYSTOLIC BLOOD PRESSURE: 120 MMHG | DIASTOLIC BLOOD PRESSURE: 82 MMHG | TEMPERATURE: 97.4 F | BODY MASS INDEX: 34 KG/M2 | WEIGHT: 251 LBS | OXYGEN SATURATION: 97 % | HEIGHT: 72 IN | RESPIRATION RATE: 16 BRPM

## 2022-07-07 DIAGNOSIS — M54.12 CERVICAL RADICULOPATHY: ICD-10-CM

## 2022-07-07 DIAGNOSIS — Z01.818 PREOP GENERAL PHYSICAL EXAM: Primary | ICD-10-CM

## 2022-07-07 DIAGNOSIS — M51.369 DDD (DEGENERATIVE DISC DISEASE), LUMBAR: ICD-10-CM

## 2022-07-07 DIAGNOSIS — Z79.899 ENCOUNTER FOR LONG-TERM (CURRENT) USE OF MEDICATIONS: ICD-10-CM

## 2022-07-07 PROCEDURE — 99214 OFFICE O/P EST MOD 30 MIN: CPT | Performed by: INTERNAL MEDICINE

## 2022-07-07 RX ORDER — MECLIZINE HYDROCHLORIDE 25 MG/1
25 TABLET ORAL 3 TIMES DAILY PRN
Qty: 30 TABLET | Refills: 0 | Status: SHIPPED | OUTPATIENT
Start: 2022-07-07 | End: 2023-03-02

## 2022-07-07 ASSESSMENT — PAIN SCALES - GENERAL: PAINLEVEL: MODERATE PAIN (4)

## 2022-07-07 NOTE — PATIENT INSTRUCTIONS
Preparing for Your Surgery  Getting started  A nurse will call you to review your health history and instructions. They will give you an arrival time based on your scheduled surgery time. Please be ready to share:    Your doctor's clinic name and phone number    Your medical, surgical and anesthesia history    A list of allergies and sensitivities    A list of medicines, including herbal treatments and over-the-counter drugs    Whether the patient has a legal guardian (ask how to send us the papers in advance)  Please tell us if you're pregnant--or if there's any chance you might be pregnant. Some surgeries may injure a fetus (unborn baby), so they require a pregnancy test. Surgeries that are safe for a fetus don't always need a test, and you can choose whether to have one.   If you have a child who's having surgery, please ask for a copy of Preparing for Your Child's Surgery.    Preparing for surgery    Within 30 days of surgery: Have a pre-op exam (sometimes called an H&P, or History and Physical). This can be done at a clinic or pre-operative center.  ? If you're having a , you may not need this exam. Talk to your care team.    At your pre-op exam, talk to your care team about all medicines you take. If you need to stop any medicines before surgery, ask when to start taking them again.  ? We do this for your safety. Many medicines can make you bleed too much during surgery. Some change how well surgery (anesthesia) drugs work.    Call your insurance company to let them know you're having surgery. (If you don't have insurance, call [phone].)    Call your clinic if there's any change in your health. This includes signs of a cold or flu (sore throat, runny nose, cough, rash, fever). It also includes a scrape or scratch near the surgery site.    If you have questions on the day of surgery, call your hospital or surgery center.  COVID testing  You may need to be tested for COVID-19 before having  surgery. If so, we will give you instructions.  Eating and drinking guidelines  For your safety: Unless your surgeon tells you otherwise, follow the guidelines below.    Eat and drink as usual until 8 hours before surgery. After that, no food or milk.    Drink clear liquids until 2 hours before surgery. These are liquids you can see through, like water, Gatorade and Propel Water. You may also have black coffee and tea (no cream or milk).    Nothing by mouth within 2 hours of surgery. This includes gum, candy and breath mints.    If you drink alcohol: Stop drinking it the night before surgery.    If your care team tells you to take medicine on the morning of surgery, it's okay to take it with a sip of water.  Preventing infection    Shower or bathe the night before and morning of your surgery. Follow the instructions your clinic gave you. (If no instructions, use regular soap.)    Don't shave or clip hair near your surgery site. We'll remove the hair if needed.    Don't smoke or vape the morning of surgery. You may chew nicotine gum up to 2 hours before surgery. A nicotine patch is okay.  ? Note: Some surgeries require you to completely quit smoking and nicotine. Check with your surgeon.    Your care team will make every effort to keep you safe from infection. We will:  ? Clean our hands often with soap and water (or an alcohol-based hand rub).  ? Clean the skin at your surgery site with a special soap that kills germs.  ? Give you a special gown to keep you warm. (Cold raises the risk of infection.)  ? Wear special hair covers, masks, gowns and gloves during surgery.  ? Give antibiotic medicine, if prescribed. Not all surgeries need antibiotics.  What to bring on the day of surgery    Photo ID and insurance card    Copy of your health care directive, if you have one    Glasses and hearing aides (bring cases)  ? You can't wear contacts during surgery    Inhaler and eye drops, if you use them (tell us about these when  you arrive)    CPAP machine or breathing device, if you use them    A few personal items, if spending the night    If you have . . .  ? A pacemaker, ICD (cardiac defibrillator) or other implant: Bring the ID card.  ? An implanted stimulator: Bring the remote control.  ? A legal guardian: Bring a copy of the certified (court-stamped) guardianship papers.  Please remove any jewelry, including body piercings. Leave jewelry and other valuables at home.  If you're going home the day of surgery    You must have a responsible adult drive you home. They should stay with you overnight as well.    If you don't have someone to stay with you, and you aren't safe to go home alone, we may keep you overnight. Insurance often won't pay for this.  After surgery  If it's hard to control your pain or you need more pain medicine, please call your surgeon's office.  Questions?   If you have any questions for your care team, list them here: _________________________________________________________________________________________________________________________________________________________________________ ____________________________________ ____________________________________ ____________________________________  For informational purposes only. Not to replace the advice of your health care provider. Copyright   2003, 2019 [facility]. All rights reserved. Clinically reviewed by Paulina Ivan MD. [employer] [id number] - REV 07/21.

## 2022-07-07 NOTE — PROGRESS NOTES
[address] [address]  Phone: [phone]  Primary Provider: Gustavo Farmer  Pre-op Performing Provider: GUSTAVO FARMER      PREOPERATIVE EVALUATION:  Today's date: 7/7/2022    Bulmaro Herrera is a 57 year old male who presents for a preoperative evaluation.    Surgical Information:  Surgery/Procedure:   Cervical 4,5,6 bialteral medial branch block Bilateral     Surgery Location: [facility]  Surgeon: Christian Chaudhry MD  Surgery Date: 07/14/2022  Time of Surgery: 03:30 pm  Where patient plans to recover: At home with family  Fax number for surgical facility: Note does not need to be faxed, will be available electronically in Epic.    Type of Anesthesia Anticipated: Monitor Anesthesia Care    Assessment & Plan     The proposed surgical procedure is considered LOW risk.    Preop general physical exam    Cervical radiculopathy    Encounter for long-term (current) use of medications    DDD (degenerative disc disease), lumbar           Risks and Recommendations:  The patient has the following additional risks and recommendations for perioperative complications:   - No identified additional risk factors other than previously addressed    Medication Instructions:  Patient to hold aspirin 5 days prior to procedure.Patient to take all medications as usual the morning of the procedure    RECOMMENDATION:  APPROVAL GIVEN to proceed with proposed procedure, without further diagnostic evaluation.    Review of external notes as documented above                   Subjective     HPI related to upcoming procedure: Persistent cervical pain with radiation into the shoulders.  Patient will be undergoing injection procedures.    Preop Questions 7/7/2022   1. Have you ever had a heart attack or stroke? No   2. Have you ever had surgery on your heart or blood vessels, such as a stent placement, a coronary artery bypass, or surgery on  an artery in your head, neck, heart, or legs? No   3. Do you have chest pain with activity? No   4. Do you have a history of  heart failure? No   5. Do you currently have a cold, bronchitis or symptoms of other infection? No   6. Do you have a cough, shortness of breath, or wheezing? No   7. Do you or anyone in your family have previous history of blood clots? No   8. Do you or does anyone in your family have a serious bleeding problem such as prolonged bleeding following surgeries or cuts? No   9. Have you ever had problems with anemia or been told to take iron pills? No   10. Have you had any abnormal blood loss such as black, tarry or bloody stools? No   11. Have you ever had a blood transfusion? No   12. Are you willing to have a blood transfusion if it is medically needed before, during, or after your surgery? NO -    13. Have you or any of your relatives ever had problems with anesthesia? No   14. Do you have sleep apnea, excessive snoring or daytime drowsiness? No   15. Do you have any artifical heart valves or other implanted medical devices like a pacemaker, defibrillator, or continuous glucose monitor? No   16. Do you have artificial joints? No   17. Are you allergic to latex? No       Health Care Directive:  Patient does not have a Health Care Directive or Living Will: Discussed advance care planning with patient; however, patient declined at this time.    Preoperative Review of :   reviewed - controlled substances reflected in medication list.      Status of Chronic Conditions:  See problem list for active medical problems.  Problems all longstanding and stable, except as noted/documented.  See ROS for pertinent symptoms related to these conditions.      Review of Systems  CONSTITUTIONAL: NEGATIVE for fever, chills, change in weight  INTEGUMENTARY/SKIN: NEGATIVE for worrisome rashes, moles or lesions  EYES: NEGATIVE for vision changes or irritation  ENT/MOUTH: NEGATIVE for ear, mouth and throat  problems  RESP: NEGATIVE for significant cough or SOB  CV: NEGATIVE for chest pain, palpitations or peripheral edema  GI: NEGATIVE for nausea, abdominal pain, heartburn, or change in bowel habits  : NEGATIVE for frequency, dysuria, or hematuria  MUSCULOSKELETAL:Persistent neck and shoulder pain secondary to cervical radiculopathy.  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     Priority: Medium     Added automatically from request for surgery [id number]       Chondromalacia of left shoulder 08/18/2021     Priority: Medium     Partial nontraumatic tear of left rotator cuff 08/18/2021     Priority: Medium     Soft tissue mass 08/05/2021     Priority: Medium     Rotator cuff syndrome of left shoulder 08/05/2021     Priority: Medium     Chronic left shoulder pain 08/05/2021     Priority: Medium     NSAID long-term use 11/18/2019     Priority: Medium     Severe needle phobia 01/21/2019     Priority: Medium     Class: Chronic     likely to preclude him from having minimally invasive interventional pain procedures with minimal sedation. Would need deeper MAC sedation       Mixed hyperlipidemia 11/13/2018     Priority: Medium     ROBBY (obstructive sleep apnea) 11/13/2018     Priority: Medium     Herniation of intervertebral disc of cervical spine without radiculopathy 03/21/2017     Priority: Medium     Cervicalgia 03/21/2017     Priority: Medium     Chronic pain syndrome 04/05/2016     Priority: Medium     DDD, cervical. T#3, #60/mo.        DDD (degenerative disc disease), lumbar 12/20/2013     Priority: Medium     DDD (degenerative disc disease), cervical 12/20/2013     Priority: Medium     Pruritus ani 01/20/2010     Priority: Medium     Back pains       Priority: Medium     Problem list name updated by automated process. Provider to review and confirm       Esophageal reflux 04/13/2005     Priority: Medium      Past Medical History:   Diagnosis Date     Back pains      Depressive disorder, not elsewhere classified 1/28/2007    declines Rx     Disc degeneration 12/2008    see MRI -throaco-lumbar mild discogenic degenerative changes     Elevated LFT's 11/09    alt and ast     Other and unspecified hyperlipidemia 1/2007     Sleep disorder 9/09    CPAP     Tobacco use disorder      Past Surgical History:   Procedure Laterality Date     COLONOSCOPY  07/07/08    adenomatous polyp repeat 5 years     COLONOSCOPY N/A 12/29/2017    Procedure: COMBINED COLONOSCOPY, SINGLE OR MULTIPLE BIOPSY/POLYPECTOMY BY BIOPSY;  Colonoscopy, Polypectomy by Biopsy;  Surgeon: Matt Reyes MD;  Location: [facility]     ENT SURGERY      for deviated septum     EXCISE LESION AXILLA  12/7/2018    Procedure: Excision Skin Tags Right Axilla;  Surgeon: Ayaan Chaney MD;  Location: [facility]     EXCISE LESION BACK N/A 12/7/2018    Procedure: Excision Back Skin Lesion X Two;  Surgeon: Ayaan Chaney MD;  Location: [facility]     EXCISE MASS BACK N/A 4/15/2022    Procedure: Excisions skin lesions of back;  Surgeon: Ayaan Chaney MD;  Location[facility]     INJECT BLOCK MEDIAL BRANCH CERVICAL/THORACIC/LUMBAR Bilateral 5/26/2022    Procedure: Cervical 4, 5, 6 Medial Branch Block;  Surgeon: Christian Chaudhry MD;  [facility]     INJECT EPIDURAL CERVICAL N/A 5/14/2015    Procedure: INJECT EPIDURAL CERVICAL;  Surgeon: Christian Chaudhry MD;  [facility]     INJECT EPIDURAL CERVICAL N/A 12/28/2018    Procedure: INJECT EPIDURAL CERVICAL 6-7;  Surgeon: Christian Chaudhry MD;  [facility]     INJECT EPIDURAL LUMBAR N/A 12/20/2019    Procedure: Lumbar 5- Sacral 1 Epidural  Injection bilatetral;  Surgeon: Christian Chaudhry MD;  [facility]     INJECT EPIDURAL TRANSFORAMINAL Bilateral 5/20/2022    Procedure:  Bilateral Lumbar 5- Sacral 1 transforaminal Epidural Steroid Injection;  Surgeon: Christian Chaudhry MD;  [facility]     INJECT FACET JOINT Bilateral 1/25/2019    Procedure: Cervical Facet Injections Cervical 5-6 and 6-7 Bilateral;  Surgeon: Christian Chaudhry MD;  [facility]     INJECT FACET JOINT Bilateral 11/29/2019    Procedure: Cervical 5-6 and 6-7 Bilateral facet joint injection;  Surgeon: Christian Chaudhry MD;  [facility]     INJECT FACET JOINT Bilateral 2/11/2021    Procedure: Cervical 5-6 and Cervical 6-7 Bilateral facet injections;  Surgeon: Christian Chaudhry MD;  [facility]     INJECT FACET JOINT Bilateral 10/19/2021    Procedure: Left C5-C6, Right C5-C6, Left C6-C7 and Right C6-C7 Intra-articular zygopophyseal joint injection utilizing fluoroscopic guidance with contrast dye;  Surgeon: Christian Chaudhry MD;  [facility]     INJECT STEROID (LOCATION) Left 10/19/2021    Procedure: Glenohumeral joint injection left shoulder;  Surgeon: Christian Chaudhry MD;  [facility]     INJECT STEROID (LOCATION) Left 5/20/2022    Procedure: Left shoulder glenohumeral steroid inj with local;  Surgeon: Christian Chaudhry MD;  [facility]     ORTHOPEDIC SURGERY      Right hand     TONSILLECTOMY       ZZC NONSPECIFIC PROCEDURE      rt hand surgery - laceration/glass/tendons     ZZHC ECHO HEART XTHORACIC, STRESS/REST  6/2009    neg     Current Outpatient Medications   Medication Sig Dispense Refill     acetaminophen (TYLENOL) 500 MG tablet Take 1,000 mg by mouth every morning       aspirin (ASA) 81 MG chewable tablet Take 1 tablet (81 mg) by mouth daily       ibuprofen (ADVIL/MOTRIN) 200 MG tablet Take 400 mg by mouth every morning       loratadine (CLARITIN) 10 MG tablet TAKE 1 TABLET (10 MG) BY MOUTH DAILY 90 tablet 0     modafinil (PROVIGIL) 200 MG tablet Take 200 mg by mouth daily       omeprazole (PRILOSEC) 40 MG DR capsule TAKE 1 CAPSULE (40 MG) BY MOUTH DAILY 90 capsule 0     clindamycin (CLEOCIN) 300 MG  capsule Take 300 mg by mouth (Patient not taking: Reported on 2022)       diazepam (VALIUM) 5 MG tablet Take 1 pill 30 minutes prior to the procedure.  May repeat once if necessary.  Do not drive after taking this medication. (Patient not taking: Reported on 2022) 2 tablet 0     meclizine (ANTIVERT) 25 MG tablet Take 1 tablet (25 mg) by mouth 3 times daily as needed for dizziness (Patient not taking: Reported on 2022) 30 tablet 0     rosuvastatin (CRESTOR) 5 MG tablet Take 1 tablet (5 mg) by mouth every other day (Patient not taking: Reported on 2022) 30 tablet 4       Allergies   Allergen Reactions     Percocet [Oxycodone-Acetaminophen] Itching     Wasps [Hornets] Swelling        Social History     Tobacco Use     Smoking status: Former Smoker     Packs/day: 1.50     Quit date: 2007     Years since quitting: 15.4     Smokeless tobacco: Never Used   Substance Use Topics     Alcohol use: No     Comment: rare     Family History   Problem Relation Age of Onset     Breast Cancer Mother      Arthritis Mother         joint ?     Depression Father      Cancer Maternal Grandmother      C.A.D. Maternal Grandfather      Cardiovascular Maternal Grandfather      Gynecology Paternal Grandmother          giving birth     Prostate Cancer Paternal Grandfather      Genetic Disorder Brother         joint pain?     Depression Brother      Cardiovascular Son         congenital heart defect -       Prostate Cancer Other          age 70's     Diabetes Other      History   Drug Use No         Objective     /82 (BP Location: Right arm)   Pulse 111   Temp 97.4  F (36.3  C) (Temporal)   Resp 16   Wt 113.9 kg (251 lb)   SpO2 97%   BMI 34.04 kg/m      Physical Exam    GENERAL APPEARANCE: healthy, alert and no distress     EYES: EOMI,  PERRL     HENT: ear canals and TM's normal and nose and mouth without ulcers or lesions     NECK: no adenopathy, no asymmetry, masses, or scars and thyroid normal  "to palpation     RESP: lungs clear to auscultation - no rales, rhonchi or wheezes     CV: regular rates and rhythm, normal S1 S2, no S3 or S4 and no murmur, click or rub     ABDOMEN:  soft, nontender, no HSM or masses and bowel sounds normal     MS: extremities normal- no gross deformities noted, no evidence of inflammation in joints, FROM in all extremities.     SKIN: no suspicious lesions or rashes     NEURO: Normal strength and tone, sensory exam grossly normal, mentation intact and speech normal     PSYCH: mentation appears normal. and affect normal/bright     LYMPHATICS: No cervical adenopathy    Recent Labs   Lab Test 05/08/22  1001 01/18/22  1449 02/08/21  1213   HGB  --   --  16.3   PLT  --   --  202   NA  --  139 140   POTASSIUM  --  3.9 4.1   CR  --  0.85 0.94   A1C 5.8*  --   --         Diagnostics:  No labs were ordered during this visit.   No EKG required for low risk surgery (cataract, skin procedure, breast biopsy, etc).    Revised Cardiac Risk Index (RCRI):  The patient has the following serious cardiovascular risks for perioperative complications:   - No serious cardiac risks = 0 points     RCRI Interpretation: 0 points: Class I (very low risk - 0.4% complication rate)           Signed Electronically by: Gustavo Farmer DO  Copy of this evaluation report is provided to requesting physician.    Addendum 8/26/2022:  After review of the above history and physical, and considering the limited physical demand associated with the proposed \"surgery\", please allow this history and physical to cover his upcoming September 8 procedure.  The patient is cleared for the proposed epidural injection.    Darian  "

## 2022-07-13 ENCOUNTER — ANESTHESIA EVENT (OUTPATIENT)
Dept: SURGERY | Facility: CLINIC | Age: 57
End: 2022-07-13
Payer: COMMERCIAL

## 2022-07-13 ASSESSMENT — LIFESTYLE VARIABLES: TOBACCO_USE: 1

## 2022-07-14 ENCOUNTER — HOSPITAL ENCOUNTER (OUTPATIENT)
Dept: GENERAL RADIOLOGY | Facility: CLINIC | Age: 57
Discharge: HOME | End: 2022-07-14
Attending: ANESTHESIOLOGY | Admitting: ANESTHESIOLOGY
Payer: COMMERCIAL

## 2022-07-14 ENCOUNTER — HOSPITAL ENCOUNTER (OUTPATIENT)
Facility: CLINIC | Age: 57
Discharge: HOME | End: 2022-07-14
Attending: ANESTHESIOLOGY | Admitting: ANESTHESIOLOGY
Payer: COMMERCIAL

## 2022-07-14 ENCOUNTER — ANESTHESIA (OUTPATIENT)
Dept: SURGERY | Facility: CLINIC | Age: 57
End: 2022-07-14
Payer: COMMERCIAL

## 2022-07-14 VITALS
DIASTOLIC BLOOD PRESSURE: 85 MMHG | OXYGEN SATURATION: 95 % | SYSTOLIC BLOOD PRESSURE: 116 MMHG | HEART RATE: 72 BPM | TEMPERATURE: 97.6 F | RESPIRATION RATE: 16 BRPM

## 2022-07-14 DIAGNOSIS — M47.812 SPONDYLOSIS OF CERVICAL JOINT WITHOUT MYELOPATHY: ICD-10-CM

## 2022-07-14 PROCEDURE — 250N000011 HC RX IP 250 OP 636: Performed by: ANESTHESIOLOGY

## 2022-07-14 PROCEDURE — 250N000011 HC RX IP 250 OP 636: Performed by: NURSE ANESTHETIST, CERTIFIED REGISTERED

## 2022-07-14 PROCEDURE — 64491 INJ PARAVERT F JNT C/T 2 LEV: CPT | Mod: 50 | Performed by: ANESTHESIOLOGY

## 2022-07-14 PROCEDURE — 999N000179 XR SURGERY CARM FLUORO LESS THAN 5 MIN W STILLS: Mod: TC

## 2022-07-14 PROCEDURE — 250N000009 HC RX 250: Performed by: NURSE ANESTHETIST, CERTIFIED REGISTERED

## 2022-07-14 PROCEDURE — 64494 INJ PARAVERT F JNT L/S 2 LEV: CPT | Mod: 50 | Performed by: ANESTHESIOLOGY

## 2022-07-14 PROCEDURE — 64490 INJ PARAVERT F JNT C/T 1 LEV: CPT | Mod: 50 | Performed by: ANESTHESIOLOGY

## 2022-07-14 PROCEDURE — 64493 INJ PARAVERT F JNT L/S 1 LEV: CPT | Performed by: ANESTHESIOLOGY

## 2022-07-14 PROCEDURE — 370N000017 HC ANESTHESIA TECHNICAL FEE, PER MIN: Performed by: ANESTHESIOLOGY

## 2022-07-14 PROCEDURE — 64479 NJX AA&/STRD TFRM EPI C/T 1: CPT | Performed by: ANESTHESIOLOGY

## 2022-07-14 PROCEDURE — 64480 NJX AA&/STRD TFRM EPI C/T EA: CPT | Performed by: ANESTHESIOLOGY

## 2022-07-14 RX ORDER — IOPAMIDOL 612 MG/ML
INJECTION, SOLUTION INTRATHECAL PRN
Status: DISCONTINUED | OUTPATIENT
Start: 2022-07-14 | End: 2022-07-14 | Stop reason: HOSPADM

## 2022-07-14 RX ORDER — PROPOFOL 10 MG/ML
INJECTION, EMULSION INTRAVENOUS PRN
Status: DISCONTINUED | OUTPATIENT
Start: 2022-07-14 | End: 2022-07-14

## 2022-07-14 RX ORDER — LIDOCAINE HYDROCHLORIDE 20 MG/ML
INJECTION, SOLUTION INFILTRATION; PERINEURAL PRN
Status: DISCONTINUED | OUTPATIENT
Start: 2022-07-14 | End: 2022-07-14

## 2022-07-14 RX ORDER — METHYLPREDNISOLONE ACETATE 40 MG/ML
INJECTION, SUSPENSION INTRA-ARTICULAR; INTRALESIONAL; INTRAMUSCULAR; SOFT TISSUE PRN
Status: DISCONTINUED | OUTPATIENT
Start: 2022-07-14 | End: 2022-07-14 | Stop reason: HOSPADM

## 2022-07-14 RX ORDER — BUPIVACAINE HYDROCHLORIDE 7.5 MG/ML
INJECTION, SOLUTION EPIDURAL; RETROBULBAR PRN
Status: DISCONTINUED | OUTPATIENT
Start: 2022-07-14 | End: 2022-07-14 | Stop reason: HOSPADM

## 2022-07-14 RX ORDER — LIDOCAINE 40 MG/G
CREAM TOPICAL
Status: DISCONTINUED | OUTPATIENT
Start: 2022-07-14 | End: 2022-07-14 | Stop reason: HOSPADM

## 2022-07-14 RX ADMIN — LIDOCAINE HYDROCHLORIDE 0.1 ML: 10 INJECTION, SOLUTION EPIDURAL; INFILTRATION; INTRACAUDAL; PERINEURAL at 14:44

## 2022-07-14 RX ADMIN — PROPOFOL 30 MG: 10 INJECTION, EMULSION INTRAVENOUS at 15:01

## 2022-07-14 RX ADMIN — PROPOFOL 50 MG: 10 INJECTION, EMULSION INTRAVENOUS at 14:54

## 2022-07-14 RX ADMIN — LIDOCAINE HYDROCHLORIDE 60 MG: 20 INJECTION, SOLUTION INFILTRATION; PERINEURAL at 14:54

## 2022-07-14 RX ADMIN — PROPOFOL 30 MG: 10 INJECTION, EMULSION INTRAVENOUS at 14:57

## 2022-07-14 RX ADMIN — PROPOFOL 30 MG: 10 INJECTION, EMULSION INTRAVENOUS at 14:59

## 2022-07-14 NOTE — OP NOTE
CHIEF COMPLAINT:  neck pain secondary to cervical spondylosis.  INTERVAL HISTORY:  I discussed the above note with the patient. I agree with above.  He did have a epidural injection in the neck and that did not help suggesting that the discs in his neck that have some degeneration are not the major pain generators.  Because of this we are going to diagnostically and therapeutically test his facet joints.  The complicating factor in his situation is that he has a severe needle phobia.  He needed very deep monitored anesthetic care in order to have the facet injection performed.  This would preclude him from having a radiofrequency ablation which requires fully awake medial branch blocks done under local anesthesia.  PHYSICAL EXAM:   Musculoskeletal: Strength of upper extremities is 5/5 bilaterally.  Pain on palpation of the   neck. Pain on   Rotation, extension and flexion of neck.   Neuro: No sensory perception differences of lower extremities.  PROCEDURE:     Left C5-C6, Right C5-C6, Left C6-C7 and Right C6-C7  Intra-articular zygopophyseal joint injection utilizing fluoroscopic guidance with contrast dye.   PROCEDURE DETAILS: After written informed consent was obtained from the patient, the patient was escorted to the procedure room.  The patient was placed in the sitting position.  A  time out  was conducted to verify the patient identity, procedure to be performed, side, site, allergies and any special requirements.  The skin over the neck was prepped and draped in normal sterile fashion. Fluoroscopy was used to identify the zygopophyseal joint with a lateral view.   The skin was anesthetized with 0.5 mL of 1% lidocaine with bicarbonate buffer.  A 25-gauge 2 inch Quincke needle was advanced under fluoroscopic guidance in the lateral approach until entry into the zygopophyseal joint was successful.  View was confirmed in AP view.  Then, <0.3 cc of Omnipaque contrast dye was injected producing a satisfactory  arthrogram without evidence of intrathecal or intravascular spread.  Then, a 1 mL solution of 5 mg dexamethasone and 0.5 mL of 2% lidocaine was incrementally injected into each  joint.  After injection of the medication, as the needle tip was withdrawn, it was flushed with local anesthetic. The patient was monitored with blood pressure and pulse oximetry machines with the assistance of an RN throughout the procedure.  The patient was alert and responsive to questions throughout the procedure.   The patient tolerated the procedure well and was observed in the post-procedural area.  The patient was dismissed without apparent complications.      Blood loss: Less than 2 cc     DIAGNOSIS:  1.  Cervical spondylosis secondary to a symptomatic facet syndrome causing axial neck pain with a history of long-term pain relief from previous facet injections  2.  Severe needle phobia  3.  Nondiagnostic medial branch blocks at C4, C5, C6.     PLAN:  1. His medial branch blocks at C4-5 and C5-6 were nondiagnostic so most likely is C6-7 facet joint is one of the major pain generators.  He is not a candidate for radiofrequency ablation at the C7 level because I cannot do the medial branch block or the radiofrequency ablation at that level due to the fact that had he has to be prone and under very deep anesthesia due to his sever needle phobia. Lying prone and under deeper sedation I am unable to get his shoulders out of the way to safely visualize and create a safe lesion of the C7 medial branch nerve.  So unfortunately because of his severe needle phobia I cannot do this in a seated position which would allow me to do the C7 level.  Going forward, I think his only option is to either consider a fusion so his facet joints do not move or to continue with periodic corticosteroid injections into these facet joints.     Christian Chaudhry MD  Diplomate of the [employer] Board of Anesthesiology, Pain Medicine

## 2022-07-14 NOTE — INTERVAL H&P NOTE
I have reviewed the surgical (or preoperative) H&P that is linked to this encounter, and examined the patient. There are no significant changes    Clinical Conditions Present on Arrival:  Clinically Significant Risk Factors Present on Admission                   # Obesity: Estimated body mass index is 34.04 kg/m  as calculated from the following:    Height as of 7/7/22: 1.829 m (6').    Weight as of 7/7/22: 113.9 kg (251 lb).

## 2022-07-14 NOTE — DISCHARGE INSTRUCTIONS

## 2022-07-14 NOTE — ANESTHESIA POSTPROCEDURE EVALUATION
Patient: Bulmaro Herrera    Procedure: Procedure(s):  Cervical 5-6 and 6-7 facet injections bilateral       Anesthesia Type:  MAC    Note:  Disposition: Outpatient   Postop Pain Control: Uneventful            Sign Out: Well controlled pain   PONV: No   Neuro/Psych: Uneventful            Sign Out: Acceptable/Baseline neuro status   Airway/Respiratory: Uneventful            Sign Out: Acceptable/Baseline resp. status   CV/Hemodynamics: Uneventful            Sign Out: Acceptable CV status   Other NRE: NONE   DID A NON-ROUTINE EVENT OCCUR? No    Event details/Postop Comments:  Pt was happy with anesthesia care.  No complications.  I will follow up with the pt if needed.           Last vitals:  Vitals Value Taken Time   BP     Temp     Pulse     Resp     SpO2 93 % 07/14/22 1516   Vitals shown include unvalidated device data.    Electronically Signed By: SHIV Hoff CRNA  July 14, 2022  3:17 PM

## 2022-07-14 NOTE — ANESTHESIA CARE TRANSFER NOTE
Patient: Bulmaro Herrera    Procedure: Procedure(s):  Cervical 5-6 and 6-7 facet injections bilateral       Diagnosis: Cervical spondylosis without myelopathy [M47.812]  Diagnosis Additional Information: No value filed.    Anesthesia Type:   MAC     Note:    Oropharynx: oropharynx clear of all foreign objects and spontaneously breathing  Level of Consciousness: drowsy  Oxygen Supplementation: nasal cannula    Independent Airway: airway patency satisfactory and stable  Dentition: dentition unchanged  Vital Signs Stable: post-procedure vital signs reviewed and stable  Report to RN Given: handoff report given  Patient transferred to: Phase II    Handoff Report: Identifed the Patient, Identified the Reponsible Provider, Reviewed the pertinent medical history, Discussed the surgical course, Reviewed Intra-OP anesthesia mangement and issues during anesthesia, Set expectations for post-procedure period and Allowed opportunity for questions and acknowledgement of understanding      Vitals:  Vitals Value Taken Time   /76 07/14/22 1515   Temp     Pulse 80 07/14/22 1515   Resp     SpO2 91 % 07/14/22 1517   Vitals shown include unvalidated device data.    Electronically Signed By: SHIV Hoff CRNA  July 14, 2022  3:18 PM

## 2022-07-14 NOTE — ANESTHESIA CARE TRANSFER NOTE
Patient: Bulmaor Herrera    Procedure: Procedure(s):  Cervical 5-6 and 6-7 facet injections bilateral       Diagnosis: Cervical spondylosis without myelopathy [M47.812]  Diagnosis Additional Information: No value filed.    Anesthesia Type:   MAC     Note:  Anesthesia Care Transfer Notewriter  Vitals:  Vitals Value Taken Time   BP     Temp     Pulse     Resp     SpO2 93 % 07/14/22 1516   Vitals shown include unvalidated device data.    Electronically Signed By: SHIV Hoff CRNA  July 14, 2022  3:17 PM

## 2022-07-14 NOTE — ANESTHESIA PREPROCEDURE EVALUATION
Anesthesia Pre-Procedure Evaluation    Patient: Bulmaro Herrera   MRN: [id number] : 1965        Preoperative Diagnosis: Arthropathy of cervical facet joint [M47.812]    Procedure : Procedure(s):  Cervical 5-6 and Cervical 6-7 Bilateral facet injections  Glenohumeral joint injection left shoulder          Past Medical History:   Diagnosis Date     Back pains      Depressive disorder, not elsewhere classified 2007    declines Rx     Disc degeneration 2008    see MRI -throaco-lumbar mild discogenic degenerative changes     Elevated LFT's     alt and ast     Other and unspecified hyperlipidemia 2007     Sleep disorder     CPAP     Tobacco use disorder       Past Surgical History:   Procedure Laterality Date     COLONOSCOPY  08    adenomatous polyp repeat 5 years     COLONOSCOPY N/A 2017    Procedure: COMBINED COLONOSCOPY, SINGLE OR MULTIPLE BIOPSY/POLYPECTOMY BY BIOPSY;  Colonoscopy, Polypectomy by Biopsy;  Surgeon: Matt Reyes MD;  Location: [facility]     ENT SURGERY      for deviated septum     EXCISE LESION AXILLA  2018    Procedure: Excision Skin Tags Right Axilla;  Surgeon: Ayaan Chaney MD;  [facility]     EXCISE LESION BACK N/A 2018    Procedure: Excision Back Skin Lesion X Two;  Surgeon: Ayaan Chaney MD;  [facility]     EXCISE MASS BACK N/A 4/15/2022    Procedure: Excisions skin lesions of back;  Surgeon: Ayaan Chaney MD;  [facility]     INJECT BLOCK MEDIAL BRANCH CERVICAL/THORACIC/LUMBAR Bilateral 2022    Procedure: Cervical 4, 5, 6 Medial Branch Block;  Surgeon: Christian Chaudhry MD;  [facility]     INJECT EPIDURAL CERVICAL N/A 2015    Procedure: INJECT EPIDURAL CERVICAL;  Surgeon: Christian Chaudhry MD;  [facility]     INJECT EPIDURAL CERVICAL N/A 2018    Procedure: INJECT EPIDURAL CERVICAL 6-7;  Surgeon: Christian Chaudhry MD;  [facility]     INJECT EPIDURAL LUMBAR N/A 2019    Procedure: Lumbar 5- Sacral  1 Epidural  Injection bilatetral;  Surgeon: Christian Chaudhry MD;  [facility]     INJECT EPIDURAL TRANSFORAMINAL Bilateral 5/20/2022    Procedure: Bilateral Lumbar 5- Sacral 1 transforaminal Epidural Steroid Injection;  Surgeon: Christian Chaudhry MD;  [facility]     INJECT FACET JOINT Bilateral 1/25/2019    Procedure: Cervical Facet Injections Cervical 5-6 and 6-7 Bilateral;  Surgeon: Christian Chaudhry MD;  [facility]     INJECT FACET JOINT Bilateral 11/29/2019    Procedure: Cervical 5-6 and 6-7 Bilateral facet joint injection;  Surgeon: Christian Chaudhry MD;  [facility]     INJECT FACET JOINT Bilateral 2/11/2021    Procedure: Cervical 5-6 and Cervical 6-7 Bilateral facet injections;  Surgeon: Christian Chaudhry MD;  [facility]     INJECT FACET JOINT Bilateral 10/19/2021    Procedure: Left C5-C6, Right C5-C6, Left C6-C7 and Right C6-C7 Intra-articular zygopophyseal joint injection utilizing fluoroscopic guidance with contrast dye;  Surgeon: Christian Chaudhry MD;  [facility]     INJECT STEROID (LOCATION) Left 10/19/2021    Procedure: Glenohumeral joint injection left shoulder;  Surgeon: Christian Chaudhry MD;  [facility]     INJECT STEROID (LOCATION) Left 5/20/2022    Procedure: Left shoulder glenohumeral steroid inj with local;  Surgeon: Christian Chaudhry MD;  [facility]     ORTHOPEDIC SURGERY      Right hand     TONSILLECTOMY       ZZC NONSPECIFIC PROCEDURE      rt hand surgery - laceration/glass/tendons     ZZHC ECHO HEART XTHORACIC, STRESS/REST  6/2009    neg      Allergies   Allergen Reactions     Percocet [Oxycodone-Acetaminophen] Itching     Wasps [Hornets] Swelling      Social History     Tobacco Use     Smoking status: Former Smoker     Packs/day: 1.50     Quit date: 2/14/2007     Years since quitting: 15.4     Smokeless tobacco: Never Used   Substance Use Topics     Alcohol use: No     Comment: rare      Wt Readings from Last 1 Encounters:   07/07/22 113.9 kg (251 lb)        Anesthesia  Evaluation   Pt has had prior anesthetic. Type: MAC.    No history of anesthetic complications       ROS/MED HX  ENT/Pulmonary:     (+) sleep apnea, tobacco use, Past use,     Neurologic:  - neg neurologic ROS     Cardiovascular:  - neg cardiovascular ROS     METS/Exercise Tolerance:     Hematologic:  - neg hematologic  ROS     Musculoskeletal:       GI/Hepatic:     (+) GERD, Asymptomatic on medication,     Renal/Genitourinary:       Endo:     (+) Obesity,     Psychiatric/Substance Use:  - neg psychiatric ROS     Infectious Disease:       Malignancy:       Other:      (+) , H/O Chronic Pain,        Physical Exam    Airway  airway exam normal      Mallampati: II   TM distance: > 3 FB   Neck ROM: full   Mouth opening: > 3 cm    Respiratory Devices and Support         Dental  no notable dental history         Cardiovascular   cardiovascular exam normal          Pulmonary   pulmonary exam normal                OUTSIDE LABS:  CBC:   Lab Results   Component Value Date    WBC 5.5 02/08/2021    WBC 5.7 11/15/2019    HGB 16.3 02/08/2021    HGB 16.0 11/15/2019    HCT 47.2 02/08/2021    HCT 47.6 11/15/2019     02/08/2021     11/15/2019     BMP:   Lab Results   Component Value Date     01/18/2022     02/08/2021    POTASSIUM 3.9 01/18/2022    POTASSIUM 4.1 02/08/2021    CHLORIDE 108 01/18/2022    CHLORIDE 108 02/08/2021    CO2 25 01/18/2022    CO2 30 02/08/2021    BUN 15 01/18/2022    BUN 17 02/08/2021    CR 0.85 01/18/2022    CR 0.94 02/08/2021    GLC 96 01/18/2022     (H) 02/08/2021     COAGS:   Lab Results   Component Value Date    PTT 31 06/06/2008    INR 1.0 02/17/2014     POC: No results found for: BGM, HCG, HCGS  HEPATIC:   Lab Results   Component Value Date    ALBUMIN 3.9 01/18/2022    PROTTOTAL 7.5 01/18/2022    ALT 84 (H) 05/08/2022    AST 63 (H) 01/18/2022    ALKPHOS 97 01/18/2022    BILITOTAL 1.1 01/18/2022     OTHER:   Lab Results   Component Value Date    PH 8.0 (H) 05/07/2002     A1C 5.8 (H) 05/08/2022    WILIAM 8.9 01/18/2022    PHOS 3.5 02/08/2021    MAG 2.2 02/08/2021    LIPASE 95 09/10/2018    AMYLASE 76 03/08/2014    TSH 3.27 02/08/2021    T4 0.94 11/15/2019    CRP <2.9 03/25/2015    SED 7 09/10/2012       Anesthesia Plan    ASA Status:  2   NPO Status:  NPO Appropriate    Anesthesia Type: MAC.     - Reason for MAC: straight local not clinically adequate   Induction: Propofol, Intravenous.   Maintenance: TIVA.        Consents    Anesthesia Plan(s) and associated risks, benefits, and realistic alternatives discussed. Questions answered and patient/representative(s) expressed understanding.     - Discussed: Risks, Benefits and Alternatives for BOTH SEDATION and the PROCEDURE were discussed     - Discussed with:  Patient      - Extended Intubation/Ventilatory Support Discussed: No.      - Patient is DNR/DNI Status: No    Use of blood products discussed: No .     Postoperative Care            Comments:    Other Comments: The risks and benefits of anesthesia, and the alternatives where applicable, have been discussed with the patient, and they wish to proceed.                   SHIV Hoff CRNA

## 2022-07-14 NOTE — ANESTHESIA CARE TRANSFER NOTE
Patient: Bulmaro Herrera    Procedure: Procedure(s):  Cervical 5-6 and 6-7 facet injections bilateral       Diagnosis: Cervical spondylosis without myelopathy [M47.812]  Diagnosis Additional Information: No value filed.    Anesthesia Type:   MAC     Note:    Oropharynx: oropharynx clear of all foreign objects and spontaneously breathing  Level of Consciousness: drowsy  Oxygen Supplementation: nasal cannula    Independent Airway: airway patency satisfactory and stable  Dentition: dentition unchanged  Vital Signs Stable: post-procedure vital signs reviewed and stable  Report to RN Given: handoff report given  Patient transferred to: Phase II    Handoff Report: Identifed the Patient, Identified the Reponsible Provider, Reviewed the pertinent medical history, Discussed the surgical course, Reviewed Intra-OP anesthesia mangement and issues during anesthesia, Set expectations for post-procedure period and Allowed opportunity for questions and acknowledgement of understanding      Vitals:  Vitals Value Taken Time   BP     Temp     Pulse     Resp     SpO2         Electronically Signed By: SHIV Hoff CRNA  July 14, 2022  3:16 PM

## 2022-08-11 ENCOUNTER — TELEPHONE (OUTPATIENT)
Dept: PALLIATIVE MEDICINE | Facility: CLINIC | Age: 57
End: 2022-08-11

## 2022-08-11 NOTE — TELEPHONE ENCOUNTER
Pt scheduled for LESI   Date:9/8/22  Time:3pm  Dr. Chaudhry    Instructed pt to have H&P and  for procedure.  Patient informed of COVID testing process.

## 2022-08-25 ENCOUNTER — MYC MEDICAL ADVICE (OUTPATIENT)
Dept: INTERNAL MEDICINE | Facility: CLINIC | Age: 57
End: 2022-08-25

## 2022-08-25 NOTE — TELEPHONE ENCOUNTER
Please see [employer] message from patient.    Looks like his next injection with Dr. Chaudhry is on 09/08/22.    Please advise on preop.

## 2022-08-26 NOTE — TELEPHONE ENCOUNTER
The previous history and physical has been addended to include/cover his upcoming cervical injection.    Please let him know this.    Darian

## 2022-09-06 DIAGNOSIS — G47.10 HYPERSOMNIA: Primary | ICD-10-CM

## 2022-09-06 RX ORDER — MODAFINIL 200 MG/1
200 TABLET ORAL DAILY
Qty: 90 TABLET | Refills: 1 | Status: SHIPPED | OUTPATIENT
Start: 2022-09-06 | End: 2023-06-04

## 2022-09-07 ENCOUNTER — ANESTHESIA EVENT (OUTPATIENT)
Dept: SURGERY | Facility: CLINIC | Age: 57
End: 2022-09-07
Payer: COMMERCIAL

## 2022-09-07 ASSESSMENT — LIFESTYLE VARIABLES: TOBACCO_USE: 1

## 2022-09-07 NOTE — ANESTHESIA PREPROCEDURE EVALUATION
Anesthesia Pre-Procedure Evaluation    Patient: Bulmaro Herrera   MRN: [id number] : 1965        Procedure : Procedure(s):  Bilateral Lumbar 5- Sacral 1 Transforaminal Epidural Steroid injection          Past Medical History:   Diagnosis Date     Back pains      Depressive disorder, not elsewhere classified 2007    declines Rx     Disc degeneration 2008    see MRI -throaco-lumbar mild discogenic degenerative changes     Elevated LFT's     alt and ast     Other and unspecified hyperlipidemia 2007     Sleep disorder     CPAP     Tobacco use disorder       Past Surgical History:   Procedure Laterality Date     COLONOSCOPY  08    adenomatous polyp repeat 5 years     COLONOSCOPY N/A 2017    Procedure: COMBINED COLONOSCOPY, SINGLE OR MULTIPLE BIOPSY/POLYPECTOMY BY BIOPSY;  Colonoscopy, Polypectomy by Biopsy;  Surgeon: Matt Reyes MD;  Location: [facility]     ENT SURGERY      for deviated septum     EXCISE LESION AXILLA  2018    Procedure: Excision Skin Tags Right Axilla;  Surgeon: Ayaan Chaney MD;  [facility]     EXCISE LESION BACK N/A 2018    Procedure: Excision Back Skin Lesion X Two;  Surgeon: Ayaan Chaney MD;  [facility]     EXCISE MASS BACK N/A 4/15/2022    Procedure: Excisions skin lesions of back;  Surgeon: Ayaan Chaney MD;  [facility]     INJECT BLOCK MEDIAL BRANCH CERVICAL/THORACIC/LUMBAR Bilateral 2022    Procedure: Cervical 4, 5, 6 Medial Branch Block;  Surgeon: Christian Chaudhry MD;  [facility]     INJECT EPIDURAL CERVICAL N/A 2015    Procedure: INJECT EPIDURAL CERVICAL;  Surgeon: Christian Chaudhry MD;  [facility]     INJECT EPIDURAL CERVICAL N/A 2018    Procedure: INJECT EPIDURAL CERVICAL 6-7;  Surgeon: Christian Chaudhry MD;  [facility]     INJECT EPIDURAL LUMBAR N/A 2019    Procedure: Lumbar 5- Sacral 1 Epidural  Injection bilatetral;  Surgeon: Christian Chaudhry MD;  [facility]     INJECT EPIDURAL  TRANSFORAMINAL Bilateral 5/20/2022    Procedure: Bilateral Lumbar 5- Sacral 1 transforaminal Epidural Steroid Injection;  Surgeon: Christian Chaudhry MD;  [facility]     INJECT EPIDURAL TRANSFORAMINAL Bilateral 7/14/2022    Procedure: Left Cervical 5-Cervical 6, Right Cervical 5-Cervical 6, Left Cervical 6-Cervical 7 and Right Cervical 6-Cervical 7 Intra-articular zygopophyseal joint injection utilizing fluoroscopic guidance with contrast dye;  Surgeon: Christian Chaudhry MD;  [facility]     INJECT FACET JOINT Bilateral 1/25/2019    Procedure: Cervical Facet Injections Cervical 5-6 and 6-7 Bilateral;  Surgeon: Christian Chaudhry MD;  [facility]     INJECT FACET JOINT Bilateral 11/29/2019    Procedure: Cervical 5-6 and 6-7 Bilateral facet joint injection;  Surgeon: Christian Chaudhry MD;  [facility]     INJECT FACET JOINT Bilateral 2/11/2021    Procedure: Cervical 5-6 and Cervical 6-7 Bilateral facet injections;  Surgeon: Christian Chaudhry MD;  [facility]     INJECT FACET JOINT Bilateral 10/19/2021    Procedure: Left C5-C6, Right C5-C6, Left C6-C7 and Right C6-C7 Intra-articular zygopophyseal joint injection utilizing fluoroscopic guidance with contrast dye;  Surgeon: Christian Chaudhry MD;  [facility]     INJECT STEROID (LOCATION) Left 10/19/2021    Procedure: Glenohumeral joint injection left shoulder;  Surgeon: Christian Chaudhry MD;  [facility]     INJECT STEROID (LOCATION) Left 5/20/2022    Procedure: Left shoulder glenohumeral steroid inj with local;  Surgeon: Christian Chaudhry MD;  [facility]     ORTHOPEDIC SURGERY      Right hand     TONSILLECTOMY       ZZC NONSPECIFIC PROCEDURE      rt hand surgery - laceration/glass/tendons     ZZHC ECHO HEART XTHORACIC, STRESS/REST  6/2009    neg      Allergies   Allergen Reactions     Percocet [Oxycodone-Acetaminophen] Itching     Wasps [Hornets] Swelling      Social History     Tobacco Use     Smoking status: Former Smoker     Packs/day: 1.50     Quit  date: 2/14/2007     Years since quitting: 15.5     Smokeless tobacco: Never Used   Substance Use Topics     Alcohol use: No     Comment: rare      Wt Readings from Last 1 Encounters:   07/07/22 113.9 kg (251 lb)        Anesthesia Evaluation   Pt has had prior anesthetic. Type: MAC.    No history of anesthetic complications       ROS/MED HX  ENT/Pulmonary:     (+) sleep apnea, tobacco use, Past use,     Neurologic:  - neg neurologic ROS     Cardiovascular:  - neg cardiovascular ROS     METS/Exercise Tolerance:     Hematologic:  - neg hematologic  ROS     Musculoskeletal:       GI/Hepatic:     (+) GERD, Asymptomatic on medication,     Renal/Genitourinary:       Endo:     (+) Obesity,     Psychiatric/Substance Use:  - neg psychiatric ROS     Infectious Disease:       Malignancy:       Other:      (+) , H/O Chronic Pain,        Physical Exam    Airway  airway exam normal      Mallampati: II   TM distance: > 3 FB   Neck ROM: full   Mouth opening: > 3 cm    Respiratory Devices and Support         Dental  no notable dental history         Cardiovascular   cardiovascular exam normal          Pulmonary   pulmonary exam normal                OUTSIDE LABS:  CBC:   Lab Results   Component Value Date    WBC 5.5 02/08/2021    WBC 5.7 11/15/2019    HGB 16.3 02/08/2021    HGB 16.0 11/15/2019    HCT 47.2 02/08/2021    HCT 47.6 11/15/2019     02/08/2021     11/15/2019     BMP:   Lab Results   Component Value Date     01/18/2022     02/08/2021    POTASSIUM 3.9 01/18/2022    POTASSIUM 4.1 02/08/2021    CHLORIDE 108 01/18/2022    CHLORIDE 108 02/08/2021    CO2 25 01/18/2022    CO2 30 02/08/2021    BUN 15 01/18/2022    BUN 17 02/08/2021    CR 0.85 01/18/2022    CR 0.94 02/08/2021    GLC 96 01/18/2022     (H) 02/08/2021     COAGS:   Lab Results   Component Value Date    PTT 31 06/06/2008    INR 1.0 02/17/2014     POC: No results found for: BGM, HCG, HCGS  HEPATIC:   Lab Results   Component Value Date     ALBUMIN 3.9 01/18/2022    PROTTOTAL 7.5 01/18/2022    ALT 84 (H) 05/08/2022    AST 63 (H) 01/18/2022    ALKPHOS 97 01/18/2022    BILITOTAL 1.1 01/18/2022     OTHER:   Lab Results   Component Value Date    PH 8.0 (H) 05/07/2002    A1C 5.8 (H) 05/08/2022    WILIAM 8.9 01/18/2022    PHOS 3.5 02/08/2021    MAG 2.2 02/08/2021    LIPASE 95 09/10/2018    AMYLASE 76 03/08/2014    TSH 3.27 02/08/2021    T4 0.94 11/15/2019    CRP <2.9 03/25/2015    SED 7 09/10/2012       Anesthesia Plan    ASA Status:  2   NPO Status:  NPO Appropriate    Anesthesia Type: MAC.     - Reason for MAC: straight local not clinically adequate   Induction: Propofol, Intravenous.   Maintenance: TIVA.        Consents    Anesthesia Plan(s) and associated risks, benefits, and realistic alternatives discussed. Questions answered and patient/representative(s) expressed understanding.     - Discussed: Risks, Benefits and Alternatives for BOTH SEDATION and the PROCEDURE were discussed     - Discussed with:  Patient      - Extended Intubation/Ventilatory Support Discussed: No.      - Patient is DNR/DNI Status: No    Use of blood products discussed: No .     Postoperative Care    Pain management: IV analgesics.        Comments:    Other Comments: The risks and benefits of anesthesia, and the alternatives where applicable, have been discussed with the patient, and they wish to proceed.          H&P reviewed: Unable to attach H&P to encounter due to EHR limitations. H&P Update: appropriate H&P reviewed, patient examined. No interval changes since H&P (within 30 days).         SHIV Rushing CRNA

## 2022-09-08 ENCOUNTER — HOSPITAL ENCOUNTER (OUTPATIENT)
Facility: CLINIC | Age: 57
Discharge: HOME | End: 2022-09-08
Attending: ANESTHESIOLOGY | Admitting: ANESTHESIOLOGY
Payer: COMMERCIAL

## 2022-09-08 ENCOUNTER — ANESTHESIA (OUTPATIENT)
Dept: SURGERY | Facility: CLINIC | Age: 57
End: 2022-09-08
Payer: COMMERCIAL

## 2022-09-08 ENCOUNTER — APPOINTMENT (OUTPATIENT)
Dept: GENERAL RADIOLOGY | Facility: CLINIC | Age: 57
End: 2022-09-08
Attending: ANESTHESIOLOGY
Payer: COMMERCIAL

## 2022-09-08 VITALS
TEMPERATURE: 98.3 F | RESPIRATION RATE: 16 BRPM | SYSTOLIC BLOOD PRESSURE: 106 MMHG | DIASTOLIC BLOOD PRESSURE: 79 MMHG | HEART RATE: 71 BPM | OXYGEN SATURATION: 93 %

## 2022-09-08 DIAGNOSIS — M54.16 LUMBAR RADICULOPATHY: ICD-10-CM

## 2022-09-08 PROCEDURE — 250N000011 HC RX IP 250 OP 636: Performed by: ANESTHESIOLOGY

## 2022-09-08 PROCEDURE — 370N000017 HC ANESTHESIA TECHNICAL FEE, PER MIN: Performed by: ANESTHESIOLOGY

## 2022-09-08 PROCEDURE — 20610 DRAIN/INJ JOINT/BURSA W/O US: CPT | Performed by: ANESTHESIOLOGY

## 2022-09-08 PROCEDURE — 64483 NJX AA&/STRD TFRM EPI L/S 1: CPT | Performed by: ANESTHESIOLOGY

## 2022-09-08 PROCEDURE — 250N000009 HC RX 250: Performed by: NURSE ANESTHETIST, CERTIFIED REGISTERED

## 2022-09-08 PROCEDURE — 999N000179 XR SURGERY CARM FLUORO LESS THAN 5 MIN W STILLS: Mod: TC

## 2022-09-08 PROCEDURE — 64483 NJX AA&/STRD TFRM EPI L/S 1: CPT | Mod: 50 | Performed by: ANESTHESIOLOGY

## 2022-09-08 PROCEDURE — 250N000011 HC RX IP 250 OP 636: Performed by: NURSE ANESTHETIST, CERTIFIED REGISTERED

## 2022-09-08 PROCEDURE — 20610 DRAIN/INJ JOINT/BURSA W/O US: CPT | Mod: LT | Performed by: ANESTHESIOLOGY

## 2022-09-08 RX ORDER — IOPAMIDOL 612 MG/ML
INJECTION, SOLUTION INTRATHECAL PRN
Status: DISCONTINUED | OUTPATIENT
Start: 2022-09-08 | End: 2022-09-08 | Stop reason: HOSPADM

## 2022-09-08 RX ORDER — METHYLPREDNISOLONE ACETATE 40 MG/ML
INJECTION, SUSPENSION INTRA-ARTICULAR; INTRALESIONAL; INTRAMUSCULAR; SOFT TISSUE PRN
Status: DISCONTINUED | OUTPATIENT
Start: 2022-09-08 | End: 2022-09-08 | Stop reason: HOSPADM

## 2022-09-08 RX ORDER — LIDOCAINE HYDROCHLORIDE 20 MG/ML
INJECTION, SOLUTION INFILTRATION; PERINEURAL PRN
Status: DISCONTINUED | OUTPATIENT
Start: 2022-09-08 | End: 2022-09-08

## 2022-09-08 RX ORDER — LIDOCAINE 40 MG/G
CREAM TOPICAL
Status: DISCONTINUED | OUTPATIENT
Start: 2022-09-08 | End: 2022-09-08 | Stop reason: HOSPADM

## 2022-09-08 RX ORDER — PROPOFOL 10 MG/ML
INJECTION, EMULSION INTRAVENOUS PRN
Status: DISCONTINUED | OUTPATIENT
Start: 2022-09-08 | End: 2022-09-08

## 2022-09-08 RX ADMIN — PROPOFOL 70 MG: 10 INJECTION, EMULSION INTRAVENOUS at 09:56

## 2022-09-08 RX ADMIN — MIDAZOLAM 2 MG: 1 INJECTION INTRAMUSCULAR; INTRAVENOUS at 09:54

## 2022-09-08 RX ADMIN — PROPOFOL 80 MG: 10 INJECTION, EMULSION INTRAVENOUS at 09:59

## 2022-09-08 RX ADMIN — LIDOCAINE HYDROCHLORIDE 1 ML: 10 INJECTION, SOLUTION EPIDURAL; INFILTRATION; INTRACAUDAL; PERINEURAL at 09:19

## 2022-09-08 RX ADMIN — LIDOCAINE HYDROCHLORIDE 60 MG: 20 INJECTION, SOLUTION INFILTRATION; PERINEURAL at 09:54

## 2022-09-08 ASSESSMENT — ACTIVITIES OF DAILY LIVING (ADL): ADLS_ACUITY_SCORE: 35

## 2022-09-08 NOTE — DISCHARGE INSTRUCTIONS

## 2022-09-08 NOTE — ANESTHESIA POSTPROCEDURE EVALUATION
Patient: Bulmaro Herrera    Procedure: Procedure(s):  Bilateral L5-S1 transforaminal epidural injections and Left intra-articular shouldr injections       Anesthesia Type:  MAC    Note:  Disposition: Outpatient   Postop Pain Control: Uneventful            Sign Out: Well controlled pain   PONV: No   Neuro/Psych: Uneventful            Sign Out: Acceptable/Baseline neuro status   Airway/Respiratory: Uneventful            Sign Out: Acceptable/Baseline resp. status   CV/Hemodynamics: Uneventful            Sign Out: Acceptable CV status   Other NRE: NONE   DID A NON-ROUTINE EVENT OCCUR? No    Event details/Postop Comments:  Pt was happy with anesthesia care.  No complications.  I will follow up with the pt if needed.           Last vitals:  Vitals Value Taken Time   /79 09/08/22 1010   Temp     Pulse 71 09/08/22 1010   Resp     SpO2 92 % 09/08/22 1019   Vitals shown include unvalidated device data.    Electronically Signed By: SHIV Rushing CRNA  September 8, 2022  10:20 AM

## 2022-09-08 NOTE — ANESTHESIA CARE TRANSFER NOTE
Patient: Bulmaro Herrera    Procedure: Procedure(s):  Bilateral L5-S1 transforaminal epidural injections and Left intra-articular shouldr injections       Diagnosis: Lumbar radiculopathy [M54.16]  Diagnosis Additional Information: No value filed.    Anesthesia Type:   MAC     Note:    Oropharynx: spontaneously breathing  Level of Consciousness: awake  Oxygen Supplementation: room air    Independent Airway: airway patency satisfactory and stable  Dentition: dentition unchanged  Vital Signs Stable: post-procedure vital signs reviewed and stable  Report to RN Given: handoff report given  Patient transferred to: Phase II    Handoff Report: Identifed the Patient, Identified the Reponsible Provider, Reviewed the pertinent medical history, Discussed the surgical course, Reviewed Intra-OP anesthesia mangement and issues during anesthesia, Set expectations for post-procedure period and Allowed opportunity for questions and acknowledgement of understanding      Vitals:  Vitals Value Taken Time   /79 09/08/22 1010   Temp     Pulse 71 09/08/22 1010   Resp     SpO2 95 % 09/08/22 1018   Vitals shown include unvalidated device data.    Electronically Signed By: SHIV Rushing CRNA  September 8, 2022  10:19 AM

## 2022-09-08 NOTE — OP NOTE
CHIEF COMPLAINT:   1. neck pain secondary to cervical spondylosis.  2.  Left shoulder pain secondary to glenohumeral joint degeneration and a left shoulder labral tear  3.  Low back pain with severe disc degeneration at L5-S1 causing back pain and leg pains  PROCEDURE number #1:    1.  Bilateral L5-S1 transforaminal epidural injections utilizing fluoroscopic guidance with contrast dye.      PROCEDURE DETAILS: After written informed consent was obtained from the patient, the patient was escorted to the procedure room.  The patient was placed in the sitting position.  A  time out  was conducted to verify the patient identity, procedure to be performed, side, site, allergies and any special requirements.  The skin over the neck was prepped and draped in normal sterile fashion. Fluoroscopy was used to identify the zygopophyseal joint with a lateral view.   The skin was anesthetized with 0.5 mL of 1% lidocaine with bicarbonate buffer.  2 separate 22-gauge Quincke needles were advanced into the foraminal openings from the oblique views and walked into the posterior aspect of the foramen at L5-S1 in the lateral fluoroscopic image.  In the AP image Omnipaque contrast was injected which showed proper spread in the epidural space no evidence of any intravascular, intrathecal, intraneural injection.  I then injected 20 mg of Depo-Medrol with 3 cc of preservative-free normal saline into each foraminal opening with good dispersion into his epidural space.      PROCEDURE number #2:     Procedure details: After his neck was treated we moved over to his left shoulder.  Left shoulder posterior approach was prepped with ChloraPrep.  1% lidocaine was used for skin.  I then advanced a 22-gauge Quincke needle into his left glenohumeral joint.  I achieved a proper arthrogram of the left glenohumeral joint with 3 cc of Isovue contrast.  I then injected 8 cc of 0.25% bupivacaine with 4 mg/cc of Depo-Medrol.  The needle was then removed and  a sterile bandage placed over the needle insertion site.     Blood loss: Less than 2 cc     DIAGNOSIS:  1.  Cervical spondylosis secondary to a symptomatic facet syndrome causing axial neck pain with a history of long-term pain relief from previous facet injections  2.  Severe needle phobia  3.  Labral tear left shoulder inferior  4.  Severe disc degeneration at L5-S1.     PLAN:  1. Performed bilateral L5-S1 epidural injections.  He really does not want to have low back surgery however he has severe degeneration at L5-S1 causing lumbar radiculopathy as well as axial back pain.  2.          I completed a left shoulder injection as well.  He does have a few other tears of his rotator cuff but the goal was to treat his glenohumeral joint.  Last time we perform this shoulder injection he actually got very high-grade pain relief which allowed him to sleep and perform his job as a .  He was again very happy with the last injection and today's injection I injected almost exclusively the glenohumeral joint without any involvement of his rotator cuff muscles.    Christian Chaudhry MD  Diplomate of the American Board of Anesthesiology, Pain Medicine

## 2022-09-12 ENCOUNTER — MYC REFILL (OUTPATIENT)
Dept: INTERNAL MEDICINE | Facility: CLINIC | Age: 57
End: 2022-09-12

## 2022-09-12 DIAGNOSIS — K21.9 GASTROESOPHAGEAL REFLUX DISEASE WITHOUT ESOPHAGITIS: ICD-10-CM

## 2022-09-14 RX ORDER — OMEPRAZOLE 40 MG/1
40 CAPSULE, DELAYED RELEASE ORAL DAILY
Qty: 90 CAPSULE | Refills: 0 | Status: SHIPPED | OUTPATIENT
Start: 2022-09-14 | End: 2022-12-15

## 2022-09-14 NOTE — TELEPHONE ENCOUNTER
Prescription approved per [facility] Refill Protocol.    Sydnee Menendez,BSN, RN

## 2022-09-21 NOTE — TELEPHONE ENCOUNTER
Reason for Call:  Same Day Appointment, Requested Provider:  Sandor Serrano MD    PCP: Sandor Serrano    Reason for visit: mouth pain/infection    Duration of symptoms: 4 days    Have you been treated for this in the past? Yes    Additional comments: Pt had dental work done on 03/02/20, he has since been experiencing intense pain that interferes in sleep and symptoms of infection. Please call for possible work in.    Can we leave a detailed message on this number? YES    Phone number patient can be reached at: Other phone number:  [phone] - wife Laine    Williams Time: Anytime    Call taken on 3/6/2020 at 9:33 AM by Maryjane Mcgraw     No

## 2022-10-09 ENCOUNTER — HEALTH MAINTENANCE LETTER (OUTPATIENT)
Age: 57
End: 2022-10-09

## 2022-12-15 ENCOUNTER — MYC MEDICAL ADVICE (OUTPATIENT)
Dept: INTERNAL MEDICINE | Facility: CLINIC | Age: 57
End: 2022-12-15

## 2022-12-15 ENCOUNTER — MYC REFILL (OUTPATIENT)
Dept: INTERNAL MEDICINE | Facility: CLINIC | Age: 57
End: 2022-12-15

## 2022-12-15 DIAGNOSIS — K21.9 GASTROESOPHAGEAL REFLUX DISEASE WITHOUT ESOPHAGITIS: ICD-10-CM

## 2022-12-16 DIAGNOSIS — K21.9 GASTROESOPHAGEAL REFLUX DISEASE WITHOUT ESOPHAGITIS: ICD-10-CM

## 2022-12-16 RX ORDER — OMEPRAZOLE 40 MG/1
40 CAPSULE, DELAYED RELEASE ORAL DAILY
Qty: 90 CAPSULE | Refills: 2 | Status: SHIPPED | OUTPATIENT
Start: 2022-12-16 | End: 2023-09-20

## 2022-12-19 RX ORDER — OMEPRAZOLE 40 MG/1
CAPSULE, DELAYED RELEASE ORAL
Qty: 90 CAPSULE | Refills: 0 | OUTPATIENT
Start: 2022-12-19

## 2023-01-04 ENCOUNTER — OFFICE VISIT (OUTPATIENT)
Dept: INTERNAL MEDICINE | Facility: CLINIC | Age: 58
End: 2023-01-04
Payer: COMMERCIAL

## 2023-01-04 VITALS
DIASTOLIC BLOOD PRESSURE: 90 MMHG | RESPIRATION RATE: 16 BRPM | HEIGHT: 72 IN | WEIGHT: 265.3 LBS | BODY MASS INDEX: 35.93 KG/M2 | TEMPERATURE: 97.7 F | OXYGEN SATURATION: 96 % | HEART RATE: 88 BPM | SYSTOLIC BLOOD PRESSURE: 122 MMHG

## 2023-01-04 DIAGNOSIS — E66.01 MORBID OBESITY (H): ICD-10-CM

## 2023-01-04 DIAGNOSIS — G47.33 OSA (OBSTRUCTIVE SLEEP APNEA): ICD-10-CM

## 2023-01-04 DIAGNOSIS — H81.13 BENIGN PAROXYSMAL POSITIONAL VERTIGO DUE TO BILATERAL VESTIBULAR DISORDER: ICD-10-CM

## 2023-01-04 DIAGNOSIS — H69.93 DYSFUNCTION OF BOTH EUSTACHIAN TUBES: Primary | ICD-10-CM

## 2023-01-04 PROCEDURE — 99214 OFFICE O/P EST MOD 30 MIN: CPT | Performed by: INTERNAL MEDICINE

## 2023-01-04 RX ORDER — PREDNISONE 20 MG/1
40 TABLET ORAL DAILY
Qty: 10 TABLET | Refills: 0 | Status: SHIPPED | OUTPATIENT
Start: 2023-01-04 | End: 2023-03-02

## 2023-01-04 RX ORDER — PREDNISONE 20 MG/1
1 TABLET ORAL
COMMUNITY
Start: 2022-12-27 | End: 2023-01-04

## 2023-01-04 ASSESSMENT — PAIN SCALES - GENERAL: PAINLEVEL: NO PAIN (0)

## 2023-01-04 NOTE — PROGRESS NOTES
Jemima Chamberlain is a 57 year old, presenting for the following health issues:  Ear Problem and Dizziness      History of Present Illness       Reason for visit:  Dizzy    He eats 0-1 servings of fruits and vegetables daily.He consumes 0 sweetened beverage(s) daily.He exercises with enough effort to increase his heart rate 10 to 19 minutes per day.  He exercises with enough effort to increase his heart rate 3 or less days per week.   He is taking medications regularly.          EMR reviewed including:             Complaint, History of Chief Complaint, Corresponding Review of Systems, and Complaint Specific Physical Examination.    #1   Persistent vertigo  Diagnosed with bilateral otitis.  Started on Augmentin, prednisone, meclizine at urgent care.  Got better but now is getting worse.  No falls or secondary injury.  Worse with any head motion.  No vomiting but  intermittent nausea.  Denies fevers or chills.        Exam:  Tympanic membranes are markedly bulging and somewhat dusky bilaterally.  Mild residual inflammation noted around the periphery.  No perforation is noted.  Patient has moderate  Patient has difficulty standing with eyes closed.  Nasal mucosa somewhat edematous, posterior nasal drainage is now clear.   LUNGS: clear bilaterally, airflow is brisk, no intercostal retraction or stridor is noted. No coughing is noted during visit.   HEART:  regular without rubs, clicks, gallops, or murmurs. PMI is nondisplaced. Upstrokes are brisk. S1,S2 are heard.   NEURO: Pt is alert and appropriate. No neurologic lateralization is noted. Cranial nerves 2-12 are intact. Peripheral sensory and motor function are grossly normal.       #2   Follow-up on sleep apnea.  Continues to use CPAP.  Notes increased fatigue recently.  Denies significant shortness of breath.        Patient has been interviewed, applicable history and applied review of systems have been performed.    Vital Signs:   BP (!) 122/90 (BP Location:  Right arm, Patient Position: Sitting, Cuff Size: Adult Large)   Pulse 88   Temp 97.7  F (36.5  C) (Temporal)   Resp 16   Ht 1.829 m (6')   Wt 120.3 kg (265 lb 4.8 oz)   SpO2 96%   BMI 35.98 kg/m        Decision Making    Problem and Complexity     1. Dysfunction of both eustachian tubes  Will repeat prednisone burst.  Patient continues with Augmentin.  Will use meclizine as needed.  Patient's wife is scheduling him with ENT.  - predniSONE (DELTASONE) 20 MG tablet; Take 2 tablets (40 mg) by mouth daily  Dispense: 10 tablet; Refill: 0    2. Benign paroxysmal positional vertigo due to bilateral vestibular disorder  As above      3. Morbid obesity (H)  Recommend weight loss responsible caloric restriction    4. ROBBY (obstructive sleep apnea)  Continue CPAP                                FOLLOW UP   I have asked the patient to make an appointment for followup with either:  1.  Me,  2.  The patient's preferred provider,  3.  Any available provider  In 2 weeks if not markedly improved        Regarding routine vaccinations:  I have reviewed the patient's vaccination schedule and discussed the benefits of prophylactic vaccination in detail.  I recommend the patient contact their pharmacist (not the pharmacy within the clinic) for vaccinations.  Discussed that most insurance companies now favor reimbursement to the pharmacies and it will financially behoove the patient to have vaccinations performed at their pharmacy.        I have carefully explained the diagnosis and treatment options to the patient.  The patient has displayed an understanding of the above, and all subsequent questions were answered.      DO ANDREW Blandon    Portions of this note were produced using [employer]  Although every attempt at real-time proof reading has been made, occasional grammar/syntax errors may have been missed.

## 2023-01-05 ENCOUNTER — MYC MEDICAL ADVICE (OUTPATIENT)
Dept: INTERNAL MEDICINE | Facility: CLINIC | Age: 58
End: 2023-01-05

## 2023-01-05 DIAGNOSIS — R53.83 FATIGUE, UNSPECIFIED TYPE: Primary | ICD-10-CM

## 2023-01-06 NOTE — TELEPHONE ENCOUNTER
This note has been reviewed by myself, not just the MA or nurse.    Orders are placed as per request.    Please let the patient/his significant other know this.    Darian

## 2023-01-07 ENCOUNTER — LAB (OUTPATIENT)
Dept: LAB | Facility: CLINIC | Age: 58
End: 2023-01-07
Payer: COMMERCIAL

## 2023-01-07 DIAGNOSIS — R53.83 FATIGUE, UNSPECIFIED TYPE: ICD-10-CM

## 2023-01-07 DIAGNOSIS — I25.10 CORONARY ARTERY DISEASE INVOLVING NATIVE CORONARY ARTERY OF NATIVE HEART WITHOUT ANGINA PECTORIS: ICD-10-CM

## 2023-01-07 DIAGNOSIS — E78.00 HYPERCHOLESTEROLEMIA: ICD-10-CM

## 2023-01-07 LAB
ALBUMIN SERPL-MCNC: 4.1 G/DL (ref 3.4–5)
ALP SERPL-CCNC: 69 U/L (ref 40–150)
ALT SERPL W P-5'-P-CCNC: 56 U/L (ref 0–70)
ANION GAP SERPL CALCULATED.3IONS-SCNC: 8 MMOL/L (ref 3–14)
AST SERPL W P-5'-P-CCNC: 30 U/L (ref 0–45)
BILIRUB SERPL-MCNC: 0.9 MG/DL (ref 0.2–1.3)
BUN SERPL-MCNC: 21 MG/DL (ref 7–30)
CALCIUM SERPL-MCNC: 9.1 MG/DL (ref 8.5–10.1)
CHLORIDE BLD-SCNC: 108 MMOL/L (ref 94–109)
CO2 SERPL-SCNC: 26 MMOL/L (ref 20–32)
CREAT SERPL-MCNC: 0.97 MG/DL (ref 0.66–1.25)
GFR SERPL CREATININE-BSD FRML MDRD: >90 ML/MIN/1.73M2
GLUCOSE BLD-MCNC: 120 MG/DL (ref 70–99)
HBA1C MFR BLD: 6.2 % (ref 0–5.6)
POTASSIUM BLD-SCNC: 4 MMOL/L (ref 3.4–5.3)
PROT SERPL-MCNC: 6.9 G/DL (ref 6.8–8.8)
SODIUM SERPL-SCNC: 142 MMOL/L (ref 133–144)
TSH SERPL DL<=0.005 MIU/L-ACNC: 3.49 MU/L (ref 0.4–4)

## 2023-01-07 PROCEDURE — 80053 COMPREHEN METABOLIC PANEL: CPT

## 2023-01-07 PROCEDURE — 36415 COLL VENOUS BLD VENIPUNCTURE: CPT

## 2023-01-07 PROCEDURE — 84403 ASSAY OF TOTAL TESTOSTERONE: CPT

## 2023-01-07 PROCEDURE — 82306 VITAMIN D 25 HYDROXY: CPT

## 2023-01-07 PROCEDURE — 83036 HEMOGLOBIN GLYCOSYLATED A1C: CPT

## 2023-01-07 PROCEDURE — 84443 ASSAY THYROID STIM HORMONE: CPT

## 2023-01-09 LAB — DEPRECATED CALCIDIOL+CALCIFEROL SERPL-MC: 31 UG/L (ref 20–75)

## 2023-01-10 LAB — TESTOST SERPL-MCNC: 271 NG/DL (ref 240–950)

## 2023-01-11 ENCOUNTER — MYC MEDICAL ADVICE (OUTPATIENT)
Dept: INTERNAL MEDICINE | Facility: CLINIC | Age: 58
End: 2023-01-11

## 2023-01-11 DIAGNOSIS — S16.1XXA STRAIN OF NECK MUSCLE, INITIAL ENCOUNTER: Primary | ICD-10-CM

## 2023-01-29 NOTE — NURSING NOTE
Chief Complaint   Patient presents with     Physical       Initial /82 (BP Location: Right arm, Patient Position: Chair, Cuff Size: Adult Regular)  Pulse 95  Temp 98.1  F (36.7  C) (Temporal)  Wt 249 lb 9.6 oz (113.2 kg)  SpO2 97%  BMI 33.85 kg/m2 Estimated body mass index is 33.85 kg/(m^2) as calculated from the following:    Height as of 8/15/17: 6' (1.829 m).    Weight as of this encounter: 249 lb 9.6 oz (113.2 kg).  Medication Reconciliation: complete   Sole Owusu CMA    Health Maintenance Due   Topic Date Due     URINE DRUG SCREEN Q1 YR  06/21/1982     SABIHA QUESTIONNAIRE 1 YEAR  06/21/1985     PHQ-9 Q1YR  09/22/2012     TETANUS IMMUNIZATION (SYSTEM ASSIGNED)  04/13/2015     ASTHMA CONTROL TEST Q6 MOS  08/26/2016     ASTHMA ACTION PLAN Q1 YR  02/26/2017         Health Maintenance reviewed at today's visit patient asked to schedule/complete:   PHq-9 given and sabiha-7 ACT given to patient and AAP printed.  Urine was collected   Sole Owusu CMA      
oral

## 2023-02-13 ENCOUNTER — MYC MEDICAL ADVICE (OUTPATIENT)
Dept: CARDIOLOGY | Facility: CLINIC | Age: 58
End: 2023-02-13
Payer: COMMERCIAL

## 2023-02-13 DIAGNOSIS — E78.00 HYPERCHOLESTEROLEMIA: ICD-10-CM

## 2023-02-13 DIAGNOSIS — I25.10 CORONARY ARTERY DISEASE INVOLVING NATIVE CORONARY ARTERY OF NATIVE HEART WITHOUT ANGINA PECTORIS: ICD-10-CM

## 2023-02-13 RX ORDER — ROSUVASTATIN CALCIUM 5 MG/1
5 TABLET, COATED ORAL EVERY OTHER DAY
Qty: 90 TABLET | Refills: 0 | Status: SHIPPED | OUTPATIENT
Start: 2023-02-13 | End: 2023-02-15

## 2023-02-13 NOTE — TELEPHONE ENCOUNTER
2/13/23 [facility] Cardiology Refill Guideline reviewed. Medication meets criteria for refill. Refill sent.  Moon Astorga RN 02/13/23 3:44 PM    Future Appointments   Date Time Provider Department Center   3/2/2023  8:15 AM Gloria Leiva [facility]   3/2/2023 11:15 AM Jeffrey Stark MD [facility]   4/4/2023  3:00 PM Carlos Stephens MD [facility]

## 2023-02-15 RX ORDER — ROSUVASTATIN CALCIUM 5 MG/1
5 TABLET, COATED ORAL DAILY
Qty: 90 TABLET | Refills: 4 | Status: SHIPPED | OUTPATIENT
Start: 2023-02-15 | End: 2024-02-16

## 2023-02-18 ENCOUNTER — HEALTH MAINTENANCE LETTER (OUTPATIENT)
Age: 58
End: 2023-02-18

## 2023-02-21 NOTE — PROGRESS NOTES
"ENT Consultation    Bulmaro Herrera who is a 57 year old male seen in consultation at the request of self.      History of Present Illness - Bulmaro Herrera is a 57 year old male ear infections   Presents with history of hearing loss that is been gradual with some tinnitus but not bothersome to him nonpulsatile bilateral intermittent.  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  No family history of hearing loss.  He was lately told that he has some ear infections in the ear intermittently but she feels like there is \"water\" behind his eardrums from time to time.  However most importantly is vertigo.  Ongoing for couple years.  True vertigo with and without motion with some emesis that may last for hours.  Denies history of migraines any photophobia or phonophobia.  No changes in vision or headaches.        BP Readings from Last 1 Encounters:   01/04/23 (!) 122/90       BP noted to be elevated today in office.  Patient to follow up with Primary Care provider regarding elevated blood pressure.    Bulmaro IS NOT a smoker/uses chewing tobacco.  Bulmaro already quit      Past Medical History -   Past Medical History:   Diagnosis Date     Back pains      Depressive disorder, not elsewhere classified 1/28/2007    declines Rx     Disc degeneration 12/2008    see MRI -throaco-lumbar mild discogenic degenerative changes     Elevated LFT's 11/09    alt and ast     Other and unspecified hyperlipidemia 1/2007     Sleep disorder 9/09    CPAP     Tobacco use disorder        Current Medications -   Current Outpatient Medications:      acetaminophen (TYLENOL) 500 MG tablet, Take 1,000 mg by mouth every morning, Disp: , Rfl:      amoxicillin-clavulanate (AUGMENTIN) 875-125 MG tablet, TAKE 1 TABLET BY MOUTH TWICE A DAY FOR 10DAYS, Disp: , Rfl:      aspirin (ASA) 81 MG chewable tablet, Take 1 tablet (81 mg) by mouth daily, Disp: , Rfl:      ibuprofen (ADVIL/MOTRIN) 200 MG tablet, Take 400 mg by mouth every morning, Disp: , Rfl:      " loratadine (CLARITIN) 10 MG tablet, TAKE 1 TABLET (10 MG) BY MOUTH DAILY, Disp: 90 tablet, Rfl: 0     meclizine (ANTIVERT) 25 MG tablet, Take 1 tablet (25 mg) by mouth 3 times daily as needed for dizziness, Disp: 30 tablet, Rfl: 0     modafinil (PROVIGIL) 200 MG tablet, Take 1 tablet (200 mg) by mouth daily, Disp: 90 tablet, Rfl: 1     omeprazole (PRILOSEC) 40 MG DR capsule, Take 1 capsule (40 mg) by mouth daily TAKE 1 CAPSULE (40 MG) BY MOUTH DAILY, Disp: 90 capsule, Rfl: 2     predniSONE (DELTASONE) 20 MG tablet, Take 2 tablets (40 mg) by mouth daily, Disp: 10 tablet, Rfl: 0     rosuvastatin (CRESTOR) 5 MG tablet, Take 1 tablet (5 mg) by mouth daily, Disp: 90 tablet, Rfl: 4    Allergies -   Allergies   Allergen Reactions     Percocet [Oxycodone-Acetaminophen] Itching     Wasps [Hornets] Swelling       Social History -   Social History     Socioeconomic History     Marital status:    Occupational History     Occupation:      Employer: SELF   Tobacco Use     Smoking status: Former     Packs/day: 1.50     Types: Cigarettes     Quit date: 2007     Years since quittin.0     Smokeless tobacco: Never   Substance and Sexual Activity     Alcohol use: No     Comment: rare     Drug use: No     Sexual activity: Yes     Partners: Female   Other Topics Concern     Parent/sibling w/ CABG, MI or angioplasty before 65F 55M? No   Social History Narrative    Dairy/d a lot, 1 gallon of milk per day plus cheese and other servings     Caffeine 48 oz. coffee per day    Exercise none    Sunscreen used - No    Seatbelts used - Yes    Working smoke/CO detectors in the home - Yes    Guns stored in the home - No    Self Breast Exams - NA    Self Testicular Exam - No    Eye Exam up to date - No    Dental Exam up to date - Yes    Pap Smear up to date - NA    Mammogram up to date - NA    PSA up to date - No    Dexa Scan up to date - NA    Flex Sig / Colonoscopy up to date - No    Immunizations up to date - No     Abuse: Current or Past(Physical, Sexual or Emotional)- No    Do you feel safe in your environment - Yes    Tms,cma        Lives with spouse and her parents.       Family History -   Family History   Problem Relation Age of Onset     Breast Cancer Mother      Arthritis Mother         joint ?     Depression Father      Cancer Maternal Grandmother      C.A.D. Maternal Grandfather      Cardiovascular Maternal Grandfather      Gynecology Paternal Grandmother          giving birth     Prostate Cancer Paternal Grandfather      Genetic Disorder Brother         joint pain?     Depression Brother      Cardiovascular Son         congenital heart defect -       Prostate Cancer Other          age 70's     Diabetes Other        Review of Systems - As per HPI and PMHx, otherwise review of system review of the head and neck negative. Otherwise 10+ review of system is negative    Physical Exam  There were no vitals taken for this visit.  BMI: There is no height or weight on file to calculate BMI.    General - The patient is well nourished and well developed, and appears to have good nutritional status.  Alert and oriented to person and place, answers questions and cooperates with examination appropriately.    SKIN - No suspicious lesions or rashes.  Respiration - No respiratory distress.  Head and Face - Normocephalic and atraumatic, with no gross asymmetry noted of the contour of the facial features.  The facial nerve is intact, with strong symmetric movements.    Voice and Breathing - The patient was breathing comfortably without the use of accessory muscles. The patients voice was clear and strong, and had appropriate pitch and quality.    Ears - Bilateral pinna and EACs with normal appearing overlying skin. Tympanic membrane intact with good mobility on pneumatic otoscopy bilaterally. Bony landmarks of the ossicular chain are normal. The tympanic membranes are normal in appearance. No retraction, perforation, or  masses.  No fluid or purulence was seen in the external canal or the middle ear.     Eyes - Extraocular movements intact.  Sclera were not icteric or injected, conjunctiva were pink and moist.    Mouth - Examination of the oral cavity showed pink, healthy oral mucosa. No lesions or ulcerations noted.  The tongue was mobile and midline, and the dentition were in good condition.      Throat - The walls of the oropharynx were smooth, pink, moist, symmetric, and had no lesions or ulcerations.  The tonsillar pillars and soft palate were symmetric.  The uvula was midline on elevation.    Neck - Normal midline excursion of the laryngotracheal complex during swallowing.  Full range of motion on passive movement.  Palpation of the occipital, submental, submandibular, internal jugular chain, and supraclavicular nodes did not demonstrate any abnormal lymph nodes or masses.  The carotid pulse was palpable bilaterally.  Palpation of the thyroid was soft and smooth, with no nodules or goiter appreciated.  The trachea was mobile and midline.    Nose - External contour is symmetric, no gross deflection or scars.  Nasal mucosa is pink and moist with no abnormal mucus.  The septum was midline and non-obstructive, turbinates of normal size and position.  No polyps, masses, or purulence noted on examination.    Neuro - Nonfocal neuro exam is normal, CN 2 through 12 intact, normal gait and muscle tone.  Romberg eyes closed unsteady without specific direction of fall.    Performed in clinic today:  Audiologic Studies - An audiogram and tympanogram were performed today as part of the evaluation and personally reviewed. The tympanogram shows Type A curves on the right and Type A curves on the left, with Normal canal volumes and middle ear pressures.  The audiogram showed Mild to moderate SNHL on the right and Normal thresholds at low frequencies to moderate SNHL at high frequencieson the left.    Wordrecognition score 92% on the right and  96% on the left    A/P - Bulmaro FRANK Sharon is a 57 year old male with significant sensorineural hearing loss bilaterally that is best managed with protection and amplification.  Patient will get hearing aids.  In regard to his dizziness and vertigo.  Counseled on lifestyle changes reduction of caffeine intake which she likes a lot and sold.  We also discussed foot evaluation with imaging of vertigo with MRI of the brain with and without contrast.  Because of high anxiety with MRI and claustrophobia 1 Ativan is prescribed.  Meclizine is prescribed to be taken as needed for vertigo in the meantime but will be stopped for at least 3 days prior to further evaluation of his vestibular system at [facility] which will be scheduled.  Patient will see us back in 6 weeks.      Jeffrey Stark MD

## 2023-03-02 ENCOUNTER — OFFICE VISIT (OUTPATIENT)
Dept: AUDIOLOGY | Facility: CLINIC | Age: 58
End: 2023-03-02
Payer: COMMERCIAL

## 2023-03-02 ENCOUNTER — OFFICE VISIT (OUTPATIENT)
Dept: INTERNAL MEDICINE | Facility: CLINIC | Age: 58
End: 2023-03-02
Payer: COMMERCIAL

## 2023-03-02 ENCOUNTER — HOSPITAL ENCOUNTER (OUTPATIENT)
Dept: GENERAL RADIOLOGY | Facility: CLINIC | Age: 58
Discharge: HOME | End: 2023-03-02
Attending: INTERNAL MEDICINE | Admitting: INTERNAL MEDICINE
Payer: COMMERCIAL

## 2023-03-02 ENCOUNTER — OFFICE VISIT (OUTPATIENT)
Dept: OTOLARYNGOLOGY | Facility: CLINIC | Age: 58
End: 2023-03-02
Payer: COMMERCIAL

## 2023-03-02 VITALS
SYSTOLIC BLOOD PRESSURE: 124 MMHG | OXYGEN SATURATION: 93 % | BODY MASS INDEX: 37.09 KG/M2 | HEART RATE: 84 BPM | WEIGHT: 264.9 LBS | HEIGHT: 71 IN | RESPIRATION RATE: 20 BRPM | TEMPERATURE: 97.9 F | DIASTOLIC BLOOD PRESSURE: 80 MMHG

## 2023-03-02 VITALS
WEIGHT: 264 LBS | BODY MASS INDEX: 36.96 KG/M2 | DIASTOLIC BLOOD PRESSURE: 80 MMHG | SYSTOLIC BLOOD PRESSURE: 122 MMHG | HEIGHT: 71 IN

## 2023-03-02 DIAGNOSIS — R42 DIZZINESS AND GIDDINESS: ICD-10-CM

## 2023-03-02 DIAGNOSIS — R06.02 SOB (SHORTNESS OF BREATH): ICD-10-CM

## 2023-03-02 DIAGNOSIS — H90.3 SENSORINEURAL HEARING LOSS, BILATERAL: ICD-10-CM

## 2023-03-02 DIAGNOSIS — R42 VERTIGO: Primary | ICD-10-CM

## 2023-03-02 DIAGNOSIS — F41.9 ANXIETY: ICD-10-CM

## 2023-03-02 DIAGNOSIS — H90.3 SENSORINEURAL HEARING LOSS, ASYMMETRICAL: Primary | ICD-10-CM

## 2023-03-02 DIAGNOSIS — Z79.899 ENCOUNTER FOR LONG-TERM (CURRENT) USE OF MEDICATIONS: Primary | ICD-10-CM

## 2023-03-02 PROCEDURE — 71046 X-RAY EXAM CHEST 2 VIEWS: CPT

## 2023-03-02 PROCEDURE — 92557 COMPREHENSIVE HEARING TEST: CPT | Performed by: AUDIOLOGIST

## 2023-03-02 PROCEDURE — 99213 OFFICE O/P EST LOW 20 MIN: CPT | Performed by: INTERNAL MEDICINE

## 2023-03-02 PROCEDURE — 99204 OFFICE O/P NEW MOD 45 MIN: CPT | Performed by: OTOLARYNGOLOGY

## 2023-03-02 PROCEDURE — 92550 TYMPANOMETRY & REFLEX THRESH: CPT | Performed by: AUDIOLOGIST

## 2023-03-02 RX ORDER — LORAZEPAM 0.5 MG/1
0.5 TABLET ORAL EVERY 6 HOURS PRN
Qty: 1 TABLET | Refills: 0 | Status: SHIPPED | OUTPATIENT
Start: 2023-03-02 | End: 2023-03-03

## 2023-03-02 RX ORDER — MECLIZINE HYDROCHLORIDE 25 MG/1
25 TABLET ORAL 3 TIMES DAILY PRN
Qty: 90 TABLET | Refills: 1 | Status: SHIPPED | OUTPATIENT
Start: 2023-03-02 | End: 2023-04-01

## 2023-03-02 ASSESSMENT — PAIN SCALES - GENERAL: PAINLEVEL: MILD PAIN (3)

## 2023-03-02 NOTE — PROGRESS NOTES
AUDIOLOGY REPORT     SUMMARY: Audiology visit completed. See audiogram for results.     RECOMMENDATIONS: Follow-up with ENT    Brinda Adler Licensed Audiologist #[id number]

## 2023-03-02 NOTE — PROGRESS NOTES
"      Jemima Chamberlain is a 57 year old, presenting for the following health issues:  Breathing Problem (4 months)      History of Present Illness       Reason for visit:  Breathing problems    He eats 2-3 servings of fruits and vegetables daily.He consumes 0 sweetened beverage(s) daily.He exercises with enough effort to increase his heart rate 9 or less minutes per day.  He exercises with enough effort to increase his heart rate 3 or less days per week.   He is taking medications regularly.        EMR reviewed including:             Complaint, History of Chief Complaint, Corresponding Review of Systems, and Complaint Specific Physical Examination.    #1   Increased dyspnea on exertion and shortness of breath.  Notes increased wheezing.  Feels as if he cannot get a deep breath.  Previous pulmonary function studies were unremarkable.  Notes that he was given an albuterol inhaler but did not use it because \"it does not work\".  CT angiogram of the heart performed last year demonstrates no significant obstruction.  Patient had an appointment with cardiology but had to reschedule.  It is now pending.  Denies chest pain, edema.        Patient has been interviewed, applicable history and applied review of systems have been performed.    Vital Signs:   /80 (BP Location: Right arm, Patient Position: Chair)   Pulse 84   Temp 97.9  F (36.6  C) (Temporal)   Resp 20   Ht 1.797 m (5' 10.75\")   Wt 120.2 kg (264 lb 14.4 oz)   SpO2 93%   BMI 37.21 kg/m        Decision Making    Problem and Complexity     1. Encounter for long-term (current) use of medications      2. SOB (shortness of breath)  Given the patient's chronic symptoms, lack of response to previous therapy, and age, would like to set up with a pulmonologist for evaluation.  Will preemptively get PFTs so they are fresh for comparison at the time of visit.  We will preemptively obtain chest x-ray to rule out possible infiltrate prior to the visit.  - XR Chest 2 " Views; Future  - General PFT Lab (Please always keep checked); Future  - Pulmonary Function Test  - Adult Pulmonary Medicine Referral; Future                                FOLLOW UP   I have asked the patient to make an appointment for followup with either:  1.  Me,  2.  The patient's preferred provider,  3.  Any available provider  In 6 months        Regarding routine vaccinations:  I have reviewed the patient's vaccination schedule and discussed the benefits of prophylactic vaccination in detail.  I recommend the patient contact their pharmacist (not the pharmacy within the clinic) for vaccinations.  Discussed that most insurance companies now favor reimbursement to the pharmacies and it will financially behoove the patient to have vaccinations performed at their pharmacy.        I have carefully explained the diagnosis and treatment options to the patient.  The patient has displayed an understanding of the above, and all subsequent questions were answered.      DO ANDREW Blandon    Portions of this note were produced using [email or web address]  Although every attempt at real-time proof reading has been made, occasional grammar/syntax errors may have been missed.

## 2023-03-02 NOTE — LETTER
"    3/2/2023         RE: Bulmaro Herrera  [address]  [facility] [address]        Dear Colleague,    Thank you for referring your patient, Bulmaro Herrera, to the [facility]. Please see a copy of my visit note below.    ENT Consultation    Bulmaro Herrera who is a 57 year old male seen in consultation at the request of self.      History of Present Illness - Bulmaro Herrera is a 57 year old male ear infections   Presents with history of hearing loss that is been gradual with some tinnitus but not bothersome to him nonpulsatile bilateral intermittent.  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  No family history of hearing loss.  He was lately told that he has some ear infections in the ear intermittently but she feels like there is \"water\" behind his eardrums from time to time.  However most importantly is vertigo.  Ongoing for couple years.  True vertigo with and without motion with some emesis that may last for hours.  Denies history of migraines any photophobia or phonophobia.  No changes in vision or headaches.        BP Readings from Last 1 Encounters:   01/04/23 (!) 122/90       BP noted to be elevated today in office.  Patient to follow up with Primary Care provider regarding elevated blood pressure.    Bulmaro IS NOT a smoker/uses chewing tobacco.  Bulmaro already quit      Past Medical History -   Past Medical History:   Diagnosis Date     Back pains      Depressive disorder, not elsewhere classified 1/28/2007    declines Rx     Disc degeneration 12/2008    see MRI -throaco-lumbar mild discogenic degenerative changes     Elevated LFT's 11/09    alt and ast     Other and unspecified hyperlipidemia 1/2007     Sleep disorder 9/09    CPAP     Tobacco use disorder        Current Medications -   Current Outpatient Medications:      acetaminophen (TYLENOL) 500 MG tablet, Take 1,000 mg by mouth every morning, Disp: , Rfl:      amoxicillin-clavulanate (AUGMENTIN) 875-125 MG tablet, TAKE 1 " TABLET BY MOUTH TWICE A DAY FOR 10DAYS, Disp: , Rfl:      aspirin (ASA) 81 MG chewable tablet, Take 1 tablet (81 mg) by mouth daily, Disp: , Rfl:      ibuprofen (ADVIL/MOTRIN) 200 MG tablet, Take 400 mg by mouth every morning, Disp: , Rfl:      loratadine (CLARITIN) 10 MG tablet, TAKE 1 TABLET (10 MG) BY MOUTH DAILY, Disp: 90 tablet, Rfl: 0     meclizine (ANTIVERT) 25 MG tablet, Take 1 tablet (25 mg) by mouth 3 times daily as needed for dizziness, Disp: 30 tablet, Rfl: 0     modafinil (PROVIGIL) 200 MG tablet, Take 1 tablet (200 mg) by mouth daily, Disp: 90 tablet, Rfl: 1     omeprazole (PRILOSEC) 40 MG DR capsule, Take 1 capsule (40 mg) by mouth daily TAKE 1 CAPSULE (40 MG) BY MOUTH DAILY, Disp: 90 capsule, Rfl: 2     predniSONE (DELTASONE) 20 MG tablet, Take 2 tablets (40 mg) by mouth daily, Disp: 10 tablet, Rfl: 0     rosuvastatin (CRESTOR) 5 MG tablet, Take 1 tablet (5 mg) by mouth daily, Disp: 90 tablet, Rfl: 4    Allergies -   Allergies   Allergen Reactions     Percocet [Oxycodone-Acetaminophen] Itching     Wasps [Hornets] Swelling       Social History -   Social History     Socioeconomic History     Marital status:    Occupational History     Occupation:      Employer: SELF   Tobacco Use     Smoking status: Former     Packs/day: 1.50     Types: Cigarettes     Quit date: 2007     Years since quittin.0     Smokeless tobacco: Never   Substance and Sexual Activity     Alcohol use: No     Comment: rare     Drug use: No     Sexual activity: Yes     Partners: Female   Other Topics Concern     Parent/sibling w/ CABG, MI or angioplasty before 65F 55M? No   Social History Narrative    Dairy/d a lot, 1 gallon of milk per day plus cheese and other servings     Caffeine 48 oz. coffee per day    Exercise none    Sunscreen used - No    Seatbelts used - Yes    Working smoke/CO detectors in the home - Yes    Guns stored in the home - No    Self Breast Exams - NA    Self Testicular Exam - No     Eye Exam up to date - No    Dental Exam up to date - Yes    Pap Smear up to date - NA    Mammogram up to date - NA    PSA up to date - No    Dexa Scan up to date - NA    Flex Sig / Colonoscopy up to date - No    Immunizations up to date - No    Abuse: Current or Past(Physical, Sexual or Emotional)- No    Do you feel safe in your environment - Yes    Tms,cma        Lives with spouse and her parents.       Family History -   Family History   Problem Relation Age of Onset     Breast Cancer Mother      Arthritis Mother         joint ?     Depression Father      Cancer Maternal Grandmother      C.A.D. Maternal Grandfather      Cardiovascular Maternal Grandfather      Gynecology Paternal Grandmother          giving birth     Prostate Cancer Paternal Grandfather      Genetic Disorder Brother         joint pain?     Depression Brother      Cardiovascular Son         congenital heart defect -       Prostate Cancer Other          age 70's     Diabetes Other        Review of Systems - As per HPI and PMHx, otherwise review of system review of the head and neck negative. Otherwise 10+ review of system is negative    Physical Exam  There were no vitals taken for this visit.  BMI: There is no height or weight on file to calculate BMI.    General - The patient is well nourished and well developed, and appears to have good nutritional status.  Alert and oriented to person and place, answers questions and cooperates with examination appropriately.    SKIN - No suspicious lesions or rashes.  Respiration - No respiratory distress.  Head and Face - Normocephalic and atraumatic, with no gross asymmetry noted of the contour of the facial features.  The facial nerve is intact, with strong symmetric movements.    Voice and Breathing - The patient was breathing comfortably without the use of accessory muscles. The patients voice was clear and strong, and had appropriate pitch and quality.    Ears - Bilateral pinna and EACs  with normal appearing overlying skin. Tympanic membrane intact with good mobility on pneumatic otoscopy bilaterally. Bony landmarks of the ossicular chain are normal. The tympanic membranes are normal in appearance. No retraction, perforation, or masses.  No fluid or purulence was seen in the external canal or the middle ear.     Eyes - Extraocular movements intact.  Sclera were not icteric or injected, conjunctiva were pink and moist.    Mouth - Examination of the oral cavity showed pink, healthy oral mucosa. No lesions or ulcerations noted.  The tongue was mobile and midline, and the dentition were in good condition.      Throat - The walls of the oropharynx were smooth, pink, moist, symmetric, and had no lesions or ulcerations.  The tonsillar pillars and soft palate were symmetric.  The uvula was midline on elevation.    Neck - Normal midline excursion of the laryngotracheal complex during swallowing.  Full range of motion on passive movement.  Palpation of the occipital, submental, submandibular, internal jugular chain, and supraclavicular nodes did not demonstrate any abnormal lymph nodes or masses.  The carotid pulse was palpable bilaterally.  Palpation of the thyroid was soft and smooth, with no nodules or goiter appreciated.  The trachea was mobile and midline.    Nose - External contour is symmetric, no gross deflection or scars.  Nasal mucosa is pink and moist with no abnormal mucus.  The septum was midline and non-obstructive, turbinates of normal size and position.  No polyps, masses, or purulence noted on examination.    Neuro - Nonfocal neuro exam is normal, CN 2 through 12 intact, normal gait and muscle tone.  Romberg eyes closed unsteady without specific direction of fall.    Performed in clinic today:  Audiologic Studies - An audiogram and tympanogram were performed today as part of the evaluation and personally reviewed. The tympanogram shows Type A curves on the right and Type A curves on the  left, with Normal canal volumes and middle ear pressures.  The audiogram showed Mild to moderate SNHL on the right and Normal thresholds at low frequencies to moderate SNHL at high frequencieson the left.    Wordrecognition score 92% on the right and 96% on the left    GISSEL/P Yaw Schafferlanette Herrera is a 57 year old male with significant sensorineural hearing loss bilaterally that is best managed with protection and amplification.  Patient will get hearing aids.  In regard to his dizziness and vertigo.  Counseled on lifestyle changes reduction of caffeine intake which she likes a lot and sold.  We also discussed foot evaluation with imaging of vertigo with MRI of the brain with and without contrast.  Because of high anxiety with MRI and claustrophobia 1 Ativan is prescribed.  Meclizine is prescribed to be taken as needed for vertigo in the meantime but will be stopped for at least 3 days prior to further evaluation of his vestibular system at [facility] which will be scheduled.  Patient will see us back in 6 weeks.      Jeffrey Stark MD        Again, thank you for allowing me to participate in the care of your patient.        Sincerely,        Jeffrey Stark MD, MD

## 2023-03-30 ENCOUNTER — MYC MEDICAL ADVICE (OUTPATIENT)
Dept: INTERNAL MEDICINE | Facility: CLINIC | Age: 58
End: 2023-03-30
Payer: COMMERCIAL

## 2023-03-30 DIAGNOSIS — K62.5 RECTAL BLEEDING: Primary | ICD-10-CM

## 2023-04-04 ENCOUNTER — OFFICE VISIT (OUTPATIENT)
Dept: CARDIOLOGY | Facility: CLINIC | Age: 58
End: 2023-04-04
Attending: PHYSICIAN ASSISTANT
Payer: COMMERCIAL

## 2023-04-04 VITALS
HEIGHT: 71 IN | HEART RATE: 89 BPM | BODY MASS INDEX: 37.09 KG/M2 | SYSTOLIC BLOOD PRESSURE: 114 MMHG | OXYGEN SATURATION: 93 % | DIASTOLIC BLOOD PRESSURE: 86 MMHG | WEIGHT: 264.9 LBS

## 2023-04-04 DIAGNOSIS — R06.02 SOB (SHORTNESS OF BREATH): ICD-10-CM

## 2023-04-04 DIAGNOSIS — R42 DIZZINESS: Primary | ICD-10-CM

## 2023-04-04 DIAGNOSIS — E78.00 HYPERCHOLESTEROLEMIA: ICD-10-CM

## 2023-04-04 DIAGNOSIS — I25.10 CORONARY ARTERY DISEASE INVOLVING NATIVE CORONARY ARTERY OF NATIVE HEART WITHOUT ANGINA PECTORIS: ICD-10-CM

## 2023-04-04 DIAGNOSIS — R00.2 PALPITATIONS: ICD-10-CM

## 2023-04-04 PROCEDURE — 99214 OFFICE O/P EST MOD 30 MIN: CPT | Performed by: INTERNAL MEDICINE

## 2023-04-04 RX ORDER — MECLIZINE HCL 25MG 25 MG/1
25 TABLET, CHEWABLE ORAL EVERY 6 HOURS PRN
COMMUNITY
End: 2024-07-16

## 2023-04-04 NOTE — PROGRESS NOTES
HPI and Plan:   See dictation    Today's clinic visit entailed:  Review of the result(s) of each unique test - zio patch, stress echo, coronary CTA  Ordering of each unique test  Prescription drug management  30 minutes spent by me on the date of the encounter doing chart review, review of test results, patient visit and documentation   Provider  Link to [facility] Help Grid     The level of medical decision making during this visit was of moderate complexity.      Orders Placed This Encounter   Procedures     Holter Monitor 48 hour Adult Pediatric     Echocardiogram Complete       Orders Placed This Encounter   Medications     meclizine 25 MG CHEW     Sig: Take 25 mg by mouth every 6 hours as needed for dizziness       There are no discontinued medications.      Encounter Diagnoses   Name Primary?     Hypercholesterolemia      Coronary artery disease involving native coronary artery of native heart without angina pectoris      Dizziness Yes     Palpitations      SOB (shortness of breath)        CURRENT MEDICATIONS:  Current Outpatient Medications   Medication Sig Dispense Refill     acetaminophen (TYLENOL) 500 MG tablet Take 1,000 mg by mouth every morning       aspirin (ASA) 81 MG chewable tablet Take 1 tablet (81 mg) by mouth daily       ibuprofen (ADVIL/MOTRIN) 200 MG tablet Take 400 mg by mouth every morning       meclizine 25 MG CHEW Take 25 mg by mouth every 6 hours as needed for dizziness       modafinil (PROVIGIL) 200 MG tablet Take 1 tablet (200 mg) by mouth daily 90 tablet 1     omeprazole (PRILOSEC) 40 MG DR capsule Take 1 capsule (40 mg) by mouth daily TAKE 1 CAPSULE (40 MG) BY MOUTH DAILY 90 capsule 2     rosuvastatin (CRESTOR) 5 MG tablet Take 1 tablet (5 mg) by mouth daily 90 tablet 4       ALLERGIES     Allergies   Allergen Reactions     Percocet [Oxycodone-Acetaminophen] Itching     Wasps [Hornets] Swelling       PAST MEDICAL HISTORY:  Past Medical History:   Diagnosis Date     Back pains      Depressive  disorder, not elsewhere classified 1/28/2007    declines Rx     Disc degeneration 12/2008    see MRI -throaco-lumbar mild discogenic degenerative changes     Elevated LFT's 11/09    alt and ast     Other and unspecified hyperlipidemia 1/2007     Sleep disorder 9/09    CPAP     Tobacco use disorder        PAST SURGICAL HISTORY:  Past Surgical History:   Procedure Laterality Date     COLONOSCOPY  07/07/08    adenomatous polyp repeat 5 years     COLONOSCOPY N/A 12/29/2017    Procedure: COMBINED COLONOSCOPY, SINGLE OR MULTIPLE BIOPSY/POLYPECTOMY BY BIOPSY;  Colonoscopy, Polypectomy by Biopsy;  Surgeon: Matt Reyes MD;  Location: [facility]     ENT SURGERY      for deviated septum     EXCISE LESION AXILLA  12/7/2018    Procedure: Excision Skin Tags Right Axilla;  Surgeon: Ayaan Chaney MD;  Location: [facility]     EXCISE LESION BACK N/A 12/7/2018    Procedure: Excision Back Skin Lesion X Two;  Surgeon: Ayaan Chaney MD;  Location: [facility]     EXCISE MASS BACK N/A 4/15/2022    Procedure: Excisions skin lesions of back;  Surgeon: Ayaan Chaney MD;  [facility]     INJECT BLOCK MEDIAL BRANCH CERVICAL/THORACIC/LUMBAR Bilateral 5/26/2022    Procedure: Cervical 4, 5, 6 Medial Branch Block;  Surgeon: Christian Chaudhry MD;  [facility]     INJECT EPIDURAL CERVICAL N/A 5/14/2015    Procedure: INJECT EPIDURAL CERVICAL;  Surgeon: Christian Chaudhry MD;  [facility]     INJECT EPIDURAL CERVICAL N/A 12/28/2018    Procedure: INJECT EPIDURAL CERVICAL 6-7;  Surgeon: Christian Chaudhry MD;  [facility]     INJECT EPIDURAL LUMBAR N/A 12/20/2019    Procedure: Lumbar 5- Sacral 1 Epidural  Injection bilatetral;  Surgeon: Christian Chaudhry MD;  [facility]     INJECT EPIDURAL LUMBAR Bilateral 9/8/2022    Procedure: Bilateral Lumbar 5-Sacral 1 transforaminal epidural injections utilizing fluoroscopic guidance with contrast dye  and;  Surgeon: Christian Chaudhry MD;  [facility]     INJECT EPIDURAL TRANSFORAMINAL Bilateral  5/20/2022    Procedure: Bilateral Lumbar 5- Sacral 1 transforaminal Epidural Steroid Injection;  Surgeon: Christian Chaudhry MD;  [facility]     INJECT EPIDURAL TRANSFORAMINAL Bilateral 7/14/2022    Procedure: Left Cervical 5-Cervical 6, Right Cervical 5-Cervical 6, Left Cervical 6-Cervical 7 and Right Cervical 6-Cervical 7 Intra-articular zygopophyseal joint injection utilizing fluoroscopic guidance with contrast dye;  Surgeon: Christian Chaudhry MD;  [facility]     INJECT FACET JOINT Bilateral 1/25/2019    Procedure: Cervical Facet Injections Cervical 5-6 and 6-7 Bilateral;  Surgeon: Christian Chaudhry MD;  [facility]     INJECT FACET JOINT Bilateral 11/29/2019    Procedure: Cervical 5-6 and 6-7 Bilateral facet joint injection;  Surgeon: Christian Chaudhry MD;  [facility]     INJECT FACET JOINT Bilateral 2/11/2021    Procedure: Cervical 5-6 and Cervical 6-7 Bilateral facet injections;  Surgeon: Christian Chaudhry MD;  [facility]     INJECT FACET JOINT Bilateral 10/19/2021    Procedure: Left C5-C6, Right C5-C6, Left C6-C7 and Right C6-C7 Intra-articular zygopophyseal joint injection utilizing fluoroscopic guidance with contrast dye;  Surgeon: Christian Chaudhry MD;  [facility]     INJECT STEROID (LOCATION) Left 10/19/2021    Procedure: Glenohumeral joint injection left shoulder;  Surgeon: Christian Chaudhry MD;  [facility]     INJECT STEROID (LOCATION) Left 5/20/2022    Procedure: Left shoulder glenohumeral steroid inj with local;  Surgeon: Christian Chaudhry MD;  [facility]     INJECT STEROID TROCHANTERIC BURSA Left 9/8/2022    Procedure: Left intra-articular shoulder injections;  Surgeon: Christian Chaudhry MD;  [facility]     ORTHOPEDIC SURGERY      Right hand     TONSILLECTOMY       ZZC NONSPECIFIC PROCEDURE      rt hand surgery - laceration/glass/tendons     ZZHC ECHO HEART XTHORACIC, STRESS/REST  6/2009    neg       FAMILY HISTORY:  Family History   Problem Relation Age of Onset     Breast  Cancer Mother      Arthritis Mother         joint ?     Depression Father      Cancer Maternal Grandmother      C.A.D. Maternal Grandfather      Cardiovascular Maternal Grandfather      Gynecology Paternal Grandmother          giving birth     Prostate Cancer Paternal Grandfather      Genetic Disorder Brother         joint pain?     Depression Brother      Cardiovascular Son         congenital heart defect -       Prostate Cancer Other          age 70's     Diabetes Other        SOCIAL HISTORY:  Social History     Socioeconomic History     Marital status:      Spouse name: None     Number of children: None     Years of education: None     Highest education level: None   Occupational History     Occupation:      Employer: SELF   Tobacco Use     Smoking status: Former     Packs/day: 1.50     Types: Cigarettes     Quit date: 2007     Years since quittin.1     Smokeless tobacco: Never   Substance and Sexual Activity     Alcohol use: No     Comment: rare     Drug use: No     Sexual activity: Yes     Partners: Female   Other Topics Concern     Parent/sibling w/ CABG, MI or angioplasty before 65F 55M? No   Social History Narrative    Dairy/d a lot, 1 gallon of milk per day plus cheese and other servings     Caffeine 48 oz. coffee per day    Exercise none    Sunscreen used - No    Seatbelts used - Yes    Working smoke/CO detectors in the home - Yes    Guns stored in the home - No    Self Breast Exams - NA    Self Testicular Exam - No    Eye Exam up to date - No    Dental Exam up to date - Yes    Pap Smear up to date - NA    Mammogram up to date - NA    PSA up to date - No    Dexa Scan up to date - NA    Flex Sig / Colonoscopy up to date - No    Immunizations up to date - No    Abuse: Current or Past(Physical, Sexual or Emotional)- No    Do you feel safe in your environment - Yes    Tms,cma        Lives with spouse and her parents.       Review of Systems:  Skin:         "  Eyes:  Positive for glasses    ENT:         Respiratory:  Positive for shortness of breath;dyspnea on exertion     Cardiovascular:  Negative;chest pain Positive for;palpitations;edema;lightheadedness;dizziness;syncope or near-syncope    Gastroenterology:        Genitourinary:         Musculoskeletal:         Neurologic:  Positive for numbness or tingling of feet;numbness or tingling of hands    Psychiatric:         Heme/Lymph/Imm:  Positive for allergies    Endocrine:           Physical Exam:  Vitals: /86 (BP Location: Right arm, Patient Position: Sitting, Cuff Size: Adult Large)   Pulse 89   Ht 1.797 m (5' 10.75\")   Wt 120.2 kg (264 lb 14.4 oz)   SpO2 93%   BMI 37.21 kg/m      Constitutional:           Skin:             Head:           Eyes:           Lymph:      ENT:           Neck:           Respiratory:            Cardiac:                                                           GI:           Extremities and Muscular Skeletal:                 Neurological:           Psych:           CC  Kiera Yan PASTIVEN  [id number]  [place] [address]              "

## 2023-04-04 NOTE — LETTER
4/4/2023    Gustavo Farmer, DO  [address] [place] [address]    RE: Bulmaro Herrera       Dear Colleague,     I had the pleasure of seeing Bulmaro Herrera in the [facility].  HPI and Plan:   See dictation    Today's clinic visit entailed:  Review of the result(s) of each unique test - zio patch, stress echo, coronary CTA  Ordering of each unique test  Prescription drug management  30 minutes spent by me on the date of the encounter doing chart review, review of test results, patient visit and documentation   Provider  Link to [facility] Help Grid     The level of medical decision making during this visit was of moderate complexity.    Orders Placed This Encounter   Procedures    Holter Monitor 48 hour Adult Pediatric    Echocardiogram Complete     Orders Placed This Encounter   Medications    meclizine 25 MG CHEW     Sig: Take 25 mg by mouth every 6 hours as needed for dizziness     There are no discontinued medications.    Encounter Diagnoses   Name Primary?    Hypercholesterolemia     Coronary artery disease involving native coronary artery of native heart without angina pectoris     Dizziness Yes    Palpitations     SOB (shortness of breath)      CURRENT MEDICATIONS:  Current Outpatient Medications   Medication Sig Dispense Refill    acetaminophen (TYLENOL) 500 MG tablet Take 1,000 mg by mouth every morning      aspirin (ASA) 81 MG chewable tablet Take 1 tablet (81 mg) by mouth daily      ibuprofen (ADVIL/MOTRIN) 200 MG tablet Take 400 mg by mouth every morning      meclizine 25 MG CHEW Take 25 mg by mouth every 6 hours as needed for dizziness      modafinil (PROVIGIL) 200 MG tablet Take 1 tablet (200 mg) by mouth daily 90 tablet 1    omeprazole (PRILOSEC) 40 MG DR capsule Take 1 capsule (40 mg) by mouth daily TAKE 1 CAPSULE (40 MG) BY MOUTH DAILY 90 capsule 2    rosuvastatin (CRESTOR) 5 MG tablet Take 1 tablet (5 mg) by mouth daily 90 tablet 4     ALLERGIES  Allergies   Allergen  Reactions    Percocet [Oxycodone-Acetaminophen] Itching    Wasps [Hornets] Swelling     PAST MEDICAL HISTORY:  Past Medical History:   Diagnosis Date    Back pains     Depressive disorder, not elsewhere classified 1/28/2007    declines Rx    Disc degeneration 12/2008    see MRI -throaco-lumbar mild discogenic degenerative changes    Elevated LFT's 11/09    alt and ast    Other and unspecified hyperlipidemia 1/2007    Sleep disorder 9/09    CPAP    Tobacco use disorder        PAST SURGICAL HISTORY:  Past Surgical History:   Procedure Laterality Date    COLONOSCOPY  07/07/08    adenomatous polyp repeat 5 years    COLONOSCOPY N/A 12/29/2017    Procedure: COMBINED COLONOSCOPY, SINGLE OR MULTIPLE BIOPSY/POLYPECTOMY BY BIOPSY;  Colonoscopy, Polypectomy by Biopsy;  Surgeon: Matt Reyes MD;  Location: [facility]    ENT SURGERY      for deviated septum    EXCISE LESION AXILLA  12/7/2018    Procedure: Excision Skin Tags Right Axilla;  Surgeon: Ayaan Chaney MD;  [facility]    EXCISE LESION BACK N/A 12/7/2018    Procedure: Excision Back Skin Lesion X Two;  Surgeon: Ayaan Chaney MD;  [facility]    EXCISE MASS BACK N/A 4/15/2022    Procedure: Excisions skin lesions of back;  Surgeon: Ayaan Chaney MD;  [facility]    INJECT BLOCK MEDIAL BRANCH CERVICAL/THORACIC/LUMBAR Bilateral 5/26/2022    Procedure: Cervical 4, 5, 6 Medial Branch Block;  Surgeon: Christian Chaudhry MD;  [facility]    INJECT EPIDURAL CERVICAL N/A 5/14/2015    Procedure: INJECT EPIDURAL CERVICAL;  Surgeon: Christian Chaudhry MD;  [facility]    INJECT EPIDURAL CERVICAL N/A 12/28/2018    Procedure: INJECT EPIDURAL CERVICAL 6-7;  Surgeon: Christian Chaudhry MD;  [facility]    INJECT EPIDURAL LUMBAR N/A 12/20/2019    Procedure: Lumbar 5- Sacral 1 Epidural  Injection bilatetral;  Surgeon: Christian Chaudhry MD;  [facility]    INJECT EPIDURAL LUMBAR Bilateral 9/8/2022    Procedure: Bilateral Lumbar 5-Sacral 1 transforaminal epidural  injections utilizing fluoroscopic guidance with contrast dye  and;  Surgeon: Christian Chaudhry MD;  [facility]    INJECT EPIDURAL TRANSFORAMINAL Bilateral 5/20/2022    Procedure: Bilateral Lumbar 5- Sacral 1 transforaminal Epidural Steroid Injection;  Surgeon: Christian Chaudhry MD;  [facility]    INJECT EPIDURAL TRANSFORAMINAL Bilateral 7/14/2022    Procedure: Left Cervical 5-Cervical 6, Right Cervical 5-Cervical 6, Left Cervical 6-Cervical 7 and Right Cervical 6-Cervical 7 Intra-articular zygopophyseal joint injection utilizing fluoroscopic guidance with contrast dye;  Surgeon: Christian Chaudhry MD;  [facility]    INJECT FACET JOINT Bilateral 1/25/2019    Procedure: Cervical Facet Injections Cervical 5-6 and 6-7 Bilateral;  Surgeon: Christian Chaudhry MD;  [facility]    INJECT FACET JOINT Bilateral 11/29/2019    Procedure: Cervical 5-6 and 6-7 Bilateral facet joint injection;  Surgeon: Christian Chaudhry MD;  [facility]    INJECT FACET JOINT Bilateral 2/11/2021    Procedure: Cervical 5-6 and Cervical 6-7 Bilateral facet injections;  Surgeon: Christian Chaudhry MD;  [facility]    INJECT FACET JOINT Bilateral 10/19/2021    Procedure: Left C5-C6, Right C5-C6, Left C6-C7 and Right C6-C7 Intra-articular zygopophyseal joint injection utilizing fluoroscopic guidance with contrast dye;  Surgeon: Christian Chaudhry MD;  [facility]    INJECT STEROID (LOCATION) Left 10/19/2021    Procedure: Glenohumeral joint injection left shoulder;  Surgeon: Christian Chaudhry MD;  [facility]    INJECT STEROID (LOCATION) Left 5/20/2022    Procedure: Left shoulder glenohumeral steroid inj with local;  Surgeon: Christian Chaudhry MD;  [facility]    INJECT STEROID TROCHANTERIC BURSA Left 9/8/2022    Procedure: Left intra-articular shoulder injections;  Surgeon: Christian Chaudhry MD;  [facility]    ORTHOPEDIC SURGERY      Right hand    TONSILLECTOMY      ZZC NONSPECIFIC PROCEDURE      rt hand surgery -  laceration/glass/tendons    ZZHC ECHO HEART XTHORACIC, STRESS/REST  2009    neg       FAMILY HISTORY:  Family History   Problem Relation Age of Onset    Breast Cancer Mother     Arthritis Mother         joint ?    Depression Father     Cancer Maternal Grandmother     C.A.D. Maternal Grandfather     Cardiovascular Maternal Grandfather     Gynecology Paternal Grandmother          giving birth    Prostate Cancer Paternal Grandfather     Genetic Disorder Brother         joint pain?    Depression Brother     Cardiovascular Son         congenital heart defect -      Prostate Cancer Other          age 70's    Diabetes Other        SOCIAL HISTORY:  Social History     Socioeconomic History    Marital status:      Spouse name: None    Number of children: None    Years of education: None    Highest education level: None   Occupational History    Occupation:      Employer: SELF   Tobacco Use    Smoking status: Former     Packs/day: 1.50     Types: Cigarettes     Quit date: 2007     Years since quittin.1    Smokeless tobacco: Never   Substance and Sexual Activity    Alcohol use: No     Comment: rare    Drug use: No    Sexual activity: Yes     Partners: Female   Other Topics Concern    Parent/sibling w/ CABG, MI or angioplasty before 65F 55M? No   Social History Narrative    Dairy/d a lot, 1 gallon of milk per day plus cheese and other servings     Caffeine 48 oz. coffee per day    Exercise none    Sunscreen used - No    Seatbelts used - Yes    Working smoke/CO detectors in the home - Yes    Guns stored in the home - No    Self Breast Exams - NA    Self Testicular Exam - No    Eye Exam up to date - No    Dental Exam up to date - Yes    Pap Smear up to date - NA    Mammogram up to date - NA    PSA up to date - No    Dexa Scan up to date - NA    Flex Sig / Colonoscopy up to date - No    Immunizations up to date - No    Abuse: Current or Past(Physical, Sexual or Emotional)- No    Do  "you feel safe in your environment - Yes    Tms,liberty        Lives with spouse and her parents.       Review of Systems:  Skin:          Eyes:  Positive for glasses    ENT:         Respiratory:  Positive for shortness of breath;dyspnea on exertion     Cardiovascular:  Negative;chest pain Positive for;palpitations;edema;lightheadedness;dizziness;syncope or near-syncope    Gastroenterology:        Genitourinary:         Musculoskeletal:         Neurologic:  Positive for numbness or tingling of feet;numbness or tingling of hands    Psychiatric:         Heme/Lymph/Imm:  Positive for allergies    Endocrine:           Physical Exam:  Vitals: /86 (BP Location: Right arm, Patient Position: Sitting, Cuff Size: Adult Large)   Pulse 89   Ht 1.797 m (5' 10.75\")   Wt 120.2 kg (264 lb 14.4 oz)   SpO2 93%   BMI 37.21 kg/m      Constitutional:           Skin:             Head:           Eyes:           Lymph:      ENT:           Neck:           Respiratory:            Cardiac:                                                           GI:           Extremities and Muscular Skeletal:                 Neurological:           Psych:               Thank you for allowing me to participate in the care of your patient.    Sincerely,     LEIGH REIS MD     [facility]    "

## 2023-04-04 NOTE — PROGRESS NOTES
Service Date: 2023    Gustavo Farmer DO  [facility]   [address] [place] [address]    RE:  Bulmaro Herrera  MRN:  [id number]  :  1965    Dear Dr. Farmer:    I had the opportunity to see Mr. Bulmaro Herrera in Cardiology Clinic today at [facility] in [place] for reevaluation of shortness of breath and new symptoms of palpitations.  Mr. Herrera is a 57-year-old gentleman who came to see me in 2021 with similar concerns about shortness of breath and fatigue.  He was having some chest discomfort symptoms at that time, which have since resolved.  I did refer him for a stress echocardiogram that was normal at that time.  However, because of challenging images, he was referred on to a CT coronary angiogram, which showed minimal coronary artery disease.  He has significant hypercholesterolemia and is on a low dose of rosuvastatin 5 mg a day.  He has had some issues with elevated liver function tests, including an ALT up to 205 a year ago.  I suspect he was already on a statin at that point.    He complains of sudden palpitations that worry him, but do not last very long and do not cause significant symptoms.  He also continues to have shortness of breath, which he is more willing to believe is related to his weight and deconditioning.  Unfortunately, he has a lot of pain in his feet and in his low back, making it difficult for him to be active.  He has a little bit of lower extremity edema as well.    His last cholesterol panel from 2022 shows an LDL up to 214 with a total cholesterol of 299, HDL 35 and triglycerides 251.  His basic metabolic panel is normal.  His hemoglobin A1c is 6.2, indicating some mild diet-controlled type 2 diabetes.  His ALT was up to 205, but it has come down to the normal range now of 56.  He remains on rosuvastatin 5 mg daily.    PHYSICAL EXAMINATION:  Today his blood pressure is 114/86, heart rate 89 and weight 264  pounds.  His lungs are clear.  His heart rhythm is regular.  Heart tones are distant, but I do not hear any significant murmurs.  He has a trace of bilateral lower extremity edema.    IMPRESSIONS:  Mr. Jazzy Herrera is a 57-year-old gentleman with chronic shortness of breath with exertion and now some palpitations as well.  His description of his palpitations sounds like intermittent isolated atrial or ventricular ectopy.  He is quite concerned about this, and I agreed to do a 48 hour Holter monitor for evaluation.    In our workup of his shortness of breath, we have not done an echocardiogram, and I think that is very reasonable; I will go ahead and get that set up.  He does not have ischemic heart disease.  He does not have any significant arrhythmias that might have been causing the shortness of breath.    We will contact him about the results or have him come in to review those with us before proceeding with any additional workup.    Sincerely,    Carlos Stephens MD, [facility]        D: 2023   T: 2023   MT: [email or web address]    Name:     JAZZY HERRERA  MRN:      [id number]        Account:      [id number]   :      1965           Service Date: 2023       Document: [id number]

## 2023-04-05 ENCOUNTER — HOSPITAL ENCOUNTER (OUTPATIENT)
Dept: CARDIOLOGY | Facility: CLINIC | Age: 58
Discharge: HOME | End: 2023-04-05
Attending: INTERNAL MEDICINE
Payer: COMMERCIAL

## 2023-04-05 ENCOUNTER — CARE COORDINATION (OUTPATIENT)
Dept: CARDIOLOGY | Facility: CLINIC | Age: 58
End: 2023-04-05

## 2023-04-05 DIAGNOSIS — R06.02 SOB (SHORTNESS OF BREATH): ICD-10-CM

## 2023-04-05 DIAGNOSIS — R00.2 PALPITATIONS: ICD-10-CM

## 2023-04-05 LAB — LVEF ECHO: NORMAL

## 2023-04-05 PROCEDURE — 93306 TTE W/DOPPLER COMPLETE: CPT

## 2023-04-05 PROCEDURE — 93226 XTRNL ECG REC<48 HR SCAN A/R: CPT

## 2023-04-05 PROCEDURE — 93227 XTRNL ECG REC<48 HR R&I: CPT | Performed by: INTERNAL MEDICINE

## 2023-04-05 PROCEDURE — 93225 XTRNL ECG REC<48 HRS REC: CPT

## 2023-04-05 PROCEDURE — 93306 TTE W/DOPPLER COMPLETE: CPT | Mod: 26 | Performed by: INTERNAL MEDICINE

## 2023-04-05 NOTE — PROGRESS NOTES
SANDIPI of results to Dr. Stephens. Sent pt a message w/normal echo result. Awaiting holter. Maribel Lares RN on 4/5/2023 at 2:36 PM      Echocardiogram Complete  Details    Reading Physician Reading Date Result Priority   Jose Jones MD 4/5/2023        BSA: 2.4 m2  Height: 72 in  Weight: 264 lb  HR: 73  BP: 114/68 mmHg  ______________________________________________________________________________  Procedure  Complete Echo Adult.  ______________________________________________________________________________  Interpretation Summary     The visual ejection fraction is 60-65%.  The right ventricular systolic function is normal.  No hemodynamically significant valvular disease.  There is no pericardial effusion.    Left Ventricle  The left ventricle is normal in size. There is normal left ventricular wall  thickness. Left ventricular systolic function is normal. The visual ejection  fraction is 60-65%. Diastolic Doppler findings (E/E' ratio and/or other  parameters) suggest left ventricular filling pressures are indeterminate.     Right Ventricle  Borderline right ventricular enlargement. The right ventricular systolic  function is normal.

## 2023-04-10 ENCOUNTER — TELEPHONE (OUTPATIENT)
Dept: GASTROENTEROLOGY | Facility: CLINIC | Age: 58
End: 2023-04-10
Payer: COMMERCIAL

## 2023-04-10 NOTE — TELEPHONE ENCOUNTER
Screening Questions  BLUE  KIND OF PREP RED  LOCATION [review exclusion criteria] GREEN  SEDATION TYPE        Y Are you active on mychart?       PERRYIN Ordering/Referring Provider?        [facility] What type of coverage do you have?      N Have you had a positive covid test in the last 14 days?     33.9 1. BMI  [BMI 40+ - review exclusion criteria]    Y  2. Are you able to give consent for your medical care? [IF NO,RN REVIEW]          N  3. Are you taking any prescription pain medications on a routine schedule   (ex narcotics: oxycodone, roxicodone, oxycontin,  and percocet)? [RN Review]          3a. EXTENDED PREP What kind of prescription?     N 4. Do you have any chemical dependencies such as alcohol, street drugs, or methadone?        **If yes 3- 5 , please schedule with MAC sedation.**          IF YES TO ANY 6 - 10 - HOSPITAL SETTING ONLY.     N 6.   Do you need assistance transferring?     N 7.   Have you had a heart or lung transplant?    N 8.   Are you currently on dialysis?   N 9.   Do you use daily home oxygen?   N 10. Do you take nitroglycerin?   10a.  If yes, how often?     11. [FEMALES]   Are you currently pregnant?    11a.  If yes, how many weeks? [ Greater than 12 weeks, OR NEEDED]    N 12. Do you have Pulmonary Hypertension? *NEED PAC APPT AT [facility] w/ MAC*     N 13. [review exclusion criteria]  Do you have any implantable devices in your body (pacemaker, defib, LVAD)?    N 14. In the past 6 months, have you had any heart related issues including cardiomyopathy or heart attack?     14a.  If yes, did it require cardiac stenting if so when?     N 15. Have you had a stroke or Transient ischemic attack (TIA - aka  mini stroke ) within 6 months?      N 16. Do you have mod to severe Obstructive Sleep Apnea?  [Hospital only]    N 17. Do you have SEVERE AND UNCONTROLLED asthma? *NEED PAC APPT AT [facility] w/MAC*     18. Are you currently taking any blood thinners?     18a. No. Continue to 19.   18b. Yes/no Blood  "Thinner: Yes, Inform patient to \"follow up w/ ordering provider for bridging instructions.\" Please schedule more than 14 days out and send in-basket to Endoscopy Pre Assessment Nurse Pool [[id number]]    N 19. Do you take the medication Phentermine?    19a. If yes, \"Hold for 7 days before procedure.  Please consult your prescribing provider if you have questions about holding this medication.\"     N  20. Do you have chronic kidney disease?      N  21. Do you have a diagnosis of diabetes?     N  22. On a regular basis do you go 3-5 days between bowel movements?      23. Preferred LOCAL Pharmacy for Pre Prescription    [ LIST ONLY ONE PHARMACY]     [facility] [place], [place] - [address] [place] [address] DR    - CLOSING REMINDERS -    Informed patient they will need an adult    Cannot take any type of public or medical transportation alone    Conscious Sedation- Needs  for 6 hours after the procedure       MAC/General-Needs  for 24 hours after procedure    Pre-Procedure Covid test to be completed [[facility] PCR Testing Required]    Confirmed Nurse will call to complete assessment       - SCHEDULING DETAILS -  NO Hospital Setting Required? If yes, what is the exclusion?:    TABITHA  Surgeon    5/22  Date of Procedure  Lower Endoscopy [Colonoscopy]  Type of Procedure Scheduled  [facility] Location   MIRALAX GATORADE WITHOUT MAGNEISUM CITRATE Which Colonoscopy Prep was Sent?     MAC per locale Sedation Type     N PAC / Pre-op Required                 "

## 2023-04-11 NOTE — TELEPHONE ENCOUNTER
Excellent Holter monitor results with only 3 isolated PVCs during a 48-hour period.  Normal heart rate variability, no significant bradycardia or pauses.  No arrhythmias. EE

## 2023-04-11 NOTE — PROGRESS NOTES
48 hour holter monitor results. FYI to Dr. Stephens. Maribel Lares, RN on 4/11/2023 at 4:49 PM

## 2023-04-12 ENCOUNTER — TRANSFERRED RECORDS (OUTPATIENT)
Dept: HEALTH INFORMATION MANAGEMENT | Facility: CLINIC | Age: 58
End: 2023-04-12

## 2023-04-19 ENCOUNTER — HOSPITAL ENCOUNTER (OUTPATIENT)
Dept: RESPIRATORY THERAPY | Facility: CLINIC | Age: 58
Discharge: HOME | End: 2023-04-19
Attending: INTERNAL MEDICINE | Admitting: INTERNAL MEDICINE
Payer: COMMERCIAL

## 2023-04-19 DIAGNOSIS — R06.02 SOB (SHORTNESS OF BREATH): ICD-10-CM

## 2023-04-19 PROCEDURE — 94726 PLETHYSMOGRAPHY LUNG VOLUMES: CPT

## 2023-04-19 PROCEDURE — 94060 EVALUATION OF WHEEZING: CPT

## 2023-04-19 PROCEDURE — 94729 DIFFUSING CAPACITY: CPT

## 2023-04-19 PROCEDURE — 999N000156 HC STATISTIC RCP CONSULT EA 30 MIN

## 2023-04-19 NOTE — DISCHARGE INSTRUCTIONS
Thank you for completing pulmonary function testing today.  All results will be scanned into your epic results for your doctor to review.  Please resume taking all your current prescribed medications and diet as directed by your provider.   If you have not heard from your provider about your testing within two weeks and do not have a follow-up appointment scheduled with them please contact your provider about any questions you have concerning your testing.   Thank you  [facility] Pulmonary Function Lab

## 2023-04-19 NOTE — PROGRESS NOTES
Pre-Bronch    Post-Bronch           Actual Pred %Pred LLN Actual %Pred %Chng       ---- SPIROMETRY ----                          FVC (L)  4.08 5.17 78 3.99 3.89 75 -4            FEV1 (L)  3.38 4.01 84 3.08 3.34 83 -1            FEV1/FVC (%) 83 78   67 86   +3            FEF 25% (L/sec) 8.37       7.15   -14            FEF 75% (L/sec) 2.18       1.39   -36            FEF 25-75% (L/sec) 3.47 3.45 100 1.77 3.44 99 -1            FEF Max (L/sec) 8.69 10.01 86 7.56 8.83 88 +1            FIVC (L)  3.69       3.37   -8            FIF Max (L/sec) 5.50 8.07 68 5.40 5.02 62 -8            MVV (L/min)   153   106                  MEP (cmH2O)                          MIP (cmH2O)                                                     ---- LUNG VOLUMES ----                          SVC (L) 3.67 5.46 67 4.47                  IC (L)  3.22 3.71 86                    ERV (L) 0.46 1.75 26                    FRC (N2) (L)   3.68   2.48                  RV (N2) (L)   2.38   1.56                  TLC (N2) (L)   7.53   6.13                  RV/TLC (N2) (%)   35   24                  Washout Time (min)                          FRC(Pleth) (L) 2.90 3.68 78 2.48                  RV (Pleth) (L) 2.45 2.38 102 1.70                  TLC (Pleth) (L) 6.12 7.53 81 6.38                  RV/TLC (Pleth) (%) 40 35   24                  Trapped Gas (L)                                                     ---- DIFFUSION ----                          DLCOunc (ml/min/mmHg) 28.98 30.29 95 22.93                  DLCOcor (ml/min/mmHg)   30.29   22.93                  DL/VA (ml/min/mmHg/L) 5.71 4.29                      VA (L) 5.07 7.05 71 5.70                  Hgb (gm/dL)   12-18

## 2023-04-20 LAB
DLCOUNC-%PRED-PRE: 99 %
DLCOUNC-PRE: 30.18 ML/MIN/MMHG
DLCOUNC-PRED: 30.29 ML/MIN/MMHG
ERV-%PRED-PRE: 33 %
ERV-PRE: 0.59 L
ERV-PRED: 1.75 L
EXPTIME-PRE: 5.84 SEC
FEF2575-%PRED-POST: 99 %
FEF2575-%PRED-PRE: 100 %
FEF2575-POST: 3.44 L/SEC
FEF2575-PRE: 3.47 L/SEC
FEF2575-PRED: 3.45 L/SEC
FEFMAX-%PRED-PRE: 86 %
FEFMAX-PRE: 8.69 L/SEC
FEFMAX-PRED: 10.01 L/SEC
FEV1-%PRED-PRE: 84 %
FEV1-PRE: 3.38 L
FEV1FEV6-PRE: 83 %
FEV1FEV6-PRED: 80 %
FEV1FVC-PRE: 83 %
FEV1FVC-PRED: 78 %
FEV1SVC-PRE: 92 %
FEV1SVC-PRED: 74 %
FIFMAX-PRE: 5.5 L/SEC
FRCPLETH-%PRED-PRE: 78 %
FRCPLETH-PRE: 2.9 L
FRCPLETH-PRED: 3.68 L
FVC-%PRED-PRE: 78 %
FVC-PRE: 4.08 L
FVC-PRED: 5.17 L
IC-%PRED-PRE: 83 %
IC-PRE: 3.09 L
IC-PRED: 3.71 L
RVPLETH-%PRED-PRE: 97 %
RVPLETH-PRE: 2.32 L
RVPLETH-PRED: 2.38 L
TLCPLETH-%PRED-PRE: 79 %
TLCPLETH-PRE: 5.99 L
TLCPLETH-PRED: 7.53 L
VA-%PRED-PRE: 80 %
VA-PRE: 5.67 L
VC-%PRED-PRE: 67 %
VC-PRE: 3.67 L
VC-PRED: 5.46 L

## 2023-04-24 ENCOUNTER — TRANSFERRED RECORDS (OUTPATIENT)
Dept: HEALTH INFORMATION MANAGEMENT | Facility: CLINIC | Age: 58
End: 2023-04-24
Payer: COMMERCIAL

## 2023-05-01 NOTE — PROGRESS NOTES
History of Present Illness - Bulmaro Herrera is a 57 year old male presenting in clinic today for a recheck on Patient presents with:  RECHECK: Ear infections    Patient initially presented with nonspecific dizziness central vertigo.  He also has very symmetrical sensorineural hearing loss bilaterally at the mid high to high frequencies more of a pattern of noise-induced hearing loss.    [facility]  However because of vertigo that he experienced with rotary chair and some other maneuvers even though it was did not show any significant pathology perhaps a bit of central signs and positional testing was negative he refused to have caloric testing done.  He still denies history of migraines any phono or photophobia, any syncopal episodes.    BP Readings from Last 1 Encounters:   05/08/23 122/80       BP noted to be well controlled today in office.     Bulmaro IS NOT a smoker/uses chewing tobacco.  Bulmaro already quit      Past Medical History -   Past Medical History:   Diagnosis Date     Back pains      Depressive disorder, not elsewhere classified 1/28/2007    declines Rx     Disc degeneration 12/2008    see MRI -throaco-lumbar mild discogenic degenerative changes     Elevated LFT's 11/09    alt and ast     Other and unspecified hyperlipidemia 1/2007     Sleep disorder 9/09    CPAP     Tobacco use disorder        Current Medications -   Current Outpatient Medications:      acetaminophen (TYLENOL) 500 MG tablet, Take 1,000 mg by mouth every morning, Disp: , Rfl:      aspirin (ASA) 81 MG chewable tablet, Take 1 tablet (81 mg) by mouth daily, Disp: , Rfl:      ibuprofen (ADVIL/MOTRIN) 200 MG tablet, Take 400 mg by mouth every morning, Disp: , Rfl:      meclizine 25 MG CHEW, Take 25 mg by mouth every 6 hours as needed for dizziness, Disp: , Rfl:      modafinil (PROVIGIL) 200 MG tablet, Take 1 tablet (200 mg) by mouth daily, Disp: 90 tablet, Rfl: 1      omeprazole (PRILOSEC) 40 MG DR capsule, Take 1 capsule (40 mg) by mouth daily TAKE 1 CAPSULE (40 MG) BY MOUTH DAILY, Disp: 90 capsule, Rfl: 2     rosuvastatin (CRESTOR) 5 MG tablet, Take 1 tablet (5 mg) by mouth daily, Disp: 90 tablet, Rfl: 4    Allergies -   Allergies   Allergen Reactions     Percocet [Oxycodone-Acetaminophen] Itching     Wasps [Hornets] Swelling       Social History -   Social History     Socioeconomic History     Marital status:    Occupational History     Occupation:      Employer: SELF   Tobacco Use     Smoking status: Former     Packs/day: 1.50     Types: Cigarettes     Quit date: 2007     Years since quittin.2     Smokeless tobacco: Never   Substance and Sexual Activity     Alcohol use: No     Comment: rare     Drug use: No     Sexual activity: Yes     Partners: Female   Other Topics Concern     Parent/sibling w/ CABG, MI or angioplasty before 65F 55M? No   Social History Narrative    Dairy/d a lot, 1 gallon of milk per day plus cheese and other servings     Caffeine 48 oz. coffee per day    Exercise none    Sunscreen used - No    Seatbelts used - Yes    Working smoke/CO detectors in the home - Yes    Guns stored in the home - No    Self Breast Exams - NA    Self Testicular Exam - No    Eye Exam up to date - No    Dental Exam up to date - Yes    Pap Smear up to date - NA    Mammogram up to date - NA    PSA up to date - No    Dexa Scan up to date - NA    Flex Sig / Colonoscopy up to date - No    Immunizations up to date - No    Abuse: Current or Past(Physical, Sexual or Emotional)- No    Do you feel safe in your environment - Yes    Tms,cma        Lives with spouse and her parents.       Family History -   Family History   Problem Relation Age of Onset     Breast Cancer Mother      Arthritis Mother         joint ?     Depression Father      Cancer Maternal Grandmother      C.A.D. Maternal Grandfather      Cardiovascular Maternal Grandfather      Gynecology  "Paternal Grandmother          giving birth     Prostate Cancer Paternal Grandfather      Genetic Disorder Brother         joint pain?     Depression Brother      Cardiovascular Son         congenital heart defect -       Prostate Cancer Other          age 70's     Diabetes Other        Review of Systems - As per HPI and PMHx, otherwise review of system review of the head and neck negative. Otherwise 10+ review of system is negative    Physical Exam  /80   Temp 98.1  F (36.7  C) (Temporal)   Ht 1.797 m (5' 10.75\")   Wt 117.2 kg (258 lb 4.8 oz)   BMI 36.28 kg/m    BMI: Body mass index is 36.28 kg/m .    General - The patient is well nourished and well developed, and appears to have good nutritional status.  Alert and oriented to person and place, answers questions and cooperates with examination appropriately.    SKIN - No suspicious lesions or rashes.  Respiration - No respiratory distress.  Head and Face - Normocephalic and atraumatic, with no gross asymmetry noted of the contour of the facial features.  The facial nerve is intact, with strong symmetric movements.    Voice and Breathing - The patient was breathing comfortably without the use of accessory muscles. The patients voice was clear and strong, and had appropriate pitch and quality.    Ears - Bilateral pinna and EACs with normal appearing overlying skin. Tympanic membrane intact with good mobility on pneumatic otoscopy bilaterally. Bony landmarks of the ossicular chain are normal. The tympanic membranes are normal in appearance. No retraction, perforation, or masses.  No fluid or purulence was seen in the external canal or the middle ear.     Eyes - Extraocular movements intact.  Sclera were not icteric or injected, conjunctiva were pink and moist.    Mouth - Examination of the oral cavity showed pink, healthy oral mucosa. No lesions or ulcerations noted.  The tongue was mobile and midline, and the dentition were in good condition. "      Throat - The walls of the oropharynx were smooth, pink, moist, symmetric, and had no lesions or ulcerations.  The tonsillar pillars and soft palate were symmetric.  The uvula was midline on elevation.    Neck - Normal midline excursion of the laryngotracheal complex during swallowing.  Full range of motion on passive movement.  Palpation of the occipital, submental, submandibular, internal jugular chain, and supraclavicular nodes did not demonstrate any abnormal lymph nodes or masses.  The carotid pulse was palpable bilaterally.  Palpation of the thyroid was soft and smooth, with no nodules or goiter appreciated.  The trachea was mobile and midline.    Nose - External contour is symmetric, no gross deflection or scars.  Nasal mucosa is pink and moist with no abnormal mucus.  The septum was midline and non-obstructive, turbinates of normal size and position.  No polyps, masses, or purulence noted on examination.    Neuro - Nonfocal neuro exam is normal, CN 2 through 12 intact, normal gait and muscle tone.    Patient's MRI results are reviewed with him that showed a normal noncontrast appearance of the near structure.  This was a noncontrast MRI since patient declined administration of intravenous contrast.  There was no evidence of acute intracranial pathology on MRI of the brain and internal auditory canals.  Performed in clinic today:  No procedures preformed in clinic today      A/P - Bulmaro ZAC Herrera is a 57 year old male Patient presents with:  RECHECK: Ear infections    Patient with nonspecific dizziness vertigo.  Unfortunately we could not complete evaluation radiographically at its completely sufficient or [facility].  However he does appear to have some features of central dizziness and disequilibrium in addition to vertigo.  We will at this point start empirically bolus rehabilitation therapy.  He will use meclizine as needed for now.  Would like to  reevaluate again in about 3 months.  Bulmaro should follow up in 3 months.      At Bulmaro next appointment they will need a hearing test.      Jeffrey Stark MD

## 2023-05-05 NOTE — TELEPHONE ENCOUNTER
----- Message from Berenice Roman on behalf of Alfredito Samano  Fidel Austen sent at 5/5/2023  3:02 AM EDT -----  Regarding: Fever  Contact: [phone]  This message is being sent by Berenice Roman on behalf of Reyna Verdin,     Last night my son was running a fever of 102  Luckily I was able to break the fever with some Tylenol and keeping him cool throughout the night  He is teething and has been a bit irritable but Im not sure if that is the cause of it  He has also been tugging at his ears as well  He has an appointment coming up very soon but I figured Id sent you a quick message to keep you informed  Thank you!   ___________________________________    Follow up call to mom to check on pt  Mom confirms that pt is having elevated temps and tugging at both ears  Mom believes pt to be teething as he has been shoving his hands in his mouth  Office appt scheduled for today at 36 with Dr Kat Lantigua  I will send a copy of this message to Dr. Chaudhry to see if he would order the EMG as he most likely would be the one to follow-up on the results depending on outcome.  It appears the patient has already sent a message to Dr. Rosado last week and a message was sent to Dr. Chaudhry already.     If Dr. Chaudhry does not wish to order EMG, then I will need to have the patient come in for further evaluation and discussion as to what we are actually ordering the test for and what we plan on to do with the results.    We will have staff contact patient about the above notes and I will send a message to Dr. Chaudhry regarding patient's request.    Sandor Serrano MD

## 2023-05-08 ENCOUNTER — OFFICE VISIT (OUTPATIENT)
Dept: OTOLARYNGOLOGY | Facility: CLINIC | Age: 58
End: 2023-05-08
Payer: COMMERCIAL

## 2023-05-08 VITALS
WEIGHT: 258.3 LBS | BODY MASS INDEX: 36.16 KG/M2 | HEIGHT: 71 IN | TEMPERATURE: 98.1 F | DIASTOLIC BLOOD PRESSURE: 80 MMHG | SYSTOLIC BLOOD PRESSURE: 122 MMHG

## 2023-05-08 DIAGNOSIS — R42 VERTIGO: ICD-10-CM

## 2023-05-08 DIAGNOSIS — R42 DIZZINESS: Primary | ICD-10-CM

## 2023-05-08 PROCEDURE — 99213 OFFICE O/P EST LOW 20 MIN: CPT | Performed by: OTOLARYNGOLOGY

## 2023-05-08 NOTE — LETTER
5/8/2023         RE: Bulmaro Herrera  [address]  [facility] [address]        Dear Colleague,    Thank you for referring your patient, Bulmaro Herrera, to the [facility]. Please see a copy of my visit note below.    History of Present Illness - Bulmaro Herrera is a 57 year old male presenting in clinic today for a recheck on Patient presents with:  RECHECK: Ear infections    Patient initially presented with nonspecific dizziness central vertigo.  He also has very symmetrical sensorineural hearing loss bilaterally at the mid high to high frequencies more of a pattern of noise-induced hearing loss.    Patient has undergone extensive testing at the [facility].  However because of vertigo that he experienced with rotary chair and some other maneuvers even though it was did not show any significant pathology perhaps a bit of central signs and positional testing was negative he refused to have caloric testing done.  He still denies history of migraines any phono or photophobia, any syncopal episodes.    BP Readings from Last 1 Encounters:   05/08/23 122/80       BP noted to be well controlled today in office.     Bulmaro IS NOT a smoker/uses chewing tobacco.  Bulmaro already quit      Past Medical History -   Past Medical History:   Diagnosis Date     Back pains      Depressive disorder, not elsewhere classified 1/28/2007    declines Rx     Disc degeneration 12/2008    see MRI -throaco-lumbar mild discogenic degenerative changes     Elevated LFT's 11/09    alt and ast     Other and unspecified hyperlipidemia 1/2007     Sleep disorder 9/09    CPAP     Tobacco use disorder        Current Medications -   Current Outpatient Medications:      acetaminophen (TYLENOL) 500 MG tablet, Take 1,000 mg by mouth every morning, Disp: , Rfl:      aspirin (ASA) 81 MG chewable tablet, Take 1 tablet (81 mg) by mouth daily, Disp: , Rfl:      ibuprofen (ADVIL/MOTRIN) 200 MG tablet, Take 400 mg by mouth  every morning, Disp: , Rfl:      meclizine 25 MG CHEW, Take 25 mg by mouth every 6 hours as needed for dizziness, Disp: , Rfl:      modafinil (PROVIGIL) 200 MG tablet, Take 1 tablet (200 mg) by mouth daily, Disp: 90 tablet, Rfl: 1     omeprazole (PRILOSEC) 40 MG DR capsule, Take 1 capsule (40 mg) by mouth daily TAKE 1 CAPSULE (40 MG) BY MOUTH DAILY, Disp: 90 capsule, Rfl: 2     rosuvastatin (CRESTOR) 5 MG tablet, Take 1 tablet (5 mg) by mouth daily, Disp: 90 tablet, Rfl: 4    Allergies -   Allergies   Allergen Reactions     Percocet [Oxycodone-Acetaminophen] Itching     Wasps [Hornets] Swelling       Social History -   Social History     Socioeconomic History     Marital status:    Occupational History     Occupation:      Employer: SELF   Tobacco Use     Smoking status: Former     Packs/day: 1.50     Types: Cigarettes     Quit date: 2007     Years since quittin.2     Smokeless tobacco: Never   Substance and Sexual Activity     Alcohol use: No     Comment: rare     Drug use: No     Sexual activity: Yes     Partners: Female   Other Topics Concern     Parent/sibling w/ CABG, MI or angioplasty before 65F 55M? No   Social History Narrative    Dairy/d a lot, 1 gallon of milk per day plus cheese and other servings     Caffeine 48 oz. coffee per day    Exercise none    Sunscreen used - No    Seatbelts used - Yes    Working smoke/CO detectors in the home - Yes    Guns stored in the home - No    Self Breast Exams - NA    Self Testicular Exam - No    Eye Exam up to date - No    Dental Exam up to date - Yes    Pap Smear up to date - NA    Mammogram up to date - NA    PSA up to date - No    Dexa Scan up to date - NA    Flex Sig / Colonoscopy up to date - No    Immunizations up to date - No    Abuse: Current or Past(Physical, Sexual or Emotional)- No    Do you feel safe in your environment - Yes    Tms,cma        Lives with spouse and her parents.       Family History -   Family History   Problem  "Relation Age of Onset     Breast Cancer Mother      Arthritis Mother         joint ?     Depression Father      Cancer Maternal Grandmother      C.A.D. Maternal Grandfather      Cardiovascular Maternal Grandfather      Gynecology Paternal Grandmother          giving birth     Prostate Cancer Paternal Grandfather      Genetic Disorder Brother         joint pain?     Depression Brother      Cardiovascular Son         congenital heart defect -       Prostate Cancer Other          age 70's     Diabetes Other        Review of Systems - As per HPI and PMHx, otherwise review of system review of the head and neck negative. Otherwise 10+ review of system is negative    Physical Exam  /80   Temp 98.1  F (36.7  C) (Temporal)   Ht 1.797 m (5' 10.75\")   Wt 117.2 kg (258 lb 4.8 oz)   BMI 36.28 kg/m    BMI: Body mass index is 36.28 kg/m .    General - The patient is well nourished and well developed, and appears to have good nutritional status.  Alert and oriented to person and place, answers questions and cooperates with examination appropriately.    SKIN - No suspicious lesions or rashes.  Respiration - No respiratory distress.  Head and Face - Normocephalic and atraumatic, with no gross asymmetry noted of the contour of the facial features.  The facial nerve is intact, with strong symmetric movements.    Voice and Breathing - The patient was breathing comfortably without the use of accessory muscles. The patients voice was clear and strong, and had appropriate pitch and quality.    Ears - Bilateral pinna and EACs with normal appearing overlying skin. Tympanic membrane intact with good mobility on pneumatic otoscopy bilaterally. Bony landmarks of the ossicular chain are normal. The tympanic membranes are normal in appearance. No retraction, perforation, or masses.  No fluid or purulence was seen in the external canal or the middle ear.     Eyes - Extraocular movements intact.  Sclera were not icteric or " injected, conjunctiva were pink and moist.    Mouth - Examination of the oral cavity showed pink, healthy oral mucosa. No lesions or ulcerations noted.  The tongue was mobile and midline, and the dentition were in good condition.      Throat - The walls of the oropharynx were smooth, pink, moist, symmetric, and had no lesions or ulcerations.  The tonsillar pillars and soft palate were symmetric.  The uvula was midline on elevation.    Neck - Normal midline excursion of the laryngotracheal complex during swallowing.  Full range of motion on passive movement.  Palpation of the occipital, submental, submandibular, internal jugular chain, and supraclavicular nodes did not demonstrate any abnormal lymph nodes or masses.  The carotid pulse was palpable bilaterally.  Palpation of the thyroid was soft and smooth, with no nodules or goiter appreciated.  The trachea was mobile and midline.    Nose - External contour is symmetric, no gross deflection or scars.  Nasal mucosa is pink and moist with no abnormal mucus.  The septum was midline and non-obstructive, turbinates of normal size and position.  No polyps, masses, or purulence noted on examination.    Neuro - Nonfocal neuro exam is normal, CN 2 through 12 intact, normal gait and muscle tone.    Patient's MRI results are reviewed with him that showed a normal noncontrast appearance of the near structure.  This was a noncontrast MRI since patient declined administration of intravenous contrast.  There was no evidence of acute intracranial pathology on MRI of the brain and internal auditory canals.  Performed in clinic today:  No procedures preformed in clinic today      A/P - Bulmaro ZAC Herrera is a 57 year old male Patient presents with:  RECHECK: Ear infections    Patient with nonspecific dizziness vertigo.  Unfortunately we could not complete evaluation radiographically at its completely sufficient or [facility].  However he  does appear to have some features of central dizziness and disequilibrium in addition to vertigo.  We will at this point start empirically bolus rehabilitation therapy.  He will use meclizine as needed for now.  Would like to reevaluate again in about 3 months.  Bulmaro should follow up in 3 months.      At Bulmaro next appointment they will need a hearing test.      Jeffrey Stark MD            Again, thank you for allowing me to participate in the care of your patient.        Sincerely,        Jeffrey Stark MD, MD

## 2023-05-09 ENCOUNTER — TELEPHONE (OUTPATIENT)
Dept: PALLIATIVE MEDICINE | Facility: CLINIC | Age: 58
End: 2023-05-09

## 2023-05-09 ENCOUNTER — OFFICE VISIT (OUTPATIENT)
Dept: AUDIOLOGY | Facility: CLINIC | Age: 58
End: 2023-05-09
Payer: COMMERCIAL

## 2023-05-09 DIAGNOSIS — H90.3 SENSORINEURAL HEARING LOSS, ASYMMETRICAL: Primary | ICD-10-CM

## 2023-05-09 PROCEDURE — 92591 PR HEARING AID EXAM BINAURAL: CPT | Performed by: AUDIOLOGIST

## 2023-05-09 PROCEDURE — 99207 PR NO CHARGE LOS: CPT | Performed by: AUDIOLOGIST

## 2023-05-09 NOTE — PROGRESS NOTES
AUDIOLOGY REPORT    SUBJECTIVE:   Bulmaro Herrera is a 57 year old male was seen in the Audiology Clinic at  [facility] on 5/09/23 to discuss concerns with hearing and functional communication difficulties. The patient was unaccompanied. Bulmaro was seen on 3/2/2023, and results revealed low normal sloping to moderate rising to mild sensorineural hearing loss right ear and normal sloping to moderate rising to normal sensorineural hearing loss left ear. The patient was medically evaluated and determined to be cleared for binaural hearing aids by MAURICE Stark. He notes bilateral tinnitus, right seems worse than left. Bulmaro notes he has some difficulty in very noisy situations and with very soft talkers.    OBJECTIVE:  Patient is a hearing aid candidate. Patient would like to discuss hearing aids today. Therefore, the patient was presented with different options for amplification to help aid in communication. Discussed styles, levels of technology and monaural vs. binaural fitting.     We discussed GUSTAVO versus completely in the canal (he thinks he would really like this style to reduce risk of loss when he is at work). Given listening needs at work and that he uses a wireless headset at work (he would like to continue to do this and not stream to his hearing aids) CIC devices could be considered.  I programmed a set of [employer] Flex Trial [employer] Discover Next 7 devices for him to just try at home. I would like him to have a comparison of the different sound qualities if we proceed with custom devices.    ASSESSMENT:     ICD-10-CM    1. Sensorineural hearing loss, asymmetrical  H90.3 Hearing Aid Exam, Binaural ([id number])        PLAN:   Bulmaro is scheduled to return in 1 week for initial follow up on hearing aid trial. Purchase agreement will be completed on that date. Please contact this clinic with any questions or concerns.      Huseyin Adler.  [place] Licensed Audiologist #[id number]

## 2023-05-09 NOTE — TELEPHONE ENCOUNTER
Pt scheduled for Cervical injection    Date: 6/2/23    Dr. Chaudhry    Instructed pt to have  H&P/      Pt scheduled for lumbar injection    Date: 6/8/23    Dr. Chaudhry    Instructed pt to have  H&P/

## 2023-05-10 ENCOUNTER — MYC MEDICAL ADVICE (OUTPATIENT)
Dept: INTERNAL MEDICINE | Facility: CLINIC | Age: 58
End: 2023-05-10
Payer: COMMERCIAL

## 2023-05-11 ENCOUNTER — MYC MEDICAL ADVICE (OUTPATIENT)
Dept: INTERNAL MEDICINE | Facility: CLINIC | Age: 58
End: 2023-05-11
Payer: COMMERCIAL

## 2023-05-11 DIAGNOSIS — F41.9 ANXIETY: ICD-10-CM

## 2023-05-11 RX ORDER — DIAZEPAM 5 MG
TABLET ORAL
Qty: 2 TABLET | Refills: 0 | Status: SHIPPED | OUTPATIENT
Start: 2023-05-11 | End: 2023-05-31

## 2023-05-11 NOTE — TELEPHONE ENCOUNTER
Prescription for diazepam pended (from previous order).  Please review and complete as appropriate.

## 2023-05-22 ENCOUNTER — HOSPITAL ENCOUNTER (OUTPATIENT)
Facility: CLINIC | Age: 58
Discharge: HOME | End: 2023-05-22
Attending: SURGERY | Admitting: SURGERY
Payer: COMMERCIAL

## 2023-05-22 ENCOUNTER — ANESTHESIA (OUTPATIENT)
Dept: GASTROENTEROLOGY | Facility: CLINIC | Age: 58
End: 2023-05-22
Payer: COMMERCIAL

## 2023-05-22 ENCOUNTER — ANESTHESIA EVENT (OUTPATIENT)
Dept: GASTROENTEROLOGY | Facility: CLINIC | Age: 58
End: 2023-05-22
Payer: COMMERCIAL

## 2023-05-22 VITALS
TEMPERATURE: 98 F | RESPIRATION RATE: 16 BRPM | SYSTOLIC BLOOD PRESSURE: 125 MMHG | HEART RATE: 65 BPM | DIASTOLIC BLOOD PRESSURE: 93 MMHG | OXYGEN SATURATION: 96 %

## 2023-05-22 LAB — COLONOSCOPY: NORMAL

## 2023-05-22 PROCEDURE — 250N000009 HC RX 250: Performed by: SURGERY

## 2023-05-22 PROCEDURE — 45378 DIAGNOSTIC COLONOSCOPY: CPT | Performed by: SURGERY

## 2023-05-22 PROCEDURE — 250N000011 HC RX IP 250 OP 636: Performed by: NURSE ANESTHETIST, CERTIFIED REGISTERED

## 2023-05-22 PROCEDURE — 250N000009 HC RX 250: Performed by: NURSE ANESTHETIST, CERTIFIED REGISTERED

## 2023-05-22 PROCEDURE — 258N000003 HC RX IP 258 OP 636: Performed by: NURSE ANESTHETIST, CERTIFIED REGISTERED

## 2023-05-22 PROCEDURE — 370N000017 HC ANESTHESIA TECHNICAL FEE, PER MIN: Performed by: SURGERY

## 2023-05-22 RX ORDER — LIDOCAINE 40 MG/G
CREAM TOPICAL
Status: DISCONTINUED | OUTPATIENT
Start: 2023-05-22 | End: 2023-05-22 | Stop reason: HOSPADM

## 2023-05-22 RX ORDER — FLUMAZENIL 0.1 MG/ML
0.2 INJECTION, SOLUTION INTRAVENOUS
Status: DISCONTINUED | OUTPATIENT
Start: 2023-05-22 | End: 2023-05-22 | Stop reason: HOSPADM

## 2023-05-22 RX ORDER — ONDANSETRON 2 MG/ML
4 INJECTION INTRAMUSCULAR; INTRAVENOUS
Status: DISCONTINUED | OUTPATIENT
Start: 2023-05-22 | End: 2023-05-22 | Stop reason: HOSPADM

## 2023-05-22 RX ORDER — PROPOFOL 10 MG/ML
INJECTION, EMULSION INTRAVENOUS CONTINUOUS PRN
Status: DISCONTINUED | OUTPATIENT
Start: 2023-05-22 | End: 2023-05-22

## 2023-05-22 RX ORDER — ONDANSETRON 2 MG/ML
4 INJECTION INTRAMUSCULAR; INTRAVENOUS EVERY 6 HOURS PRN
Status: DISCONTINUED | OUTPATIENT
Start: 2023-05-22 | End: 2023-05-22 | Stop reason: HOSPADM

## 2023-05-22 RX ORDER — PROCHLORPERAZINE MALEATE 5 MG
10 TABLET ORAL EVERY 6 HOURS PRN
Status: DISCONTINUED | OUTPATIENT
Start: 2023-05-22 | End: 2023-05-22 | Stop reason: HOSPADM

## 2023-05-22 RX ORDER — PROPOFOL 10 MG/ML
INJECTION, EMULSION INTRAVENOUS PRN
Status: DISCONTINUED | OUTPATIENT
Start: 2023-05-22 | End: 2023-05-22

## 2023-05-22 RX ORDER — ONDANSETRON 4 MG/1
4 TABLET, ORALLY DISINTEGRATING ORAL EVERY 6 HOURS PRN
Status: DISCONTINUED | OUTPATIENT
Start: 2023-05-22 | End: 2023-05-22 | Stop reason: HOSPADM

## 2023-05-22 RX ORDER — ONDANSETRON 4 MG/1
4 TABLET, ORALLY DISINTEGRATING ORAL EVERY 30 MIN PRN
Status: DISCONTINUED | OUTPATIENT
Start: 2023-05-22 | End: 2023-05-22 | Stop reason: HOSPADM

## 2023-05-22 RX ORDER — NALOXONE HYDROCHLORIDE 0.4 MG/ML
0.4 INJECTION, SOLUTION INTRAMUSCULAR; INTRAVENOUS; SUBCUTANEOUS
Status: DISCONTINUED | OUTPATIENT
Start: 2023-05-22 | End: 2023-05-22 | Stop reason: HOSPADM

## 2023-05-22 RX ORDER — LIDOCAINE HYDROCHLORIDE 10 MG/ML
INJECTION, SOLUTION INFILTRATION; PERINEURAL PRN
Status: DISCONTINUED | OUTPATIENT
Start: 2023-05-22 | End: 2023-05-22

## 2023-05-22 RX ORDER — ONDANSETRON 2 MG/ML
4 INJECTION INTRAMUSCULAR; INTRAVENOUS EVERY 30 MIN PRN
Status: DISCONTINUED | OUTPATIENT
Start: 2023-05-22 | End: 2023-05-22 | Stop reason: HOSPADM

## 2023-05-22 RX ORDER — NALOXONE HYDROCHLORIDE 0.4 MG/ML
0.2 INJECTION, SOLUTION INTRAMUSCULAR; INTRAVENOUS; SUBCUTANEOUS
Status: DISCONTINUED | OUTPATIENT
Start: 2023-05-22 | End: 2023-05-22 | Stop reason: HOSPADM

## 2023-05-22 RX ORDER — SODIUM CHLORIDE, SODIUM LACTATE, POTASSIUM CHLORIDE, CALCIUM CHLORIDE 600; 310; 30; 20 MG/100ML; MG/100ML; MG/100ML; MG/100ML
INJECTION, SOLUTION INTRAVENOUS CONTINUOUS PRN
Status: DISCONTINUED | OUTPATIENT
Start: 2023-05-22 | End: 2023-05-22

## 2023-05-22 RX ADMIN — PROPOFOL 50 MG: 10 INJECTION, EMULSION INTRAVENOUS at 14:12

## 2023-05-22 RX ADMIN — PROPOFOL 200 MCG/KG/MIN: 10 INJECTION, EMULSION INTRAVENOUS at 14:12

## 2023-05-22 RX ADMIN — SODIUM CHLORIDE, POTASSIUM CHLORIDE, SODIUM LACTATE AND CALCIUM CHLORIDE: 600; 310; 30; 20 INJECTION, SOLUTION INTRAVENOUS at 14:07

## 2023-05-22 RX ADMIN — LIDOCAINE HYDROCHLORIDE 1 ML: 10 INJECTION, SOLUTION EPIDURAL; INFILTRATION; INTRACAUDAL; PERINEURAL at 13:34

## 2023-05-22 RX ADMIN — LIDOCAINE HYDROCHLORIDE 50 MG: 10 INJECTION, SOLUTION INFILTRATION; PERINEURAL at 14:12

## 2023-05-22 RX ADMIN — PROPOFOL 50 MG: 10 INJECTION, EMULSION INTRAVENOUS at 14:11

## 2023-05-22 ASSESSMENT — ACTIVITIES OF DAILY LIVING (ADL)
ADLS_ACUITY_SCORE: 35
ADLS_ACUITY_SCORE: 35

## 2023-05-22 ASSESSMENT — LIFESTYLE VARIABLES: TOBACCO_USE: 1

## 2023-05-22 NOTE — ANESTHESIA PREPROCEDURE EVALUATION
Anesthesia Pre-Procedure Evaluation    Patient: Bulmaro Herrera   MRN: [id number] : 1965        Procedure : Procedure(s):  Colonoscopy          Past Medical History:   Diagnosis Date     Back pains      Depressive disorder, not elsewhere classified 2007    declines Rx     Disc degeneration 2008    see MRI -throaco-lumbar mild discogenic degenerative changes     Elevated LFT's     alt and ast     Other and unspecified hyperlipidemia 2007     Sleep disorder     CPAP     Tobacco use disorder       Past Surgical History:   Procedure Laterality Date     COLONOSCOPY  08    adenomatous polyp repeat 5 years     COLONOSCOPY N/A 2017    Procedure: COMBINED COLONOSCOPY, SINGLE OR MULTIPLE BIOPSY/POLYPECTOMY BY BIOPSY;  Colonoscopy, Polypectomy by Biopsy;  Surgeon: Mtat Reyes MD;  [facility]     ENT SURGERY      for deviated septum     EXCISE LESION AXILLA  2018    Procedure: Excision Skin Tags Right Axilla;  Surgeon: Ayaan Chaney MD;  [facility]     EXCISE LESION BACK N/A 2018    Procedure: Excision Back Skin Lesion X Two;  Surgeon: Ayaan Chaney MD;  [facility]     EXCISE MASS BACK N/A 4/15/2022    Procedure: Excisions skin lesions of back;  Surgeon: Ayaan Chaney MD;  [facility]     INJECT BLOCK MEDIAL BRANCH CERVICAL/THORACIC/LUMBAR Bilateral 2022    Procedure: Cervical 4, 5, 6 Medial Branch Block;  Surgeon: Christian Chaudhry MD;  [facility]     INJECT EPIDURAL CERVICAL N/A 2015    Procedure: INJECT EPIDURAL CERVICAL;  Surgeon: Christian Chaudhry MD;  [facility]     INJECT EPIDURAL CERVICAL N/A 2018    Procedure: INJECT EPIDURAL CERVICAL 6-7;  Surgeon: Christian Chaudhry MD;  [facility]     INJECT EPIDURAL LUMBAR N/A 2019    Procedure: Lumbar 5- Sacral 1 Epidural  Injection bilatetral;  Surgeon: Christian Chaudhyr MD;  [facility]     INJECT EPIDURAL LUMBAR Bilateral 2022    Procedure: Bilateral Lumbar  5-Sacral 1 transforaminal epidural injections utilizing fluoroscopic guidance with contrast dye  and;  Surgeon: Christian Chaudhry MD;  [facility]     INJECT EPIDURAL TRANSFORAMINAL Bilateral 5/20/2022    Procedure: Bilateral Lumbar 5- Sacral 1 transforaminal Epidural Steroid Injection;  Surgeon: Christian Chaudhry MD;  [facility]     INJECT EPIDURAL TRANSFORAMINAL Bilateral 7/14/2022    Procedure: Left Cervical 5-Cervical 6, Right Cervical 5-Cervical 6, Left Cervical 6-Cervical 7 and Right Cervical 6-Cervical 7 Intra-articular zygopophyseal joint injection utilizing fluoroscopic guidance with contrast dye;  Surgeon: Christian Chaudhry MD;  [facility]     INJECT FACET JOINT Bilateral 1/25/2019    Procedure: Cervical Facet Injections Cervical 5-6 and 6-7 Bilateral;  Surgeon: Christian Chaudhry MD;  [facility]     INJECT FACET JOINT Bilateral 11/29/2019    Procedure: Cervical 5-6 and 6-7 Bilateral facet joint injection;  Surgeon: Christian Chaudhry MD;  [facility]     INJECT FACET JOINT Bilateral 2/11/2021    Procedure: Cervical 5-6 and Cervical 6-7 Bilateral facet injections;  Surgeon: Christian Chaudhry MD;  [facility]     INJECT FACET JOINT Bilateral 10/19/2021    Procedure: Left C5-C6, Right C5-C6, Left C6-C7 and Right C6-C7 Intra-articular zygopophyseal joint injection utilizing fluoroscopic guidance with contrast dye;  Surgeon: Christian Chaudhry MD;  [facility]     INJECT STEROID (LOCATION) Left 10/19/2021    Procedure: Glenohumeral joint injection left shoulder;  Surgeon: Christian Chaudhry MD;  [facility]     INJECT STEROID (LOCATION) Left 5/20/2022    Procedure: Left shoulder glenohumeral steroid inj with local;  Surgeon: Christian Chaudhry MD;  [facility]     INJECT STEROID TROCHANTERIC BURSA Left 9/8/2022    Procedure: Left intra-articular shoulder injections;  Surgeon: Christian Chaudhry MD;  [facility]     ORTHOPEDIC SURGERY      Right hand     TONSILLECTOMY       [facility] NONSPECIFIC  PROCEDURE      rt hand surgery - laceration/glass/tendons     ZZHC ECHO HEART XTHORACIC, STRESS/REST  2009    neg      Allergies   Allergen Reactions     Percocet [Oxycodone-Acetaminophen] Itching     Wasps [Hornets] Swelling      Social History     Tobacco Use     Smoking status: Former     Packs/day: 1.50     Types: Cigarettes     Quit date: 2007     Years since quittin.2     Smokeless tobacco: Never   Vaping Use     Vaping status: Not on file   Substance Use Topics     Alcohol use: No     Comment: rare      Wt Readings from Last 1 Encounters:   23 117.2 kg (258 lb 4.8 oz)        Anesthesia Evaluation            ROS/MED HX  ENT/Pulmonary:     (+) sleep apnea, tobacco use, Past use,     Neurologic:       Cardiovascular:  - neg cardiovascular ROS     METS/Exercise Tolerance:     Hematologic:  - neg hematologic  ROS     Musculoskeletal: Comment: DJD      GI/Hepatic:     (+) GERD,     Renal/Genitourinary:  - neg Renal ROS     Endo:     (+) Obesity,     Psychiatric/Substance Use:     (+) psychiatric history depression     Infectious Disease:  - neg infectious disease ROS     Malignancy:  - neg malignancy ROS     Other:      (+) , H/O Chronic Pain,        Physical Exam    Airway        Mallampati: II   TM distance: > 3 FB   Neck ROM: full   Mouth opening: > 3 cm    Respiratory Devices and Support         Dental           Cardiovascular   cardiovascular exam normal       Rhythm and rate: regular and normal     Pulmonary   pulmonary exam normal        breath sounds clear to auscultation           OUTSIDE LABS:  CBC:   Lab Results   Component Value Date    WBC 5.5 2021    WBC 5.7 11/15/2019    HGB 16.3 2021    HGB 16.0 11/15/2019    HCT 47.2 2021    HCT 47.6 11/15/2019     2021     11/15/2019     BMP:   Lab Results   Component Value Date     2023     2022    POTASSIUM 4.0 2023    POTASSIUM 3.9 2022    CHLORIDE 108 2023     CHLORIDE 108 01/18/2022    CO2 26 01/07/2023    CO2 25 01/18/2022    BUN 21 01/07/2023    BUN 15 01/18/2022    CR 0.97 01/07/2023    CR 0.85 01/18/2022     (H) 01/07/2023    GLC 96 01/18/2022     COAGS:   Lab Results   Component Value Date    PTT 31 06/06/2008    INR 1.0 02/17/2014     POC: No results found for: BGM, HCG, HCGS  HEPATIC:   Lab Results   Component Value Date    ALBUMIN 4.1 01/07/2023    PROTTOTAL 6.9 01/07/2023    ALT 56 01/07/2023    AST 30 01/07/2023    ALKPHOS 69 01/07/2023    BILITOTAL 0.9 01/07/2023     OTHER:   Lab Results   Component Value Date    PH 8.0 (H) 05/07/2002    A1C 6.2 (H) 01/07/2023    WILIAM 9.1 01/07/2023    PHOS 3.5 02/08/2021    MAG 2.2 02/08/2021    LIPASE 95 09/10/2018    AMYLASE 76 03/08/2014    TSH 3.49 01/07/2023    T4 0.94 11/15/2019    CRP <2.9 03/25/2015    SED 7 09/10/2012       Anesthesia Plan    ASA Status:  2   NPO Status:  NPO Appropriate    Anesthesia Type: MAC.     - Reason for MAC: straight local not clinically adequate   Induction: Intravenous, Propofol.   Maintenance: Balanced.        Consents    Anesthesia Plan(s) and associated risks, benefits, and realistic alternatives discussed. Questions answered and patient/representative(s) expressed understanding.    - Discussed:     - Discussed with:  Patient    Use of blood products discussed: No .     Postoperative Care            Comments:    Other Comments: The risks and benefits of anesthesia, and the alternatives where applicable, have been discussed with the patient, and they wish to proceed.            Jimmie Posadas, APRN CRNA

## 2023-05-22 NOTE — H&P
Patient seen for Endoscopy    HPI:  Patient is a 57 year old male w rectal bleeding here for endoscopy. Not taking blood thinning medications. No MI or CVA history. No issues with previous sedation. No recent acute illness.    Review Of Systems    Skin: negative  Ears/Nose/Throat: negative  Respiratory: No shortness of breath, dyspnea on exertion, cough, or hemoptysis  Cardiovascular: negative  Gastrointestinal: negative  Genitourinary: negative  Musculoskeletal: negative  Neurologic: negative  Hematologic/Lymphatic/Immunologic: negative  Endocrine: negative      Past Medical History:   Diagnosis Date     Back pains      Depressive disorder, not elsewhere classified 01/28/2007    declines Rx     Disc degeneration 12/01/2008    see MRI -throaco-lumbar mild discogenic degenerative changes     Elevated LFT's 11/01/2009    alt and ast     Other and unspecified hyperlipidemia 01/01/2007     Sleep disorder 09/01/2009    CPAP     Tobacco use disorder        Past Surgical History:   Procedure Laterality Date     COLONOSCOPY  07/07/08    adenomatous polyp repeat 5 years     COLONOSCOPY N/A 12/29/2017    Procedure: COMBINED COLONOSCOPY, SINGLE OR MULTIPLE BIOPSY/POLYPECTOMY BY BIOPSY;  Colonoscopy, Polypectomy by Biopsy;  Surgeon: Matt Reyes MD;  Location:  [facility]     ENT SURGERY      for deviated septum     EXCISE LESION AXILLA  12/7/2018    Procedure: Excision Skin Tags Right Axilla;  Surgeon: Ayaan Chaney MD;  [facility]     EXCISE LESION BACK N/A 12/7/2018    Procedure: Excision Back Skin Lesion X Two;  Surgeon: Ayaan Chaney MD;  [facility]     EXCISE MASS BACK N/A 4/15/2022    Procedure: Excisions skin lesions of back;  Surgeon: Ayaan Chaney MD;  [facility]     INJECT BLOCK MEDIAL BRANCH CERVICAL/THORACIC/LUMBAR Bilateral 5/26/2022    Procedure: Cervical 4, 5, 6 Medial Branch Block;  Surgeon: Christian Chaudhry MD;  [facility]     INJECT EPIDURAL CERVICAL N/A 5/14/2015    Procedure:  INJECT EPIDURAL CERVICAL;  Surgeon: Christian Chaudhry MD;  [facility]     INJECT EPIDURAL CERVICAL N/A 12/28/2018    Procedure: INJECT EPIDURAL CERVICAL 6-7;  Surgeon: Christian Chaudhry MD;  [facility]     INJECT EPIDURAL LUMBAR N/A 12/20/2019    Procedure: Lumbar 5- Sacral 1 Epidural  Injection bilatetral;  Surgeon: Christian Chaudhry MD;  [facility]     INJECT EPIDURAL LUMBAR Bilateral 9/8/2022    Procedure: Bilateral Lumbar 5-Sacral 1 transforaminal epidural injections utilizing fluoroscopic guidance with contrast dye  and;  Surgeon: Christian Chaudhry MD;  [facility]     INJECT EPIDURAL TRANSFORAMINAL Bilateral 5/20/2022    Procedure: Bilateral Lumbar 5- Sacral 1 transforaminal Epidural Steroid Injection;  Surgeon: Christian Chaudhry MD;  [facility]     INJECT EPIDURAL TRANSFORAMINAL Bilateral 7/14/2022    Procedure: Left Cervical 5-Cervical 6, Right Cervical 5-Cervical 6, Left Cervical 6-Cervical 7 and Right Cervical 6-Cervical 7 Intra-articular zygopophyseal joint injection utilizing fluoroscopic guidance with contrast dye;  Surgeon: Christian Chaudhry MD;  [facility]     INJECT FACET JOINT Bilateral 1/25/2019    Procedure: Cervical Facet Injections Cervical 5-6 and 6-7 Bilateral;  Surgeon: Christian Chaudhry MD;  [facility]     INJECT FACET JOINT Bilateral 11/29/2019    Procedure: Cervical 5-6 and 6-7 Bilateral facet joint injection;  Surgeon: Christian Chaudhry MD;  [facility]     INJECT FACET JOINT Bilateral 2/11/2021    Procedure: Cervical 5-6 and Cervical 6-7 Bilateral facet injections;  Surgeon: Christian Chaudhry MD;  [facility]     INJECT FACET JOINT Bilateral 10/19/2021    Procedure: Left C5-C6, Right C5-C6, Left C6-C7 and Right C6-C7 Intra-articular zygopophyseal joint injection utilizing fluoroscopic guidance with contrast dye;  Surgeon: Christian Chaudhry MD;  [facility]     INJECT STEROID (LOCATION) Left 10/19/2021    Procedure: Glenohumeral joint injection left shoulder;   Surgeon: Christian Chaudhry MD;  [facility]     INJECT STEROID (LOCATION) Left 2022    Procedure: Left shoulder glenohumeral steroid inj with local;  Surgeon: Christian Chaudhry MD;  [facility]     INJECT STEROID TROCHANTERIC BURSA Left 2022    Procedure: Left intra-articular shoulder injections;  Surgeon: Christian Chauhdry MD;  [facility]     ORTHOPEDIC SURGERY      Right hand     TONSILLECTOMY       [facility] NONSPECIFIC PROCEDURE      rt hand surgery - laceration/glass/tendons     ZZHC ECHO HEART XTHORACIC, STRESS/REST  2009    neg       Family History   Problem Relation Age of Onset     Breast Cancer Mother      Arthritis Mother         joint ?     Depression Father      Cancer Maternal Grandmother      C.A.D. Maternal Grandfather      Cardiovascular Maternal Grandfather      Gynecology Paternal Grandmother          giving birth     Prostate Cancer Paternal Grandfather      Genetic Disorder Brother         joint pain?     Depression Brother      Cardiovascular Son         congenital heart defect -       Prostate Cancer Other          age 70's     Diabetes Other        Social History     Socioeconomic History     Marital status:      Spouse name: Not on file     Number of children: Not on file     Years of education: Not on file     Highest education level: Not on file   Occupational History     Occupation:      Employer: SELF   Tobacco Use     Smoking status: Former     Packs/day: 1.50     Types: Cigarettes     Quit date: 2007     Years since quittin.2     Smokeless tobacco: Never   Vaping Use     Vaping status: Not on file   Substance and Sexual Activity     Alcohol use: No     Comment: rare     Drug use: No     Sexual activity: Yes     Partners: Female   Other Topics Concern     Parent/sibling w/ CABG, MI or angioplasty before 65F 55M? No   Social History Narrative    Dairy/d a lot, 1 gallon of milk per day plus cheese and other servings     Caffeine  48 oz. coffee per day    Exercise none    Sunscreen used - No    Seatbelts used - Yes    Working smoke/CO detectors in the home - Yes    Guns stored in the home - No    Self Breast Exams - NA    Self Testicular Exam - No    Eye Exam up to date - No    Dental Exam up to date - Yes    Pap Smear up to date - NA    Mammogram up to date - NA    PSA up to date - No    Dexa Scan up to date - NA    Flex Sig / Colonoscopy up to date - No    Immunizations up to date - No    Abuse: Current or Past(Physical, Sexual or Emotional)- No    Do you feel safe in your environment - Yes    Tms,liberty        Lives with spouse and her parents.     Social Determinants of Health     Financial Resource Strain: Not on file   Food Insecurity: Not on file   Transportation Needs: Not on file   Physical Activity: Not on file   Stress: Not on file   Social Connections: Not on file   Intimate Partner Violence: Not on file   Housing Stability: Not on file       No current outpatient medications on file.       Medications and history reviewed    Physical exam:  Vitals: Pulse 81   Temp 98  F (36.7  C) (Oral)   Resp 16   SpO2 95%   BMI= There is no height or weight on file to calculate BMI.    Constitutional: Healthy, alert, non-distressed   Head: Normo-cephalic, atraumatic, no lesions, masses or tenderness   Cardiovascular: RRR, no new murmurs, +S1, +S2 heart sounds, no clicks, rubs or gallops   Respiratory: CTAB, no rales, rhonchi or wheezing, equal chest rise, good respiratory effort   Gastrointestinal: Soft, non-tender, non distended, no rebound rigidity or guarding, no masses or hernias palpated   : Deferred  Musculoskeletal: Moves all extremities, normal  strength, no deformities noted   Skin: No suspicious lesions or rashes   Psychiatric: Mentation appears normal, affect appropriate   Hematologic/Lymphatic/Immunologic: Normal cervical and supraclavicular lymph nodes   Patient able to get up on table without difficulty.    Labs show:  No  results found for this or any previous visit (from the past 24 hour(s)).    Assessment: Endoscopy  Plan: Pt cleared for anesthesia for proposed procedure.    Constantino Bowie DO

## 2023-05-22 NOTE — ANESTHESIA CARE TRANSFER NOTE
Patient: Bulmaro Herrera    Procedure: Procedure(s):  Colonoscopy       Diagnosis: Rectal bleeding [K62.5]  Diagnosis Additional Information: No value filed.    Anesthesia Type:   MAC     Note:    Oropharynx: oropharynx clear of all foreign objects and spontaneously breathing  Level of Consciousness: drowsy  Oxygen Supplementation: face mask    Independent Airway: airway patency satisfactory and stable  Dentition: dentition unchanged  Vital Signs Stable: post-procedure vital signs reviewed and stable  Report to RN Given: handoff report given  Patient transferred to: Phase II          Vitals:  Vitals Value Taken Time   /93 05/22/23 1500   Temp     Pulse 65 05/22/23 1500   Resp 16 05/22/23 1500   SpO2 97 % 05/22/23 1510   Vitals shown include unvalidated device data.    Electronically Signed By: SHIV Rossi CRNA  May 22, 2023  3:31 PM

## 2023-05-22 NOTE — DISCHARGE INSTRUCTIONS
[facility]    Home Care Following Endoscopy          Activity:  You have just undergone an endoscopic procedure usually performed with conscious sedation.  Do not work or operate machinery (including a car) for at least 12 hours.    I encourage you to walk and attempt to pass this air as soon as possible.  Diet:  Return to the diet you were on before your procedure but eat lightly for the first 12-24 hours.  Drink plenty of water.  Resume any regular medications unless otherwise advised by your physician.  Please begin any new medication prescribed as a result of your procedure as directed by your physician.   If you had any biopsy or polyp removed please refrain from aspirin or aspirin products for 2 days.  If on Coumadin please restart as instructed by your physician.   Pain:  You may take Tylenol as needed for pain.  Expected Recovery:  You can expect some mild abdominal fullness and/or discomfort due to the air used to inflate your intestinal tract.    Call Your Physician if You Have:    After Colonoscopy:  Worsening persisting abdominal pain which is worse with activity.  Fevers (>101 degrees F), chills or shakes.  Passage of continued blood with bowel movements.     Any questions or concerns about your recovery, please call [phone] or after hours [phone] ([phone]) Nurse Advice Line.    Follow-up Care:  You did NOT have polyps/biopsy tissue sample(s) removed.                Call [phone]  with any questions or concerned

## 2023-05-22 NOTE — ANESTHESIA POSTPROCEDURE EVALUATION
Patient: Bulmaro Herrera    Procedure: Procedure(s):  Colonoscopy       Anesthesia Type:  MAC    Note:  Disposition: Outpatient   Postop Pain Control: Uneventful            Sign Out: Well controlled pain   PONV: No   Neuro/Psych: Uneventful            Sign Out: Acceptable/Baseline neuro status   Airway/Respiratory: Uneventful            Sign Out: Acceptable/Baseline resp. status   CV/Hemodynamics: Uneventful            Sign Out: Acceptable CV status   Other NRE: NONE   DID A NON-ROUTINE EVENT OCCUR? No    Event details/Postop Comments:  Pt was happy with anesthesia care.  No complications.  I will follow up with the pt if needed.           Last vitals:  Vitals Value Taken Time   /93 05/22/23 1500   Temp     Pulse 65 05/22/23 1500   Resp 16 05/22/23 1500   SpO2 97 % 05/22/23 1510   Vitals shown include unvalidated device data.    Electronically Signed By: SHIV Rossi CRNA  May 22, 2023  3:45 PM

## 2023-05-31 ENCOUNTER — OFFICE VISIT (OUTPATIENT)
Dept: PULMONOLOGY | Facility: CLINIC | Age: 58
End: 2023-05-31
Attending: INTERNAL MEDICINE
Payer: COMMERCIAL

## 2023-05-31 VITALS
SYSTOLIC BLOOD PRESSURE: 146 MMHG | RESPIRATION RATE: 18 BRPM | DIASTOLIC BLOOD PRESSURE: 92 MMHG | HEART RATE: 100 BPM | OXYGEN SATURATION: 95 % | BODY MASS INDEX: 36.8 KG/M2 | WEIGHT: 262 LBS | TEMPERATURE: 97.4 F

## 2023-05-31 DIAGNOSIS — J98.4 RESTRICTIVE LUNG DISEASE: Primary | ICD-10-CM

## 2023-05-31 DIAGNOSIS — R06.02 SOB (SHORTNESS OF BREATH): ICD-10-CM

## 2023-05-31 PROCEDURE — 99204 OFFICE O/P NEW MOD 45 MIN: CPT

## 2023-05-31 ASSESSMENT — PAIN SCALES - GENERAL: PAINLEVEL: NO PAIN (0)

## 2023-05-31 NOTE — PROGRESS NOTES
"Does Bulmaro have home oxygen?  No     [facility]  Pulmonary Medicine  Visit Clinic Note  May 31, 2023         ASSESSMENT & PLAN       Shortness of breath  Musculoskeletal pain  Mild restrictive lung disease    Based on his symptoms and pulmonary function testing, I do not have a specific diagnosis for him at this time that could explain his dyspnea.  Given his mild lung restriction, I think it is reasonable to get a chest CT scan looking for any evidence of fibrosis.  There is nothing apparent on our CT scan from 2021, but this is a coronary CT scan and missed about half of his lung fields.  Weight gain could be a major factor here, but his dyspnea precedes that.  I do wonder about how arthritis of his neck and back may be limiting the pliability of his spine and opening up his chest wall with breathing.  This may be reason for some of his lung restriction with some of his shortness of breath.  Going to start with a high-resolution chest CT scan.  There may be other testing that follows that depending on the results of the CT scan.      I will get in touch with him after I review the chest CT scan results    Tavares Vergara MD              Today's visit note:     Chief Complaint: Consult (SOB and results of the PFT'S )      HISTORY OF PRESENT ILLNESS:    Bulmaro Herrera is a 57 year old year old male who is being seen for shortness of breath.    He states that he has had the feeling of shortness of breath for many years.  Maybe even about 20 years.  He describes the feeling of just not being able to \"get enough oxygen.\"  However the last few years he states that that symptom has been getting worse.  He also suffers from a lot of musculoskeletal issues such as neck and low back pain and what sounds like radicular pain down his legs.  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  He does not get regular exercise outside of the manual labor on the job.  He smokes " about a pack and half cigarettes for 20 years, but quit smoking 15 years ago.  He has been prescribed inhaler such as albuterol, but these have not helped him.  He admits that weight gain over the last 6 few years has not helped his breathing symptoms, but feels like there is something more to it than just weight.  He feels wheezing at times in the middle of his chest but no significant cough or chest pain.  He has had a cardiology evaluation which has not identified a cause for his shortness of breath.             Past Medical and Surgical History:     Past Medical History:   Diagnosis Date     Back pains      Depressive disorder, not elsewhere classified 01/28/2007    declines Rx     Disc degeneration 12/01/2008    see MRI -throaco-lumbar mild discogenic degenerative changes     Elevated LFT's 11/01/2009    alt and ast     Other and unspecified hyperlipidemia 01/01/2007     Sleep disorder 09/01/2009    CPAP     Tobacco use disorder      Past Surgical History:   Procedure Laterality Date     COLONOSCOPY  07/07/08    adenomatous polyp repeat 5 years     COLONOSCOPY N/A 12/29/2017    Procedure: COMBINED COLONOSCOPY, SINGLE OR MULTIPLE BIOPSY/POLYPECTOMY BY BIOPSY;  Colonoscopy, Polypectomy by Biopsy;  Surgeon: Matt Reyes MD;  Location:  GI     COLONOSCOPY N/A 5/22/2023    Procedure: Colonoscopy;  Surgeon: Constantino Bowie DO;  Location:  GI     ENT SURGERY      for deviated septum     EXCISE LESION AXILLA  12/7/2018    Procedure: Excision Skin Tags Right Axilla;  Surgeon: Ayaan Chaney MD;  [facility]     EXCISE LESION BACK N/A 12/7/2018    Procedure: Excision Back Skin Lesion X Two;  Surgeon: Ayaan Chaney MD;  [facility]     EXCISE MASS BACK N/A 4/15/2022    Procedure: Excisions skin lesions of back;  Surgeon: Ayaan Chaney MD;  [facility]     INJECT BLOCK MEDIAL BRANCH CERVICAL/THORACIC/LUMBAR Bilateral 5/26/2022    Procedure: Cervical 4, 5, 6 Medial Branch Block;   Surgeon: Christian Chaudhry MD;  [facility]     INJECT EPIDURAL CERVICAL N/A 5/14/2015    Procedure: INJECT EPIDURAL CERVICAL;  Surgeon: Christian Chaudhry MD;  [facility]     INJECT EPIDURAL CERVICAL N/A 12/28/2018    Procedure: INJECT EPIDURAL CERVICAL 6-7;  Surgeon: Christian Chaudhry MD;  [facility]     INJECT EPIDURAL LUMBAR N/A 12/20/2019    Procedure: Lumbar 5- Sacral 1 Epidural  Injection bilatetral;  Surgeon: Christian Chaudhry MD;  [facility]     INJECT EPIDURAL LUMBAR Bilateral 9/8/2022    Procedure: Bilateral Lumbar 5-Sacral 1 transforaminal epidural injections utilizing fluoroscopic guidance with contrast dye  and;  Surgeon: Christian Chaudhry MD;  [facility]     INJECT EPIDURAL TRANSFORAMINAL Bilateral 5/20/2022    Procedure: Bilateral Lumbar 5- Sacral 1 transforaminal Epidural Steroid Injection;  Surgeon: Christian Chaudhry MD;  [facility]     INJECT EPIDURAL TRANSFORAMINAL Bilateral 7/14/2022    Procedure: Left Cervical 5-Cervical 6, Right Cervical 5-Cervical 6, Left Cervical 6-Cervical 7 and Right Cervical 6-Cervical 7 Intra-articular zygopophyseal joint injection utilizing fluoroscopic guidance with contrast dye;  Surgeon: Christian Chaudhry MD;  [facility]     INJECT FACET JOINT Bilateral 1/25/2019    Procedure: Cervical Facet Injections Cervical 5-6 and 6-7 Bilateral;  Surgeon: Christian Chaudhry MD;  [facility]     INJECT FACET JOINT Bilateral 11/29/2019    Procedure: Cervical 5-6 and 6-7 Bilateral facet joint injection;  Surgeon: Christian Chaudhry MD;  [facility]     INJECT FACET JOINT Bilateral 2/11/2021    Procedure: Cervical 5-6 and Cervical 6-7 Bilateral facet injections;  Surgeon: Christian Chaudhry MD;  [facility]     INJECT FACET JOINT Bilateral 10/19/2021    Procedure: Left C5-C6, Right C5-C6, Left C6-C7 and Right C6-C7 Intra-articular zygopophyseal joint injection utilizing fluoroscopic guidance with contrast dye;  Surgeon: Christian Chaudhry MD;  [facility]      INJECT STEROID (LOCATION) Left 10/19/2021    Procedure: Glenohumeral joint injection left shoulder;  Surgeon: Christian Chaudhry MD;  [facility]     INJECT STEROID (LOCATION) Left 2022    Procedure: Left shoulder glenohumeral steroid inj with local;  Surgeon: Christian Chaudhry MD;  [facility]     INJECT STEROID TROCHANTERIC BURSA Left 2022    Procedure: Left intra-articular shoulder injections;  Surgeon: Christian Chaudhry MD;  [facility]     ORTHOPEDIC SURGERY      Right hand     TONSILLECTOMY       ZZC NONSPECIFIC PROCEDURE      rt hand surgery - laceration/glass/tendons     ZZHC ECHO HEART XTHORACIC, STRESS/REST  2009    neg           Family History:     Family History   Problem Relation Age of Onset     Breast Cancer Mother      Arthritis Mother         joint ?     Depression Father      Cancer Maternal Grandmother      C.A.D. Maternal Grandfather      Cardiovascular Maternal Grandfather      Gynecology Paternal Grandmother          giving birth     Prostate Cancer Paternal Grandfather      Genetic Disorder Brother         joint pain?     Depression Brother      Cardiovascular Son         congenital heart defect -       Prostate Cancer Other          age 70's     Diabetes Other               Social History:     Social History     Socioeconomic History     Marital status:      Spouse name: Not on file     Number of children: Not on file     Years of education: Not on file     Highest education level: Not on file   Occupational History     Occupation:      Employer: SELF   Tobacco Use     Smoking status: Former     Packs/day: 1.50     Types: Cigarettes     Quit date: 2007     Years since quittin.3     Smokeless tobacco: Never   Vaping Use     Vaping status: Not on file   Substance and Sexual Activity     Alcohol use: No     Comment: rare     Drug use: No     Sexual activity: Yes     Partners: Female   Other Topics Concern     Parent/sibling w/  CABG, MI or angioplasty before 65F 55M? No   Social History Narrative    Dairy/d a lot, 1 gallon of milk per day plus cheese and other servings     Caffeine 48 oz. coffee per day    Exercise none    Sunscreen used - No    Seatbelts used - Yes    Working smoke/CO detectors in the home - Yes    Guns stored in the home - No    Self Breast Exams - NA    Self Testicular Exam - No    Eye Exam up to date - No    Dental Exam up to date - Yes    Pap Smear up to date - NA    Mammogram up to date - NA    PSA up to date - No    Dexa Scan up to date - NA    Flex Sig / Colonoscopy up to date - No    Immunizations up to date - No    Abuse: Current or Past(Physical, Sexual or Emotional)- No    Do you feel safe in your environment - Yes    [place]        Lives with spouse and her parents.     Social Determinants of Health     Financial Resource Strain: Not on file   Food Insecurity: Not on file   Transportation Needs: Not on file   Physical Activity: Not on file   Stress: Not on file   Social Connections: Not on file   Intimate Partner Violence: Not on file   Housing Stability: Not on file            Medications:     Current Outpatient Medications   Medication     acetaminophen (TYLENOL) 500 MG tablet     aspirin (ASA) 81 MG chewable tablet     ibuprofen (ADVIL/MOTRIN) 200 MG tablet     meclizine 25 MG CHEW     modafinil (PROVIGIL) 200 MG tablet     omeprazole (PRILOSEC) 40 MG DR capsule     rosuvastatin (CRESTOR) 5 MG tablet     No current facility-administered medications for this visit.            Review of Systems:       A complete review of systems was otherwise negative except as noted in the HPI.      PHYSICAL EXAM:  BP (!) 146/92   Pulse 100   Temp 97.4  F (36.3  C) (Temporal)   Resp 18   Wt 118.8 kg (262 lb)   SpO2 95%   BMI 36.80 kg/m       General: Comfortable, No apparent distress  Eyes: Anicteric   Nose: Nasal mucosa with no edema or hyperemia.  No polyps  Ears: Hearing grossly normal  Mouth: Oral mucosa is moist,  without any lesions. No oropharyngeal exudate.  Neck: supple, no thyromegaly  Lymphatics: No cervical or supraclavicular nodes  Respiratory: Good air movement. No crackles. No rhonchi. No wheezes  Cardiac: RRR, normal S1, S2. No murmurs. No JVD  Musculoskeletal: Extremities normal. No clubbing. No cyanosis. No edema.  Skin: No rash on limited exam  Neuro: Normal mentation. Normal speech.  Psych:Normal affect           Data:   All laboratory and imaging data reviewed.      PFT:   Pulmonary function testing on April 19, 2023 shows an FEV1/FVC ratio 0.86 postbronchodilator.  His postbronchodilator FVC is 3.89 L which is 75% predicted and the FEV1 is 3.34 L which is 83% predicted.  His total lung capacity is 79% predicted and the diffusion capacity is 99% predicted.       PFT Interpretation:  No airflow obstruction.  Mild restriction.  Normal diffusion capacity.  Valid Maneuver    CXR: I have reviewed the CXR images from March 2, 2023 and agree with the radiologist interpretation below:  Negative chest. No acute infiltrate or significant change      No results found for this or any previous visit (from the past 168 hour(s)).

## 2023-05-31 NOTE — NURSING NOTE
Chief Complaint   Patient presents with     Consult     SOB and results of the PFT'S      Bulmaro to follow up with Primary Care provider regarding elevated blood pressure.

## 2023-06-01 ENCOUNTER — HOSPITAL ENCOUNTER (OUTPATIENT)
Dept: CT IMAGING | Facility: CLINIC | Age: 58
Discharge: HOME | End: 2023-06-01
Payer: COMMERCIAL

## 2023-06-01 DIAGNOSIS — R06.02 SOB (SHORTNESS OF BREATH): ICD-10-CM

## 2023-06-01 DIAGNOSIS — J98.4 RESTRICTIVE LUNG DISEASE: ICD-10-CM

## 2023-06-01 PROCEDURE — 71250 CT THORAX DX C-: CPT

## 2023-06-06 ENCOUNTER — MYC MEDICAL ADVICE (OUTPATIENT)
Dept: INTERNAL MEDICINE | Facility: CLINIC | Age: 58
End: 2023-06-06
Payer: COMMERCIAL

## 2023-06-06 ENCOUNTER — TRANSFERRED RECORDS (OUTPATIENT)
Dept: HEALTH INFORMATION MANAGEMENT | Facility: CLINIC | Age: 58
End: 2023-06-06
Payer: COMMERCIAL

## 2023-06-07 ENCOUNTER — ANESTHESIA EVENT (OUTPATIENT)
Dept: SURGERY | Facility: CLINIC | Age: 58
End: 2023-06-07
Payer: COMMERCIAL

## 2023-06-07 ASSESSMENT — LIFESTYLE VARIABLES: TOBACCO_USE: 1

## 2023-06-08 ENCOUNTER — ANESTHESIA (OUTPATIENT)
Dept: SURGERY | Facility: CLINIC | Age: 58
End: 2023-06-08
Payer: COMMERCIAL

## 2023-06-08 ENCOUNTER — HOSPITAL ENCOUNTER (OUTPATIENT)
Dept: GENERAL RADIOLOGY | Facility: CLINIC | Age: 58
Discharge: HOME | End: 2023-06-08
Attending: ANESTHESIOLOGY | Admitting: ANESTHESIOLOGY
Payer: COMMERCIAL

## 2023-06-08 ENCOUNTER — HOSPITAL ENCOUNTER (OUTPATIENT)
Facility: CLINIC | Age: 58
Discharge: HOME | End: 2023-06-08
Attending: ANESTHESIOLOGY | Admitting: ANESTHESIOLOGY
Payer: COMMERCIAL

## 2023-06-08 VITALS
HEART RATE: 71 BPM | OXYGEN SATURATION: 94 % | WEIGHT: 256 LBS | HEIGHT: 71 IN | SYSTOLIC BLOOD PRESSURE: 119 MMHG | DIASTOLIC BLOOD PRESSURE: 91 MMHG | RESPIRATION RATE: 16 BRPM | TEMPERATURE: 97.6 F | BODY MASS INDEX: 35.84 KG/M2

## 2023-06-08 DIAGNOSIS — M48.02 NEURAL FORAMINAL STENOSIS OF CERVICAL SPINE: ICD-10-CM

## 2023-06-08 DIAGNOSIS — M54.2 CERVICALGIA: ICD-10-CM

## 2023-06-08 PROCEDURE — 20610 DRAIN/INJ JOINT/BURSA W/O US: CPT | Mod: LT | Performed by: ANESTHESIOLOGY

## 2023-06-08 PROCEDURE — 77002 NEEDLE LOCALIZATION BY XRAY: CPT | Mod: 26 | Performed by: ANESTHESIOLOGY

## 2023-06-08 PROCEDURE — 999N000179 XR SURGERY CARM FLUORO LESS THAN 5 MIN W STILLS: Mod: TC

## 2023-06-08 PROCEDURE — 250N000011 HC RX IP 250 OP 636: Performed by: ANESTHESIOLOGY

## 2023-06-08 PROCEDURE — 250N000009 HC RX 250: Performed by: NURSE ANESTHETIST, CERTIFIED REGISTERED

## 2023-06-08 PROCEDURE — 250N000009 HC RX 250: Performed by: ANESTHESIOLOGY

## 2023-06-08 PROCEDURE — 250N000011 HC RX IP 250 OP 636: Performed by: NURSE ANESTHETIST, CERTIFIED REGISTERED

## 2023-06-08 PROCEDURE — 258N000003 HC RX IP 258 OP 636: Performed by: NURSE ANESTHETIST, CERTIFIED REGISTERED

## 2023-06-08 PROCEDURE — 64490 INJ PARAVERT F JNT C/T 1 LEV: CPT | Mod: 50 | Performed by: ANESTHESIOLOGY

## 2023-06-08 PROCEDURE — 64491 INJ PARAVERT F JNT C/T 2 LEV: CPT | Performed by: ANESTHESIOLOGY

## 2023-06-08 PROCEDURE — 370N000017 HC ANESTHESIA TECHNICAL FEE, PER MIN: Performed by: ANESTHESIOLOGY

## 2023-06-08 RX ORDER — LIDOCAINE 40 MG/G
CREAM TOPICAL
Status: DISCONTINUED | OUTPATIENT
Start: 2023-06-08 | End: 2023-06-08 | Stop reason: HOSPADM

## 2023-06-08 RX ORDER — SODIUM CHLORIDE, SODIUM LACTATE, POTASSIUM CHLORIDE, CALCIUM CHLORIDE 600; 310; 30; 20 MG/100ML; MG/100ML; MG/100ML; MG/100ML
INJECTION, SOLUTION INTRAVENOUS CONTINUOUS PRN
Status: DISCONTINUED | OUTPATIENT
Start: 2023-06-08 | End: 2023-06-08

## 2023-06-08 RX ORDER — PROPOFOL 10 MG/ML
INJECTION, EMULSION INTRAVENOUS PRN
Status: DISCONTINUED | OUTPATIENT
Start: 2023-06-08 | End: 2023-06-08

## 2023-06-08 RX ORDER — LIDOCAINE HYDROCHLORIDE 20 MG/ML
INJECTION, SOLUTION INFILTRATION; PERINEURAL PRN
Status: DISCONTINUED | OUTPATIENT
Start: 2023-06-08 | End: 2023-06-08

## 2023-06-08 RX ORDER — TRIAMCINOLONE ACETONIDE 40 MG/ML
INJECTION, SUSPENSION INTRA-ARTICULAR; INTRAMUSCULAR PRN
Status: DISCONTINUED | OUTPATIENT
Start: 2023-06-08 | End: 2023-06-08 | Stop reason: HOSPADM

## 2023-06-08 RX ORDER — IOPAMIDOL 612 MG/ML
INJECTION, SOLUTION INTRATHECAL PRN
Status: DISCONTINUED | OUTPATIENT
Start: 2023-06-08 | End: 2023-06-08 | Stop reason: HOSPADM

## 2023-06-08 RX ORDER — BUPIVACAINE HYDROCHLORIDE 5 MG/ML
INJECTION, SOLUTION PERINEURAL PRN
Status: DISCONTINUED | OUTPATIENT
Start: 2023-06-08 | End: 2023-06-08 | Stop reason: HOSPADM

## 2023-06-08 RX ADMIN — PROPOFOL 50 MG: 10 INJECTION, EMULSION INTRAVENOUS at 15:40

## 2023-06-08 RX ADMIN — PROPOFOL 50 MG: 10 INJECTION, EMULSION INTRAVENOUS at 15:32

## 2023-06-08 RX ADMIN — DEXMEDETOMIDINE HYDROCHLORIDE 10 MCG: 100 INJECTION, SOLUTION INTRAVENOUS at 15:21

## 2023-06-08 RX ADMIN — LIDOCAINE HYDROCHLORIDE 0.2 ML: 10 INJECTION, SOLUTION EPIDURAL; INFILTRATION; INTRACAUDAL; PERINEURAL at 14:57

## 2023-06-08 RX ADMIN — LIDOCAINE HYDROCHLORIDE 30 MG: 20 INJECTION, SOLUTION INFILTRATION; PERINEURAL at 15:25

## 2023-06-08 RX ADMIN — PROPOFOL 50 MG: 10 INJECTION, EMULSION INTRAVENOUS at 15:35

## 2023-06-08 RX ADMIN — PROPOFOL 50 MG: 10 INJECTION, EMULSION INTRAVENOUS at 15:27

## 2023-06-08 RX ADMIN — PROPOFOL 50 MG: 10 INJECTION, EMULSION INTRAVENOUS at 15:38

## 2023-06-08 RX ADMIN — PROPOFOL 50 MG: 10 INJECTION, EMULSION INTRAVENOUS at 15:29

## 2023-06-08 RX ADMIN — MIDAZOLAM 2 MG: 1 INJECTION INTRAMUSCULAR; INTRAVENOUS at 15:18

## 2023-06-08 RX ADMIN — PROPOFOL 50 MG: 10 INJECTION, EMULSION INTRAVENOUS at 15:25

## 2023-06-08 ASSESSMENT — ACTIVITIES OF DAILY LIVING (ADL): ADLS_ACUITY_SCORE: 35

## 2023-06-08 NOTE — ANESTHESIA POSTPROCEDURE EVALUATION
Patient: Bulmaro Herrera    Procedure: Procedure(s):  Bilateral cervical 5-6 and 6-7 Intra-articular zygopophyseal joint injections utilizing fluoroscopic guidance and a left Glenohumeral joint injection       Anesthesia Type:  MAC    Note:  Disposition: Outpatient   Postop Pain Control: Uneventful            Sign Out: Well controlled pain   PONV: No   Neuro/Psych: Uneventful            Sign Out: Acceptable/Baseline neuro status   Airway/Respiratory: Uneventful            Sign Out: Acceptable/Baseline resp. status   CV/Hemodynamics: Uneventful            Sign Out: Acceptable CV status   Other NRE: NONE   DID A NON-ROUTINE EVENT OCCUR? No    Event details/Postop Comments:  Pt was happy with anesthesia care.  No complications.  I will follow up with the pt if needed.           Last vitals:  Vitals Value Taken Time   /91 06/08/23 1610   Temp     Pulse 71 06/08/23 1610   Resp     SpO2 93 % 06/08/23 1612   Vitals shown include unvalidated device data.    Electronically Signed By: SHIV Mcarthur CRNA  June 8, 2023  4:19 PM

## 2023-06-08 NOTE — DISCHARGE INSTRUCTIONS
CT abdomen and pelvis with contrast

 

HISTORY: Severe abdominal pain, nausea and vomiting

 

CT scan of the abdomen and pelvis was done using 60 mL Omnipaque 300 

contrast. Lung bases are clear. There is no effusion. Liver is normal in 

appearance. Spleen is unremarkable. Adrenal glands are normal. Pancreas is

normal in appearance. There is no mass or hydronephrosis in the kidneys. 

There is mild thickening of the gallbladder wall. Ultrasound could be of 

benefit. There is no free air or ascites. There is no bowel obstruction. 

Uterus and left ovary are normal. There is a 2.7 cm right ovarian cyst. 

Patient has had a partial colectomy. There is wall thickening of the 

terminal ileum suggesting  and mild wall thickening of the rectum. There 

is mild adenopathy in the mesentery.

 

IMPRESSION:

1. Mild bowel wall thickening of the distal small bowel and rectum 

suggesting colitis and ileitis.

2. Mild mesenteric adenopathy.

3. No bowel obstruction or abscess noted.

4. Right ovarian cyst 

5. Mild thickening of the gallbladder wall without calcified gallstones.

 

Electronically signed by: Kaiden Kessler MD (12/12/2018 7:28 PM) [facility] Home Care Instructions                Procedure: Epidural injection or joint injection    Activity:  Rest today  Do not work today  Resume normal activity tomorrow    Pain:  You may experience soreness at the injection site for 1 to 3 days.  You may use an ice pack for 20 minutes every 2 hours for the first 24 hours  You may use a heating pad after the first 24 hours  You may use Tylenol  (acetaminophen) every 4 hours or other pain medicines as directed by your physician    Safety  Sedation medicine, if given may remain active for many hours.    It is important for the next 24 hours that you do not:  Drive a car  Operate machines or power tools  Consume alcohol, including beer  Sign any important papers or legal documents    You may experience numbness radiating into your legs or arms, (depending on the procedure location)  This numbness may last several hours.  Until the numb sensation returns to normal please use caution in walking, climbing stairs, stepping out of your vehicle, etc.    Common side effects of steroids:  Not everyone will experience corticosteroid side effects. If side effects are experienced they will gradually subside in the 7-10 day period following an injection.    Most common side effects include:  Flushed face and/or chest  Feeling of warmth, particularly in face but could be overall feeling of warmth  Increased blood sugar in diabetic patients  Menstrual irregularities may occur.  If taking hormone based birth control an alternate method of birth control is recommended  Sleep disturbances and/or mood swings are possible  Leg cramps    Please contact us if you have:  Severe pain   Fever more than 101.5 degrees Fahrenheit  Signs of infection (redness, swelling or drainage)       PLEASE GO TO THE NEAREST EMERGENCY ROOM IF YOU HAVE:   - Increasing numbness or weakness in your arms or legs or increasingly severe pain (greater than 4 hours after your injection)    If you have questions during  normal business hours (8am-5pm Monday-Friday) contact the [facility] at [phone] to access the [facility] . If you need help after hours, we recommend that you go to a hospital emergency room or dial 911.

## 2023-06-08 NOTE — ANESTHESIA PREPROCEDURE EVALUATION
Anesthesia Pre-Procedure Evaluation    Patient: Bulmaro Herrera   MRN: [id number] : 1965        Procedure : Procedure(s):  Left Cervical 5-Cervical 6, Right Cervical 5-Cervical 6, Left Cervical 6-Cervical 7 and Right Cervical 6-Cervical 7 Intra-articular zygapophyseal joint          Past Medical History:   Diagnosis Date     Back pains      Depressive disorder, not elsewhere classified 2007    declines Rx     Disc degeneration 2008    see MRI -throaco-lumbar mild discogenic degenerative changes     Elevated LFT's 2009    alt and ast     Other and unspecified hyperlipidemia 2007     Sleep disorder 2009    CPAP     Tobacco use disorder       Past Surgical History:   Procedure Laterality Date     COLONOSCOPY  08    adenomatous polyp repeat 5 years     COLONOSCOPY N/A 2017    Procedure: COMBINED COLONOSCOPY, SINGLE OR MULTIPLE BIOPSY/POLYPECTOMY BY BIOPSY;  Colonoscopy, Polypectomy by Biopsy;  Surgeon: Matt Reyes MD;  Location:  GI     COLONOSCOPY N/A 2023    Procedure: Colonoscopy;  Surgeon: Constantino Bowie DO;  Location:  GI     ENT SURGERY      for deviated septum     EXCISE LESION AXILLA  2018    Procedure: Excision Skin Tags Right Axilla;  Surgeon: Ayaan Chaney MD;  [facility]     EXCISE LESION BACK N/A 2018    Procedure: Excision Back Skin Lesion X Two;  Surgeon: Ayaan Chaney MD;  [facility]     EXCISE MASS BACK N/A 4/15/2022    Procedure: Excisions skin lesions of back;  Surgeon: Ayaan Chaney MD;  [facility]     INJECT BLOCK MEDIAL BRANCH CERVICAL/THORACIC/LUMBAR Bilateral 2022    Procedure: Cervical 4, 5, 6 Medial Branch Block;  Surgeon: Christian hCaudhry MD;  [facility]     INJECT EPIDURAL CERVICAL N/A 2015    Procedure: INJECT EPIDURAL CERVICAL;  Surgeon: Christian Chaudhry MD;  [facility]     INJECT EPIDURAL CERVICAL N/A 2018    Procedure: INJECT EPIDURAL CERVICAL 6-7;  Surgeon:  Christian Chaudhry MD;  [facility]     INJECT EPIDURAL LUMBAR N/A 12/20/2019    Procedure: Lumbar 5- Sacral 1 Epidural  Injection bilatetral;  Surgeon: Christian Chaudhry MD;  [facility]     INJECT EPIDURAL LUMBAR Bilateral 9/8/2022    Procedure: Bilateral Lumbar 5-Sacral 1 transforaminal epidural injections utilizing fluoroscopic guidance with contrast dye  and;  Surgeon: Christian Chaudhry MD;  [facility]     INJECT EPIDURAL TRANSFORAMINAL Bilateral 5/20/2022    Procedure: Bilateral Lumbar 5- Sacral 1 transforaminal Epidural Steroid Injection;  Surgeon: Christian Chaudhry MD;  [facility]     INJECT EPIDURAL TRANSFORAMINAL Bilateral 7/14/2022    Procedure: Left Cervical 5-Cervical 6, Right Cervical 5-Cervical 6, Left Cervical 6-Cervical 7 and Right Cervical 6-Cervical 7 Intra-articular zygopophyseal joint injection utilizing fluoroscopic guidance with contrast dye;  Surgeon: Christian Chaudhry MD;  [facility]     INJECT FACET JOINT Bilateral 1/25/2019    Procedure: Cervical Facet Injections Cervical 5-6 and 6-7 Bilateral;  Surgeon: Christian Chaudhry MD;  [facility]     INJECT FACET JOINT Bilateral 11/29/2019    Procedure: Cervical 5-6 and 6-7 Bilateral facet joint injection;  Surgeon: Christian Chaudhry MD;  [facility]     INJECT FACET JOINT Bilateral 2/11/2021    Procedure: Cervical 5-6 and Cervical 6-7 Bilateral facet injections;  Surgeon: Christian Chaudhry MD;  [facility]     INJECT FACET JOINT Bilateral 10/19/2021    Procedure: Left C5-C6, Right C5-C6, Left C6-C7 and Right C6-C7 Intra-articular zygopophyseal joint injection utilizing fluoroscopic guidance with contrast dye;  Surgeon: Christian Chaudhry MD;  [facility]     INJECT STEROID (LOCATION) Left 10/19/2021    Procedure: Glenohumeral joint injection left shoulder;  Surgeon: Christian Chaudhry MD;  [facility]     INJECT STEROID (LOCATION) Left 5/20/2022    Procedure: Left shoulder glenohumeral steroid inj with local;  Surgeon: Isidoro  Christian CHAUHAN MD;  [facility]     INJECT STEROID TROCHANTERIC BURSA Left 2022    Procedure: Left intra-articular shoulder injections;  Surgeon: Christian Chaudhry MD;  [facility]     ORTHOPEDIC SURGERY      Right hand     TONSILLECTOMY       Z NONSPECIFIC PROCEDURE      rt hand surgery - laceration/glass/tendons     [facility] ECHO HEART XTHORACIC, STRESS/REST  2009    neg      Allergies   Allergen Reactions     Wasps [Hornets] Swelling     Percocet [Oxycodone-Acetaminophen] Itching      Social History     Tobacco Use     Smoking status: Former     Packs/day: 1.50     Types: Cigarettes     Quit date: 2007     Years since quittin.3     Smokeless tobacco: Never   Vaping Use     Vaping status: Not on file   Substance Use Topics     Alcohol use: No     Comment: rare      Wt Readings from Last 1 Encounters:   23 118.8 kg (262 lb)        Anesthesia Evaluation   Pt has had prior anesthetic. Type: MAC and General.    No history of anesthetic complications       ROS/MED HX  ENT/Pulmonary:     (+) sleep apnea, moderate, uses CPAP, tobacco use, Past use,     Neurologic:       Cardiovascular:  - neg cardiovascular ROS   (+) -----Previous cardiac testing   Echo: Date: 23 Results:  Interpretation Summary     The visual ejection fraction is 60-65%.  The right ventricular systolic function is normal.  No hemodynamically significant valvular disease.  There is no pericardial effusion  Stress Test: Date: Results:    ECG Reviewed: Date: Results:    Cath: Date: Results:      METS/Exercise Tolerance:     Hematologic:  - neg hematologic  ROS     Musculoskeletal: Comment: DJD  (+) arthritis,     GI/Hepatic:     (+) GERD, Asymptomatic on medication,     Renal/Genitourinary:  - neg Renal ROS     Endo:     (+) Obesity,     Psychiatric/Substance Use:     (+) psychiatric history depression     Infectious Disease:  - neg infectious disease ROS     Malignancy:  - neg malignancy ROS     Other:      (+) , H/O Chronic Pain,         Physical Exam    Airway  airway exam normal      Mallampati: II   TM distance: > 3 FB   Neck ROM: full   Mouth opening: > 3 cm    Respiratory Devices and Support         Dental       (+) Minor Abnormalities - some fillings, tiny chips      Cardiovascular   cardiovascular exam normal       Rhythm and rate: regular and normal     Pulmonary   pulmonary exam normal        breath sounds clear to auscultation           OUTSIDE LABS:  CBC:   Lab Results   Component Value Date    WBC 5.5 02/08/2021    WBC 5.7 11/15/2019    HGB 16.3 02/08/2021    HGB 16.0 11/15/2019    HCT 47.2 02/08/2021    HCT 47.6 11/15/2019     02/08/2021     11/15/2019     BMP:   Lab Results   Component Value Date     01/07/2023     01/18/2022    POTASSIUM 4.0 01/07/2023    POTASSIUM 3.9 01/18/2022    CHLORIDE 108 01/07/2023    CHLORIDE 108 01/18/2022    CO2 26 01/07/2023    CO2 25 01/18/2022    BUN 21 01/07/2023    BUN 15 01/18/2022    CR 0.97 01/07/2023    CR 0.85 01/18/2022     (H) 01/07/2023    GLC 96 01/18/2022     COAGS:   Lab Results   Component Value Date    PTT 31 06/06/2008    INR 1.0 02/17/2014     POC: No results found for: BGM, HCG, HCGS  HEPATIC:   Lab Results   Component Value Date    ALBUMIN 4.1 01/07/2023    PROTTOTAL 6.9 01/07/2023    ALT 56 01/07/2023    AST 30 01/07/2023    ALKPHOS 69 01/07/2023    BILITOTAL 0.9 01/07/2023     OTHER:   Lab Results   Component Value Date    PH 8.0 (H) 05/07/2002    A1C 6.2 (H) 01/07/2023    WILIAM 9.1 01/07/2023    PHOS 3.5 02/08/2021    MAG 2.2 02/08/2021    LIPASE 95 09/10/2018    AMYLASE 76 03/08/2014    TSH 3.49 01/07/2023    T4 0.94 11/15/2019    CRP <2.9 03/25/2015    SED 7 09/10/2012       Anesthesia Plan    ASA Status:  2   NPO Status:  NPO Appropriate    Anesthesia Type: MAC.     - Reason for MAC: straight local not clinically adequate      Maintenance: TIVA.        Consents    Anesthesia Plan(s) and associated risks, benefits, and realistic alternatives  discussed. Questions answered and patient/representative(s) expressed understanding.    - Discussed:     - Discussed with:  Patient    Use of blood products discussed: No .     Postoperative Care    Pain management: IV analgesics.        Comments:    Other Comments: The risks and benefits of anesthesia, and the alternatives where applicable, have been discussed with the patient, and they wish to proceed.       H&P reviewed: Unable to attach H&P to encounter due to EHR limitations. H&P Update: appropriate H&P reviewed, patient examined. No interval changes since H&P (within 30 days).         SHIV Mcarthur CRNA

## 2023-06-08 NOTE — ANESTHESIA CARE TRANSFER NOTE
Patient: Bulmaro Herrera    Procedure: Procedure(s):  Bilateral cervical 5-6 and 6-7 Intra-articular zygopophyseal joint injections utilizing fluoroscopic guidance and a left Glenohumeral joint injection       Diagnosis: Cervicalgia [M54.2]  Neural foraminal stenosis of cervical spine [M48.02]  Diagnosis Additional Information: No value filed.    Anesthesia Type:   MAC     Note:    Oropharynx: oropharynx clear of all foreign objects and spontaneously breathing  Level of Consciousness: drowsy  Oxygen Supplementation: nasal cannula    Independent Airway: airway patency satisfactory and stable  Dentition: dentition unchanged  Vital Signs Stable: post-procedure vital signs reviewed and stable  Report to RN Given: handoff report given  Patient transferred to: Phase II    Handoff Report: Identifed the Patient, Identified the Reponsible Provider, Reviewed the pertinent medical history, Discussed the surgical course, Reviewed Intra-OP anesthesia mangement and issues during anesthesia, Set expectations for post-procedure period and Allowed opportunity for questions and acknowledgement of understanding      Vitals:  Vitals Value Taken Time   BP     Temp     Pulse     Resp     SpO2         Electronically Signed By: SHIV Mcarthur CRNA  June 8, 2023  3:52 PM

## 2023-06-08 NOTE — OP NOTE
CHIEF COMPLAINT:   1. neck pain secondary to cervical spondylosis.  2.  Left shoulder pain secondary to glenohumeral joint degeneration and a left shoulder labral tear  INTERVAL HISTORY:  I discussed the above note with the patient. I agree with above.  He did have a epidural injection in the neck and that did not help suggesting that the discs in his neck that have some degeneration are not the major pain generators.  Because of this we are going to diagnostically and therapeutically test his facet joints.  The complicating factor in his situation is that he has a severe needle phobia.  He needed very deep monitored anesthetic care in order to have the facet injection performed.  This would preclude him from having a radiofrequency ablation which requires fully awake medial branch blocks done under local anesthesia.  PHYSICAL EXAM:   Musculoskeletal: Strength of upper extremities is 5/5 bilaterally.  Pain on palpation of the   neck. Pain on   Rotation, extension and flexion of neck.   Neuro: No sensory perception differences of lower extremities.  PROCEDURE number #1:    1. Left C5-C6, Right C5-C6, Left C6-C7 and Right C6-C7  Intra-articular zygopophyseal joint injection utilizing fluoroscopic guidance with contrast dye.      PROCEDURE DETAILS: After written informed consent was obtained from the patient, the patient was escorted to the procedure room.  The patient was placed in the sitting position.  A  time out  was conducted to verify the patient identity, procedure to be performed, side, site, allergies and any special requirements.  The skin over the neck was prepped and draped in normal sterile fashion. Fluoroscopy was used to identify the zygopophyseal joint with a lateral view.   The skin was anesthetized with 0.5 mL of 1% lidocaine with bicarbonate buffer.  A 25-gauge 2 inch Quincke needle was advanced under fluoroscopic guidance in the lateral approach until entry into the zygopophyseal joint was  successful.  View was confirmed in AP view.  Then, <0.3 cc of Omnipaque contrast dye was injected producing a satisfactory arthrogram without evidence of intrathecal or intravascular spread.  Then, a 1 mL solution of 5 mg dexamethasone and 0.5 mL of 2% lidocaine was incrementally injected into each  joint.  After injection of the medication, as the needle tip was withdrawn, it was flushed with local anesthetic. The patient was monitored with blood pressure and pulse oximetry machines with the assistance of an RN throughout the procedure.  The patient was alert and responsive to questions throughout the procedure.   The patient tolerated the procedure well and was observed in the post-procedural area.  The patient was dismissed without apparent complications.       PROCEDURE number #2:     Procedure details: After his neck was treated we moved over to his left shoulder.  Left shoulder posterior approach was prepped with ChloraPrep.  1% lidocaine was used for skin.  I then advanced a 22-gauge Quincke needle into his left glenohumeral joint.  Initially contrast was injected which did not show proper spread throughout the joint and after several needle tip manipulations and contrast injection, I achieved a proper arthrogram of the left glenohumeral joint.  I then injected 8 cc of 0.25% bupivacaine with 4 mg/cc of Depo-Medrol.  The needle was then removed and a sterile bandage placed over the needle insertion site.     Blood loss: Less than 2 cc     DIAGNOSIS:  1.  Cervical spondylosis secondary to a symptomatic facet syndrome causing axial neck pain with a history of long-term pain relief from previous facet injections  2.  Severe needle phobia  3.  Labral tear left shoulder inferior with severe glenohumeral joint osteoarthritis     PLAN:  1. Performed repeat zygopophyseal joint injections.   He had about 9 to 10 months of pain relief from the last set of injections.  However, he has a severe needle phobia so he would  not be a candidate for medial branch blocks or a radiofrequency ablation so if he does have positive diagnostic injections with his pain form then he would need to consider a cervical fusion if the facet injections do not provide long-term pain relief for him.  2.    I completed a left shoulder injection as well.  He does have a few other               tears of his rotator cuff but the goal was to treat his glenohumeral joint       which has severe OA.     Christian Chaudhry MD  Diplomate of the American Board of Anesthesiology, Pain Medicine

## 2023-06-15 ENCOUNTER — ANESTHESIA EVENT (OUTPATIENT)
Dept: SURGERY | Facility: CLINIC | Age: 58
End: 2023-06-15
Payer: COMMERCIAL

## 2023-06-16 ENCOUNTER — HOSPITAL ENCOUNTER (OUTPATIENT)
Facility: CLINIC | Age: 58
Discharge: HOME | End: 2023-06-16
Attending: ANESTHESIOLOGY | Admitting: ANESTHESIOLOGY
Payer: COMMERCIAL

## 2023-06-16 ENCOUNTER — ANESTHESIA (OUTPATIENT)
Dept: SURGERY | Facility: CLINIC | Age: 58
End: 2023-06-16
Payer: COMMERCIAL

## 2023-06-16 ENCOUNTER — HOSPITAL ENCOUNTER (OUTPATIENT)
Dept: GENERAL RADIOLOGY | Facility: CLINIC | Age: 58
Discharge: HOME | End: 2023-06-16
Attending: ANESTHESIOLOGY
Payer: COMMERCIAL

## 2023-06-16 VITALS
DIASTOLIC BLOOD PRESSURE: 99 MMHG | HEART RATE: 81 BPM | OXYGEN SATURATION: 96 % | SYSTOLIC BLOOD PRESSURE: 135 MMHG | TEMPERATURE: 98.1 F | RESPIRATION RATE: 16 BRPM

## 2023-06-16 DIAGNOSIS — M54.16 LUMBAR RADICULOPATHY: ICD-10-CM

## 2023-06-16 PROCEDURE — 250N000011 HC RX IP 250 OP 636: Performed by: ANESTHESIOLOGY

## 2023-06-16 PROCEDURE — 250N000009 HC RX 250: Performed by: NURSE ANESTHETIST, CERTIFIED REGISTERED

## 2023-06-16 PROCEDURE — 258N000003 HC RX IP 258 OP 636: Performed by: NURSE ANESTHETIST, CERTIFIED REGISTERED

## 2023-06-16 PROCEDURE — 370N000017 HC ANESTHESIA TECHNICAL FEE, PER MIN: Performed by: ANESTHESIOLOGY

## 2023-06-16 PROCEDURE — 250N000009 HC RX 250: Performed by: ANESTHESIOLOGY

## 2023-06-16 PROCEDURE — 64483 NJX AA&/STRD TFRM EPI L/S 1: CPT | Mod: 50 | Performed by: ANESTHESIOLOGY

## 2023-06-16 PROCEDURE — 250N000011 HC RX IP 250 OP 636: Performed by: NURSE ANESTHETIST, CERTIFIED REGISTERED

## 2023-06-16 PROCEDURE — 999N000179 XR SURGERY CARM FLUORO LESS THAN 5 MIN W STILLS: Mod: TC

## 2023-06-16 RX ORDER — IOPAMIDOL 612 MG/ML
INJECTION, SOLUTION INTRATHECAL PRN
Status: DISCONTINUED | OUTPATIENT
Start: 2023-06-16 | End: 2023-06-16 | Stop reason: HOSPADM

## 2023-06-16 RX ORDER — PROPOFOL 10 MG/ML
INJECTION, EMULSION INTRAVENOUS PRN
Status: DISCONTINUED | OUTPATIENT
Start: 2023-06-16 | End: 2023-06-16

## 2023-06-16 RX ORDER — LIDOCAINE HYDROCHLORIDE 20 MG/ML
INJECTION, SOLUTION INFILTRATION; PERINEURAL PRN
Status: DISCONTINUED | OUTPATIENT
Start: 2023-06-16 | End: 2023-06-16

## 2023-06-16 RX ORDER — TRIAMCINOLONE ACETONIDE 40 MG/ML
INJECTION, SUSPENSION INTRA-ARTICULAR; INTRAMUSCULAR PRN
Status: DISCONTINUED | OUTPATIENT
Start: 2023-06-16 | End: 2023-06-16 | Stop reason: HOSPADM

## 2023-06-16 RX ADMIN — LIDOCAINE HYDROCHLORIDE 80 MG: 20 INJECTION, SOLUTION INFILTRATION; PERINEURAL at 15:06

## 2023-06-16 RX ADMIN — PROPOFOL 50 MG: 10 INJECTION, EMULSION INTRAVENOUS at 15:11

## 2023-06-16 RX ADMIN — PROPOFOL 50 MG: 10 INJECTION, EMULSION INTRAVENOUS at 15:09

## 2023-06-16 RX ADMIN — LIDOCAINE HYDROCHLORIDE 0.1 ML: 10 INJECTION, SOLUTION EPIDURAL; INFILTRATION; INTRACAUDAL; PERINEURAL at 14:32

## 2023-06-16 RX ADMIN — DEXMEDETOMIDINE HYDROCHLORIDE 12 MCG: 100 INJECTION, SOLUTION INTRAVENOUS at 15:02

## 2023-06-16 RX ADMIN — PROPOFOL 50 MG: 10 INJECTION, EMULSION INTRAVENOUS at 15:10

## 2023-06-16 RX ADMIN — PROPOFOL 50 MG: 10 INJECTION, EMULSION INTRAVENOUS at 15:14

## 2023-06-16 RX ADMIN — MIDAZOLAM 2 MG: 1 INJECTION INTRAMUSCULAR; INTRAVENOUS at 15:02

## 2023-06-16 RX ADMIN — PROPOFOL 50 MG: 10 INJECTION, EMULSION INTRAVENOUS at 15:16

## 2023-06-16 ASSESSMENT — LIFESTYLE VARIABLES: TOBACCO_USE: 1

## 2023-06-16 ASSESSMENT — ACTIVITIES OF DAILY LIVING (ADL)
ADLS_ACUITY_SCORE: 35
ADLS_ACUITY_SCORE: 35

## 2023-06-16 NOTE — INTERVAL H&P NOTE
"I have reviewed the surgical (or preoperative) H&P that is linked to this encounter, and examined the patient. There are no significant changes    Clinical Conditions Present on Arrival:  Clinically Significant Risk Factors Present on Admission                 # Drug Induced Platelet Defect: home medication list includes an antiplatelet medication  # Obesity: Estimated body mass index is 35.96 kg/m  as calculated from the following:    Height as of 6/8/23: 1.797 m (5' 10.75\").    Weight as of 6/8/23: 116.1 kg (256 lb).       "

## 2023-06-16 NOTE — DISCHARGE INSTRUCTIONS

## 2023-06-16 NOTE — ANESTHESIA POSTPROCEDURE EVALUATION
Patient: Bulmaro Herrera    Procedure: Procedure(s):  Bilateral Lumbar 5-Sacral 1 transforaminal epidural injections       Anesthesia Type:  MAC    Note:  Disposition: Outpatient   Postop Pain Control: Uneventful            Sign Out: Well controlled pain   PONV: No   Neuro/Psych: Uneventful            Sign Out: Acceptable/Baseline neuro status   Airway/Respiratory: Uneventful            Sign Out: Acceptable/Baseline resp. status   CV/Hemodynamics: Uneventful            Sign Out: Acceptable CV status   Other NRE: NONE   DID A NON-ROUTINE EVENT OCCUR? No    Event details/Postop Comments:  Pt was happy with anesthesia care.  No complications.  I will follow up with the pt if needed.           Last vitals:  Vitals Value Taken Time   /99 06/16/23 1550   Temp     Pulse 81 06/16/23 1550   Resp     SpO2 96 % 06/16/23 1543   Vitals shown include unvalidated device data.    Electronically Signed By: SHIV Mcarthur CRNA  June 16, 2023  4:01 PM

## 2023-06-16 NOTE — ANESTHESIA CARE TRANSFER NOTE
Patient: Bulmaro Herrera    Procedure: Procedure(s):  Bilateral Lumbar 5-Sacral 1 transforaminal epidural injections       Diagnosis: Lumbar radiculopathy [M54.16]  Diagnosis Additional Information: No value filed.    Anesthesia Type:   MAC     Note:    Oropharynx: oropharynx clear of all foreign objects and spontaneously breathing  Level of Consciousness: drowsy  Oxygen Supplementation: nasal cannula    Independent Airway: airway patency satisfactory and stable  Dentition: dentition unchanged  Vital Signs Stable: post-procedure vital signs reviewed and stable  Report to RN Given: handoff report given  Patient transferred to: Phase II    Handoff Report: Identifed the Patient, Identified the Reponsible Provider, Reviewed the pertinent medical history, Discussed the surgical course, Reviewed Intra-OP anesthesia mangement and issues during anesthesia, Set expectations for post-procedure period and Allowed opportunity for questions and acknowledgement of understanding      Vitals:  Vitals Value Taken Time   /91 06/16/23 1530   Temp     Pulse 74 06/16/23 1530   Resp     SpO2 94 % 06/16/23 1533   Vitals shown include unvalidated device data.    Electronically Signed By: SHIV Mcarthur CRNA  June 16, 2023  3:34 PM

## 2023-06-16 NOTE — ANESTHESIA PREPROCEDURE EVALUATION
Anesthesia Pre-Procedure Evaluation    Patient: Bulmaro Herrera   MRN: [id number] : 1965        Procedure : Procedure(s):  Bilateral Lumbar 5-Sacral 1 transforaminal epidural injections          Past Medical History:   Diagnosis Date     Back pains      Depressive disorder, not elsewhere classified 2007    declines Rx     Disc degeneration 2008    see MRI -throaco-lumbar mild discogenic degenerative changes     Elevated LFT's 2009    alt and ast     Other and unspecified hyperlipidemia 2007     Sleep disorder 2009    CPAP     Tobacco use disorder       Past Surgical History:   Procedure Laterality Date     COLONOSCOPY  08    adenomatous polyp repeat 5 years     COLONOSCOPY N/A 2017    Procedure: COMBINED COLONOSCOPY, SINGLE OR MULTIPLE BIOPSY/POLYPECTOMY BY BIOPSY;  Colonoscopy, Polypectomy by Biopsy;  Surgeon: Matt Reyes MD;  Location:  [facility]     COLONOSCOPY N/A 2023    Procedure: Colonoscopy;  Surgeon: Constantino Bowie DO;  Location:  GI     ENT SURGERY      for deviated septum     EXCISE LESION AXILLA  2018    Procedure: Excision Skin Tags Right Axilla;  Surgeon: Ayaan Chaney MD;  [facility]     EXCISE LESION BACK N/A 2018    Procedure: Excision Back Skin Lesion X Two;  Surgeon: Ayaan Chaney MD;  [facility]     EXCISE MASS BACK N/A 4/15/2022    Procedure: Excisions skin lesions of back;  Surgeon: Ayaan Chaney MD;  [facility]     INJECT BLOCK MEDIAL BRANCH CERVICAL/THORACIC/LUMBAR Bilateral 2022    Procedure: Cervical 4, 5, 6 Medial Branch Block;  Surgeon: Christian Chaudhry MD;  [facility]     INJECT EPIDURAL CERVICAL N/A 2015    Procedure: INJECT EPIDURAL CERVICAL;  Surgeon: Christian Chaudhry MD;  [facility]     INJECT EPIDURAL CERVICAL N/A 2018    Procedure: INJECT EPIDURAL CERVICAL 6-7;  Surgeon: Christian Chaudhry MD;  [facility]     INJECT EPIDURAL CERVICAL Bilateral 2023     Procedure: Left Cervical 5-Cervical 6, Right Cervical 5-Cervical 6, Left Cervical 6-Cervical 7 and Right Cervical 6-Cervical 7 Intra-articular zygopophyseal joint injection utilizing fluoroscopic guidance with contrast dye.;  Surgeon: Christian Chaudhry MD;  [facility]     INJECT EPIDURAL LUMBAR N/A 12/20/2019    Procedure: Lumbar 5- Sacral 1 Epidural  Injection bilatetral;  Surgeon: Christian Chaudhry MD;  [facility]     INJECT EPIDURAL LUMBAR Bilateral 9/8/2022    Procedure: Bilateral Lumbar 5-Sacral 1 transforaminal epidural injections utilizing fluoroscopic guidance with contrast dye  and;  Surgeon: Christian Chaudhry MD;  [facility]     INJECT EPIDURAL TRANSFORAMINAL Bilateral 5/20/2022    Procedure: Bilateral Lumbar 5- Sacral 1 transforaminal Epidural Steroid Injection;  Surgeon: Christian Chaudhry MD;  [facility]     INJECT EPIDURAL TRANSFORAMINAL Bilateral 7/14/2022    Procedure: Left Cervical 5-Cervical 6, Right Cervical 5-Cervical 6, Left Cervical 6-Cervical 7 and Right Cervical 6-Cervical 7 Intra-articular zygopophyseal joint injection utilizing fluoroscopic guidance with contrast dye;  Surgeon: Christian Chaudhry MD;  [facility]     INJECT FACET JOINT Bilateral 1/25/2019    Procedure: Cervical Facet Injections Cervical 5-6 and 6-7 Bilateral;  Surgeon: Christian Chaudhry MD;  [facility]     INJECT FACET JOINT Bilateral 11/29/2019    Procedure: Cervical 5-6 and 6-7 Bilateral facet joint injection;  Surgeon: Christian Chaudhry MD;  [facility]     INJECT FACET JOINT Bilateral 2/11/2021    Procedure: Cervical 5-6 and Cervical 6-7 Bilateral facet injections;  Surgeon: Christian Chaudhry MD;  [facility]     INJECT FACET JOINT Bilateral 10/19/2021    Procedure: Left C5-C6, Right C5-C6, Left C6-C7 and Right C6-C7 Intra-articular zygopophyseal joint injection utilizing fluoroscopic guidance with contrast dye;  Surgeon: Christian Chaudhry MD;  [facility]     INJECT STEROID (LOCATION) Left  10/19/2021    Procedure: Glenohumeral joint injection left shoulder;  Surgeon: Christian Chaudhry MD;  [facility]     INJECT STEROID (LOCATION) Left 2022    Procedure: Left shoulder glenohumeral steroid inj with local;  Surgeon: Christian Chaudhry MD;  [facility]     INJECT STEROID TROCHANTERIC BURSA Left 2022    Procedure: Left intra-articular shoulder injections;  Surgeon: Christian Chaudhry MD;  [facility]     ORTHOPEDIC SURGERY      Right hand     TONSILLECTOMY       ZZC NONSPECIFIC PROCEDURE      rt hand surgery - laceration/glass/tendons     [facility]  2009    neg      Allergies   Allergen Reactions     Wasps [Hornets] Swelling     Percocet [Oxycodone-Acetaminophen] Itching      Social History     Tobacco Use     Smoking status: Former     Packs/day: 1.50     Types: Cigarettes     Quit date: 2007     Years since quittin.3     Smokeless tobacco: Never   Vaping Use     Vaping status: Not on file   Substance Use Topics     Alcohol use: No     Comment: rare      Wt Readings from Last 1 Encounters:   23 116.1 kg (256 lb)        Anesthesia Evaluation   Pt has had prior anesthetic. Type: MAC and General.    No history of anesthetic complications       ROS/MED HX  ENT/Pulmonary:     (+) sleep apnea, moderate, uses CPAP, tobacco use, Past use,     Neurologic:       Cardiovascular:  - neg cardiovascular ROS   (+) -----Previous cardiac testing   Echo: Date: 23 Results:  Interpretation Summary     The visual ejection fraction is 60-65%.  The right ventricular systolic function is normal.  No hemodynamically significant valvular disease.  There is no pericardial effusion  Stress Test: Date: Results:    ECG Reviewed: Date: Results:    Cath: Date: Results:      METS/Exercise Tolerance:     Hematologic:  - neg hematologic  ROS     Musculoskeletal: Comment: DJD  (+) arthritis,     GI/Hepatic:     (+) GERD, Asymptomatic on medication,     Renal/Genitourinary:  -  neg Renal ROS     Endo:     (+) Obesity,     Psychiatric/Substance Use:     (+) psychiatric history depression     Infectious Disease:  - neg infectious disease ROS     Malignancy:  - neg malignancy ROS     Other:      (+) , H/O Chronic Pain,        Physical Exam    Airway  airway exam normal      Mallampati: II   TM distance: > 3 FB   Neck ROM: full   Mouth opening: > 3 cm    Respiratory Devices and Support         Dental       (+) Minor Abnormalities - some fillings, tiny chips      Cardiovascular   cardiovascular exam normal       Rhythm and rate: regular and normal     Pulmonary   pulmonary exam normal        breath sounds clear to auscultation           OUTSIDE LABS:  CBC:   Lab Results   Component Value Date    WBC 5.5 02/08/2021    WBC 5.7 11/15/2019    HGB 16.3 02/08/2021    HGB 16.0 11/15/2019    HCT 47.2 02/08/2021    HCT 47.6 11/15/2019     02/08/2021     11/15/2019     BMP:   Lab Results   Component Value Date     01/07/2023     01/18/2022    POTASSIUM 4.0 01/07/2023    POTASSIUM 3.9 01/18/2022    CHLORIDE 108 01/07/2023    CHLORIDE 108 01/18/2022    CO2 26 01/07/2023    CO2 25 01/18/2022    BUN 21 01/07/2023    BUN 15 01/18/2022    CR 0.97 01/07/2023    CR 0.85 01/18/2022     (H) 01/07/2023    GLC 96 01/18/2022     COAGS:   Lab Results   Component Value Date    PTT 31 06/06/2008    INR 1.0 02/17/2014     POC: No results found for: BGM, HCG, HCGS  HEPATIC:   Lab Results   Component Value Date    ALBUMIN 4.1 01/07/2023    PROTTOTAL 6.9 01/07/2023    ALT 56 01/07/2023    AST 30 01/07/2023    ALKPHOS 69 01/07/2023    BILITOTAL 0.9 01/07/2023     OTHER:   Lab Results   Component Value Date    PH 8.0 (H) 05/07/2002    A1C 6.2 (H) 01/07/2023    WILIAM 9.1 01/07/2023    PHOS 3.5 02/08/2021    MAG 2.2 02/08/2021    LIPASE 95 09/10/2018    AMYLASE 76 03/08/2014    TSH 3.49 01/07/2023    T4 0.94 11/15/2019    CRP <2.9 03/25/2015    SED 7 09/10/2012       Anesthesia Plan    ASA Status:   2   NPO Status:  NPO Appropriate    Anesthesia Type: MAC.     - Reason for MAC: straight local not clinically adequate      Maintenance: TIVA.        Consents    Anesthesia Plan(s) and associated risks, benefits, and realistic alternatives discussed. Questions answered and patient/representative(s) expressed understanding.     - Discussed: Risks, Benefits and Alternatives for BOTH SEDATION and the PROCEDURE were discussed     - Discussed with:  Patient      - Extended Intubation/Ventilatory Support Discussed: No.      - Patient is DNR/DNI Status: No    Use of blood products discussed: No .     Postoperative Care    Pain management: IV analgesics.   PONV prophylaxis: Background Propofol Infusion     Comments:    Other Comments: The risks and benefits of anesthesia, and the alternatives where applicable, have been discussed with the patient, and they wish to proceed.       H&P reviewed: Unable to attach H&P to encounter due to EHR limitations. H&P Update: appropriate H&P reviewed, patient examined. No interval changes since H&P (within 30 days).         SHIV Rossi CRNA

## 2023-06-16 NOTE — OP NOTE
CHIEF COMPLAINT:   1. neck pain secondary to cervical spondylosis.  2.  Left shoulder pain secondary to glenohumeral joint degeneration and a left shoulder labral tear  3.  Low back pain with severe disc degeneration at L5-S1 causing back pain and leg pains  PROCEDURE number #1:    1.  Bilateral L5-S1 transforaminal epidural injections utilizing fluoroscopic guidance with contrast dye.      PROCEDURE DETAILS: After written informed consent was obtained from the patient, the patient was escorted to the procedure room.  The patient was placed in the sitting position.  A  time out  was conducted to verify the patient identity, procedure to be performed, side, site, allergies and any special requirements.  The skin over the neck was prepped and draped in normal sterile fashion. Fluoroscopy was used to identify the zygopophyseal joint with a lateral view.   The skin was anesthetized with 0.5 mL of 1% lidocaine with bicarbonate buffer.  2 separate 22-gauge Quincke needles were advanced into the foraminal openings from the oblique views and walked into the posterior aspect of the foramen at L5-S1 in the lateral fluoroscopic image.  In the AP image Omnipaque contrast was injected which showed proper spread in the epidural space no evidence of any intravascular, intrathecal, intraneural injection.  I then injected 20 mg of Depo-Medrol with 3 cc of preservative-free normal saline into each foraminal opening with good dispersion into his epidural space.           Blood loss: Less than 2 cc     DIAGNOSIS:  1.  Cervical spondylosis secondary to a symptomatic facet syndrome causing axial neck pain with a history of long-term pain relief from previous facet injections  2.  Severe needle phobia  3.  Labral tear left shoulder inferior  4.  Severe disc degeneration at L5-S1.     PLAN:  1. Performed bilateral L5-S1 epidural injections.  He really does not want to have low back surgery however he has severe degeneration at L5-S1  causing lumbar radiculopathy as well as axial back pain.    Christian Chaudhry MD  Diplomate of the American Board of Anesthesiology, Pain Medicine

## 2023-07-24 ENCOUNTER — MYC MEDICAL ADVICE (OUTPATIENT)
Dept: INTERNAL MEDICINE | Facility: CLINIC | Age: 58
End: 2023-07-24
Payer: COMMERCIAL

## 2023-07-24 DIAGNOSIS — M75.102 ROTATOR CUFF SYNDROME OF LEFT SHOULDER: Primary | ICD-10-CM

## 2023-08-21 NOTE — TELEPHONE ENCOUNTER
Okay, quick question:    Am I supposed to direct the location of the injections?    Is this something that Dr. Chaudhry determines?    Do not they usually need to see a spinal specialist first?    Do I just placed an order for random injections?    I am unfamiliar with this.    Please educate me at your earliest convenience.    Darian

## 2023-08-22 ENCOUNTER — MYC MEDICAL ADVICE (OUTPATIENT)
Dept: NEUROSURGERY | Facility: CLINIC | Age: 58
End: 2023-08-22
Payer: COMMERCIAL

## 2023-08-22 DIAGNOSIS — M54.12 CERVICAL RADICULOPATHY: ICD-10-CM

## 2023-08-22 DIAGNOSIS — M47.812 ARTHROPATHY OF CERVICAL FACET JOINT: ICD-10-CM

## 2023-08-22 DIAGNOSIS — M54.2 CERVICALGIA: Primary | ICD-10-CM

## 2023-08-22 DIAGNOSIS — M54.16 LUMBAR RADICULOPATHY: ICD-10-CM

## 2023-08-22 DIAGNOSIS — M51.9 DISC DISORDER OF LUMBAR REGION: ICD-10-CM

## 2023-08-22 NOTE — TELEPHONE ENCOUNTER
Please order injection for LEFT shoulder  (left glenohumeral joint injection)    I sent message to patient regarding the other request needing to come from Neurosurgery.    Janelle Espinoza XRO//

## 2023-08-23 NOTE — TELEPHONE ENCOUNTER
Patient called clinic requesting to repeat injection.    Type of injection: Bilateral Lumbar 5-Sacral 1 transforaminal epidural injections AND Left Cervical 5-Cervical 6, Right Cervical 5-Cervical 6, Left Cervical 6-Cervical 7 and Right Cervical 6-Cervical 7 Intra-articular zygopophyseal joint injection     Most recent injection date:    6/16/23: Bilateral Lumbar 5-Sacral 1 transforaminal epidural injections     6/8/23: Left Cervical 5-Cervical 6, Right Cervical 5-Cervical 6, Left Cervical 6-Cervical 7 and Right Cervical 6-Cervical 7 Intra-articular zygopophyseal joint injection     9/8/22 Bilateral Lumbar 5-Sacral 1 transforaminal epidural injections      Most recent MRI: Lumbar and cervical: 1/31/22      How long did injection provide symptomatic relief: 2.5 months    Current symptoms: neck and back pain, no other new s/s    Number of injections in last 12 months: 2 neck, 2 back    Plan: rpt?      Patient voiced understanding and agreement with plan.     Advised patient to call back with any questions or concerns.

## 2023-09-05 DIAGNOSIS — M75.102 ROTATOR CUFF SYNDROME OF LEFT SHOULDER: Primary | ICD-10-CM

## 2023-09-06 ENCOUNTER — OFFICE VISIT (OUTPATIENT)
Dept: INTERNAL MEDICINE | Facility: CLINIC | Age: 58
End: 2023-09-06
Payer: COMMERCIAL

## 2023-09-06 VITALS
DIASTOLIC BLOOD PRESSURE: 80 MMHG | RESPIRATION RATE: 14 BRPM | OXYGEN SATURATION: 94 % | SYSTOLIC BLOOD PRESSURE: 110 MMHG | TEMPERATURE: 98.6 F | HEART RATE: 90 BPM | BODY MASS INDEX: 35.45 KG/M2 | WEIGHT: 252.4 LBS

## 2023-09-06 DIAGNOSIS — G89.29 CHRONIC LEFT SHOULDER PAIN: ICD-10-CM

## 2023-09-06 DIAGNOSIS — M25.512 CHRONIC LEFT SHOULDER PAIN: ICD-10-CM

## 2023-09-06 DIAGNOSIS — M51.369 DDD (DEGENERATIVE DISC DISEASE), LUMBAR: ICD-10-CM

## 2023-09-06 DIAGNOSIS — M75.102 ROTATOR CUFF SYNDROME OF LEFT SHOULDER: Primary | ICD-10-CM

## 2023-09-06 DIAGNOSIS — M50.30 DDD (DEGENERATIVE DISC DISEASE), CERVICAL: ICD-10-CM

## 2023-09-06 DIAGNOSIS — E78.2 MIXED HYPERLIPIDEMIA: ICD-10-CM

## 2023-09-06 DIAGNOSIS — Z01.818 PREOP GENERAL PHYSICAL EXAM: ICD-10-CM

## 2023-09-06 PROCEDURE — 99214 OFFICE O/P EST MOD 30 MIN: CPT | Performed by: INTERNAL MEDICINE

## 2023-09-06 NOTE — H&P (VIEW-ONLY)
[address] [address]  Phone: [phone]  Primary Provider: Gustavo Farmer  Pre-op Performing Provider: GUSTAVO FARMER      PREOPERATIVE EVALUATION:  Today's date: 9/6/2023    Bulmaro Herrera is a 58 year old male who presents for a preoperative evaluation.      9/6/2023    12:56 PM   Additional Questions   Roomed by Renuka rodriguez       Surgical Information:  Surgery/Procedure: Left Cervical 5-Cervical 6, Right Cervical 5-Cervical 6, Left Cervical 6-Cervical 7 and Right Cervical 6-Cervical 7 Intra-articular zygopophyseal joint injection   Bilateral Lumbar 5-Sacral 1 transforaminal epidural injections       Surgery Location: [facility]   Surgeon: Isidoro   Surgery Date: 9/14/23 and 9/28/23  Time of Surgery:   Where patient plans to recover: At home with family  Fax number for surgical facility: Note does not need to be faxed, will be available electronically in Epic.    Assessment & Plan     The proposed surgical procedure is considered LOW risk.    Preoperative examination      Rotator cuff syndrome of left shoulder      DDD (degenerative disc disease), cervical      DDD (degenerative disc disease), lumbar      Chronic left shoulder pain      Mixed hyperlipidemia              - No identified additional risk factors other than previously addressed    Antiplatelet or Anticoagulation Medication Instructions:   - aspirin: Discontinue aspirin 7-10 days prior to procedure to reduce bleeding risk. It should be resumed postoperatively.     Additional Medication Instructions:  Patient is to take all scheduled medications on the day of surgery    RECOMMENDATION:  APPROVAL GIVEN to proceed with proposed procedure, without further diagnostic evaluation.            Subjective       HPI related to upcoming procedure: Patient has chronic low back and cervical pain secondary to degenerative joint disease and chronic degenerative disc disease.  Has had  previous epidural spinal injections in the past and is anticipating repeat procedure at this time.        9/6/2023    12:40 PM   Preop Questions   1. Have you ever had a heart attack or stroke? No   2. Have you ever had surgery on your heart or blood vessels, such as a stent placement, a coronary artery bypass, or surgery on an artery in your head, neck, heart, or legs? No   3. Do you have chest pain with activity? No   4. Do you have a history of  heart failure? No   5. Do you currently have a cold, bronchitis or symptoms of other infection? No   6. Do you have a cough, shortness of breath, or wheezing? UNKNOWN -    7. Do you or anyone in your family have previous history of blood clots? No   8. Do you or does anyone in your family have a serious bleeding problem such as prolonged bleeding following surgeries or cuts? No   9. Have you ever had problems with anemia or been told to take iron pills? No   10. Have you had any abnormal blood loss such as black, tarry or bloody stools? No   11. Have you ever had a blood transfusion? No   12. Are you willing to have a blood transfusion if it is medically needed before, during, or after your surgery? Yes   13. Have you or any of your relatives ever had problems with anesthesia? UNKNOWN -    14. Do you have sleep apnea, excessive snoring or daytime drowsiness? UNKNOWN -    15. Do you have any artifical heart valves or other implanted medical devices like a pacemaker, defibrillator, or continuous glucose monitor? No   16. Do you have artificial joints? No   17. Are you allergic to latex? No       Health Care Directive:  Patient does not have a Health Care Directive or Living Will: Discussed advance care planning with patient; however, patient declined at this time.    Preoperative Review of :   reviewed - no record of controlled substances prescribed.          Review of Systems  CONSTITUTIONAL: NEGATIVE for fever, chills, change in weight  INTEGUMENTARY/SKIN: NEGATIVE  for worrisome rashes, moles or lesions  EYES: NEGATIVE for vision changes or irritation  ENT/MOUTH: NEGATIVE for ear, mouth and throat problems  RESP: NEGATIVE for significant cough or SOB  CV: NEGATIVE for chest pain, palpitations or peripheral edema  GI: NEGATIVE for nausea, abdominal pain, heartburn, or change in bowel habits  : NEGATIVE for frequency, dysuria, or hematuria  MUSCULOSKELETAL: Chronic low back and cervical pain.  Currently without radiculopathy.  NEURO: NEGATIVE for weakness, dizziness or paresthesias  ENDOCRINE: NEGATIVE for temperature intolerance, skin/hair changes  HEME: NEGATIVE for bleeding problems  PSYCHIATRIC: NEGATIVE for changes in mood or affect    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/2022     Priority: Medium     Added automatically from request for surgery [id number]      Chondromalacia of left shoulder 08/18/2021     Priority: Medium    Partial nontraumatic tear of left rotator cuff 08/18/2021     Priority: Medium    Soft tissue mass 08/05/2021     Priority: Medium    Rotator cuff syndrome of left shoulder 08/05/2021     Priority: Medium    Chronic left shoulder pain 08/05/2021     Priority: Medium    NSAID long-term use 11/18/2019     Priority: Medium    Severe needle phobia 01/21/2019     Priority: Medium     Class: Chronic     likely to preclude him from having minimally invasive interventional pain procedures with minimal sedation. Would need deeper MAC sedation      Mixed hyperlipidemia 11/13/2018     Priority: Medium    ROBBY (obstructive sleep apnea) 11/13/2018     Priority: Medium    Herniation of intervertebral disc of cervical spine without radiculopathy 03/21/2017     Priority: Medium    Cervicalgia 03/21/2017     Priority: Medium    Chronic pain syndrome 04/05/2016     Priority: Medium     DDD, cervical. T#3, #60/mo.       DDD (degenerative disc disease), lumbar  12/20/2013     Priority: Medium    DDD (degenerative disc disease), cervical 12/20/2013     Priority: Medium    Pruritus ani 01/20/2010     Priority: Medium    Back pains      Priority: Medium     Problem list name updated by automated process. Provider to review and confirm      Esophageal reflux 04/13/2005     Priority: Medium      Past Medical History:   Diagnosis Date    Back pains     Depressive disorder, not elsewhere classified 01/28/2007    declines Rx    Disc degeneration 12/01/2008    see MRI -throaco-lumbar mild discogenic degenerative changes    Elevated LFT's 11/01/2009    alt and ast    Other and unspecified hyperlipidemia 01/01/2007    Sleep disorder 09/01/2009    CPAP    Tobacco use disorder      Past Surgical History:   Procedure Laterality Date    COLONOSCOPY  07/07/08    adenomatous polyp repeat 5 years    COLONOSCOPY N/A 12/29/2017    Procedure: COMBINED COLONOSCOPY, SINGLE OR MULTIPLE BIOPSY/POLYPECTOMY BY BIOPSY;  Colonoscopy, Polypectomy by Biopsy;  Surgeon: Matt Reyes MD;  Location:  GI    COLONOSCOPY N/A 5/22/2023    Procedure: Colonoscopy;  Surgeon: Constantino Bowie DO;  Location:  GI    ENT SURGERY      for deviated septum    EXCISE LESION AXILLA  12/7/2018    Procedure: Excision Skin Tags Right Axilla;  Surgeon: Ayaan Chaney MD;  [facility]    EXCISE LESION BACK N/A 12/7/2018    Procedure: Excision Back Skin Lesion X Two;  Surgeon: Ayaan Chaney MD;  [facility]    EXCISE MASS BACK N/A 4/15/2022    Procedure: Excisions skin lesions of back;  Surgeon: Ayaan Chaney MD;  [facility]    INJECT BLOCK MEDIAL BRANCH CERVICAL/THORACIC/LUMBAR Bilateral 5/26/2022    Procedure: Cervical 4, 5, 6 Medial Branch Block;  Surgeon: Christian Chaudhry MD;  [facility]    INJECT EPIDURAL CERVICAL N/A 5/14/2015    Procedure: INJECT EPIDURAL CERVICAL;  Surgeon: Christian Chaudhry MD;  [facility]    INJECT EPIDURAL CERVICAL N/A 12/28/2018    Procedure: INJECT  EPIDURAL CERVICAL 6-7;  Surgeon: Christian Chaudhry MD;  [facility]    INJECT EPIDURAL CERVICAL Bilateral 6/8/2023    Procedure: Left Cervical 5-Cervical 6, Right Cervical 5-Cervical 6, Left Cervical 6-Cervical 7 and Right Cervical 6-Cervical 7 Intra-articular zygopophyseal joint injection utilizing fluoroscopic guidance with contrast dye.;  Surgeon: Christian Chaudhry MD;  [facility]    INJECT EPIDURAL LUMBAR N/A 12/20/2019    Procedure: Lumbar 5- Sacral 1 Epidural  Injection bilatetral;  Surgeon: Christian Chaudhry MD;  [facility]    INJECT EPIDURAL LUMBAR Bilateral 9/8/2022    Procedure: Bilateral Lumbar 5-Sacral 1 transforaminal epidural injections utilizing fluoroscopic guidance with contrast dye  and;  Surgeon: Christian Chaudhry MD;  [facility]    INJECT EPIDURAL TRANSFORAMINAL Bilateral 5/20/2022    Procedure: Bilateral Lumbar 5- Sacral 1 transforaminal Epidural Steroid Injection;  Surgeon: Christian Chaudhry MD;  [facility]    INJECT EPIDURAL TRANSFORAMINAL Bilateral 7/14/2022    Procedure: Left Cervical 5-Cervical 6, Right Cervical 5-Cervical 6, Left Cervical 6-Cervical 7 and Right Cervical 6-Cervical 7 Intra-articular zygopophyseal joint injection utilizing fluoroscopic guidance with contrast dye;  Surgeon: Christian Chaudhry MD;  [facility]    INJECT EPIDURAL TRANSFORAMINAL Bilateral 6/16/2023    Procedure: Bilateral Lumbar 5-Sacral 1 transforaminal epidural injections utilizing fluoroscopic guidance with contrast dye;  Surgeon: Christian Chaudhry MD;  [facility]    INJECT FACET JOINT Bilateral 1/25/2019    Procedure: Cervical Facet Injections Cervical 5-6 and 6-7 Bilateral;  Surgeon: Christian Chaudhry MD;  [facility]    INJECT FACET JOINT Bilateral 11/29/2019    Procedure: Cervical 5-6 and 6-7 Bilateral facet joint injection;  Surgeon: Christian Chaudhry MD;  [facility]    INJECT FACET JOINT Bilateral 2/11/2021    Procedure: Cervical 5-6 and Cervical 6-7 Bilateral facet injections;   Surgeon: Christian Chaudhry MD;  [facility]    INJECT FACET JOINT Bilateral 10/19/2021    Procedure: Left C5-C6, Right C5-C6, Left C6-C7 and Right C6-C7 Intra-articular zygopophyseal joint injection utilizing fluoroscopic guidance with contrast dye;  Surgeon: Christian Chaudhry MD;  [facility]    INJECT STEROID (LOCATION) Left 10/19/2021    Procedure: Glenohumeral joint injection left shoulder;  Surgeon: Christian Chaudhry MD;  [facility]    INJECT STEROID (LOCATION) Left 5/20/2022    Procedure: Left shoulder glenohumeral steroid inj with local;  Surgeon: Christian Chaudhry MD;  [facility]    INJECT STEROID TROCHANTERIC BURSA Left 9/8/2022    Procedure: Left intra-articular shoulder injections;  Surgeon: Christian Chaudhry MD;  [facility]    ORTHOPEDIC SURGERY      Right hand    TONSILLECTOMY      ZZC NONSPECIFIC PROCEDURE      rt hand surgery - laceration/glass/tendons    ZZHC ECHO HEART XTHORACIC, STRESS/REST  6/2009    neg     Current Outpatient Medications   Medication Sig Dispense Refill    acetaminophen (TYLENOL) 500 MG tablet Take 1,000 mg by mouth every morning      aspirin (ASA) 81 MG chewable tablet Take 1 tablet (81 mg) by mouth daily      ibuprofen (ADVIL/MOTRIN) 200 MG tablet Take 400 mg by mouth every morning      meclizine 25 MG CHEW Take 25 mg by mouth every 6 hours as needed for dizziness      omeprazole (PRILOSEC) 40 MG DR capsule Take 1 capsule (40 mg) by mouth daily TAKE 1 CAPSULE (40 MG) BY MOUTH DAILY 90 capsule 2    rosuvastatin (CRESTOR) 5 MG tablet Take 1 tablet (5 mg) by mouth daily 90 tablet 4    modafinil (PROVIGIL) 200 MG tablet Take 1 tablet (200 mg) by mouth daily (Patient not taking: Reported on 9/6/2023) 90 tablet 1       Allergies   Allergen Reactions    Wasps [Hornets] Swelling    Percocet [Oxycodone-Acetaminophen] Itching        Social History     Tobacco Use    Smoking status: Former     Packs/day: 1.50     Types: Cigarettes     Quit date: 2/14/2007     Years since  quittin.5    Smokeless tobacco: Never   Substance Use Topics    Alcohol use: No     Comment: rare     Family History   Problem Relation Age of Onset    Breast Cancer Mother     Arthritis Mother         joint ?    Depression Father     Cancer Maternal Grandmother     C.A.D. Maternal Grandfather     Cardiovascular Maternal Grandfather     Gynecology Paternal Grandmother          giving birth    Prostate Cancer Paternal Grandfather     Genetic Disorder Brother         joint pain?    Depression Brother     Cardiovascular Son         congenital heart defect -      Prostate Cancer Other          age 70's    Diabetes Other      History   Drug Use No         Objective     /80   Pulse 90   Temp 98.6  F (37  C)   Resp 14   Wt 114.5 kg (252 lb 6.4 oz)   SpO2 94%   BMI 35.45 kg/m      Physical Exam    GENERAL APPEARANCE: healthy, alert and no distress     EYES: EOMI,  PERRL     HENT: ear canals and TM's normal and nose and mouth without ulcers or lesions     NECK: no adenopathy, no asymmetry, masses, or scars and thyroid normal to palpation     RESP: lungs clear to auscultation - no rales, rhonchi or wheezes     CV: regular rates and rhythm, normal S1 S2, no S3 or S4 and no murmur, click or rub     ABDOMEN:  soft, nontender, no HSM or masses and bowel sounds normal     MS: extremities normal- no gross deformities noted, no evidence of inflammation in joints, FROM in all extremities.     SKIN: no suspicious lesions or rashes     NEURO: Normal strength and tone, sensory exam grossly normal, mentation intact and speech normal     PSYCH: mentation appears normal. and affect normal/bright     LYMPHATICS: No cervical adenopathy    Recent Labs   Lab Test 23  1016 22  1001 22  1449     --  139   POTASSIUM 4.0  --  3.9   CR 0.97  --  0.85   A1C 6.2* 5.8*  --         Diagnostics:  No labs were ordered during this visit.   No EKG required for low risk surgery (cataract, skin  procedure, breast biopsy, etc).    Revised Cardiac Risk Index (RCRI):  The patient has the following serious cardiovascular risks for perioperative complications:   - No serious cardiac risks = 0 points     RCRI Interpretation: 0 points: Class I (very low risk - 0.4% complication rate)         Signed Electronically by: Gustavo Farmer DO  Copy of this evaluation report is provided to requesting physician.

## 2023-09-06 NOTE — H&P (VIEW-ONLY)
[address] [address]  Phone: [phone]  Primary Provider: Gustavo Farmer  Pre-op Performing Provider: GUSTAVO FARMER      PREOPERATIVE EVALUATION:  Today's date: 9/6/2023    Bulmaro Herrera is a 58 year old male who presents for a preoperative evaluation.      9/6/2023    12:56 PM   Additional Questions   Roomed by Renuka rodriguez       Surgical Information:  Surgery/Procedure: Left Cervical 5-Cervical 6, Right Cervical 5-Cervical 6, Left Cervical 6-Cervical 7 and Right Cervical 6-Cervical 7 Intra-articular zygopophyseal joint injection   Bilateral Lumbar 5-Sacral 1 transforaminal epidural injections       Surgery Location: [facility]   Surgeon: Isidoro   Surgery Date: 9/14/23 and 9/28/23  Time of Surgery:   Where patient plans to recover: At home with family  Fax number for surgical facility: Note does not need to be faxed, will be available electronically in Epic.    Assessment & Plan     The proposed surgical procedure is considered LOW risk.    Preoperative examination      Rotator cuff syndrome of left shoulder      DDD (degenerative disc disease), cervical      DDD (degenerative disc disease), lumbar      Chronic left shoulder pain      Mixed hyperlipidemia              - No identified additional risk factors other than previously addressed    Antiplatelet or Anticoagulation Medication Instructions:   - aspirin: Discontinue aspirin 7-10 days prior to procedure to reduce bleeding risk. It should be resumed postoperatively.     Additional Medication Instructions:  Patient is to take all scheduled medications on the day of surgery    RECOMMENDATION:  APPROVAL GIVEN to proceed with proposed procedure, without further diagnostic evaluation.            Subjective       HPI related to upcoming procedure: Patient has chronic low back and cervical pain secondary to degenerative joint disease and chronic degenerative disc disease.  Has had  previous epidural spinal injections in the past and is anticipating repeat procedure at this time.        9/6/2023    12:40 PM   Preop Questions   1. Have you ever had a heart attack or stroke? No   2. Have you ever had surgery on your heart or blood vessels, such as a stent placement, a coronary artery bypass, or surgery on an artery in your head, neck, heart, or legs? No   3. Do you have chest pain with activity? No   4. Do you have a history of  heart failure? No   5. Do you currently have a cold, bronchitis or symptoms of other infection? No   6. Do you have a cough, shortness of breath, or wheezing? UNKNOWN -    7. Do you or anyone in your family have previous history of blood clots? No   8. Do you or does anyone in your family have a serious bleeding problem such as prolonged bleeding following surgeries or cuts? No   9. Have you ever had problems with anemia or been told to take iron pills? No   10. Have you had any abnormal blood loss such as black, tarry or bloody stools? No   11. Have you ever had a blood transfusion? No   12. Are you willing to have a blood transfusion if it is medically needed before, during, or after your surgery? Yes   13. Have you or any of your relatives ever had problems with anesthesia? UNKNOWN -    14. Do you have sleep apnea, excessive snoring or daytime drowsiness? UNKNOWN -    15. Do you have any artifical heart valves or other implanted medical devices like a pacemaker, defibrillator, or continuous glucose monitor? No   16. Do you have artificial joints? No   17. Are you allergic to latex? No       Health Care Directive:  Patient does not have a Health Care Directive or Living Will: Discussed advance care planning with patient; however, patient declined at this time.    Preoperative Review of :   reviewed - no record of controlled substances prescribed.          Review of Systems  CONSTITUTIONAL: NEGATIVE for fever, chills, change in weight  INTEGUMENTARY/SKIN: NEGATIVE  for worrisome rashes, moles or lesions  EYES: NEGATIVE for vision changes or irritation  ENT/MOUTH: NEGATIVE for ear, mouth and throat problems  RESP: NEGATIVE for significant cough or SOB  CV: NEGATIVE for chest pain, palpitations or peripheral edema  GI: NEGATIVE for nausea, abdominal pain, heartburn, or change in bowel habits  : NEGATIVE for frequency, dysuria, or hematuria  MUSCULOSKELETAL: Chronic low back and cervical pain.  Currently without radiculopathy.  NEURO: NEGATIVE for weakness, dizziness or paresthesias  ENDOCRINE: NEGATIVE for temperature intolerance, skin/hair changes  HEME: NEGATIVE for bleeding problems  PSYCHIATRIC: NEGATIVE for changes in mood or affect    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/2022     Priority: Medium     Added automatically from request for surgery [id number]      Chondromalacia of left shoulder 08/18/2021     Priority: Medium    Partial nontraumatic tear of left rotator cuff 08/18/2021     Priority: Medium    Soft tissue mass 08/05/2021     Priority: Medium    Rotator cuff syndrome of left shoulder 08/05/2021     Priority: Medium    Chronic left shoulder pain 08/05/2021     Priority: Medium    NSAID long-term use 11/18/2019     Priority: Medium    Severe needle phobia 01/21/2019     Priority: Medium     Class: Chronic     likely to preclude him from having minimally invasive interventional pain procedures with minimal sedation. Would need deeper MAC sedation      Mixed hyperlipidemia 11/13/2018     Priority: Medium    ROBBY (obstructive sleep apnea) 11/13/2018     Priority: Medium    Herniation of intervertebral disc of cervical spine without radiculopathy 03/21/2017     Priority: Medium    Cervicalgia 03/21/2017     Priority: Medium    Chronic pain syndrome 04/05/2016     Priority: Medium     DDD, cervical. T#3, #60/mo.       DDD (degenerative disc disease), lumbar  12/20/2013     Priority: Medium    DDD (degenerative disc disease), cervical 12/20/2013     Priority: Medium    Pruritus ani 01/20/2010     Priority: Medium    Back pains      Priority: Medium     Problem list name updated by automated process. Provider to review and confirm      Esophageal reflux 04/13/2005     Priority: Medium      Past Medical History:   Diagnosis Date    Back pains     Depressive disorder, not elsewhere classified 01/28/2007    declines Rx    Disc degeneration 12/01/2008    see MRI -throaco-lumbar mild discogenic degenerative changes    Elevated LFT's 11/01/2009    alt and ast    Other and unspecified hyperlipidemia 01/01/2007    Sleep disorder 09/01/2009    CPAP    Tobacco use disorder      Past Surgical History:   Procedure Laterality Date    COLONOSCOPY  07/07/08    adenomatous polyp repeat 5 years    COLONOSCOPY N/A 12/29/2017    Procedure: COMBINED COLONOSCOPY, SINGLE OR MULTIPLE BIOPSY/POLYPECTOMY BY BIOPSY;  Colonoscopy, Polypectomy by Biopsy;  Surgeon: Matt Reyes MD;  Location:  GI    COLONOSCOPY N/A 5/22/2023    Procedure: Colonoscopy;  Surgeon: Constantino Bowie DO;  Location:  GI    ENT SURGERY      for deviated septum    EXCISE LESION AXILLA  12/7/2018    Procedure: Excision Skin Tags Right Axilla;  Surgeon: Ayaan Chaney MD;  [facility]    EXCISE LESION BACK N/A 12/7/2018    Procedure: Excision Back Skin Lesion X Two;  Surgeon: Ayaan Chaney MD;  [facility]    EXCISE MASS BACK N/A 4/15/2022    Procedure: Excisions skin lesions of back;  Surgeon: Ayaan Chaney MD;  [facility]    INJECT BLOCK MEDIAL BRANCH CERVICAL/THORACIC/LUMBAR Bilateral 5/26/2022    Procedure: Cervical 4, 5, 6 Medial Branch Block;  Surgeon: Christian Chaudhry MD;  [facility]    INJECT EPIDURAL CERVICAL N/A 5/14/2015    Procedure: INJECT EPIDURAL CERVICAL;  Surgeon: Christian Chaudhry MD;  [facility]    INJECT EPIDURAL CERVICAL N/A 12/28/2018    Procedure: INJECT  EPIDURAL CERVICAL 6-7;  Surgeon: Christian Chaudhry MD;  [facility]    INJECT EPIDURAL CERVICAL Bilateral 6/8/2023    Procedure: Left Cervical 5-Cervical 6, Right Cervical 5-Cervical 6, Left Cervical 6-Cervical 7 and Right Cervical 6-Cervical 7 Intra-articular zygopophyseal joint injection utilizing fluoroscopic guidance with contrast dye.;  Surgeon: Christian Chaudhry MD;  [facility]    INJECT EPIDURAL LUMBAR N/A 12/20/2019    Procedure: Lumbar 5- Sacral 1 Epidural  Injection bilatetral;  Surgeon: Christian Chaudhry MD;  [facility]    INJECT EPIDURAL LUMBAR Bilateral 9/8/2022    Procedure: Bilateral Lumbar 5-Sacral 1 transforaminal epidural injections utilizing fluoroscopic guidance with contrast dye  and;  Surgeon: Christian Chaudhry MD;  [facility]    INJECT EPIDURAL TRANSFORAMINAL Bilateral 5/20/2022    Procedure: Bilateral Lumbar 5- Sacral 1 transforaminal Epidural Steroid Injection;  Surgeon: Christian Chaudhry MD;  [facility]    INJECT EPIDURAL TRANSFORAMINAL Bilateral 7/14/2022    Procedure: Left Cervical 5-Cervical 6, Right Cervical 5-Cervical 6, Left Cervical 6-Cervical 7 and Right Cervical 6-Cervical 7 Intra-articular zygopophyseal joint injection utilizing fluoroscopic guidance with contrast dye;  Surgeon: Christian Chaudhry MD;  [facility]    INJECT EPIDURAL TRANSFORAMINAL Bilateral 6/16/2023    Procedure: Bilateral Lumbar 5-Sacral 1 transforaminal epidural injections utilizing fluoroscopic guidance with contrast dye;  Surgeon: Christian Chaudhry MD;  [facility]    INJECT FACET JOINT Bilateral 1/25/2019    Procedure: Cervical Facet Injections Cervical 5-6 and 6-7 Bilateral;  Surgeon: Christian Chaudhry MD;  [facility]    INJECT FACET JOINT Bilateral 11/29/2019    Procedure: Cervical 5-6 and 6-7 Bilateral facet joint injection;  Surgeon: Christian Chaudhry MD;  [facility]    INJECT FACET JOINT Bilateral 2/11/2021    Procedure: Cervical 5-6 and Cervical 6-7 Bilateral facet injections;   Surgeon: Christian Chaudhry MD;  [facility]    INJECT FACET JOINT Bilateral 10/19/2021    Procedure: Left C5-C6, Right C5-C6, Left C6-C7 and Right C6-C7 Intra-articular zygopophyseal joint injection utilizing fluoroscopic guidance with contrast dye;  Surgeon: Christian Chaudhry MD;  [facility]    INJECT STEROID (LOCATION) Left 10/19/2021    Procedure: Glenohumeral joint injection left shoulder;  Surgeon: Christian Chaudhry MD;  [facility]    INJECT STEROID (LOCATION) Left 5/20/2022    Procedure: Left shoulder glenohumeral steroid inj with local;  Surgeon: Christian Chaudhry MD;  [facility]    INJECT STEROID TROCHANTERIC BURSA Left 9/8/2022    Procedure: Left intra-articular shoulder injections;  Surgeon: Christian Chaudhry MD;  [facility]    ORTHOPEDIC SURGERY      Right hand    TONSILLECTOMY      ZZC NONSPECIFIC PROCEDURE      rt hand surgery - laceration/glass/tendons    ZZHC ECHO HEART XTHORACIC, STRESS/REST  6/2009    neg     Current Outpatient Medications   Medication Sig Dispense Refill    acetaminophen (TYLENOL) 500 MG tablet Take 1,000 mg by mouth every morning      aspirin (ASA) 81 MG chewable tablet Take 1 tablet (81 mg) by mouth daily      ibuprofen (ADVIL/MOTRIN) 200 MG tablet Take 400 mg by mouth every morning      meclizine 25 MG CHEW Take 25 mg by mouth every 6 hours as needed for dizziness      omeprazole (PRILOSEC) 40 MG DR capsule Take 1 capsule (40 mg) by mouth daily TAKE 1 CAPSULE (40 MG) BY MOUTH DAILY 90 capsule 2    rosuvastatin (CRESTOR) 5 MG tablet Take 1 tablet (5 mg) by mouth daily 90 tablet 4    modafinil (PROVIGIL) 200 MG tablet Take 1 tablet (200 mg) by mouth daily (Patient not taking: Reported on 9/6/2023) 90 tablet 1       Allergies   Allergen Reactions    Wasps [Hornets] Swelling    Percocet [Oxycodone-Acetaminophen] Itching        Social History     Tobacco Use    Smoking status: Former     Packs/day: 1.50     Types: Cigarettes     Quit date: 2/14/2007     Years since  quittin.5    Smokeless tobacco: Never   Substance Use Topics    Alcohol use: No     Comment: rare     Family History   Problem Relation Age of Onset    Breast Cancer Mother     Arthritis Mother         joint ?    Depression Father     Cancer Maternal Grandmother     C.A.D. Maternal Grandfather     Cardiovascular Maternal Grandfather     Gynecology Paternal Grandmother          giving birth    Prostate Cancer Paternal Grandfather     Genetic Disorder Brother         joint pain?    Depression Brother     Cardiovascular Son         congenital heart defect -      Prostate Cancer Other          age 70's    Diabetes Other      History   Drug Use No         Objective     /80   Pulse 90   Temp 98.6  F (37  C)   Resp 14   Wt 114.5 kg (252 lb 6.4 oz)   SpO2 94%   BMI 35.45 kg/m      Physical Exam    GENERAL APPEARANCE: healthy, alert and no distress     EYES: EOMI,  PERRL     HENT: ear canals and TM's normal and nose and mouth without ulcers or lesions     NECK: no adenopathy, no asymmetry, masses, or scars and thyroid normal to palpation     RESP: lungs clear to auscultation - no rales, rhonchi or wheezes     CV: regular rates and rhythm, normal S1 S2, no S3 or S4 and no murmur, click or rub     ABDOMEN:  soft, nontender, no HSM or masses and bowel sounds normal     MS: extremities normal- no gross deformities noted, no evidence of inflammation in joints, FROM in all extremities.     SKIN: no suspicious lesions or rashes     NEURO: Normal strength and tone, sensory exam grossly normal, mentation intact and speech normal     PSYCH: mentation appears normal. and affect normal/bright     LYMPHATICS: No cervical adenopathy    Recent Labs   Lab Test 23  1016 22  1001 22  1449     --  139   POTASSIUM 4.0  --  3.9   CR 0.97  --  0.85   A1C 6.2* 5.8*  --         Diagnostics:  No labs were ordered during this visit.   No EKG required for low risk surgery (cataract, skin  procedure, breast biopsy, etc).    Revised Cardiac Risk Index (RCRI):  The patient has the following serious cardiovascular risks for perioperative complications:   - No serious cardiac risks = 0 points     RCRI Interpretation: 0 points: Class I (very low risk - 0.4% complication rate)         Signed Electronically by: Gustavo Farmer DO  Copy of this evaluation report is provided to requesting physician.

## 2023-09-06 NOTE — PROGRESS NOTES
[address] [address]  Phone: [phone]  Primary Provider: Gustavo Farmer  Pre-op Performing Provider: GUSTAVO FARMER      PREOPERATIVE EVALUATION:  Today's date: 9/6/2023    Bulmaro Herrera is a 58 year old male who presents for a preoperative evaluation.      9/6/2023    12:56 PM   Additional Questions   Roomed by Renuka rodriguez       Surgical Information:  Surgery/Procedure: Left Cervical 5-Cervical 6, Right Cervical 5-Cervical 6, Left Cervical 6-Cervical 7 and Right Cervical 6-Cervical 7 Intra-articular zygopophyseal joint injection   Bilateral Lumbar 5-Sacral 1 transforaminal epidural injections       Surgery Location: [facility]   Surgeon: Isidoro   Surgery Date: 9/14/23 and 9/28/23  Time of Surgery:   Where patient plans to recover: At home with family  Fax number for surgical facility: Note does not need to be faxed, will be available electronically in Epic.    Assessment & Plan     The proposed surgical procedure is considered LOW risk.    Preoperative examination      Rotator cuff syndrome of left shoulder      DDD (degenerative disc disease), cervical      DDD (degenerative disc disease), lumbar      Chronic left shoulder pain      Mixed hyperlipidemia              - No identified additional risk factors other than previously addressed    Antiplatelet or Anticoagulation Medication Instructions:   - aspirin: Discontinue aspirin 7-10 days prior to procedure to reduce bleeding risk. It should be resumed postoperatively.     Additional Medication Instructions:  Patient is to take all scheduled medications on the day of surgery    RECOMMENDATION:  APPROVAL GIVEN to proceed with proposed procedure, without further diagnostic evaluation.            Subjective       HPI related to upcoming procedure: Patient has chronic low back and cervical pain secondary to degenerative joint disease and chronic degenerative disc disease.  Has had  previous epidural spinal injections in the past and is anticipating repeat procedure at this time.        9/6/2023    12:40 PM   Preop Questions   1. Have you ever had a heart attack or stroke? No   2. Have you ever had surgery on your heart or blood vessels, such as a stent placement, a coronary artery bypass, or surgery on an artery in your head, neck, heart, or legs? No   3. Do you have chest pain with activity? No   4. Do you have a history of  heart failure? No   5. Do you currently have a cold, bronchitis or symptoms of other infection? No   6. Do you have a cough, shortness of breath, or wheezing? UNKNOWN -    7. Do you or anyone in your family have previous history of blood clots? No   8. Do you or does anyone in your family have a serious bleeding problem such as prolonged bleeding following surgeries or cuts? No   9. Have you ever had problems with anemia or been told to take iron pills? No   10. Have you had any abnormal blood loss such as black, tarry or bloody stools? No   11. Have you ever had a blood transfusion? No   12. Are you willing to have a blood transfusion if it is medically needed before, during, or after your surgery? Yes   13. Have you or any of your relatives ever had problems with anesthesia? UNKNOWN -    14. Do you have sleep apnea, excessive snoring or daytime drowsiness? UNKNOWN -    15. Do you have any artifical heart valves or other implanted medical devices like a pacemaker, defibrillator, or continuous glucose monitor? No   16. Do you have artificial joints? No   17. Are you allergic to latex? No       Health Care Directive:  Patient does not have a Health Care Directive or Living Will: Discussed advance care planning with patient; however, patient declined at this time.    Preoperative Review of :   reviewed - no record of controlled substances prescribed.          Review of Systems  CONSTITUTIONAL: NEGATIVE for fever, chills, change in weight  INTEGUMENTARY/SKIN: NEGATIVE  for worrisome rashes, moles or lesions  EYES: NEGATIVE for vision changes or irritation  ENT/MOUTH: NEGATIVE for ear, mouth and throat problems  RESP: NEGATIVE for significant cough or SOB  CV: NEGATIVE for chest pain, palpitations or peripheral edema  GI: NEGATIVE for nausea, abdominal pain, heartburn, or change in bowel habits  : NEGATIVE for frequency, dysuria, or hematuria  MUSCULOSKELETAL: Chronic low back and cervical pain.  Currently without radiculopathy.  NEURO: NEGATIVE for weakness, dizziness or paresthesias  ENDOCRINE: NEGATIVE for temperature intolerance, skin/hair changes  HEME: NEGATIVE for bleeding problems  PSYCHIATRIC: NEGATIVE for changes in mood or affect    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/2022     Priority: Medium     Added automatically from request for surgery [id number]      Chondromalacia of left shoulder 08/18/2021     Priority: Medium    Partial nontraumatic tear of left rotator cuff 08/18/2021     Priority: Medium    Soft tissue mass 08/05/2021     Priority: Medium    Rotator cuff syndrome of left shoulder 08/05/2021     Priority: Medium    Chronic left shoulder pain 08/05/2021     Priority: Medium    NSAID long-term use 11/18/2019     Priority: Medium    Severe needle phobia 01/21/2019     Priority: Medium     Class: Chronic     likely to preclude him from having minimally invasive interventional pain procedures with minimal sedation. Would need deeper MAC sedation      Mixed hyperlipidemia 11/13/2018     Priority: Medium    ROBBY (obstructive sleep apnea) 11/13/2018     Priority: Medium    Herniation of intervertebral disc of cervical spine without radiculopathy 03/21/2017     Priority: Medium    Cervicalgia 03/21/2017     Priority: Medium    Chronic pain syndrome 04/05/2016     Priority: Medium     DDD, cervical. T#3, #60/mo.       DDD (degenerative disc disease), lumbar  12/20/2013     Priority: Medium    DDD (degenerative disc disease), cervical 12/20/2013     Priority: Medium    Pruritus ani 01/20/2010     Priority: Medium    Back pains      Priority: Medium     Problem list name updated by automated process. Provider to review and confirm      Esophageal reflux 04/13/2005     Priority: Medium      Past Medical History:   Diagnosis Date    Back pains     Depressive disorder, not elsewhere classified 01/28/2007    declines Rx    Disc degeneration 12/01/2008    see MRI -throaco-lumbar mild discogenic degenerative changes    Elevated LFT's 11/01/2009    alt and ast    Other and unspecified hyperlipidemia 01/01/2007    Sleep disorder 09/01/2009    CPAP    Tobacco use disorder      Past Surgical History:   Procedure Laterality Date    COLONOSCOPY  07/07/08    adenomatous polyp repeat 5 years    COLONOSCOPY N/A 12/29/2017    Procedure: COMBINED COLONOSCOPY, SINGLE OR MULTIPLE BIOPSY/POLYPECTOMY BY BIOPSY;  Colonoscopy, Polypectomy by Biopsy;  Surgeon: Matt Reyes MD;  Location:  GI    COLONOSCOPY N/A 5/22/2023    Procedure: Colonoscopy;  Surgeon: Constantino Bowie DO;  Location:  GI    ENT SURGERY      for deviated septum    EXCISE LESION AXILLA  12/7/2018    Procedure: Excision Skin Tags Right Axilla;  Surgeon: Ayaan Chaney MD;  [facility]    EXCISE LESION BACK N/A 12/7/2018    Procedure: Excision Back Skin Lesion X Two;  Surgeon: Ayaan Chaney MD;  [facility]    EXCISE MASS BACK N/A 4/15/2022    Procedure: Excisions skin lesions of back;  Surgeon: Ayaan Chaney MD;  [facility]    INJECT BLOCK MEDIAL BRANCH CERVICAL/THORACIC/LUMBAR Bilateral 5/26/2022    Procedure: Cervical 4, 5, 6 Medial Branch Block;  Surgeon: Christian Chaudhry MD;  [facility]    INJECT EPIDURAL CERVICAL N/A 5/14/2015    Procedure: INJECT EPIDURAL CERVICAL;  Surgeon: Christian Chaudhry MD;  [facility]    INJECT EPIDURAL CERVICAL N/A 12/28/2018    Procedure: INJECT  EPIDURAL CERVICAL 6-7;  Surgeon: Christian Chaudhry MD;  [facility]    INJECT EPIDURAL CERVICAL Bilateral 6/8/2023    Procedure: Left Cervical 5-Cervical 6, Right Cervical 5-Cervical 6, Left Cervical 6-Cervical 7 and Right Cervical 6-Cervical 7 Intra-articular zygopophyseal joint injection utilizing fluoroscopic guidance with contrast dye.;  Surgeon: Christian Chaudhry MD;  [facility]    INJECT EPIDURAL LUMBAR N/A 12/20/2019    Procedure: Lumbar 5- Sacral 1 Epidural  Injection bilatetral;  Surgeon: Christian Chaudhry MD;  [facility]    INJECT EPIDURAL LUMBAR Bilateral 9/8/2022    Procedure: Bilateral Lumbar 5-Sacral 1 transforaminal epidural injections utilizing fluoroscopic guidance with contrast dye  and;  Surgeon: Christian Chaudhry MD;  [facility]    INJECT EPIDURAL TRANSFORAMINAL Bilateral 5/20/2022    Procedure: Bilateral Lumbar 5- Sacral 1 transforaminal Epidural Steroid Injection;  Surgeon: Christian Chaudhry MD;  [facility]    INJECT EPIDURAL TRANSFORAMINAL Bilateral 7/14/2022    Procedure: Left Cervical 5-Cervical 6, Right Cervical 5-Cervical 6, Left Cervical 6-Cervical 7 and Right Cervical 6-Cervical 7 Intra-articular zygopophyseal joint injection utilizing fluoroscopic guidance with contrast dye;  Surgeon: Christian Chaudhry MD;  [facility]    INJECT EPIDURAL TRANSFORAMINAL Bilateral 6/16/2023    Procedure: Bilateral Lumbar 5-Sacral 1 transforaminal epidural injections utilizing fluoroscopic guidance with contrast dye;  Surgeon: Christian Chaudhry MD;  [facility]    INJECT FACET JOINT Bilateral 1/25/2019    Procedure: Cervical Facet Injections Cervical 5-6 and 6-7 Bilateral;  Surgeon: Christian Chaudhry MD;  [facility]    INJECT FACET JOINT Bilateral 11/29/2019    Procedure: Cervical 5-6 and 6-7 Bilateral facet joint injection;  Surgeon: Christian Chaudhry MD;  [facility]    INJECT FACET JOINT Bilateral 2/11/2021    Procedure: Cervical 5-6 and Cervical 6-7 Bilateral facet injections;   Surgeon: Christian Chaudhry MD;  [facility]    INJECT FACET JOINT Bilateral 10/19/2021    Procedure: Left C5-C6, Right C5-C6, Left C6-C7 and Right C6-C7 Intra-articular zygopophyseal joint injection utilizing fluoroscopic guidance with contrast dye;  Surgeon: Christian Chaudhry MD;  [facility]    INJECT STEROID (LOCATION) Left 10/19/2021    Procedure: Glenohumeral joint injection left shoulder;  Surgeon: Christian Chaudhry MD;  [facility]    INJECT STEROID (LOCATION) Left 5/20/2022    Procedure: Left shoulder glenohumeral steroid inj with local;  Surgeon: Christian Chaudhry MD;  [facility]    INJECT STEROID TROCHANTERIC BURSA Left 9/8/2022    Procedure: Left intra-articular shoulder injections;  Surgeon: Christian Chaudhry MD;  [facility]    ORTHOPEDIC SURGERY      Right hand    TONSILLECTOMY      ZZC NONSPECIFIC PROCEDURE      rt hand surgery - laceration/glass/tendons    ZZHC ECHO HEART XTHORACIC, STRESS/REST  6/2009    neg     Current Outpatient Medications   Medication Sig Dispense Refill    acetaminophen (TYLENOL) 500 MG tablet Take 1,000 mg by mouth every morning      aspirin (ASA) 81 MG chewable tablet Take 1 tablet (81 mg) by mouth daily      ibuprofen (ADVIL/MOTRIN) 200 MG tablet Take 400 mg by mouth every morning      meclizine 25 MG CHEW Take 25 mg by mouth every 6 hours as needed for dizziness      omeprazole (PRILOSEC) 40 MG DR capsule Take 1 capsule (40 mg) by mouth daily TAKE 1 CAPSULE (40 MG) BY MOUTH DAILY 90 capsule 2    rosuvastatin (CRESTOR) 5 MG tablet Take 1 tablet (5 mg) by mouth daily 90 tablet 4    modafinil (PROVIGIL) 200 MG tablet Take 1 tablet (200 mg) by mouth daily (Patient not taking: Reported on 9/6/2023) 90 tablet 1       Allergies   Allergen Reactions    Wasps [Hornets] Swelling    Percocet [Oxycodone-Acetaminophen] Itching        Social History     Tobacco Use    Smoking status: Former     Packs/day: 1.50     Types: Cigarettes     Quit date: 2/14/2007     Years since  quittin.5    Smokeless tobacco: Never   Substance Use Topics    Alcohol use: No     Comment: rare     Family History   Problem Relation Age of Onset    Breast Cancer Mother     Arthritis Mother         joint ?    Depression Father     Cancer Maternal Grandmother     C.A.D. Maternal Grandfather     Cardiovascular Maternal Grandfather     Gynecology Paternal Grandmother          giving birth    Prostate Cancer Paternal Grandfather     Genetic Disorder Brother         joint pain?    Depression Brother     Cardiovascular Son         congenital heart defect -      Prostate Cancer Other          age 70's    Diabetes Other      History   Drug Use No         Objective     /80   Pulse 90   Temp 98.6  F (37  C)   Resp 14   Wt 114.5 kg (252 lb 6.4 oz)   SpO2 94%   BMI 35.45 kg/m      Physical Exam    GENERAL APPEARANCE: healthy, alert and no distress     EYES: EOMI,  PERRL     HENT: ear canals and TM's normal and nose and mouth without ulcers or lesions     NECK: no adenopathy, no asymmetry, masses, or scars and thyroid normal to palpation     RESP: lungs clear to auscultation - no rales, rhonchi or wheezes     CV: regular rates and rhythm, normal S1 S2, no S3 or S4 and no murmur, click or rub     ABDOMEN:  soft, nontender, no HSM or masses and bowel sounds normal     MS: extremities normal- no gross deformities noted, no evidence of inflammation in joints, FROM in all extremities.     SKIN: no suspicious lesions or rashes     NEURO: Normal strength and tone, sensory exam grossly normal, mentation intact and speech normal     PSYCH: mentation appears normal. and affect normal/bright     LYMPHATICS: No cervical adenopathy    Recent Labs   Lab Test 23  1016 22  1001 22  1449     --  139   POTASSIUM 4.0  --  3.9   CR 0.97  --  0.85   A1C 6.2* 5.8*  --         Diagnostics:  No labs were ordered during this visit.   No EKG required for low risk surgery (cataract, skin  procedure, breast biopsy, etc).    Revised Cardiac Risk Index (RCRI):  The patient has the following serious cardiovascular risks for perioperative complications:   - No serious cardiac risks = 0 points     RCRI Interpretation: 0 points: Class I (very low risk - 0.4% complication rate)         Signed Electronically by: Gustavo Farmer DO  Copy of this evaluation report is provided to requesting physician.       Azithromycin Counseling:  I discussed with the patient the risks of azithromycin including but not limited to GI upset, allergic reaction, drug rash, diarrhea, and yeast infections.

## 2023-09-13 ENCOUNTER — ANESTHESIA EVENT (OUTPATIENT)
Dept: SURGERY | Facility: CLINIC | Age: 58
End: 2023-09-13
Payer: COMMERCIAL

## 2023-09-13 ASSESSMENT — LIFESTYLE VARIABLES: TOBACCO_USE: 1

## 2023-09-14 ENCOUNTER — HOSPITAL ENCOUNTER (OUTPATIENT)
Dept: GENERAL RADIOLOGY | Facility: CLINIC | Age: 58
Discharge: HOME | End: 2023-09-14
Attending: ANESTHESIOLOGY | Admitting: ANESTHESIOLOGY
Payer: COMMERCIAL

## 2023-09-14 ENCOUNTER — ANESTHESIA (OUTPATIENT)
Dept: SURGERY | Facility: CLINIC | Age: 58
End: 2023-09-14
Payer: COMMERCIAL

## 2023-09-14 ENCOUNTER — HOSPITAL ENCOUNTER (OUTPATIENT)
Facility: CLINIC | Age: 58
Discharge: HOME | End: 2023-09-14
Attending: ANESTHESIOLOGY | Admitting: ANESTHESIOLOGY
Payer: COMMERCIAL

## 2023-09-14 VITALS
OXYGEN SATURATION: 96 % | SYSTOLIC BLOOD PRESSURE: 92 MMHG | WEIGHT: 252 LBS | HEART RATE: 71 BPM | BODY MASS INDEX: 35.28 KG/M2 | HEIGHT: 71 IN | RESPIRATION RATE: 16 BRPM | TEMPERATURE: 98.2 F | DIASTOLIC BLOOD PRESSURE: 64 MMHG

## 2023-09-14 DIAGNOSIS — M54.2 CERVICALGIA: ICD-10-CM

## 2023-09-14 DIAGNOSIS — M54.12 CERVICAL RADICULOPATHY: ICD-10-CM

## 2023-09-14 DIAGNOSIS — M47.812 ARTHROPATHY OF CERVICAL FACET JOINT: ICD-10-CM

## 2023-09-14 PROCEDURE — 20552 NJX 1/MLT TRIGGER POINT 1/2: CPT | Performed by: ANESTHESIOLOGY

## 2023-09-14 PROCEDURE — 250N000011 HC RX IP 250 OP 636: Performed by: NURSE ANESTHETIST, CERTIFIED REGISTERED

## 2023-09-14 PROCEDURE — 250N000011 HC RX IP 250 OP 636: Mod: JZ | Performed by: ANESTHESIOLOGY

## 2023-09-14 PROCEDURE — 370N000017 HC ANESTHESIA TECHNICAL FEE, PER MIN: Performed by: ANESTHESIOLOGY

## 2023-09-14 PROCEDURE — 258N000003 HC RX IP 258 OP 636: Performed by: NURSE ANESTHETIST, CERTIFIED REGISTERED

## 2023-09-14 PROCEDURE — 64490 INJ PARAVERT F JNT C/T 1 LEV: CPT | Performed by: ANESTHESIOLOGY

## 2023-09-14 PROCEDURE — 20610 DRAIN/INJ JOINT/BURSA W/O US: CPT | Mod: LT | Performed by: ANESTHESIOLOGY

## 2023-09-14 PROCEDURE — 999N000179 XR SURGERY CARM FLUORO LESS THAN 5 MIN W STILLS: Mod: TC

## 2023-09-14 PROCEDURE — 64491 INJ PARAVERT F JNT C/T 2 LEV: CPT | Performed by: ANESTHESIOLOGY

## 2023-09-14 PROCEDURE — 250N000009 HC RX 250: Performed by: NURSE ANESTHETIST, CERTIFIED REGISTERED

## 2023-09-14 RX ORDER — LIDOCAINE HYDROCHLORIDE 20 MG/ML
INJECTION, SOLUTION INFILTRATION; PERINEURAL PRN
Status: DISCONTINUED | OUTPATIENT
Start: 2023-09-14 | End: 2023-09-14

## 2023-09-14 RX ORDER — PROPOFOL 10 MG/ML
INJECTION, EMULSION INTRAVENOUS PRN
Status: DISCONTINUED | OUTPATIENT
Start: 2023-09-14 | End: 2023-09-14

## 2023-09-14 RX ORDER — SODIUM CHLORIDE, SODIUM LACTATE, POTASSIUM CHLORIDE, CALCIUM CHLORIDE 600; 310; 30; 20 MG/100ML; MG/100ML; MG/100ML; MG/100ML
INJECTION, SOLUTION INTRAVENOUS CONTINUOUS
Status: DISCONTINUED | OUTPATIENT
Start: 2023-09-14 | End: 2023-09-14 | Stop reason: HOSPADM

## 2023-09-14 RX ORDER — TRIAMCINOLONE ACETONIDE 40 MG/ML
INJECTION, SUSPENSION INTRA-ARTICULAR; INTRAMUSCULAR PRN
Status: DISCONTINUED | OUTPATIENT
Start: 2023-09-14 | End: 2023-09-14 | Stop reason: HOSPADM

## 2023-09-14 RX ADMIN — LIDOCAINE HYDROCHLORIDE 50 MG: 20 INJECTION, SOLUTION INFILTRATION; PERINEURAL at 15:07

## 2023-09-14 RX ADMIN — DEXMEDETOMIDINE HYDROCHLORIDE 20 MCG: 100 INJECTION, SOLUTION INTRAVENOUS at 15:04

## 2023-09-14 RX ADMIN — PROPOFOL 50 MG: 10 INJECTION, EMULSION INTRAVENOUS at 15:21

## 2023-09-14 RX ADMIN — PROPOFOL 100 MG: 10 INJECTION, EMULSION INTRAVENOUS at 15:08

## 2023-09-14 RX ADMIN — PROPOFOL 50 MG: 10 INJECTION, EMULSION INTRAVENOUS at 15:10

## 2023-09-14 RX ADMIN — PROPOFOL 50 MG: 10 INJECTION, EMULSION INTRAVENOUS at 15:14

## 2023-09-14 RX ADMIN — LIDOCAINE HYDROCHLORIDE 0.1 ML: 10 INJECTION, SOLUTION EPIDURAL; INFILTRATION; INTRACAUDAL; PERINEURAL at 14:48

## 2023-09-14 ASSESSMENT — ACTIVITIES OF DAILY LIVING (ADL): ADLS_ACUITY_SCORE: 35

## 2023-09-14 NOTE — DISCHARGE INSTRUCTIONS

## 2023-09-14 NOTE — ANESTHESIA PREPROCEDURE EVALUATION
Anesthesia Pre-Procedure Evaluation    Patient: Bulmaro Herrera   MRN: [id number] : 1965        Procedure : Procedure(s):  Left Cervical 5-Cervical 6, Right Cervical 5-Cervical 6, Left Cervical 6-Cervical 7 and Right Cervical 6-Cervical 7 Intra-articular zygopophyseal joint injection          Past Medical History:   Diagnosis Date     Back pains      Depressive disorder, not elsewhere classified 2007    declines Rx     Disc degeneration 2008    see MRI -throaco-lumbar mild discogenic degenerative changes     Elevated LFT's 2009    alt and ast     Other and unspecified hyperlipidemia 2007     Sleep disorder 2009    CPAP     Tobacco use disorder       Past Surgical History:   Procedure Laterality Date     COLONOSCOPY  08    adenomatous polyp repeat 5 years     COLONOSCOPY N/A 2017    Procedure: COMBINED COLONOSCOPY, SINGLE OR MULTIPLE BIOPSY/POLYPECTOMY BY BIOPSY;  Colonoscopy, Polypectomy by Biopsy;  Surgeon: Matt Reyes MD;  Location:  GI     COLONOSCOPY N/A 2023    Procedure: Colonoscopy;  Surgeon: Constantino Bowie DO;  Location:  GI     ENT SURGERY      for deviated septum     EXCISE LESION AXILLA  2018    Procedure: Excision Skin Tags Right Axilla;  Surgeon: Ayaan Chaney MD;  [facility]     EXCISE LESION BACK N/A 2018    Procedure: Excision Back Skin Lesion X Two;  Surgeon: Ayaan Chaney MD;  [facility]     EXCISE MASS BACK N/A 4/15/2022    Procedure: Excisions skin lesions of back;  Surgeon: Ayaan Chaney MD;  [facility]     INJECT BLOCK MEDIAL BRANCH CERVICAL/THORACIC/LUMBAR Bilateral 2022    Procedure: Cervical 4, 5, 6 Medial Branch Block;  Surgeon: Christian Chaudhry MD;  [facility]     INJECT EPIDURAL CERVICAL N/A 2015    Procedure: INJECT EPIDURAL CERVICAL;  Surgeon: Christian Chaudhry MD;  [facility]     INJECT EPIDURAL CERVICAL N/A 2018    Procedure: INJECT EPIDURAL CERVICAL 6-7;   Surgeon: Christian Chaudhry MD;  [facility]     INJECT EPIDURAL CERVICAL Bilateral 6/8/2023    Procedure: Left Cervical 5-Cervical 6, Right Cervical 5-Cervical 6, Left Cervical 6-Cervical 7 and Right Cervical 6-Cervical 7 Intra-articular zygopophyseal joint injection utilizing fluoroscopic guidance with contrast dye.;  Surgeon: Christian Chaudhry MD;  [facility]     INJECT EPIDURAL LUMBAR N/A 12/20/2019    Procedure: Lumbar 5- Sacral 1 Epidural  Injection bilatetral;  Surgeon: Christian Chaudhry MD;  [facility]     INJECT EPIDURAL LUMBAR Bilateral 9/8/2022    Procedure: Bilateral Lumbar 5-Sacral 1 transforaminal epidural injections utilizing fluoroscopic guidance with contrast dye  and;  Surgeon: Christian Chaudhry MD;  [facility]     INJECT EPIDURAL TRANSFORAMINAL Bilateral 5/20/2022    Procedure: Bilateral Lumbar 5- Sacral 1 transforaminal Epidural Steroid Injection;  Surgeon: Christian Chaudhry MD;  [facility]     INJECT EPIDURAL TRANSFORAMINAL Bilateral 7/14/2022    Procedure: Left Cervical 5-Cervical 6, Right Cervical 5-Cervical 6, Left Cervical 6-Cervical 7 and Right Cervical 6-Cervical 7 Intra-articular zygopophyseal joint injection utilizing fluoroscopic guidance with contrast dye;  Surgeon: Christian Chaudhry MD;  [facility]     INJECT EPIDURAL TRANSFORAMINAL Bilateral 6/16/2023    Procedure: Bilateral Lumbar 5-Sacral 1 transforaminal epidural injections utilizing fluoroscopic guidance with contrast dye;  Surgeon: Christian Chaudhry MD;  [facility]     INJECT FACET JOINT Bilateral 1/25/2019    Procedure: Cervical Facet Injections Cervical 5-6 and 6-7 Bilateral;  Surgeon: Christian Chaudhry MD;  [facility]     INJECT FACET JOINT Bilateral 11/29/2019    Procedure: Cervical 5-6 and 6-7 Bilateral facet joint injection;  Surgeon: Christian Chaudhry MD;  [facility]     INJECT FACET JOINT Bilateral 2/11/2021    Procedure: Cervical 5-6 and Cervical 6-7 Bilateral facet injections;  Surgeon: Isidoro  Christian CHAUHAN MD;  [facility]     INJECT FACET JOINT Bilateral 10/19/2021    Procedure: Left C5-C6, Right C5-C6, Left C6-C7 and Right C6-C7 Intra-articular zygopophyseal joint injection utilizing fluoroscopic guidance with contrast dye;  Surgeon: Christian Chaudhry MD;  [facility]     INJECT STEROID (LOCATION) Left 10/19/2021    Procedure: Glenohumeral joint injection left shoulder;  Surgeon: Christian Chaudhry MD;  [facility]     INJECT STEROID (LOCATION) Left 2022    Procedure: Left shoulder glenohumeral steroid inj with local;  Surgeon: Christian Chaudhry MD;  [facility]     INJECT STEROID TROCHANTERIC BURSA Left 2022    Procedure: Left intra-articular shoulder injections;  Surgeon: Christian Chaudhry MD;  [facility]     ORTHOPEDIC SURGERY      Right hand     TONSILLECTOMY       ZZC NONSPECIFIC PROCEDURE      rt hand surgery - laceration/glass/tendons     ZZHC ECHO HEART XTHORACIC, STRESS/REST  2009    neg      Allergies   Allergen Reactions     Wasps [Hornets] Swelling     Percocet [Oxycodone-Acetaminophen] Itching      Social History     Tobacco Use     Smoking status: Former     Packs/day: 1.50     Types: Cigarettes     Quit date: 2007     Years since quittin.5     Smokeless tobacco: Never   Substance Use Topics     Alcohol use: No     Comment: rare      Wt Readings from Last 1 Encounters:   23 114.5 kg (252 lb 6.4 oz)        Anesthesia Evaluation   Pt has had prior anesthetic. Type: MAC and General.    No history of anesthetic complications       ROS/MED HX  ENT/Pulmonary:     (+) sleep apnea, moderate, uses CPAP,              tobacco use, Past use,                      Neurologic:  - neg neurologic ROS     Cardiovascular:  - neg cardiovascular ROS   (+) Dyslipidemia - -   -  - -                                 Previous cardiac testing   Echo: Date: 23 Results:  Interpretation Summary     The visual ejection fraction is 60-65%.  The right ventricular systolic function is  normal.  No hemodynamically significant valvular disease.  There is no pericardial effusion  Stress Test:  Date: Results:    ECG Reviewed:  Date: Results:    Cath:  Date: Results:      METS/Exercise Tolerance:     Hematologic:  - neg hematologic  ROS     Musculoskeletal: Comment: DJD  (+)  arthritis,             GI/Hepatic:     (+) GERD, Asymptomatic on medication,                  Renal/Genitourinary:  - neg Renal ROS     Endo:     (+)               Obesity,       Psychiatric/Substance Use:     (+) psychiatric history depression       Infectious Disease:  - neg infectious disease ROS     Malignancy:  - neg malignancy ROS     Other:  - neg other ROS    (+)  , H/O Chronic Pain,       Physical Exam    Airway  airway exam normal      Mallampati: II   TM distance: > 3 FB   Neck ROM: full   Mouth opening: > 3 cm    Respiratory Devices and Support         Dental       (+) Minor Abnormalities - some fillings, tiny chips      Cardiovascular   cardiovascular exam normal       Rhythm and rate: regular and normal     Pulmonary   pulmonary exam normal        breath sounds clear to auscultation       OUTSIDE LABS:  CBC:   Lab Results   Component Value Date    WBC 5.5 02/08/2021    WBC 5.7 11/15/2019    HGB 16.3 02/08/2021    HGB 16.0 11/15/2019    HCT 47.2 02/08/2021    HCT 47.6 11/15/2019     02/08/2021     11/15/2019     BMP:   Lab Results   Component Value Date     01/07/2023     01/18/2022    POTASSIUM 4.0 01/07/2023    POTASSIUM 3.9 01/18/2022    CHLORIDE 108 01/07/2023    CHLORIDE 108 01/18/2022    CO2 26 01/07/2023    CO2 25 01/18/2022    BUN 21 01/07/2023    BUN 15 01/18/2022    CR 0.97 01/07/2023    CR 0.85 01/18/2022     (H) 01/07/2023    GLC 96 01/18/2022     COAGS:   Lab Results   Component Value Date    PTT 31 06/06/2008    INR 1.0 02/17/2014     POC: No results found for: BGM, HCG, HCGS  HEPATIC:   Lab Results   Component Value Date    ALBUMIN 4.1 01/07/2023    PROTTOTAL 6.9  01/07/2023    ALT 56 01/07/2023    AST 30 01/07/2023    ALKPHOS 69 01/07/2023    BILITOTAL 0.9 01/07/2023     OTHER:   Lab Results   Component Value Date    PH 8.0 (H) 05/07/2002    A1C 6.2 (H) 01/07/2023    WILIAM 9.1 01/07/2023    PHOS 3.5 02/08/2021    MAG 2.2 02/08/2021    LIPASE 95 09/10/2018    AMYLASE 76 03/08/2014    TSH 3.49 01/07/2023    T4 0.94 11/15/2019    CRP <2.9 03/25/2015    SED 7 09/10/2012       Anesthesia Plan    ASA Status:  2    NPO Status:  NPO Appropriate    Anesthesia Type: MAC.      Maintenance: TIVA.        Consents    Anesthesia Plan(s) and associated risks, benefits, and realistic alternatives discussed. Questions answered and patient/representative(s) expressed understanding.     - Discussed:     - Discussed with:  Patient       Use of blood products discussed: No .     Postoperative Care            Comments:    Other Comments: The risks and benefits of anesthesia, and the alternatives where applicable, have been discussed with the patient, and they wish to proceed.          Matt Hood, APRN CRNA

## 2023-09-14 NOTE — ANESTHESIA POSTPROCEDURE EVALUATION
Patient: Bulmaro Herrera    Procedure: Procedure(s):  Bilateral Cervical 5-6 and Cervical 6-7 Intra-articular zygopophyseal joint injections  Left Intra-articular shoulder Injection       Anesthesia Type:  MAC    Note:  Disposition: Outpatient   Postop Pain Control: Uneventful            Sign Out: Well controlled pain   PONV: No   Neuro/Psych: Uneventful            Sign Out: Acceptable/Baseline neuro status   Airway/Respiratory: Uneventful            Sign Out: Acceptable/Baseline resp. status   CV/Hemodynamics: Uneventful            Sign Out: Acceptable CV status   Other NRE: NONE   DID A NON-ROUTINE EVENT OCCUR? No    Event details/Postop Comments:  Pt was happy with anesthesia care.  No complications.  I will follow up with the pt if needed.       Last vitals:  Vitals:    09/14/23 1449   BP: (!) 133/90   Resp: 16   Temp: 98.2  F (36.8  C)       Electronically Signed By: SHIV Mcarthur CRNA  September 14, 2023  3:42 PM

## 2023-09-14 NOTE — OP NOTE
CHIEF COMPLAINT:   1. Neck pain secondary to cervical spondylosis without myelopathy  2. Left shoulder pain secondary to glenohumeral joint degeneration and a left shoulder labral tear  INTERVAL HISTORY:  I discussed the above note with the patient. I agree with above.  He did have a epidural injection in the neck and that did not help suggesting that the discs in his neck that have some degeneration are not the major pain generators.  Because of this we are going to diagnostically and therapeutically test his facet joints.  The complicating factor in his situation is that he has a severe needle phobia.  He needed very deep monitored anesthetic care in order to have the facet injection performed.  This would preclude him from having a radiofrequency ablation which requires fully awake medial branch blocks done under local anesthesia.  PHYSICAL EXAM:   Musculoskeletal: Strength of upper extremities is 5/5 bilaterally.  Pain on palpation of the   neck. Pain on   Rotation, extension and flexion of neck. Facet loading positive bilaterally  Neuro: No sensory perception differences of upper extremities.  PROCEDURE number #1:    1. Left C5-C6, Right C5-C6, Left C6-C7 and Right C6-C7  Intra-articular zygopophyseal joint injection utilizing fluoroscopic guidance with contrast dye.      PROCEDURE DETAILS: After written informed consent was obtained from the patient, the patient was escorted to the procedure room.  The patient was placed in the sitting position.  A  time out  was conducted to verify the patient identity, procedure to be performed, side, site, allergies and any special requirements.  The skin over the neck was prepped and draped in normal sterile fashion. Fluoroscopy was used to identify the zygopophyseal joint with a lateral view.   The skin was anesthetized with 0.5 mL of 1% lidocaine with bicarbonate buffer.  A 25-gauge 2 inch Quincke needle was advanced under fluoroscopic guidance in the lateral approach  until entry into the zygopophyseal joint was successful.  View was confirmed in AP view.  Then, <0.3 cc of Omnipaque contrast dye was injected producing a satisfactory arthrogram without evidence of intrathecal or intravascular spread.  Then, a 1 mL solution of 5 mg dexamethasone and 0.5 mL of 2% lidocaine was incrementally injected into each  joint.  After injection of the medication, as the needle tip was withdrawn, it was flushed with local anesthetic. The patient was monitored with blood pressure and pulse oximetry machines with the assistance of an RN throughout the procedure.  The patient was alert and responsive to questions throughout the procedure.   The patient tolerated the procedure well and was observed in the post-procedural area.  The patient was dismissed without apparent complications.       PROCEDURE number #2:     Procedure details: After his neck was treated we moved over to his left shoulder.  Left shoulder posterior approach was prepped with ChloraPrep.  1% lidocaine was used for skin.  I then advanced a 22-gauge Quincke needle into his left glenohumeral joint.  Initially contrast was injected which did not show proper spread throughout the joint and after several needle tip manipulations and contrast injection, I achieved a proper arthrogram of the left glenohumeral joint.  I then injected 8 cc of 0.25% bupivacaine with 4 mg/cc of Depo-Medrol.  The needle was then removed and a sterile bandage placed over the needle insertion site.     Blood loss: Less than 2 cc     DIAGNOSIS:  1.  Cervical spondylosis secondary to a symptomatic facet syndrome causing axial neck pain with a history of long-term pain relief from previous facet injections  2.  Severe needle phobia  3.  Labral tear left shoulder inferior with severe glenohumeral joint osteoarthritis     PLAN:  Performed repeat zygopophyseal joint injections.   He had about 9 to 10 months of pain relief from the last set of injections.  However,  he has a severe needle phobia so he would not be a candidate for medial branch blocks or a radiofrequency ablation so if he does have positive diagnostic injections with his pain form then he would need to consider a cervical fusion if the facet injections do not provide long-term pain relief for him.  He is going to consider platelet rich plasma injections into the cervical facet joints to give himself an attempt for a longer term treatment.    2.    I completed a left shoulder injection as well.  He does have a few other  tears of his rotator cuff but the goal was to treat his glenohumeral joint  which has severe OA.     Christian Chaudhry MD  Diplomate of the American Board of Anesthesiology, Pain Medicine

## 2023-09-14 NOTE — ANESTHESIA CARE TRANSFER NOTE
Patient: Bulmaro Herrera    Procedure: Procedure(s):  Bilateral Cervical 5-6 and Cervical 6-7 Intra-articular zygopophyseal joint injections  Left Intra-articular shoulder Injection       Diagnosis: Cervicalgia [M54.2]  Arthropathy of cervical facet joint [M47.812]  Cervical radiculopathy [M54.12]  Diagnosis Additional Information: No value filed.    Anesthesia Type:   MAC     Note:    Oropharynx: oropharynx clear of all foreign objects and spontaneously breathing  Level of Consciousness: drowsy  Oxygen Supplementation: room air    Independent Airway: airway patency satisfactory and stable  Dentition: dentition unchanged  Vital Signs Stable: post-procedure vital signs reviewed and stable  Report to RN Given: handoff report given  Patient transferred to: PACU    Handoff Report: Identifed the Patient, Identified the Reponsible Provider, Reviewed the pertinent medical history, Discussed the surgical course, Reviewed Intra-OP anesthesia mangement and issues during anesthesia, Set expectations for post-procedure period and Allowed opportunity for questions and acknowledgement of understanding  Vitals:  Vitals Value Taken Time   BP 90/60 09/14/23 1532   Temp     Pulse 76 09/14/23 1532   Resp     SpO2 91 % 09/14/23 1539   Vitals shown include unvalidated device data.    Electronically Signed By: SHIV Mcarthur CRNA  September 14, 2023  3:39 PM

## 2023-09-19 ENCOUNTER — MYC MEDICAL ADVICE (OUTPATIENT)
Dept: INTERNAL MEDICINE | Facility: CLINIC | Age: 58
End: 2023-09-19
Payer: COMMERCIAL

## 2023-09-19 DIAGNOSIS — K21.9 GASTROESOPHAGEAL REFLUX DISEASE WITHOUT ESOPHAGITIS: ICD-10-CM

## 2023-09-20 RX ORDER — OMEPRAZOLE 40 MG/1
CAPSULE, DELAYED RELEASE ORAL
Qty: 90 CAPSULE | Refills: 2 | Status: SHIPPED | OUTPATIENT
Start: 2023-09-20 | End: 2024-07-01

## 2023-09-27 ENCOUNTER — ANESTHESIA EVENT (OUTPATIENT)
Dept: SURGERY | Facility: CLINIC | Age: 58
End: 2023-09-27
Payer: COMMERCIAL

## 2023-09-27 ASSESSMENT — LIFESTYLE VARIABLES: TOBACCO_USE: 1

## 2023-09-27 NOTE — ANESTHESIA PREPROCEDURE EVALUATION
Anesthesia Pre-Procedure Evaluation    Patient: Bulmaro Herrera   MRN: [id number] : 1965        Procedure : Procedure(s):  Bilateral Lumbar 5-Sacral 1 transforaminal epidural injections          Past Medical History:   Diagnosis Date     Back pains      Depressive disorder, not elsewhere classified 2007    declines Rx     Disc degeneration 2008    see MRI -throaco-lumbar mild discogenic degenerative changes     Elevated LFT's 2009    alt and ast     Other and unspecified hyperlipidemia 2007     Sleep disorder 2009    CPAP     Tobacco use disorder       Past Surgical History:   Procedure Laterality Date     COLONOSCOPY  08    adenomatous polyp repeat 5 years     COLONOSCOPY N/A 2017    Procedure: COMBINED COLONOSCOPY, SINGLE OR MULTIPLE BIOPSY/POLYPECTOMY BY BIOPSY;  Colonoscopy, Polypectomy by Biopsy;  Surgeon: Matt Reyes MD;  Location:  GI     COLONOSCOPY N/A 2023    Procedure: Colonoscopy;  Surgeon: Constantino Bowie DO;  Location:  GI     ENT SURGERY      for deviated septum     EXCISE LESION AXILLA  2018    Procedure: Excision Skin Tags Right Axilla;  Surgeon: Ayaan Chaney MD;  [facility]     EXCISE LESION BACK N/A 2018    Procedure: Excision Back Skin Lesion X Two;  Surgeon: Ayaan Chaney MD;  [facility]     EXCISE MASS BACK N/A 4/15/2022    Procedure: Excisions skin lesions of back;  Surgeon: Ayaan Chaney MD;  [facility]     INJECT BLOCK MEDIAL BRANCH CERVICAL/THORACIC/LUMBAR Bilateral 2022    Procedure: Cervical 4, 5, 6 Medial Branch Block;  Surgeon: Christian Chaudhry MD;  [facility]     INJECT EPIDURAL CERVICAL N/A 2015    Procedure: INJECT EPIDURAL CERVICAL;  Surgeon: Christian Chaudhry MD;  [facility]     INJECT EPIDURAL CERVICAL N/A 2018    Procedure: INJECT EPIDURAL CERVICAL 6-7;  Surgeon: Christian Chaudhry MD;  [facility]     INJECT EPIDURAL CERVICAL Bilateral 2023     Procedure: Left Cervical 5-Cervical 6, Right Cervical 5-Cervical 6, Left Cervical 6-Cervical 7 and Right Cervical 6-Cervical 7 Intra-articular zygopophyseal joint injection utilizing fluoroscopic guidance with contrast dye.;  Surgeon: Christian Chaudhry MD;  [facility]     INJECT EPIDURAL LUMBAR N/A 12/20/2019    Procedure: Lumbar 5- Sacral 1 Epidural  Injection bilatetral;  Surgeon: Christian Chaudhry MD;  [facility]     INJECT EPIDURAL LUMBAR Bilateral 9/8/2022    Procedure: Bilateral Lumbar 5-Sacral 1 transforaminal epidural injections utilizing fluoroscopic guidance with contrast dye  and;  Surgeon: Christian Chaudhry MD;  [facility]     INJECT EPIDURAL TRANSFORAMINAL Bilateral 5/20/2022    Procedure: Bilateral Lumbar 5- Sacral 1 transforaminal Epidural Steroid Injection;  Surgeon: Christian Chaudhry MD;  [facility]     INJECT EPIDURAL TRANSFORAMINAL Bilateral 7/14/2022    Procedure: Left Cervical 5-Cervical 6, Right Cervical 5-Cervical 6, Left Cervical 6-Cervical 7 and Right Cervical 6-Cervical 7 Intra-articular zygopophyseal joint injection utilizing fluoroscopic guidance with contrast dye;  Surgeon: Christian Chaudhry MD;  [facility]     INJECT EPIDURAL TRANSFORAMINAL Bilateral 6/16/2023    Procedure: Bilateral Lumbar 5-Sacral 1 transforaminal epidural injections utilizing fluoroscopic guidance with contrast dye;  Surgeon: Christian Chaudhry MD;  [facility]     INJECT FACET JOINT Bilateral 1/25/2019    Procedure: Cervical Facet Injections Cervical 5-6 and 6-7 Bilateral;  Surgeon: Christian Chaudhry MD;  [facility]     INJECT FACET JOINT Bilateral 11/29/2019    Procedure: Cervical 5-6 and 6-7 Bilateral facet joint injection;  Surgeon: Christian Chaudhry MD;  [facility]     INJECT FACET JOINT Bilateral 2/11/2021    Procedure: Cervical 5-6 and Cervical 6-7 Bilateral facet injections;  Surgeon: Christian Chaudhry MD;  [facility]     INJECT FACET JOINT Bilateral 10/19/2021    Procedure: Left  C5-C6, Right C5-C6, Left C6-C7 and Right C6-C7 Intra-articular zygopophyseal joint injection utilizing fluoroscopic guidance with contrast dye;  Surgeon: Christian Chaudhry MD;  [facility]     INJECT FACET JOINT Bilateral 2023    Procedure: Left Cervical 5-Cervical 6, Right Cervical 5-Cervical 6, Left Cervical 6-Cervical 7 and Right Cervical 6-Cervical 7 Intra-articular zygopophyseal joint injection utilizing fluoroscopic guidance with contrast dye. Left Intra-articular shoulder Injection;  Surgeon: Christian Chaudhry MD;  [facility]     INJECT STEROID (LOCATION) Left 10/19/2021    Procedure: Glenohumeral joint injection left shoulder;  Surgeon: Christian Chaudhry MD;  [facility]     INJECT STEROID (LOCATION) Left 2022    Procedure: Left shoulder glenohumeral steroid inj with local;  Surgeon: Christian Chaudhry MD;  [facility]     INJECT STEROID TROCHANTERIC BURSA Left 2022    Procedure: Left intra-articular shoulder injections;  Surgeon: Christian Chaudhry MD;  [facility]     ORTHOPEDIC SURGERY      Right hand     TONSILLECTOMY       ZZC NONSPECIFIC PROCEDURE      rt hand surgery - laceration/glass/tendons     ZZHC ECHO HEART XTHORACIC, STRESS/REST  2009    neg      Allergies   Allergen Reactions     Wasps [Hornets] Swelling     Percocet [Oxycodone-Acetaminophen] Itching      Social History     Tobacco Use     Smoking status: Former     Packs/day: 1.50     Types: Cigarettes     Quit date: 2007     Years since quittin.6     Smokeless tobacco: Never   Substance Use Topics     Alcohol use: No     Comment: rare      Wt Readings from Last 1 Encounters:   23 114.3 kg (252 lb)        Anesthesia Evaluation   Pt has had prior anesthetic. Type: MAC and General.    No history of anesthetic complications       ROS/MED HX  ENT/Pulmonary:     (+) sleep apnea, moderate, uses CPAP,              tobacco use, Past use,                      Neurologic:  - neg neurologic ROS      Cardiovascular:  - neg cardiovascular ROS   (+) Dyslipidemia - -   -  - -                                 Previous cardiac testing   Echo: Date: 4/5/23 Results:  Interpretation Summary     The visual ejection fraction is 60-65%.  The right ventricular systolic function is normal.  No hemodynamically significant valvular disease.  There is no pericardial effusion  Stress Test:  Date: Results:    ECG Reviewed:  Date: Results:    Cath:  Date: Results:      METS/Exercise Tolerance:     Hematologic:  - neg hematologic  ROS     Musculoskeletal: Comment: DJD  (+)  arthritis,             GI/Hepatic:     (+) GERD, Asymptomatic on medication,                  Renal/Genitourinary:  - neg Renal ROS     Endo:     (+)               Obesity,       Psychiatric/Substance Use:     (+) psychiatric history depression       Infectious Disease:  - neg infectious disease ROS     Malignancy:  - neg malignancy ROS     Other:  - neg other ROS    (+)  , H/O Chronic Pain,       Physical Exam    Airway  airway exam normal      Mallampati: II   TM distance: > 3 FB   Neck ROM: full   Mouth opening: > 3 cm    Respiratory Devices and Support         Dental       (+) Minor Abnormalities - some fillings, tiny chips      Cardiovascular   cardiovascular exam normal       Rhythm and rate: regular and normal     Pulmonary   pulmonary exam normal        breath sounds clear to auscultation       OUTSIDE LABS:  CBC:   Lab Results   Component Value Date    WBC 5.5 02/08/2021    WBC 5.7 11/15/2019    HGB 16.3 02/08/2021    HGB 16.0 11/15/2019    HCT 47.2 02/08/2021    HCT 47.6 11/15/2019     02/08/2021     11/15/2019     BMP:   Lab Results   Component Value Date     01/07/2023     01/18/2022    POTASSIUM 4.0 01/07/2023    POTASSIUM 3.9 01/18/2022    CHLORIDE 108 01/07/2023    CHLORIDE 108 01/18/2022    CO2 26 01/07/2023    CO2 25 01/18/2022    BUN 21 01/07/2023    BUN 15 01/18/2022    CR 0.97 01/07/2023    CR 0.85 01/18/2022    GLC  120 (H) 01/07/2023    GLC 96 01/18/2022     COAGS:   Lab Results   Component Value Date    PTT 31 06/06/2008    INR 1.0 02/17/2014     POC: No results found for: BGM, HCG, HCGS  HEPATIC:   Lab Results   Component Value Date    ALBUMIN 4.1 01/07/2023    PROTTOTAL 6.9 01/07/2023    ALT 56 01/07/2023    AST 30 01/07/2023    ALKPHOS 69 01/07/2023    BILITOTAL 0.9 01/07/2023     OTHER:   Lab Results   Component Value Date    PH 8.0 (H) 05/07/2002    A1C 6.2 (H) 01/07/2023    WILIAM 9.1 01/07/2023    PHOS 3.5 02/08/2021    MAG 2.2 02/08/2021    LIPASE 95 09/10/2018    AMYLASE 76 03/08/2014    TSH 3.49 01/07/2023    T4 0.94 11/15/2019    CRP <2.9 03/25/2015    SED 7 09/10/2012       Anesthesia Plan    ASA Status:  2    NPO Status:  NPO Appropriate    Anesthesia Type: MAC.     - Reason for MAC: straight local not clinically adequate      Maintenance: TIVA.        Consents    Anesthesia Plan(s) and associated risks, benefits, and realistic alternatives discussed. Questions answered and patient/representative(s) expressed understanding.     - Discussed:     - Discussed with:  Patient       Use of blood products discussed: No .     Postoperative Care            Comments:    Other Comments: The risks and benefits of anesthesia, and the alternatives where applicable, have been discussed with the patient, and they wish to proceed.          Matt Hood, APRN CRNA

## 2023-09-28 ENCOUNTER — HOSPITAL ENCOUNTER (OUTPATIENT)
Facility: CLINIC | Age: 58
Discharge: HOME | End: 2023-09-28
Attending: ANESTHESIOLOGY | Admitting: ANESTHESIOLOGY
Payer: COMMERCIAL

## 2023-09-28 ENCOUNTER — ANESTHESIA (OUTPATIENT)
Dept: SURGERY | Facility: CLINIC | Age: 58
End: 2023-09-28
Payer: COMMERCIAL

## 2023-09-28 ENCOUNTER — HOSPITAL ENCOUNTER (OUTPATIENT)
Dept: GENERAL RADIOLOGY | Facility: CLINIC | Age: 58
Discharge: HOME | End: 2023-09-28
Attending: ANESTHESIOLOGY | Admitting: ANESTHESIOLOGY
Payer: COMMERCIAL

## 2023-09-28 VITALS
HEART RATE: 79 BPM | WEIGHT: 252 LBS | RESPIRATION RATE: 18 BRPM | BODY MASS INDEX: 35.28 KG/M2 | TEMPERATURE: 98 F | OXYGEN SATURATION: 92 % | HEIGHT: 71 IN | SYSTOLIC BLOOD PRESSURE: 106 MMHG | DIASTOLIC BLOOD PRESSURE: 80 MMHG

## 2023-09-28 DIAGNOSIS — M51.17 INTERVERTEBRAL DISC DISORDER WITH RADICULOPATHY OF LUMBOSACRAL REGION: ICD-10-CM

## 2023-09-28 PROCEDURE — 250N000009 HC RX 250: Performed by: NURSE ANESTHETIST, CERTIFIED REGISTERED

## 2023-09-28 PROCEDURE — 250N000011 HC RX IP 250 OP 636: Mod: JZ | Performed by: ANESTHESIOLOGY

## 2023-09-28 PROCEDURE — 370N000017 HC ANESTHESIA TECHNICAL FEE, PER MIN: Performed by: ANESTHESIOLOGY

## 2023-09-28 PROCEDURE — 250N000011 HC RX IP 250 OP 636: Performed by: ANESTHESIOLOGY

## 2023-09-28 PROCEDURE — 258N000003 HC RX IP 258 OP 636: Performed by: NURSE ANESTHETIST, CERTIFIED REGISTERED

## 2023-09-28 PROCEDURE — 64483 NJX AA&/STRD TFRM EPI L/S 1: CPT | Mod: 50 | Performed by: ANESTHESIOLOGY

## 2023-09-28 PROCEDURE — 250N000011 HC RX IP 250 OP 636: Performed by: NURSE ANESTHETIST, CERTIFIED REGISTERED

## 2023-09-28 PROCEDURE — 999N000179 XR SURGERY CARM FLUORO LESS THAN 5 MIN W STILLS: Mod: TC

## 2023-09-28 RX ORDER — LIDOCAINE HYDROCHLORIDE 20 MG/ML
INJECTION, SOLUTION INFILTRATION; PERINEURAL PRN
Status: DISCONTINUED | OUTPATIENT
Start: 2023-09-28 | End: 2023-09-28

## 2023-09-28 RX ORDER — SODIUM CHLORIDE, SODIUM LACTATE, POTASSIUM CHLORIDE, CALCIUM CHLORIDE 600; 310; 30; 20 MG/100ML; MG/100ML; MG/100ML; MG/100ML
INJECTION, SOLUTION INTRAVENOUS CONTINUOUS
Status: DISCONTINUED | OUTPATIENT
Start: 2023-09-28 | End: 2023-09-28 | Stop reason: HOSPADM

## 2023-09-28 RX ORDER — LIDOCAINE 40 MG/G
CREAM TOPICAL
Status: DISCONTINUED | OUTPATIENT
Start: 2023-09-28 | End: 2023-09-28 | Stop reason: HOSPADM

## 2023-09-28 RX ORDER — IOPAMIDOL 612 MG/ML
INJECTION, SOLUTION INTRATHECAL PRN
Status: DISCONTINUED | OUTPATIENT
Start: 2023-09-28 | End: 2023-09-28 | Stop reason: HOSPADM

## 2023-09-28 RX ORDER — TRIAMCINOLONE ACETONIDE 40 MG/ML
INJECTION, SUSPENSION INTRA-ARTICULAR; INTRAMUSCULAR PRN
Status: DISCONTINUED | OUTPATIENT
Start: 2023-09-28 | End: 2023-09-28 | Stop reason: HOSPADM

## 2023-09-28 RX ORDER — PROPOFOL 10 MG/ML
INJECTION, EMULSION INTRAVENOUS PRN
Status: DISCONTINUED | OUTPATIENT
Start: 2023-09-28 | End: 2023-09-28

## 2023-09-28 RX ADMIN — LIDOCAINE HYDROCHLORIDE 50 MG: 20 INJECTION, SOLUTION INFILTRATION; PERINEURAL at 16:06

## 2023-09-28 RX ADMIN — DEXMEDETOMIDINE HYDROCHLORIDE 24 MCG: 100 INJECTION, SOLUTION INTRAVENOUS at 16:01

## 2023-09-28 RX ADMIN — PROPOFOL 100 MG: 10 INJECTION, EMULSION INTRAVENOUS at 16:06

## 2023-09-28 RX ADMIN — LIDOCAINE HYDROCHLORIDE 0.2 ML: 10 INJECTION, SOLUTION EPIDURAL; INFILTRATION; INTRACAUDAL; PERINEURAL at 15:28

## 2023-09-28 RX ADMIN — PROPOFOL 50 MG: 10 INJECTION, EMULSION INTRAVENOUS at 16:08

## 2023-09-28 RX ADMIN — PROPOFOL 50 MG: 10 INJECTION, EMULSION INTRAVENOUS at 16:10

## 2023-09-28 RX ADMIN — PROPOFOL 100 MG: 10 INJECTION, EMULSION INTRAVENOUS at 16:07

## 2023-09-28 ASSESSMENT — ACTIVITIES OF DAILY LIVING (ADL): ADLS_ACUITY_SCORE: 33

## 2023-09-28 NOTE — INTERVAL H&P NOTE
"I have reviewed the surgical (or preoperative) H&P that is linked to this encounter, and examined the patient. There are no significant changes    Clinical Conditions Present on Arrival:  Clinically Significant Risk Factors Present on Admission                 # Drug Induced Platelet Defect: home medication list includes an antiplatelet medication  # Obesity: Estimated body mass index is 35.4 kg/m  as calculated from the following:    Height as of this encounter: 1.797 m (5' 10.75\").    Weight as of this encounter: 114.3 kg (252 lb).       "

## 2023-09-28 NOTE — OP NOTE
CHIEF COMPLAINT:   1. neck pain secondary to cervical spondylosis.  2.  Left shoulder pain secondary to glenohumeral joint degeneration and a left shoulder labral tear  3.  Low back pain with severe disc degeneration at L5-S1 causing back pain and leg pains    Bulmaro gets high-grade pain relief from these injections but they last for about 3 months and then the pain comes back.  He is requesting to have this repeated again.    We also had a discussion regarding his neck.  He is planning on having us perform a PRP injection into his neck.  He understands that that is an out-of-pocket expense not covered by insurance.  We have discussed the need for at least 2 of these treatments.  This will have to be done at our clinic which will be a new clinic in [facility].  He understands that the rotation to the new clinic will occur in January 2024.  PROCEDURE   1.  Bilateral L5-S1 transforaminal epidural injections utilizing fluoroscopic guidance with contrast dye.      PROCEDURE DETAILS: After written informed consent was obtained from the patient, the patient was escorted to the procedure room.  The patient was placed in the sitting position.  A  time out  was conducted to verify the patient identity, procedure to be performed, side, site, allergies and any special requirements.  The skin over the neck was prepped and draped in normal sterile fashion. Fluoroscopy was used to identify the zygopophyseal joint with a lateral view.   The skin was anesthetized with 0.5 mL of 1% lidocaine with bicarbonate buffer.  2 separate 22-gauge Quincke needles were advanced into the foraminal openings from the oblique views and walked into the posterior aspect of the foramen at L5-S1 in the lateral fluoroscopic image.  In the AP image Omnipaque contrast was injected which showed proper spread in the epidural space no evidence of any intravascular, intrathecal, intraneural injection.  I then injected 20 mg of Kenalog with 3 cc of  preservative-free normal saline into each foraminal opening with good dispersion into his epidural space.     Blood loss: 0 cc     DIAGNOSIS:  1.  Cervical spondylosis secondary to a symptomatic facet syndrome causing axial neck pain with a history of long-term pain relief from previous facet injections  2.  Severe needle phobia  3.  Labral tear left shoulder inferior  4.  Severe disc degeneration at L5-S1.     PLAN:  Performed a repeat bilateral L5-S1 epidural injections.  He really does not want to have low back surgery however he has severe degeneration at L5-S1 causing lumbar radiculopathy as well as axial back pain.    Christian Chaudhry MD  Diplomate of the American Board of Anesthesiology, Pain Medicine

## 2023-09-28 NOTE — ANESTHESIA CARE TRANSFER NOTE
Patient: Bulmaro Herrera    Procedure: Procedure(s):  Bilateral Lumbar 5-Sacral 1 transforaminal epidural injections       Diagnosis: Intervertebral disc disorder with radiculopathy of lumbosacral region [M51.17]  Diagnosis Additional Information: No value filed.    Anesthesia Type:   MAC     Note:    Oropharynx: oropharynx clear of all foreign objects and spontaneously breathing  Level of Consciousness: drowsy  Oxygen Supplementation: room air    Independent Airway: airway patency satisfactory and stable  Dentition: dentition unchanged  Vital Signs Stable: post-procedure vital signs reviewed and stable  Report to RN Given: handoff report given  Patient transferred to: Phase II    Handoff Report: Identifed the Patient, Identified the Reponsible Provider, Reviewed the pertinent medical history, Discussed the surgical course, Reviewed Intra-OP anesthesia mangement and issues during anesthesia, Set expectations for post-procedure period and Allowed opportunity for questions and acknowledgement of understanding  Vitals:  Vitals Value Taken Time   BP     Temp     Pulse     Resp     SpO2         Electronically Signed By: SHIV Mcarthur CRNA  September 28, 2023  4:22 PM

## 2023-09-28 NOTE — ANESTHESIA POSTPROCEDURE EVALUATION
Patient: Bulmaro Herrera    Procedure: Procedure(s):  Bilateral Lumbar 5-Sacral 1 transforaminal epidural injections       Anesthesia Type:  MAC    Note:  Disposition: Outpatient   Postop Pain Control: Uneventful            Sign Out: Well controlled pain   PONV: No   Neuro/Psych: Uneventful            Sign Out: Acceptable/Baseline neuro status   Airway/Respiratory: Uneventful            Sign Out: Acceptable/Baseline resp. status   CV/Hemodynamics: Uneventful            Sign Out: Acceptable CV status   Other NRE: NONE   DID A NON-ROUTINE EVENT OCCUR? No    Event details/Postop Comments:  Pt was happy with anesthesia care.  No complications.  I will follow up with the pt if needed.           Last vitals:  Vitals Value Taken Time   /80 09/28/23 1640   Temp 98  F (36.7  C) 09/28/23 1620   Pulse 79 09/28/23 1640   Resp 18 09/28/23 1620   SpO2 92 % 09/28/23 1634   Vitals shown include unvalidated device data.    Electronically Signed By: SHIV Mcarthur CRNA  September 28, 2023  6:21 PM

## 2023-09-28 NOTE — DISCHARGE INSTRUCTIONS

## 2023-11-06 ENCOUNTER — HOSPITAL ENCOUNTER (EMERGENCY)
Facility: CLINIC | Age: 58
Discharge: LEFT BEFORE DISCHARGE | End: 2023-11-06
Attending: EMERGENCY MEDICINE | Admitting: EMERGENCY MEDICINE
Payer: COMMERCIAL

## 2023-11-06 VITALS
HEIGHT: 72 IN | BODY MASS INDEX: 34.18 KG/M2 | RESPIRATION RATE: 20 BRPM | HEART RATE: 14 BPM | SYSTOLIC BLOOD PRESSURE: 146 MMHG | TEMPERATURE: 97.5 F | OXYGEN SATURATION: 97 % | DIASTOLIC BLOOD PRESSURE: 101 MMHG

## 2023-11-06 DIAGNOSIS — R10.9 FLANK PAIN: ICD-10-CM

## 2023-11-06 PROCEDURE — 99281 EMR DPT VST MAYX REQ PHY/QHP: CPT

## 2023-11-06 NOTE — ED TRIAGE NOTES
Pt reports Saturday he started having pain in his right flank area. Pt states pain comes and goes with movement.      Triage Assessment (Adult)       Row Name 11/06/23 1245          Triage Assessment    Airway WDL WDL        Respiratory WDL    Respiratory WDL WDL        Skin Circulation/Temperature WDL    Skin Circulation/Temperature WDL WDL        Cardiac WDL    Cardiac WDL WDL        Peripheral/Neurovascular WDL    Peripheral Neurovascular WDL WDL

## 2023-11-11 ENCOUNTER — HOSPITAL ENCOUNTER (EMERGENCY)
Facility: CLINIC | Age: 58
Discharge: HOME | End: 2023-11-11
Attending: NURSE PRACTITIONER | Admitting: NURSE PRACTITIONER
Payer: COMMERCIAL

## 2023-11-11 ENCOUNTER — APPOINTMENT (OUTPATIENT)
Dept: CT IMAGING | Facility: CLINIC | Age: 58
End: 2023-11-11
Attending: NURSE PRACTITIONER
Payer: COMMERCIAL

## 2023-11-11 VITALS
TEMPERATURE: 98 F | OXYGEN SATURATION: 95 % | RESPIRATION RATE: 20 BRPM | BODY MASS INDEX: 34.18 KG/M2 | DIASTOLIC BLOOD PRESSURE: 113 MMHG | HEART RATE: 68 BPM | WEIGHT: 252 LBS | SYSTOLIC BLOOD PRESSURE: 135 MMHG

## 2023-11-11 DIAGNOSIS — M54.50 ACUTE RIGHT-SIDED LOW BACK PAIN WITHOUT SCIATICA: ICD-10-CM

## 2023-11-11 LAB
ALBUMIN SERPL BCG-MCNC: 4.3 G/DL (ref 3.5–5.2)
ALBUMIN UR-MCNC: NEGATIVE MG/DL
ALP SERPL-CCNC: 75 U/L (ref 40–129)
ALT SERPL W P-5'-P-CCNC: 30 U/L (ref 0–70)
ANION GAP SERPL CALCULATED.3IONS-SCNC: 10 MMOL/L (ref 7–15)
APPEARANCE UR: ABNORMAL
AST SERPL W P-5'-P-CCNC: 22 U/L (ref 0–45)
BASOPHILS # BLD AUTO: 0.1 10E3/UL (ref 0–0.2)
BASOPHILS NFR BLD AUTO: 2 %
BILIRUB SERPL-MCNC: 0.7 MG/DL
BILIRUB UR QL STRIP: NEGATIVE
BUN SERPL-MCNC: 15.6 MG/DL (ref 6–20)
CALCIUM SERPL-MCNC: 9.5 MG/DL (ref 8.6–10)
CHLORIDE SERPL-SCNC: 102 MMOL/L (ref 98–107)
COLOR UR AUTO: ABNORMAL
CREAT SERPL-MCNC: 1.08 MG/DL (ref 0.67–1.17)
DEPRECATED HCO3 PLAS-SCNC: 27 MMOL/L (ref 22–29)
EGFRCR SERPLBLD CKD-EPI 2021: 81 ML/MIN/1.73M2
EOSINOPHIL # BLD AUTO: 0.1 10E3/UL (ref 0–0.7)
EOSINOPHIL NFR BLD AUTO: 1 %
ERYTHROCYTE [DISTWIDTH] IN BLOOD BY AUTOMATED COUNT: 12.9 % (ref 10–15)
GLUCOSE SERPL-MCNC: 99 MG/DL (ref 70–99)
GLUCOSE UR STRIP-MCNC: NEGATIVE MG/DL
HCT VFR BLD AUTO: 46.4 % (ref 40–53)
HGB BLD-MCNC: 15.8 G/DL (ref 13.3–17.7)
HGB UR QL STRIP: NEGATIVE
IMM GRANULOCYTES # BLD: 0.1 10E3/UL
IMM GRANULOCYTES NFR BLD: 1 %
KETONES UR STRIP-MCNC: NEGATIVE MG/DL
LEUKOCYTE ESTERASE UR QL STRIP: NEGATIVE
LIPASE SERPL-CCNC: 18 U/L (ref 13–60)
LYMPHOCYTES # BLD AUTO: 2.3 10E3/UL (ref 0.8–5.3)
LYMPHOCYTES NFR BLD AUTO: 34 %
MCH RBC QN AUTO: 31.6 PG (ref 26.5–33)
MCHC RBC AUTO-ENTMCNC: 34.1 G/DL (ref 31.5–36.5)
MCV RBC AUTO: 93 FL (ref 78–100)
MONOCYTES # BLD AUTO: 0.6 10E3/UL (ref 0–1.3)
MONOCYTES NFR BLD AUTO: 8 %
MUCOUS THREADS #/AREA URNS LPF: PRESENT /LPF
NEUTROPHILS # BLD AUTO: 3.7 10E3/UL (ref 1.6–8.3)
NEUTROPHILS NFR BLD AUTO: 54 %
NITRATE UR QL: NEGATIVE
NRBC # BLD AUTO: 0 10E3/UL
NRBC BLD AUTO-RTO: 0 /100
PH UR STRIP: 5 [PH] (ref 5–7)
PLATELET # BLD AUTO: 212 10E3/UL (ref 150–450)
POTASSIUM SERPL-SCNC: 3.9 MMOL/L (ref 3.4–5.3)
PROT SERPL-MCNC: 7 G/DL (ref 6.4–8.3)
RBC # BLD AUTO: 5 10E6/UL (ref 4.4–5.9)
RBC URINE: 1 /HPF
SODIUM SERPL-SCNC: 139 MMOL/L (ref 135–145)
SP GR UR STRIP: 1.02 (ref 1–1.03)
UROBILINOGEN UR STRIP-MCNC: 2 MG/DL
WBC # BLD AUTO: 6.7 10E3/UL (ref 4–11)
WBC URINE: 1 /HPF

## 2023-11-11 PROCEDURE — 85004 AUTOMATED DIFF WBC COUNT: CPT | Performed by: NURSE PRACTITIONER

## 2023-11-11 PROCEDURE — 74176 CT ABD & PELVIS W/O CONTRAST: CPT

## 2023-11-11 PROCEDURE — 99284 EMERGENCY DEPT VISIT MOD MDM: CPT | Performed by: NURSE PRACTITIONER

## 2023-11-11 PROCEDURE — 99285 EMERGENCY DEPT VISIT HI MDM: CPT | Mod: 25

## 2023-11-11 PROCEDURE — 80053 COMPREHEN METABOLIC PANEL: CPT | Performed by: NURSE PRACTITIONER

## 2023-11-11 PROCEDURE — 36415 COLL VENOUS BLD VENIPUNCTURE: CPT | Performed by: NURSE PRACTITIONER

## 2023-11-11 PROCEDURE — 250N000011 HC RX IP 250 OP 636: Mod: JZ | Performed by: NURSE PRACTITIONER

## 2023-11-11 PROCEDURE — 83690 ASSAY OF LIPASE: CPT | Performed by: NURSE PRACTITIONER

## 2023-11-11 PROCEDURE — 81001 URINALYSIS AUTO W/SCOPE: CPT | Performed by: NURSE PRACTITIONER

## 2023-11-11 PROCEDURE — 96374 THER/PROPH/DIAG INJ IV PUSH: CPT

## 2023-11-11 RX ORDER — KETOROLAC TROMETHAMINE 15 MG/ML
15 INJECTION, SOLUTION INTRAMUSCULAR; INTRAVENOUS ONCE
Status: COMPLETED | OUTPATIENT
Start: 2023-11-11 | End: 2023-11-11

## 2023-11-11 RX ORDER — HYDROCODONE BITARTRATE AND ACETAMINOPHEN 5; 325 MG/1; MG/1
1-2 TABLET ORAL EVERY 6 HOURS PRN
Qty: 14 TABLET | Refills: 0 | Status: SHIPPED | OUTPATIENT
Start: 2023-11-11 | End: 2023-11-14

## 2023-11-11 RX ORDER — CYCLOBENZAPRINE HCL 10 MG
10 TABLET ORAL 3 TIMES DAILY PRN
Qty: 12 TABLET | Refills: 0 | Status: SHIPPED | OUTPATIENT
Start: 2023-11-11 | End: 2024-01-10

## 2023-11-11 RX ADMIN — KETOROLAC TROMETHAMINE 15 MG: 15 INJECTION, SOLUTION INTRAMUSCULAR; INTRAVENOUS at 13:32

## 2023-11-11 ASSESSMENT — ACTIVITIES OF DAILY LIVING (ADL)
ADLS_ACUITY_SCORE: 35
ADLS_ACUITY_SCORE: 35

## 2023-11-11 NOTE — ED PROVIDER NOTES
"  History     Chief Complaint   Patient presents with    Flank Pain     HPI  Bulmaro Herrera is a 58 year old male with history of cervical and lumbar degenerative disc disease, esophageal reflux, ROBBY, who presents for evaluation of right flank pain.  Symptoms started on Saturday, 1 week ago.  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  He laid in bed most the day and most of the day on Sunday.  He tried to go to work on Monday but continued to have right flank pain.  Pain seemed like it was getting better the last couple days, but still when he gets out of bed in the morning and bends over he can barely put his socks on due to the pain.  Pain is now also radiating into his right side.  Denies fever or chills.  Denies nausea or vomiting.  Denies constipation or diarrhea.  Denies difficulty urinating or painful urination.  He reports history of a kidney stone when he was a child, but none since.  He presented here 5 days ago, but left without being seen thinking maybe it was just a muscle.  He does have a history of a \"bad back\" and has been told he needs back surgery.    He has been treating the pain with IcyHot patches which have not been helpful.  No prior history of back surgery.  He has had prior epidural steroid injections to his lumbar spine, last done 9/28/2023.  Patient states his wife was concerned for possible gallbladder issue.  Patient feels the pain is much deeper and not coming from his back, with concern for possibly related to his kidney.  No history of abdominal surgeries.  Former smoker.  Denies alcohol use.    Allergies:  Allergies   Allergen Reactions    Wasps [Hornets] Swelling    Percocet [Oxycodone-Acetaminophen] Itching       Problem List:    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/2022     Priority: Medium     Added automatically from request for surgery " [id number]      Chondromalacia of left shoulder 08/18/2021     Priority: Medium    Partial nontraumatic tear of left rotator cuff 08/18/2021     Priority: Medium    Soft tissue mass 08/05/2021     Priority: Medium    Rotator cuff syndrome of left shoulder 08/05/2021     Priority: Medium    Chronic left shoulder pain 08/05/2021     Priority: Medium    NSAID long-term use 11/18/2019     Priority: Medium    Severe needle phobia 01/21/2019     Priority: Medium     Class: Chronic     likely to preclude him from having minimally invasive interventional pain procedures with minimal sedation. Would need deeper MAC sedation      Mixed hyperlipidemia 11/13/2018     Priority: Medium    ROBBY (obstructive sleep apnea) 11/13/2018     Priority: Medium    Herniation of intervertebral disc of cervical spine without radiculopathy 03/21/2017     Priority: Medium    Cervicalgia 03/21/2017     Priority: Medium    Chronic pain syndrome 04/05/2016     Priority: Medium     DDD, cervical. T#3, #60/mo.       DDD (degenerative disc disease), lumbar 12/20/2013     Priority: Medium    DDD (degenerative disc disease), cervical 12/20/2013     Priority: Medium    Pruritus ani 01/20/2010     Priority: Medium    Back pains      Priority: Medium     Problem list name updated by automated process. Provider to review and confirm      Esophageal reflux 04/13/2005     Priority: Medium        Past Medical History:    Past Medical History:   Diagnosis Date    Back pains     Depressive disorder, not elsewhere classified 01/28/2007    Disc degeneration 12/01/2008    Elevated LFT's 11/01/2009    Other and unspecified hyperlipidemia 01/01/2007    Sleep disorder 09/01/2009    Tobacco use disorder        Past Surgical History:    Past Surgical History:   Procedure Laterality Date    COLONOSCOPY  07/07/08    adenomatous polyp repeat 5 years    COLONOSCOPY N/A 12/29/2017    Procedure: COMBINED COLONOSCOPY, SINGLE OR MULTIPLE BIOPSY/POLYPECTOMY BY BIOPSY;  Colonoscopy,  Polypectomy by Biopsy;  Surgeon: Matt Reyes MD;  Location: [facility]    COLONOSCOPY N/A 5/22/2023    Procedure: Colonoscopy;  Surgeon: Constantino Bowie DO;  Location:  GI    ENT SURGERY      for deviated septum    EXCISE LESION AXILLA  12/7/2018    Procedure: Excision Skin Tags Right Axilla;  Surgeon: Ayaan Chaney MD;  Location: [facility]    EXCISE LESION BACK N/A 12/7/2018    Procedure: Excision Back Skin Lesion X Two;  Surgeon: Ayaan Chaney MD;  [facility]    EXCISE MASS BACK N/A 4/15/2022    Procedure: Excisions skin lesions of back;  Surgeon: Ayaan Chaney MD;  [facility]    INJECT BLOCK MEDIAL BRANCH CERVICAL/THORACIC/LUMBAR Bilateral 5/26/2022    Procedure: Cervical 4, 5, 6 Medial Branch Block;  Surgeon: Christian Chaudhry MD;  [facility]    INJECT EPIDURAL CERVICAL N/A 5/14/2015    Procedure: INJECT EPIDURAL CERVICAL;  Surgeon: Christian Chaudhry MD;  [facility]    INJECT EPIDURAL CERVICAL N/A 12/28/2018    Procedure: INJECT EPIDURAL CERVICAL 6-7;  Surgeon: Christian Chaudhry MD;  [facility]    INJECT EPIDURAL CERVICAL Bilateral 6/8/2023    Procedure: Left Cervical 5-Cervical 6, Right Cervical 5-Cervical 6, Left Cervical 6-Cervical 7 and Right Cervical 6-Cervical 7 Intra-articular zygopophyseal joint injection utilizing fluoroscopic guidance with contrast dye.;  Surgeon: Christian Chaudhry MD;  [facility]    INJECT EPIDURAL LUMBAR N/A 12/20/2019    Procedure: Lumbar 5- Sacral 1 Epidural  Injection bilatetral;  Surgeon: Christian Chaudhry MD;  [facility]    INJECT EPIDURAL LUMBAR Bilateral 9/8/2022    Procedure: Bilateral Lumbar 5-Sacral 1 transforaminal epidural injections utilizing fluoroscopic guidance with contrast dye  and;  Surgeon: Christian Chaudhry MD;  [facility]    INJECT EPIDURAL TRANSFORAMINAL Bilateral 5/20/2022    Procedure: Bilateral Lumbar 5- Sacral 1 transforaminal Epidural Steroid Injection;  Surgeon: Christian Chaudhry MD;  [facility]    INJECT  EPIDURAL TRANSFORAMINAL Bilateral 7/14/2022    Procedure: Left Cervical 5-Cervical 6, Right Cervical 5-Cervical 6, Left Cervical 6-Cervical 7 and Right Cervical 6-Cervical 7 Intra-articular zygopophyseal joint injection utilizing fluoroscopic guidance with contrast dye;  Surgeon: Christian Chaudhry MD;  [facility]    INJECT EPIDURAL TRANSFORAMINAL Bilateral 6/16/2023    Procedure: Bilateral Lumbar 5-Sacral 1 transforaminal epidural injections utilizing fluoroscopic guidance with contrast dye;  Surgeon: Christian Chaudhry MD;  [facility]    INJECT EPIDURAL TRANSFORAMINAL Bilateral 9/28/2023    Procedure: Bilateral Lumbar 5-Sacral 1 transforaminal epidural injections utilizing fluoroscopic guidance with contrast dye;  Surgeon: Christian Chaudhry MD;  [facility]    INJECT FACET JOINT Bilateral 1/25/2019    Procedure: Cervical Facet Injections Cervical 5-6 and 6-7 Bilateral;  Surgeon: Christian Chaudhry MD;  [facility]    INJECT FACET JOINT Bilateral 11/29/2019    Procedure: Cervical 5-6 and 6-7 Bilateral facet joint injection;  Surgeon: Christian Chaudhry MD;  [facility]    INJECT FACET JOINT Bilateral 2/11/2021    Procedure: Cervical 5-6 and Cervical 6-7 Bilateral facet injections;  Surgeon: Christian Chaudhry MD;  [facility]    INJECT FACET JOINT Bilateral 10/19/2021    Procedure: Left C5-C6, Right C5-C6, Left C6-C7 and Right C6-C7 Intra-articular zygopophyseal joint injection utilizing fluoroscopic guidance with contrast dye;  Surgeon: Christian Chaudhry MD;  [facility]    INJECT FACET JOINT Bilateral 9/14/2023    Procedure: Left Cervical 5-Cervical 6, Right Cervical 5-Cervical 6, Left Cervical 6-Cervical 7 and Right Cervical 6-Cervical 7 Intra-articular zygopophyseal joint injection utilizing fluoroscopic guidance with contrast dye. Left Intra-articular shoulder Injection;  Surgeon: Christian Chaudhry MD;  [facility]    INJECT STEROID (LOCATION) Left 10/19/2021    Procedure: Glenohumeral joint  injection left shoulder;  Surgeon: Christian Chaudhry MD;  [facility]    INJECT STEROID (LOCATION) Left 2022    Procedure: Left shoulder glenohumeral steroid inj with local;  Surgeon: Christian Chaudhry MD;  [facility]    INJECT STEROID TROCHANTERIC BURSA Left 2022    Procedure: Left intra-articular shoulder injections;  Surgeon: Christian Chaudhry MD;  [facility]    ORTHOPEDIC SURGERY      Right hand    TONSILLECTOMY      ZZC NONSPECIFIC PROCEDURE      rt hand surgery - laceration/glass/tendons    ZZHC ECHO HEART XTHORACIC, STRESS/REST  2009    neg       Family History:    Family History   Problem Relation Age of Onset    Breast Cancer Mother     Arthritis Mother         joint ?    Depression Father     Cancer Maternal Grandmother     C.A.D. Maternal Grandfather     Cardiovascular Maternal Grandfather     Gynecology Paternal Grandmother          giving birth    Prostate Cancer Paternal Grandfather     Genetic Disorder Brother         joint pain?    Depression Brother     Cardiovascular Son         congenital heart defect -      Prostate Cancer Other          age 70's    Diabetes Other        Social History:  Marital Status:   [2]  Social History     Tobacco Use    Smoking status: Former     Packs/day: 1.5     Types: Cigarettes     Quit date: 2007     Years since quittin.7    Smokeless tobacco: Never   Substance Use Topics    Alcohol use: No     Comment: rare    Drug use: No        Medications:    cyclobenzaprine (FLEXERIL) 10 MG tablet  HYDROcodone-acetaminophen (NORCO) 5-325 MG tablet  acetaminophen (TYLENOL) 500 MG tablet  aspirin (ASA) 81 MG chewable tablet  ibuprofen (ADVIL/MOTRIN) 200 MG tablet  meclizine 25 MG CHEW  omeprazole (PRILOSEC) 40 MG DR capsule  rosuvastatin (CRESTOR) 5 MG tablet          Review of Systems  As mentioned above in the history present illness. All other systems were reviewed and are negative.    Physical Exam   BP: (!) 137/92  Pulse:  78  Temp: 98  F (36.7  C)  Resp: 20  Weight: 114.3 kg (252 lb)  SpO2: 95 %      Physical Exam  Constitutional:       General: He is not in acute distress.     Appearance: He is well-developed. He is not ill-appearing.   HENT:      Head: Normocephalic and atraumatic.      Right Ear: External ear normal.      Left Ear: External ear normal.      Nose: Nose normal.      Mouth/Throat:      Mouth: Mucous membranes are moist.   Eyes:      Conjunctiva/sclera: Conjunctivae normal.   Cardiovascular:      Rate and Rhythm: Normal rate and regular rhythm.      Heart sounds: Normal heart sounds. No murmur heard.  Pulmonary:      Effort: Pulmonary effort is normal. No respiratory distress.      Breath sounds: Normal breath sounds.   Abdominal:      Palpations: Abdomen is soft. There is no hepatomegaly or splenomegaly.      Tenderness: There is abdominal tenderness in the right upper quadrant and right lower quadrant. There is no right CVA tenderness. Negative signs include Rodas's sign and McBurney's sign.   Musculoskeletal:         General: Normal range of motion.      Comments: Slow gait/movement when moving from chair to the gurney due to acuity of pain.   Skin:     General: Skin is warm and dry.      Findings: No rash.   Neurological:      General: No focal deficit present.      Mental Status: He is alert and oriented to person, place, and time.         ED Course                 Procedures              Results for orders placed or performed during the hospital encounter of 11/11/23 (from the past 24 hour(s))   CBC with platelets differential    Narrative    The following orders were created for panel order CBC with platelets differential.  Procedure                               Abnormality         Status                     ---------                               -----------         ------                     CBC with platelets and d...[[id number]]                      Final result                 Please view results for  these tests on the individual orders.   Comprehensive metabolic panel   Result Value Ref Range    Sodium 139 135 - 145 mmol/L    Potassium 3.9 3.4 - 5.3 mmol/L    Carbon Dioxide (CO2) 27 22 - 29 mmol/L    Anion Gap 10 7 - 15 mmol/L    Urea Nitrogen 15.6 6.0 - 20.0 mg/dL    Creatinine 1.08 0.67 - 1.17 mg/dL    GFR Estimate 81 >60 mL/min/1.73m2    Calcium 9.5 8.6 - 10.0 mg/dL    Chloride 102 98 - 107 mmol/L    Glucose 99 70 - 99 mg/dL    Alkaline Phosphatase 75 40 - 129 U/L    AST 22 0 - 45 U/L    ALT 30 0 - 70 U/L    Protein Total 7.0 6.4 - 8.3 g/dL    Albumin 4.3 3.5 - 5.2 g/dL    Bilirubin Total 0.7 <=1.2 mg/dL   Lipase   Result Value Ref Range    Lipase 18 13 - 60 U/L   CBC with platelets and differential   Result Value Ref Range    WBC Count 6.7 4.0 - 11.0 10e3/uL    RBC Count 5.00 4.40 - 5.90 10e6/uL    Hemoglobin 15.8 13.3 - 17.7 g/dL    Hematocrit 46.4 40.0 - 53.0 %    MCV 93 78 - 100 fL    MCH 31.6 26.5 - 33.0 pg    MCHC 34.1 31.5 - 36.5 g/dL    RDW 12.9 10.0 - 15.0 %    Platelet Count 212 150 - 450 10e3/uL    % Neutrophils 54 %    % Lymphocytes 34 %    % Monocytes 8 %    % Eosinophils 1 %    % Basophils 2 %    % Immature Granulocytes 1 %    NRBCs per 100 WBC 0 <1 /100    Absolute Neutrophils 3.7 1.6 - 8.3 10e3/uL    Absolute Lymphocytes 2.3 0.8 - 5.3 10e3/uL    Absolute Monocytes 0.6 0.0 - 1.3 10e3/uL    Absolute Eosinophils 0.1 0.0 - 0.7 10e3/uL    Absolute Basophils 0.1 0.0 - 0.2 10e3/uL    Absolute Immature Granulocytes 0.1 <=0.4 10e3/uL    Absolute NRBCs 0.0 10e3/uL   UA with Microscopic reflex to Culture    Specimen: Urine, Midstream   Result Value Ref Range    Color Urine Loan (A) Colorless, Straw, Light Yellow, Yellow    Appearance Urine Slightly Cloudy (A) Clear    Glucose Urine Negative Negative mg/dL    Bilirubin Urine Negative Negative    Ketones Urine Negative Negative mg/dL    Specific Gravity Urine 1.023 1.003 - 1.035    Blood Urine Negative Negative    pH Urine 5.0 5.0 - 7.0    Protein  Albumin Urine Negative Negative mg/dL    Urobilinogen Urine 2.0 Normal, 2.0 mg/dL    Nitrite Urine Negative Negative    Leukocyte Esterase Urine Negative Negative    Mucus Urine Present (A) None Seen /LPF    RBC Urine 1 <=2 /HPF    WBC Urine 1 <=5 /HPF    Narrative    Urine Culture not indicated   CT Abdomen Pelvis w/o Contrast    Narrative    EXAM: CT ABDOMEN AND PELVIS WITHOUT CONTRAST  LOCATION: [facility]  DATE: 11/11/2023    INDICATION: Right flank and right-sided abdominal pain.  COMPARISON: 09/10/2018.  TECHNIQUE: CT scan of the abdomen and pelvis was performed without IV contrast. Multiplanar reformats were obtained. Dose reduction techniques were used.  CONTRAST: None.    FINDINGS:   LOWER CHEST: Benign granulomatous change. No effusions.    HEPATOBILIARY: Diffuse hepatic steatosis. No significant mass. No bile duct dilatation. No calcified gallstones.    PANCREAS: No significant mass, duct dilatation, or inflammatory change.    SPLEEN: Normal size.    ADRENAL GLANDS: No significant nodules.    KIDNEYS/BLADDER: No significant mass, stone, or hydronephrosis.    BOWEL: No obstruction or inflammatory change. Stool throughout the colon.    LYMPH NODES: No lymphadenopathy.    VASCULATURE: There are mild atherosclerotic changes of the visualized aorta and its branches. There is no evidence of aortic aneurysm.    PELVIC ORGANS: No pelvic masses.    MUSCULOSKELETAL: No frankly destructive bony lesions.      Impression    IMPRESSION:   1.  No renal stones or hydronephrosis.  2.  At least moderate fatty infiltration of the liver, steatohepatitis not excluded in this setting.             Medications   ketorolac (TORADOL) injection 15 mg (15 mg Intravenous $Given 11/11/23 1332)       Assessments & Plan (with Medical Decision Making)     PDMP Review         Value Time User    State PDMP site checked  Yes 11/11/2023 12:55 PM Pat Mercer APRN CNP          Patient presents  complaint of right flank pain started after pouring concrete on Saturday. Work-up is most consistent with musculoskeletal etiology.  No concerning renal findings.  Urinalysis is negative for infection or evidence of blood to suggest kidney stone.  Abdominal/pelvis CT is negative for ureteral stone or hydronephrosis to suggest a passed kidney stone.  No bowel obstruction or mass.  No diverticulitis.  No appendicitis.  Hepatic steatosis, but no gallbladder wall thickening or inflammation to suggest cholecystitis.     Plan:    Flexeril 10 mg 3 times a day as needed.  (Muscle relaxer)  Tylenol 650 mg every 4-6 hours as needed for pain.  Ibuprofen 400-600 mg every 6-8 hours as needed for pain  (take with food, stop if causing stomach pains.)  You can also try over-the-counter lidoderm patch.     Or--Norco 1-2 tablets every 6 hours as needed for severe pain.  Do not drive or drink alcohol with this medication.    Recheck in clinic if not improving in 5 to 7 days.    Discharge Medication List as of 11/11/2023  2:56 PM        START taking these medications    Details   cyclobenzaprine (FLEXERIL) 10 MG tablet Take 1 tablet (10 mg) by mouth 3 times daily as needed for muscle spasms, Disp-12 tablet, R-0, E-Prescribe      HYDROcodone-acetaminophen (NORCO) 5-325 MG tablet Take 1-2 tablets by mouth every 6 hours as needed for severe pain, Disp-14 tablet, R-0, E-Prescribe             Final diagnoses:   Acute right-sided low back pain without sciatica       11/11/2023   [facility] EMERGENCY DEPT       Sylvie, SHIV Joseph CNP  11/11/23 1957

## 2023-11-11 NOTE — DISCHARGE INSTRUCTIONS
Flexeril 10 mg 3 times a day as needed.  (Muscle relaxer)  Tylenol 650 mg every 4-6 hours as needed for pain.  Ibuprofen 400-600 mg every 6-8 hours as needed for pain  (take with food, stop if causing stomach pains.)  You can also try over-the-counter lidoderm patch.     Or--Norco 1-2 tablets every 6 hours as needed for severe pain.  Do not drive or drink alcohol with this medication.    Recheck in clinic if not improving in 5 to 7 days.

## 2023-11-16 ENCOUNTER — MYC MEDICAL ADVICE (OUTPATIENT)
Dept: INTERNAL MEDICINE | Facility: CLINIC | Age: 58
End: 2023-11-16
Payer: COMMERCIAL

## 2023-11-16 ENCOUNTER — NURSE TRIAGE (OUTPATIENT)
Dept: INTERNAL MEDICINE | Facility: CLINIC | Age: 58
End: 2023-11-16
Payer: COMMERCIAL

## 2023-11-16 NOTE — TELEPHONE ENCOUNTER
Patient sent in a mychart regarding continued back pain:    Consent to communicate on file for wife.    His pain comes and goes in waves.  His pain is not any better than when he was in the Emergency room.    No pain when he is just standing, but when he moves or bends or breaths in a certain way then it is severe.    Per protocol patient advised to go to the urgent care. Patient agrees with the plan.    Alanna Ziegler RN on 11/16/2023 at 5:47 PM      Reason for Disposition   SEVERE back pain (e.g., excruciating, unable to do any normal activities) and not improved after pain medicine and CARE ADVICE    Additional Information   Negative: Passed out (i.e., fainted, collapsed and was not responding)   Negative: Shock suspected (e.g., cold/pale/clammy skin, too weak to stand, low BP, rapid pulse)   Negative: Sounds like a life-threatening emergency to the triager   Negative: Major injury to the back (e.g., MVA, fall > 10 feet or 3 meters, penetrating injury, etc.)   Negative: Pain in the upper back over the ribs (rib cage) that radiates (travels) into the chest   Negative: Pain in the upper back over the ribs (rib cage) and worsened by coughing (or clearly increases with breathing)   Negative: Back pain during pregnancy   Negative: SEVERE back pain of sudden onset and age > 60 years   Negative: SEVERE abdominal pain (e.g., excruciating)   Negative: Abdominal pain and age > 60 years   Negative: Unable to urinate (or only a few drops) and bladder feels very full   Negative: Loss of bladder or bowel control (urine or bowel incontinence; wetting self, leaking stool) of new-onset   Negative: Numbness (loss of sensation) in groin or rectal area   Negative: Pain radiates into groin, scrotum   Negative: Blood in urine (red, pink, or tea-colored)   Negative: Vomiting and pain over lower ribs of back (i.e., flank - kidney area)   Negative: Weakness of a leg or foot (e.g., unable to bear weight, dragging foot)   Negative:  Patient sounds very sick or weak to the triager   Negative: Fever > 100.4 F (38.0 C) and flank pain   Negative: Pain or burning with passing urine (urination)    Protocols used: Back Pain-A-[place]

## 2023-12-07 ENCOUNTER — OFFICE VISIT (OUTPATIENT)
Dept: INTERNAL MEDICINE | Facility: CLINIC | Age: 58
End: 2023-12-07
Payer: COMMERCIAL

## 2023-12-07 VITALS
WEIGHT: 254.3 LBS | SYSTOLIC BLOOD PRESSURE: 122 MMHG | OXYGEN SATURATION: 95 % | TEMPERATURE: 97.1 F | HEIGHT: 72 IN | BODY MASS INDEX: 34.44 KG/M2 | HEART RATE: 80 BPM | DIASTOLIC BLOOD PRESSURE: 82 MMHG | RESPIRATION RATE: 16 BRPM

## 2023-12-07 DIAGNOSIS — E78.2 MIXED HYPERLIPIDEMIA: ICD-10-CM

## 2023-12-07 DIAGNOSIS — S39.012D STRAIN OF LUMBAR REGION, SUBSEQUENT ENCOUNTER: Primary | ICD-10-CM

## 2023-12-07 DIAGNOSIS — M51.369 DDD (DEGENERATIVE DISC DISEASE), LUMBAR: ICD-10-CM

## 2023-12-07 PROCEDURE — 99213 OFFICE O/P EST LOW 20 MIN: CPT | Performed by: INTERNAL MEDICINE

## 2023-12-07 RX ORDER — PREDNISONE 20 MG/1
20 TABLET ORAL DAILY
Qty: 10 TABLET | Refills: 0 | Status: SHIPPED | OUTPATIENT
Start: 2023-12-07 | End: 2024-01-10

## 2023-12-07 ASSESSMENT — PAIN SCALES - GENERAL: PAINLEVEL: NO PAIN (1)

## 2023-12-07 NOTE — PROGRESS NOTES
Jemima Chamberlain is a 58 year old, presenting for the following health issues:  ER F/U      12/7/2023     2:10 PM   Additional Questions   Roomed by Kori ROE         12/7/2023     2:10 PM   Patient Reported Additional Medications   Patient reports taking the following new medications Red yeast rice       HPI       ED/UC Followup:    Facility:  [facility] Emergency room  Date of visit: 11-6-2023 and 11-  Reason for visit: flank pain  Current Status: improvement           EMR reviewed including:             Complaint, History of Chief Complaint, Corresponding Review of Systems, and Complaint Specific Physical Examination.    #1   Persistent back pain  Recently seen in the emergency department CAT scan was performed  No significant abnormality noted on CAT scan.  Some taut soft tissue swelling in the associated area.  Discomfort is lower thoracic/upper lumbar right flank pain.  Palpable spasm is noted in this area.  Reproducible discomfort with palpation.  No urinary symptoms.  No hematuria.  CT does show some significant lumbar disc disease and arthritis specifically at L5/S1.  Patient is working with neurosurgery with this.  Modest improvement with ibuprofen at prescription strength dosages.        Exam:   NEURO: Pt is alert and appropriate. No neurologic lateralization is noted. Cranial nerves 2-12 are intact. Peripheral sensory and motor function are grossly normal.    LUNGS: clear bilaterally, airflow is brisk, no intercostal retraction or stridor is noted. No coughing is noted during visit.   HEART:  regular without rubs, clicks, gallops, or murmurs. PMI is nondisplaced. Upstrokes are brisk. S1,S2 are heard.        #2   Follow-up on hyperlipidemia  Patient no longer taking Crestor.  Has had no cardiac event or signs of peripheral vascular disease.  Does not follow-up particularly stringent diet.  Recent CMP and CBC reviewed and unremarkable.  Patient has been interviewed, applicable history and  applied review of systems have been performed.    Vital Signs:   /82 (BP Location: Right arm, Patient Position: Chair)   Pulse 80   Temp 97.1  F (36.2  C) (Temporal)   Resp 16   Ht 1.829 m (6')   Wt 115.3 kg (254 lb 4.8 oz)   SpO2 95%   BMI 34.49 kg/m        Decision Making    Problem and Complexity     1. Strain of lumbar region, subsequent encounter  Short course of prednisone, moist heat to the affected area  - predniSONE (DELTASONE) 20 MG tablet; Take 1 tablet (20 mg) by mouth daily  Dispense: 10 tablet; Refill: 0    2. DDD (degenerative disc disease), lumbar  Follow-up with neurosurgery as scheduled      3. Mixed hyperlipidemia  Recommend fasting lab work at next visit including lipids.  He will notify me by phone of his progress in the upcoming 2 weeks                             FOLLOW UP   I have asked the patient to make an appointment for followup with me in 4 months.    Regarding routine vaccinations:  I have reviewed the patient's vaccination schedule and discussed the benefits of prophylactic vaccination in detail.  I recommend the patient contact their pharmacist for vaccinations.  Discussed that most insurance companies now favor reimbursement to the pharmacies and it will financially behoove the patient to have vaccinations performed at their pharmacy.        I have carefully explained the diagnosis and treatment options to the patient.  The patient has displayed an understanding of the above, and all subsequent questions were answered.      DO ANDREW Blandon    Portions of this note were produced using [employer]  Although every attempt at real-time proof reading has been made, occasional grammar/syntax errors may have been missed.

## 2024-01-08 ENCOUNTER — TELEPHONE (OUTPATIENT)
Dept: INTERNAL MEDICINE | Facility: CLINIC | Age: 59
End: 2024-01-08
Payer: COMMERCIAL

## 2024-01-10 ENCOUNTER — OFFICE VISIT (OUTPATIENT)
Dept: INTERNAL MEDICINE | Facility: CLINIC | Age: 59
End: 2024-01-10
Payer: COMMERCIAL

## 2024-01-10 VITALS
BODY MASS INDEX: 34.54 KG/M2 | TEMPERATURE: 98.1 F | OXYGEN SATURATION: 94 % | RESPIRATION RATE: 14 BRPM | HEART RATE: 74 BPM | WEIGHT: 254.7 LBS | SYSTOLIC BLOOD PRESSURE: 128 MMHG | DIASTOLIC BLOOD PRESSURE: 88 MMHG

## 2024-01-10 DIAGNOSIS — E66.01 MORBID OBESITY (H): ICD-10-CM

## 2024-01-10 DIAGNOSIS — S39.012D STRAIN OF LUMBAR REGION, SUBSEQUENT ENCOUNTER: ICD-10-CM

## 2024-01-10 PROCEDURE — 99213 OFFICE O/P EST LOW 20 MIN: CPT | Performed by: INTERNAL MEDICINE

## 2024-01-10 RX ORDER — PREDNISONE 20 MG/1
20 TABLET ORAL DAILY
Qty: 10 TABLET | Refills: 0 | Status: SHIPPED | OUTPATIENT
Start: 2024-01-10 | End: 2024-02-16

## 2024-01-10 NOTE — PROGRESS NOTES
Subjective     Bulmaro Herrera is a 58 year old male who presents to clinic today for the following health issues accompanied by his  self :    History of Present Illness       Back Pain:  He presents for follow up of back pain. Patient's back pain is a recurring problem.  Location of back pain:  Right lower back  Description of back pain: dull ache  Back pain spreads: nowhere    Since patient first noticed back pain, pain is: always present, but gets better and worse  Does back pain interfere with his job:  Yes       He eats 0-1 servings of fruits and vegetables daily.He consumes 1 sweetened beverage(s) daily.He exercises with enough effort to increase his heart rate 10 to 19 minutes per day.  He exercises with enough effort to increase his heart rate 3 or less days per week.   He is taking medications regularly.  Right foot pain.           EMR reviewed including:             Complaint, History of Chief Complaint, Corresponding Review of Systems, and Complaint Specific Physical Examination.    #1   Persistent low back pain.  Recently seen for stated lumbar spasm.  Treated with brief steroid and muscle relaxer.  And significant relief.  Has not recurred somewhat.  Patient flying to [place] tomorrow.  Concerned regardingin sitting in .  Has chronic radicular pain.  Sees neurosurgery for this.  This is currently not his major problem.        Exam:  Palpable paraspinal lumbar spasm.  Worse on right than left.  Able to walk without foot drop or gait abnormality.  Straight leg raising minimally positive.  Sacroiliac joint not inflamed or painful.        Patient has been interviewed, applicable history and applied review of systems have been performed.    Vital Signs:   /88   Pulse 74   Temp 98.1  F (36.7  C)   Resp 14   Wt 115.5 kg (254 lb 11.2 oz)   SpO2 94%   BMI 34.54 kg/m        Decision Making    Problem and Complexity     1. Strain of lumbar region, subsequent encounter  Recommend tizanidine at  bedtime.  Repeat prednisone burst.  Moist heat when possible.    - tiZANidine (ZANAFLEX) 4 MG tablet; Take 1 tablet (4 mg) by mouth at bedtime  Dispense: 14 tablet; Refill: 0  - predniSONE (DELTASONE) 20 MG tablet; Take 1 tablet (20 mg) by mouth daily  Dispense: 10 tablet; Refill: 0    2. Morbid obesity (H)  Recommend continued weight loss responsible caloric restriction                                FOLLOW UP   I have asked the patient to make an appointment for followup with his neurosurgeon upon return to town    Regarding routine vaccinations:  I have reviewed the patient's vaccination schedule and discussed the benefits of prophylactic vaccination in detail.  I recommend the patient contact their pharmacist for vaccinations.  Discussed that most insurance companies now favor reimbursement to the pharmacies and it will financially behoove the patient to have vaccinations performed at their pharmacy.        I have carefully explained the diagnosis and treatment options to the patient.  The patient has displayed an understanding of the above, and all subsequent questions were answered.      DO ANDREW Blandon    Portions of this note were produced using [employer]  Although every attempt at real-time proof reading has been made, occasional grammar/syntax errors may have been missed.

## 2024-02-05 ENCOUNTER — TELEPHONE (OUTPATIENT)
Dept: ADMISSION | Facility: CLINIC | Age: 59
End: 2024-02-05
Payer: COMMERCIAL

## 2024-02-05 DIAGNOSIS — M54.16 LUMBAR RADICULOPATHY: ICD-10-CM

## 2024-02-05 DIAGNOSIS — M54.2 CERVICALGIA: Primary | ICD-10-CM

## 2024-02-05 NOTE — TELEPHONE ENCOUNTER
Reason for Call:  Other call back    Detailed comments: pts wife calling to request neck and back injection orders. Please advise     Phone Number Patient can be reached at: Home number on file [phone] (home)    Best Time: any    Can we leave a detailed message on this number? YES    Call taken on 2/5/2024 at 4:40 PM by Natalya Nice

## 2024-02-06 ENCOUNTER — MYC MEDICAL ADVICE (OUTPATIENT)
Dept: NEUROSURGERY | Facility: CLINIC | Age: 59
End: 2024-02-06
Payer: COMMERCIAL

## 2024-02-06 NOTE — TELEPHONE ENCOUNTER
Attempted to reach out to patient, no answer. Left voice message for them to call clinic back to further discuss.      Patient called clinic requesting to repeat injection.     Type of injection: Bilateral Lumbar 5-Sacral 1 transforaminal epidural injections AND Left Cervical 5-Cervical 6, Right Cervical 5-Cervical 6, Left Cervical 6-Cervical 7 and Right Cervical 6-Cervical 7 Intra-articular zygopophyseal joint injection      Most recent injection date:   9/14/23: Left Cervical 5-Cervical 6, Right Cervical 5-Cervical 6, Left Cervical 6-Cervical 7 and Right Cervical 6-Cervical 7 Intra-articular zygopophyseal joint injection      9/28/23 Bilateral Lumbar 5-Sacral 1 transforaminal epidural injections       Most recent MRI: Lumbar and cervical: 1/31/22       How long did injection provide symptomatic relief:      Current symptoms:      Number of injections in last 12 months: 2 neck (6/8/23 and 9/14/23), 2 back 6/16/23 and 9/28/23)

## 2024-02-07 ENCOUNTER — MYC REFILL (OUTPATIENT)
Dept: INTERNAL MEDICINE | Facility: CLINIC | Age: 59
End: 2024-02-07
Payer: COMMERCIAL

## 2024-02-07 ENCOUNTER — TELEPHONE (OUTPATIENT)
Dept: PALLIATIVE MEDICINE | Facility: CLINIC | Age: 59
End: 2024-02-07
Payer: COMMERCIAL

## 2024-02-07 DIAGNOSIS — S39.012D STRAIN OF LUMBAR REGION, SUBSEQUENT ENCOUNTER: ICD-10-CM

## 2024-02-07 NOTE — TELEPHONE ENCOUNTER
Per Dr. Tran, ok to repeat injections. Order placed. Staff message sent to Dr. Chaudhry's scheduling team. Patient updated via [employer].

## 2024-02-07 NOTE — TELEPHONE ENCOUNTER
Pt scheduled for  KIMBERLY  Date: 2/19/24    Dr. Chaudhry    Instructed pt to have  H&P/       Pt scheduled for  TESI  Date: 3/15/24    Dr. Chaudhry    Instructed pt to have  H&P/

## 2024-02-07 NOTE — TELEPHONE ENCOUNTER
Received "[employer]t message from patient with updates about injection relief.    Repeat injection request.     Type of injection: Bilateral Lumbar 5-Sacral 1 transforaminal epidural injections AND Left Cervical 5-Cervical 6, Right Cervical 5-Cervical 6, Left Cervical 6-Cervical 7 and Right Cervical 6-Cervical 7 Intra-articular zygopophyseal joint injection      Most recent injection date:   9/14/23: Left Cervical 5-Cervical 6, Right Cervical 5-Cervical 6, Left Cervical 6-Cervical 7 and Right Cervical 6-Cervical 7 Intra-articular zygopophyseal joint injection      9/28/23 Bilateral Lumbar 5-Sacral 1 transforaminal epidural injections       Most recent MRI: Lumbar and cervical: 1/31/22       How long did injection provide symptomatic relief: approx 4.5 months of relief    Percentage of relief: 90%     Current symptoms: Patient reports he has the same symptoms as prior to injections. He reports sharp pains, tossing and turning at night. Denies new symptoms.     Number of injections in last 12 months: 2 neck (6/8/23 and 9/14/23), 2 back 6/16/23 and 9/28/23)    Plan: Will route to provider for review and recommendations.    Patient voiced understanding and agreement with plan.     Advised patient to call back with any questions or concerns.

## 2024-02-14 RX ORDER — CETIRIZINE HYDROCHLORIDE 10 MG/1
10 TABLET ORAL DAILY
COMMUNITY

## 2024-02-16 ENCOUNTER — OFFICE VISIT (OUTPATIENT)
Dept: INTERNAL MEDICINE | Facility: CLINIC | Age: 59
End: 2024-02-16
Payer: COMMERCIAL

## 2024-02-16 VITALS
WEIGHT: 260 LBS | RESPIRATION RATE: 16 BRPM | DIASTOLIC BLOOD PRESSURE: 88 MMHG | TEMPERATURE: 97.9 F | BODY MASS INDEX: 35.21 KG/M2 | HEIGHT: 72 IN | HEART RATE: 90 BPM | SYSTOLIC BLOOD PRESSURE: 122 MMHG | OXYGEN SATURATION: 96 %

## 2024-02-16 DIAGNOSIS — G89.29 CHRONIC LEFT SHOULDER PAIN: ICD-10-CM

## 2024-02-16 DIAGNOSIS — M25.512 CHRONIC LEFT SHOULDER PAIN: ICD-10-CM

## 2024-02-16 DIAGNOSIS — G47.33 OSA (OBSTRUCTIVE SLEEP APNEA): ICD-10-CM

## 2024-02-16 DIAGNOSIS — Z01.818 PREOP GENERAL PHYSICAL EXAM: Primary | ICD-10-CM

## 2024-02-16 DIAGNOSIS — E78.2 MIXED HYPERLIPIDEMIA: ICD-10-CM

## 2024-02-16 DIAGNOSIS — M54.12 CERVICAL RADICULOPATHY: ICD-10-CM

## 2024-02-16 DIAGNOSIS — H81.13 BENIGN PAROXYSMAL POSITIONAL VERTIGO DUE TO BILATERAL VESTIBULAR DISORDER: ICD-10-CM

## 2024-02-16 DIAGNOSIS — M51.369 DDD (DEGENERATIVE DISC DISEASE), LUMBAR: ICD-10-CM

## 2024-02-16 DIAGNOSIS — Z12.5 SCREENING FOR PROSTATE CANCER: ICD-10-CM

## 2024-02-16 PROCEDURE — 99214 OFFICE O/P EST MOD 30 MIN: CPT | Performed by: INTERNAL MEDICINE

## 2024-02-16 NOTE — PROGRESS NOTES
Preoperative Evaluation  [address] [address]  Phone: [phone]  Primary Provider: Gustavo Farmer  Pre-op Performing Provider: GUSTAVO FARMER  Feb 16, 2024       Bulmaro is a 58 year old, presenting for the following:  Pre-Op Exam        2/16/2024     2:21 PM   Additional Questions   Roomed by Darcie     Surgical Information  Surgery/Procedure: Cervical and lumbar injections  Surgery Location:  OR  Surgeon: Dr. Chaudhry  Surgery Date: 2/19/24 and 3/15/24  Time of Surgery:   Where patient plans to recover: At home with family  Fax number for surgical facility: Note does not need to be faxed, will be available electronically in Epic.    Assessment & Plan     The proposed surgical procedure is considered LOW risk.    Preop general physical exam      Cervical radiculopathy      DDD (degenerative disc disease), lumbar      Chronic left shoulder pain  Patient with request intra-articular injection of the left shoulder at his currently scheduled cervical injection procedure.    Mixed hyperlipidemia    - Lipid panel reflex to direct LDL Non-fasting; Future  - Comprehensive metabolic panel (BMP + Alb, Alk Phos, ALT, AST, Total. Bili, TP); Future  - UA Macroscopic with reflex to Microscopic and Culture - Lab Collect; Future    ROBBY (obstructive sleep apnea)    - CBC with platelets; Future    Benign paroxysmal positional vertigo due to bilateral vestibular disorder      Screening for prostate cancer    - PSA, screen; Future            - No identified additional risk factors other than previously addressed    Antiplatelet or Anticoagulation Medication Instructions   - aspirin: Discontinue aspirin 7-10 days prior to procedure to reduce bleeding risk. It should be resumed postoperatively.     Additional Medication Instructions  Patient is to take all scheduled medications on the day of surgery    Recommendation  APPROVAL GIVEN to proceed with  proposed procedure pending review of diagnostic evaluation.          Subjective       HPI related to upcoming procedure: Patient has persistent cervical disc disease, degenerative lumbar disc disease, and chronic painful left shoulder.  He has had previous injections.  He is anticipating neurosurgical intervention in the future.        2/16/2024     9:40 AM   Preop Questions   1. Have you ever had a heart attack or stroke? No   2. Have you ever had surgery on your heart or blood vessels, such as a stent placement, a coronary artery bypass, or surgery on an artery in your head, neck, heart, or legs? No   3. Do you have chest pain with activity? No   4. Do you have a history of  heart failure? No   5. Do you currently have a cold, bronchitis or symptoms of other infection? No   6. Do you have a cough, shortness of breath, or wheezing? No   7. Do you or anyone in your family have previous history of blood clots? No   8. Do you or does anyone in your family have a serious bleeding problem such as prolonged bleeding following surgeries or cuts? No   9. Have you ever had problems with anemia or been told to take iron pills? No   10. Have you had any abnormal blood loss such as black, tarry or bloody stools? No   11. Have you ever had a blood transfusion? No   12. Are you willing to have a blood transfusion if it is medically needed before, during, or after your surgery? Yes   13. Have you or any of your relatives ever had problems with anesthesia? No   14. Do you have sleep apnea, excessive snoring or daytime drowsiness? No   15. Do you have any artifical heart valves or other implanted medical devices like a pacemaker, defibrillator, or continuous glucose monitor? No   16. Do you have artificial joints? No   17. Are you allergic to latex? No       Health Care Directive  Patient does not have a Health Care Directive or Living Will: Discussed advance care planning with patient; however, patient declined at this  time.    Preoperative Review of    reviewed - no record of controlled substances prescribed.          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/2022     Priority: Medium     Added automatically from request for surgery [id number]      Chondromalacia of left shoulder 08/18/2021     Priority: Medium    Partial nontraumatic tear of left rotator cuff 08/18/2021     Priority: Medium    Soft tissue mass 08/05/2021     Priority: Medium    Rotator cuff syndrome of left shoulder 08/05/2021     Priority: Medium    Chronic left shoulder pain 08/05/2021     Priority: Medium    NSAID long-term use 11/18/2019     Priority: Medium    Severe needle phobia 01/21/2019     Priority: Medium     Class: Chronic     likely to preclude him from having minimally invasive interventional pain procedures with minimal sedation. Would need deeper MAC sedation      Mixed hyperlipidemia 11/13/2018     Priority: Medium    ROBBY (obstructive sleep apnea) 11/13/2018     Priority: Medium    Herniation of intervertebral disc of cervical spine without radiculopathy 03/21/2017     Priority: Medium    Cervicalgia 03/21/2017     Priority: Medium    Chronic pain syndrome 04/05/2016     Priority: Medium     DDD, cervical. T#3, #60/mo.       DDD (degenerative disc disease), lumbar 12/20/2013     Priority: Medium    DDD (degenerative disc disease), cervical 12/20/2013     Priority: Medium    Pruritus ani 01/20/2010     Priority: Medium    Back pains      Priority: Medium     Problem list name updated by automated process. Provider to review and confirm      Esophageal reflux 04/13/2005     Priority: Medium      Past Medical History:   Diagnosis Date    Back pains     Depressive disorder, not elsewhere classified 01/28/2007    declines Rx    Disc degeneration 12/01/2008    see MRI -throaco-lumbar mild discogenic degenerative changes    Elevated LFT's 11/01/2009     alt and ast    Other and unspecified hyperlipidemia 01/01/2007    Sleep disorder 09/01/2009    CPAP    Tobacco use disorder      Past Surgical History:   Procedure Laterality Date    COLONOSCOPY  07/07/08    adenomatous polyp repeat 5 years    COLONOSCOPY N/A 12/29/2017    Procedure: COMBINED COLONOSCOPY, SINGLE OR MULTIPLE BIOPSY/POLYPECTOMY BY BIOPSY;  Colonoscopy, Polypectomy by Biopsy;  Surgeon: Matt Reyes MD;  Location:  GI    COLONOSCOPY N/A 5/22/2023    Procedure: Colonoscopy;  Surgeon: Constantino Bowie DO;  Location:  GI    ENT SURGERY      for deviated septum    EXCISE LESION AXILLA  12/7/2018    Procedure: Excision Skin Tags Right Axilla;  Surgeon: Ayaan Chaney MD;  [facility]    EXCISE LESION BACK N/A 12/7/2018    Procedure: Excision Back Skin Lesion X Two;  Surgeon: Ayaan Chaney MD;  [facility]    EXCISE MASS BACK N/A 4/15/2022    Procedure: Excisions skin lesions of back;  Surgeon: Ayaan Chaney MD;  [facility]    INJECT BLOCK MEDIAL BRANCH CERVICAL/THORACIC/LUMBAR Bilateral 5/26/2022    Procedure: Cervical 4, 5, 6 Medial Branch Block;  Surgeon: Christian Chaudhry MD;  [facility]    INJECT EPIDURAL CERVICAL N/A 5/14/2015    Procedure: INJECT EPIDURAL CERVICAL;  Surgeon: Christian Chaudhry MD;  [facility]    INJECT EPIDURAL CERVICAL N/A 12/28/2018    Procedure: INJECT EPIDURAL CERVICAL 6-7;  Surgeon: Christian Chaudhry MD;  [facility]    INJECT EPIDURAL CERVICAL Bilateral 6/8/2023    Procedure: Left Cervical 5-Cervical 6, Right Cervical 5-Cervical 6, Left Cervical 6-Cervical 7 and Right Cervical 6-Cervical 7 Intra-articular zygopophyseal joint injection utilizing fluoroscopic guidance with contrast dye.;  Surgeon: Christian Chaudhry MD;  [facility]    INJECT EPIDURAL LUMBAR N/A 12/20/2019    Procedure: Lumbar 5- Sacral 1 Epidural  Injection bilatetral;  Surgeon: Christian Chaudhry MD;  [facility]    INJECT EPIDURAL LUMBAR Bilateral 9/8/2022     Procedure: Bilateral Lumbar 5-Sacral 1 transforaminal epidural injections utilizing fluoroscopic guidance with contrast dye  and;  Surgeon: Christian Chaudhry MD;  [facility]    INJECT EPIDURAL TRANSFORAMINAL Bilateral 5/20/2022    Procedure: Bilateral Lumbar 5- Sacral 1 transforaminal Epidural Steroid Injection;  Surgeon: Christian Chaudhry MD;  [facility]    INJECT EPIDURAL TRANSFORAMINAL Bilateral 7/14/2022    Procedure: Left Cervical 5-Cervical 6, Right Cervical 5-Cervical 6, Left Cervical 6-Cervical 7 and Right Cervical 6-Cervical 7 Intra-articular zygopophyseal joint injection utilizing fluoroscopic guidance with contrast dye;  Surgeon: Christian Chaudhry MD;  [facility]    INJECT EPIDURAL TRANSFORAMINAL Bilateral 6/16/2023    Procedure: Bilateral Lumbar 5-Sacral 1 transforaminal epidural injections utilizing fluoroscopic guidance with contrast dye;  Surgeon: Christian Chaudhry MD;  [facility]    INJECT EPIDURAL TRANSFORAMINAL Bilateral 9/28/2023    Procedure: Bilateral Lumbar 5-Sacral 1 transforaminal epidural injections utilizing fluoroscopic guidance with contrast dye;  Surgeon: Christian Chaudhry MD;  [facility]    INJECT FACET JOINT Bilateral 1/25/2019    Procedure: Cervical Facet Injections Cervical 5-6 and 6-7 Bilateral;  Surgeon: Christian Chaudhry MD;  [facility]    INJECT FACET JOINT Bilateral 11/29/2019    Procedure: Cervical 5-6 and 6-7 Bilateral facet joint injection;  Surgeon: Christian Chaudhry MD;  [facility]    INJECT FACET JOINT Bilateral 2/11/2021    Procedure: Cervical 5-6 and Cervical 6-7 Bilateral facet injections;  Surgeon: Christian Chaudhry MD;  [facility]    INJECT FACET JOINT Bilateral 10/19/2021    Procedure: Left C5-C6, Right C5-C6, Left C6-C7 and Right C6-C7 Intra-articular zygopophyseal joint injection utilizing fluoroscopic guidance with contrast dye;  Surgeon: Christian Chaudhry MD;  [facility]    INJECT FACET JOINT Bilateral 9/14/2023    Procedure: Left  Cervical 5-Cervical 6, Right Cervical 5-Cervical 6, Left Cervical 6-Cervical 7 and Right Cervical 6-Cervical 7 Intra-articular zygopophyseal joint injection utilizing fluoroscopic guidance with contrast dye. Left Intra-articular shoulder Injection;  Surgeon: Christian Chaudhry MD;  [facility]    INJECT STEROID (LOCATION) Left 10/19/2021    Procedure: Glenohumeral joint injection left shoulder;  Surgeon: Christian Chaudhry MD;  [facility]    INJECT STEROID (LOCATION) Left 5/20/2022    Procedure: Left shoulder glenohumeral steroid inj with local;  Surgeon: Christian Chaudhry MD;  [facility]    INJECT STEROID TROCHANTERIC BURSA Left 9/8/2022    Procedure: Left intra-articular shoulder injections;  Surgeon: Christian Chaudhry MD;  [facility]    ORTHOPEDIC SURGERY      Right hand    TONSILLECTOMY      ZZC NONSPECIFIC PROCEDURE      rt hand surgery - laceration/glass/tendons    ZZ ECHO HEART XTHORACIC, STRESS/REST  6/2009    neg     Current Outpatient Medications   Medication Sig Dispense Refill    acetaminophen (TYLENOL) 500 MG tablet Take 1,000 mg by mouth every morning      aspirin (ASA) 81 MG chewable tablet Take 1 tablet (81 mg) by mouth daily      cetirizine (ZYRTEC) 10 MG tablet Take 10 mg by mouth daily      ibuprofen (ADVIL/MOTRIN) 200 MG tablet Take 400 mg by mouth every morning      meclizine 25 MG CHEW Take 25 mg by mouth every 6 hours as needed for dizziness      omeprazole (PRILOSEC) 40 MG DR capsule TAKE ONE CAPSULE (40MG) BY MOUTH DAILY 90 capsule 2    tiZANidine (ZANAFLEX) 4 MG tablet Take 1 tablet (4 mg) by mouth at bedtime 14 tablet 0    predniSONE (DELTASONE) 20 MG tablet Take 1 tablet (20 mg) by mouth daily (Patient not taking: Reported on 2/16/2024) 10 tablet 0    rosuvastatin (CRESTOR) 5 MG tablet Take 1 tablet (5 mg) by mouth daily (Patient not taking: Reported on 12/7/2023) 90 tablet 4       Allergies   Allergen Reactions    Wasps [Hornets] Swelling    Percocet [Oxycodone-Acetaminophen]  Itching        Social History     Tobacco Use    Smoking status: Former     Packs/day: 1.5     Types: Cigarettes     Quit date: 2007     Years since quittin.0    Smokeless tobacco: Never   Substance Use Topics    Alcohol use: No     Comment: rare     Family History   Problem Relation Age of Onset    Breast Cancer Mother     Arthritis Mother         joint ?    Depression Father     Cancer Maternal Grandmother     C.A.D. Maternal Grandfather     Cardiovascular Maternal Grandfather     Gynecology Paternal Grandmother          giving birth    Prostate Cancer Paternal Grandfather     Genetic Disorder Brother         joint pain?    Depression Brother     Cardiovascular Son         congenital heart defect -      Prostate Cancer Other          age 70's    Diabetes Other      History   Drug Use No         Review of Systems    Review of Systems  CONSTITUTIONAL: NEGATIVE for fever, chills, change in weight  INTEGUMENTARY/SKIN: NEGATIVE for worrisome rashes, moles or lesions  EYES: NEGATIVE for vision changes or irritation  ENT/MOUTH: NEGATIVE for ear, mouth and throat problems  RESP: NEGATIVE for significant cough or SOB  BREAST: NEGATIVE for masses, tenderness or discharge  CV: NEGATIVE for chest pain, palpitations or peripheral edema  GI: NEGATIVE for nausea, abdominal pain, heartburn, or change in bowel habits  : NEGATIVE for frequency, dysuria, or hematuria  MUSCULOSKELETAL: Positive for crepitance with cervical motion.  Chronic discomfort with any left shoulder movement, and chronic low back pain with radiation and radicular pain into the bilateral buttocks and down the legs  NEURO: Positive for persistent vertigo secondary to labyrinthine dysfunction.  Also positive for lower extremity radiculopathy and chronic shoulder pain secondary to cervical radiculopathy  ENDOCRINE: NEGATIVE for temperature intolerance, skin/hair changes  HEME: NEGATIVE for bleeding problems  PSYCHIATRIC: NEGATIVE for  changes in mood or affect  Objective    /88   Pulse 90   Temp 97.9  F (36.6  C) (Temporal)   Resp 16   Ht 1.829 m (6')   Wt 117.9 kg (260 lb)   SpO2 96%   BMI 35.26 kg/m     Estimated body mass index is 35.26 kg/m  as calculated from the following:    Height as of this encounter: 1.829 m (6').    Weight as of this encounter: 117.9 kg (260 lb).  Physical Exam  GENERAL: alert and no distress  EYES: Eyes grossly normal to inspection, PERRL and conjunctivae and sclerae normal  HENT: ear canals and TM's normal, nose and mouth without ulcers or lesions  NECK: no adenopathy, no asymmetry, masses, or scars  RESP: lungs clear to auscultation - no rales, rhonchi or wheezes  CV: regular rate and rhythm, normal S1 S2, no S3 or S4, no murmur, click or rub, no peripheral edema  ABDOMEN: soft, nontender, no hepatosplenomegaly, no masses and bowel sounds normal  MS: no gross musculoskeletal defects noted, no edema  SKIN: no suspicious lesions or rashes  NEURO: Normal strength and tone, mentation intact and speech normal  PSYCH: mentation appears normal, affect normal/bright    Recent Labs   Lab Test 11/11/23  1331 01/07/23  1016 05/08/22  1001   HGB 15.8  --   --      --   --     142  --    POTASSIUM 3.9 4.0  --    CR 1.08 0.97  --    A1C  --  6.2* 5.8*        Diagnostics  Labs pending at this time.  Results will be reviewed when available.   No EKG required for low risk surgery (cataract, skin procedure, breast biopsy, etc).    Revised Cardiac Risk Index (RCRI)  The patient has the following serious cardiovascular risks for perioperative complications:   - No serious cardiac risks = 0 points     RCRI Interpretation: 0 points: Class I (very low risk - 0.4% complication rate)         Signed Electronically by: Gustavo Farmer DO  Copy of this evaluation report is provided to requesting physician.

## 2024-02-19 ENCOUNTER — ANESTHESIA EVENT (OUTPATIENT)
Dept: SURGERY | Facility: CLINIC | Age: 59
End: 2024-02-19
Payer: COMMERCIAL

## 2024-02-19 ENCOUNTER — ANESTHESIA (OUTPATIENT)
Dept: SURGERY | Facility: CLINIC | Age: 59
End: 2024-02-19
Payer: COMMERCIAL

## 2024-02-19 ENCOUNTER — APPOINTMENT (OUTPATIENT)
Dept: GENERAL RADIOLOGY | Facility: CLINIC | Age: 59
End: 2024-02-19
Attending: ANESTHESIOLOGY
Payer: COMMERCIAL

## 2024-02-19 ENCOUNTER — HOSPITAL ENCOUNTER (OUTPATIENT)
Facility: CLINIC | Age: 59
Discharge: HOME | End: 2024-02-19
Attending: ANESTHESIOLOGY | Admitting: ANESTHESIOLOGY
Payer: COMMERCIAL

## 2024-02-19 VITALS
OXYGEN SATURATION: 93 % | RESPIRATION RATE: 16 BRPM | SYSTOLIC BLOOD PRESSURE: 114 MMHG | TEMPERATURE: 98 F | DIASTOLIC BLOOD PRESSURE: 91 MMHG | HEART RATE: 90 BPM

## 2024-02-19 DIAGNOSIS — M54.2 CERVICALGIA: ICD-10-CM

## 2024-02-19 PROCEDURE — 64491 INJ PARAVERT F JNT C/T 2 LEV: CPT | Mod: 50

## 2024-02-19 PROCEDURE — 20610 DRAIN/INJ JOINT/BURSA W/O US: CPT | Performed by: ANESTHESIOLOGY

## 2024-02-19 PROCEDURE — 250N000009 HC RX 250: Performed by: NURSE ANESTHETIST, CERTIFIED REGISTERED

## 2024-02-19 PROCEDURE — 250N000011 HC RX IP 250 OP 636: Performed by: NURSE ANESTHETIST, CERTIFIED REGISTERED

## 2024-02-19 PROCEDURE — 370N000017 HC ANESTHESIA TECHNICAL FEE, PER MIN: Performed by: ANESTHESIOLOGY

## 2024-02-19 PROCEDURE — 250N000009 HC RX 250: Performed by: ANESTHESIOLOGY

## 2024-02-19 PROCEDURE — 999N000179 XR SURGERY CARM FLUORO LESS THAN 5 MIN W STILLS: Mod: TC

## 2024-02-19 PROCEDURE — 250N000011 HC RX IP 250 OP 636: Mod: JZ | Performed by: ANESTHESIOLOGY

## 2024-02-19 PROCEDURE — 258N000003 HC RX IP 258 OP 636: Performed by: NURSE ANESTHETIST, CERTIFIED REGISTERED

## 2024-02-19 PROCEDURE — 250N000011 HC RX IP 250 OP 636: Performed by: ANESTHESIOLOGY

## 2024-02-19 PROCEDURE — 64490 INJ PARAVERT F JNT C/T 1 LEV: CPT | Mod: 50 | Performed by: ANESTHESIOLOGY

## 2024-02-19 RX ORDER — IOPAMIDOL 612 MG/ML
INJECTION, SOLUTION INTRATHECAL PRN
Status: DISCONTINUED | OUTPATIENT
Start: 2024-02-19 | End: 2024-02-19 | Stop reason: HOSPADM

## 2024-02-19 RX ORDER — PROPOFOL 10 MG/ML
INJECTION, EMULSION INTRAVENOUS PRN
Status: DISCONTINUED | OUTPATIENT
Start: 2024-02-19 | End: 2024-02-19

## 2024-02-19 RX ORDER — LIDOCAINE HYDROCHLORIDE 10 MG/ML
INJECTION, SOLUTION INFILTRATION; PERINEURAL PRN
Status: DISCONTINUED | OUTPATIENT
Start: 2024-02-19 | End: 2024-02-19

## 2024-02-19 RX ORDER — LIDOCAINE HYDROCHLORIDE 40 MG/ML
INJECTION, SOLUTION RETROBULBAR PRN
Status: DISCONTINUED | OUTPATIENT
Start: 2024-02-19 | End: 2024-02-19 | Stop reason: HOSPADM

## 2024-02-19 RX ORDER — LIDOCAINE 40 MG/G
CREAM TOPICAL
Status: DISCONTINUED | OUTPATIENT
Start: 2024-02-19 | End: 2024-02-19 | Stop reason: HOSPADM

## 2024-02-19 RX ORDER — TRIAMCINOLONE ACETONIDE 40 MG/ML
INJECTION, SUSPENSION INTRA-ARTICULAR; INTRAMUSCULAR PRN
Status: DISCONTINUED | OUTPATIENT
Start: 2024-02-19 | End: 2024-02-19 | Stop reason: HOSPADM

## 2024-02-19 RX ADMIN — PROPOFOL 30 MG: 10 INJECTION, EMULSION INTRAVENOUS at 11:23

## 2024-02-19 RX ADMIN — PROPOFOL 50 MG: 10 INJECTION, EMULSION INTRAVENOUS at 11:14

## 2024-02-19 RX ADMIN — PROPOFOL 50 MG: 10 INJECTION, EMULSION INTRAVENOUS at 11:11

## 2024-02-19 RX ADMIN — PROPOFOL 50 MG: 10 INJECTION, EMULSION INTRAVENOUS at 11:17

## 2024-02-19 RX ADMIN — DEXMEDETOMIDINE HYDROCHLORIDE 20 MCG: 100 INJECTION, SOLUTION INTRAVENOUS at 11:01

## 2024-02-19 RX ADMIN — PROPOFOL 70 MG: 10 INJECTION, EMULSION INTRAVENOUS at 11:05

## 2024-02-19 RX ADMIN — LIDOCAINE HYDROCHLORIDE 1 ML: 10 INJECTION, SOLUTION EPIDURAL; INFILTRATION; INTRACAUDAL; PERINEURAL at 10:46

## 2024-02-19 RX ADMIN — PROPOFOL 50 MG: 10 INJECTION, EMULSION INTRAVENOUS at 11:20

## 2024-02-19 RX ADMIN — PROPOFOL 50 MG: 10 INJECTION, EMULSION INTRAVENOUS at 11:08

## 2024-02-19 RX ADMIN — LIDOCAINE HYDROCHLORIDE 50 MG: 10 INJECTION, SOLUTION INFILTRATION; PERINEURAL at 11:05

## 2024-02-19 ASSESSMENT — LIFESTYLE VARIABLES: TOBACCO_USE: 1

## 2024-02-19 NOTE — ANESTHESIA CARE TRANSFER NOTE
Patient: Bulmaro Herrera    Procedure: Procedure(s):  Bilateral cervical 5-6 and 6-7 inter-articular facet injections with a left intra-articular shoulder joint injection       Diagnosis: Cervicalgia [M54.2]  Diagnosis Additional Information: No value filed.    Anesthesia Type:   MAC     Note:    Oropharynx: spontaneously breathing  Level of Consciousness: awake  Oxygen Supplementation: room air    Independent Airway: airway patency satisfactory and stable  Dentition: dentition unchanged  Vital Signs Stable: post-procedure vital signs reviewed and stable  Report to RN Given: handoff report given  Patient transferred to: Phase II    Handoff Report: Identifed the Patient, Identified the Reponsible Provider, Reviewed the pertinent medical history, Discussed the surgical course, Reviewed Intra-OP anesthesia mangement and issues during anesthesia, Set expectations for post-procedure period and Allowed opportunity for questions and acknowledgement of understanding      Vitals:  Vitals Value Taken Time   /80 02/19/24 1131   Temp     Pulse 96 02/19/24 1131   Resp 16 02/19/24 1131   SpO2 91 % 02/19/24 1136   Vitals shown include unfiled device data.    Electronically Signed By: SHIV Kirkland CRNA  February 19, 2024  11:37 AM

## 2024-02-19 NOTE — OP NOTE
CHIEF COMPLAINT:   1. neck pain secondary to cervical spondylosis.  2.  Left shoulder pain secondary to glenohumeral joint degeneration and a left shoulder labral tear  INTERVAL HISTORY:  I discussed the above note with the patient. I agree with above.  He did have a epidural injection in the neck and that did not help suggesting that the discs in his neck that have some degeneration are not the major pain generators.  Because of this we are going to diagnostically and therapeutically test his facet joints.  The complicating factor in his situation is that he has a severe needle phobia.  He needed very deep monitored anesthetic care in order to have the facet injection performed.  This would preclude him from having a radiofrequency ablation which requires fully awake medial branch blocks done under local anesthesia.  PHYSICAL EXAM:   Musculoskeletal: Strength of upper extremities is 5/5 bilaterally.  Pain on palpation of the   neck. Pain on   Rotation, extension and flexion of neck.   Neuro: No sensory perception differences of lower extremities.  PROCEDURE number #1:    1. Left C5-C6, Right C5-C6, Left C6-C7 and Right C6-C7  Intra-articular zygopophyseal joint injection utilizing fluoroscopic guidance with contrast dye.      PROCEDURE DETAILS: After written informed consent was obtained from the patient, the patient was escorted to the procedure room.  The patient was placed in the sitting position.  A  time out  was conducted to verify the patient identity, procedure to be performed, side, site, allergies and any special requirements.  The skin over the neck was prepped and draped in normal sterile fashion. Fluoroscopy was used to identify the zygopophyseal joint with a lateral view.   The skin was anesthetized with 0.5 mL of 1% lidocaine with bicarbonate buffer.  A 25-gauge 2 inch Quincke needle was advanced under fluoroscopic guidance in the lateral approach until entry into the zygopophyseal joint was  successful.  View was confirmed in AP view.  Then, <0.3 cc of Omnipaque contrast dye was injected producing a satisfactory arthrogram without evidence of intrathecal or intravascular spread.  Then, a 1 mL solution of 5 mg dexamethasone and 0.5 mL of 2% lidocaine was incrementally injected into each  joint.  After injection of the medication, as the needle tip was withdrawn, it was flushed with local anesthetic. The patient was monitored with blood pressure and pulse oximetry machines with the assistance of an RN throughout the procedure.  The patient was alert and responsive to questions throughout the procedure.   The patient tolerated the procedure well and was observed in the post-procedural area.  The patient was dismissed without apparent complications.       PROCEDURE number #2:     Procedure details: After his neck was treated we moved over to his left shoulder.  Left shoulder posterior approach was prepped with ChloraPrep.  1% lidocaine was used for skin.  I then advanced a 22-gauge Quincke needle into his left glenohumeral joint.  Initially contrast was injected which did not show proper spread throughout the joint and after several needle tip manipulations and contrast injection, I achieved a proper arthrogram of the left glenohumeral joint.  I then injected 8 cc of 0.25% bupivacaine with 4 mg/cc of Depo-Medrol.  The needle was then removed and a sterile bandage placed over the needle insertion site.     Blood loss: Less than 2 cc     DIAGNOSIS:  1.  Cervical spondylosis secondary to a symptomatic facet syndrome causing axial neck pain with a history of long-term pain relief from previous facet injections  2.  Severe needle phobia  3.  Labral tear left shoulder inferior with severe glenohumeral joint osteoarthritis     PLAN:  Performed repeat zygopophyseal joint injections.   He had about 9 to 10 months of pain relief from the last set of injections.  However, he has a severe needle phobia so he would not  be a candidate for medial branch blocks or a radiofrequency ablation so if he does have positive diagnostic injections with his pain form then he would need to consider a cervical fusion if the facet injections do not provide long-term pain relief for him.  2.    I completed a repeat left shoulder injection as well.      Christian Chaudhry MD  Diplomate of the American Board of Anesthesiology, Pain Medicine

## 2024-02-19 NOTE — ANESTHESIA PREPROCEDURE EVALUATION
Anesthesia Pre-Procedure Evaluation    Patient: Bulmaro Herrera   MRN: [id number] : 1965        Procedure : Procedure(s):  Left Cervical 5-Cervical 6, Right Cervical 5-Cervical 6, Left Cervical 6-Cervical 7 and Right Cervical 6-Cervical 7 Intra-articular zygopophyseal joint injection        Past Medical History:   Diagnosis Date    Back pains     Depressive disorder, not elsewhere classified 2007    declines Rx    Disc degeneration 2008    see MRI -throaco-lumbar mild discogenic degenerative changes    Elevated LFT's 2009    alt and ast    Other and unspecified hyperlipidemia 2007    Sleep disorder 2009    CPAP    Tobacco use disorder       Past Surgical History:   Procedure Laterality Date    COLONOSCOPY  08    adenomatous polyp repeat 5 years    COLONOSCOPY N/A 2017    Procedure: COMBINED COLONOSCOPY, SINGLE OR MULTIPLE BIOPSY/POLYPECTOMY BY BIOPSY;  Colonoscopy, Polypectomy by Biopsy;  Surgeon: Matt Reyes MD;  Location:  GI    COLONOSCOPY N/A 2023    Procedure: Colonoscopy;  Surgeon: Constantino Bowie DO;  Location:  GI    ENT SURGERY      for deviated septum    EXCISE LESION AXILLA  2018    Procedure: Excision Skin Tags Right Axilla;  Surgeon: Ayaan Chaney MD;  [facility]    EXCISE LESION BACK N/A 2018    Procedure: Excision Back Skin Lesion X Two;  Surgeon: Ayaan Chaney MD;  [facility]    EXCISE MASS BACK N/A 4/15/2022    Procedure: Excisions skin lesions of back;  Surgeon: Ayaan Chaney MD;  [facility]    INJECT BLOCK MEDIAL BRANCH CERVICAL/THORACIC/LUMBAR Bilateral 2022    Procedure: Cervical 4, 5, 6 Medial Branch Block;  Surgeon: Christian Chaudhry MD;  [facility]    INJECT EPIDURAL CERVICAL N/A 2015    Procedure: INJECT EPIDURAL CERVICAL;  Surgeon: Christian Chaudhry MD;  [facility]    INJECT EPIDURAL CERVICAL N/A 2018    Procedure: INJECT EPIDURAL CERVICAL 6-7;  Surgeon: Isidoro  Christian CHAUHAN MD;  [facility]    INJECT EPIDURAL CERVICAL Bilateral 6/8/2023    Procedure: Left Cervical 5-Cervical 6, Right Cervical 5-Cervical 6, Left Cervical 6-Cervical 7 and Right Cervical 6-Cervical 7 Intra-articular zygopophyseal joint injection utilizing fluoroscopic guidance with contrast dye.;  Surgeon: Christian Chaudhry MD;  [facility]    INJECT EPIDURAL LUMBAR N/A 12/20/2019    Procedure: Lumbar 5- Sacral 1 Epidural  Injection bilatetral;  Surgeon: Christian Chaudhry MD;  [facility]    INJECT EPIDURAL LUMBAR Bilateral 9/8/2022    Procedure: Bilateral Lumbar 5-Sacral 1 transforaminal epidural injections utilizing fluoroscopic guidance with contrast dye  and;  Surgeon: Christian Chaudhry MD;  [facility]    INJECT EPIDURAL TRANSFORAMINAL Bilateral 5/20/2022    Procedure: Bilateral Lumbar 5- Sacral 1 transforaminal Epidural Steroid Injection;  Surgeon: Christian Chaudhry MD;  [facility]    INJECT EPIDURAL TRANSFORAMINAL Bilateral 7/14/2022    Procedure: Left Cervical 5-Cervical 6, Right Cervical 5-Cervical 6, Left Cervical 6-Cervical 7 and Right Cervical 6-Cervical 7 Intra-articular zygopophyseal joint injection utilizing fluoroscopic guidance with contrast dye;  Surgeon: Christian Chaudhry MD;  [facility]    INJECT EPIDURAL TRANSFORAMINAL Bilateral 6/16/2023    Procedure: Bilateral Lumbar 5-Sacral 1 transforaminal epidural injections utilizing fluoroscopic guidance with contrast dye;  Surgeon: Christian Chaudhry MD;  [facility]    INJECT EPIDURAL TRANSFORAMINAL Bilateral 9/28/2023    Procedure: Bilateral Lumbar 5-Sacral 1 transforaminal epidural injections utilizing fluoroscopic guidance with contrast dye;  Surgeon: Christian Chaudhry MD;  [facility]    INJECT FACET JOINT Bilateral 1/25/2019    Procedure: Cervical Facet Injections Cervical 5-6 and 6-7 Bilateral;  Surgeon: Christian Chaudhry MD;  [facility]    INJECT FACET JOINT Bilateral 11/29/2019    Procedure: Cervical 5-6 and 6-7  Bilateral facet joint injection;  Surgeon: Christian Chaudhry MD;  [facility]    INJECT FACET JOINT Bilateral 2021    Procedure: Cervical 5-6 and Cervical 6-7 Bilateral facet injections;  Surgeon: Christian Chaudhry MD;  [facility]    INJECT FACET JOINT Bilateral 10/19/2021    Procedure: Left C5-C6, Right C5-C6, Left C6-C7 and Right C6-C7 Intra-articular zygopophyseal joint injection utilizing fluoroscopic guidance with contrast dye;  Surgeon: Christian Chaudhry MD;  [facility]    INJECT FACET JOINT Bilateral 2023    Procedure: Left Cervical 5-Cervical 6, Right Cervical 5-Cervical 6, Left Cervical 6-Cervical 7 and Right Cervical 6-Cervical 7 Intra-articular zygopophyseal joint injection utilizing fluoroscopic guidance with contrast dye. Left Intra-articular shoulder Injection;  Surgeon: Christian Chaudhry MD;  [facility]    INJECT STEROID (LOCATION) Left 10/19/2021    Procedure: Glenohumeral joint injection left shoulder;  Surgeon: Christian Chaudhry MD;  [facility]    INJECT STEROID (LOCATION) Left 2022    Procedure: Left shoulder glenohumeral steroid inj with local;  Surgeon: Christian Chaudhry MD;  [facility]    INJECT STEROID TROCHANTERIC BURSA Left 2022    Procedure: Left intra-articular shoulder injections;  Surgeon: Christian Chaudhry MD;  [facility]    ORTHOPEDIC SURGERY      Right hand    TONSILLECTOMY      ZZC NONSPECIFIC PROCEDURE      rt hand surgery - laceration/glass/tendons    ZZHC ECHO HEART XTHORACIC, STRESS/REST  2009    neg      Allergies   Allergen Reactions    Wasps [Hornets] Swelling    Percocet [Oxycodone-Acetaminophen] Itching      Social History     Tobacco Use    Smoking status: Former     Packs/day: 1.5     Types: Cigarettes     Quit date: 2007     Years since quittin.0    Smokeless tobacco: Never   Substance Use Topics    Alcohol use: No     Comment: rare      Wt Readings from Last 1 Encounters:   24 117.9 kg (260 lb)        Anesthesia  Evaluation   Pt has had prior anesthetic. Type: MAC and General.    No history of anesthetic complications       ROS/MED HX  ENT/Pulmonary:     (+) sleep apnea, moderate, uses CPAP,              tobacco use, Past use,                       Neurologic:  - neg neurologic ROS     Cardiovascular:  - neg cardiovascular ROS   (+) Dyslipidemia - -   -  - -                                 Previous cardiac testing   Echo: Date: 4/5/23 Results:  Interpretation Summary     The visual ejection fraction is 60-65%.  The right ventricular systolic function is normal.  No hemodynamically significant valvular disease.  There is no pericardial effusion  Stress Test:  Date: Results:    ECG Reviewed:  Date: Results:    Cath:  Date: Results:      METS/Exercise Tolerance:     Hematologic:  - neg hematologic  ROS     Musculoskeletal: Comment: DJD  (+)  arthritis,             GI/Hepatic:     (+) GERD, Asymptomatic on medication,                  Renal/Genitourinary:  - neg Renal ROS     Endo:     (+)               Obesity,       Psychiatric/Substance Use:     (+) psychiatric history depression and anxiety       Infectious Disease:  - neg infectious disease ROS     Malignancy:  - neg malignancy ROS     Other:  - neg other ROS    (+)  , H/O Chronic Pain,         Physical Exam    Airway  airway exam normal      Mallampati: II   TM distance: > 3 FB   Neck ROM: full   Mouth opening: > 3 cm    Respiratory Devices and Support         Dental       (+) Minor Abnormalities - some fillings, tiny chips      Cardiovascular   cardiovascular exam normal       Rhythm and rate: regular and normal     Pulmonary   pulmonary exam normal        breath sounds clear to auscultation       Other findings: Patient is appropriate, but verbalizes his anxiety and all extremities are twitchy.  OUTSIDE LABS:  CBC:   Lab Results   Component Value Date    WBC 6.7 11/11/2023    WBC 5.5 02/08/2021    HGB 15.8 11/11/2023    HGB 16.3 02/08/2021    HCT 46.4 11/11/2023    HCT  "47.2 02/08/2021     11/11/2023     02/08/2021     BMP:   Lab Results   Component Value Date     11/11/2023     01/07/2023    POTASSIUM 3.9 11/11/2023    POTASSIUM 4.0 01/07/2023    CHLORIDE 102 11/11/2023    CHLORIDE 108 01/07/2023    CO2 27 11/11/2023    CO2 26 01/07/2023    BUN 15.6 11/11/2023    BUN 21 01/07/2023    CR 1.08 11/11/2023    CR 0.97 01/07/2023    GLC 99 11/11/2023     (H) 01/07/2023     COAGS:   Lab Results   Component Value Date    PTT 31 06/06/2008    INR 1.0 02/17/2014     POC: No results found for: \"BGM\", \"HCG\", \"HCGS\"  HEPATIC:   Lab Results   Component Value Date    ALBUMIN 4.3 11/11/2023    PROTTOTAL 7.0 11/11/2023    ALT 30 11/11/2023    AST 22 11/11/2023    ALKPHOS 75 11/11/2023    BILITOTAL 0.7 11/11/2023     OTHER:   Lab Results   Component Value Date    PH 8.0 (H) 05/07/2002    A1C 6.2 (H) 01/07/2023    WILIAM 9.5 11/11/2023    PHOS 3.5 02/08/2021    MAG 2.2 02/08/2021    LIPASE 18 11/11/2023    AMYLASE 76 03/08/2014    TSH 3.49 01/07/2023    T4 0.94 11/15/2019    CRP <2.9 03/25/2015    SED 7 09/10/2012       Anesthesia Plan    ASA Status:  2    NPO Status:  NPO Appropriate    Anesthesia Type: MAC.     - Reason for MAC: straight local not clinically adequate      Maintenance: TIVA.        Consents    Anesthesia Plan(s) and associated risks, benefits, and realistic alternatives discussed. Questions answered and patient/representative(s) expressed understanding.     - Discussed: Risks, Benefits and Alternatives for BOTH SEDATION and the PROCEDURE were discussed     - Discussed with:  Patient       Use of blood products discussed: No .     Postoperative Care            Comments:                SHIV Ramírez CRNA  "

## 2024-02-19 NOTE — DISCHARGE INSTRUCTIONS

## 2024-02-19 NOTE — ANESTHESIA POSTPROCEDURE EVALUATION
Patient: Bulmaro Herrera    Procedure: Procedure(s):  Bilateral cervical 5-6 and 6-7 inter-articular facet injections with a left intra-articular shoulder joint injection       Anesthesia Type:  MAC    Note:  Disposition: Outpatient   Postop Pain Control: Uneventful            Sign Out: Well controlled pain   PONV: No   Neuro/Psych: Uneventful            Sign Out: Acceptable/Baseline neuro status   Airway/Respiratory: Uneventful            Sign Out: Acceptable/Baseline resp. status   CV/Hemodynamics: Uneventful            Sign Out: Acceptable CV status; No obvious hypovolemia; No obvious fluid overload   Other NRE: NONE   DID A NON-ROUTINE EVENT OCCUR? No           Last vitals:  Vitals Value Taken Time   /91 02/19/24 1140   Temp     Pulse 90 02/19/24 1140   Resp 16 02/19/24 1140   SpO2 95 % 02/19/24 1146   Vitals shown include unfiled device data.    Electronically Signed By: SHIV Kirkland CRNA  February 19, 2024  12:27 PM

## 2024-02-20 ENCOUNTER — TRANSFERRED RECORDS (OUTPATIENT)
Dept: HEALTH INFORMATION MANAGEMENT | Facility: CLINIC | Age: 59
End: 2024-02-20

## 2024-03-09 ENCOUNTER — LAB (OUTPATIENT)
Dept: LAB | Facility: CLINIC | Age: 59
End: 2024-03-09
Payer: COMMERCIAL

## 2024-03-09 DIAGNOSIS — E78.2 MIXED HYPERLIPIDEMIA: ICD-10-CM

## 2024-03-09 DIAGNOSIS — G47.33 OSA (OBSTRUCTIVE SLEEP APNEA): ICD-10-CM

## 2024-03-09 DIAGNOSIS — Z12.5 SCREENING FOR PROSTATE CANCER: ICD-10-CM

## 2024-03-09 LAB
ALBUMIN SERPL BCG-MCNC: 4.3 G/DL (ref 3.5–5.2)
ALBUMIN UR-MCNC: NEGATIVE MG/DL
ALP SERPL-CCNC: 77 U/L (ref 40–150)
ALT SERPL W P-5'-P-CCNC: 64 U/L (ref 0–70)
ANION GAP SERPL CALCULATED.3IONS-SCNC: 10 MMOL/L (ref 7–15)
APPEARANCE UR: CLEAR
AST SERPL W P-5'-P-CCNC: 33 U/L (ref 0–45)
BILIRUB SERPL-MCNC: 0.8 MG/DL
BILIRUB UR QL STRIP: NEGATIVE
BUN SERPL-MCNC: 15.6 MG/DL (ref 6–20)
CALCIUM SERPL-MCNC: 9.8 MG/DL (ref 8.6–10)
CHLORIDE SERPL-SCNC: 104 MMOL/L (ref 98–107)
CHOLEST SERPL-MCNC: 301 MG/DL
COLOR UR AUTO: YELLOW
CREAT SERPL-MCNC: 0.98 MG/DL (ref 0.67–1.17)
DEPRECATED HCO3 PLAS-SCNC: 25 MMOL/L (ref 22–29)
EGFRCR SERPLBLD CKD-EPI 2021: >90 ML/MIN/1.73M2
ERYTHROCYTE [DISTWIDTH] IN BLOOD BY AUTOMATED COUNT: 12.5 % (ref 10–15)
FASTING STATUS PATIENT QL REPORTED: YES
GLUCOSE SERPL-MCNC: 129 MG/DL (ref 70–99)
GLUCOSE UR STRIP-MCNC: NEGATIVE MG/DL
HCT VFR BLD AUTO: 46.6 % (ref 40–53)
HDLC SERPL-MCNC: 45 MG/DL
HGB BLD-MCNC: 16.1 G/DL (ref 13.3–17.7)
HGB UR QL STRIP: NEGATIVE
KETONES UR STRIP-MCNC: NEGATIVE MG/DL
LDLC SERPL CALC-MCNC: 218 MG/DL
LEUKOCYTE ESTERASE UR QL STRIP: NEGATIVE
MCH RBC QN AUTO: 32 PG (ref 26.5–33)
MCHC RBC AUTO-ENTMCNC: 34.5 G/DL (ref 31.5–36.5)
MCV RBC AUTO: 93 FL (ref 78–100)
NITRATE UR QL: NEGATIVE
NONHDLC SERPL-MCNC: 256 MG/DL
PH UR STRIP: 6 [PH] (ref 5–7)
PLATELET # BLD AUTO: 195 10E3/UL (ref 150–450)
POTASSIUM SERPL-SCNC: 4.2 MMOL/L (ref 3.4–5.3)
PROT SERPL-MCNC: 7.2 G/DL (ref 6.4–8.3)
PSA SERPL DL<=0.01 NG/ML-MCNC: 0.59 NG/ML (ref 0–3.5)
RBC # BLD AUTO: 5.03 10E6/UL (ref 4.4–5.9)
SODIUM SERPL-SCNC: 139 MMOL/L (ref 135–145)
SP GR UR STRIP: 1.01 (ref 1–1.03)
TRIGL SERPL-MCNC: 191 MG/DL
UROBILINOGEN UR STRIP-MCNC: NORMAL MG/DL
WBC # BLD AUTO: 5.9 10E3/UL (ref 4–11)

## 2024-03-09 PROCEDURE — 80053 COMPREHEN METABOLIC PANEL: CPT

## 2024-03-09 PROCEDURE — 81003 URINALYSIS AUTO W/O SCOPE: CPT

## 2024-03-09 PROCEDURE — G0103 PSA SCREENING: HCPCS

## 2024-03-09 PROCEDURE — 85027 COMPLETE CBC AUTOMATED: CPT

## 2024-03-09 PROCEDURE — 80061 LIPID PANEL: CPT

## 2024-03-09 PROCEDURE — 36415 COLL VENOUS BLD VENIPUNCTURE: CPT

## 2024-03-13 DIAGNOSIS — I25.10 CORONARY ARTERY DISEASE INVOLVING NATIVE CORONARY ARTERY OF NATIVE HEART WITHOUT ANGINA PECTORIS: ICD-10-CM

## 2024-03-13 DIAGNOSIS — E78.00 HYPERCHOLESTEROLEMIA: Primary | ICD-10-CM

## 2024-03-13 RX ORDER — ROSUVASTATIN CALCIUM 5 MG/1
5 TABLET, COATED ORAL DAILY
Qty: 90 TABLET | Refills: 3 | Status: SHIPPED | OUTPATIENT
Start: 2024-03-13 | End: 2024-07-16

## 2024-03-13 NOTE — TELEPHONE ENCOUNTER
"Patient needs new prescription for rosuvastatin 5mg daily. It was accidentally removed at a family practice visit back in Feb. 2024.     When patient last met with Dr. Stephens on 4/4/23 it was noted that patient \"He remains on rosuvastatin 5 mg daily\"     Will resend prescription to patient's pharmacy.      Lipids, CMP last drawn on 3/9/24    Component      Latest Ref Rng 3/9/2024  10:22 AM   Sodium      135 - 145 mmol/L 139    Potassium      3.4 - 5.3 mmol/L 4.2    Carbon Dioxide (CO2)      22 - 29 mmol/L 25    Anion Gap      7 - 15 mmol/L 10    Urea Nitrogen      6.0 - 20.0 mg/dL 15.6    Creatinine      0.67 - 1.17 mg/dL 0.98    GFR Estimate      >60 mL/min/1.73m2 >90    Calcium      8.6 - 10.0 mg/dL 9.8    Chloride      98 - 107 mmol/L 104    Glucose      70 - 99 mg/dL 129 (H)    Alkaline Phosphatase      40 - 150 U/L 77    AST      0 - 45 U/L 33    ALT      0 - 70 U/L 64    Protein Total      6.4 - 8.3 g/dL 7.2    Albumin      3.5 - 5.2 g/dL 4.3    Bilirubin Total      <=1.2 mg/dL 0.8    Cholesterol      <200 mg/dL 301 (H)    Triglycerides      <150 mg/dL 191 (H)    HDL Cholesterol      >=40 mg/dL 45    LDL Cholesterol Calculated      <=100 mg/dL 218 (H)    Non HDL Cholesterol      <130 mg/dL 256 (H)    Patient Fasting? Yes          "

## 2024-03-14 ENCOUNTER — ANESTHESIA EVENT (OUTPATIENT)
Dept: SURGERY | Facility: CLINIC | Age: 59
End: 2024-03-14
Payer: COMMERCIAL

## 2024-03-14 ASSESSMENT — LIFESTYLE VARIABLES: TOBACCO_USE: 1

## 2024-03-15 ENCOUNTER — HOSPITAL ENCOUNTER (OUTPATIENT)
Dept: GENERAL RADIOLOGY | Facility: CLINIC | Age: 59
Discharge: HOME | End: 2024-03-15
Attending: ANESTHESIOLOGY | Admitting: ANESTHESIOLOGY
Payer: COMMERCIAL

## 2024-03-15 ENCOUNTER — ANESTHESIA (OUTPATIENT)
Dept: SURGERY | Facility: CLINIC | Age: 59
End: 2024-03-15
Payer: COMMERCIAL

## 2024-03-15 ENCOUNTER — HOSPITAL ENCOUNTER (OUTPATIENT)
Facility: CLINIC | Age: 59
Discharge: HOME | End: 2024-03-15
Attending: ANESTHESIOLOGY | Admitting: ANESTHESIOLOGY
Payer: COMMERCIAL

## 2024-03-15 VITALS
RESPIRATION RATE: 18 BRPM | DIASTOLIC BLOOD PRESSURE: 86 MMHG | HEART RATE: 78 BPM | TEMPERATURE: 97.7 F | OXYGEN SATURATION: 94 % | SYSTOLIC BLOOD PRESSURE: 120 MMHG

## 2024-03-15 DIAGNOSIS — M54.16 LUMBAR RADICULOPATHY: ICD-10-CM

## 2024-03-15 PROCEDURE — 250N000011 HC RX IP 250 OP 636: Mod: JZ | Performed by: ANESTHESIOLOGY

## 2024-03-15 PROCEDURE — 250N000011 HC RX IP 250 OP 636: Performed by: ANESTHESIOLOGY

## 2024-03-15 PROCEDURE — 250N000009 HC RX 250: Performed by: NURSE ANESTHETIST, CERTIFIED REGISTERED

## 2024-03-15 PROCEDURE — 370N000017 HC ANESTHESIA TECHNICAL FEE, PER MIN: Performed by: ANESTHESIOLOGY

## 2024-03-15 PROCEDURE — 250N000011 HC RX IP 250 OP 636: Performed by: NURSE ANESTHETIST, CERTIFIED REGISTERED

## 2024-03-15 PROCEDURE — 999N000179 XR SURGERY CARM FLUORO LESS THAN 5 MIN W STILLS: Mod: TC

## 2024-03-15 PROCEDURE — 64483 NJX AA&/STRD TFRM EPI L/S 1: CPT | Performed by: ANESTHESIOLOGY

## 2024-03-15 RX ORDER — IOPAMIDOL 612 MG/ML
INJECTION, SOLUTION INTRATHECAL PRN
Status: DISCONTINUED | OUTPATIENT
Start: 2024-03-15 | End: 2024-03-15 | Stop reason: HOSPADM

## 2024-03-15 RX ORDER — ONDANSETRON 4 MG/1
4 TABLET, ORALLY DISINTEGRATING ORAL EVERY 30 MIN PRN
Status: CANCELLED | OUTPATIENT
Start: 2024-03-15

## 2024-03-15 RX ORDER — NALOXONE HYDROCHLORIDE 0.4 MG/ML
0.1 INJECTION, SOLUTION INTRAMUSCULAR; INTRAVENOUS; SUBCUTANEOUS
Status: CANCELLED | OUTPATIENT
Start: 2024-03-15

## 2024-03-15 RX ORDER — TRIAMCINOLONE ACETONIDE 40 MG/ML
INJECTION, SUSPENSION INTRA-ARTICULAR; INTRAMUSCULAR PRN
Status: DISCONTINUED | OUTPATIENT
Start: 2024-03-15 | End: 2024-03-15 | Stop reason: HOSPADM

## 2024-03-15 RX ORDER — LIDOCAINE HYDROCHLORIDE 10 MG/ML
INJECTION, SOLUTION INFILTRATION; PERINEURAL PRN
Status: DISCONTINUED | OUTPATIENT
Start: 2024-03-15 | End: 2024-03-15

## 2024-03-15 RX ORDER — LIDOCAINE 40 MG/G
CREAM TOPICAL
Status: DISCONTINUED | OUTPATIENT
Start: 2024-03-15 | End: 2024-03-15 | Stop reason: HOSPADM

## 2024-03-15 RX ORDER — PROPOFOL 10 MG/ML
INJECTION, EMULSION INTRAVENOUS PRN
Status: DISCONTINUED | OUTPATIENT
Start: 2024-03-15 | End: 2024-03-15

## 2024-03-15 RX ORDER — ONDANSETRON 2 MG/ML
4 INJECTION INTRAMUSCULAR; INTRAVENOUS EVERY 30 MIN PRN
Status: CANCELLED | OUTPATIENT
Start: 2024-03-15

## 2024-03-15 RX ADMIN — PROPOFOL 70 MG: 10 INJECTION, EMULSION INTRAVENOUS at 13:58

## 2024-03-15 RX ADMIN — LIDOCAINE HYDROCHLORIDE 1 ML: 10 INJECTION, SOLUTION EPIDURAL; INFILTRATION; INTRACAUDAL; PERINEURAL at 13:15

## 2024-03-15 RX ADMIN — MIDAZOLAM 2 MG: 1 INJECTION INTRAMUSCULAR; INTRAVENOUS at 13:55

## 2024-03-15 RX ADMIN — LIDOCAINE HYDROCHLORIDE 50 MG: 10 INJECTION, SOLUTION INFILTRATION; PERINEURAL at 13:57

## 2024-03-15 RX ADMIN — PROPOFOL 30 MG: 10 INJECTION, EMULSION INTRAVENOUS at 14:02

## 2024-03-15 ASSESSMENT — ACTIVITIES OF DAILY LIVING (ADL)
ADLS_ACUITY_SCORE: 35
ADLS_ACUITY_SCORE: 35

## 2024-03-15 NOTE — OP NOTE
CHIEF COMPLAINT:   1. neck pain secondary to cervical spondylosis.  2.  Left shoulder pain secondary to glenohumeral joint degeneration and a left shoulder labral tear  3.  Low back pain with severe disc degeneration at L5-S1 causing back pain and leg pains     Bulmaro gets high-grade pain relief from these injections but they last for about 3 months and then the pain comes back.  He is requesting to have this repeated again.     We also had a discussion regarding his neck.  He is planning on having us perform a PRP injection into his neck.  He understands that that is an out-of-pocket expense not covered by insurance.  We have discussed the need for at least 2 of these treatments.  This will have to be done at our clinic which will be a new clinic in [facility].  He understands that the rotation to the new clinic will occur in January 2024.  PROCEDURE   1.  Bilateral L5-S1 transforaminal epidural injections utilizing fluoroscopic guidance with contrast dye.      PROCEDURE DETAILS: After written informed consent was obtained from the patient, the patient was escorted to the procedure room.  The patient was placed in the sitting position.  A  time out  was conducted to verify the patient identity, procedure to be performed, side, site, allergies and any special requirements.  The skin over the neck was prepped and draped in normal sterile fashion. Fluoroscopy was used to identify the zygopophyseal joint with a lateral view.   The skin was anesthetized with 0.5 mL of 1% lidocaine with bicarbonate buffer.  2 separate 22-gauge Quincke needles were advanced into the foraminal openings from the oblique views and walked into the posterior aspect of the foramen at L5-S1 in the lateral fluoroscopic image.  In the AP image Omnipaque contrast was injected which showed proper spread in the epidural space no evidence of any intravascular, intrathecal, intraneural injection.  I then injected 20 mg of Kenalog with 3 cc of  preservative-free normal saline into each foraminal opening with good dispersion into his epidural space.     Blood loss: 0 cc     DIAGNOSIS:  1.  Cervical spondylosis secondary to a symptomatic facet syndrome causing axial neck pain with a history of long-term pain relief from previous facet injections  2.  Severe needle phobia  3.  Labral tear left shoulder inferior  4.  Severe disc degeneration at L5-S1.     PLAN:  Performed a repeat bilateral L5-S1 epidural injections.  He really does not want to have low back surgery however he has severe degeneration at L5-S1 causing lumbar radiculopathy as well as axial back pain.     Christian Chaudhry MD  Diplomate of the American Board of Anesthesiology, Pain Medicine

## 2024-03-15 NOTE — ANESTHESIA PREPROCEDURE EVALUATION
Anesthesia Pre-Procedure Evaluation    Patient: Bulmaro Herrera   MRN: [id number] : 1965        Procedure : Procedure(s):  Bilateral Lumbar 5-Sacral 1 transforaminal epidural injections (Bilateral: Back)       Past Medical History:   Diagnosis Date    Back pains     Depressive disorder, not elsewhere classified 2007    declines Rx    Disc degeneration 2008    see MRI -throaco-lumbar mild discogenic degenerative changes    Elevated LFT's 2009    alt and ast    Other and unspecified hyperlipidemia 2007    Sleep disorder 2009    CPAP    Tobacco use disorder       Past Surgical History:   Procedure Laterality Date    COLONOSCOPY  08    adenomatous polyp repeat 5 years    COLONOSCOPY N/A 2017    Procedure: COMBINED COLONOSCOPY, SINGLE OR MULTIPLE BIOPSY/POLYPECTOMY BY BIOPSY;  Colonoscopy, Polypectomy by Biopsy;  Surgeon: Matt Reyes MD;  Location:  GI    COLONOSCOPY N/A 2023    Procedure: Colonoscopy;  Surgeon: Constantino Bowie DO;  Location:  GI    ENT SURGERY      for deviated septum    EXCISE LESION AXILLA  2018    Procedure: Excision Skin Tags Right Axilla;  Surgeon: Ayaan Chaney MD;  [facility]    EXCISE LESION BACK N/A 2018    Procedure: Excision Back Skin Lesion X Two;  Surgeon: Ayaan Chaney MD;  [facility]    EXCISE MASS BACK N/A 4/15/2022    Procedure: Excisions skin lesions of back;  Surgeon: Ayaan Chaney MD;  [facility]    INJECT BLOCK MEDIAL BRANCH CERVICAL/THORACIC/LUMBAR Bilateral 2022    Procedure: Cervical 4, 5, 6 Medial Branch Block;  Surgeon: Christian Chaudhry MD;  [facility]    INJECT EPIDURAL CERVICAL N/A 2015    Procedure: INJECT EPIDURAL CERVICAL;  Surgeon: Christian Chaudhry MD;  [facility]    INJECT EPIDURAL CERVICAL N/A 2018    Procedure: INJECT EPIDURAL CERVICAL 6-7;  Surgeon: Christian Chaudhry MD;  [facility]    INJECT EPIDURAL CERVICAL Bilateral 2023     Procedure: Left Cervical 5-Cervical 6, Right Cervical 5-Cervical 6, Left Cervical 6-Cervical 7 and Right Cervical 6-Cervical 7 Intra-articular zygopophyseal joint injection utilizing fluoroscopic guidance with contrast dye.;  Surgeon: Christian Chaudhry MD;  [facility]    INJECT EPIDURAL CERVICAL Bilateral 2/19/2024    Procedure: Left Cervical 5 - Cervical 6, Right Cervical 5 - Cervical 6, Left Cervical 6 - Cervical 7 and Right Cervical 6 - Cervical 7 Intra-articular zygopophyseal joint injection utilizing fluoroscopic guidance with contrast dye.;  Surgeon: Christian Chaudhry MD;  [facility]    INJECT EPIDURAL LUMBAR N/A 12/20/2019    Procedure: Lumbar 5- Sacral 1 Epidural  Injection bilatetral;  Surgeon: Christian Chaudhry MD;  [facility]    INJECT EPIDURAL LUMBAR Bilateral 9/8/2022    Procedure: Bilateral Lumbar 5-Sacral 1 transforaminal epidural injections utilizing fluoroscopic guidance with contrast dye  and;  Surgeon: Christian Chaudhry MD;  [facility]    INJECT EPIDURAL TRANSFORAMINAL Bilateral 5/20/2022    Procedure: Bilateral Lumbar 5- Sacral 1 transforaminal Epidural Steroid Injection;  Surgeon: Christian Chaudhry MD;  [facility]    INJECT EPIDURAL TRANSFORAMINAL Bilateral 7/14/2022    Procedure: Left Cervical 5-Cervical 6, Right Cervical 5-Cervical 6, Left Cervical 6-Cervical 7 and Right Cervical 6-Cervical 7 Intra-articular zygopophyseal joint injection utilizing fluoroscopic guidance with contrast dye;  Surgeon: Christian Chaudhry MD;  [facility]    INJECT EPIDURAL TRANSFORAMINAL Bilateral 6/16/2023    Procedure: Bilateral Lumbar 5-Sacral 1 transforaminal epidural injections utilizing fluoroscopic guidance with contrast dye;  Surgeon: Christian Chaudhry MD;  [facility]    INJECT EPIDURAL TRANSFORAMINAL Bilateral 9/28/2023    Procedure: Bilateral Lumbar 5-Sacral 1 transforaminal epidural injections utilizing fluoroscopic guidance with contrast dye;  Surgeon: Christian Chaudhry MD;  [facility]    INJECT FACET JOINT Bilateral 1/25/2019    Procedure: Cervical Facet Injections Cervical 5-6 and 6-7 Bilateral;  Surgeon: Christian Chaudhry MD;  [facility]    INJECT FACET JOINT Bilateral 11/29/2019    Procedure: Cervical 5-6 and 6-7 Bilateral facet joint injection;  Surgeon: Christian Chaudhry MD;  [facility]    INJECT FACET JOINT Bilateral 2/11/2021    Procedure: Cervical 5-6 and Cervical 6-7 Bilateral facet injections;  Surgeon: Christian Chaudhry MD;  [facility]    INJECT FACET JOINT Bilateral 10/19/2021    Procedure: Left C5-C6, Right C5-C6, Left C6-C7 and Right C6-C7 Intra-articular zygopophyseal joint injection utilizing fluoroscopic guidance with contrast dye;  Surgeon: Christian Chaudhry MD;  [facility]    INJECT FACET JOINT Bilateral 9/14/2023    Procedure: Left Cervical 5-Cervical 6, Right Cervical 5-Cervical 6, Left Cervical 6-Cervical 7 and Right Cervical 6-Cervical 7 Intra-articular zygopophyseal joint injection utilizing fluoroscopic guidance with contrast dye. Left Intra-articular shoulder Injection;  Surgeon: Christian Chaudhry MD;  [facility]    INJECT STEROID (LOCATION) Left 10/19/2021    Procedure: Glenohumeral joint injection left shoulder;  Surgeon: Christian Chaudhry MD;  [facility]    INJECT STEROID (LOCATION) Left 5/20/2022    Procedure: Left shoulder glenohumeral steroid inj with local;  Surgeon: Christian Chaudhry MD;  [facility]    INJECT STEROID TROCHANTERIC BURSA Left 9/8/2022    Procedure: Left intra-articular shoulder injections;  Surgeon: Christain Chaudhry MD;  [facility]    ORTHOPEDIC SURGERY      Right hand    TONSILLECTOMY      ZZC NONSPECIFIC PROCEDURE      rt hand surgery - laceration/glass/tendons    ZZHC ECHO HEART XTHORACIC, STRESS/REST  6/2009    neg      Allergies   Allergen Reactions    Wasps [Hornets] Swelling    Percocet [Oxycodone-Acetaminophen] Itching      Social History     Tobacco Use    Smoking status: Former     Packs/day: 1.5     Types:  Cigarettes     Quit date: 2007     Years since quittin.0    Smokeless tobacco: Never   Substance Use Topics    Alcohol use: No     Comment: rare      Wt Readings from Last 1 Encounters:   24 117.9 kg (260 lb)        Anesthesia Evaluation   Pt has had prior anesthetic. Type: MAC and General.    No history of anesthetic complications       ROS/MED HX  ENT/Pulmonary:     (+) sleep apnea, moderate, uses CPAP,              tobacco use, Past use,                       Neurologic:  - neg neurologic ROS     Cardiovascular:  - neg cardiovascular ROS   (+) Dyslipidemia - -   -  - -                                 Previous cardiac testing   Echo: Date: 23 Results:  Interpretation Summary     The visual ejection fraction is 60-65%.  The right ventricular systolic function is normal.  No hemodynamically significant valvular disease.  There is no pericardial effusion  Stress Test:  Date: Results:    ECG Reviewed:  Date: Results:    Cath:  Date: Results:      METS/Exercise Tolerance:     Hematologic:  - neg hematologic  ROS     Musculoskeletal: Comment: DJD  (+)  arthritis,             GI/Hepatic:     (+) GERD, Asymptomatic on medication,                  Renal/Genitourinary:  - neg Renal ROS     Endo:     (+)               Obesity,       Psychiatric/Substance Use:     (+) psychiatric history depression and anxiety       Infectious Disease:  - neg infectious disease ROS     Malignancy:  - neg malignancy ROS     Other:  - neg other ROS    (+)  , H/O Chronic Pain,         Physical Exam    Airway  airway exam normal      Mallampati: II   TM distance: > 3 FB   Neck ROM: full   Mouth opening: > 3 cm    Respiratory Devices and Support         Dental       (+) Minor Abnormalities - some fillings, tiny chips      Cardiovascular   cardiovascular exam normal       Rhythm and rate: regular and normal     Pulmonary   pulmonary exam normal        breath sounds clear to auscultation       Other findings: Patient is  "appropriate, but verbalizes his anxiety and all extremities are twitchy.  OUTSIDE LABS:  CBC:   Lab Results   Component Value Date    WBC 5.9 03/09/2024    WBC 6.7 11/11/2023    HGB 16.1 03/09/2024    HGB 15.8 11/11/2023    HCT 46.6 03/09/2024    HCT 46.4 11/11/2023     03/09/2024     11/11/2023     BMP:   Lab Results   Component Value Date     03/09/2024     11/11/2023    POTASSIUM 4.2 03/09/2024    POTASSIUM 3.9 11/11/2023    CHLORIDE 104 03/09/2024    CHLORIDE 102 11/11/2023    CO2 25 03/09/2024    CO2 27 11/11/2023    BUN 15.6 03/09/2024    BUN 15.6 11/11/2023    CR 0.98 03/09/2024    CR 1.08 11/11/2023     (H) 03/09/2024    GLC 99 11/11/2023     COAGS:   Lab Results   Component Value Date    PTT 31 06/06/2008    INR 1.0 02/17/2014     POC: No results found for: \"BGM\", \"HCG\", \"HCGS\"  HEPATIC:   Lab Results   Component Value Date    ALBUMIN 4.3 03/09/2024    PROTTOTAL 7.2 03/09/2024    ALT 64 03/09/2024    AST 33 03/09/2024    ALKPHOS 77 03/09/2024    BILITOTAL 0.8 03/09/2024     OTHER:   Lab Results   Component Value Date    PH 8.0 (H) 05/07/2002    A1C 6.2 (H) 01/07/2023    WILIAM 9.8 03/09/2024    PHOS 3.5 02/08/2021    MAG 2.2 02/08/2021    LIPASE 18 11/11/2023    AMYLASE 76 03/08/2014    TSH 3.49 01/07/2023    T4 0.94 11/15/2019    CRP <2.9 03/25/2015    SED 7 09/10/2012       Anesthesia Plan    ASA Status:  2    NPO Status:  NPO Appropriate    Anesthesia Type: MAC.     - Reason for MAC: straight local not clinically adequate      Maintenance: TIVA.        Consents    Anesthesia Plan(s) and associated risks, benefits, and realistic alternatives discussed. Questions answered and patient/representative(s) expressed understanding.     - Discussed: Risks, Benefits and Alternatives for BOTH SEDATION and the PROCEDURE were discussed     - Discussed with:  Patient       Use of blood products discussed: No .     Postoperative Care    Pain management: Oral pain medications.   PONV " prophylaxis: Background Propofol Infusion     Comments:    Other Comments: The risks and benefits of anesthesia, and the alternatives where applicable, have been discussed with the patient, and they wish to proceed.               SHIV Hoff CRNA

## 2024-03-15 NOTE — ANESTHESIA POSTPROCEDURE EVALUATION
Patient: Bulmaro Herrera    Procedure: Procedure(s):  Bilateral Lumbar 5-Sacral 1 transforaminal epidural injections       Anesthesia Type:  MAC    Note:  Disposition: Outpatient   Postop Pain Control: Uneventful            Sign Out: Well controlled pain   PONV: No   Neuro/Psych: Uneventful            Sign Out: Acceptable/Baseline neuro status   Airway/Respiratory: Uneventful            Sign Out: Acceptable/Baseline resp. status   CV/Hemodynamics: Uneventful            Sign Out: Acceptable CV status   Other NRE: NONE   DID A NON-ROUTINE EVENT OCCUR? No    Event details/Postop Comments:  Pt was happy with anesthesia care.  No complications.  I will follow up with the pt if needed.       Last vitals:  Vitals:    03/15/24 1303   BP: 138/89   Pulse: 72   Resp: 16   Temp: 97.7  F (36.5  C)   SpO2: 96%       Electronically Signed By: SHIV Hoff CRNA  March 15, 2024  2:17 PM

## 2024-03-15 NOTE — ANESTHESIA CARE TRANSFER NOTE
Patient: Bulmaro Herrera    Procedure: Procedure(s):  Bilateral Lumbar 5-Sacral 1 transforaminal epidural injections       Diagnosis: Lumbar radiculopathy [M54.16]  Diagnosis Additional Information: No value filed.    Anesthesia Type:   MAC     Note:    Oropharynx: oropharynx clear of all foreign objects and spontaneously breathing  Level of Consciousness: drowsy  Oxygen Supplementation: room air    Independent Airway: airway patency satisfactory and stable  Dentition: dentition unchanged  Vital Signs Stable: post-procedure vital signs reviewed and stable  Report to RN Given: handoff report given  Patient transferred to: Phase II    Handoff Report: Identifed the Patient, Identified the Reponsible Provider, Reviewed the pertinent medical history, Discussed the surgical course, Reviewed Intra-OP anesthesia mangement and issues during anesthesia, Set expectations for post-procedure period and Allowed opportunity for questions and acknowledgement of understanding  Vitals:  Vitals Value Taken Time   BP     Temp     Pulse     Resp     SpO2         Electronically Signed By: SHIV Hoff CRNA  March 15, 2024  2:14 PM

## 2024-03-15 NOTE — DISCHARGE INSTRUCTIONS

## 2024-03-16 ENCOUNTER — HEALTH MAINTENANCE LETTER (OUTPATIENT)
Age: 59
End: 2024-03-16

## 2024-04-01 ENCOUNTER — MYC MEDICAL ADVICE (OUTPATIENT)
Dept: INTERNAL MEDICINE | Facility: CLINIC | Age: 59
End: 2024-04-01
Payer: COMMERCIAL

## 2024-04-01 DIAGNOSIS — Z83.49 FAMILY HISTORY OF HEMOCHROMATOSIS: Primary | ICD-10-CM

## 2024-04-03 ENCOUNTER — LAB (OUTPATIENT)
Dept: LAB | Facility: CLINIC | Age: 59
End: 2024-04-03
Payer: COMMERCIAL

## 2024-04-03 DIAGNOSIS — Z83.49 FAMILY HISTORY OF HEMOCHROMATOSIS: ICD-10-CM

## 2024-04-03 LAB
FERRITIN SERPL-MCNC: 546 NG/ML (ref 31–409)
IRON BINDING CAPACITY (ROCHE): 342 UG/DL (ref 240–430)
IRON SATN MFR SERPL: 27 % (ref 15–46)
IRON SERPL-MCNC: 93 UG/DL (ref 61–157)

## 2024-04-03 PROCEDURE — 83550 IRON BINDING TEST: CPT

## 2024-04-03 PROCEDURE — 83540 ASSAY OF IRON: CPT

## 2024-04-03 PROCEDURE — 36415 COLL VENOUS BLD VENIPUNCTURE: CPT

## 2024-04-03 PROCEDURE — 82728 ASSAY OF FERRITIN: CPT

## 2024-04-04 NOTE — NURSING NOTE
Health Maintenance Due   Topic Date Due     HIV SCREEN (SYSTEM ASSIGNED)  06/21/1985     TETANUS IMMUNIZATION (SYSTEM ASSIGNED)  04/13/2015     URINE DRUG SCREEN Q1 YR  08/24/2018     INFLUENZA VACCINE (1) 09/01/2018     Kori DURAN LPN     English

## 2024-04-05 ENCOUNTER — LAB (OUTPATIENT)
Dept: LAB | Facility: CLINIC | Age: 59
End: 2024-04-05
Payer: COMMERCIAL

## 2024-04-05 DIAGNOSIS — Z83.49 FAMILY HISTORY OF HEMOCHROMATOSIS: ICD-10-CM

## 2024-04-05 LAB
LAB DIRECTOR COMMENTS: NORMAL
LAB DIRECTOR DISCLAIMER: NORMAL
LAB DIRECTOR INTERPRETATION: NORMAL
LAB DIRECTOR METHODOLOGY: NORMAL
LAB DIRECTOR RESULTS: NORMAL
SPECIMEN DESCRIPTION: NORMAL

## 2024-04-05 PROCEDURE — 36415 COLL VENOUS BLD VENIPUNCTURE: CPT

## 2024-04-05 PROCEDURE — G0452 MOLECULAR PATHOLOGY INTERPR: HCPCS | Performed by: STUDENT IN AN ORGANIZED HEALTH CARE EDUCATION/TRAINING PROGRAM

## 2024-04-05 PROCEDURE — 81256 HFE GENE: CPT

## 2024-04-14 NOTE — TELEPHONE ENCOUNTER
RECORDS STATUS - ALL OTHER DIAGNOSIS      RECORDS RECEIVED FROM: Epic   DATE RECEIVED:    NOTES STATUS DETAILS   OFFICE NOTE from referring provider Epic 02/16/24: Dr. Gustavo Farmer   MEDICATION LIST [facility]    LABS     PATHOLOGY REPORTS     ANYTHING RELATED TO DIAGNOSIS Epic Most recent 04/05/24

## 2024-05-23 ENCOUNTER — OFFICE VISIT (OUTPATIENT)
Dept: CARDIOLOGY | Facility: CLINIC | Age: 59
End: 2024-05-23
Payer: COMMERCIAL

## 2024-05-23 VITALS
OXYGEN SATURATION: 92 % | HEART RATE: 82 BPM | BODY MASS INDEX: 34 KG/M2 | HEIGHT: 72 IN | DIASTOLIC BLOOD PRESSURE: 86 MMHG | WEIGHT: 251 LBS | SYSTOLIC BLOOD PRESSURE: 128 MMHG

## 2024-05-23 DIAGNOSIS — I49.3 PVC'S (PREMATURE VENTRICULAR CONTRACTIONS): ICD-10-CM

## 2024-05-23 DIAGNOSIS — R00.2 PALPITATIONS: Primary | ICD-10-CM

## 2024-05-23 PROCEDURE — 93000 ELECTROCARDIOGRAM COMPLETE: CPT | Performed by: NURSE PRACTITIONER

## 2024-05-23 PROCEDURE — 99214 OFFICE O/P EST MOD 30 MIN: CPT | Performed by: NURSE PRACTITIONER

## 2024-05-23 RX ORDER — DILTIAZEM HYDROCHLORIDE 60 MG/1
60 CAPSULE, EXTENDED RELEASE ORAL 2 TIMES DAILY
Qty: 60 CAPSULE | Refills: 3 | Status: SHIPPED | OUTPATIENT
Start: 2024-05-23 | End: 2024-07-29

## 2024-05-23 NOTE — PATIENT INSTRUCTIONS
TODAY'S RECOMMENDATIONS:    As needed diltiazem 60 mg twice for palpitations   Continue all medications without changes.  Please follow up with Kiera Salomon or Cristal in 1 year.    If you have questions or concerns please call clinic at [phone].    Please call [phone] for scheduling.      It was a pleasure seeing you today!

## 2024-05-23 NOTE — PROGRESS NOTES
General Cardiology Clinic Progress Note  Bulmaro Herrera MRN# [id number]   YOB: 1965 Age: 58 year old     Primary cardiologist: Dr. Stephens    Reason for visit: Palpitations    History of presenting illness:    Bulmaro Herrera is a pleasant 58 year old patient with past medical history significant for:    Nonocclusive CAD  Palpitations  Hyperlipidemia with history of elevated LFTs thought secondary to statins and dose was adjusted.  Severe phobia to needles  Longstanding dyspnea on exertion  Tobacco abuse  ROBBY: Utilizes dental appliance  Obesity    The patient initially establish care with Dr. Stephens in 2021 due to chest discomfort and shortness of breath.  He underwent a stress echocardiogram that was negative for ischemia.  A CT coronary angiogram with calcium score was then obtained noting a total score of 41 (left main: 0, LAD: 19, circumflex: 20 and RCA: 2) this placed him in the 56 percentile according to gender and age.  Also, a Zio patch was obtained noting rare PACs and PVCs.     He was last evaluated by Dr. Stephens in 4/2023 stable chronic shortness of breath on exertion but endorsed palpitations.  Was recommended that he undergo a 48-hour Holter monitor and echocardiogram.  The echo showed preserved LVEF without wall motion or valvular abnormalities and the Holter showed SR with heart rate ranging between  bpm with average heart rate of 58 beats.  There were 3 isolated PVCs thus having PVC burden less than 1%.  There were no PACs recorded.  The patient marked symptoms during SR and ST.    Today the patient returns for increased palpitations.  He states over the last several days he is having up to 40 or 50 episodes of single palpitations per day.  EKG was obtained that did note a PVC and this correlated with the patient's symptoms.  We discussed that even though he may feel like he is having a large amount of PVCs per day, but at this burden they are more of a nuisance and are not harmful.   We discussed taking scheduled diltiazem versus as needed.  He opted for the latter.    Diagnotic studies:  EKG 5/25/2024: SR 82 bpm, , , QTc 406, QRS duration 108. 1 PVC.   Echocardiogram 4/2023: Preserved LVEF without wall motion or valvular abnormalities  Holter monitor 4/2023: SR with heart rate ranging between  bpm with average heart rate of 58 beats.  There were 3 isolated PVCs thus having PVC burden less than 1%.  There were no PACs recorded.  The patient marked symptoms during SR and ST.  CTA 3/2021:calcium score was then obtained noting a total score of 41 (left main: 0, LAD: 19, circumflex: 20 and RCA: 2) this placed him in the 56 percentile according to gender and age  Stress echo 2021: Negative for ischemia            Assessment and Plan:     ASSESSMENT:    Chronic shortness of breath and palpitations from rare PVCs  Mild nonocclusive CAD noted on CTA from 2021  History of rare PVCs from Holter monitor from 2023  EKG today noted SR with PVC      Hyperlipidemia with history of transaminitis  ALT was as high as 205 and 1/2022  Currently on rosuvastatin 5 mg daily and LFTs have normalized  FLP 3/2024: , HDL 45,  and triglycerides 191  His PCP did recommend increasing his statin dose but thus far patient has remained same dose.      PLAN:     As needed diltiazem 60 mg twice daily for symptomatic PVCs  Discussed side effects of hypotension and lower extremity edema.  With utilizing as needed, side effects should be minimal.  Return to clinic in 1 year or sooner if needed.       Orders this Visit:  Orders Placed This Encounter   Procedures    Follow-Up with Cardiology    EKG 12-lead complete w/read - Clinics     Orders Placed This Encounter   Medications    diltiazem ER (CARDIZEM SR) 60 MG 12 hr capsule     Sig: Take 1 capsule (60 mg) by mouth 2 times daily     Dispense:  60 capsule     Refill:  3     There are no discontinued medications.    Today's clinic visit entailed:  Review  "of the result(s) of each unique test - EKG, CT, stress test, echocardiogram, Holter  The following tests were independently interpreted by me as noted in my documentation: EKG  Ordering of each unique test  Prescription drug management  30 minutes spent by me on the date of the encounter doing chart review, history and exam, documentation and further activities per the note  Provider  Link to [employer] Help Grid     The level of medical decision making during this visit was of moderate complexity.           Review of Systems:     Review of Systems:  Skin:  Negative     Eyes:  Positive for glasses  ENT:  Negative    Respiratory:  Negative    Cardiovascular:  Negative for;chest pain Positive for;lightheadedness;dizziness;palpitations  Gastroenterology: Negative    Genitourinary:  Negative    Musculoskeletal:  Positive for joint pain  Neurologic:  Negative    Psychiatric:  Negative    Heme/Lymph/Imm:  Positive for allergies  Endocrine:  Negative              Physical Exam:     Vitals: /86 (BP Location: Right arm, Patient Position: Sitting, Cuff Size: Adult Large)   Pulse 82   Ht 1.829 m (6' 0.01\")   Wt 113.9 kg (251 lb)   SpO2 92%   BMI 34.03 kg/m    Constitutional: Well nourished and in no apparent distress.  Eyes: Pupils equal, round. Sclerae anicteric.   HEENT: Normocephalic, atraumatic.   Neck: Supple. JVD   Respiratory: Breathing non-labored. Lungs clear to auscultation bilaterally. No crackles, wheezes, rhonchi, or rales.  Cardiovascular:  Regular rate and rhythm, normal S1 and S2. No murmur, rub, or gallop.  Skin: Warm, dry. No rashes, cyanosis, or xanthelasma.  Extremities: No edema.  Neurologic: No gross motor deficits. Alert, awake, and oriented to person, place and time.  Psychiatric: Affect appropriate.        CURRENT MEDICATIONS:  Current Outpatient Medications   Medication Sig Dispense Refill    acetaminophen (TYLENOL) 500 MG tablet Take 1,000 mg by mouth every morning      aspirin (ASA) 81 MG " chewable tablet Take 1 tablet (81 mg) by mouth daily      cetirizine (ZYRTEC) 10 MG tablet Take 10 mg by mouth daily      diltiazem ER (CARDIZEM SR) 60 MG 12 hr capsule Take 1 capsule (60 mg) by mouth 2 times daily 60 capsule 3    ibuprofen (ADVIL/MOTRIN) 200 MG tablet Take 400 mg by mouth every morning      omeprazole (PRILOSEC) 40 MG DR capsule TAKE ONE CAPSULE (40MG) BY MOUTH DAILY 90 capsule 2    rosuvastatin (CRESTOR) 5 MG tablet Take 1 tablet (5 mg) by mouth daily 90 tablet 3    meclizine 25 MG CHEW Take 25 mg by mouth every 6 hours as needed for dizziness (Patient not taking: Reported on 5/23/2024)      tiZANidine (ZANAFLEX) 4 MG tablet Take 1 tablet (4 mg) by mouth at bedtime (Patient not taking: Reported on 5/23/2024) 14 tablet 0       ALLERGIES  Allergies   Allergen Reactions    Wasps [Hornets] Swelling    Percocet [Oxycodone-Acetaminophen] Itching         PAST MEDICAL HISTORY:  Past Medical History:   Diagnosis Date    Back pains     Depressive disorder, not elsewhere classified 01/28/2007    declines Rx    Disc degeneration 12/01/2008    see MRI -throaco-lumbar mild discogenic degenerative changes    Elevated LFT's 11/01/2009    alt and ast    Other and unspecified hyperlipidemia 01/01/2007    Sleep disorder 09/01/2009    CPAP    Tobacco use disorder        PAST SURGICAL HISTORY:  Past Surgical History:   Procedure Laterality Date    COLONOSCOPY  07/07/08    adenomatous polyp repeat 5 years    COLONOSCOPY N/A 12/29/2017    Procedure: COMBINED COLONOSCOPY, SINGLE OR MULTIPLE BIOPSY/POLYPECTOMY BY BIOPSY;  Colonoscopy, Polypectomy by Biopsy;  Surgeon: Matt Reyes MD;  Location:  GI    COLONOSCOPY N/A 5/22/2023    Procedure: Colonoscopy;  Surgeon: Constantino Bowie DO;  Location:  GI    ENT SURGERY      for deviated septum    EXCISE LESION AXILLA  12/7/2018    Procedure: Excision Skin Tags Right Axilla;  Surgeon: Ayaan Chaney MD;  Location:  OR    EXCISE LESION BACK N/A 12/7/2018     Procedure: Excision Back Skin Lesion X Two;  Surgeon: Ayaan Chaney MD;  [facility]    EXCISE MASS BACK N/A 4/15/2022    Procedure: Excisions skin lesions of back;  Surgeon: Ayaan Chaney MD;  [facility]    INJECT BLOCK MEDIAL BRANCH CERVICAL/THORACIC/LUMBAR Bilateral 5/26/2022    Procedure: Cervical 4, 5, 6 Medial Branch Block;  Surgeon: Christian Chaudhry MD;  [facility]    INJECT EPIDURAL CERVICAL N/A 5/14/2015    Procedure: INJECT EPIDURAL CERVICAL;  Surgeon: Christian Chaudhry MD;  [facility]    INJECT EPIDURAL CERVICAL N/A 12/28/2018    Procedure: INJECT EPIDURAL CERVICAL 6-7;  Surgeon: Christian Chaudhry MD;  [facility]    INJECT EPIDURAL CERVICAL Bilateral 6/8/2023    Procedure: Left Cervical 5-Cervical 6, Right Cervical 5-Cervical 6, Left Cervical 6-Cervical 7 and Right Cervical 6-Cervical 7 Intra-articular zygopophyseal joint injection utilizing fluoroscopic guidance with contrast dye.;  Surgeon: Christian Chaudhry MD;  [facility]    INJECT EPIDURAL CERVICAL Bilateral 2/19/2024    Procedure: Left Cervical 5 - Cervical 6, Right Cervical 5 - Cervical 6, Left Cervical 6 - Cervical 7 and Right Cervical 6 - Cervical 7 Intra-articular zygopophyseal joint injection utilizing fluoroscopic guidance with contrast dye.;  Surgeon: Christian Chaudhry MD;  [facility]    INJECT EPIDURAL LUMBAR N/A 12/20/2019    Procedure: Lumbar 5- Sacral 1 Epidural  Injection bilatetral;  Surgeon: Christian Chaudhry MD;  [facility]    INJECT EPIDURAL LUMBAR Bilateral 9/8/2022    Procedure: Bilateral Lumbar 5-Sacral 1 transforaminal epidural injections utilizing fluoroscopic guidance with contrast dye  and;  Surgeon: Christian Chaudhry MD;  [facility]    INJECT EPIDURAL TRANSFORAMINAL Bilateral 5/20/2022    Procedure: Bilateral Lumbar 5- Sacral 1 transforaminal Epidural Steroid Injection;  Surgeon: Christian Chaudhry MD;  [facility]    INJECT EPIDURAL TRANSFORAMINAL Bilateral 7/14/2022    Procedure: Left  Cervical 5-Cervical 6, Right Cervical 5-Cervical 6, Left Cervical 6-Cervical 7 and Right Cervical 6-Cervical 7 Intra-articular zygopophyseal joint injection utilizing fluoroscopic guidance with contrast dye;  Surgeon: Christian Chaudhry MD;  [facility]    INJECT EPIDURAL TRANSFORAMINAL Bilateral 6/16/2023    Procedure: Bilateral Lumbar 5-Sacral 1 transforaminal epidural injections utilizing fluoroscopic guidance with contrast dye;  Surgeon: Christian Chaudhry MD;  [facility]    INJECT EPIDURAL TRANSFORAMINAL Bilateral 9/28/2023    Procedure: Bilateral Lumbar 5-Sacral 1 transforaminal epidural injections utilizing fluoroscopic guidance with contrast dye;  Surgeon: Christian Chaudhry MD;  [facility]    INJECT EPIDURAL TRANSFORAMINAL Bilateral 3/15/2024    Procedure: Bilateral Lumbar 5 - Sacral 1 transforaminal epidural injections utilizing fluoroscopic guidance with contrast dye.;  Surgeon: Christian Chaudhry MD;  [facility]    INJECT FACET JOINT Bilateral 1/25/2019    Procedure: Cervical Facet Injections Cervical 5-6 and 6-7 Bilateral;  Surgeon: Christian Chaudhry MD;  [facility]    INJECT FACET JOINT Bilateral 11/29/2019    Procedure: Cervical 5-6 and 6-7 Bilateral facet joint injection;  Surgeon: Christian Chaudhry MD;  [facility]    INJECT FACET JOINT Bilateral 2/11/2021    Procedure: Cervical 5-6 and Cervical 6-7 Bilateral facet injections;  Surgeon: Christian Chaudhry MD;  [facility]    INJECT FACET JOINT Bilateral 10/19/2021    Procedure: Left C5-C6, Right C5-C6, Left C6-C7 and Right C6-C7 Intra-articular zygopophyseal joint injection utilizing fluoroscopic guidance with contrast dye;  Surgeon: Christian Chaudhry MD;  [facility]    INJECT FACET JOINT Bilateral 9/14/2023    Procedure: Left Cervical 5-Cervical 6, Right Cervical 5-Cervical 6, Left Cervical 6-Cervical 7 and Right Cervical 6-Cervical 7 Intra-articular zygopophyseal joint injection utilizing fluoroscopic guidance with contrast dye.  Left Intra-articular shoulder Injection;  Surgeon: Christian Chaudhry MD;  [facility]    INJECT STEROID (LOCATION) Left 10/19/2021    Procedure: Glenohumeral joint injection left shoulder;  Surgeon: Christian Chaudhry MD;  [facility]    INJECT STEROID (LOCATION) Left 2022    Procedure: Left shoulder glenohumeral steroid inj with local;  Surgeon: Christian Chaudhry MD;  [facility]    INJECT STEROID TROCHANTERIC BURSA Left 2022    Procedure: Left intra-articular shoulder injections;  Surgeon: Christian Chaudhry MD;  [facility]    ORTHOPEDIC SURGERY      Right hand    TONSILLECTOMY      ZZC NONSPECIFIC PROCEDURE      rt hand surgery - laceration/glass/tendons    ZZHC ECHO HEART XTHORACIC, STRESS/REST  2009    neg       FAMILY HISTORY:  Family History   Problem Relation Age of Onset    Breast Cancer Mother     Arthritis Mother         joint ?    Depression Father     Cancer Maternal Grandmother     C.A.D. Maternal Grandfather     Cardiovascular Maternal Grandfather     Gynecology Paternal Grandmother          giving birth    Prostate Cancer Paternal Grandfather     Genetic Disorder Brother         joint pain?    Depression Brother     Cardiovascular Son         congenital heart defect -      Prostate Cancer Other          age 70's    Diabetes Other        SOCIAL HISTORY:  Social History     Socioeconomic History    Marital status:    Occupational History    Occupation:      Employer: SELF   Tobacco Use    Smoking status: Former     Current packs/day: 0.00     Types: Cigarettes     Quit date: 2007     Years since quittin.2    Smokeless tobacco: Never   Vaping Use    Vaping status: Never Used   Substance and Sexual Activity    Alcohol use: No     Comment: rare    Drug use: No    Sexual activity: Yes     Partners: Female   Other Topics Concern    Parent/sibling w/ CABG, MI or angioplasty before 65F 55M? No   Social History Narrative    Dairy/d a lot, 1  gallon of milk per day plus cheese and other servings     Caffeine 48 oz. coffee per day    Exercise none    Sunscreen used - No    Seatbelts used - Yes    Working smoke/CO detectors in the home - Yes    Guns stored in the home - No    Self Breast Exams - NA    Self Testicular Exam - No    Eye Exam up to date - No    Dental Exam up to date - Yes    Pap Smear up to date - NA    Mammogram up to date - NA    PSA up to date - No    Dexa Scan up to date - NA    Flex Sig / Colonoscopy up to date - No    Immunizations up to date - No    Abuse: Current or Past(Physical, Sexual or Emotional)- No    Do you feel safe in your environment - Yes    Tms,cma        Lives with spouse and her parents.     Social Determinants of Health     Financial Resource Strain: Low Risk  (1/10/2024)    Financial Resource Strain     Within the past 12 months, have you or your family members you live with been unable to get utilities (heat, electricity) when it was really needed?: No   Food Insecurity: Low Risk  (1/10/2024)    Food Insecurity     Within the past 12 months, did you worry that your food would run out before you got money to buy more?: No     Within the past 12 months, did the food you bought just not last and you didn t have money to get more?: No   Transportation Needs: Low Risk  (1/10/2024)    Transportation Needs     Within the past 12 months, has lack of transportation kept you from medical appointments, getting your medicines, non-medical meetings or appointments, work, or from getting things that you need?: No   Interpersonal Safety: Low Risk  (12/7/2023)    Interpersonal Safety     Do you feel physically and emotionally safe where you currently live?: Yes     Within the past 12 months, have you been hit, slapped, kicked or otherwise physically hurt by someone?: No     Within the past 12 months, have you been humiliated or emotionally abused in other ways by your partner or ex-partner?: No   Housing Stability: Low Risk   (1/10/2024)    Housing Stability     Do you have housing? : Yes     Are you worried about losing your housing?: No

## 2024-05-23 NOTE — LETTER
5/23/2024    Gustavo Farmer, DO  [address] [place] [address]    RE: Bulmaro Herrera       Dear Colleague,     I had the pleasure of seeing Bulmaro Herrera in the [facility].    General Cardiology Clinic Progress Note  Bulmaro Herrera MRN# [id number]   YOB: 1965 Age: 58 year old     Primary cardiologist: Dr. Stephens    Reason for visit: Palpitations    History of presenting illness:    Bulmaro Herrera is a pleasant 58 year old patient with past medical history significant for:    Nonocclusive CAD  Palpitations  Hyperlipidemia with history of elevated LFTs thought secondary to statins and dose was adjusted.  Severe phobia to needles  Longstanding dyspnea on exertion  Tobacco abuse  ROBBY: Utilizes dental appliance  Obesity    The patient initially establish care with Dr. Stephens in 2021 due to chest discomfort and shortness of breath.  He underwent a stress echocardiogram that was negative for ischemia.  A CT coronary angiogram with calcium score was then obtained noting a total score of 41 (left main: 0, LAD: 19, circumflex: 20 and RCA: 2) this placed him in the 56 percentile according to gender and age.  Also, a Zio patch was obtained noting rare PACs and PVCs.     He was last evaluated by Dr. Stephens in 4/2023 stable chronic shortness of breath on exertion but endorsed palpitations.  Was recommended that he undergo a 48-hour Holter monitor and echocardiogram.  The echo showed preserved LVEF without wall motion or valvular abnormalities and the Holter showed SR with heart rate ranging between  bpm with average heart rate of 58 beats.  There were 3 isolated PVCs thus having PVC burden less than 1%.  There were no PACs recorded.  The patient marked symptoms during SR and ST.    Today the patient returns for increased palpitations.  He states over the last several days he is having up to 40 or 50 episodes of single palpitations per day.  EKG was obtained that did note a PVC  and this correlated with the patient's symptoms.  We discussed that even though he may feel like he is having a large amount of PVCs per day, but at this burden they are more of a nuisance and are not harmful.  We discussed taking scheduled diltiazem versus as needed.  He opted for the latter.    Diagnotic studies:  EKG 5/25/2024: SR 82 bpm, , , QTc 406, QRS duration 108. 1 PVC.   Echocardiogram 4/2023: Preserved LVEF without wall motion or valvular abnormalities  Holter monitor 4/2023: SR with heart rate ranging between  bpm with average heart rate of 58 beats.  There were 3 isolated PVCs thus having PVC burden less than 1%.  There were no PACs recorded.  The patient marked symptoms during SR and ST.  CTA 3/2021:calcium score was then obtained noting a total score of 41 (left main: 0, LAD: 19, circumflex: 20 and RCA: 2) this placed him in the 56 percentile according to gender and age  Stress echo 2021: Negative for ischemia            Assessment and Plan:     ASSESSMENT:    Chronic shortness of breath and palpitations from rare PVCs  Mild nonocclusive CAD noted on CTA from 2021  History of rare PVCs from Holter monitor from 2023  EKG today noted SR with PVC      Hyperlipidemia with history of transaminitis  ALT was as high as 205 and 1/2022  Currently on rosuvastatin 5 mg daily and LFTs have normalized  FLP 3/2024: , HDL 45,  and triglycerides 191  His PCP did recommend increasing his statin dose but thus far patient has remained same dose.      PLAN:     As needed diltiazem 60 mg twice daily for symptomatic PVCs  Discussed side effects of hypotension and lower extremity edema.  With utilizing as needed, side effects should be minimal.  Return to clinic in 1 year or sooner if needed.       Orders this Visit:  Orders Placed This Encounter   Procedures    Follow-Up with Cardiology    EKG 12-lead complete w/read - Clinics     Orders Placed This Encounter   Medications    diltiazem ER  "(CARDIZEM SR) 60 MG 12 hr capsule     Sig: Take 1 capsule (60 mg) by mouth 2 times daily     Dispense:  60 capsule     Refill:  3     There are no discontinued medications.    Today's clinic visit entailed:  Review of the result(s) of each unique test - EKG, CT, stress test, echocardiogram, Holter  The following tests were independently interpreted by me as noted in my documentation: EKG  Ordering of each unique test  Prescription drug management  30 minutes spent by me on the date of the encounter doing chart review, history and exam, documentation and further activities per the note  Provider  Link to [facility] [employer]     The level of medical decision making during this visit was of moderate complexity.           Review of Systems:     Review of Systems:  Skin:  Negative     Eyes:  Positive for glasses  ENT:  Negative    Respiratory:  Negative    Cardiovascular:  Negative for;chest pain Positive for;lightheadedness;dizziness;palpitations  Gastroenterology: Negative    Genitourinary:  Negative    Musculoskeletal:  Positive for joint pain  Neurologic:  Negative    Psychiatric:  Negative    Heme/Lymph/Imm:  Positive for allergies  Endocrine:  Negative              Physical Exam:     Vitals: /86 (BP Location: Right arm, Patient Position: Sitting, Cuff Size: Adult Large)   Pulse 82   Ht 1.829 m (6' 0.01\")   Wt 113.9 kg (251 lb)   SpO2 92%   BMI 34.03 kg/m    Constitutional: Well nourished and in no apparent distress.  Eyes: Pupils equal, round. Sclerae anicteric.   HEENT: Normocephalic, atraumatic.   Neck: Supple. JVD   Respiratory: Breathing non-labored. Lungs clear to auscultation bilaterally. No crackles, wheezes, rhonchi, or rales.  Cardiovascular:  Regular rate and rhythm, normal S1 and S2. No murmur, rub, or gallop.  Skin: Warm, dry. No rashes, cyanosis, or xanthelasma.  Extremities: No edema.  Neurologic: No gross motor deficits. Alert, awake, and oriented to person, place and time.  Psychiatric: Affect " appropriate.        CURRENT MEDICATIONS:  Current Outpatient Medications   Medication Sig Dispense Refill    acetaminophen (TYLENOL) 500 MG tablet Take 1,000 mg by mouth every morning      aspirin (ASA) 81 MG chewable tablet Take 1 tablet (81 mg) by mouth daily      cetirizine (ZYRTEC) 10 MG tablet Take 10 mg by mouth daily      diltiazem ER (CARDIZEM SR) 60 MG 12 hr capsule Take 1 capsule (60 mg) by mouth 2 times daily 60 capsule 3    ibuprofen (ADVIL/MOTRIN) 200 MG tablet Take 400 mg by mouth every morning      omeprazole (PRILOSEC) 40 MG DR capsule TAKE ONE CAPSULE (40MG) BY MOUTH DAILY 90 capsule 2    rosuvastatin (CRESTOR) 5 MG tablet Take 1 tablet (5 mg) by mouth daily 90 tablet 3    meclizine 25 MG CHEW Take 25 mg by mouth every 6 hours as needed for dizziness (Patient not taking: Reported on 5/23/2024)      tiZANidine (ZANAFLEX) 4 MG tablet Take 1 tablet (4 mg) by mouth at bedtime (Patient not taking: Reported on 5/23/2024) 14 tablet 0       ALLERGIES  Allergies   Allergen Reactions    Wasps [Hornets] Swelling    Percocet [Oxycodone-Acetaminophen] Itching         PAST MEDICAL HISTORY:  Past Medical History:   Diagnosis Date    Back pains     Depressive disorder, not elsewhere classified 01/28/2007    declines Rx    Disc degeneration 12/01/2008    see MRI -throaco-lumbar mild discogenic degenerative changes    Elevated LFT's 11/01/2009    alt and ast    Other and unspecified hyperlipidemia 01/01/2007    Sleep disorder 09/01/2009    CPAP    Tobacco use disorder        PAST SURGICAL HISTORY:  Past Surgical History:   Procedure Laterality Date    COLONOSCOPY  07/07/08    adenomatous polyp repeat 5 years    COLONOSCOPY N/A 12/29/2017    Procedure: COMBINED COLONOSCOPY, SINGLE OR MULTIPLE BIOPSY/POLYPECTOMY BY BIOPSY;  Colonoscopy, Polypectomy by Biopsy;  Surgeon: Matt Reyes MD;  Location:  GI    COLONOSCOPY N/A 5/22/2023    Procedure: Colonoscopy;  Surgeon: Constantino Bowie DO;  Location:  GI     ENT SURGERY      for deviated septum    EXCISE LESION AXILLA  12/7/2018    Procedure: Excision Skin Tags Right Axilla;  Surgeon: Ayaan Chaney MD;  [facility]    EXCISE LESION BACK N/A 12/7/2018    Procedure: Excision Back Skin Lesion X Two;  Surgeon: Ayaan Chaney MD;  [facility]    EXCISE MASS BACK N/A 4/15/2022    Procedure: Excisions skin lesions of back;  Surgeon: Ayaan Chaney MD;  [facility]    INJECT BLOCK MEDIAL BRANCH CERVICAL/THORACIC/LUMBAR Bilateral 5/26/2022    Procedure: Cervical 4, 5, 6 Medial Branch Block;  Surgeon: Christian Chaudhry MD;  [facility]    INJECT EPIDURAL CERVICAL N/A 5/14/2015    Procedure: INJECT EPIDURAL CERVICAL;  Surgeon: Christian Chaudhry MD;  [facility]    INJECT EPIDURAL CERVICAL N/A 12/28/2018    Procedure: INJECT EPIDURAL CERVICAL 6-7;  Surgeon: Christian Chaudhry MD;  [facility]    INJECT EPIDURAL CERVICAL Bilateral 6/8/2023    Procedure: Left Cervical 5-Cervical 6, Right Cervical 5-Cervical 6, Left Cervical 6-Cervical 7 and Right Cervical 6-Cervical 7 Intra-articular zygopophyseal joint injection utilizing fluoroscopic guidance with contrast dye.;  Surgeon: Christian Chaudhry MD;  [facility]    INJECT EPIDURAL CERVICAL Bilateral 2/19/2024    Procedure: Left Cervical 5 - Cervical 6, Right Cervical 5 - Cervical 6, Left Cervical 6 - Cervical 7 and Right Cervical 6 - Cervical 7 Intra-articular zygopophyseal joint injection utilizing fluoroscopic guidance with contrast dye.;  Surgeon: Christian Chaudhry MD;  [facility]    INJECT EPIDURAL LUMBAR N/A 12/20/2019    Procedure: Lumbar 5- Sacral 1 Epidural  Injection bilatetral;  Surgeon: Christian Chaudhry MD;  [facility]    INJECT EPIDURAL LUMBAR Bilateral 9/8/2022    Procedure: Bilateral Lumbar 5-Sacral 1 transforaminal epidural injections utilizing fluoroscopic guidance with contrast dye  and;  Surgeon: Christian Chaudhry MD;  [facility]    INJECT EPIDURAL TRANSFORAMINAL Bilateral  5/20/2022    Procedure: Bilateral Lumbar 5- Sacral 1 transforaminal Epidural Steroid Injection;  Surgeon: Christian Chaudhry MD;  [facility]    INJECT EPIDURAL TRANSFORAMINAL Bilateral 7/14/2022    Procedure: Left Cervical 5-Cervical 6, Right Cervical 5-Cervical 6, Left Cervical 6-Cervical 7 and Right Cervical 6-Cervical 7 Intra-articular zygopophyseal joint injection utilizing fluoroscopic guidance with contrast dye;  Surgeon: Christian Chaudhry MD;  [facility]    INJECT EPIDURAL TRANSFORAMINAL Bilateral 6/16/2023    Procedure: Bilateral Lumbar 5-Sacral 1 transforaminal epidural injections utilizing fluoroscopic guidance with contrast dye;  Surgeon: Christian Chaudhry MD;  [facility]    INJECT EPIDURAL TRANSFORAMINAL Bilateral 9/28/2023    Procedure: Bilateral Lumbar 5-Sacral 1 transforaminal epidural injections utilizing fluoroscopic guidance with contrast dye;  Surgeon: Christian Chaudhry MD;  [facility]    INJECT EPIDURAL TRANSFORAMINAL Bilateral 3/15/2024    Procedure: Bilateral Lumbar 5 - Sacral 1 transforaminal epidural injections utilizing fluoroscopic guidance with contrast dye.;  Surgeon: Christian Chaudhry MD;  [facility]    INJECT FACET JOINT Bilateral 1/25/2019    Procedure: Cervical Facet Injections Cervical 5-6 and 6-7 Bilateral;  Surgeon: Christian Chaudhry MD;  [facility]    INJECT FACET JOINT Bilateral 11/29/2019    Procedure: Cervical 5-6 and 6-7 Bilateral facet joint injection;  Surgeon: Christian Chaudhry MD;  [facility]    INJECT FACET JOINT Bilateral 2/11/2021    Procedure: Cervical 5-6 and Cervical 6-7 Bilateral facet injections;  Surgeon: Christian Chaudhry MD;  [facility]    INJECT FACET JOINT Bilateral 10/19/2021    Procedure: Left C5-C6, Right C5-C6, Left C6-C7 and Right C6-C7 Intra-articular zygopophyseal joint injection utilizing fluoroscopic guidance with contrast dye;  Surgeon: Christian Chaudhry MD;  [facility]    INJECT FACET JOINT Bilateral 9/14/2023    Procedure:  Left Cervical 5-Cervical 6, Right Cervical 5-Cervical 6, Left Cervical 6-Cervical 7 and Right Cervical 6-Cervical 7 Intra-articular zygopophyseal joint injection utilizing fluoroscopic guidance with contrast dye. Left Intra-articular shoulder Injection;  Surgeon: Christian Chaudhry MD;  [facility]    INJECT STEROID (LOCATION) Left 10/19/2021    Procedure: Glenohumeral joint injection left shoulder;  Surgeon: Christian Chaudhry MD;  [facility]    INJECT STEROID (LOCATION) Left 2022    Procedure: Left shoulder glenohumeral steroid inj with local;  Surgeon: Christian Chaudhry MD;  [facility]    INJECT STEROID TROCHANTERIC BURSA Left 2022    Procedure: Left intra-articular shoulder injections;  Surgeon: Christian Chaudhry MD;  [facility]    ORTHOPEDIC SURGERY      Right hand    TONSILLECTOMY      ZZC NONSPECIFIC PROCEDURE      rt hand surgery - laceration/glass/tendons    ZZHC ECHO HEART XTHORACIC, STRESS/REST  2009    neg       FAMILY HISTORY:  Family History   Problem Relation Age of Onset    Breast Cancer Mother     Arthritis Mother         joint ?    Depression Father     Cancer Maternal Grandmother     C.A.D. Maternal Grandfather     Cardiovascular Maternal Grandfather     Gynecology Paternal Grandmother          giving birth    Prostate Cancer Paternal Grandfather     Genetic Disorder Brother         joint pain?    Depression Brother     Cardiovascular Son         congenital heart defect -      Prostate Cancer Other          age 70's    Diabetes Other        SOCIAL HISTORY:  Social History     Socioeconomic History    Marital status:    Occupational History    Occupation:      Employer: SELF   Tobacco Use    Smoking status: Former     Current packs/day: 0.00     Types: Cigarettes     Quit date: 2007     Years since quittin.2    Smokeless tobacco: Never   Vaping Use    Vaping status: Never Used   Substance and Sexual Activity    Alcohol use: No      Comment: rare    Drug use: No    Sexual activity: Yes     Partners: Female   Other Topics Concern    Parent/sibling w/ CABG, MI or angioplasty before 65F 55M? No   Social History Narrative    Dairy/d a lot, 1 gallon of milk per day plus cheese and other servings     Caffeine 48 oz. coffee per day    Exercise none    Sunscreen used - No    Seatbelts used - Yes    Working smoke/CO detectors in the home - Yes    Guns stored in the home - No    Self Breast Exams - NA    Self Testicular Exam - No    Eye Exam up to date - No    Dental Exam up to date - Yes    Pap Smear up to date - NA    Mammogram up to date - NA    PSA up to date - No    Dexa Scan up to date - NA    Flex Sig / Colonoscopy up to date - No    Immunizations up to date - No    Abuse: Current or Past(Physical, Sexual or Emotional)- No    Do you feel safe in your environment - Yes    Tms,cma        Lives with spouse and her parents.     Social Determinants of Health     Financial Resource Strain: Low Risk  (1/10/2024)    Financial Resource Strain     Within the past 12 months, have you or your family members you live with been unable to get utilities (heat, electricity) when it was really needed?: No   Food Insecurity: Low Risk  (1/10/2024)    Food Insecurity     Within the past 12 months, did you worry that your food would run out before you got money to buy more?: No     Within the past 12 months, did the food you bought just not last and you didn t have money to get more?: No   Transportation Needs: Low Risk  (1/10/2024)    Transportation Needs     Within the past 12 months, has lack of transportation kept you from medical appointments, getting your medicines, non-medical meetings or appointments, work, or from getting things that you need?: No   Interpersonal Safety: Low Risk  (12/7/2023)    Interpersonal Safety     Do you feel physically and emotionally safe where you currently live?: Yes     Within the past 12 months, have you been hit, slapped, kicked  or otherwise physically hurt by someone?: No     Within the past 12 months, have you been humiliated or emotionally abused in other ways by your partner or ex-partner?: No   Housing Stability: Low Risk  (1/10/2024)    Housing Stability     Do you have housing? : Yes     Are you worried about losing your housing?: No               Thank you for allowing me to participate in the care of your patient.      Sincerely,     [facility]  cc:   Gustavo Farmer, DO  [address] [place] [address]

## 2024-06-05 ENCOUNTER — VIRTUAL VISIT (OUTPATIENT)
Dept: ONCOLOGY | Facility: CLINIC | Age: 59
End: 2024-06-05
Payer: COMMERCIAL

## 2024-06-05 DIAGNOSIS — R53.82 CHRONIC FATIGUE: ICD-10-CM

## 2024-06-05 DIAGNOSIS — Z15.89 MONOALLELIC MUTATION OF HFE GENE: ICD-10-CM

## 2024-06-05 DIAGNOSIS — M25.50 MULTIPLE JOINT PAIN: ICD-10-CM

## 2024-06-05 DIAGNOSIS — R10.11 RUQ ABDOMINAL PAIN: ICD-10-CM

## 2024-06-05 DIAGNOSIS — R79.89 HIGH SERUM FERRITIN: Primary | ICD-10-CM

## 2024-06-05 PROCEDURE — 99214 OFFICE O/P EST MOD 30 MIN: CPT | Mod: 95 | Performed by: INTERNAL MEDICINE

## 2024-06-05 NOTE — PROGRESS NOTES
Video-Visit Details     Video Start Time: 10:52AM     Type of service:  Video Visit     Video End Time: 11:29AM    Originating Location (pt. Location): Home     Distant Location (provider location):  [facility] off site     Platform used for Video Visit: Well       [facility]    Hematology/Oncology New Patient Note      Today's Date: 6/5/2024     Referring provider: n/a  Reason for Consultation: hemochromatosis     HISTORY OF PRESENT ILLNESS: Bulmaro Herrera is a 58 year old male who was referred to the Hematology/Oncology Clinic for hemochromatosis     Patient has medical history including obesity, hld, pre-diabetes, ROBBY- using mouth guard, non-cclusive CAD, PVCs w/palpitations, BPPV, chronic pain syndrome w/chronic left shoulder pain w/rotator cuff injury, lumbar spine herniation w/b/l L5/S1 transforaminal epidural injections, GERD, hepatic steatosis, lung granulomas, hx of aspiration pna 2007, needle phobia, hx tobacco use- quit 16 years ago    - 4/24 labs  Ferritin 546, iron sat index 27, TIBC 342, iron 93  HEMOCHROMATOSIS RESULTS  C282Y Mutation Interpretation: NORMAL  H63D Mutation Interpretation: HETEROZYGOTE  S65C Mutation Interpretation: NORMAL    Regarding symptoms of HH:  Weakness, fatigue, malaise- fatigue- has felt fatigued for decades, even in his 20s-30s; sleeps poorly 2/2 arthralgias, ROBBY- using mouth guard  Hepatomegaly or RUQ pain- 2 episodes of right abdominal pain/flank pain, 11/23 went to ER- CT AP w/o contrast DAVID; since then had bothersome sensation in RUQ  Arthralgias- chronic left shoulder pain w/rotator cuff injury, neck pain, back pain; gets steroid injections- neck and shoulder and separately in back- all given in 2/24   Thyroid dysfxn- 1/23 thyroid studies WNL  Premature amenorrhea or impotence   Cardiac sx- pt has intermittent palpitations, had holter monitor 4/23- using diltiazem prn      Regarding labs for HH:  LFTs- 3/24 LFTs normal, 2022 LFTs elevated,  known hemochromatosis   Thyroid- 1/23 TSH   Hyperglycemia- 1/23 hgb A1c 6.2     Family history:  - brother- presented with fatigue, dx w/hereditary hemochromatosis, unsure of which mutations he has, he has been getting phlebotomy   - sister- no hematologic dx, ferritin normal, didn't do genetic testing, menopause 3 months ago   - father- no hematologic dx  - mother- hx of heart disease, EF 5%, requiring LVAD, not a transplant candidate at her age and hx of breast cancer, no hx of iron overload    Pt is not taking any supplements containing iron, MVI does have vitamin C 133%, has red meat about 2-3 x/week  No alcohol use                 REVIEW OF SYSTEMS:   A 14 point ROS was reviewed with pertinent positives and negatives in the HPI.        HOME MEDICATIONS:  Current Outpatient Medications   Medication Sig Dispense Refill    acetaminophen (TYLENOL) 500 MG tablet Take 1,000 mg by mouth every morning      aspirin (ASA) 81 MG chewable tablet Take 1 tablet (81 mg) by mouth daily      cetirizine (ZYRTEC) 10 MG tablet Take 10 mg by mouth daily      diltiazem (CARDIZEM) 60 MG tablet Take 1 tablet (60 mg) by mouth 4 times daily 30 tablet 4    diltiazem ER (CARDIZEM SR) 60 MG 12 hr capsule Take 1 capsule (60 mg) by mouth 2 times daily 60 capsule 3    ibuprofen (ADVIL/MOTRIN) 200 MG tablet Take 400 mg by mouth every morning      meclizine 25 MG CHEW Take 25 mg by mouth every 6 hours as needed for dizziness (Patient not taking: Reported on 5/23/2024)      omeprazole (PRILOSEC) 40 MG DR capsule TAKE ONE CAPSULE (40MG) BY MOUTH DAILY 90 capsule 2    rosuvastatin (CRESTOR) 5 MG tablet Take 1 tablet (5 mg) by mouth daily 90 tablet 3    tiZANidine (ZANAFLEX) 4 MG tablet Take 1 tablet (4 mg) by mouth at bedtime (Patient not taking: Reported on 5/23/2024) 14 tablet 0         ALLERGIES:  Allergies   Allergen Reactions    Wasps [Hornets] Swelling    Percocet [Oxycodone-Acetaminophen] Itching         PAST MEDICAL HISTORY:  Past Medical  History:   Diagnosis Date    Back pains     Depressive disorder, not elsewhere classified 01/28/2007    declines Rx    Disc degeneration 12/01/2008    see MRI -throaco-lumbar mild discogenic degenerative changes    Elevated LFT's 11/01/2009    alt and ast    Other and unspecified hyperlipidemia 01/01/2007    Sleep disorder 09/01/2009    CPAP    Tobacco use disorder          PAST SURGICAL HISTORY:  Past Surgical History:   Procedure Laterality Date    COLONOSCOPY  07/07/08    adenomatous polyp repeat 5 years    COLONOSCOPY N/A 12/29/2017    Procedure: COMBINED COLONOSCOPY, SINGLE OR MULTIPLE BIOPSY/POLYPECTOMY BY BIOPSY;  Colonoscopy, Polypectomy by Biopsy;  Surgeon: Matt Reyes MD;  Location:  GI    COLONOSCOPY N/A 5/22/2023    Procedure: Colonoscopy;  Surgeon: Constantino Bowie DO;  Location:  GI    ENT SURGERY      for deviated septum    EXCISE LESION AXILLA  12/7/2018    Procedure: Excision Skin Tags Right Axilla;  Surgeon: Ayaan Chaney MD;  Location: [facility]    EXCISE LESION BACK N/A 12/7/2018    Procedure: Excision Back Skin Lesion X Two;  Surgeon: Ayaan Chaney MD;  [facility]    EXCISE MASS BACK N/A 4/15/2022    Procedure: Excisions skin lesions of back;  Surgeon: Ayaan Chaney MD;  [facility]    INJECT BLOCK MEDIAL BRANCH CERVICAL/THORACIC/LUMBAR Bilateral 5/26/2022    Procedure: Cervical 4, 5, 6 Medial Branch Block;  Surgeon: Christian Chaudhry MD;  [facility]    INJECT EPIDURAL CERVICAL N/A 5/14/2015    Procedure: INJECT EPIDURAL CERVICAL;  Surgeon: Christian Chaudhry MD;  [facility]    INJECT EPIDURAL CERVICAL N/A 12/28/2018    Procedure: INJECT EPIDURAL CERVICAL 6-7;  Surgeon: Christian Chaudhry MD;  [facility]    INJECT EPIDURAL CERVICAL Bilateral 6/8/2023    Procedure: Left Cervical 5-Cervical 6, Right Cervical 5-Cervical 6, Left Cervical 6-Cervical 7 and Right Cervical 6-Cervical 7 Intra-articular zygopophyseal joint injection utilizing fluoroscopic guidance  with contrast dye.;  Surgeon: Christian Chaudhry MD;  [facility]    INJECT EPIDURAL CERVICAL Bilateral 2/19/2024    Procedure: Left Cervical 5 - Cervical 6, Right Cervical 5 - Cervical 6, Left Cervical 6 - Cervical 7 and Right Cervical 6 - Cervical 7 Intra-articular zygopophyseal joint injection utilizing fluoroscopic guidance with contrast dye.;  Surgeon: Christian Chaudhry MD;  [facility]    INJECT EPIDURAL LUMBAR N/A 12/20/2019    Procedure: Lumbar 5- Sacral 1 Epidural  Injection bilatetral;  Surgeon: Christian Chaudhry MD;  [facility]    INJECT EPIDURAL LUMBAR Bilateral 9/8/2022    Procedure: Bilateral Lumbar 5-Sacral 1 transforaminal epidural injections utilizing fluoroscopic guidance with contrast dye  and;  Surgeon: Christian Chaudhry MD;  [facility]    INJECT EPIDURAL TRANSFORAMINAL Bilateral 5/20/2022    Procedure: Bilateral Lumbar 5- Sacral 1 transforaminal Epidural Steroid Injection;  Surgeon: Christian Chaudhry MD;  [facility]    INJECT EPIDURAL TRANSFORAMINAL Bilateral 7/14/2022    Procedure: Left Cervical 5-Cervical 6, Right Cervical 5-Cervical 6, Left Cervical 6-Cervical 7 and Right Cervical 6-Cervical 7 Intra-articular zygopophyseal joint injection utilizing fluoroscopic guidance with contrast dye;  Surgeon: Christian Chaudhry MD;  [facility]    INJECT EPIDURAL TRANSFORAMINAL Bilateral 6/16/2023    Procedure: Bilateral Lumbar 5-Sacral 1 transforaminal epidural injections utilizing fluoroscopic guidance with contrast dye;  Surgeon: Christian Chaudhry MD;  [facility]    INJECT EPIDURAL TRANSFORAMINAL Bilateral 9/28/2023    Procedure: Bilateral Lumbar 5-Sacral 1 transforaminal epidural injections utilizing fluoroscopic guidance with contrast dye;  Surgeon: Christian Chaudhry MD;  [facility]    INJECT EPIDURAL TRANSFORAMINAL Bilateral 3/15/2024    Procedure: Bilateral Lumbar 5 - Sacral 1 transforaminal epidural injections utilizing fluoroscopic guidance with contrast dye.;  Surgeon:  Christian Chaudhry MD;  [facility]    INJECT FACET JOINT Bilateral 1/25/2019    Procedure: Cervical Facet Injections Cervical 5-6 and 6-7 Bilateral;  Surgeon: Christian Chaudhry MD;  [facility]    INJECT FACET JOINT Bilateral 11/29/2019    Procedure: Cervical 5-6 and 6-7 Bilateral facet joint injection;  Surgeon: Christian Chaudhry MD;  [facility]    INJECT FACET JOINT Bilateral 2/11/2021    Procedure: Cervical 5-6 and Cervical 6-7 Bilateral facet injections;  Surgeon: Christian Chaudhry MD;  [facility]    INJECT FACET JOINT Bilateral 10/19/2021    Procedure: Left C5-C6, Right C5-C6, Left C6-C7 and Right C6-C7 Intra-articular zygopophyseal joint injection utilizing fluoroscopic guidance with contrast dye;  Surgeon: Christian Chaudhry MD;  [facility]    INJECT FACET JOINT Bilateral 9/14/2023    Procedure: Left Cervical 5-Cervical 6, Right Cervical 5-Cervical 6, Left Cervical 6-Cervical 7 and Right Cervical 6-Cervical 7 Intra-articular zygopophyseal joint injection utilizing fluoroscopic guidance with contrast dye. Left Intra-articular shoulder Injection;  Surgeon: Christian Chaudhry MD;  [facility]    INJECT STEROID (LOCATION) Left 10/19/2021    Procedure: Glenohumeral joint injection left shoulder;  Surgeon: Christian Chaudhry MD;  [facility]    INJECT STEROID (LOCATION) Left 5/20/2022    Procedure: Left shoulder glenohumeral steroid inj with local;  Surgeon: Christian Chaudhry MD;  [facility]    INJECT STEROID TROCHANTERIC BURSA Left 9/8/2022    Procedure: Left intra-articular shoulder injections;  Surgeon: Christian Chaudhry MD;  [facility]    ORTHOPEDIC SURGERY      Right hand    TONSILLECTOMY      ZZC NONSPECIFIC PROCEDURE      rt hand surgery - laceration/glass/tendons    ZZHC ECHO HEART XTHORACIC, STRESS/REST  6/2009    neg         SOCIAL HISTORY:  Social History     Socioeconomic History    Marital status:      Spouse name: Not on file    Number of children: Not on file    Years of  education: Not on file    Highest education level: Not on file   Occupational History    Occupation:      Employer: SELF   Tobacco Use    Smoking status: Former     Current packs/day: 0.00     Types: Cigarettes     Quit date: 2007     Years since quittin.2    Smokeless tobacco: Never   Vaping Use    Vaping status: Never Used   Substance and Sexual Activity    Alcohol use: No     Comment: rare    Drug use: No    Sexual activity: Yes     Partners: Female   Other Topics Concern    Parent/sibling w/ CABG, MI or angioplasty before 65F 55M? No   Social History Narrative    Dairy/d a lot, 1 gallon of milk per day plus cheese and other servings     Caffeine 48 oz. coffee per day    Exercise none    Sunscreen used - No    Seatbelts used - Yes    Working smoke/CO detectors in the home - Yes    Guns stored in the home - No    Self Breast Exams - NA    Self Testicular Exam - No    Eye Exam up to date - No    Dental Exam up to date - Yes    Pap Smear up to date - NA    Mammogram up to date - NA    PSA up to date - No    Dexa Scan up to date - NA    Flex Sig / Colonoscopy up to date - No    Immunizations up to date - No    Abuse: Current or Past(Physical, Sexual or Emotional)- No    Do you feel safe in your environment - Yes    Tms,liberty        Lives with spouse and her parents.     Social Determinants of Health     Financial Resource Strain: Low Risk  (1/10/2024)    Financial Resource Strain     Within the past 12 months, have you or your family members you live with been unable to get utilities (heat, electricity) when it was really needed?: No   Food Insecurity: Low Risk  (1/10/2024)    Food Insecurity     Within the past 12 months, did you worry that your food would run out before you got money to buy more?: No     Within the past 12 months, did the food you bought just not last and you didn't have money to get more?: No   Transportation Needs: Low Risk  (1/10/2024)    Transportation Needs     Within  the past 12 months, has lack of transportation kept you from medical appointments, getting your medicines, non-medical meetings or appointments, work, or from getting things that you need?: No   Physical Activity: Not on file   Stress: Not on file   Social Connections: Not on file   Interpersonal Safety: Low Risk  (2023)    Interpersonal Safety     Do you feel physically and emotionally safe where you currently live?: Yes     Within the past 12 months, have you been hit, slapped, kicked or otherwise physically hurt by someone?: No     Within the past 12 months, have you been humiliated or emotionally abused in other ways by your partner or ex-partner?: No   Housing Stability: Low Risk  (1/10/2024)    Housing Stability     Do you have housing? : Yes     Are you worried about losing your housing?: No         FAMILY HISTORY:  Family History   Problem Relation Age of Onset    Breast Cancer Mother     Arthritis Mother         joint ?    Depression Father     Cancer Maternal Grandmother     C.A.D. Maternal Grandfather     Cardiovascular Maternal Grandfather     Gynecology Paternal Grandmother          giving birth    Prostate Cancer Paternal Grandfather     Genetic Disorder Brother         joint pain?    Depression Brother     Cardiovascular Son         congenital heart defect -      Prostate Cancer Other          age 70's    Diabetes Other          PHYSICAL EXAM:  Vital signs:  There were no vitals taken for this visit.         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  - 4/24 labs  Ferritin 546, iron sat index 27, TIBC 342, iron 93  HEMOCHROMATOSIS RESULTS  C282Y Mutation Interpretation: NORMAL  H63D Mutation Interpretation: HETEROZYGOTE  S65C Mutation Interpretation: NORMAL  - 11/23 CT AP w/o contrast: At least moderate fatty infiltration of the liver, steatohepatitis not excluded in this setting     PLAN:  - usually heterozygous H63D mutation without confounding second mutation in HFE gene does not cause iron overload  - notably, iron saturation index is normal/<45%, also suggesting lack of iron overload  - of note, ferritin is an acute phase reactant and can be elevated for this reason (pt w/significant joint pain) and in liver disease as well  (pt w/hepatic steatosis)  - will check the following labs:  Ferritin, iron studies  ESR, CRP, rheumatoid factor, HAMILTON, anti-CCP antibody  - if repeat iron studies suggestive of iron overload, will refer to genetics to r/o confounding non-HFE gene mutation   - Check abdominal ultrasound with doppler with attention to liver  - recommend siblings and children get genetic tested for hereditary hemochromatosis as well   - Recommend that patient avoid iron supplementation, limit vitamin C intake, limit alcohol consumption and avoid raw shellfish consumption    #hld  #hepatic steasosis  #intermittent RUQ pain    #fatigue  #borderline B12  #ROBBY  - 3/24 hgb WNL, 1/23 TSH WNL, 2/16 B12 343  - repeat TSH, check B12, RBC folate, MMA, homocysteine     RTC 2-3 weeks for follow up with me (after abd US and labs)    ADDENDUM:  6/24 labs  Ferritin 473, iron sat 35%, TIBC 345, iron 122  CRP normal, ESR normal, HAMILTON negative, RF negative, anti CCP Ab negative   TSH 1.91, B12 412, RBC folate 992, homocysteine normal, MMA normal  Abd US w/doppler: hepatic steatosis     Ferritin improved from 546 to 473 w/o intervention  Iron sat remains normal  Elevated ferritin likely 2/2 liver disease w/hepatic steatosis  rather than iron overload  Recommend testing for HH for family members    Kylee Torres DO  Hematology/Oncology  [facility]

## 2024-06-05 NOTE — LETTER
6/5/2024      Bulmaro Herrera  [address]  [facility] [address]      Dear Colleague,    Thank you for referring your patient, Bulmaro Herrera, to the [facility]. Please see a copy of my visit note below.    Video-Visit Details     Video Start Time: 10:52AM     Type of service:  Video Visit     Video End Time: 11:29AM    Originating Location (pt. Location): Home     Distant Location (provider location):  [facility] off site     Platform used for Video Visit: Well       [facility]    Hematology/Oncology New Patient Note      Today's Date: 6/5/2024     Referring provider: n/a  Reason for Consultation: hemochromatosis     HISTORY OF PRESENT ILLNESS: Bulmaro Herrera is a 58 year old male who was referred to the Hematology/Oncology Clinic for hemochromatosis     Patient has medical history including obesity, hld, pre-diabetes, ROBBY- using mouth guard, non-cclusive CAD, PVCs w/palpitations, BPPV, chronic pain syndrome w/chronic left shoulder pain w/rotator cuff injury, lumbar spine herniation w/b/l L5/S1 transforaminal epidural injections, GERD, hepatic steatosis, lung granulomas, hx of aspiration pna 2007, needle phobia, hx tobacco use- quit 16 years ago    - 4/24 labs  Ferritin 546, iron sat index 27, TIBC 342, iron 93  HEMOCHROMATOSIS RESULTS  C282Y Mutation Interpretation: NORMAL  H63D Mutation Interpretation: HETEROZYGOTE  S65C Mutation Interpretation: NORMAL    Regarding symptoms of HH:  Weakness, fatigue, malaise- fatigue- has felt fatigued for decades, even in his 20s-30s; sleeps poorly 2/2 arthralgias, ROBBY- using mouth guard  Hepatomegaly or RUQ pain- 2 episodes of right abdominal pain/flank pain, 11/23 went to ER- CT AP w/o contrast DAVID; since then had bothersome sensation in RUQ  Arthralgias- chronic left shoulder pain w/rotator cuff injury, neck pain, back pain; gets steroid injections- neck and shoulder and separately in back- all given in 2/24    Thyroid dysfxn- 1/23 thyroid studies WNL  Premature amenorrhea or impotence   Cardiac sx- pt has intermittent palpitations, had holter monitor 4/23- using diltiazem prn      Regarding labs for HH:  LFTs- 3/24 LFTs normal, 2022 LFTs elevated, known hemochromatosis   Thyroid- 1/23 TSH   Hyperglycemia- 1/23 hgb A1c 6.2     Family history:  - brother- presented with fatigue, dx w/hereditary hemochromatosis, unsure of which mutations he has, he has been getting phlebotomy   - sister- no hematologic dx, ferritin normal, didn't do genetic testing, menopause 3 months ago   - father- no hematologic dx  - mother- hx of heart disease, EF 5%, requiring LVAD, not a transplant candidate at her age and hx of breast cancer, no hx of iron overload    Pt is not taking any supplements containing iron, MVI does have vitamin C 133%, has red meat about 2-3 x/week  No alcohol use                 REVIEW OF SYSTEMS:   A 14 point ROS was reviewed with pertinent positives and negatives in the HPI.        HOME MEDICATIONS:  Current Outpatient Medications   Medication Sig Dispense Refill     acetaminophen (TYLENOL) 500 MG tablet Take 1,000 mg by mouth every morning       aspirin (ASA) 81 MG chewable tablet Take 1 tablet (81 mg) by mouth daily       cetirizine (ZYRTEC) 10 MG tablet Take 10 mg by mouth daily       diltiazem (CARDIZEM) 60 MG tablet Take 1 tablet (60 mg) by mouth 4 times daily 30 tablet 4     diltiazem ER (CARDIZEM SR) 60 MG 12 hr capsule Take 1 capsule (60 mg) by mouth 2 times daily 60 capsule 3     ibuprofen (ADVIL/MOTRIN) 200 MG tablet Take 400 mg by mouth every morning       meclizine 25 MG CHEW Take 25 mg by mouth every 6 hours as needed for dizziness (Patient not taking: Reported on 5/23/2024)       omeprazole (PRILOSEC) 40 MG DR capsule TAKE ONE CAPSULE (40MG) BY MOUTH DAILY 90 capsule 2     rosuvastatin (CRESTOR) 5 MG tablet Take 1 tablet (5 mg) by mouth daily 90 tablet 3     tiZANidine (ZANAFLEX) 4 MG tablet Take 1  tablet (4 mg) by mouth at bedtime (Patient not taking: Reported on 5/23/2024) 14 tablet 0         ALLERGIES:  Allergies   Allergen Reactions     Wasps [Hornets] Swelling     Percocet [Oxycodone-Acetaminophen] Itching         PAST MEDICAL HISTORY:  Past Medical History:   Diagnosis Date     Back pains      Depressive disorder, not elsewhere classified 01/28/2007    declines Rx     Disc degeneration 12/01/2008    see MRI -throaco-lumbar mild discogenic degenerative changes     Elevated LFT's 11/01/2009    alt and ast     Other and unspecified hyperlipidemia 01/01/2007     Sleep disorder 09/01/2009    CPAP     Tobacco use disorder          PAST SURGICAL HISTORY:  Past Surgical History:   Procedure Laterality Date     COLONOSCOPY  07/07/08    adenomatous polyp repeat 5 years     COLONOSCOPY N/A 12/29/2017    Procedure: COMBINED COLONOSCOPY, SINGLE OR MULTIPLE BIOPSY/POLYPECTOMY BY BIOPSY;  Colonoscopy, Polypectomy by Biopsy;  Surgeon: Matt Reyes MD;  Location:  GI     COLONOSCOPY N/A 5/22/2023    Procedure: Colonoscopy;  Surgeon: Constantino Bowie DO;  Location:  GI     ENT SURGERY      for deviated septum     EXCISE LESION AXILLA  12/7/2018    Procedure: Excision Skin Tags Right Axilla;  Surgeon: Ayaan Chaney MD;  [facility]     EXCISE LESION BACK N/A 12/7/2018    Procedure: Excision Back Skin Lesion X Two;  Surgeon: Ayaan Chaney MD;  [facility]     EXCISE MASS BACK N/A 4/15/2022    Procedure: Excisions skin lesions of back;  Surgeon: Ayaan Chaney MD;  [facility]     INJECT BLOCK MEDIAL BRANCH CERVICAL/THORACIC/LUMBAR Bilateral 5/26/2022    Procedure: Cervical 4, 5, 6 Medial Branch Block;  Surgeon: Christian Chaudhry MD;  [facility]     INJECT EPIDURAL CERVICAL N/A 5/14/2015    Procedure: INJECT EPIDURAL CERVICAL;  Surgeon: Christian Chaudhry MD;  [facility]     INJECT EPIDURAL CERVICAL N/A 12/28/2018    Procedure: INJECT EPIDURAL CERVICAL 6-7;  Surgeon: Christian Chaudhry  MD;  [facility]     INJECT EPIDURAL CERVICAL Bilateral 6/8/2023    Procedure: Left Cervical 5-Cervical 6, Right Cervical 5-Cervical 6, Left Cervical 6-Cervical 7 and Right Cervical 6-Cervical 7 Intra-articular zygopophyseal joint injection utilizing fluoroscopic guidance with contrast dye.;  Surgeon: Christian Chaudhry MD;  [facility]     INJECT EPIDURAL CERVICAL Bilateral 2/19/2024    Procedure: Left Cervical 5 - Cervical 6, Right Cervical 5 - Cervical 6, Left Cervical 6 - Cervical 7 and Right Cervical 6 - Cervical 7 Intra-articular zygopophyseal joint injection utilizing fluoroscopic guidance with contrast dye.;  Surgeon: Christian Chaudhry MD;  [facility]     INJECT EPIDURAL LUMBAR N/A 12/20/2019    Procedure: Lumbar 5- Sacral 1 Epidural  Injection bilatetral;  Surgeon: Christian Chaudhry MD;  [facility]     INJECT EPIDURAL LUMBAR Bilateral 9/8/2022    Procedure: Bilateral Lumbar 5-Sacral 1 transforaminal epidural injections utilizing fluoroscopic guidance with contrast dye  and;  Surgeon: Christian Chaudhry MD;  [facility]     INJECT EPIDURAL TRANSFORAMINAL Bilateral 5/20/2022    Procedure: Bilateral Lumbar 5- Sacral 1 transforaminal Epidural Steroid Injection;  Surgeon: Christian Chaudhry MD;  [facility]     INJECT EPIDURAL TRANSFORAMINAL Bilateral 7/14/2022    Procedure: Left Cervical 5-Cervical 6, Right Cervical 5-Cervical 6, Left Cervical 6-Cervical 7 and Right Cervical 6-Cervical 7 Intra-articular zygopophyseal joint injection utilizing fluoroscopic guidance with contrast dye;  Surgeon: Christian Chaudhry MD;  [facility]     INJECT EPIDURAL TRANSFORAMINAL Bilateral 6/16/2023    Procedure: Bilateral Lumbar 5-Sacral 1 transforaminal epidural injections utilizing fluoroscopic guidance with contrast dye;  Surgeon: Christian Chaudhry MD;  [facility]     INJECT EPIDURAL TRANSFORAMINAL Bilateral 9/28/2023    Procedure: Bilateral Lumbar 5-Sacral 1 transforaminal epidural injections utilizing  fluoroscopic guidance with contrast dye;  Surgeon: Christian Chaudhry MD;  [facility]     INJECT EPIDURAL TRANSFORAMINAL Bilateral 3/15/2024    Procedure: Bilateral Lumbar 5 - Sacral 1 transforaminal epidural injections utilizing fluoroscopic guidance with contrast dye.;  Surgeon: Christian Chaudhry MD;  [facility]     INJECT FACET JOINT Bilateral 1/25/2019    Procedure: Cervical Facet Injections Cervical 5-6 and 6-7 Bilateral;  Surgeon: Christian Chaudhry MD;  [facility]     INJECT FACET JOINT Bilateral 11/29/2019    Procedure: Cervical 5-6 and 6-7 Bilateral facet joint injection;  Surgeon: Christian Chaudhry MD;  [facility]     INJECT FACET JOINT Bilateral 2/11/2021    Procedure: Cervical 5-6 and Cervical 6-7 Bilateral facet injections;  Surgeon: Christian Chaudhry MD;  [facility]     INJECT FACET JOINT Bilateral 10/19/2021    Procedure: Left C5-C6, Right C5-C6, Left C6-C7 and Right C6-C7 Intra-articular zygopophyseal joint injection utilizing fluoroscopic guidance with contrast dye;  Surgeon: Christian Chaudhry MD;  [facility]     INJECT FACET JOINT Bilateral 9/14/2023    Procedure: Left Cervical 5-Cervical 6, Right Cervical 5-Cervical 6, Left Cervical 6-Cervical 7 and Right Cervical 6-Cervical 7 Intra-articular zygopophyseal joint injection utilizing fluoroscopic guidance with contrast dye. Left Intra-articular shoulder Injection;  Surgeon: Christian Chaudhry MD;  [facility]     INJECT STEROID (LOCATION) Left 10/19/2021    Procedure: Glenohumeral joint injection left shoulder;  Surgeon: Christian Chaudhry MD;  [facility]     INJECT STEROID (LOCATION) Left 5/20/2022    Procedure: Left shoulder glenohumeral steroid inj with local;  Surgeon: Christian Chaudhry MD;  [facility]     INJECT STEROID TROCHANTERIC BURSA Left 9/8/2022    Procedure: Left intra-articular shoulder injections;  Surgeon: Christian Chaudhry MD;  [facility]     ORTHOPEDIC SURGERY      Right hand     TONSILLECTOMY       [facility]  NONSPECIFIC PROCEDURE      rt hand surgery - laceration/glass/tendons     ZZHC ECHO HEART XTHORACIC, STRESS/REST  2009    neg         SOCIAL HISTORY:  Social History     Socioeconomic History     Marital status:      Spouse name: Not on file     Number of children: Not on file     Years of education: Not on file     Highest education level: Not on file   Occupational History     Occupation:      Employer: SELF   Tobacco Use     Smoking status: Former     Current packs/day: 0.00     Types: Cigarettes     Quit date: 2007     Years since quittin.2     Smokeless tobacco: Never   Vaping Use     Vaping status: Never Used   Substance and Sexual Activity     Alcohol use: No     Comment: rare     Drug use: No     Sexual activity: Yes     Partners: Female   Other Topics Concern     Parent/sibling w/ CABG, MI or angioplasty before 65F 55M? No   Social History Narrative    Dairy/d a lot, 1 gallon of milk per day plus cheese and other servings     Caffeine 48 oz. coffee per day    Exercise none    Sunscreen used - No    Seatbelts used - Yes    Working smoke/CO detectors in the home - Yes    Guns stored in the home - No    Self Breast Exams - NA    Self Testicular Exam - No    Eye Exam up to date - No    Dental Exam up to date - Yes    Pap Smear up to date - NA    Mammogram up to date - NA    PSA up to date - No    Dexa Scan up to date - NA    Flex Sig / Colonoscopy up to date - No    Immunizations up to date - No    Abuse: Current or Past(Physical, Sexual or Emotional)- No    Do you feel safe in your environment - Yes    Tms,cma        Lives with spouse and her parents.     Social Determinants of Health     Financial Resource Strain: Low Risk  (1/10/2024)    Financial Resource Strain      Within the past 12 months, have you or your family members you live with been unable to get utilities (heat, electricity) when it was really needed?: No   Food Insecurity: Low Risk  (1/10/2024)    Food  Insecurity      Within the past 12 months, did you worry that your food would run out before you got money to buy more?: No      Within the past 12 months, did the food you bought just not last and you didn't have money to get more?: No   Transportation Needs: Low Risk  (1/10/2024)    Transportation Needs      Within the past 12 months, has lack of transportation kept you from medical appointments, getting your medicines, non-medical meetings or appointments, work, or from getting things that you need?: No   Physical Activity: Not on file   Stress: Not on file   Social Connections: Not on file   Interpersonal Safety: Low Risk  (2023)    Interpersonal Safety      Do you feel physically and emotionally safe where you currently live?: Yes      Within the past 12 months, have you been hit, slapped, kicked or otherwise physically hurt by someone?: No      Within the past 12 months, have you been humiliated or emotionally abused in other ways by your partner or ex-partner?: No   Housing Stability: Low Risk  (1/10/2024)    Housing Stability      Do you have housing? : Yes      Are you worried about losing your housing?: No         FAMILY HISTORY:  Family History   Problem Relation Age of Onset     Breast Cancer Mother      Arthritis Mother         joint ?     Depression Father      Cancer Maternal Grandmother      C.A.D. Maternal Grandfather      Cardiovascular Maternal Grandfather      Gynecology Paternal Grandmother          giving birth     Prostate Cancer Paternal Grandfather      Genetic Disorder Brother         joint pain?     Depression Brother      Cardiovascular Son         congenital heart defect -       Prostate Cancer Other          age 70's     Diabetes Other          PHYSICAL EXAM:  Vital signs:  There were no vitals taken for this visit.         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  - 4/24 labs  Ferritin 546, iron sat index 27, TIBC 342, iron 93  HEMOCHROMATOSIS RESULTS  C282Y Mutation Interpretation: NORMAL  H63D Mutation Interpretation: HETEROZYGOTE  S65C Mutation Interpretation: NORMAL  - 11/23 CT AP w/o contrast: At least moderate fatty infiltration of the liver, steatohepatitis not excluded in this setting     PLAN:  - usually heterozygous H63D mutation without confounding second mutation in HFE gene does not cause iron overload  - notably, iron saturation index is normal/<45%, also suggesting lack of iron overload  - of note, ferritin is an acute phase reactant and can be elevated for this reason (pt w/significant joint pain) and in liver disease as well  (pt w/hepatic steatosis)  - will check the following labs:  Ferritin, iron studies  ESR, CRP, rheumatoid factor, HAMILTON, anti-CCP antibody  - if repeat iron studies suggestive of iron overload, will refer to genetics to r/o confounding non-HFE gene mutation   - Check abdominal ultrasound with doppler with attention to liver  - recommend siblings and children get genetic tested for hereditary hemochromatosis as well   - Recommend that patient avoid iron supplementation, limit vitamin C intake, limit alcohol consumption and avoid raw shellfish consumption    #hld  #hepatic steasosis  #intermittent RUQ pain    #fatigue  #borderline B12  #ROBBY  - 3/24 hgb WNL, 1/23 TSH WNL, 2/16 B12 343  - repeat TSH, check B12, RBC folate, MMA, homocysteine     RTC 2-3 weeks for follow up with me (after abd US and labs)      Kylee Torres DO  Hematology/Oncology  [facility]  [facility]      Again, thank you for allowing me to participate in the care of your patient.        Sincerely,        ANGELA FINNEY, DO

## 2024-06-05 NOTE — LETTER
6/5/2024      Bulmaro Herrera  [address]  [facility] [address]      Dear Colleague,    Thank you for referring your patient, Bulmaro Herrera, to the [facility]. Please see a copy of my visit note below.    Video-Visit Details     Video Start Time: 10:52AM     Type of service:  Video Visit     Video End Time: 11:29AM    Originating Location (pt. Location): Home     Distant Location (provider location):  [facility] off site     Platform used for Video Visit: Well       [facility]    Hematology/Oncology New Patient Note      Today's Date: 6/5/2024     Referring provider: n/a  Reason for Consultation: hemochromatosis     HISTORY OF PRESENT ILLNESS: Bulmaro Herrera is a 58 year old male who was referred to the Hematology/Oncology Clinic for hemochromatosis     Patient has medical history including obesity, hld, pre-diabetes, ROBBY- using mouth guard, non-cclusive CAD, PVCs w/palpitations, BPPV, chronic pain syndrome w/chronic left shoulder pain w/rotator cuff injury, lumbar spine herniation w/b/l L5/S1 transforaminal epidural injections, GERD, hepatic steatosis, lung granulomas, hx of aspiration pna 2007, needle phobia, hx tobacco use- quit 16 years ago    - 4/24 labs  Ferritin 546, iron sat index 27, TIBC 342, iron 93  HEMOCHROMATOSIS RESULTS  C282Y Mutation Interpretation: NORMAL  H63D Mutation Interpretation: HETEROZYGOTE  S65C Mutation Interpretation: NORMAL    Regarding symptoms of HH:  Weakness, fatigue, malaise- fatigue- has felt fatigued for decades, even in his 20s-30s; sleeps poorly 2/2 arthralgias, ROBBY- using mouth guard  Hepatomegaly or RUQ pain- 2 episodes of right abdominal pain/flank pain, 11/23 went to ER- CT AP w/o contrast DAVID; since then had bothersome sensation in RUQ  Arthralgias- chronic left shoulder pain w/rotator cuff injury, neck pain, back pain; gets steroid injections- neck and shoulder and separately in back- all given in 2/24    Thyroid dysfxn- 1/23 thyroid studies WNL  Premature amenorrhea or impotence   Cardiac sx- pt has intermittent palpitations, had holter monitor 4/23- using diltiazem prn      Regarding labs for HH:  LFTs- 3/24 LFTs normal, 2022 LFTs elevated, known hemochromatosis   Thyroid- 1/23 TSH   Hyperglycemia- 1/23 hgb A1c 6.2     Family history:  - brother- presented with fatigue, dx w/hereditary hemochromatosis, unsure of which mutations he has, he has been getting phlebotomy   - sister- no hematologic dx, ferritin normal, didn't do genetic testing, menopause 3 months ago   - father- no hematologic dx  - mother- hx of heart disease, EF 5%, requiring LVAD, not a transplant candidate at her age and hx of breast cancer, no hx of iron overload    Pt is not taking any supplements containing iron, MVI does have vitamin C 133%, has red meat about 2-3 x/week  No alcohol use                 REVIEW OF SYSTEMS:   A 14 point ROS was reviewed with pertinent positives and negatives in the HPI.        HOME MEDICATIONS:  Current Outpatient Medications   Medication Sig Dispense Refill     acetaminophen (TYLENOL) 500 MG tablet Take 1,000 mg by mouth every morning       aspirin (ASA) 81 MG chewable tablet Take 1 tablet (81 mg) by mouth daily       cetirizine (ZYRTEC) 10 MG tablet Take 10 mg by mouth daily       diltiazem (CARDIZEM) 60 MG tablet Take 1 tablet (60 mg) by mouth 4 times daily 30 tablet 4     diltiazem ER (CARDIZEM SR) 60 MG 12 hr capsule Take 1 capsule (60 mg) by mouth 2 times daily 60 capsule 3     ibuprofen (ADVIL/MOTRIN) 200 MG tablet Take 400 mg by mouth every morning       meclizine 25 MG CHEW Take 25 mg by mouth every 6 hours as needed for dizziness (Patient not taking: Reported on 5/23/2024)       omeprazole (PRILOSEC) 40 MG DR capsule TAKE ONE CAPSULE (40MG) BY MOUTH DAILY 90 capsule 2     rosuvastatin (CRESTOR) 5 MG tablet Take 1 tablet (5 mg) by mouth daily 90 tablet 3     tiZANidine (ZANAFLEX) 4 MG tablet Take 1  tablet (4 mg) by mouth at bedtime (Patient not taking: Reported on 5/23/2024) 14 tablet 0         ALLERGIES:  Allergies   Allergen Reactions     Wasps [Hornets] Swelling     Percocet [Oxycodone-Acetaminophen] Itching         PAST MEDICAL HISTORY:  Past Medical History:   Diagnosis Date     Back pains      Depressive disorder, not elsewhere classified 01/28/2007    declines Rx     Disc degeneration 12/01/2008    see MRI -throaco-lumbar mild discogenic degenerative changes     Elevated LFT's 11/01/2009    alt and ast     Other and unspecified hyperlipidemia 01/01/2007     Sleep disorder 09/01/2009    CPAP     Tobacco use disorder          PAST SURGICAL HISTORY:  Past Surgical History:   Procedure Laterality Date     COLONOSCOPY  07/07/08    adenomatous polyp repeat 5 years     COLONOSCOPY N/A 12/29/2017    Procedure: COMBINED COLONOSCOPY, SINGLE OR MULTIPLE BIOPSY/POLYPECTOMY BY BIOPSY;  Colonoscopy, Polypectomy by Biopsy;  Surgeon: Matt Reyes MD;  Location:  GI     COLONOSCOPY N/A 5/22/2023    Procedure: Colonoscopy;  Surgeon: Constantino Bowie DO;  Location:  GI     ENT SURGERY      for deviated septum     EXCISE LESION AXILLA  12/7/2018    Procedure: Excision Skin Tags Right Axilla;  Surgeon: Ayaan Chaney MD;  [facility]     EXCISE LESION BACK N/A 12/7/2018    Procedure: Excision Back Skin Lesion X Two;  Surgeon: Ayaan Chaney MD;  [facility]: [facility]     EXCISE MASS BACK N/A 4/15/2022    Procedure: Excisions skin lesions of back;  Surgeon: Ayaan Chaney MD;  [facility]     INJECT BLOCK MEDIAL BRANCH CERVICAL/THORACIC/LUMBAR Bilateral 5/26/2022    Procedure: Cervical 4, 5, 6 Medial Branch Block;  Surgeon: Christian Chaudhry MD;  [facility]     INJECT EPIDURAL CERVICAL N/A 5/14/2015    Procedure: INJECT EPIDURAL CERVICAL;  Surgeon: Christian Chaudhry MD;  [facility]     INJECT EPIDURAL CERVICAL N/A 12/28/2018    Procedure: INJECT EPIDURAL CERVICAL 6-7;  Surgeon: Christian Chaudhry  MD;  [facility]     INJECT EPIDURAL CERVICAL Bilateral 6/8/2023    Procedure: Left Cervical 5-Cervical 6, Right Cervical 5-Cervical 6, Left Cervical 6-Cervical 7 and Right Cervical 6-Cervical 7 Intra-articular zygopophyseal joint injection utilizing fluoroscopic guidance with contrast dye.;  Surgeon: Christian Chaudhry MD;  [facility]     INJECT EPIDURAL CERVICAL Bilateral 2/19/2024    Procedure: Left Cervical 5 - Cervical 6, Right Cervical 5 - Cervical 6, Left Cervical 6 - Cervical 7 and Right Cervical 6 - Cervical 7 Intra-articular zygopophyseal joint injection utilizing fluoroscopic guidance with contrast dye.;  Surgeon: Christian Chaudhry MD;  [facility]     INJECT EPIDURAL LUMBAR N/A 12/20/2019    Procedure: Lumbar 5- Sacral 1 Epidural  Injection bilatetral;  Surgeon: Christian Chaudhry MD;  [facility]     INJECT EPIDURAL LUMBAR Bilateral 9/8/2022    Procedure: Bilateral Lumbar 5-Sacral 1 transforaminal epidural injections utilizing fluoroscopic guidance with contrast dye  and;  Surgeon: Christian Chaudhry MD;  [facility]     INJECT EPIDURAL TRANSFORAMINAL Bilateral 5/20/2022    Procedure: Bilateral Lumbar 5- Sacral 1 transforaminal Epidural Steroid Injection;  Surgeon: Christian Chaudhry MD;  [facility]     INJECT EPIDURAL TRANSFORAMINAL Bilateral 7/14/2022    Procedure: Left Cervical 5-Cervical 6, Right Cervical 5-Cervical 6, Left Cervical 6-Cervical 7 and Right Cervical 6-Cervical 7 Intra-articular zygopophyseal joint injection utilizing fluoroscopic guidance with contrast dye;  Surgeon: Christian Chaudhry MD;  [facility]     INJECT EPIDURAL TRANSFORAMINAL Bilateral 6/16/2023    Procedure: Bilateral Lumbar 5-Sacral 1 transforaminal epidural injections utilizing fluoroscopic guidance with contrast dye;  Surgeon: Christian Chaudhry MD;  [facility]     INJECT EPIDURAL TRANSFORAMINAL Bilateral 9/28/2023    Procedure: Bilateral Lumbar 5-Sacral 1 transforaminal epidural injections utilizing  fluoroscopic guidance with contrast dye;  Surgeon: Christian Chaudhry MD;  [facility]     INJECT EPIDURAL TRANSFORAMINAL Bilateral 3/15/2024    Procedure: Bilateral Lumbar 5 - Sacral 1 transforaminal epidural injections utilizing fluoroscopic guidance with contrast dye.;  Surgeon: Christian Chaudhry MD;  [facility]     INJECT FACET JOINT Bilateral 1/25/2019    Procedure: Cervical Facet Injections Cervical 5-6 and 6-7 Bilateral;  Surgeon: Christian Chaudhry MD;  [facility]     INJECT FACET JOINT Bilateral 11/29/2019    Procedure: Cervical 5-6 and 6-7 Bilateral facet joint injection;  Surgeon: Christian Chaudhry MD;  [facility]     INJECT FACET JOINT Bilateral 2/11/2021    Procedure: Cervical 5-6 and Cervical 6-7 Bilateral facet injections;  Surgeon: Christian Chaudhry MD;  [facility]     INJECT FACET JOINT Bilateral 10/19/2021    Procedure: Left C5-C6, Right C5-C6, Left C6-C7 and Right C6-C7 Intra-articular zygopophyseal joint injection utilizing fluoroscopic guidance with contrast dye;  Surgeon: Christian Chaudhry MD;  [facility]     INJECT FACET JOINT Bilateral 9/14/2023    Procedure: Left Cervical 5-Cervical 6, Right Cervical 5-Cervical 6, Left Cervical 6-Cervical 7 and Right Cervical 6-Cervical 7 Intra-articular zygopophyseal joint injection utilizing fluoroscopic guidance with contrast dye. Left Intra-articular shoulder Injection;  Surgeon: Christian Chaudhry MD;  [facility]     INJECT STEROID (LOCATION) Left 10/19/2021    Procedure: Glenohumeral joint injection left shoulder;  Surgeon: Christian Chaudhry MD;  [facility]     INJECT STEROID (LOCATION) Left 5/20/2022    Procedure: Left shoulder glenohumeral steroid inj with local;  Surgeon: Christian Chaudhry MD;  [facility]     INJECT STEROID TROCHANTERIC BURSA Left 9/8/2022    Procedure: Left intra-articular shoulder injections;  Surgeon: Christian Chaudhry MD;  [facility]     ORTHOPEDIC SURGERY      Right hand     TONSILLECTOMY       [facility]  NONSPECIFIC PROCEDURE      rt hand surgery - laceration/glass/tendons     ZZHC ECHO HEART XTHORACIC, STRESS/REST  2009    neg         SOCIAL HISTORY:  Social History     Socioeconomic History     Marital status:      Spouse name: Not on file     Number of children: Not on file     Years of education: Not on file     Highest education level: Not on file   Occupational History     Occupation:      Employer: SELF   Tobacco Use     Smoking status: Former     Current packs/day: 0.00     Types: Cigarettes     Quit date: 2007     Years since quittin.2     Smokeless tobacco: Never   Vaping Use     Vaping status: Never Used   Substance and Sexual Activity     Alcohol use: No     Comment: rare     Drug use: No     Sexual activity: Yes     Partners: Female   Other Topics Concern     Parent/sibling w/ CABG, MI or angioplasty before 65F 55M? No   Social History Narrative    Dairy/d a lot, 1 gallon of milk per day plus cheese and other servings     Caffeine 48 oz. coffee per day    Exercise none    Sunscreen used - No    Seatbelts used - Yes    Working smoke/CO detectors in the home - Yes    Guns stored in the home - No    Self Breast Exams - NA    Self Testicular Exam - No    Eye Exam up to date - No    Dental Exam up to date - Yes    Pap Smear up to date - NA    Mammogram up to date - NA    PSA up to date - No    Dexa Scan up to date - NA    Flex Sig / Colonoscopy up to date - No    Immunizations up to date - No    Abuse: Current or Past(Physical, Sexual or Emotional)- No    Do you feel safe in your environment - Yes    Tms,cma        Lives with spouse and her parents.     Social Determinants of Health     Financial Resource Strain: Low Risk  (1/10/2024)    Financial Resource Strain      Within the past 12 months, have you or your family members you live with been unable to get utilities (heat, electricity) when it was really needed?: No   Food Insecurity: Low Risk  (1/10/2024)    Food  Insecurity      Within the past 12 months, did you worry that your food would run out before you got money to buy more?: No      Within the past 12 months, did the food you bought just not last and you didn't have money to get more?: No   Transportation Needs: Low Risk  (1/10/2024)    Transportation Needs      Within the past 12 months, has lack of transportation kept you from medical appointments, getting your medicines, non-medical meetings or appointments, work, or from getting things that you need?: No   Physical Activity: Not on file   Stress: Not on file   Social Connections: Not on file   Interpersonal Safety: Low Risk  (2023)    Interpersonal Safety      Do you feel physically and emotionally safe where you currently live?: Yes      Within the past 12 months, have you been hit, slapped, kicked or otherwise physically hurt by someone?: No      Within the past 12 months, have you been humiliated or emotionally abused in other ways by your partner or ex-partner?: No   Housing Stability: Low Risk  (1/10/2024)    Housing Stability      Do you have housing? : Yes      Are you worried about losing your housing?: No         FAMILY HISTORY:  Family History   Problem Relation Age of Onset     Breast Cancer Mother      Arthritis Mother         joint ?     Depression Father      Cancer Maternal Grandmother      C.A.D. Maternal Grandfather      Cardiovascular Maternal Grandfather      Gynecology Paternal Grandmother          giving birth     Prostate Cancer Paternal Grandfather      Genetic Disorder Brother         joint pain?     Depression Brother      Cardiovascular Son         congenital heart defect -       Prostate Cancer Other          age 70's     Diabetes Other          PHYSICAL EXAM:  Vital signs:  There were no vitals taken for this visit.         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  - 4/24 labs  Ferritin 546, iron sat index 27, TIBC 342, iron 93  HEMOCHROMATOSIS RESULTS  C282Y Mutation Interpretation: NORMAL  H63D Mutation Interpretation: HETEROZYGOTE  S65C Mutation Interpretation: NORMAL  - 11/23 CT AP w/o contrast: At least moderate fatty infiltration of the liver, steatohepatitis not excluded in this setting     PLAN:  - usually heterozygous H63D mutation without confounding second mutation in HFE gene does not cause iron overload  - notably, iron saturation index is normal/<45%, also suggesting lack of iron overload  - of note, ferritin is an acute phase reactant and can be elevated for this reason (pt w/significant joint pain) and in liver disease as well  (pt w/hepatic steatosis)  - will check the following labs:  Ferritin, iron studies  ESR, CRP, rheumatoid factor, HAMILTON, anti-CCP antibody  - if repeat iron studies suggestive of iron overload, will refer to genetics to r/o confounding non-HFE gene mutation   - Check abdominal ultrasound with doppler with attention to liver  - recommend siblings and children get genetic tested for hereditary hemochromatosis as well   - Recommend that patient avoid iron supplementation, limit vitamin C intake, limit alcohol consumption and avoid raw shellfish consumption    #hld  #hepatic steasosis  #intermittent RUQ pain    #fatigue  #borderline B12  #ROBBY  - 3/24 hgb WNL, 1/23 TSH WNL, 2/16 B12 343  - repeat TSH, check B12, RBC folate, MMA, homocysteine     RTC 2-3 weeks for follow up with me (after abd US and labs)      Kylee Torres DO  Hematology/Oncology  [facility]      Again, thank you for allowing me to participate in the care of your patient.        Sincerely,        ANGELA FINNEY, DO   Alternatives Discussed Intro (Do Not Add Period): I discussed alternative treatments to Mohs surgery and specifically discussed the risks and benefits of

## 2024-06-09 ENCOUNTER — LAB (OUTPATIENT)
Dept: LAB | Facility: CLINIC | Age: 59
End: 2024-06-09
Payer: COMMERCIAL

## 2024-06-09 DIAGNOSIS — R53.82 CHRONIC FATIGUE: ICD-10-CM

## 2024-06-09 DIAGNOSIS — R79.89 HIGH SERUM FERRITIN: ICD-10-CM

## 2024-06-09 DIAGNOSIS — M25.50 MULTIPLE JOINT PAIN: ICD-10-CM

## 2024-06-09 LAB
CRP SERPL-MCNC: <3 MG/L
ERYTHROCYTE [SEDIMENTATION RATE] IN BLOOD BY WESTERGREN METHOD: 3 MM/HR (ref 0–20)
FERRITIN SERPL-MCNC: 473 NG/ML (ref 31–409)
HCT VFR BLD AUTO: 47.2 % (ref 40–53)
HCYS SERPL-SCNC: 10.1 UMOL/L (ref 0–15)
IRON BINDING CAPACITY (ROCHE): 345 UG/DL (ref 240–430)
IRON SATN MFR SERPL: 35 % (ref 15–46)
IRON SERPL-MCNC: 122 UG/DL (ref 61–157)
RHEUMATOID FACT SERPL-ACNC: <10 IU/ML
TSH SERPL DL<=0.005 MIU/L-ACNC: 1.91 UIU/ML (ref 0.3–4.2)
VIT B12 SERPL-MCNC: 412 PG/ML (ref 232–1245)

## 2024-06-09 PROCEDURE — 83550 IRON BINDING TEST: CPT | Performed by: INTERNAL MEDICINE

## 2024-06-09 PROCEDURE — 82747 ASSAY OF FOLIC ACID RBC: CPT | Mod: 90 | Performed by: INTERNAL MEDICINE

## 2024-06-09 PROCEDURE — 85652 RBC SED RATE AUTOMATED: CPT | Performed by: INTERNAL MEDICINE

## 2024-06-09 PROCEDURE — 84443 ASSAY THYROID STIM HORMONE: CPT | Performed by: INTERNAL MEDICINE

## 2024-06-09 PROCEDURE — 85014 HEMATOCRIT: CPT | Performed by: INTERNAL MEDICINE

## 2024-06-09 PROCEDURE — 83540 ASSAY OF IRON: CPT | Performed by: INTERNAL MEDICINE

## 2024-06-09 PROCEDURE — 86200 CCP ANTIBODY: CPT | Performed by: INTERNAL MEDICINE

## 2024-06-09 PROCEDURE — 86431 RHEUMATOID FACTOR QUANT: CPT | Performed by: INTERNAL MEDICINE

## 2024-06-09 PROCEDURE — 83090 ASSAY OF HOMOCYSTEINE: CPT | Performed by: INTERNAL MEDICINE

## 2024-06-09 PROCEDURE — 86038 ANTINUCLEAR ANTIBODIES: CPT | Performed by: INTERNAL MEDICINE

## 2024-06-09 PROCEDURE — 82607 VITAMIN B-12: CPT | Performed by: INTERNAL MEDICINE

## 2024-06-09 PROCEDURE — 86140 C-REACTIVE PROTEIN: CPT | Performed by: INTERNAL MEDICINE

## 2024-06-09 PROCEDURE — 99000 SPECIMEN HANDLING OFFICE-LAB: CPT | Performed by: INTERNAL MEDICINE

## 2024-06-09 PROCEDURE — 83921 ORGANIC ACID SINGLE QUANT: CPT | Performed by: INTERNAL MEDICINE

## 2024-06-09 PROCEDURE — 36415 COLL VENOUS BLD VENIPUNCTURE: CPT

## 2024-06-09 PROCEDURE — 82728 ASSAY OF FERRITIN: CPT | Performed by: INTERNAL MEDICINE

## 2024-06-10 ENCOUNTER — HOSPITAL ENCOUNTER (OUTPATIENT)
Dept: ULTRASOUND IMAGING | Facility: CLINIC | Age: 59
Discharge: HOME | End: 2024-06-10
Attending: INTERNAL MEDICINE | Admitting: INTERNAL MEDICINE
Payer: COMMERCIAL

## 2024-06-10 DIAGNOSIS — R10.11 RUQ ABDOMINAL PAIN: ICD-10-CM

## 2024-06-10 DIAGNOSIS — R79.89 HIGH SERUM FERRITIN: ICD-10-CM

## 2024-06-10 DIAGNOSIS — Z15.89 MONOALLELIC MUTATION OF HFE GENE: ICD-10-CM

## 2024-06-10 LAB
ANA SER QL IF: NEGATIVE
CCP AB SER IA-ACNC: 1.8 U/ML

## 2024-06-10 PROCEDURE — 76700 US EXAM ABDOM COMPLETE: CPT | Mod: XU

## 2024-06-11 ENCOUNTER — PRE VISIT (OUTPATIENT)
Dept: ONCOLOGY | Facility: CLINIC | Age: 59
End: 2024-06-11

## 2024-06-12 LAB
FOLATE RBC-MCNC: NORMAL NG/ML
Lab: NORMAL
PERFORMING LABORATORY: NORMAL
TEST NAME: NORMAL

## 2024-06-16 LAB — MISCELLANEOUS TEST 1 (ARUP): NORMAL

## 2024-06-18 DIAGNOSIS — M51.9 DISC DISORDER OF LUMBAR REGION: ICD-10-CM

## 2024-06-18 DIAGNOSIS — M54.16 LUMBAR RADICULOPATHY: ICD-10-CM

## 2024-06-18 DIAGNOSIS — M54.12 CERVICAL RADICULOPATHY: ICD-10-CM

## 2024-06-18 DIAGNOSIS — M48.02 NEURAL FORAMINAL STENOSIS OF CERVICAL SPINE: ICD-10-CM

## 2024-06-18 DIAGNOSIS — M54.2 CERVICALGIA: Primary | ICD-10-CM

## 2024-06-18 DIAGNOSIS — M47.812 ARTHROPATHY OF CERVICAL FACET JOINT: ICD-10-CM

## 2024-06-18 NOTE — PROGRESS NOTES
Attempted to reach out to patient, no answer. Left voice message for them to call clinic back to further discuss.     Sent pt mychart asking to clarify if she is following up for cervical or lumbar issues  Needs update MRI of affected area prior to appt  Order MRI once this has been clarified

## 2024-06-19 ENCOUNTER — TELEPHONE (OUTPATIENT)
Dept: NEUROSURGERY | Facility: CLINIC | Age: 59
End: 2024-06-19

## 2024-06-19 NOTE — TELEPHONE ENCOUNTER
Left voice message to contact clinic to schedule MRI prior to the appointment scheduled on 6/27 with Dr. Tran per staff message. Writer provided imaging scheduling teams number [phone].

## 2024-06-21 NOTE — TELEPHONE ENCOUNTER
Faxed  MRI Order  June 21, 2024 to fax number [email or web address] fax: [phone]    Right Fax confirmed at 11:23 AM    Evelin Marquez

## 2024-06-21 NOTE — TELEPHONE ENCOUNTER
Routed to [facility] to fax. Pt sent message to let us know what date he is scheduled as we may need to push back 6/27 appt with Dr. Tran.

## 2024-06-21 NOTE — TELEPHONE ENCOUNTER
Spoke with patient's spouse to push back  6/27 appointment with Dr. Tran as patient doesn't have a recent MRI.     Spouse is requesting to fax a referral to [facility] to get this done.

## 2024-06-24 NOTE — TELEPHONE ENCOUNTER
Left voice message to let us know what date patient is scheduled his MRI at [facility] as we need to schedule follow up with Dr. Tran afterwards.

## 2024-07-01 ENCOUNTER — MYC REFILL (OUTPATIENT)
Dept: INTERNAL MEDICINE | Facility: CLINIC | Age: 59
End: 2024-07-01
Payer: COMMERCIAL

## 2024-07-01 DIAGNOSIS — K21.9 GASTROESOPHAGEAL REFLUX DISEASE WITHOUT ESOPHAGITIS: ICD-10-CM

## 2024-07-01 RX ORDER — OMEPRAZOLE 40 MG/1
CAPSULE, DELAYED RELEASE ORAL
Qty: 90 CAPSULE | Refills: 0 | Status: SHIPPED | OUTPATIENT
Start: 2024-07-01 | End: 2024-09-26

## 2024-07-01 RX ORDER — OMEPRAZOLE 40 MG/1
CAPSULE, DELAYED RELEASE ORAL
Qty: 90 CAPSULE | Refills: 0 | OUTPATIENT
Start: 2024-07-01

## 2024-07-02 ENCOUNTER — TRANSFERRED RECORDS (OUTPATIENT)
Dept: HEALTH INFORMATION MANAGEMENT | Facility: CLINIC | Age: 59
End: 2024-07-02
Payer: COMMERCIAL

## 2024-07-08 ENCOUNTER — MYC MEDICAL ADVICE (OUTPATIENT)
Dept: INTERNAL MEDICINE | Facility: CLINIC | Age: 59
End: 2024-07-08
Payer: COMMERCIAL

## 2024-07-08 DIAGNOSIS — M54.12 CERVICAL RADICULOPATHY: Primary | ICD-10-CM

## 2024-07-10 ENCOUNTER — TELEPHONE (OUTPATIENT)
Dept: NEUROSURGERY | Facility: CLINIC | Age: 59
End: 2024-07-10
Payer: COMMERCIAL

## 2024-07-10 DIAGNOSIS — M54.12 CERVICAL RADICULOPATHY: ICD-10-CM

## 2024-07-10 DIAGNOSIS — M54.16 LUMBAR RADICULOPATHY: ICD-10-CM

## 2024-07-10 DIAGNOSIS — M54.2 CERVICALGIA: Primary | ICD-10-CM

## 2024-07-10 DIAGNOSIS — M48.02 NEURAL FORAMINAL STENOSIS OF CERVICAL SPINE: ICD-10-CM

## 2024-07-10 RX ORDER — PREDNISONE 20 MG/1
TABLET ORAL
Qty: 20 TABLET | Refills: 0 | Status: SHIPPED | OUTPATIENT
Start: 2024-07-10

## 2024-07-10 NOTE — TELEPHONE ENCOUNTER
Health Call Center    Phone Message    May a detailed message be left on voicemail: yes     Reason for Call: Other: Pts spouse called and stated that patient has an upcoming visit to go over the results of imaging, but the appointment is not until 7/25. Spouse Laine reports that patient was wondering if Dr Tran would put in another order for pt to get another injection done in the meantime and get the process going on that so patient does not have to wait until the end of the month to get another one. Please call back to advise.      Action Taken: Message routed to:  Other: Neurosurgery    Travel Screening: Not Applicable     Date of Service:

## 2024-07-10 NOTE — TELEPHONE ENCOUNTER
Patient called clinic requesting to repeat TWO injections.    Most recent MRI: 7/2/24 cervical and lumbar MRIs (located in PACS and report scanned into Media tab).    Cervical: 2/19/24 Left C5-6, right C5-6, Left 6-7, right C6-7 Intra-articular zygopophyseal joint injection   Provided 60% relief for (previous cervical injection provided greater relief at 80%). This injection lasted 3 months.  Reports new numbness to left hand 3rd and 4th fingers and 5th finger going into the palm is numb which started in April 2024. Reports ongoing neck pain.    Lumbar: 3/15/24 bilateral L5-S1 transforaminal KRYSTAL. 80% relief lasted about 2 months.  No new symptoms. Reports continued low back and left side lateral lower leg numb.    Number of injections in last 12 months AND what dates they were done:   Cervical:  2/19/24 9/14/23 6/8/23    Lumbar:  3/15/24  9/28/23  6/16/23    Plan: Has scheduled follow up with Dr. Tran on 7/25/24. Patient requesting injection orders now to he can get them scheduled and doesn't have to wait as long if injections are recommended at next visit. We should have a response either today or tomorrow.    Patient voiced understanding and agreement with plan.     Advised patient to call back with any questions or concerns.    Katelin Glez RN on 7/10/2024 at 12:07 PM

## 2024-07-10 NOTE — TELEPHONE ENCOUNTER
KRYSTAL orders pended. Routing to provider to sign.    LM and called wife (c2c on file) informing both that the injection orders have been approved. When I spoke with patient's spouse, I learned that the main goal of the upcoming visit is to see if there is something such as surgery that would provide longer term relief of pain. He is mostly concerned about the neck. Will call patient/spouse back if patient should stay the course with injections (instead of consult about surgery).    Katelin Glez RN on 7/10/2024 at 2:01 PM

## 2024-07-11 DIAGNOSIS — M54.16 LUMBAR RADICULOPATHY: Primary | ICD-10-CM

## 2024-07-11 NOTE — TELEPHONE ENCOUNTER
Spoke with spouse today. Informed her of Dr. Tran's response about surgery. She confirms that patient is not having arm pain. Patient let the previous injection wear too far off before starting the process for another injection as his pain is awful presently. I advised patient called once the injection start wearing off to request a repeat.    Patient was prescribed steroids to help with the pain. Writer confirmed with Dr. Chaudhry's scheduling staff that there're no restrictions between timing of oral steroids and the ESIs.    Patient also now reports N/t to feet for years. The podiatrist said it was stemming from his back. He's now wondering what other treatment options (including surgery) would be available. He last saw Dr. Tran in 2022 and there was mention that surgery could be an option after the injection at that time (per spouse report)    Patient requesting a refill of tizanidine as this helped with his pain in the past.    Katelin Glez RN on 7/11/2024 at 4:34 PM

## 2024-07-12 NOTE — TELEPHONE ENCOUNTER
Dr. Tran approved a script for tizanidine. Writer signed. Dr. Tran reviewed the lumbar MRI and said surgery would likely not be indicated at this time.    Writer informed spouse. The patient plans to continue injections every 3-4 months. Dr. Chaudhry mentioned something about PRP injections at the last visit but only does them at his [place] location. Patient plans to bring this up as a conversation at this next visit. Advised to have patient call for a repeat injection once the next set start to wear off.    Katelin Glez RN on 7/12/2024 at 9:14 AM

## 2024-07-16 ENCOUNTER — MYC REFILL (OUTPATIENT)
Dept: INTERNAL MEDICINE | Facility: CLINIC | Age: 59
End: 2024-07-16

## 2024-07-16 ENCOUNTER — MYC REFILL (OUTPATIENT)
Dept: CARDIOLOGY | Facility: CLINIC | Age: 59
End: 2024-07-16

## 2024-07-16 DIAGNOSIS — E78.00 HYPERCHOLESTEROLEMIA: ICD-10-CM

## 2024-07-16 DIAGNOSIS — I25.10 CORONARY ARTERY DISEASE INVOLVING NATIVE CORONARY ARTERY OF NATIVE HEART WITHOUT ANGINA PECTORIS: ICD-10-CM

## 2024-07-16 DIAGNOSIS — H81.13 BENIGN PAROXYSMAL POSITIONAL VERTIGO DUE TO BILATERAL VESTIBULAR DISORDER: Primary | ICD-10-CM

## 2024-07-17 RX ORDER — ROSUVASTATIN CALCIUM 5 MG/1
5 TABLET, COATED ORAL DAILY
Qty: 90 TABLET | Refills: 3 | Status: SHIPPED | OUTPATIENT
Start: 2024-07-17

## 2024-07-17 RX ORDER — MECLIZINE HCL 25MG 25 MG/1
25 TABLET, CHEWABLE ORAL EVERY 6 HOURS PRN
Qty: 60 TABLET | Refills: 1 | Status: SHIPPED | OUTPATIENT
Start: 2024-07-17 | End: 2024-09-26

## 2024-07-29 ENCOUNTER — OFFICE VISIT (OUTPATIENT)
Dept: INTERNAL MEDICINE | Facility: CLINIC | Age: 59
End: 2024-07-29
Payer: COMMERCIAL

## 2024-07-29 VITALS
BODY MASS INDEX: 34.43 KG/M2 | OXYGEN SATURATION: 96 % | RESPIRATION RATE: 14 BRPM | HEART RATE: 90 BPM | WEIGHT: 253.9 LBS | SYSTOLIC BLOOD PRESSURE: 118 MMHG | DIASTOLIC BLOOD PRESSURE: 82 MMHG | TEMPERATURE: 97.9 F

## 2024-07-29 DIAGNOSIS — Z01.818 PREOP GENERAL PHYSICAL EXAM: Primary | ICD-10-CM

## 2024-07-29 DIAGNOSIS — G89.29 CHRONIC LEFT SHOULDER PAIN: ICD-10-CM

## 2024-07-29 DIAGNOSIS — E66.01 MORBID OBESITY (H): ICD-10-CM

## 2024-07-29 DIAGNOSIS — M54.12 CERVICAL RADICULOPATHY: ICD-10-CM

## 2024-07-29 DIAGNOSIS — M51.369 DDD (DEGENERATIVE DISC DISEASE), LUMBAR: ICD-10-CM

## 2024-07-29 DIAGNOSIS — M25.512 CHRONIC LEFT SHOULDER PAIN: ICD-10-CM

## 2024-07-29 DIAGNOSIS — G47.33 OSA (OBSTRUCTIVE SLEEP APNEA): ICD-10-CM

## 2024-07-29 DIAGNOSIS — Z83.49 FAMILY HISTORY OF HEMOCHROMATOSIS: ICD-10-CM

## 2024-07-29 PROCEDURE — 99214 OFFICE O/P EST MOD 30 MIN: CPT | Performed by: INTERNAL MEDICINE

## 2024-07-29 NOTE — PROGRESS NOTES
Preoperative Evaluation  [address] [address]  Phone: [phone]  Primary Provider: Gustavo Farmer DO  Pre-op Performing Provider: Gustavo Farmer DO  Jul 29, 2024 7/29/2024   Surgical Information   What procedure is being done? Injections    Facility or Hospital where procedure/surgery will be performed: [facility]   Who is doing the procedure / surgery? dr Chaudhry   Date of surgery / procedure: 08/08 and 8/16    Time of surgery / procedure: 730am   Where do you plan to recover after surgery? at home with family        Fax number for surgical facility: Note does not need to be faxed, will be available electronically in Epic.    Assessment & Plan     The proposed surgical procedure is considered LOW risk.    Preop general physical exam      Cervical radiculopathy  Positive physical therapy.  Has had previous epidural injections in the past with excellent results.    Chronic left shoulder pain  Chronic and ongoing.  Has responded to intra-articular joint injection in the past.  2 injection not performed in the office due to patient's severe fear of needles.    DDD (degenerative disc disease), lumbar  Chronic and ongoing.    ROBBY (obstructive sleep apnea)  Please do not leave patient unattended in the supine position.    Morbid obesity (H)  Recommend weight loss responsible caloric restriction and exercise as tolerated    Family history of hemochromatosis  Recent hematology workup demonstrates patient does not have hemochromatosis            - No identified additional risk factors other than previously addressed    Antiplatelet or Anticoagulation Medication Instructions   - aspirin: Discontinue aspirin 7-10 days prior to procedure to reduce bleeding risk. It should be resumed postoperatively.     Additional Medication Instructions  Patient will hold all medications morning of procedure.    Recommendation  Approval  given to proceed with proposed procedure, without further diagnostic evaluation.    Jemima Chamberlain is a 59 year old, presenting for the following:  Pre-Op Exam          7/29/2024     2:39 PM   Additional Questions   Roomed by Renuka NAVARRO related to upcoming procedure: Patient has persistent cervical pain with radiation into the left shoulder and into the upper left arm.  He has not responded to physical therapy.  He is anticipating epidural injection.  He has had this in the past with good results.        7/29/2024   Pre-Op Questionnaire   Have you ever had a heart attack or stroke? No   Have you ever had surgery on your heart or blood vessels, such as a stent placement, a coronary artery bypass, or surgery on an artery in your head, neck, heart, or legs? No   Do you have chest pain with activity? No   Do you have a history of heart failure? No   Do you currently have a cold, bronchitis or symptoms of other infection? No   Do you have a cough, shortness of breath, or wheezing? No   Do you or anyone in your family have previous history of blood clots? No   Do you or does anyone in your family have a serious bleeding problem such as prolonged bleeding following surgeries or cuts? No   Have you ever had problems with anemia or been told to take iron pills? No   Have you had any abnormal blood loss such as black, tarry or bloody stools? No   Have you ever had a blood transfusion? No   Are you willing to have a blood transfusion if it is medically needed before, during, or after your surgery? Yes   Have you or any of your relatives ever had problems with anesthesia? (!) YES    Do you have sleep apnea, excessive snoring or daytime drowsiness? No   Do you have any artifical heart valves or other implanted medical devices like a pacemaker, defibrillator, or continuous glucose monitor? No   Do you have artificial joints? No   Are you allergic to latex? No        Health Care Directive  Patient does not have a Health  Care Directive or Living Will: Discussed advance care planning with patient; information given to patient to review.    Preoperative Review of    reviewed - no record of controlled substances prescribed.          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/2022     Priority: Medium     Added automatically from request for surgery [id number]      Chondromalacia of left shoulder 08/18/2021     Priority: Medium    Partial nontraumatic tear of left rotator cuff 08/18/2021     Priority: Medium    Soft tissue mass 08/05/2021     Priority: Medium    Rotator cuff syndrome of left shoulder 08/05/2021     Priority: Medium    Chronic left shoulder pain 08/05/2021     Priority: Medium    NSAID long-term use 11/18/2019     Priority: Medium    Severe needle phobia 01/21/2019     Priority: Medium     Class: Chronic     likely to preclude him from having minimally invasive interventional pain procedures with minimal sedation. Would need deeper MAC sedation      Mixed hyperlipidemia 11/13/2018     Priority: Medium    ROBBY (obstructive sleep apnea) 11/13/2018     Priority: Medium    Herniation of intervertebral disc of cervical spine without radiculopathy 03/21/2017     Priority: Medium    Cervicalgia 03/21/2017     Priority: Medium    Chronic pain syndrome 04/05/2016     Priority: Medium     DDD, cervical. T#3, #60/mo.       DDD (degenerative disc disease), lumbar 12/20/2013     Priority: Medium    DDD (degenerative disc disease), cervical 12/20/2013     Priority: Medium    Pruritus ani 01/20/2010     Priority: Medium    Back pains      Priority: Medium     Problem list name updated by automated process. Provider to review and confirm      Esophageal reflux 04/13/2005     Priority: Medium      Past Medical History:   Diagnosis Date    Back pains     Depressive disorder, not elsewhere classified 01/28/2007    declines Rx    Disc  degeneration 12/01/2008    see MRI -throaco-lumbar mild discogenic degenerative changes    Elevated LFT's 11/01/2009    alt and ast    Other and unspecified hyperlipidemia 01/01/2007    Sleep disorder 09/01/2009    CPAP    Tobacco use disorder      Past Surgical History:   Procedure Laterality Date    COLONOSCOPY  07/07/08    adenomatous polyp repeat 5 years    COLONOSCOPY N/A 12/29/2017    Procedure: COMBINED COLONOSCOPY, SINGLE OR MULTIPLE BIOPSY/POLYPECTOMY BY BIOPSY;  Colonoscopy, Polypectomy by Biopsy;  Surgeon: Matt Reyes MD;  Location:  GI    COLONOSCOPY N/A 5/22/2023    Procedure: Colonoscopy;  Surgeon: Constantino Bowie DO;  Location:  GI    ENT SURGERY      for deviated septum    EXCISE LESION AXILLA  12/7/2018    Procedure: Excision Skin Tags Right Axilla;  Surgeon: Ayaan Chaney MD;  [facility]    EXCISE LESION BACK N/A 12/7/2018    Procedure: Excision Back Skin Lesion X Two;  Surgeon: Ayaan Chaney MD;  [facility]    EXCISE MASS BACK N/A 4/15/2022    Procedure: Excisions skin lesions of back;  Surgeon: Ayaan Chaney MD;  [facility]    INJECT BLOCK MEDIAL BRANCH CERVICAL/THORACIC/LUMBAR Bilateral 5/26/2022    Procedure: Cervical 4, 5, 6 Medial Branch Block;  Surgeon: Christian Chaudhry MD;  [facility]    INJECT EPIDURAL CERVICAL N/A 5/14/2015    Procedure: INJECT EPIDURAL CERVICAL;  Surgeon: Christian Chaudhry MD;  [facility]    INJECT EPIDURAL CERVICAL N/A 12/28/2018    Procedure: INJECT EPIDURAL CERVICAL 6-7;  Surgeon: Christian Chaudhry MD;  [facility]    INJECT EPIDURAL CERVICAL Bilateral 6/8/2023    Procedure: Left Cervical 5-Cervical 6, Right Cervical 5-Cervical 6, Left Cervical 6-Cervical 7 and Right Cervical 6-Cervical 7 Intra-articular zygopophyseal joint injection utilizing fluoroscopic guidance with contrast dye.;  Surgeon: Christian Chaudhry MD;  [facility]    INJECT EPIDURAL CERVICAL Bilateral 2/19/2024    Procedure: Left Cervical 5 -  Cervical 6, Right Cervical 5 - Cervical 6, Left Cervical 6 - Cervical 7 and Right Cervical 6 - Cervical 7 Intra-articular zygopophyseal joint injection utilizing fluoroscopic guidance with contrast dye.;  Surgeon: Christian Chaudhry MD;  [facility]    INJECT EPIDURAL LUMBAR N/A 12/20/2019    Procedure: Lumbar 5- Sacral 1 Epidural  Injection bilatetral;  Surgeon: Christian Chaudhry MD;  [facility]    INJECT EPIDURAL LUMBAR Bilateral 9/8/2022    Procedure: Bilateral Lumbar 5-Sacral 1 transforaminal epidural injections utilizing fluoroscopic guidance with contrast dye  and;  Surgeon: Christian Chaudhry MD;  [facility]    INJECT EPIDURAL TRANSFORAMINAL Bilateral 5/20/2022    Procedure: Bilateral Lumbar 5- Sacral 1 transforaminal Epidural Steroid Injection;  Surgeon: Christian Chaudhry MD;  [facility]    INJECT EPIDURAL TRANSFORAMINAL Bilateral 7/14/2022    Procedure: Left Cervical 5-Cervical 6, Right Cervical 5-Cervical 6, Left Cervical 6-Cervical 7 and Right Cervical 6-Cervical 7 Intra-articular zygopophyseal joint injection utilizing fluoroscopic guidance with contrast dye;  Surgeon: Christian Chaudhry MD;  [facility]    INJECT EPIDURAL TRANSFORAMINAL Bilateral 6/16/2023    Procedure: Bilateral Lumbar 5-Sacral 1 transforaminal epidural injections utilizing fluoroscopic guidance with contrast dye;  Surgeon: Christian Chaudhry MD;  [facility]    INJECT EPIDURAL TRANSFORAMINAL Bilateral 9/28/2023    Procedure: Bilateral Lumbar 5-Sacral 1 transforaminal epidural injections utilizing fluoroscopic guidance with contrast dye;  Surgeon: Christian Chaudhry MD;  [facility]    INJECT EPIDURAL TRANSFORAMINAL Bilateral 3/15/2024    Procedure: Bilateral Lumbar 5 - Sacral 1 transforaminal epidural injections utilizing fluoroscopic guidance with contrast dye.;  Surgeon: Christian Chaudhry MD;  [facility]    INJECT FACET JOINT Bilateral 1/25/2019    Procedure: Cervical Facet Injections Cervical 5-6 and 6-7 Bilateral;   Surgeon: Christian Chaudhry MD;  [facility]    INJECT FACET JOINT Bilateral 11/29/2019    Procedure: Cervical 5-6 and 6-7 Bilateral facet joint injection;  Surgeon: Christian Chaudhry MD;  [facility]    INJECT FACET JOINT Bilateral 2/11/2021    Procedure: Cervical 5-6 and Cervical 6-7 Bilateral facet injections;  Surgeon: Christian Chaudhry MD;  [facility]    INJECT FACET JOINT Bilateral 10/19/2021    Procedure: Left C5-C6, Right C5-C6, Left C6-C7 and Right C6-C7 Intra-articular zygopophyseal joint injection utilizing fluoroscopic guidance with contrast dye;  Surgeon: Christian Chaudhry MD;  [facility]    INJECT FACET JOINT Bilateral 9/14/2023    Procedure: Left Cervical 5-Cervical 6, Right Cervical 5-Cervical 6, Left Cervical 6-Cervical 7 and Right Cervical 6-Cervical 7 Intra-articular zygopophyseal joint injection utilizing fluoroscopic guidance with contrast dye. Left Intra-articular shoulder Injection;  Surgeon: Christian Chaudhry MD;  [facility]    INJECT STEROID (LOCATION) Left 10/19/2021    Procedure: Glenohumeral joint injection left shoulder;  Surgeon: Christian Chaudhry MD;  [facility]    INJECT STEROID (LOCATION) Left 5/20/2022    Procedure: Left shoulder glenohumeral steroid inj with local;  Surgeon: Christian Chaudhry MD;  [facility]    INJECT STEROID TROCHANTERIC BURSA Left 9/8/2022    Procedure: Left intra-articular shoulder injections;  Surgeon: Christian Chaudhry MD;  [facility]    ORTHOPEDIC SURGERY      Right hand    TONSILLECTOMY      ZZC NONSPECIFIC PROCEDURE      rt hand surgery - laceration/glass/tendons    [facility]  [facility]    neg     Current Outpatient Medications   Medication Sig Dispense Refill    acetaminophen (TYLENOL) 500 MG tablet Take 1,000 mg by mouth every morning      aspirin (ASA) 81 MG chewable tablet Take 1 tablet (81 mg) by mouth daily      cetirizine (ZYRTEC) 10 MG tablet Take 10 mg by mouth daily      ibuprofen (ADVIL/MOTRIN) 200  MG tablet Take 400 mg by mouth every morning      meclizine 25 MG CHEW Take 1 tablet (25 mg) by mouth every 6 hours as needed for dizziness 60 tablet 1    omeprazole (PRILOSEC) 40 MG DR capsule TAKE ONE CAPSULE (40MG) BY MOUTH DAILY 90 capsule 0    predniSONE (DELTASONE) 20 MG tablet Take 3 tabs by mouth daily x 3 days, then 2 tabs daily x 3 days, then 1 tab daily x 3 days, then 1/2 tab daily x 3 days. 20 tablet 0    rosuvastatin (CRESTOR) 5 MG tablet Take 1 tablet (5 mg) by mouth daily 90 tablet 3    diltiazem (CARDIZEM) 60 MG tablet Take 1 tablet (60 mg) by mouth 4 times daily (Patient not taking: Reported on 2024) 30 tablet 4    diltiazem ER (CARDIZEM SR) 60 MG 12 hr capsule Take 1 capsule (60 mg) by mouth 2 times daily (Patient not taking: Reported on 2024) 60 capsule 3    MODAFINIL PO  (Patient not taking: Reported on 2024)      tiZANidine (ZANAFLEX) 4 MG tablet Take 1 tablet (4 mg) by mouth 3 times daily (Patient not taking: Reported on 2024) 20 tablet 0       Allergies   Allergen Reactions    Wasps [Hornets] Swelling    Percocet [Oxycodone-Acetaminophen] Itching        Social History     Tobacco Use    Smoking status: Former     Current packs/day: 0.00     Types: Cigarettes     Quit date: 2007     Years since quittin.4    Smokeless tobacco: Never   Substance Use Topics    Alcohol use: No     Comment: rare     Family History   Problem Relation Age of Onset    Coronary Artery Disease Mother     Breast Cancer Mother     Arthritis Mother         joint ?    Depression Father     Cancer Maternal Grandmother     C.A.D. Maternal Grandfather     Cardiovascular Maternal Grandfather     Gynecology Paternal Grandmother          giving birth    Prostate Cancer Paternal Grandfather     Genetic Disorder Brother         joint pain?    Depression Brother     Cardiovascular Son         congenital heart defect -      Prostate Cancer Other          age 70's    Diabetes Other   "    History   Drug Use No             Review of Systems  CONSTITUTIONAL: NEGATIVE for fever, chills, change in weight  INTEGUMENTARY/SKIN: NEGATIVE for worrisome rashes, moles or lesions  EYES: NEGATIVE for vision changes or irritation  ENT/MOUTH: NEGATIVE for ear, mouth and throat problems  RESP: NEGATIVE for significant cough or SOB  BREAST: NEGATIVE for masses, tenderness or discharge  CV: NEGATIVE for chest pain, palpitations or peripheral edema  GI: NEGATIVE for nausea, abdominal pain, heartburn, or change in bowel habits  : NEGATIVE for frequency, dysuria, or hematuria  MUSCULOSKELETAL: Persistent of her left shoulder and arm pain.  Worsened with cervical range of motion.  Also has localized pain in the neck.  NEURO: NEGATIVE for weakness, dizziness or paresthesias  ENDOCRINE: NEGATIVE for temperature intolerance, skin/hair changes  HEME: NEGATIVE for bleeding problems  PSYCHIATRIC: NEGATIVE for changes in mood or affect    Objective    /82   Pulse 90   Temp 97.9  F (36.6  C)   Resp 14   Wt 115.2 kg (253 lb 14.4 oz)   SpO2 96%   BMI 34.43 kg/m     Estimated body mass index is 34.43 kg/m  as calculated from the following:    Height as of 5/23/24: 1.829 m (6' 0.01\").    Weight as of this encounter: 115.2 kg (253 lb 14.4 oz).  Physical Exam  GENERAL: alert and no distress  EYES: Eyes grossly normal to inspection, PERRL and conjunctivae and sclerae normal  HENT: ear canals and TM's normal, nose and mouth without ulcers or lesions  NECK: no adenopathy, no asymmetry, masses, or scars  RESP: lungs clear to auscultation - no rales, rhonchi or wheezes  CV: regular rate and rhythm, normal S1 S2, no S3 or S4, no murmur, click or rub, no peripheral edema  ABDOMEN: soft, nontender, no hepatosplenomegaly, no masses and bowel sounds normal  MS: Some cervical crepitance with range of motion is noted.  Limitation with rotation and extension noted.  SKIN: no suspicious lesions or rashes  NEURO: Normal strength and " tone, mentation intact and speech normal  PSYCH: mentation appears normal, affect normal/bright    Recent Labs   Lab Test 03/09/24  1022 11/11/23  1331   HGB 16.1 15.8    212    139   POTASSIUM 4.2 3.9   CR 0.98 1.08        Diagnostics  No labs were ordered during this visit.   No EKG required for low risk surgery (cataract, skin procedure, breast biopsy, etc).    Revised Cardiac Risk Index (RCRI)  The patient has the following serious cardiovascular risks for perioperative complications:   - No serious cardiac risks = 0 points     RCRI Interpretation: 0 points: Class I (very low risk - 0.4% complication rate)         Signed Electronically by: Gustavo Farmer DO  A copy of this evaluation report is provided to the requesting physician.

## 2024-08-08 ENCOUNTER — APPOINTMENT (OUTPATIENT)
Dept: GENERAL RADIOLOGY | Facility: CLINIC | Age: 59
End: 2024-08-08
Attending: ANESTHESIOLOGY
Payer: COMMERCIAL

## 2024-08-08 ENCOUNTER — ANESTHESIA EVENT (OUTPATIENT)
Dept: SURGERY | Facility: CLINIC | Age: 59
End: 2024-08-08
Payer: COMMERCIAL

## 2024-08-08 ENCOUNTER — ANESTHESIA (OUTPATIENT)
Dept: SURGERY | Facility: CLINIC | Age: 59
End: 2024-08-08
Payer: COMMERCIAL

## 2024-08-08 ENCOUNTER — HOSPITAL ENCOUNTER (OUTPATIENT)
Facility: CLINIC | Age: 59
Discharge: HOME | End: 2024-08-08
Attending: ANESTHESIOLOGY | Admitting: ANESTHESIOLOGY
Payer: COMMERCIAL

## 2024-08-08 VITALS
HEART RATE: 82 BPM | SYSTOLIC BLOOD PRESSURE: 97 MMHG | TEMPERATURE: 97.1 F | DIASTOLIC BLOOD PRESSURE: 86 MMHG | OXYGEN SATURATION: 92 % | RESPIRATION RATE: 18 BRPM

## 2024-08-08 DIAGNOSIS — M48.02 NEURAL FORAMINAL STENOSIS OF CERVICAL SPINE: ICD-10-CM

## 2024-08-08 DIAGNOSIS — M54.12 CERVICAL RADICULOPATHY: ICD-10-CM

## 2024-08-08 DIAGNOSIS — M54.2 CERVICALGIA: ICD-10-CM

## 2024-08-08 PROCEDURE — 64479 NJX AA&/STRD TFRM EPI C/T 1: CPT | Performed by: ANESTHESIOLOGY

## 2024-08-08 PROCEDURE — 250N000011 HC RX IP 250 OP 636: Performed by: NURSE ANESTHETIST, CERTIFIED REGISTERED

## 2024-08-08 PROCEDURE — 62321 NJX INTERLAMINAR CRV/THRC: CPT | Performed by: ANESTHESIOLOGY

## 2024-08-08 PROCEDURE — 250N000011 HC RX IP 250 OP 636: Performed by: ANESTHESIOLOGY

## 2024-08-08 PROCEDURE — 250N000009 HC RX 250: Performed by: NURSE ANESTHETIST, CERTIFIED REGISTERED

## 2024-08-08 PROCEDURE — 250N000009 HC RX 250: Performed by: ANESTHESIOLOGY

## 2024-08-08 PROCEDURE — 20610 DRAIN/INJ JOINT/BURSA W/O US: CPT | Mod: XS | Performed by: NURSE ANESTHETIST, CERTIFIED REGISTERED

## 2024-08-08 PROCEDURE — 20610 DRAIN/INJ JOINT/BURSA W/O US: CPT | Mod: LT | Performed by: ANESTHESIOLOGY

## 2024-08-08 PROCEDURE — 370N000017 HC ANESTHESIA TECHNICAL FEE, PER MIN: Performed by: ANESTHESIOLOGY

## 2024-08-08 PROCEDURE — 999N000179 XR SURGERY CARM FLUORO LESS THAN 5 MIN W STILLS: Mod: TC

## 2024-08-08 RX ORDER — FENTANYL CITRATE 50 UG/ML
50 INJECTION, SOLUTION INTRAMUSCULAR; INTRAVENOUS EVERY 10 MIN PRN
Status: DISCONTINUED | OUTPATIENT
Start: 2024-08-08 | End: 2024-08-08 | Stop reason: HOSPADM

## 2024-08-08 RX ORDER — DEXAMETHASONE SODIUM PHOSPHATE 10 MG/ML
4 INJECTION, SOLUTION INTRAMUSCULAR; INTRAVENOUS
Status: DISCONTINUED | OUTPATIENT
Start: 2024-08-08 | End: 2024-08-08 | Stop reason: HOSPADM

## 2024-08-08 RX ORDER — OXYCODONE HYDROCHLORIDE 5 MG/1
5 TABLET ORAL
Status: DISCONTINUED | OUTPATIENT
Start: 2024-08-08 | End: 2024-08-08 | Stop reason: HOSPADM

## 2024-08-08 RX ORDER — ONDANSETRON 4 MG/1
4 TABLET, ORALLY DISINTEGRATING ORAL EVERY 30 MIN PRN
Status: DISCONTINUED | OUTPATIENT
Start: 2024-08-08 | End: 2024-08-08 | Stop reason: HOSPADM

## 2024-08-08 RX ORDER — IOPAMIDOL 612 MG/ML
INJECTION, SOLUTION INTRATHECAL PRN
Status: DISCONTINUED | OUTPATIENT
Start: 2024-08-08 | End: 2024-08-08 | Stop reason: HOSPADM

## 2024-08-08 RX ORDER — NALOXONE HYDROCHLORIDE 0.4 MG/ML
0.1 INJECTION, SOLUTION INTRAMUSCULAR; INTRAVENOUS; SUBCUTANEOUS
Status: DISCONTINUED | OUTPATIENT
Start: 2024-08-08 | End: 2024-08-08 | Stop reason: HOSPADM

## 2024-08-08 RX ORDER — OXYCODONE HYDROCHLORIDE 5 MG/1
10 TABLET ORAL
Status: DISCONTINUED | OUTPATIENT
Start: 2024-08-08 | End: 2024-08-08 | Stop reason: HOSPADM

## 2024-08-08 RX ORDER — PROPOFOL 10 MG/ML
INJECTION, EMULSION INTRAVENOUS PRN
Status: DISCONTINUED | OUTPATIENT
Start: 2024-08-08 | End: 2024-08-08

## 2024-08-08 RX ORDER — TRIAMCINOLONE ACETONIDE 40 MG/ML
INJECTION, SUSPENSION INTRA-ARTICULAR; INTRAMUSCULAR PRN
Status: DISCONTINUED | OUTPATIENT
Start: 2024-08-08 | End: 2024-08-08 | Stop reason: HOSPADM

## 2024-08-08 RX ORDER — ONDANSETRON 2 MG/ML
4 INJECTION INTRAMUSCULAR; INTRAVENOUS EVERY 30 MIN PRN
Status: DISCONTINUED | OUTPATIENT
Start: 2024-08-08 | End: 2024-08-08 | Stop reason: HOSPADM

## 2024-08-08 RX ADMIN — LIDOCAINE HYDROCHLORIDE 1 ML: 10 INJECTION, SOLUTION EPIDURAL; INFILTRATION; INTRACAUDAL; PERINEURAL at 07:03

## 2024-08-08 RX ADMIN — PROPOFOL 40 MG: 10 INJECTION, EMULSION INTRAVENOUS at 07:41

## 2024-08-08 RX ADMIN — PROPOFOL 40 MG: 10 INJECTION, EMULSION INTRAVENOUS at 07:37

## 2024-08-08 RX ADMIN — PROPOFOL 40 MG: 10 INJECTION, EMULSION INTRAVENOUS at 07:34

## 2024-08-08 RX ADMIN — PROPOFOL 40 MG: 10 INJECTION, EMULSION INTRAVENOUS at 07:39

## 2024-08-08 RX ADMIN — PROPOFOL 40 MG: 10 INJECTION, EMULSION INTRAVENOUS at 07:30

## 2024-08-08 RX ADMIN — PROPOFOL 50 MG: 10 INJECTION, EMULSION INTRAVENOUS at 07:28

## 2024-08-08 RX ADMIN — PROPOFOL 40 MG: 10 INJECTION, EMULSION INTRAVENOUS at 07:32

## 2024-08-08 RX ADMIN — MIDAZOLAM 2 MG: 1 INJECTION INTRAMUSCULAR; INTRAVENOUS at 07:24

## 2024-08-08 ASSESSMENT — ACTIVITIES OF DAILY LIVING (ADL)
ADLS_ACUITY_SCORE: 20
ADLS_ACUITY_SCORE: 20

## 2024-08-08 ASSESSMENT — LIFESTYLE VARIABLES: TOBACCO_USE: 1

## 2024-08-08 NOTE — ANESTHESIA PREPROCEDURE EVALUATION
Anesthesia Pre-Procedure Evaluation    Patient: Bulmaro Herrera   MRN: [id number] : 1965        Procedure : Procedure(s):  Bilateral Lumbar 5-Sacral 1 transforaminal epidural injections (Bilateral: Back)       Past Medical History:   Diagnosis Date    Back pains     Depressive disorder, not elsewhere classified 2007    declines Rx    Disc degeneration 2008    see MRI -throaco-lumbar mild discogenic degenerative changes    Elevated LFT's 2009    alt and ast    Other and unspecified hyperlipidemia 2007    Sleep disorder 2009    CPAP    Tobacco use disorder       Past Surgical History:   Procedure Laterality Date    COLONOSCOPY  08    adenomatous polyp repeat 5 years    COLONOSCOPY N/A 2017    Procedure: COMBINED COLONOSCOPY, SINGLE OR MULTIPLE BIOPSY/POLYPECTOMY BY BIOPSY;  Colonoscopy, Polypectomy by Biopsy;  Surgeon: Matt Reyes MD;  Location:  GI    COLONOSCOPY N/A 2023    Procedure: Colonoscopy;  Surgeon: Constantino Bowie DO;  Location:  GI    ENT SURGERY      for deviated septum    EXCISE LESION AXILLA  2018    Procedure: Excision Skin Tags Right Axilla;  Surgeon: Ayaan Chaney MD;  [facility]    EXCISE LESION BACK N/A 2018    Procedure: Excision Back Skin Lesion X Two;  Surgeon: Ayaan Chaney MD;  [facility]    EXCISE MASS BACK N/A 4/15/2022    Procedure: Excisions skin lesions of back;  Surgeon: Ayaan Chaney MD;  [facility]    INJECT BLOCK MEDIAL BRANCH CERVICAL/THORACIC/LUMBAR Bilateral 2022    Procedure: Cervical 4, 5, 6 Medial Branch Block;  Surgeon: Christian Chaudhry MD;  [facility]    INJECT EPIDURAL CERVICAL N/A 2015    Procedure: INJECT EPIDURAL CERVICAL;  Surgeon: Christian Chaudhry MD;  [facility]    INJECT EPIDURAL CERVICAL N/A 2018    Procedure: INJECT EPIDURAL CERVICAL 6-7;  Surgeon: Christian Chaudhry MD;  [facility]    INJECT EPIDURAL CERVICAL Bilateral 2023     Procedure: Left Cervical 5-Cervical 6, Right Cervical 5-Cervical 6, Left Cervical 6-Cervical 7 and Right Cervical 6-Cervical 7 Intra-articular zygopophyseal joint injection utilizing fluoroscopic guidance with contrast dye.;  Surgeon: Christian Chaudhry MD;  [facility]    INJECT EPIDURAL CERVICAL Bilateral 2/19/2024    Procedure: Left Cervical 5 - Cervical 6, Right Cervical 5 - Cervical 6, Left Cervical 6 - Cervical 7 and Right Cervical 6 - Cervical 7 Intra-articular zygopophyseal joint injection utilizing fluoroscopic guidance with contrast dye.;  Surgeon: Christian Chaudhry MD;  [facility]    INJECT EPIDURAL LUMBAR N/A 12/20/2019    Procedure: Lumbar 5- Sacral 1 Epidural  Injection bilatetral;  Surgeon: Christian Chaudhry MD;  [facility]    INJECT EPIDURAL LUMBAR Bilateral 9/8/2022    Procedure: Bilateral Lumbar 5-Sacral 1 transforaminal epidural injections utilizing fluoroscopic guidance with contrast dye  and;  Surgeon: Christian Chaudhry MD;  [facility]    INJECT EPIDURAL TRANSFORAMINAL Bilateral 5/20/2022    Procedure: Bilateral Lumbar 5- Sacral 1 transforaminal Epidural Steroid Injection;  Surgeon: Christian Chaudhry MD;  [facility]    INJECT EPIDURAL TRANSFORAMINAL Bilateral 7/14/2022    Procedure: Left Cervical 5-Cervical 6, Right Cervical 5-Cervical 6, Left Cervical 6-Cervical 7 and Right Cervical 6-Cervical 7 Intra-articular zygopophyseal joint injection utilizing fluoroscopic guidance with contrast dye;  Surgeon: Christian Chaudhry MD;  [facility]    INJECT EPIDURAL TRANSFORAMINAL Bilateral 6/16/2023    Procedure: Bilateral Lumbar 5-Sacral 1 transforaminal epidural injections utilizing fluoroscopic guidance with contrast dye;  Surgeon: Christian Chaudhry MD;  [facility]    INJECT EPIDURAL TRANSFORAMINAL Bilateral 9/28/2023    Procedure: Bilateral Lumbar 5-Sacral 1 transforaminal epidural injections utilizing fluoroscopic guidance with contrast dye;  Surgeon: Christian Chaudhry MD;  [facility]    INJECT EPIDURAL TRANSFORAMINAL Bilateral 3/15/2024    Procedure: Bilateral Lumbar 5 - Sacral 1 transforaminal epidural injections utilizing fluoroscopic guidance with contrast dye.;  Surgeon: Christian Chaudhry MD;  [facility]    INJECT FACET JOINT Bilateral 1/25/2019    Procedure: Cervical Facet Injections Cervical 5-6 and 6-7 Bilateral;  Surgeon: Christian Chaudhry MD;  [facility]    INJECT FACET JOINT Bilateral 11/29/2019    Procedure: Cervical 5-6 and 6-7 Bilateral facet joint injection;  Surgeon: Christian Chaudhry MD;  [facility]    INJECT FACET JOINT Bilateral 2/11/2021    Procedure: Cervical 5-6 and Cervical 6-7 Bilateral facet injections;  Surgeon: Christian Chaudhry MD;  [facility]    INJECT FACET JOINT Bilateral 10/19/2021    Procedure: Left C5-C6, Right C5-C6, Left C6-C7 and Right C6-C7 Intra-articular zygopophyseal joint injection utilizing fluoroscopic guidance with contrast dye;  Surgeon: Christian Chaudhry MD;  [facility]    INJECT FACET JOINT Bilateral 9/14/2023    Procedure: Left Cervical 5-Cervical 6, Right Cervical 5-Cervical 6, Left Cervical 6-Cervical 7 and Right Cervical 6-Cervical 7 Intra-articular zygopophyseal joint injection utilizing fluoroscopic guidance with contrast dye. Left Intra-articular shoulder Injection;  Surgeon: Christian Chaudhry MD;  [facility]    INJECT STEROID (LOCATION) Left 10/19/2021    Procedure: Glenohumeral joint injection left shoulder;  Surgeon: Christian Chaudhry MD;  [facility]    INJECT STEROID (LOCATION) Left 5/20/2022    Procedure: Left shoulder glenohumeral steroid inj with local;  Surgeon: Christian Chaudhry MD;  [facility]    INJECT STEROID TROCHANTERIC BURSA Left 9/8/2022    Procedure: Left intra-articular shoulder injections;  Surgeon: Christian Chaudhry MD;  [facility]    ORTHOPEDIC SURGERY      Right hand    TONSILLECTOMY      ZZC NONSPECIFIC PROCEDURE      rt hand surgery - laceration/glass/tendons    ZZHC ECHO HEART  XTHORACIC, STRESS/REST  2009    neg      Allergies   Allergen Reactions    Wasps [Hornets] Swelling    Percocet [Oxycodone-Acetaminophen] Itching      Social History     Tobacco Use    Smoking status: Former     Current packs/day: 0.00     Types: Cigarettes     Quit date: 2007     Years since quittin.4    Smokeless tobacco: Never   Substance Use Topics    Alcohol use: No     Comment: rare      Wt Readings from Last 1 Encounters:   24 115.2 kg (253 lb 14.4 oz)        Anesthesia Evaluation   Pt has had prior anesthetic. Type: MAC and General.    No history of anesthetic complications       ROS/MED HX  ENT/Pulmonary:     (+) sleep apnea, moderate, uses CPAP,              tobacco use, Past use,                       Neurologic:  - neg neurologic ROS     Cardiovascular:  - neg cardiovascular ROS   (+) Dyslipidemia - -   -  - -                                 Previous cardiac testing   Echo: Date: 23 Results:  Interpretation Summary     The visual ejection fraction is 60-65%.  The right ventricular systolic function is normal.  No hemodynamically significant valvular disease.  There is no pericardial effusion  Stress Test:  Date: Results:    ECG Reviewed:  Date: 24 Results:  Sinus Rhythm -occasional ectopic ventricular beat    Low voltage in precordial leads.  -Poor R-wave progression -may be secondary to pulmonary disease  consider old anterior infarct.  Cath:  Date: Results:      METS/Exercise Tolerance:     Hematologic:  - neg hematologic  ROS     Musculoskeletal: Comment: DJD  (+)  arthritis,             GI/Hepatic:     (+) GERD, Asymptomatic on medication,                  Renal/Genitourinary:  - neg Renal ROS     Endo:     (+)               Obesity,       Psychiatric/Substance Use:     (+) psychiatric history depression and anxiety       Infectious Disease:  - neg infectious disease ROS     Malignancy:  - neg malignancy ROS     Other:  - neg other ROS    (+)  , H/O Chronic Pain,    "      Physical Exam    Airway  airway exam normal      Mallampati: II   TM distance: > 3 FB   Neck ROM: full   Mouth opening: > 3 cm    Respiratory Devices and Support         Dental       (+) Minor Abnormalities - some fillings, tiny chips      Cardiovascular   cardiovascular exam normal       Rhythm and rate: regular and normal     Pulmonary   pulmonary exam normal        breath sounds clear to auscultation       Other findings: Patient is appropriate, but verbalizes his anxiety and all extremities are twitchy.  OUTSIDE LABS:  CBC:   Lab Results   Component Value Date    WBC 5.9 03/09/2024    WBC 6.7 11/11/2023    HGB 16.1 03/09/2024    HGB 15.8 11/11/2023    HCT 47.2 06/09/2024    HCT 46.6 03/09/2024     03/09/2024     11/11/2023     BMP:   Lab Results   Component Value Date     03/09/2024     11/11/2023    POTASSIUM 4.2 03/09/2024    POTASSIUM 3.9 11/11/2023    CHLORIDE 104 03/09/2024    CHLORIDE 102 11/11/2023    CO2 25 03/09/2024    CO2 27 11/11/2023    BUN 15.6 03/09/2024    BUN 15.6 11/11/2023    CR 0.98 03/09/2024    CR 1.08 11/11/2023     (H) 03/09/2024    GLC 99 11/11/2023     COAGS:   Lab Results   Component Value Date    PTT 31 06/06/2008    INR 1.0 02/17/2014     POC: No results found for: \"BGM\", \"HCG\", \"HCGS\"  HEPATIC:   Lab Results   Component Value Date    ALBUMIN 4.3 03/09/2024    PROTTOTAL 7.2 03/09/2024    ALT 64 03/09/2024    AST 33 03/09/2024    ALKPHOS 77 03/09/2024    BILITOTAL 0.8 03/09/2024     OTHER:   Lab Results   Component Value Date    PH 8.0 (H) 05/07/2002    A1C 6.2 (H) 01/07/2023    WILIAM 9.8 03/09/2024    PHOS 3.5 02/08/2021    MAG 2.2 02/08/2021    LIPASE 18 11/11/2023    AMYLASE 76 03/08/2014    TSH 1.91 06/09/2024    T4 0.94 11/15/2019    CRP <2.9 03/25/2015    SED 3 06/09/2024       Anesthesia Plan    ASA Status:  2    NPO Status:  NPO Appropriate    Anesthesia Type: MAC.     - Reason for MAC: straight local not clinically adequate    "   Maintenance: TIVA.        Consents    Anesthesia Plan(s) and associated risks, benefits, and realistic alternatives discussed. Questions answered and patient/representative(s) expressed understanding.     - Discussed: Risks, Benefits and Alternatives for BOTH SEDATION and the PROCEDURE were discussed     - Discussed with:  Patient       Use of blood products discussed: No .     Postoperative Care    Pain management: Oral pain medications.   PONV prophylaxis: Background Propofol Infusion     Comments:    Other Comments: The risks and benefits of anesthesia, and the alternatives where applicable, have been discussed with the patient, and they wish to proceed.               SHIV Kirkland CRNA

## 2024-08-08 NOTE — DISCHARGE INSTRUCTIONS

## 2024-08-08 NOTE — ANESTHESIA POSTPROCEDURE EVALUATION
Patient: Bulmaro Herrera    Procedure: Procedure(s):  INJECTION, SPINE, CERVICAL, EPIDURAL, cervical 7 to thoracic 1  steroid injection left shoulder       Anesthesia Type:  MAC    Note:  Disposition: Outpatient   Postop Pain Control: Uneventful            Sign Out: Well controlled pain   PONV: No   Neuro/Psych: Uneventful            Sign Out: Acceptable/Baseline neuro status   Airway/Respiratory: Uneventful            Sign Out: Acceptable/Baseline resp. status   CV/Hemodynamics: Uneventful            Sign Out: Acceptable CV status; No obvious hypovolemia; No obvious fluid overload   Other NRE: NONE   DID A NON-ROUTINE EVENT OCCUR? No           Last vitals:  Vitals Value Taken Time   /80 08/08/24 0811   Temp     Pulse 67 08/08/24 0811   Resp 18 08/08/24 0800   SpO2 94 % 08/08/24 0813   Vitals shown include unfiled device data.    Electronically Signed By: SHIV Kirkland CRNA  August 8, 2024  8:45 AM

## 2024-08-08 NOTE — ANESTHESIA CARE TRANSFER NOTE
Patient: Bulmaro Herrera    Procedure: Procedure(s):  INJECTION, SPINE, CERVICAL, EPIDURAL, cervical 7 to thoracic 1  steroid injection left shoulder       Diagnosis: Cervicalgia [M54.2]  Cervical radiculopathy [M54.12]  Neural foraminal stenosis of cervical spine [M48.02]  Diagnosis Additional Information: No value filed.    Anesthesia Type:   MAC     Note:    Oropharynx: spontaneously breathing  Level of Consciousness: drowsy  Oxygen Supplementation: room air    Independent Airway: airway patency satisfactory and stable  Dentition: dentition unchanged  Vital Signs Stable: post-procedure vital signs reviewed and stable  Report to RN Given: handoff report given  Patient transferred to: Phase II    Handoff Report: Identifed the Patient, Identified the Reponsible Provider, Reviewed the pertinent medical history, Discussed the surgical course, Reviewed Intra-OP anesthesia mangement and issues during anesthesia, Set expectations for post-procedure period and Allowed opportunity for questions and acknowledgement of understanding      Vitals:  Vitals Value Taken Time   /79    Temp     Pulse 80    Resp 16    SpO2 91 % 08/08/24 0747   Vitals shown include unfiled device data.    Electronically Signed By: SHIV Kirkland CRNA  August 8, 2024  7:49 AM

## 2024-08-08 NOTE — OP NOTE
CHIEF COMPLAINT:   1. neck pain secondary to cervical spondylosis and disc herniations cervical spine. He   2.  Left shoulder pain secondary to glenohumeral joint degeneration and a left shoulder labral tear  INTERVAL HISTORY:  I discussed the above note with the patient. I agree with above.  He did have a epidural injection in the neck and he says that was helpful for him.  There is a little bit of conflicting information regarding the cervical epidural.  It appears that the cervical epidural was helpful for him and he said the combination of doing the epidural with the facet injections is the most beneficial.  I do think given that he seems to have abrupt and significant pain improvement from the facet injections at the main pain generator are the cervical facets.  However he said doing the combination is best for him and he does report having some arm pain along with the neck pain which would suggest more of a nerve root problem and therefore an epidural would be the best treatment for that pain generator.     He does have a severe needle phobia.  He essentially needs to have very deep anesthesia in order to to have these injections performed.  PHYSICAL EXAM:   Musculoskeletal: Strength of upper extremities is 5/5 bilaterally.  Pain on palpation of the   neck. Pain on   Rotation, extension and flexion of neck.   Neuro: No sensory perception differences of lower extremities.        PROCEDURE: number #1     C7-T1 Interlaminar epidural steroid injection using fluoroscopic guidance with contrast dye.     PROCEDURE DETAILS: After written informed consent was obtained from the patient, the patient was escorted to the procedure room.  The patient was placed in the prone position.  A  time out  was conducted to verify patient identity, procedure to be performed, side, site, allergies and any special requirements.  The skin over the neck and upper back region were prepped and draped in normal sterile fashion utilizing  ChloraPrep.  Fluoroscopy was used to identify the C7-T1 interspace in an AP view and the skin was anesthetized with 2 mL of 1% lidocaine with bicarbonate buffer.  A 20-gauge 3-1/2 inch Tuohy needle was advanced using the loss of resistance technique with preservative free normal saline with fluoroscopic guidance. After negative aspiration for CSF and blood, 1.5 cc of Isovue contrast dye was injected revealing the appropriate cervical epidurogram without evidence of intrathecal or intravascular spread. Following this, a 3-mL solution of 40 mg of triamcinolone with 2 cc preservative-free normal saline was slowly injected.  After injection of the medication, as the needle tip was withdrawn, it was flushed with local anesthetic.  The patient was monitored with blood pressure and pulse oximetry machines with the assistance of an RN throughout the procedure.  The patient was alert and responsive to questions throughout the procedure.   The patient tolerated the procedure well and was observed in the post-procedural area.  The patient was dismissed without apparent complications.             PROCEDURE number #2: Left intra-articular shoulder injection.     Procedure details: After his neck was treated we moved over to his left shoulder.  Left shoulder posterior approach was prepped with ChloraPrep.  1% lidocaine was used for skin.  I then advanced a 22-gauge Quincke needle into his left glenohumeral joint.  Initially contrast was injected which did not show proper spread throughout the joint and after several needle tip manipulations and contrast injection, I achieved a proper arthrogram of the left glenohumeral joint.  I then injected 8 cc of 0.25% bupivacaine with 4 mg/cc of triamcinolone.  The needle was then removed and a sterile bandage placed over the needle insertion site.     Blood loss: Less than 2 cc     DIAGNOSIS:  1.  Cervical spondylosis secondary to a symptomatic facet syndrome causing axial neck pain with a  history of long-term pain relief from previous facet injections  2.  Cervical disc degeneration causing arm pain and neck pain.  Appears to have a combination of a facet syndrome along with nerve root mediated arm pain.  2.  Severe needle phobia  3.  Labral tear left shoulder inferior with severe glenohumeral joint osteoarthritis     PLAN:  Performed a repeat cervical epidural injection.  2.    I completed a repeat left shoulder injection as well.

## 2024-08-16 ENCOUNTER — HOSPITAL ENCOUNTER (OUTPATIENT)
Facility: CLINIC | Age: 59
Discharge: HOME | End: 2024-08-16
Attending: ANESTHESIOLOGY | Admitting: ANESTHESIOLOGY
Payer: COMMERCIAL

## 2024-08-16 ENCOUNTER — APPOINTMENT (OUTPATIENT)
Dept: GENERAL RADIOLOGY | Facility: CLINIC | Age: 59
End: 2024-08-16
Attending: ANESTHESIOLOGY
Payer: COMMERCIAL

## 2024-08-16 ENCOUNTER — ANESTHESIA (OUTPATIENT)
Dept: SURGERY | Facility: CLINIC | Age: 59
End: 2024-08-16
Payer: COMMERCIAL

## 2024-08-16 ENCOUNTER — ANESTHESIA EVENT (OUTPATIENT)
Dept: SURGERY | Facility: CLINIC | Age: 59
End: 2024-08-16
Payer: COMMERCIAL

## 2024-08-16 VITALS
DIASTOLIC BLOOD PRESSURE: 80 MMHG | HEART RATE: 75 BPM | RESPIRATION RATE: 18 BRPM | SYSTOLIC BLOOD PRESSURE: 117 MMHG | TEMPERATURE: 98.2 F | OXYGEN SATURATION: 97 %

## 2024-08-16 DIAGNOSIS — M54.16 LUMBAR RADICULOPATHY: ICD-10-CM

## 2024-08-16 PROCEDURE — 250N000011 HC RX IP 250 OP 636: Performed by: NURSE ANESTHETIST, CERTIFIED REGISTERED

## 2024-08-16 PROCEDURE — 250N000011 HC RX IP 250 OP 636: Performed by: ANESTHESIOLOGY

## 2024-08-16 PROCEDURE — 64483 NJX AA&/STRD TFRM EPI L/S 1: CPT | Performed by: ANESTHESIOLOGY

## 2024-08-16 PROCEDURE — 999N000179 XR SURGERY CARM FLUORO LESS THAN 5 MIN W STILLS: Mod: TC

## 2024-08-16 PROCEDURE — 250N000009 HC RX 250: Performed by: NURSE ANESTHETIST, CERTIFIED REGISTERED

## 2024-08-16 PROCEDURE — 370N000017 HC ANESTHESIA TECHNICAL FEE, PER MIN: Performed by: ANESTHESIOLOGY

## 2024-08-16 PROCEDURE — 64483 NJX AA&/STRD TFRM EPI L/S 1: CPT | Mod: 50 | Performed by: ANESTHESIOLOGY

## 2024-08-16 RX ORDER — ONDANSETRON 4 MG/1
4 TABLET, ORALLY DISINTEGRATING ORAL EVERY 30 MIN PRN
Status: DISCONTINUED | OUTPATIENT
Start: 2024-08-16 | End: 2024-08-16 | Stop reason: HOSPADM

## 2024-08-16 RX ORDER — MODAFINIL 200 MG/1
200 TABLET ORAL DAILY
COMMUNITY
End: 2024-09-30

## 2024-08-16 RX ORDER — LIDOCAINE HYDROCHLORIDE 20 MG/ML
INJECTION, SOLUTION INFILTRATION; PERINEURAL PRN
Status: DISCONTINUED | OUTPATIENT
Start: 2024-08-16 | End: 2024-08-16

## 2024-08-16 RX ORDER — DEXAMETHASONE SODIUM PHOSPHATE 10 MG/ML
4 INJECTION, SOLUTION INTRAMUSCULAR; INTRAVENOUS
Status: DISCONTINUED | OUTPATIENT
Start: 2024-08-16 | End: 2024-08-16 | Stop reason: HOSPADM

## 2024-08-16 RX ORDER — IOPAMIDOL 612 MG/ML
INJECTION, SOLUTION INTRATHECAL PRN
Status: DISCONTINUED | OUTPATIENT
Start: 2024-08-16 | End: 2024-08-16 | Stop reason: HOSPADM

## 2024-08-16 RX ORDER — PROPOFOL 10 MG/ML
INJECTION, EMULSION INTRAVENOUS PRN
Status: DISCONTINUED | OUTPATIENT
Start: 2024-08-16 | End: 2024-08-16

## 2024-08-16 RX ORDER — ONDANSETRON 2 MG/ML
4 INJECTION INTRAMUSCULAR; INTRAVENOUS EVERY 30 MIN PRN
Status: DISCONTINUED | OUTPATIENT
Start: 2024-08-16 | End: 2024-08-16 | Stop reason: HOSPADM

## 2024-08-16 RX ORDER — NALOXONE HYDROCHLORIDE 0.4 MG/ML
0.1 INJECTION, SOLUTION INTRAMUSCULAR; INTRAVENOUS; SUBCUTANEOUS
Status: DISCONTINUED | OUTPATIENT
Start: 2024-08-16 | End: 2024-08-16 | Stop reason: HOSPADM

## 2024-08-16 RX ORDER — TRIAMCINOLONE ACETONIDE 40 MG/ML
INJECTION, SUSPENSION INTRA-ARTICULAR; INTRAMUSCULAR PRN
Status: DISCONTINUED | OUTPATIENT
Start: 2024-08-16 | End: 2024-08-16 | Stop reason: HOSPADM

## 2024-08-16 RX ADMIN — PROPOFOL 50 MG: 10 INJECTION, EMULSION INTRAVENOUS at 15:09

## 2024-08-16 RX ADMIN — LIDOCAINE HYDROCHLORIDE 0.2 ML: 10 INJECTION, SOLUTION EPIDURAL; INFILTRATION; INTRACAUDAL; PERINEURAL at 14:06

## 2024-08-16 RX ADMIN — PROPOFOL 30 MG: 10 INJECTION, EMULSION INTRAVENOUS at 15:13

## 2024-08-16 RX ADMIN — PROPOFOL 40 MG: 10 INJECTION, EMULSION INTRAVENOUS at 15:12

## 2024-08-16 RX ADMIN — MIDAZOLAM 2 MG: 1 INJECTION INTRAMUSCULAR; INTRAVENOUS at 15:03

## 2024-08-16 RX ADMIN — LIDOCAINE HYDROCHLORIDE 50 MG: 20 INJECTION, SOLUTION INFILTRATION; PERINEURAL at 15:07

## 2024-08-16 RX ADMIN — PROPOFOL 100 MG: 10 INJECTION, EMULSION INTRAVENOUS at 15:07

## 2024-08-16 ASSESSMENT — ACTIVITIES OF DAILY LIVING (ADL)
ADLS_ACUITY_SCORE: 35
ADLS_ACUITY_SCORE: 35

## 2024-08-16 ASSESSMENT — LIFESTYLE VARIABLES: TOBACCO_USE: 1

## 2024-08-16 NOTE — DISCHARGE INSTRUCTIONS

## 2024-08-16 NOTE — ANESTHESIA POSTPROCEDURE EVALUATION
Patient: Bulmaro Herrera    Procedure: Procedure(s):  INJECTION, SPINE, LUMBAR, Transforaminal EPIDURAL, Lumbar 5 to Sacaral 1 - Bilateral       Anesthesia Type:  MAC    Note:  Disposition: Outpatient   Postop Pain Control: Uneventful            Sign Out: Well controlled pain   PONV: No   Neuro/Psych: Uneventful            Sign Out: Acceptable/Baseline neuro status   Airway/Respiratory: Uneventful            Sign Out: Acceptable/Baseline resp. status   CV/Hemodynamics: Uneventful            Sign Out: Acceptable CV status   Other NRE: NONE   DID A NON-ROUTINE EVENT OCCUR? No    Event details/Postop Comments:  Pt was happy with anesthesia care.  No complications.  I will follow up with the pt if needed.           Last vitals:  Vitals Value Taken Time   /79 08/16/24 1521   Temp     Pulse 78 08/16/24 1521   Resp 18 08/16/24 1521   SpO2 94 % 08/16/24 1526   Vitals shown include unfiled device data.    Electronically Signed By: SHIV Alex CRNA  August 16, 2024  3:27 PM

## 2024-08-16 NOTE — ANESTHESIA CARE TRANSFER NOTE
Patient: Bulmaro Herrera    Procedure: Procedure(s):  INJECTION, SPINE, LUMBAR, Transforaminal EPIDURAL, Lumbar 5 to Sacaral 1 - Bilateral       Diagnosis: Lumbar radiculopathy [M54.16]  Diagnosis Additional Information: No value filed.    Anesthesia Type:   MAC     Note:    Oropharynx: oropharynx clear of all foreign objects and spontaneously breathing  Level of Consciousness: drowsy  Oxygen Supplementation: nasal cannula    Independent Airway: airway patency satisfactory and stable  Dentition: dentition unchanged  Vital Signs Stable: post-procedure vital signs reviewed and stable  Report to RN Given: handoff report given  Patient transferred to: Phase II    Handoff Report: Identifed the Patient, Identified the Reponsible Provider, Reviewed the pertinent medical history, Discussed the surgical course, Reviewed Intra-OP anesthesia mangement and issues during anesthesia, Set expectations for post-procedure period and Allowed opportunity for questions and acknowledgement of understanding      Vitals:  Vitals Value Taken Time   BP     Temp     Pulse     Resp     SpO2         Electronically Signed By: SHIV Alex CRNA  August 16, 2024  3:18 PM

## 2024-08-16 NOTE — OP NOTE
CHIEF COMPLAINT:   1. neck pain secondary to cervical spondylosis.  2.  Left shoulder pain secondary to glenohumeral joint degeneration and a left shoulder labral tear  3.  Low back pain with severe disc degeneration at L5-S1 causing back pain and leg pains     Bulmaro gets high-grade pain relief from these injections but they last for about 3 months with >75% pain relief and then the pain comes back abruptly  He is requesting to have this repeated again.     We also had a discussion regarding his neck.  He is planning on having us perform a PRP injection into his neck.  He understands that that is an out-of-pocket expense not covered by insurance.  We have discussed the need for at least 2 of these treatments.  This will have to be done at our clinic which will be a new clinic in [facility].  He understands that the rotation to the new clinic will occur in January 2024.  PROCEDURE   1.  Bilateral L5-S1 transforaminal epidural injections utilizing fluoroscopic guidance with contrast dye.      PROCEDURE DETAILS: After written informed consent was obtained from the patient, the patient was escorted to the procedure room.  The patient was placed in the sitting position.  A  time out  was conducted to verify the patient identity, procedure to be performed, side, site, allergies and any special requirements.  The skin over the neck was prepped and draped in normal sterile fashion. Fluoroscopy was used to identify the zygopophyseal joint with a lateral view.   The skin was anesthetized with 0.5 mL of 1% lidocaine with bicarbonate buffer.  2 separate 22-gauge Quincke needles were advanced into the foraminal openings from the oblique views and walked into the posterior aspect of the foramen at L5-S1 in the lateral fluoroscopic image.  In the AP image Omnipaque contrast was injected which showed proper spread in the epidural space no evidence of any intravascular, intrathecal, intraneural injection.  I then injected 20 mg  of Kenalog with 3 cc of preservative-free normal saline into each foraminal opening with good dispersion into his epidural space.     Blood loss: 0 cc     DIAGNOSIS:  1.  Cervical spondylosis secondary to a symptomatic facet syndrome causing axial neck pain with a history of long-term pain relief from previous facet injections  2.  Severe needle phobia  3.  Labral tear left shoulder inferior  4.  Severe disc degeneration at L5-S1.     PLAN:  Performed a repeat bilateral L5-S1 epidural injections.  He really does not want to have low back surgery however he has severe degeneration at L5-S1 causing lumbar radiculopathy as well as axial back pain.     Christian Chaudhry MD  Diplomate of the [employer] Board of Anesthesiology, Pain Medicine

## 2024-08-16 NOTE — ANESTHESIA PREPROCEDURE EVALUATION
Anesthesia Pre-Procedure Evaluation    Patient: Bulmaro Herrera   MRN: [id number] : 1965        Procedure : Procedure(s):  INJECTION, SPINE, LUMBAR, EPIDURAL, Lumbar 5 to Sacaral 1          Past Medical History:   Diagnosis Date    Back pains     Depressive disorder, not elsewhere classified 2007    declines Rx    Disc degeneration 2008    see MRI -throaco-lumbar mild discogenic degenerative changes    Elevated LFT's 2009    alt and ast    Other and unspecified hyperlipidemia 2007    Sleep disorder 2009    CPAP    Tobacco use disorder       Past Surgical History:   Procedure Laterality Date    COLONOSCOPY  08    adenomatous polyp repeat 5 years    COLONOSCOPY N/A 2017    Procedure: COMBINED COLONOSCOPY, SINGLE OR MULTIPLE BIOPSY/POLYPECTOMY BY BIOPSY;  Colonoscopy, Polypectomy by Biopsy;  Surgeon: Matt Reyes MD;  Location:  GI    COLONOSCOPY N/A 2023    Procedure: Colonoscopy;  Surgeon: Constantino Bowie DO;  Location:  GI    ENT SURGERY      for deviated septum    EXCISE LESION AXILLA  2018    Procedure: Excision Skin Tags Right Axilla;  Surgeon: Ayaan Chaney MD;  [facility]    EXCISE LESION BACK N/A 2018    Procedure: Excision Back Skin Lesion X Two;  Surgeon: Ayaan Chaney MD;  [facility]    EXCISE MASS BACK N/A 4/15/2022    Procedure: Excisions skin lesions of back;  Surgeon: Ayaan Chaney MD;  [facility]    INJECT BLOCK MEDIAL BRANCH CERVICAL/THORACIC/LUMBAR Bilateral 2022    Procedure: Cervical 4, 5, 6 Medial Branch Block;  Surgeon: Christian Chaudhry MD;  [facility]    INJECT EPIDURAL CERVICAL N/A 2015    Procedure: INJECT EPIDURAL CERVICAL;  Surgeon: hCristian Chaudhry MD;  [facility]    INJECT EPIDURAL CERVICAL N/A 2018    Procedure: INJECT EPIDURAL CERVICAL 6-7;  Surgeon: Christian Chaudhry MD;  [facility]    INJECT EPIDURAL CERVICAL Bilateral 2023    Procedure: Left Cervical  5-Cervical 6, Right Cervical 5-Cervical 6, Left Cervical 6-Cervical 7 and Right Cervical 6-Cervical 7 Intra-articular zygopophyseal joint injection utilizing fluoroscopic guidance with contrast dye.;  Surgeon: Christian Chaudhry MD;  [facility]    INJECT EPIDURAL CERVICAL Bilateral 2/19/2024    Procedure: Left Cervical 5 - Cervical 6, Right Cervical 5 - Cervical 6, Left Cervical 6 - Cervical 7 and Right Cervical 6 - Cervical 7 Intra-articular zygopophyseal joint injection utilizing fluoroscopic guidance with contrast dye.;  Surgeon: Christian Chaudhry MD;  [facility]    INJECT EPIDURAL LUMBAR N/A 12/20/2019    Procedure: Lumbar 5- Sacral 1 Epidural  Injection bilatetral;  Surgeon: Christian Chaudhry MD;  [facility]    INJECT EPIDURAL LUMBAR Bilateral 9/8/2022    Procedure: Bilateral Lumbar 5-Sacral 1 transforaminal epidural injections utilizing fluoroscopic guidance with contrast dye  and;  Surgeon: Christian Chaudhry MD;  [facility]    INJECT EPIDURAL TRANSFORAMINAL Bilateral 5/20/2022    Procedure: Bilateral Lumbar 5- Sacral 1 transforaminal Epidural Steroid Injection;  Surgeon: Christian Chaudhry MD;  [facility]    INJECT EPIDURAL TRANSFORAMINAL Bilateral 7/14/2022    Procedure: Left Cervical 5-Cervical 6, Right Cervical 5-Cervical 6, Left Cervical 6-Cervical 7 and Right Cervical 6-Cervical 7 Intra-articular zygopophyseal joint injection utilizing fluoroscopic guidance with contrast dye;  Surgeon: Christian Chaudhry MD;  [facility]    INJECT EPIDURAL TRANSFORAMINAL Bilateral 6/16/2023    Procedure: Bilateral Lumbar 5-Sacral 1 transforaminal epidural injections utilizing fluoroscopic guidance with contrast dye;  Surgeon: Christian Chaudhry MD;  [facility]    INJECT EPIDURAL TRANSFORAMINAL Bilateral 9/28/2023    Procedure: Bilateral Lumbar 5-Sacral 1 transforaminal epidural injections utilizing fluoroscopic guidance with contrast dye;  Surgeon: Christian Chaudhry MD;  [facility]    INJECT EPIDURAL  TRANSFORAMINAL Bilateral 3/15/2024    Procedure: Bilateral Lumbar 5 - Sacral 1 transforaminal epidural injections utilizing fluoroscopic guidance with contrast dye.;  Surgeon: Christian Chaudhry MD;  [facility]    INJECT FACET JOINT Bilateral 1/25/2019    Procedure: Cervical Facet Injections Cervical 5-6 and 6-7 Bilateral;  Surgeon: Christian Chaudhry MD;  [facility]    INJECT FACET JOINT Bilateral 11/29/2019    Procedure: Cervical 5-6 and 6-7 Bilateral facet joint injection;  Surgeon: Christian Chaudhry MD;  [facility]    INJECT FACET JOINT Bilateral 2/11/2021    Procedure: Cervical 5-6 and Cervical 6-7 Bilateral facet injections;  Surgeon: Christian Chaudhry MD;  [facility]    INJECT FACET JOINT Bilateral 10/19/2021    Procedure: Left C5-C6, Right C5-C6, Left C6-C7 and Right C6-C7 Intra-articular zygopophyseal joint injection utilizing fluoroscopic guidance with contrast dye;  Surgeon: Christian Chaudhry MD;  [facility]    INJECT FACET JOINT Bilateral 9/14/2023    Procedure: Left Cervical 5-Cervical 6, Right Cervical 5-Cervical 6, Left Cervical 6-Cervical 7 and Right Cervical 6-Cervical 7 Intra-articular zygopophyseal joint injection utilizing fluoroscopic guidance with contrast dye. Left Intra-articular shoulder Injection;  Surgeon: Christian Chaudhry MD;  [facility]    INJECT STEROID (LOCATION) Left 10/19/2021    Procedure: Glenohumeral joint injection left shoulder;  Surgeon: Christian Chaudhry MD;  [facility]    INJECT STEROID (LOCATION) Left 5/20/2022    Procedure: Left shoulder glenohumeral steroid inj with local;  Surgeon: Christian Chaudhry MD;  [facility]    INJECT STEROID TROCHANTERIC BURSA Left 9/8/2022    Procedure: Left intra-articular shoulder injections;  Surgeon: Christian Chaudhry MD;  [facility]    ORTHOPEDIC SURGERY      Right hand    TONSILLECTOMY      ZZC NONSPECIFIC PROCEDURE      rt hand surgery - laceration/glass/tendons    ZZHC ECHO HEART XTHORACIC, STRESS/REST  6/2009     neg      Allergies   Allergen Reactions    Wasps [Hornets] Swelling    Percocet [Oxycodone-Acetaminophen] Itching      Social History     Tobacco Use    Smoking status: Former     Current packs/day: 0.00     Types: Cigarettes     Quit date: 2007     Years since quittin.5    Smokeless tobacco: Never   Substance Use Topics    Alcohol use: No     Comment: rare      Wt Readings from Last 1 Encounters:   24 115.2 kg (253 lb 14.4 oz)        Anesthesia Evaluation            ROS/MED HX  ENT/Pulmonary:     (+) sleep apnea,               tobacco use,                        Neurologic:       Cardiovascular:     (+) Dyslipidemia - -   -  - -                                      METS/Exercise Tolerance:     Hematologic:  - neg hematologic  ROS     Musculoskeletal: Comment: DDD      GI/Hepatic:     (+) GERD,            liver disease,       Renal/Genitourinary:  - neg Renal ROS     Endo:     (+)               Obesity,       Psychiatric/Substance Use:     (+) psychiatric history depression       Infectious Disease:  - neg infectious disease ROS     Malignancy:  - neg malignancy ROS     Other:      (+)  , H/O Chronic Pain,         Physical Exam    Airway  airway exam normal      Mallampati: II   TM distance: > 3 FB   Neck ROM: full   Mouth opening: > 3 cm    Respiratory Devices and Support         Dental           Cardiovascular   cardiovascular exam normal       Rhythm and rate: regular and normal     Pulmonary   pulmonary exam normal        breath sounds clear to auscultation           OUTSIDE LABS:  CBC:   Lab Results   Component Value Date    WBC 5.9 2024    WBC 6.7 2023    HGB 16.1 2024    HGB 15.8 2023    HCT 47.2 2024    HCT 46.6 2024     2024     2023     BMP:   Lab Results   Component Value Date     2024     2023    POTASSIUM 4.2 2024    POTASSIUM 3.9 2023    CHLORIDE 104 2024    CHLORIDE 102 2023     "CO2 25 03/09/2024    CO2 27 11/11/2023    BUN 15.6 03/09/2024    BUN 15.6 11/11/2023    CR 0.98 03/09/2024    CR 1.08 11/11/2023     (H) 03/09/2024    GLC 99 11/11/2023     COAGS:   Lab Results   Component Value Date    PTT 31 06/06/2008    INR 1.0 02/17/2014     POC: No results found for: \"BGM\", \"HCG\", \"HCGS\"  HEPATIC:   Lab Results   Component Value Date    ALBUMIN 4.3 03/09/2024    PROTTOTAL 7.2 03/09/2024    ALT 64 03/09/2024    AST 33 03/09/2024    ALKPHOS 77 03/09/2024    BILITOTAL 0.8 03/09/2024     OTHER:   Lab Results   Component Value Date    PH 8.0 (H) 05/07/2002    A1C 6.2 (H) 01/07/2023    WILIAM 9.8 03/09/2024    PHOS 3.5 02/08/2021    MAG 2.2 02/08/2021    LIPASE 18 11/11/2023    AMYLASE 76 03/08/2014    TSH 1.91 06/09/2024    T4 0.94 11/15/2019    CRP <2.9 03/25/2015    SED 3 06/09/2024       Anesthesia Plan    ASA Status:  2    NPO Status:  NPO Appropriate    Anesthesia Type: MAC.     - Reason for MAC: immobility needed, straight local not clinically adequate (needle phobia)   Induction: Intravenous, Propofol.   Maintenance: TIVA.        Consents    Anesthesia Plan(s) and associated risks, benefits, and realistic alternatives discussed. Questions answered and patient/representative(s) expressed understanding.     - Discussed:     - Discussed with:  Patient       Use of blood products discussed: No .     Postoperative Care       PONV prophylaxis: Background Propofol Infusion, Ondansetron (or other 5HT-3)     Comments:    Other Comments: The risks and benefits of anesthesia, and the alternatives where applicable, have been discussed with the patient, and they wish to proceed.              Jimmie Posadas, APRN CRNA    I have reviewed the pertinent notes and labs in the chart from the past 30 days and (re)examined the patient.  Any updates or changes from those notes are reflected in this note.                  "

## 2024-08-30 ENCOUNTER — MYC MEDICAL ADVICE (OUTPATIENT)
Dept: INTERNAL MEDICINE | Facility: CLINIC | Age: 59
End: 2024-08-30
Payer: COMMERCIAL

## 2024-08-30 DIAGNOSIS — K08.89 PAIN, DENTAL: Primary | ICD-10-CM

## 2024-08-30 NOTE — TELEPHONE ENCOUNTER
Patient has facial swelling- he states it is a back tooth on the right side on top.    He states he might have broke some of the tooth.  He has a fever.    He states the tooth is painful    He is wondering if he can get some pain medication and antibiotics for infection?    Patient also advised to go to urgent care.    Alanna Ziegler RN on 8/30/2024 at 11:57 AM

## 2024-09-03 RX ORDER — TRAMADOL HYDROCHLORIDE 50 MG/1
50 TABLET ORAL EVERY 6 HOURS PRN
Qty: 10 TABLET | Refills: 0 | Status: SHIPPED | OUTPATIENT
Start: 2024-09-03 | End: 2024-09-06

## 2024-09-04 NOTE — ANESTHESIA PREPROCEDURE EVALUATION
Date of Service:  09/04/24     Preoperative Diagnosis:  Umbilical hernia    Postoperative Diagnosis:  Incarcerated umbilical hernia     Procedure Performed:  Primary open umbilical hernia repair    Surgeon:  Pratima Nix MD    Assistant:  Brenda Aranda NP    Assistant Tasks:  Opening and closing, Dissecting tissue, and Removing tissue.    Estimated Blood Loss:  Minimal    Drains:  None    Specimens:  None    Anesthesia:  General endotracheal anesthesia (Dr. Mcmanus)    Complications:  None    Implants:  None     Findings:  1 cm defect with incarcerated fat    Indications for Procedure:    This is a 51 year-old man who presented as an outpatient with a symptomatic umbilical hernia.  Open repair was discussed with possible mesh.  A thorough discussion of the risks and benefits of the procedure was held with the patient and consent was obtained.    Description of Procedure:  Geronimo was brought to the operating room and placed on the operating room table in a supine position.  General endotracheal anesthesia was induced. Perioperative antibiotics were administered.  The abdomen was then prepped and draped in the usual sterile fashion.  A time-out was held with the entire OR team.      A curvilinear, infra-umbilical incision was made.  The subcutaneous tissues were dissected with electrocautery down to the level of the fascia.  The umbilical stalk was bluntly dissected out.  A hemostat was placed behind it, and the umbilical stalk was meticulously dissected off the hernia sac.  The hernia sac was dissected out down to the level of the fascia and resected.  It contained incarcerated fat.  This was amputated.  The fascia surrounding the defect was cleared off circumferentially.  The defect measured 1 cm in size. There was a tiny 2 mm fat containing defect immediately cephalad to this.  A primary repair without mesh was performed. The tissue was reapproximated with figure of eight 0 Nurolon sutures.  The tiny hernia  Anesthesia Pre-Procedure Evaluation    Patient: Bulmaro Herrera   MRN: [id number] : 1965        Preoperative Diagnosis: Arthropathy of cervical facet joint [M47.812]    Procedure : Procedure(s):  Cervical 5-6 and Cervical 6-7 Bilateral facet injections  Glenohumeral joint injection left shoulder          Past Medical History:   Diagnosis Date     Back pains      Depressive disorder, not elsewhere classified 2007    declines Rx     Disc degeneration 2008    see MRI -throaco-lumbar mild discogenic degenerative changes     Elevated LFT's     alt and ast     Other and unspecified hyperlipidemia 2007     Sleep disorder     CPAP     Tobacco use disorder       Past Surgical History:   Procedure Laterality Date     COLONOSCOPY  08    adenomatous polyp repeat 5 years     COLONOSCOPY N/A 2017    Procedure: COMBINED COLONOSCOPY, SINGLE OR MULTIPLE BIOPSY/POLYPECTOMY BY BIOPSY;  Colonoscopy, Polypectomy by Biopsy;  Surgeon: Matt Reyes MD;  [facility]     ENT SURGERY      for deviated septum     EXCISE LESION AXILLA  2018    Procedure: Excision Skin Tags Right Axilla;  Surgeon: Ayaan Chaney MD;  [facility]     EXCISE LESION BACK N/A 2018    Procedure: Excision Back Skin Lesion X Two;  Surgeon: Ayaan Chaney MD;  [facility]     EXCISE MASS BACK N/A 4/15/2022    Procedure: Excisions skin lesions of back;  Surgeon: Ayaan Chaney MD;  [facility]     INJECT EPIDURAL CERVICAL N/A 2015    Procedure: INJECT EPIDURAL CERVICAL;  Surgeon: Christian Chaudhry MD;  [facility]     INJECT EPIDURAL CERVICAL N/A 2018    Procedure: INJECT EPIDURAL CERVICAL 6-7;  Surgeon: Christian Chaudhry MD;  [facility]     INJECT EPIDURAL LUMBAR N/A 2019    Procedure: Lumbar 5- Sacral 1 Epidural  Injection bilatetral;  Surgeon: Christian Chaudhry MD;  [facility]     INJECT EPIDURAL TRANSFORAMINAL Bilateral 2022    Procedure: Bilateral Lumbar 5-  Sacral 1 transforaminal Epidural Steroid Injection;  Surgeon: Christian Chaudhry MD;  [facility]     INJECT FACET JOINT Bilateral 1/25/2019    Procedure: Cervical Facet Injections Cervical 5-6 and 6-7 Bilateral;  Surgeon: Christian Chaudhry MD;  [facility]     INJECT FACET JOINT Bilateral 11/29/2019    Procedure: Cervical 5-6 and 6-7 Bilateral facet joint injection;  Surgeon: Christian Chaudhry MD;  [facility]     INJECT FACET JOINT Bilateral 2/11/2021    Procedure: Cervical 5-6 and Cervical 6-7 Bilateral facet injections;  Surgeon: Christian Chaudhry MD;  [facility]     INJECT FACET JOINT Bilateral 10/19/2021    Procedure: Left C5-C6, Right C5-C6, Left C6-C7 and Right C6-C7 Intra-articular zygopophyseal joint injection utilizing fluoroscopic guidance with contrast dye;  Surgeon: Christian Chaudhry MD;  [facility]     INJECT STEROID (LOCATION) Left 10/19/2021    Procedure: Glenohumeral joint injection left shoulder;  Surgeon: Christian Chaudhry MD;  [facility]     INJECT STEROID (LOCATION) Left 5/20/2022    Procedure: Left shoulder glenohumeral steroid inj with local;  Surgeon: Christian Chaudhry MD;  [facility]     ORTHOPEDIC SURGERY      Right hand     TONSILLECTOMY       ZZC NONSPECIFIC PROCEDURE      rt hand surgery - laceration/glass/tendons     ZZ ECHO HEART XTHORACIC, STRESS/REST  6/2009    neg      Allergies   Allergen Reactions     Percocet [Oxycodone-Acetaminophen] Itching     Wasps [Hornets] Swelling      Social History     Tobacco Use     Smoking status: Former Smoker     Packs/day: 1.50     Quit date: 2/14/2007     Years since quitting: 15.2     Smokeless tobacco: Never Used   Substance Use Topics     Alcohol use: No     Comment: rare      Wt Readings from Last 1 Encounters:   05/10/22 115.9 kg (255 lb 9.6 oz)        Anesthesia Evaluation   Pt has had prior anesthetic. Type: MAC.    No history of anesthetic complications       ROS/MED HX  ENT/Pulmonary:     (+) sleep apnea, tobacco use,  defect was incorporated into this closure.  Additional local anesthetic was injected in the fascia.  The cavity was irrigated.  Hemostasis was meticulously obtained.  The umbilical stalk was reattached to the fascia using an interrupted 3-0 Vicryl suture.  Deep dermal sutures of 3-0 Vicryl were placed.  The skin was closed with a running 4-0 subcuticular Monocryl stitch.  Surgical glue was applied.    At the end of the case, all needle, sponge and instrument counts were correct.  The patient tolerated the procedure without complication and was taken to the recovery room in satisfactory condition.  I was present for the entirety of the operation.     I certify that the assistant surgeon services were medically necessary. A qualified resident was not available to perform the services.      Pratima Nix MD         Past use,     Neurologic:  - neg neurologic ROS     Cardiovascular:  - neg cardiovascular ROS     METS/Exercise Tolerance:     Hematologic:  - neg hematologic  ROS     Musculoskeletal:       GI/Hepatic:     (+) GERD, Asymptomatic on medication,     Renal/Genitourinary:       Endo:     (+) Obesity,     Psychiatric/Substance Use:  - neg psychiatric ROS     Infectious Disease:       Malignancy:       Other:      (+) , H/O Chronic Pain,        Physical Exam    Airway  airway exam normal      Mallampati: II   TM distance: > 3 FB   Neck ROM: full   Mouth opening: > 3 cm    Respiratory Devices and Support         Dental  no notable dental history         Cardiovascular   cardiovascular exam normal          Pulmonary   pulmonary exam normal                OUTSIDE LABS:  CBC:   Lab Results   Component Value Date    WBC 5.5 02/08/2021    WBC 5.7 11/15/2019    HGB 16.3 02/08/2021    HGB 16.0 11/15/2019    HCT 47.2 02/08/2021    HCT 47.6 11/15/2019     02/08/2021     11/15/2019     BMP:   Lab Results   Component Value Date     01/18/2022     02/08/2021    POTASSIUM 3.9 01/18/2022    POTASSIUM 4.1 02/08/2021    CHLORIDE 108 01/18/2022    CHLORIDE 108 02/08/2021    CO2 25 01/18/2022    CO2 30 02/08/2021    BUN 15 01/18/2022    BUN 17 02/08/2021    CR 0.85 01/18/2022    CR 0.94 02/08/2021    GLC 96 01/18/2022     (H) 02/08/2021     COAGS:   Lab Results   Component Value Date    PTT 31 06/06/2008    INR 1.0 02/17/2014     POC: No results found for: BGM, HCG, HCGS  HEPATIC:   Lab Results   Component Value Date    ALBUMIN 3.9 01/18/2022    PROTTOTAL 7.5 01/18/2022    ALT 84 (H) 05/08/2022    AST 63 (H) 01/18/2022    ALKPHOS 97 01/18/2022    BILITOTAL 1.1 01/18/2022     OTHER:   Lab Results   Component Value Date    PH 8.0 (H) 05/07/2002    A1C 5.8 (H) 05/08/2022    WILIAM 8.9 01/18/2022    PHOS 3.5 02/08/2021    MAG 2.2 02/08/2021    LIPASE 95 09/10/2018    AMYLASE 76 03/08/2014    TSH 3.27 02/08/2021     T4 0.94 11/15/2019    CRP <2.9 03/25/2015    SED 7 09/10/2012       Anesthesia Plan    ASA Status:  2   NPO Status:  NPO Appropriate    Anesthesia Type: MAC.     - Reason for MAC: straight local not clinically adequate   Induction: Propofol, Intravenous.   Maintenance: TIVA.        Consents    Anesthesia Plan(s) and associated risks, benefits, and realistic alternatives discussed. Questions answered and patient/representative(s) expressed understanding.     - Discussed: Risks, Benefits and Alternatives for BOTH SEDATION and the PROCEDURE were discussed     - Discussed with:  Patient      - Extended Intubation/Ventilatory Support Discussed: No.      - Patient is DNR/DNI Status: No    Use of blood products discussed: No .     Postoperative Care            Comments:    Other Comments: The risks and benefits of anesthesia, and the alternatives where applicable, have been discussed with the patient, and they wish to proceed.                   SHIV Hoff CRNA

## 2024-09-04 NOTE — TELEPHONE ENCOUNTER
Patient has replied to patient via [employer]. Patient has read message.  Will close encounter.     Reynaldo Cunningham RN on 9/4/2024 at 10:55 AM

## 2024-09-26 ENCOUNTER — MYC REFILL (OUTPATIENT)
Dept: INTERNAL MEDICINE | Facility: CLINIC | Age: 59
End: 2024-09-26
Payer: COMMERCIAL

## 2024-09-26 DIAGNOSIS — K21.9 GASTROESOPHAGEAL REFLUX DISEASE WITHOUT ESOPHAGITIS: ICD-10-CM

## 2024-09-26 DIAGNOSIS — H81.13 BENIGN PAROXYSMAL POSITIONAL VERTIGO DUE TO BILATERAL VESTIBULAR DISORDER: ICD-10-CM

## 2024-09-27 ENCOUNTER — MYC MEDICAL ADVICE (OUTPATIENT)
Dept: OTOLARYNGOLOGY | Facility: CLINIC | Age: 59
End: 2024-09-27
Payer: COMMERCIAL

## 2024-09-27 ENCOUNTER — MYC MEDICAL ADVICE (OUTPATIENT)
Dept: INTERNAL MEDICINE | Facility: CLINIC | Age: 59
End: 2024-09-27
Payer: COMMERCIAL

## 2024-09-27 DIAGNOSIS — G47.419 PRIMARY NARCOLEPSY WITHOUT CATAPLEXY: Primary | ICD-10-CM

## 2024-09-27 RX ORDER — OMEPRAZOLE 40 MG/1
40 CAPSULE, DELAYED RELEASE ORAL DAILY
Qty: 90 CAPSULE | Refills: 0 | Status: SHIPPED | OUTPATIENT
Start: 2024-09-27

## 2024-09-27 RX ORDER — MODAFINIL 200 MG/1
200 TABLET ORAL DAILY
OUTPATIENT
Start: 2024-09-27

## 2024-09-27 RX ORDER — MECLIZINE HCL 25MG 25 MG/1
25 TABLET, CHEWABLE ORAL EVERY 6 HOURS PRN
Qty: 60 TABLET | Refills: 1 | Status: SHIPPED | OUTPATIENT
Start: 2024-09-27

## 2024-09-30 RX ORDER — MODAFINIL 200 MG/1
200 TABLET ORAL DAILY
Qty: 90 TABLET | Refills: 0 | Status: SHIPPED | OUTPATIENT
Start: 2024-09-30

## 2024-10-21 ENCOUNTER — TELEPHONE (OUTPATIENT)
Dept: ADMISSION | Facility: CLINIC | Age: 59
End: 2024-10-21
Payer: COMMERCIAL

## 2024-10-21 ENCOUNTER — MYC MEDICAL ADVICE (OUTPATIENT)
Dept: NEUROSURGERY | Facility: CLINIC | Age: 59
End: 2024-10-21
Payer: COMMERCIAL

## 2024-10-21 DIAGNOSIS — M48.02 NEURAL FORAMINAL STENOSIS OF CERVICAL SPINE: ICD-10-CM

## 2024-10-21 DIAGNOSIS — M54.16 LUMBAR RADICULOPATHY: Primary | ICD-10-CM

## 2024-10-21 DIAGNOSIS — M54.12 CERVICAL RADICULOPATHY: Primary | ICD-10-CM

## 2024-10-21 DIAGNOSIS — M54.2 CERVICALGIA: ICD-10-CM

## 2024-10-21 NOTE — TELEPHONE ENCOUNTER
Reason for Call:  Other call back    Detailed comments: patient  would like to get orders placed for his next set of back and neck injections L spine done 8/16 & C spine done 8/8. He would be eligible again in November for the inj. The injections help significantly and he was told to not wait to be in pain but to be proactive about getting them done. Please call Bulmaro at [phone]    Phone Number Patient can be reached at: Cell number on file:    Telephone Information:   Mobile [phone]       Best Time: any    Can we leave a detailed message on this number? YES    Call taken on 10/21/2024 at 8:37 AM by Carmella Erazo

## 2024-10-21 NOTE — CONFIDENTIAL NOTE
Attempted to reach out to patient, no answer. Left voice message asking patient to call clinic back to further discuss or can reply to [employer] message that was sent.      Patient replied back to [employer] message, see correspondence.       Patient called clinic requesting to repeat Two injections.    Type of injection: C7-T1 interlaminar KRYSTAL, Bilateral L5-S1 TFESI with Dr. Chaudhry    Most recent injection date: KIMBERLY 8/8/24, Lum TFESI 8/16/24    Most recent MRI:   Lumbar MRI 7/2/24-pacs/report scanned to media  Cervical MRI 7/2/24-pacs/report scanned to media    How long did injection provide symptomatic relief: patient notes still getting relief since august injections on both cervical and lumbar but pain slowly returning.     Percentage of relief: Notes 80 % pain relief for both areas that is now decreasing to 50-60% and reports continued relief with gradual return of symptoms    Current symptoms:   Cervical-neck pain but noticed in the last week having intermittent tingling in fingers but otherwise denies new symptoms.     Lumbar- continues to have low back pain denies any new pain or symptoms. States the injections working great and is able to sleep and work.     Number of injections in last 12 months AND what dates they were done: 4 injections:  Cervical - 2/19/24, 8/8/24,  Lumbar- 8/16/24, 3/15/24    Plan: pended case requests for repeat cervical and lumbar injections.   Routed to care team for review and sign off.   Advised patient via [employer] to connect with us if pain persists after injections.

## 2024-11-07 ENCOUNTER — OFFICE VISIT (OUTPATIENT)
Dept: INTERNAL MEDICINE | Facility: CLINIC | Age: 59
End: 2024-11-07
Payer: COMMERCIAL

## 2024-11-07 VITALS
SYSTOLIC BLOOD PRESSURE: 120 MMHG | TEMPERATURE: 97.6 F | HEART RATE: 84 BPM | RESPIRATION RATE: 16 BRPM | HEIGHT: 72 IN | OXYGEN SATURATION: 97 % | BODY MASS INDEX: 34.47 KG/M2 | WEIGHT: 254.5 LBS | DIASTOLIC BLOOD PRESSURE: 88 MMHG

## 2024-11-07 DIAGNOSIS — M51.360 DEGENERATION OF INTERVERTEBRAL DISC OF LUMBAR REGION WITH DISCOGENIC BACK PAIN: ICD-10-CM

## 2024-11-07 DIAGNOSIS — E66.01 MORBID OBESITY (H): ICD-10-CM

## 2024-11-07 DIAGNOSIS — G47.33 OSA (OBSTRUCTIVE SLEEP APNEA): ICD-10-CM

## 2024-11-07 DIAGNOSIS — M25.512 CHRONIC LEFT SHOULDER PAIN: ICD-10-CM

## 2024-11-07 DIAGNOSIS — M54.12 CERVICAL RADICULOPATHY: Primary | ICD-10-CM

## 2024-11-07 DIAGNOSIS — F40.231 SEVERE NEEDLE PHOBIA: ICD-10-CM

## 2024-11-07 DIAGNOSIS — G89.29 CHRONIC LEFT SHOULDER PAIN: ICD-10-CM

## 2024-11-07 PROCEDURE — 99213 OFFICE O/P EST LOW 20 MIN: CPT | Performed by: INTERNAL MEDICINE

## 2024-11-07 PROCEDURE — G2211 COMPLEX E/M VISIT ADD ON: HCPCS | Performed by: INTERNAL MEDICINE

## 2024-11-07 NOTE — PROGRESS NOTES
Preoperative Evaluation  [address] [address]  Phone: [phone]  Primary Provider: Gustavo Farmer DO  Pre-op Performing Provider: Gustavo Farmer DO  Nov 7, 2024 11/7/2024   Surgical Information   What procedure is being done? Neck and shoulder injection    Facility or Hospital where procedure/surgery will be performed: [facility]    Who is doing the procedure / surgery? dr bolden    Date of surgery / procedure: 11/15/2024  11/27/24   Time of surgery / procedure: 130    Where do you plan to recover after surgery? at home alone        Patient-reported     Fax number for surgical facility: Note does not need to be faxed, will be available electronically in Epic.    Assessment & Plan     The proposed surgical procedure is considered LOW risk.    Cervical radiculopathy  Patient anticipating cervical injection.  Has had minimal relief with conservative therapy.  Prior injections were quite successful.    Morbid obesity (H)  Patient had an increased risk for obstructive sleep apnea.    Chronic left shoulder pain  Patient has chronic shoulder pain.  Has had previous steroid injections.  Due to needle phobia, would like to have this performed while he is sedate.  - ARTHROCENTESIS ASPIR&/INJ MAJOR JT/BURSA W/US    Severe needle phobia  As above      ROBBY (obstructive sleep apnea)  As further above  Do not leave unattended in the supine position.    Degeneration of intervertebral disc of lumbar region with discogenic back pain  Chronic and ongoing.  Anticipating lumbar injections in the not so distant future            - No identified additional risk factors other than previously addressed    Antiplatelet or Anticoagulation Medication Instructions   - aspirin: Patient is at increased risk of thrombosis (e.g. stenting within the last year), continue aspirin without modification. Consider consultation with cardiology.      Additional Medication Instructions  Take all scheduled medications on the day of surgery    Recommendation  Approval given to proceed with proposed procedure, without further diagnostic evaluation.    Jemima Chamberlain is a 59 year old, presenting for the following:  Pre-Op Exam          11/7/2024     2:11 PM   Additional Questions   Roomed by Trisha NAVARRO related to upcoming procedure: Patient has severe shoulder and neck pain.  Responds well to intra-articular injection.        11/7/2024   Pre-Op Questionnaire   Have you ever had a heart attack or stroke? No    Have you ever had surgery on your heart or blood vessels, such as a stent placement, a coronary artery bypass, or surgery on an artery in your head, neck, heart, or legs? No    Do you have chest pain with activity? No    Do you have a history of heart failure? No    Do you currently have a cold, bronchitis or symptoms of other infection? No    Do you have a cough, shortness of breath, or wheezing? No    Do you or anyone in your family have previous history of blood clots? No    Do you or does anyone in your family have a serious bleeding problem such as prolonged bleeding following surgeries or cuts? No    Have you ever had problems with anemia or been told to take iron pills? No    Have you had any abnormal blood loss such as black, tarry or bloody stools? No    Have you ever had a blood transfusion? No    Are you willing to have a blood transfusion if it is medically needed before, during, or after your surgery? Yes    Have you or any of your relatives ever had problems with anesthesia? (!) YES     Do you have sleep apnea, excessive snoring or daytime drowsiness? No    Do you have any artifical heart valves or other implanted medical devices like a pacemaker, defibrillator, or continuous glucose monitor? No    Do you have artificial joints? No    Are you allergic to latex? No        Patient-reported     Health Care Directive  Patient does not  have a Health Care Directive: Discussed advance care planning with patient; information given to patient to review.    Preoperative Review of    reviewed - controlled substances reflected in medication list.          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/2022     Priority: Medium     Added automatically from request for surgery [id number]      Chondromalacia of left shoulder 08/18/2021     Priority: Medium    Partial nontraumatic tear of left rotator cuff 08/18/2021     Priority: Medium    Soft tissue mass 08/05/2021     Priority: Medium    Rotator cuff syndrome of left shoulder 08/05/2021     Priority: Medium    Chronic left shoulder pain 08/05/2021     Priority: Medium    NSAID long-term use 11/18/2019     Priority: Medium    Severe needle phobia 01/21/2019     Priority: Medium     Class: Chronic     likely to preclude him from having minimally invasive interventional pain procedures with minimal sedation. Would need deeper MAC sedation      Mixed hyperlipidemia 11/13/2018     Priority: Medium    ROBBY (obstructive sleep apnea) 11/13/2018     Priority: Medium    Herniation of intervertebral disc of cervical spine without radiculopathy 03/21/2017     Priority: Medium    Cervicalgia 03/21/2017     Priority: Medium    Chronic pain syndrome 04/05/2016     Priority: Medium     DDD, cervical. T#3, #60/mo.       DDD (degenerative disc disease), lumbar 12/20/2013     Priority: Medium    DDD (degenerative disc disease), cervical 12/20/2013     Priority: Medium    Pruritus ani 01/20/2010     Priority: Medium    Back pains      Priority: Medium     Problem list name updated by automated process. Provider to review and confirm      Esophageal reflux 04/13/2005     Priority: Medium      Past Medical History:   Diagnosis Date    Back pains     Depressive disorder, not elsewhere classified 01/28/2007    declines Rx    Disc  degeneration 12/01/2008    see MRI -throaco-lumbar mild discogenic degenerative changes    Elevated LFT's 11/01/2009    alt and ast    Other and unspecified hyperlipidemia 01/01/2007    Sleep disorder 09/01/2009    CPAP    Tobacco use disorder      Past Surgical History:   Procedure Laterality Date    COLONOSCOPY  07/07/08    adenomatous polyp repeat 5 years    COLONOSCOPY N/A 12/29/2017    Procedure: COMBINED COLONOSCOPY, SINGLE OR MULTIPLE BIOPSY/POLYPECTOMY BY BIOPSY;  Colonoscopy, Polypectomy by Biopsy;  Surgeon: Matt Reyes MD;  Location:  GI    COLONOSCOPY N/A 5/22/2023    Procedure: Colonoscopy;  Surgeon: Constantino Bowie DO;  Location:  GI    ENT SURGERY      for deviated septum    EXCISE LESION AXILLA  12/7/2018    Procedure: Excision Skin Tags Right Axilla;  Surgeon: Ayaan Chaney MD;  [facility]    EXCISE LESION BACK N/A 12/7/2018    Procedure: Excision Back Skin Lesion X Two;  Surgeon: Ayaan Chaney MD;  [facility]    EXCISE MASS BACK N/A 4/15/2022    Procedure: Excisions skin lesions of back;  Surgeon: Ayaan Chaney MD;  [facility]    INJECT BLOCK MEDIAL BRANCH CERVICAL/THORACIC/LUMBAR Bilateral 5/26/2022    Procedure: Cervical 4, 5, 6 Medial Branch Block;  Surgeon: Christian Chaudhry MD;  [facility]    INJECT EPIDURAL CERVICAL N/A 5/14/2015    Procedure: INJECT EPIDURAL CERVICAL;  Surgeon: Christian Chaudhry MD;  [facility]    INJECT EPIDURAL CERVICAL N/A 12/28/2018    Procedure: INJECT EPIDURAL CERVICAL 6-7;  Surgeon: Christian Chaudhry MD;  [facility]    INJECT EPIDURAL CERVICAL Bilateral 6/8/2023    Procedure: Left Cervical 5-Cervical 6, Right Cervical 5-Cervical 6, Left Cervical 6-Cervical 7 and Right Cervical 6-Cervical 7 Intra-articular zygopophyseal joint injection utilizing fluoroscopic guidance with contrast dye.;  Surgeon: Christian Chaudhry MD;  [facility]    INJECT EPIDURAL CERVICAL Bilateral 2/19/2024    Procedure: Left Cervical 5 -  Cervical 6, Right Cervical 5 - Cervical 6, Left Cervical 6 - Cervical 7 and Right Cervical 6 - Cervical 7 Intra-articular zygopophyseal joint injection utilizing fluoroscopic guidance with contrast dye.;  Surgeon: Christian Chaudhry MD;  [facility]    INJECT EPIDURAL CERVICAL N/A 8/8/2024    Procedure: Cervical 7 - Thoracic 1 Interlaminar epidural steroid injection using fluoroscopic guidance with contrast dye.;  Surgeon: Christian Chaudhry MD;  [facility]    INJECT EPIDURAL LUMBAR N/A 12/20/2019    Procedure: Lumbar 5- Sacral 1 Epidural  Injection bilatetral;  Surgeon: Christian Chaudhry MD;  [facility]    INJECT EPIDURAL LUMBAR Bilateral 9/8/2022    Procedure: Bilateral Lumbar 5-Sacral 1 transforaminal epidural injections utilizing fluoroscopic guidance with contrast dye  and;  Surgeon: Christian Chaudhry MD;  [facility]    INJECT EPIDURAL LUMBAR Bilateral 8/16/2024    Procedure: Bilateral Lumbar 5-Sacral 1 transforaminal epidural injections utilizing fluoroscopic guidance with contrast dye;  Surgeon: Christian Chaudhry MD;  [facility]    INJECT EPIDURAL TRANSFORAMINAL Bilateral 5/20/2022    Procedure: Bilateral Lumbar 5- Sacral 1 transforaminal Epidural Steroid Injection;  Surgeon: Christian Chaudhry MD;  [facility]    INJECT EPIDURAL TRANSFORAMINAL Bilateral 7/14/2022    Procedure: Left Cervical 5-Cervical 6, Right Cervical 5-Cervical 6, Left Cervical 6-Cervical 7 and Right Cervical 6-Cervical 7 Intra-articular zygopophyseal joint injection utilizing fluoroscopic guidance with contrast dye;  Surgeon: Christian Chaudhry MD;  [facility]    INJECT EPIDURAL TRANSFORAMINAL Bilateral 6/16/2023    Procedure: Bilateral Lumbar 5-Sacral 1 transforaminal epidural injections utilizing fluoroscopic guidance with contrast dye;  Surgeon: Christian Chaudhry MD;  [facility]    INJECT EPIDURAL TRANSFORAMINAL Bilateral 9/28/2023    Procedure: Bilateral Lumbar 5-Sacral 1 transforaminal epidural injections utilizing  fluoroscopic guidance with contrast dye;  Surgeon: Christian Chaudhry MD;  [facility]    INJECT EPIDURAL TRANSFORAMINAL Bilateral 3/15/2024    Procedure: Bilateral Lumbar 5 - Sacral 1 transforaminal epidural injections utilizing fluoroscopic guidance with contrast dye.;  Surgeon: Christian Chaudhry MD;  [facility]    INJECT FACET JOINT Bilateral 1/25/2019    Procedure: Cervical Facet Injections Cervical 5-6 and 6-7 Bilateral;  Surgeon: Christian Chaudhry MD;  [facility]    INJECT FACET JOINT Bilateral 11/29/2019    Procedure: Cervical 5-6 and 6-7 Bilateral facet joint injection;  Surgeon: Christian Chaudhry MD;  [facility]    INJECT FACET JOINT Bilateral 2/11/2021    Procedure: Cervical 5-6 and Cervical 6-7 Bilateral facet injections;  Surgeon: Christian Chaudhry MD;  [facility]    INJECT FACET JOINT Bilateral 10/19/2021    Procedure: Left C5-C6, Right C5-C6, Left C6-C7 and Right C6-C7 Intra-articular zygopophyseal joint injection utilizing fluoroscopic guidance with contrast dye;  Surgeon: Christian Chaudhry MD;  [facility]    INJECT FACET JOINT Bilateral 9/14/2023    Procedure: Left Cervical 5-Cervical 6, Right Cervical 5-Cervical 6, Left Cervical 6-Cervical 7 and Right Cervical 6-Cervical 7 Intra-articular zygopophyseal joint injection utilizing fluoroscopic guidance with contrast dye. Left Intra-articular shoulder Injection;  Surgeon: Christian Chaudhry MD;  [facility]    INJECT STEROID (LOCATION) Left 10/19/2021    Procedure: Glenohumeral joint injection left shoulder;  Surgeon: Christian Chaudhry MD;  [facility]    INJECT STEROID (LOCATION) Left 5/20/2022    Procedure: Left shoulder glenohumeral steroid inj with local;  Surgeon: Christian Chaudhry MD;  [facility]    INJECT STEROID (LOCATION) Left 8/8/2024    Procedure: steroid injection left shoulder;  Surgeon: Christian Chaudhry MD;  [facility]    INJECT STEROID TROCHANTERIC BURSA Left 9/8/2022    Procedure: Left intra-articular shoulder  injections;  Surgeon: Christian Chaudhry MD;  [facility]    ORTHOPEDIC SURGERY      Right hand    TONSILLECTOMY      [facility] NONSPECIFIC PROCEDURE      rt hand surgery - laceration/glass/tendons    [email or web address] ECHO HEART XTHORACIC, STRESS/REST  2009    neg     Current Outpatient Medications   Medication Sig Dispense Refill    acetaminophen (TYLENOL) 500 MG tablet Take 1,000 mg by mouth every morning      aspirin (ASA) 81 MG chewable tablet Take 1 tablet (81 mg) by mouth daily      cetirizine (ZYRTEC) 10 MG tablet Take 10 mg by mouth daily      ibuprofen (ADVIL/MOTRIN) 200 MG tablet Take 400 mg by mouth every morning      meclizine 25 MG CHEW Take 1 tablet (25 mg) by mouth every 6 hours as needed for dizziness. 60 tablet 1    modafinil (PROVIGIL) 200 MG tablet Take 1 tablet (200 mg) by mouth daily. 90 tablet 0    omeprazole (PRILOSEC) 40 MG DR capsule Take 1 capsule (40 mg) by mouth daily. 90 capsule 0    predniSONE (DELTASONE) 20 MG tablet Take 3 tabs by mouth daily x 3 days, then 2 tabs daily x 3 days, then 1 tab daily x 3 days, then 1/2 tab daily x 3 days. 20 tablet 0    rosuvastatin (CRESTOR) 5 MG tablet Take 1 tablet (5 mg) by mouth daily 90 tablet 3       Allergies   Allergen Reactions    Wasps [Hornets] Swelling    Percocet [Oxycodone-Acetaminophen] Itching        Social History     Tobacco Use    Smoking status: Former     Current packs/day: 0.00     Types: Cigarettes     Quit date: 2007     Years since quittin.7    Smokeless tobacco: Never   Substance Use Topics    Alcohol use: No     Comment: rare     Family History   Problem Relation Age of Onset    Coronary Artery Disease Mother     Breast Cancer Mother     Arthritis Mother         joint ?    Depression Father     Cancer Maternal Grandmother     C.A.D. Maternal Grandfather     Cardiovascular Maternal Grandfather     Gynecology Paternal Grandmother          giving birth    Prostate Cancer Paternal Grandfather     Genetic Disorder Brother          joint pain?    Depression Brother     Cardiovascular Son         congenital heart defect -      Prostate Cancer Other          age 70's    Diabetes Other      History   Drug Use No             Review of Systems  CONSTITUTIONAL: NEGATIVE for fever, chills, change in weight  INTEGUMENTARY/SKIN: NEGATIVE for worrisome rashes, moles or lesions  EYES: NEGATIVE for vision changes or irritation  ENT/MOUTH: NEGATIVE for ear, mouth and throat problems  RESP: NEGATIVE for significant cough or SOB  BREAST: NEGATIVE for masses, tenderness or discharge  CV: NEGATIVE for chest pain, palpitations or peripheral edema  GI: NEGATIVE for nausea, abdominal pain, heartburn, or change in bowel habits  : NEGATIVE for frequency, dysuria, or hematuria  MUSCULOSKELETAL: Chronic cervical radiculopathy with pain radiating to the shoulders.  Additionally, chronic left shoulder pain with motion induced exacerbation.  NEURO: NEGATIVE for weakness, dizziness or paresthesias  ENDOCRINE: NEGATIVE for temperature intolerance, skin/hair changes  HEME: NEGATIVE for bleeding problems  PSYCHIATRIC: NEGATIVE for changes in mood or affect    Objective    /88   Pulse 84   Temp 97.6  F (36.4  C) (Temporal)   Resp 16   Ht 1.829 m (6')   Wt 115.4 kg (254 lb 8 oz)   SpO2 97%   BMI 34.52 kg/m     Estimated body mass index is 34.52 kg/m  as calculated from the following:    Height as of this encounter: 1.829 m (6').    Weight as of this encounter: 115.4 kg (254 lb 8 oz).  Physical Exam  GENERAL: alert and no distress  EYES: Eyes grossly normal to inspection, PERRL and conjunctivae and sclerae normal  HENT: ear canals and TM's normal, nose and mouth without ulcers or lesions  NECK: no adenopathy, no asymmetry, masses, or scars  RESP: lungs clear to auscultation - no rales, rhonchi or wheezes  CV: regular rate and rhythm, normal S1 S2, no S3 or S4, no murmur, click or rub, no peripheral edema  ABDOMEN: soft, nontender, no  hepatosplenomegaly, no masses and bowel sounds normal  MS: no gross musculoskeletal defects noted, no edema  SKIN: no suspicious lesions or rashes  NEURO: Normal strength and tone, mentation intact and speech normal  PSYCH: mentation appears normal, affect normal/bright    Recent Labs   Lab Test 03/09/24  1022 11/11/23  1331   HGB 16.1 15.8    212    139   POTASSIUM 4.2 3.9   CR 0.98 1.08        Diagnostics  No labs were ordered during this visit.   No EKG required for low risk surgery (cataract, skin procedure, breast biopsy, etc).    Revised Cardiac Risk Index (RCRI)  The patient has the following serious cardiovascular risks for perioperative complications:   - No serious cardiac risks = 0 points     RCRI Interpretation: 0 points: Class I (very low risk - 0.4% complication rate)         Signed Electronically by: Gustavo Farmer DO  A copy of this evaluation report is provided to the requesting physician.

## 2024-11-13 RX ORDER — AMOXICILLIN 500 MG/1
CAPSULE ORAL
COMMUNITY
Start: 2024-09-03

## 2024-11-15 ENCOUNTER — HOSPITAL ENCOUNTER (OUTPATIENT)
Facility: CLINIC | Age: 59
Discharge: HOME | End: 2024-11-15
Attending: ANESTHESIOLOGY | Admitting: ANESTHESIOLOGY
Payer: COMMERCIAL

## 2024-11-15 ENCOUNTER — ANESTHESIA EVENT (OUTPATIENT)
Dept: SURGERY | Facility: CLINIC | Age: 59
End: 2024-11-15
Payer: COMMERCIAL

## 2024-11-15 ENCOUNTER — ANESTHESIA (OUTPATIENT)
Dept: SURGERY | Facility: CLINIC | Age: 59
End: 2024-11-15
Payer: COMMERCIAL

## 2024-11-15 ENCOUNTER — APPOINTMENT (OUTPATIENT)
Dept: GENERAL RADIOLOGY | Facility: CLINIC | Age: 59
End: 2024-11-15
Attending: ANESTHESIOLOGY
Payer: COMMERCIAL

## 2024-11-15 VITALS
TEMPERATURE: 97.8 F | HEART RATE: 80 BPM | SYSTOLIC BLOOD PRESSURE: 131 MMHG | OXYGEN SATURATION: 93 % | RESPIRATION RATE: 16 BRPM | DIASTOLIC BLOOD PRESSURE: 105 MMHG

## 2024-11-15 DIAGNOSIS — M54.12 CERVICAL RADICULOPATHY: ICD-10-CM

## 2024-11-15 PROCEDURE — 250N000011 HC RX IP 250 OP 636: Performed by: ANESTHESIOLOGY

## 2024-11-15 PROCEDURE — 64490 INJ PARAVERT F JNT C/T 1 LEV: CPT | Mod: 50 | Performed by: ANESTHESIOLOGY

## 2024-11-15 PROCEDURE — 250N000011 HC RX IP 250 OP 636: Performed by: NURSE ANESTHETIST, CERTIFIED REGISTERED

## 2024-11-15 PROCEDURE — 370N000017 HC ANESTHESIA TECHNICAL FEE, PER MIN: Performed by: ANESTHESIOLOGY

## 2024-11-15 PROCEDURE — 20610 DRAIN/INJ JOINT/BURSA W/O US: CPT

## 2024-11-15 PROCEDURE — 250N000009 HC RX 250: Performed by: NURSE ANESTHETIST, CERTIFIED REGISTERED

## 2024-11-15 PROCEDURE — 64491 INJ PARAVERT F JNT C/T 2 LEV: CPT | Mod: 50 | Performed by: ANESTHESIOLOGY

## 2024-11-15 PROCEDURE — 250N000009 HC RX 250: Performed by: ANESTHESIOLOGY

## 2024-11-15 PROCEDURE — 20610 DRAIN/INJ JOINT/BURSA W/O US: CPT | Mod: LT | Performed by: ANESTHESIOLOGY

## 2024-11-15 PROCEDURE — 64492 INJ PARAVERT F JNT C/T 3 LEV: CPT

## 2024-11-15 PROCEDURE — 64491 INJ PARAVERT F JNT C/T 2 LEV: CPT | Performed by: ANESTHESIOLOGY

## 2024-11-15 PROCEDURE — 999N000179 XR SURGERY CARM FLUORO LESS THAN 5 MIN W STILLS: Mod: TC

## 2024-11-15 RX ORDER — PROPOFOL 10 MG/ML
INJECTION, EMULSION INTRAVENOUS PRN
Status: DISCONTINUED | OUTPATIENT
Start: 2024-11-15 | End: 2024-11-15

## 2024-11-15 RX ORDER — LIDOCAINE HYDROCHLORIDE 20 MG/ML
INJECTION, SOLUTION INFILTRATION; PERINEURAL PRN
Status: DISCONTINUED | OUTPATIENT
Start: 2024-11-15 | End: 2024-11-15

## 2024-11-15 RX ORDER — LIDOCAINE HYDROCHLORIDE 10 MG/ML
INJECTION, SOLUTION EPIDURAL; INFILTRATION; INTRACAUDAL; PERINEURAL PRN
Status: DISCONTINUED | OUTPATIENT
Start: 2024-11-15 | End: 2024-11-15 | Stop reason: HOSPADM

## 2024-11-15 RX ORDER — TRIAMCINOLONE ACETONIDE 40 MG/ML
INJECTION, SUSPENSION INTRA-ARTICULAR; INTRAMUSCULAR PRN
Status: DISCONTINUED | OUTPATIENT
Start: 2024-11-15 | End: 2024-11-15 | Stop reason: HOSPADM

## 2024-11-15 RX ORDER — IOPAMIDOL 612 MG/ML
INJECTION, SOLUTION INTRATHECAL PRN
Status: DISCONTINUED | OUTPATIENT
Start: 2024-11-15 | End: 2024-11-15 | Stop reason: HOSPADM

## 2024-11-15 RX ADMIN — PROPOFOL 100 MG: 10 INJECTION, EMULSION INTRAVENOUS at 14:40

## 2024-11-15 RX ADMIN — LIDOCAINE HYDROCHLORIDE 0.1 ML: 10 INJECTION, SOLUTION EPIDURAL; INFILTRATION; INTRACAUDAL; PERINEURAL at 13:56

## 2024-11-15 RX ADMIN — PROPOFOL 50 MG: 10 INJECTION, EMULSION INTRAVENOUS at 14:48

## 2024-11-15 RX ADMIN — PROPOFOL 50 MG: 10 INJECTION, EMULSION INTRAVENOUS at 14:45

## 2024-11-15 RX ADMIN — PROPOFOL 50 MG: 10 INJECTION, EMULSION INTRAVENOUS at 14:58

## 2024-11-15 RX ADMIN — LIDOCAINE HYDROCHLORIDE 50 MG: 20 INJECTION, SOLUTION INFILTRATION; PERINEURAL at 14:37

## 2024-11-15 RX ADMIN — PROPOFOL 50 MG: 10 INJECTION, EMULSION INTRAVENOUS at 14:51

## 2024-11-15 RX ADMIN — PROPOFOL 50 MG: 10 INJECTION, EMULSION INTRAVENOUS at 14:54

## 2024-11-15 RX ADMIN — MIDAZOLAM 2 MG: 1 INJECTION INTRAMUSCULAR; INTRAVENOUS at 14:32

## 2024-11-15 RX ADMIN — PROPOFOL 100 MG: 10 INJECTION, EMULSION INTRAVENOUS at 14:37

## 2024-11-15 ASSESSMENT — ACTIVITIES OF DAILY LIVING (ADL)
ADLS_ACUITY_SCORE: 0
ADLS_ACUITY_SCORE: 0

## 2024-11-15 ASSESSMENT — LIFESTYLE VARIABLES: TOBACCO_USE: 1

## 2024-11-15 NOTE — OP NOTE
CHIEF COMPLAINT:   1. neck pain secondary to cervical spondylosis.  2.  Left shoulder pain secondary to glenohumeral joint degeneration and a left shoulder labral tear  INTERVAL HISTORY:  I discussed the above note with the patient. I agree with above.  He did have a epidural injection in the neck and that did not help very much suggesting that the discs in his neck that have some degeneration are not the major pain generators.  Because of this we are going to diagnostically and therapeutically test his facet joints.  The complicating factor in his situation is that he has a severe needle phobia.  He needed very deep monitored anesthetic care in order to have the facet injection performed.  This would preclude him from having a radiofrequency ablation which requires fully awake medial branch blocks done under local anesthesia.  PHYSICAL EXAM:   Musculoskeletal: Strength of upper extremities is 5/5 bilaterally.  Pain on palpation of the   neck. Pain on   Rotation, extension and flexion of neck.   Neuro: No sensory perception differences of lower extremities.  PROCEDURE number #1:    1. Left C5-C6, Right C5-C6, Left C6-C7 and Right C6-C7  Intra-articular zygopophyseal joint injection utilizing fluoroscopic guidance with contrast dye.      PROCEDURE DETAILS: After written informed consent was obtained from the patient, the patient was escorted to the procedure room.  The patient was placed in the sitting position.  A  time out  was conducted to verify the patient identity, procedure to be performed, side, site, allergies and any special requirements.  The skin over the neck was prepped and draped in normal sterile fashion. Fluoroscopy was used to identify the zygopophyseal joint with a lateral view.   The skin was anesthetized with 0.5 mL of 1% lidocaine with bicarbonate buffer.  A 25-gauge 2 inch Quincke needle was advanced under fluoroscopic guidance in the lateral approach until entry into the zygopophyseal joint  was successful.  View was confirmed in AP view.  Then, <0.3 cc of Omnipaque contrast dye was injected producing a satisfactory arthrogram without evidence of intrathecal or intravascular spread.  Then, a 1 mL solution of 5 mg dexamethasone and 0.5 mL of 2% lidocaine was incrementally injected into each  joint.  After injection of the medication, as the needle tip was withdrawn, it was flushed with local anesthetic. The patient was monitored with blood pressure and pulse oximetry machines with the assistance of an RN throughout the procedure.  The patient was alert and responsive to questions throughout the procedure.   The patient tolerated the procedure well and was observed in the post-procedural area.  The patient was dismissed without apparent complications.       PROCEDURE number #2:     Procedure details: After his neck was treated we moved over to his left shoulder.  Left shoulder posterior approach was prepped with ChloraPrep.  1% lidocaine was used for skin.  I then advanced a 22-gauge Quincke needle into his left glenohumeral joint.  Initially contrast was injected which did not show proper spread throughout the joint and after several needle tip manipulations and contrast injection, I achieved a proper arthrogram of the left glenohumeral joint.  I then injected 8 cc of 0.25% bupivacaine with 4 mg/cc of Depo-Medrol.  The needle was then removed and a sterile bandage placed over the needle insertion site.     Blood loss: Less than 2 cc     DIAGNOSIS:  1.  Cervical spondylosis secondary to a symptomatic facet syndrome causing axial neck pain with a history of long-term pain relief from previous facet injections  2.  Severe needle phobia  3.  Labral tear left shoulder inferior with severe glenohumeral joint osteoarthritis     PLAN:  Performed repeat zygopophyseal joint injections.   In the past he has received between 6 to 10 months months of pain relief from facet injections.  However, he has a severe needle  phobia so he would not be a candidate for medial branch blocks or a radiofrequency ablation so if he does have positive diagnostic injections with his pain form then he would need to consider a cervical fusion if the facet injections do not provide long-term pain relief for him.  2.    I completed a repeat left shoulder injection as well.      Christian Chaudhry MD  Diplomate of the American Board of Anesthesiology, Pain Medicine

## 2024-11-15 NOTE — ANESTHESIA CARE TRANSFER NOTE
Patient: Bulmaro Herrera    Procedure: Procedure(s):  Cervical 5-6 and 6-7 bilateral facet injections and a left shoulder injection       Diagnosis: Cervical radiculopathy [M54.12]  Cervicalgia [M54.2]  Neural foraminal stenosis of cervical spine [M48.02]  Diagnosis Additional Information: No value filed.    Anesthesia Type:   MAC     Note:    Oropharynx: oropharynx clear of all foreign objects and spontaneously breathing  Level of Consciousness: awake  Oxygen Supplementation: room air  Level of Supplemental Oxygen (L/min / FiO2): 6  Independent Airway: airway patency satisfactory and stable  Dentition: dentition unchanged  Vital Signs Stable: post-procedure vital signs reviewed and stable  Report to RN Given: handoff report given  Patient transferred to: Phase II    Handoff Report: Identifed the Patient, Identified the Reponsible Provider, Reviewed the pertinent medical history, Discussed the surgical course, Reviewed Intra-OP anesthesia mangement and issues during anesthesia, Set expectations for post-procedure period and Allowed opportunity for questions and acknowledgement of understanding    Vitals:  Vitals Value Taken Time   /93 11/15/24 1520   Temp     Pulse 71 11/15/24 1520   Resp     SpO2 92 % 11/15/24 1521   Vitals shown include unfiled device data.    Electronically Signed By: SHIV Ramírez CRNA  November 15, 2024  3:23 PM

## 2024-11-15 NOTE — ANESTHESIA PREPROCEDURE EVALUATION
Anesthesia Pre-Procedure Evaluation    Patient: Bulmaro Herrera   MRN: [id number] : 1965        Procedure Procedure(s):  Cervical 7-Thoracic 1 Interlaminar epidural steroid injection          Past Medical History:   Diagnosis Date    Back pains     Depressive disorder, not elsewhere classified 2007    declines Rx    Disc degeneration 2008    see MRI -throaco-lumbar mild discogenic degenerative changes    Elevated LFT's 2009    alt and ast    Other and unspecified hyperlipidemia 2007    Sleep disorder 2009    CPAP    Tobacco use disorder       Past Surgical History:   Procedure Laterality Date    COLONOSCOPY  08    adenomatous polyp repeat 5 years    COLONOSCOPY N/A 2017    Procedure: COMBINED COLONOSCOPY, SINGLE OR MULTIPLE BIOPSY/POLYPECTOMY BY BIOPSY;  Colonoscopy, Polypectomy by Biopsy;  Surgeon: Matt Reyes MD;  Location:  GI    COLONOSCOPY N/A 2023    Procedure: Colonoscopy;  Surgeon: Constantino Bowie DO;  Location:  GI    ENT SURGERY      for deviated septum    EXCISE LESION AXILLA  2018    Procedure: Excision Skin Tags Right Axilla;  Surgeon: Ayaan Chaney MD;  [facility]    EXCISE LESION BACK N/A 2018    Procedure: Excision Back Skin Lesion X Two;  Surgeon: Ayaan Chaney MD;  [facility]    EXCISE MASS BACK N/A 4/15/2022    Procedure: Excisions skin lesions of back;  Surgeon: Aayan Chaney MD;  [facility]    INJECT BLOCK MEDIAL BRANCH CERVICAL/THORACIC/LUMBAR Bilateral 2022    Procedure: Cervical 4, 5, 6 Medial Branch Block;  Surgeon: Christian Chaudhry MD;  [facility]    INJECT EPIDURAL CERVICAL N/A 2015    Procedure: INJECT EPIDURAL CERVICAL;  Surgeon: Christian Chaudhry MD;  [facility]    INJECT EPIDURAL CERVICAL N/A 2018    Procedure: INJECT EPIDURAL CERVICAL 6-7;  Surgeon: Christian Chaudhry MD;  [facility]    INJECT EPIDURAL CERVICAL Bilateral 2023    Procedure: Left  Cervical 5-Cervical 6, Right Cervical 5-Cervical 6, Left Cervical 6-Cervical 7 and Right Cervical 6-Cervical 7 Intra-articular zygopophyseal joint injection utilizing fluoroscopic guidance with contrast dye.;  Surgeon: Christian Chaudhry MD;  [facility]    INJECT EPIDURAL CERVICAL Bilateral 2/19/2024    Procedure: Left Cervical 5 - Cervical 6, Right Cervical 5 - Cervical 6, Left Cervical 6 - Cervical 7 and Right Cervical 6 - Cervical 7 Intra-articular zygopophyseal joint injection utilizing fluoroscopic guidance with contrast dye.;  Surgeon: Christian Chaudhry MD;  [facility]    INJECT EPIDURAL CERVICAL N/A 8/8/2024    Procedure: Cervical 7 - Thoracic 1 Interlaminar epidural steroid injection using fluoroscopic guidance with contrast dye.;  Surgeon: Christian Chaudhry MD;  [facility]    INJECT EPIDURAL LUMBAR N/A 12/20/2019    Procedure: Lumbar 5- Sacral 1 Epidural  Injection bilatetral;  Surgeon: Christian Chaudhry MD;  [facility]    INJECT EPIDURAL LUMBAR Bilateral 9/8/2022    Procedure: Bilateral Lumbar 5-Sacral 1 transforaminal epidural injections utilizing fluoroscopic guidance with contrast dye  and;  Surgeon: Christian Chaudhry MD;  [facility]    INJECT EPIDURAL LUMBAR Bilateral 8/16/2024    Procedure: Bilateral Lumbar 5-Sacral 1 transforaminal epidural injections utilizing fluoroscopic guidance with contrast dye;  Surgeon: Christian Chaudhry MD;  [facility]    INJECT EPIDURAL TRANSFORAMINAL Bilateral 5/20/2022    Procedure: Bilateral Lumbar 5- Sacral 1 transforaminal Epidural Steroid Injection;  Surgeon: Christian Chaudhry MD;  [facility]    INJECT EPIDURAL TRANSFORAMINAL Bilateral 7/14/2022    Procedure: Left Cervical 5-Cervical 6, Right Cervical 5-Cervical 6, Left Cervical 6-Cervical 7 and Right Cervical 6-Cervical 7 Intra-articular zygopophyseal joint injection utilizing fluoroscopic guidance with contrast dye;  Surgeon: Christian Chaudhry MD;  [facility]    INJECT EPIDURAL TRANSFORAMINAL  Bilateral 6/16/2023    Procedure: Bilateral Lumbar 5-Sacral 1 transforaminal epidural injections utilizing fluoroscopic guidance with contrast dye;  Surgeon: Christian Chaudhry MD;  [facility]    INJECT EPIDURAL TRANSFORAMINAL Bilateral 9/28/2023    Procedure: Bilateral Lumbar 5-Sacral 1 transforaminal epidural injections utilizing fluoroscopic guidance with contrast dye;  Surgeon: Christian Chaudhry MD;  [facility]    INJECT EPIDURAL TRANSFORAMINAL Bilateral 3/15/2024    Procedure: Bilateral Lumbar 5 - Sacral 1 transforaminal epidural injections utilizing fluoroscopic guidance with contrast dye.;  Surgeon: Christian Chaudhry MD;  [facility]    INJECT FACET JOINT Bilateral 1/25/2019    Procedure: Cervical Facet Injections Cervical 5-6 and 6-7 Bilateral;  Surgeon: Christian Chaudhry MD;  [facility]    INJECT FACET JOINT Bilateral 11/29/2019    Procedure: Cervical 5-6 and 6-7 Bilateral facet joint injection;  Surgeon: Christian Chaudhry MD;  [facility]    INJECT FACET JOINT Bilateral 2/11/2021    Procedure: Cervical 5-6 and Cervical 6-7 Bilateral facet injections;  Surgeon: Christian Chaudhry MD;  [facility]    INJECT FACET JOINT Bilateral 10/19/2021    Procedure: Left C5-C6, Right C5-C6, Left C6-C7 and Right C6-C7 Intra-articular zygopophyseal joint injection utilizing fluoroscopic guidance with contrast dye;  Surgeon: Christian Chaudhry MD;  [facility]    INJECT FACET JOINT Bilateral 9/14/2023    Procedure: Left Cervical 5-Cervical 6, Right Cervical 5-Cervical 6, Left Cervical 6-Cervical 7 and Right Cervical 6-Cervical 7 Intra-articular zygopophyseal joint injection utilizing fluoroscopic guidance with contrast dye. Left Intra-articular shoulder Injection;  Surgeon: Christian Chaudhry MD;  [facility]    INJECT STEROID (LOCATION) Left 10/19/2021    Procedure: Glenohumeral joint injection left shoulder;  Surgeon: Christian Chaudhry MD;  [facility]    INJECT STEROID (LOCATION) Left 5/20/2022     Procedure: Left shoulder glenohumeral steroid inj with local;  Surgeon: Christian Chaudhry MD;  [facility]    INJECT STEROID (LOCATION) Left 2024    Procedure: steroid injection left shoulder;  Surgeon: Christian Chaudhry MD;  [facility]    INJECT STEROID TROCHANTERIC BURSA Left 2022    Procedure: Left intra-articular shoulder injections;  Surgeon: Christian Chaudhry MD;  [facility]    ORTHOPEDIC SURGERY      Right hand    TONSILLECTOMY      ZZC NONSPECIFIC PROCEDURE      rt hand surgery - laceration/glass/tendons    ZZ ECHO HEART XTHORACIC, STRESS/REST  2009    neg      Allergies   Allergen Reactions    Wasps [Hornets] Swelling    Percocet [Oxycodone-Acetaminophen] Itching      Social History     Tobacco Use    Smoking status: Former     Current packs/day: 0.00     Types: Cigarettes     Quit date: 2007     Years since quittin.7    Smokeless tobacco: Never   Substance Use Topics    Alcohol use: No     Comment: rare      Wt Readings from Last 1 Encounters:   24 115.4 kg (254 lb 8 oz)        Anesthesia Evaluation            ROS/MED HX  ENT/Pulmonary:     (+) sleep apnea,               tobacco use,                        Neurologic:       Cardiovascular:     (+) Dyslipidemia - -   -  - -                                 Previous cardiac testing   Echo: Date: Results:    Stress Test:  Date: Results:    ECG Reviewed:  Date: 24 Results:  Sinus Rhythm   Low voltage in precordial leads.  Cath:  Date: Results:      METS/Exercise Tolerance:     Hematologic:  - neg hematologic  ROS     Musculoskeletal: Comment: DDD      GI/Hepatic:     (+) GERD,            liver disease,       Renal/Genitourinary:  - neg Renal ROS     Endo:     (+)               Obesity,       Psychiatric/Substance Use:     (+) psychiatric history depression       Infectious Disease:  - neg infectious disease ROS     Malignancy:  - neg malignancy ROS     Other:      (+)  , H/O Chronic Pain,         Physical Exam    Airway  " airway exam normal      Mallampati: II   TM distance: > 3 FB   Neck ROM: full   Mouth opening: > 3 cm    Respiratory Devices and Support         Dental           Cardiovascular   cardiovascular exam normal       Rhythm and rate: regular and normal     Pulmonary   pulmonary exam normal        breath sounds clear to auscultation           OUTSIDE LABS:  CBC:   Lab Results   Component Value Date    WBC 5.9 03/09/2024    WBC 6.7 11/11/2023    HGB 16.1 03/09/2024    HGB 15.8 11/11/2023    HCT 47.2 06/09/2024    HCT 46.6 03/09/2024     03/09/2024     11/11/2023     BMP:   Lab Results   Component Value Date     03/09/2024     11/11/2023    POTASSIUM 4.2 03/09/2024    POTASSIUM 3.9 11/11/2023    CHLORIDE 104 03/09/2024    CHLORIDE 102 11/11/2023    CO2 25 03/09/2024    CO2 27 11/11/2023    BUN 15.6 03/09/2024    BUN 15.6 11/11/2023    CR 0.98 03/09/2024    CR 1.08 11/11/2023     (H) 03/09/2024    GLC 99 11/11/2023     COAGS:   Lab Results   Component Value Date    PTT 31 06/06/2008    INR 1.0 02/17/2014     POC: No results found for: \"BGM\", \"HCG\", \"HCGS\"  HEPATIC:   Lab Results   Component Value Date    ALBUMIN 4.3 03/09/2024    PROTTOTAL 7.2 03/09/2024    ALT 64 03/09/2024    AST 33 03/09/2024    ALKPHOS 77 03/09/2024    BILITOTAL 0.8 03/09/2024     OTHER:   Lab Results   Component Value Date    PH 8.0 (H) 05/07/2002    A1C 6.2 (H) 01/07/2023    WILIAM 9.8 03/09/2024    PHOS 3.5 02/08/2021    MAG 2.2 02/08/2021    LIPASE 18 11/11/2023    AMYLASE 76 03/08/2014    TSH 1.91 06/09/2024    T4 0.94 11/15/2019    CRP <2.9 03/25/2015    SED 3 06/09/2024       Anesthesia Plan    ASA Status:  2    NPO Status:  NPO Appropriate    Anesthesia Type: MAC.     - Reason for MAC: immobility needed, straight local not clinically adequate (needle phobia)   Induction: Intravenous, Propofol.   Maintenance: TIVA.        Consents    Anesthesia Plan(s) and associated risks, benefits, and realistic alternatives " discussed. Questions answered and patient/representative(s) expressed understanding.     - Discussed:     - Discussed with:  Patient       Use of blood products discussed: No .     Postoperative Care       PONV prophylaxis: Background Propofol Infusion, Ondansetron (or other 5HT-3)     Comments:    Other Comments: The risks and benefits of anesthesia, and the alternatives where applicable, have been discussed with the patient, and they wish to proceed.              SHIV Ramírez CRNA    I have reviewed the pertinent notes and labs in the chart from the past 30 days and (re)examined the patient.  Any updates or changes from those notes are reflected in this note.               # Obesity: Estimated body mass index is 34.52 kg/m  as calculated from the following:    Height as of 11/7/24: 1.829 m (6').    Weight as of 11/7/24: 115.4 kg (254 lb 8 oz).

## 2024-11-15 NOTE — ANESTHESIA POSTPROCEDURE EVALUATION
Patient: Bulmaro Herrera    Procedure: Procedure(s):  Cervical 5-6 and 6-7 bilateral facet injections and a left shoulder injection       Anesthesia Type:  MAC    Note:  Disposition: Outpatient   Postop Pain Control: Uneventful            Sign Out: Well controlled pain   PONV: No   Neuro/Psych: Uneventful            Sign Out: Acceptable/Baseline neuro status   Airway/Respiratory: Uneventful            Sign Out: Acceptable/Baseline resp. status   CV/Hemodynamics: Uneventful            Sign Out: Acceptable CV status   Other NRE: NONE   DID A NON-ROUTINE EVENT OCCUR? No    Event details/Postop Comments:  Pt was happy with anesthesia care.  No complications.  I will follow up with the pt if needed.           Last vitals:  Vitals Value Taken Time   /93 11/15/24 1520   Temp     Pulse 71 11/15/24 1520   Resp     SpO2 92 % 11/15/24 1521   Vitals shown include unfiled device data.    Electronically Signed By: SHIV Ramírez CRNA  November 15, 2024  3:23 PM

## 2024-11-15 NOTE — DISCHARGE INSTRUCTIONS

## 2024-11-18 ENCOUNTER — MYC REFILL (OUTPATIENT)
Dept: CARDIOLOGY | Facility: CLINIC | Age: 59
End: 2024-11-18
Payer: COMMERCIAL

## 2024-11-18 ENCOUNTER — MYC REFILL (OUTPATIENT)
Dept: INTERNAL MEDICINE | Facility: CLINIC | Age: 59
End: 2024-11-18
Payer: COMMERCIAL

## 2024-11-18 DIAGNOSIS — E78.00 HYPERCHOLESTEROLEMIA: ICD-10-CM

## 2024-11-18 DIAGNOSIS — M54.12 CERVICAL RADICULOPATHY: ICD-10-CM

## 2024-11-18 DIAGNOSIS — H69.93 DYSFUNCTION OF BOTH EUSTACHIAN TUBES: Primary | ICD-10-CM

## 2024-11-18 DIAGNOSIS — I25.10 CORONARY ARTERY DISEASE INVOLVING NATIVE CORONARY ARTERY OF NATIVE HEART WITHOUT ANGINA PECTORIS: ICD-10-CM

## 2024-11-18 RX ORDER — ROSUVASTATIN CALCIUM 5 MG/1
5 TABLET, COATED ORAL DAILY
Qty: 90 TABLET | Refills: 3 | Status: CANCELLED | OUTPATIENT
Start: 2024-11-18

## 2024-11-19 NOTE — TELEPHONE ENCOUNTER
Clinic RN: Please investigate patient's chart or contact patient if the information cannot be found because for prednisone- this medication was prescribed for an acute condition. Confirm current symptoms/need for medication and possible need for appointment. If necessary, document reason and route refill encounter to provider for approval or denial. Zyrtec- the medication is listed as historical or discontinued. Confirm patient is taking this medication. Document findings and route refill encounter to provider for approval or denial.    Oliva Stone, BSN, RN

## 2024-11-20 NOTE — TELEPHONE ENCOUNTER
Patient Contact    Attempt # 1    Was call answered?  No.  Left message on voicemail with information to call me back. Sent [employer] message as well.     Corinne Freedman RN on 11/20/2024 at 12:19 PM

## 2024-11-21 RX ORDER — PREDNISONE 20 MG/1
TABLET ORAL
Qty: 20 TABLET | Refills: 0 | Status: SHIPPED | OUTPATIENT
Start: 2024-11-21

## 2024-11-21 RX ORDER — CETIRIZINE HYDROCHLORIDE 10 MG/1
10 TABLET ORAL DAILY
Qty: 90 TABLET | Refills: 1 | Status: SHIPPED | OUTPATIENT
Start: 2024-11-21

## 2024-11-27 ENCOUNTER — HOSPITAL ENCOUNTER (OUTPATIENT)
Dept: GENERAL RADIOLOGY | Facility: CLINIC | Age: 59
Discharge: HOME | End: 2024-11-27
Attending: ANESTHESIOLOGY | Admitting: ANESTHESIOLOGY
Payer: COMMERCIAL

## 2024-11-27 ENCOUNTER — ANESTHESIA EVENT (OUTPATIENT)
Dept: SURGERY | Facility: CLINIC | Age: 59
End: 2024-11-27
Payer: COMMERCIAL

## 2024-11-27 ENCOUNTER — HOSPITAL ENCOUNTER (OUTPATIENT)
Facility: CLINIC | Age: 59
Discharge: HOME | End: 2024-11-27
Attending: ANESTHESIOLOGY | Admitting: ANESTHESIOLOGY
Payer: COMMERCIAL

## 2024-11-27 ENCOUNTER — ANESTHESIA (OUTPATIENT)
Dept: SURGERY | Facility: CLINIC | Age: 59
End: 2024-11-27
Payer: COMMERCIAL

## 2024-11-27 VITALS
DIASTOLIC BLOOD PRESSURE: 96 MMHG | RESPIRATION RATE: 16 BRPM | HEART RATE: 67 BPM | SYSTOLIC BLOOD PRESSURE: 131 MMHG | OXYGEN SATURATION: 94 % | TEMPERATURE: 97.4 F

## 2024-11-27 DIAGNOSIS — M54.16 LUMBAR RADICULOPATHY: ICD-10-CM

## 2024-11-27 PROCEDURE — 62323 NJX INTERLAMINAR LMBR/SAC: CPT | Mod: 50 | Performed by: ANESTHESIOLOGY

## 2024-11-27 PROCEDURE — 250N000011 HC RX IP 250 OP 636: Performed by: ANESTHESIOLOGY

## 2024-11-27 PROCEDURE — 64483 NJX AA&/STRD TFRM EPI L/S 1: CPT | Mod: 50 | Performed by: ANESTHESIOLOGY

## 2024-11-27 PROCEDURE — 999N000179 XR SURGERY CARM FLUORO LESS THAN 5 MIN W STILLS: Mod: TC

## 2024-11-27 PROCEDURE — 370N000017 HC ANESTHESIA TECHNICAL FEE, PER MIN: Performed by: ANESTHESIOLOGY

## 2024-11-27 PROCEDURE — 250N000009 HC RX 250: Performed by: NURSE ANESTHETIST, CERTIFIED REGISTERED

## 2024-11-27 PROCEDURE — 250N000011 HC RX IP 250 OP 636: Performed by: NURSE ANESTHETIST, CERTIFIED REGISTERED

## 2024-11-27 RX ORDER — PROPOFOL 10 MG/ML
INJECTION, EMULSION INTRAVENOUS PRN
Status: DISCONTINUED | OUTPATIENT
Start: 2024-11-27 | End: 2024-11-27

## 2024-11-27 RX ORDER — TRIAMCINOLONE ACETONIDE 40 MG/ML
INJECTION, SUSPENSION INTRA-ARTICULAR; INTRAMUSCULAR PRN
Status: DISCONTINUED | OUTPATIENT
Start: 2024-11-27 | End: 2024-11-27 | Stop reason: HOSPADM

## 2024-11-27 RX ORDER — IOPAMIDOL 612 MG/ML
INJECTION, SOLUTION INTRATHECAL PRN
Status: DISCONTINUED | OUTPATIENT
Start: 2024-11-27 | End: 2024-11-27 | Stop reason: HOSPADM

## 2024-11-27 RX ADMIN — PROPOFOL 50 MG: 10 INJECTION, EMULSION INTRAVENOUS at 08:35

## 2024-11-27 RX ADMIN — LIDOCAINE HYDROCHLORIDE 0.1 ML: 10 INJECTION, SOLUTION EPIDURAL; INFILTRATION; INTRACAUDAL; PERINEURAL at 07:51

## 2024-11-27 RX ADMIN — PROPOFOL 50 MG: 10 INJECTION, EMULSION INTRAVENOUS at 08:37

## 2024-11-27 RX ADMIN — PROPOFOL 30 MG: 10 INJECTION, EMULSION INTRAVENOUS at 08:39

## 2024-11-27 RX ADMIN — PROPOFOL 50 MG: 10 INJECTION, EMULSION INTRAVENOUS at 08:38

## 2024-11-27 RX ADMIN — PROPOFOL 50 MG: 10 INJECTION, EMULSION INTRAVENOUS at 08:36

## 2024-11-27 RX ADMIN — MIDAZOLAM 2 MG: 1 INJECTION INTRAMUSCULAR; INTRAVENOUS at 08:33

## 2024-11-27 ASSESSMENT — ACTIVITIES OF DAILY LIVING (ADL)
ADLS_ACUITY_SCORE: 35
ADLS_ACUITY_SCORE: 35

## 2024-11-27 ASSESSMENT — LIFESTYLE VARIABLES: TOBACCO_USE: 1

## 2024-11-27 NOTE — ANESTHESIA PREPROCEDURE EVALUATION
Anesthesia Pre-Procedure Evaluation    Patient: Bulmaro Herrera   MRN: [id number] : 1965        Procedure : Procedure(s):  Bilateral Lumbar 5-Sacral 1 transforaminal epidural steroid injections          Past Medical History:   Diagnosis Date    Back pains     Depressive disorder, not elsewhere classified 2007    declines Rx    Disc degeneration 2008    see MRI -throaco-lumbar mild discogenic degenerative changes    Elevated LFT's 2009    alt and ast    Other and unspecified hyperlipidemia 2007    Sleep disorder 2009    CPAP    Tobacco use disorder       Past Surgical History:   Procedure Laterality Date    COLONOSCOPY  08    adenomatous polyp repeat 5 years    COLONOSCOPY N/A 2017    Procedure: COMBINED COLONOSCOPY, SINGLE OR MULTIPLE BIOPSY/POLYPECTOMY BY BIOPSY;  Colonoscopy, Polypectomy by Biopsy;  Surgeon: Matt Reyes MD;  Location:  [facility]    COLONOSCOPY N/A 2023    Procedure: Colonoscopy;  Surgeon: Constantino Bowie DO;  Location:  [facility]    ENT SURGERY      for deviated septum    EXCISE LESION AXILLA  2018    Procedure: Excision Skin Tags Right Axilla;  Surgeon: Ayaan Chaney MD;  [facility]    EXCISE LESION BACK N/A 2018    Procedure: Excision Back Skin Lesion X Two;  Surgeon: Ayaan Chaney MD;  [facility]    EXCISE MASS BACK N/A 4/15/2022    Procedure: Excisions skin lesions of back;  Surgeon: Ayaan Chaney MD;  [facility]    INJECT BLOCK MEDIAL BRANCH CERVICAL/THORACIC/LUMBAR Bilateral 2022    Procedure: Cervical 4, 5, 6 Medial Branch Block;  Surgeon: Christian Chaudhry MD;  [facility]    INJECT EPIDURAL CERVICAL N/A 2015    Procedure: INJECT EPIDURAL CERVICAL;  Surgeon: Christian Chaudhry MD;  [facility]    INJECT EPIDURAL CERVICAL N/A 2018    Procedure: INJECT EPIDURAL CERVICAL 6-7;  Surgeon: Christian Chaudhry MD;  [facility]    INJECT EPIDURAL CERVICAL Bilateral 2023    Procedure:  Left Cervical 5-Cervical 6, Right Cervical 5-Cervical 6, Left Cervical 6-Cervical 7 and Right Cervical 6-Cervical 7 Intra-articular zygopophyseal joint injection utilizing fluoroscopic guidance with contrast dye.;  Surgeon: Christian Chaudhry MD;  [facility]    INJECT EPIDURAL CERVICAL Bilateral 2/19/2024    Procedure: Left Cervical 5 - Cervical 6, Right Cervical 5 - Cervical 6, Left Cervical 6 - Cervical 7 and Right Cervical 6 - Cervical 7 Intra-articular zygopophyseal joint injection utilizing fluoroscopic guidance with contrast dye.;  Surgeon: Christian Chaudhry MD;  [facility]    INJECT EPIDURAL CERVICAL N/A 8/8/2024    Procedure: Cervical 7 - Thoracic 1 Interlaminar epidural steroid injection using fluoroscopic guidance with contrast dye.;  Surgeon: Christian Chaudhry MD;  [facility]    INJECT EPIDURAL LUMBAR N/A 12/20/2019    Procedure: Lumbar 5- Sacral 1 Epidural  Injection bilatetral;  Surgeon: Christian Chaudhry MD;  [facility]    INJECT EPIDURAL LUMBAR Bilateral 9/8/2022    Procedure: Bilateral Lumbar 5-Sacral 1 transforaminal epidural injections utilizing fluoroscopic guidance with contrast dye  and;  Surgeon: Christian Chaudhry MD;  [facility]    INJECT EPIDURAL LUMBAR Bilateral 8/16/2024    Procedure: Bilateral Lumbar 5-Sacral 1 transforaminal epidural injections utilizing fluoroscopic guidance with contrast dye;  Surgeon: Christian Chaudhry MD;  [facility]    INJECT EPIDURAL TRANSFORAMINAL Bilateral 5/20/2022    Procedure: Bilateral Lumbar 5- Sacral 1 transforaminal Epidural Steroid Injection;  Surgeon: Christian Chaudhry MD;  [facility]    INJECT EPIDURAL TRANSFORAMINAL Bilateral 7/14/2022    Procedure: Left Cervical 5-Cervical 6, Right Cervical 5-Cervical 6, Left Cervical 6-Cervical 7 and Right Cervical 6-Cervical 7 Intra-articular zygopophyseal joint injection utilizing fluoroscopic guidance with contrast dye;  Surgeon: Christian Chaudhry MD;  [facility]    INJECT EPIDURAL  TRANSFORAMINAL Bilateral 6/16/2023    Procedure: Bilateral Lumbar 5-Sacral 1 transforaminal epidural injections utilizing fluoroscopic guidance with contrast dye;  Surgeon: Christian Chaudhry MD;  [facility]    INJECT EPIDURAL TRANSFORAMINAL Bilateral 9/28/2023    Procedure: Bilateral Lumbar 5-Sacral 1 transforaminal epidural injections utilizing fluoroscopic guidance with contrast dye;  Surgeon: Christian Chaudhry MD;  [facility]    INJECT EPIDURAL TRANSFORAMINAL Bilateral 3/15/2024    Procedure: Bilateral Lumbar 5 - Sacral 1 transforaminal epidural injections utilizing fluoroscopic guidance with contrast dye.;  Surgeon: Christian Chaudhry MD;  [facility]    INJECT FACET JOINT Bilateral 1/25/2019    Procedure: Cervical Facet Injections Cervical 5-6 and 6-7 Bilateral;  Surgeon: Christian Chaudhry MD;  [facility]    INJECT FACET JOINT Bilateral 11/29/2019    Procedure: Cervical 5-6 and 6-7 Bilateral facet joint injection;  Surgeon: Christian Chaudhry MD;  [facility]    INJECT FACET JOINT Bilateral 2/11/2021    Procedure: Cervical 5-6 and Cervical 6-7 Bilateral facet injections;  Surgeon: Christian Chaudhry MD;  [facility]    INJECT FACET JOINT Bilateral 10/19/2021    Procedure: Left C5-C6, Right C5-C6, Left C6-C7 and Right C6-C7 Intra-articular zygopophyseal joint injection utilizing fluoroscopic guidance with contrast dye;  Surgeon: Christian Chaudhry MD;  [facility]    INJECT FACET JOINT Bilateral 9/14/2023    Procedure: Left Cervical 5-Cervical 6, Right Cervical 5-Cervical 6, Left Cervical 6-Cervical 7 and Right Cervical 6-Cervical 7 Intra-articular zygopophyseal joint injection utilizing fluoroscopic guidance with contrast dye. Left Intra-articular shoulder Injection;  Surgeon: Christian Chaudhry MD;  [facility]    INJECT FACET JOINT Bilateral 11/15/2024    Procedure: Left Cervical 5-Cervical 6, Right Cervical 5-Cervical 6, Left Cervical 6-Cervical 7 and Right Cervical 6-Cervical 7 Intra-articular  zygopophyseal joint injection utilizing fluoroscopic guidance with contrast dye.;  Surgeon: Christian Chaudhry MD;  [facility]    INJECT STEROID (LOCATION) Left 10/19/2021    Procedure: Glenohumeral joint injection left shoulder;  Surgeon: Christian Chaudhry MD;  [facility]    INJECT STEROID (LOCATION) Left 2022    Procedure: Left shoulder glenohumeral steroid inj with local;  Surgeon: Christian Chaudhry MD;  [facility]    INJECT STEROID (LOCATION) Left 2024    Procedure: steroid injection left shoulder;  Surgeon: Christian Chaudhry MD;  [facility]    INJECT STEROID TROCHANTERIC BURSA Left 2022    Procedure: Left intra-articular shoulder injections;  Surgeon: Christian Chaudhry MD;  [facility]    ORTHOPEDIC SURGERY      Right hand    TONSILLECTOMY      ZZC NONSPECIFIC PROCEDURE      rt hand surgery - laceration/glass/tendons    ZZHC ECHO HEART XTHORACIC, STRESS/REST  2009    neg      Allergies   Allergen Reactions    Wasps [Hornets] Swelling    Percocet [Oxycodone-Acetaminophen] Itching      Social History     Tobacco Use    Smoking status: Former     Current packs/day: 0.00     Types: Cigarettes     Quit date: 2007     Years since quittin.7    Smokeless tobacco: Never   Substance Use Topics    Alcohol use: No     Comment: rare      Wt Readings from Last 1 Encounters:   24 115.4 kg (254 lb 8 oz)        Anesthesia Evaluation            ROS/MED HX  ENT/Pulmonary:     (+) sleep apnea,               tobacco use,                        Neurologic:       Cardiovascular:     (+) Dyslipidemia - -   -  - -                                      METS/Exercise Tolerance:     Hematologic:  - neg hematologic  ROS     Musculoskeletal:   (+)  arthritis,             GI/Hepatic:     (+) GERD,                   Renal/Genitourinary:  - neg Renal ROS     Endo:     (+)               Obesity,       Psychiatric/Substance Use:     (+) psychiatric history depression       Infectious Disease:  - neg  "infectious disease ROS     Malignancy:  - neg malignancy ROS     Other:      (+)  , H/O Chronic Pain,         Physical Exam    Airway  airway exam normal      Mallampati: II   TM distance: > 3 FB   Neck ROM: full   Mouth opening: > 3 cm    Respiratory Devices and Support         Dental           Cardiovascular   cardiovascular exam normal       Rhythm and rate: regular and normal     Pulmonary   pulmonary exam normal        breath sounds clear to auscultation           OUTSIDE LABS:  CBC:   Lab Results   Component Value Date    WBC 5.9 03/09/2024    WBC 6.7 11/11/2023    HGB 16.1 03/09/2024    HGB 15.8 11/11/2023    HCT 47.2 06/09/2024    HCT 46.6 03/09/2024     03/09/2024     11/11/2023     BMP:   Lab Results   Component Value Date     03/09/2024     11/11/2023    POTASSIUM 4.2 03/09/2024    POTASSIUM 3.9 11/11/2023    CHLORIDE 104 03/09/2024    CHLORIDE 102 11/11/2023    CO2 25 03/09/2024    CO2 27 11/11/2023    BUN 15.6 03/09/2024    BUN 15.6 11/11/2023    CR 0.98 03/09/2024    CR 1.08 11/11/2023     (H) 03/09/2024    GLC 99 11/11/2023     COAGS:   Lab Results   Component Value Date    PTT 31 06/06/2008    INR 1.0 02/17/2014     POC: No results found for: \"BGM\", \"HCG\", \"HCGS\"  HEPATIC:   Lab Results   Component Value Date    ALBUMIN 4.3 03/09/2024    PROTTOTAL 7.2 03/09/2024    ALT 64 03/09/2024    AST 33 03/09/2024    ALKPHOS 77 03/09/2024    BILITOTAL 0.8 03/09/2024     OTHER:   Lab Results   Component Value Date    PH 8.0 (H) 05/07/2002    A1C 6.2 (H) 01/07/2023    WILIAM 9.8 03/09/2024    PHOS 3.5 02/08/2021    MAG 2.2 02/08/2021    LIPASE 18 11/11/2023    AMYLASE 76 03/08/2014    TSH 1.91 06/09/2024    T4 0.94 11/15/2019    CRP <2.9 03/25/2015    SED 3 06/09/2024       Anesthesia Plan    ASA Status:  2    NPO Status:  NPO Appropriate    Anesthesia Type: MAC.              Consents    Anesthesia Plan(s) and associated risks, benefits, and realistic alternatives discussed. " Questions answered and patient/representative(s) expressed understanding.     - Discussed: Risks, Benefits and Alternatives for BOTH SEDATION and the PROCEDURE were discussed     - Discussed with:  Patient       - Patient is DNR/DNI Status: No     Use of blood products discussed: No .     Postoperative Care            Comments:               SHIV Mercedes CRNA    I have reviewed the pertinent notes and labs in the chart from the past 30 days and (re)examined the patient.  Any updates or changes from those notes are reflected in this note.             # Drug Induced Platelet Defect: home medication list includes an antiplatelet medication             # Obesity: Estimated body mass index is 34.52 kg/m  as calculated from the following:    Height as of 11/7/24: 1.829 m (6').    Weight as of 11/7/24: 115.4 kg (254 lb 8 oz).

## 2024-11-27 NOTE — ANESTHESIA POSTPROCEDURE EVALUATION
Patient: Bulmaro Herrera    Procedure: Procedure(s):  Bilateral Lumbar 5-Sacral 1 transforaminal epidural steroid injections - Bilateral       Anesthesia Type:  MAC    Note:  Disposition: Outpatient   Postop Pain Control: Uneventful            Sign Out: Well controlled pain   PONV: No   Neuro/Psych: Uneventful            Sign Out: Acceptable/Baseline neuro status   Airway/Respiratory: Uneventful            Sign Out: Acceptable/Baseline resp. status   CV/Hemodynamics: Uneventful            Sign Out: Acceptable CV status; No obvious hypovolemia; No obvious fluid overload   Other NRE: NONE   DID A NON-ROUTINE EVENT OCCUR? No           Last vitals:  Vitals Value Taken Time   BP     Temp     Pulse     Resp     SpO2 94 % 11/27/24 0850       Electronically Signed By: SHIV Mercedes CRNA  November 27, 2024  8:52 AM

## 2024-11-27 NOTE — DISCHARGE INSTRUCTIONS

## 2024-11-27 NOTE — OP NOTE
CHIEF COMPLAINT:     Low back pain with severe disc degeneration at L5-S1 causing back pain and leg pains    Interval history: Bulmaro receives epidural injections roughly once every 6 months for his lumbar spine.  He also has neck pain for which he receives primarily intra-articular facet injections.  His neck is generally doing much better after the facet injections.  He is able to sleep at least after having these injections performed.    For his back he does receive over 50% pain relief for over 4 months from these injections.  He seems to come back about once every 6 months for his low back to be treated.      PROCEDURE number #1:    1.  Bilateral L5-S1 transforaminal epidural injections utilizing fluoroscopic guidance with contrast dye.      PROCEDURE DETAILS: After written informed consent was obtained from the patient, the patient was escorted to the procedure room.  The patient was placed in the sitting position.  A  time out  was conducted to verify the patient identity, procedure to be performed, side, site, allergies and any special requirements.  The skin over the neck was prepped and draped in normal sterile fashion. Fluoroscopy was used to identify the zygopophyseal joint with a lateral view.   The skin was anesthetized with 0.5 mL of 1% lidocaine with bicarbonate buffer.  2 separate 22-gauge Quincke needles were advanced into the foraminal openings from the oblique views and walked into the posterior aspect of the foramen at L5-S1 in the lateral fluoroscopic image.  In the AP image Omnipaque contrast was injected which showed proper spread in the epidural space no evidence of any intravascular, intrathecal, intraneural injection.  I then injected 20 mg of triamcinolone with 3 cc of preservative-free normal saline into each foraminal opening with good dispersion into his epidural space.  The needles were then removed and a sterile bandage placed over the needle insertion sites.        DIAGNOSIS:  1..   Severe disc degeneration at L5-S1 with bilateral L5-S1 foraminal stenosis causing back pain and leg pains.  2.  Cervical facet syndrome needing facet injections roughly once every 6 months for pain management     PLAN:  Performed bilateral L5-S1 epidural injections.  He really does not want to have low back surgery however he has severe degeneration at L5-S1 causing lumbar radiculopathy as well as axial back pain.    Christian Chaudhry MD  Diplomate of the [employer]esthes[employer], Pain Medicine

## 2024-11-27 NOTE — ANESTHESIA CARE TRANSFER NOTE
Patient: Bulmaro Herrera    Procedure: Procedure(s):  Bilateral Lumbar 5-Sacral 1 transforaminal epidural steroid injections - Bilateral       Diagnosis: Lumbar radiculopathy [M54.16]  Diagnosis Additional Information: No value filed.    Anesthesia Type:   MAC     Note:    Oropharynx: oropharynx clear of all foreign objects  Level of Consciousness: awake and drowsy  Oxygen Supplementation: room air    Independent Airway: airway patency satisfactory and stable  Dentition: dentition unchanged  Vital Signs Stable: post-procedure vital signs reviewed and stable  Report to RN Given: handoff report given  Patient transferred to: Phase II    Handoff Report: Identifed the Patient, Identified the Reponsible Provider, Reviewed the pertinent medical history, Discussed the surgical course, Reviewed Intra-OP anesthesia mangement and issues during anesthesia, Set expectations for post-procedure period and Allowed opportunity for questions and acknowledgement of understanding      Vitals:  Vitals Value Taken Time   BP     Temp     Pulse     Resp     SpO2 94 % 11/27/24 0850       Electronically Signed By: SHIV Mercedes CRNA  November 27, 2024  8:51 AM

## 2025-01-08 ENCOUNTER — MYC REFILL (OUTPATIENT)
Dept: INTERNAL MEDICINE | Facility: CLINIC | Age: 60
End: 2025-01-08
Payer: COMMERCIAL

## 2025-01-08 DIAGNOSIS — K21.9 GASTROESOPHAGEAL REFLUX DISEASE WITHOUT ESOPHAGITIS: ICD-10-CM

## 2025-01-08 RX ORDER — OMEPRAZOLE 40 MG/1
40 CAPSULE, DELAYED RELEASE ORAL DAILY
Qty: 90 CAPSULE | Refills: 2 | Status: SHIPPED | OUTPATIENT
Start: 2025-01-08

## 2025-01-16 ENCOUNTER — HOSPITAL ENCOUNTER (OUTPATIENT)
Dept: GENERAL RADIOLOGY | Facility: CLINIC | Age: 60
Discharge: HOME | End: 2025-01-16
Attending: INTERNAL MEDICINE
Payer: COMMERCIAL

## 2025-01-16 ENCOUNTER — OFFICE VISIT (OUTPATIENT)
Dept: INTERNAL MEDICINE | Facility: CLINIC | Age: 60
End: 2025-01-16
Payer: COMMERCIAL

## 2025-01-16 VITALS
DIASTOLIC BLOOD PRESSURE: 88 MMHG | RESPIRATION RATE: 18 BRPM | HEART RATE: 76 BPM | HEIGHT: 72 IN | WEIGHT: 251.7 LBS | TEMPERATURE: 98.2 F | BODY MASS INDEX: 34.09 KG/M2 | OXYGEN SATURATION: 93 % | SYSTOLIC BLOOD PRESSURE: 128 MMHG

## 2025-01-16 DIAGNOSIS — R73.09 ELEVATED GLUCOSE: ICD-10-CM

## 2025-01-16 DIAGNOSIS — M51.361 DEGENERATION OF INTERVERTEBRAL DISC OF LUMBAR REGION WITH LOWER EXTREMITY PAIN: ICD-10-CM

## 2025-01-16 DIAGNOSIS — G47.33 OSA (OBSTRUCTIVE SLEEP APNEA): ICD-10-CM

## 2025-01-16 DIAGNOSIS — Z83.49 FAMILY HISTORY OF HEMOCHROMATOSIS: ICD-10-CM

## 2025-01-16 DIAGNOSIS — Z12.5 SCREENING FOR PROSTATE CANCER: ICD-10-CM

## 2025-01-16 DIAGNOSIS — M25.552 HIP PAIN, LEFT: ICD-10-CM

## 2025-01-16 DIAGNOSIS — G47.419 PRIMARY NARCOLEPSY WITHOUT CATAPLEXY: ICD-10-CM

## 2025-01-16 DIAGNOSIS — R53.83 FATIGUE, UNSPECIFIED TYPE: ICD-10-CM

## 2025-01-16 DIAGNOSIS — Z00.00 ROUTINE GENERAL MEDICAL EXAMINATION AT A HEALTH CARE FACILITY: Primary | ICD-10-CM

## 2025-01-16 DIAGNOSIS — M54.12 CERVICAL RADICULOPATHY: ICD-10-CM

## 2025-01-16 DIAGNOSIS — E78.2 MIXED HYPERLIPIDEMIA: ICD-10-CM

## 2025-01-16 PROCEDURE — 73502 X-RAY EXAM HIP UNI 2-3 VIEWS: CPT

## 2025-01-16 PROCEDURE — 99396 PREV VISIT EST AGE 40-64: CPT | Performed by: INTERNAL MEDICINE

## 2025-01-16 PROCEDURE — 71046 X-RAY EXAM CHEST 2 VIEWS: CPT

## 2025-01-16 SDOH — HEALTH STABILITY: PHYSICAL HEALTH: ON AVERAGE, HOW MANY DAYS PER WEEK DO YOU ENGAGE IN MODERATE TO STRENUOUS EXERCISE (LIKE A BRISK WALK)?: 1 DAY

## 2025-01-16 SDOH — HEALTH STABILITY: PHYSICAL HEALTH: ON AVERAGE, HOW MANY MINUTES DO YOU ENGAGE IN EXERCISE AT THIS LEVEL?: 10 MIN

## 2025-01-16 ASSESSMENT — SOCIAL DETERMINANTS OF HEALTH (SDOH): HOW OFTEN DO YOU GET TOGETHER WITH FRIENDS OR RELATIVES?: PATIENT DECLINED

## 2025-01-16 ASSESSMENT — PAIN SCALES - GENERAL: PAINLEVEL_OUTOF10: MILD PAIN (3)

## 2025-01-16 NOTE — PROGRESS NOTES
Preventive Care Visit  [facility]  Gustavo Farmer DO, Internal Medicine  Jan 16, 2025      Assessment & Plan     Routine general medical examination at a health care facility      Elevated glucose  Check A1c, rule out early diabetes.  - Hemoglobin A1c; Future  - Comprehensive metabolic panel (BMP + Alb, Alk Phos, ALT, AST, Total. Bili, TP); Future    Mixed hyperlipidemia  Follow lipids.  Continue low-fat diet  - Lipid panel reflex to direct LDL Fasting; Future    Cervical radiculopathy  Follow-up with spinal specialists for therapy/injections as indicated    ROBBY (obstructive sleep apnea)  Continue CPAP    Primary narcolepsy without cataplexy  Continue current medications.  - [id number] - Urine Drug Confirmation Panel (Comprehensive); Future    Fatigue, unspecified type  Rule out physical causes.  Suspect related to narcolepsy and obstructive sleep apnea  - CBC with platelets; Future  - UA Macroscopic with reflex to Microscopic and Culture - Lab Collect; Future  - TSH with free T4 reflex; Future  - XR Chest 2 Views; Future    Degeneration of intervertebral disc of lumbar region with lower extremity pain  As above      Hip pain, left  Obtain x-ray of hip to evaluate arthritis and rule out occult fracture  - XR Hip Left 2-3 Views; Future    Family history of hemochromatosis  Check ferritin level as brother does have hemochromatosis  - Ferritin; Future    Screening for prostate cancer  Screening  - PSA, screen; Future    Patient has been advised of split billing requirements and indicates understanding: Yes        BMI  Estimated body mass index is 34.14 kg/m  as calculated from the following:    Height as of this encounter: 1.829 m (6').    Weight as of this encounter: 114.2 kg (251 lb 11.2 oz).   Weight management plan: Discussed healthy diet and exercise guidelines    Counseling  Appropriate preventive services were addressed with this patient via screening, questionnaire, or  discussion as appropriate for fall prevention, nutrition, physical activity, Tobacco-use cessation, social engagement, weight loss and cognition.  Checklist reviewing preventive services available has been given to the patient.  Reviewed patient's diet, addressing concerns and/or questions.   He is at risk for lack of exercise and has been provided with information to increase physical activity for the benefit of his well-being.   The patient was instructed to see the dentist every 6 months.   He is at risk for psychosocial distress and has been provided with information to reduce risk.       MEDICATIONS:  Continue current medications without change  Work on weight loss  Regular exercise    Jemima Chamberlain is a 59 year old, presenting for the following:  Physical        1/16/2025     3:26 PM   Additional Questions   Roomed by Kori ROE         1/16/2025     3:26 PM   Patient Reported Additional Medications   Patient reports taking the following new medications multi vitamin          HPI          Health Care Directive  Patient does not have a Health Care Directive: Patient states has Advance Directive and will bring in a copy to clinic.      1/16/2025   General Health   How would you rate your overall physical health? (!) FAIR   Feel stress (tense, anxious, or unable to sleep) To some extent   (!) STRESS CONCERN      1/16/2025   Nutrition   Three or more servings of calcium each day? Yes   Diet: I don't know   How many servings of fruit and vegetables per day? (!) 2-3   How many sweetened beverages each day? 0-1         1/16/2025   Exercise   Days per week of moderate/strenous exercise 1 day   Average minutes spent exercising at this level 10 min   (!) EXERCISE CONCERN      1/16/2025   Social Factors   Frequency of gathering with friends or relatives Patient declined   Worry food won't last until get money to buy more No   Food not last or not have enough money for food? No   Do you have housing? (Housing is  defined as stable permanent housing and does not include staying ouside in a car, in a tent, in an abandoned building, in an overnight shelter, or couch-surfing.) Yes   Are you worried about losing your housing? No   Lack of transportation? No   Unable to get utilities (heat,electricity)? No         2025   Fall Risk   Fallen 2 or more times in the past year? No   Trouble with walking or balance? No          2025   Dental   Dentist two times every year? (!) NO         2025   TB Screening   Were you born outside of the [place]? No         Today's PHQ-2 Score:       2025     2:54 PM   PHQ-2 (  [employer])   Q1: Little interest or pleasure in doing things 0   Q2: Feeling down, depressed or hopeless 0   PHQ-2 Score 0    Q1: Little interest or pleasure in doing things Not at all   Q2: Feeling down, depressed or hopeless Not at all   PHQ-2 Score 0       Patient-reported           2025   Substance Use   Alcohol more than 3/day or more than 7/wk No   Do you use any other substances recreationally? No     Social History     Tobacco Use    Smoking status: Former     Current packs/day: 0.00     Types: Cigarettes     Quit date: 2007     Years since quittin.9    Smokeless tobacco: Never   Vaping Use    Vaping status: Never Used   Substance Use Topics    Alcohol use: No     Comment: rare    Drug use: No           2025   STI Screening   New sexual partner(s) since last STI/HIV test? No   Last PSA:   PSA   Date Value Ref Range Status   2005 0.67 0 - 4 ug/L Final     Prostate Specific Antigen Screen   Date Value Ref Range Status   2024 0.59 0.00 - 3.50 ng/mL Final     ASCVD Risk   The 10-year ASCVD risk score (Stas VICTOR, et al., 2019) is: 11.1%    Values used to calculate the score:      Age: 59 years      Sex: Male      Is Non- : No      Diabetic: No      Tobacco smoker: No      Systolic Blood Pressure: 128 mmHg      Is BP treated: No      HDL  Cholesterol: 45 mg/dL      Total Cholesterol: 301 mg/dL           Reviewed and updated as needed this visit by Provider                    Past Medical History:   Diagnosis Date    Back pains     Depressive disorder, not elsewhere classified 01/28/2007    declines Rx    Disc degeneration 12/01/2008    see MRI -throaco-lumbar mild discogenic degenerative changes    Elevated LFT's 11/01/2009    alt and ast    Other and unspecified hyperlipidemia 01/01/2007    Sleep disorder 09/01/2009    CPAP    Tobacco use disorder      Past Surgical History:   Procedure Laterality Date    COLONOSCOPY  07/07/08    adenomatous polyp repeat 5 years    COLONOSCOPY N/A 12/29/2017    Procedure: COMBINED COLONOSCOPY, SINGLE OR MULTIPLE BIOPSY/POLYPECTOMY BY BIOPSY;  Colonoscopy, Polypectomy by Biopsy;  Surgeon: Matt Reyes MD;  Location:  GI    COLONOSCOPY N/A 5/22/2023    Procedure: Colonoscopy;  Surgeon: Constantino Bowie DO;  Location:  GI    ENT SURGERY      for deviated septum    EXCISE LESION AXILLA  12/7/2018    Procedure: Excision Skin Tags Right Axilla;  Surgeon: Ayaan Chaney MD;  [facility]    EXCISE LESION BACK N/A 12/7/2018    Procedure: Excision Back Skin Lesion X Two;  Surgeon: Ayaan Chaney MD;  [facility]    EXCISE MASS BACK N/A 4/15/2022    Procedure: Excisions skin lesions of back;  Surgeon: Ayaan Chaney MD;  [facility]    INJECT BLOCK MEDIAL BRANCH CERVICAL/THORACIC/LUMBAR Bilateral 5/26/2022    Procedure: Cervical 4, 5, 6 Medial Branch Block;  Surgeon: Christian Chaudhry MD;  [facility]    INJECT EPIDURAL CERVICAL N/A 5/14/2015    Procedure: INJECT EPIDURAL CERVICAL;  Surgeon: Christian Chaudhry MD;  [facility]    INJECT EPIDURAL CERVICAL N/A 12/28/2018    Procedure: INJECT EPIDURAL CERVICAL 6-7;  Surgeon: Christian Chaudhry MD;  [facility]    INJECT EPIDURAL CERVICAL Bilateral 6/8/2023    Procedure: Left Cervical 5-Cervical 6, Right Cervical 5-Cervical 6, Left Cervical  6-Cervical 7 and Right Cervical 6-Cervical 7 Intra-articular zygopophyseal joint injection utilizing fluoroscopic guidance with contrast dye.;  Surgeon: Christian Chaudhry MD;  [facility]    INJECT EPIDURAL CERVICAL Bilateral 2/19/2024    Procedure: Left Cervical 5 - Cervical 6, Right Cervical 5 - Cervical 6, Left Cervical 6 - Cervical 7 and Right Cervical 6 - Cervical 7 Intra-articular zygopophyseal joint injection utilizing fluoroscopic guidance with contrast dye.;  Surgeon: Christian Chaudhry MD;  [facility]    INJECT EPIDURAL CERVICAL N/A 8/8/2024    Procedure: Cervical 7 - Thoracic 1 Interlaminar epidural steroid injection using fluoroscopic guidance with contrast dye.;  Surgeon: Christian Chaudhry MD;  [facility]    INJECT EPIDURAL LUMBAR N/A 12/20/2019    Procedure: Lumbar 5- Sacral 1 Epidural  Injection bilatetral;  Surgeon: Christian Chaudhry MD;  [facility]    INJECT EPIDURAL LUMBAR Bilateral 9/8/2022    Procedure: Bilateral Lumbar 5-Sacral 1 transforaminal epidural injections utilizing fluoroscopic guidance with contrast dye  and;  Surgeon: Christian Chaudhry MD;  [facility]    INJECT EPIDURAL LUMBAR Bilateral 8/16/2024    Procedure: Bilateral Lumbar 5-Sacral 1 transforaminal epidural injections utilizing fluoroscopic guidance with contrast dye;  Surgeon: Christian Chaudhry MD;  [facility]    INJECT EPIDURAL TRANSFORAMINAL Bilateral 5/20/2022    Procedure: Bilateral Lumbar 5- Sacral 1 transforaminal Epidural Steroid Injection;  Surgeon: Christian Chaudhry MD;  [facility]    INJECT EPIDURAL TRANSFORAMINAL Bilateral 7/14/2022    Procedure: Left Cervical 5-Cervical 6, Right Cervical 5-Cervical 6, Left Cervical 6-Cervical 7 and Right Cervical 6-Cervical 7 Intra-articular zygopophyseal joint injection utilizing fluoroscopic guidance with contrast dye;  Surgeon: Christian Chaudhry MD;  [facility]    INJECT EPIDURAL TRANSFORAMINAL Bilateral 6/16/2023    Procedure: Bilateral Lumbar 5-Sacral 1  transforaminal epidural injections utilizing fluoroscopic guidance with contrast dye;  Surgeon: Christian Chaudhry MD;  [facility]    INJECT EPIDURAL TRANSFORAMINAL Bilateral 9/28/2023    Procedure: Bilateral Lumbar 5-Sacral 1 transforaminal epidural injections utilizing fluoroscopic guidance with contrast dye;  Surgeon: Christian Chaudhry MD;  [facility]    INJECT EPIDURAL TRANSFORAMINAL Bilateral 3/15/2024    Procedure: Bilateral Lumbar 5 - Sacral 1 transforaminal epidural injections utilizing fluoroscopic guidance with contrast dye.;  Surgeon: Christian Chaudhry MD;  [facility]    INJECT EPIDURAL TRANSFORAMINAL Bilateral 11/27/2024    Procedure: Bilateral Lumbar 5 - Sacral 1 transforaminal epidural injections utilizing fluoroscopic guidance with contrast dye;  Surgeon: Christian Chaudhry MD;  [facility]    INJECT FACET JOINT Bilateral 1/25/2019    Procedure: Cervical Facet Injections Cervical 5-6 and 6-7 Bilateral;  Surgeon: Christian Chaudhry MD;  [facility]    INJECT FACET JOINT Bilateral 11/29/2019    Procedure: Cervical 5-6 and 6-7 Bilateral facet joint injection;  Surgeon: Christian Chaudhry MD;  [facility]    INJECT FACET JOINT Bilateral 2/11/2021    Procedure: Cervical 5-6 and Cervical 6-7 Bilateral facet injections;  Surgeon: Christian Chaudhry MD;  [facility]    INJECT FACET JOINT Bilateral 10/19/2021    Procedure: Left C5-C6, Right C5-C6, Left C6-C7 and Right C6-C7 Intra-articular zygopophyseal joint injection utilizing fluoroscopic guidance with contrast dye;  Surgeon: Christian Chaudhry MD;  [facility]    INJECT FACET JOINT Bilateral 9/14/2023    Procedure: Left Cervical 5-Cervical 6, Right Cervical 5-Cervical 6, Left Cervical 6-Cervical 7 and Right Cervical 6-Cervical 7 Intra-articular zygopophyseal joint injection utilizing fluoroscopic guidance with contrast dye. Left Intra-articular shoulder Injection;  Surgeon: Christian Chaudhry MD;  [facility]    INJECT FACET JOINT Bilateral  11/15/2024    Procedure: Left Cervical 5-Cervical 6, Right Cervical 5-Cervical 6, Left Cervical 6-Cervical 7 and Right Cervical 6-Cervical 7 Intra-articular zygopophyseal joint injection utilizing fluoroscopic guidance with contrast dye.;  Surgeon: Christian Chaudhry MD;  [facility]    INJECT STEROID (LOCATION) Left 10/19/2021    Procedure: Glenohumeral joint injection left shoulder;  Surgeon: Christian Chaudhry MD;  [facility]    INJECT STEROID (LOCATION) Left 5/20/2022    Procedure: Left shoulder glenohumeral steroid inj with local;  Surgeon: Christian Chaudhry MD;  [facility]    INJECT STEROID (LOCATION) Left 8/8/2024    Procedure: steroid injection left shoulder;  Surgeon: Christian Chaudhry MD;  [facility]    INJECT STEROID TROCHANTERIC BURSA Left 9/8/2022    Procedure: Left intra-articular shoulder injections;  Surgeon: Christian Chaudhry MD;  [facility]    ORTHOPEDIC SURGERY      Right hand    TONSILLECTOMY      ZZC NONSPECIFIC PROCEDURE      rt hand surgery - laceration/glass/tendons    ZZHC ECHO HEART XTHORACIC, STRESS/REST  6/2009    neg     Lab work is in process  Labs reviewed in EPIC  BP Readings from Last 3 Encounters:   01/16/25 128/88   11/27/24 (!) 131/96   11/15/24 (!) 131/105    Wt Readings from Last 3 Encounters:   01/16/25 114.2 kg (251 lb 11.2 oz)   11/07/24 115.4 kg (254 lb 8 oz)   07/29/24 115.2 kg (253 lb 14.4 oz)                  Patient Active Problem List   Diagnosis    Esophageal reflux    Back pains    Pruritus ani    DDD (degenerative disc disease), lumbar    DDD (degenerative disc disease), cervical    Chronic pain syndrome    Herniation of intervertebral disc of cervical spine without radiculopathy    Cervicalgia    Mixed hyperlipidemia    ROBBY (obstructive sleep apnea)    Severe needle phobia    NSAID long-term use    Soft tissue mass    Rotator cuff syndrome of left shoulder    Chronic left shoulder pain    Chondromalacia of left shoulder    Partial nontraumatic tear of  left rotator cuff    Arthrosis of left shoulder    Morbid obesity (H)     Past Surgical History:   Procedure Laterality Date    COLONOSCOPY  07/07/08    adenomatous polyp repeat 5 years    COLONOSCOPY N/A 12/29/2017    Procedure: COMBINED COLONOSCOPY, SINGLE OR MULTIPLE BIOPSY/POLYPECTOMY BY BIOPSY;  Colonoscopy, Polypectomy by Biopsy;  Surgeon: Matt Reyes MD;  Location:  GI    COLONOSCOPY N/A 5/22/2023    Procedure: Colonoscopy;  Surgeon: Constantino Bowie DO;  Location:  GI    ENT SURGERY      for deviated septum    EXCISE LESION AXILLA  12/7/2018    Procedure: Excision Skin Tags Right Axilla;  Surgeon: Ayaan Chaney MD;  [facility]    EXCISE LESION BACK N/A 12/7/2018    Procedure: Excision Back Skin Lesion X Two;  Surgeon: Ayaan Chaney MD;  [facility]    EXCISE MASS BACK N/A 4/15/2022    Procedure: Excisions skin lesions of back;  Surgeon: Ayaan Chaney MD;  [facility]    INJECT BLOCK MEDIAL BRANCH CERVICAL/THORACIC/LUMBAR Bilateral 5/26/2022    Procedure: Cervical 4, 5, 6 Medial Branch Block;  Surgeon: Christian Chaudhry MD;  [facility]    INJECT EPIDURAL CERVICAL N/A 5/14/2015    Procedure: INJECT EPIDURAL CERVICAL;  Surgeon: Christian Chaudhry MD;  [facility]    INJECT EPIDURAL CERVICAL N/A 12/28/2018    Procedure: INJECT EPIDURAL CERVICAL 6-7;  Surgeon: Christian hCaudhry MD;  [facility]    INJECT EPIDURAL CERVICAL Bilateral 6/8/2023    Procedure: Left Cervical 5-Cervical 6, Right Cervical 5-Cervical 6, Left Cervical 6-Cervical 7 and Right Cervical 6-Cervical 7 Intra-articular zygopophyseal joint injection utilizing fluoroscopic guidance with contrast dye.;  Surgeon: Christian Chaudhry MD;  [facility]    INJECT EPIDURAL CERVICAL Bilateral 2/19/2024    Procedure: Left Cervical 5 - Cervical 6, Right Cervical 5 - Cervical 6, Left Cervical 6 - Cervical 7 and Right Cervical 6 - Cervical 7 Intra-articular zygopophyseal joint injection utilizing fluoroscopic  guidance with contrast dye.;  Surgeon: Christian Chaudhry MD;  [facility]    INJECT EPIDURAL CERVICAL N/A 8/8/2024    Procedure: Cervical 7 - Thoracic 1 Interlaminar epidural steroid injection using fluoroscopic guidance with contrast dye.;  Surgeon: Christian Chaudhry MD;  [facility]    INJECT EPIDURAL LUMBAR N/A 12/20/2019    Procedure: Lumbar 5- Sacral 1 Epidural  Injection bilatetral;  Surgeon: Christian Chaudhry MD;  [facility]    INJECT EPIDURAL LUMBAR Bilateral 9/8/2022    Procedure: Bilateral Lumbar 5-Sacral 1 transforaminal epidural injections utilizing fluoroscopic guidance with contrast dye  and;  Surgeon: Christian Chaudhry MD;  [facility]    INJECT EPIDURAL LUMBAR Bilateral 8/16/2024    Procedure: Bilateral Lumbar 5-Sacral 1 transforaminal epidural injections utilizing fluoroscopic guidance with contrast dye;  Surgeon: Christian Chaudhry MD;  [facility]    INJECT EPIDURAL TRANSFORAMINAL Bilateral 5/20/2022    Procedure: Bilateral Lumbar 5- Sacral 1 transforaminal Epidural Steroid Injection;  Surgeon: Christian Chaudhry MD;  [facility]    INJECT EPIDURAL TRANSFORAMINAL Bilateral 7/14/2022    Procedure: Left Cervical 5-Cervical 6, Right Cervical 5-Cervical 6, Left Cervical 6-Cervical 7 and Right Cervical 6-Cervical 7 Intra-articular zygopophyseal joint injection utilizing fluoroscopic guidance with contrast dye;  Surgeon: Christian Chaudhry MD;  [facility]    INJECT EPIDURAL TRANSFORAMINAL Bilateral 6/16/2023    Procedure: Bilateral Lumbar 5-Sacral 1 transforaminal epidural injections utilizing fluoroscopic guidance with contrast dye;  Surgeon: Christian Chaudhry MD;  [facility]    INJECT EPIDURAL TRANSFORAMINAL Bilateral 9/28/2023    Procedure: Bilateral Lumbar 5-Sacral 1 transforaminal epidural injections utilizing fluoroscopic guidance with contrast dye;  Surgeon: Christian Chaudhry MD;  [facility]    INJECT EPIDURAL TRANSFORAMINAL Bilateral 3/15/2024    Procedure: Bilateral Lumbar 5  - Sacral 1 transforaminal epidural injections utilizing fluoroscopic guidance with contrast dye.;  Surgeon: Christian Chaudhry MD;  [facility]    INJECT EPIDURAL TRANSFORAMINAL Bilateral 11/27/2024    Procedure: Bilateral Lumbar 5 - Sacral 1 transforaminal epidural injections utilizing fluoroscopic guidance with contrast dye;  Surgeon: Christian Chaudhry MD;  [facility]    INJECT FACET JOINT Bilateral 1/25/2019    Procedure: Cervical Facet Injections Cervical 5-6 and 6-7 Bilateral;  Surgeon: Christian Chaudhry MD;  [facility]    INJECT FACET JOINT Bilateral 11/29/2019    Procedure: Cervical 5-6 and 6-7 Bilateral facet joint injection;  Surgeon: Christian Chaudhry MD;  [facility]    INJECT FACET JOINT Bilateral 2/11/2021    Procedure: Cervical 5-6 and Cervical 6-7 Bilateral facet injections;  Surgeon: Christian Chaudhry MD;  [facility]    INJECT FACET JOINT Bilateral 10/19/2021    Procedure: Left C5-C6, Right C5-C6, Left C6-C7 and Right C6-C7 Intra-articular zygopophyseal joint injection utilizing fluoroscopic guidance with contrast dye;  Surgeon: Christian Chaudhry MD;  [facility]    INJECT FACET JOINT Bilateral 9/14/2023    Procedure: Left Cervical 5-Cervical 6, Right Cervical 5-Cervical 6, Left Cervical 6-Cervical 7 and Right Cervical 6-Cervical 7 Intra-articular zygopophyseal joint injection utilizing fluoroscopic guidance with contrast dye. Left Intra-articular shoulder Injection;  Surgeon: Christian Chaudhry MD;  [facility]    INJECT FACET JOINT Bilateral 11/15/2024    Procedure: Left Cervical 5-Cervical 6, Right Cervical 5-Cervical 6, Left Cervical 6-Cervical 7 and Right Cervical 6-Cervical 7 Intra-articular zygopophyseal joint injection utilizing fluoroscopic guidance with contrast dye.;  Surgeon: Christian Chaudhry MD;  [facility]    INJECT STEROID (LOCATION) Left 10/19/2021    Procedure: Glenohumeral joint injection left shoulder;  Surgeon: Christian Chaudhry MD;  [facility]    INJECT  STEROID (LOCATION) Left 2022    Procedure: Left shoulder glenohumeral steroid inj with local;  Surgeon: Christian Chaudhry MD;  [facility]    INJECT STEROID (LOCATION) Left 2024    Procedure: steroid injection left shoulder;  Surgeon: Christian Chaudhry MD;  [facility]    INJECT STEROID TROCHANTERIC BURSA Left 2022    Procedure: Left intra-articular shoulder injections;  Surgeon: Christian Chaudhry MD;  [facility]    ORTHOPEDIC SURGERY      Right hand    TONSILLECTOMY      ZZC NONSPECIFIC PROCEDURE      rt hand surgery - laceration/glass/tendons    [facility] ECHO HEART XTHORACIC, STRESS/REST  2009    neg       Social History     Tobacco Use    Smoking status: Former     Current packs/day: 0.00     Types: Cigarettes     Quit date: 2007     Years since quittin.9    Smokeless tobacco: Never   Substance Use Topics    Alcohol use: No     Comment: rare     Family History   Problem Relation Age of Onset    Coronary Artery Disease Mother     Breast Cancer Mother     Arthritis Mother         joint ?    Depression Father     Cancer Maternal Grandmother     C.A.D. Maternal Grandfather     Cardiovascular Maternal Grandfather     Gynecology Paternal Grandmother          giving birth    Prostate Cancer Paternal Grandfather     Genetic Disorder Brother         joint pain?    Depression Brother     Cardiovascular Son         congenital heart defect -      Prostate Cancer Other          age 70's    Diabetes Other          Current Outpatient Medications   Medication Sig Dispense Refill    acetaminophen (TYLENOL) 500 MG tablet Take 1,000 mg by mouth every morning      aspirin (ASA) 81 MG chewable tablet Take 1 tablet (81 mg) by mouth daily      cetirizine (ZYRTEC) 10 MG tablet Take 1 tablet (10 mg) by mouth daily. 90 tablet 1    ibuprofen (ADVIL/MOTRIN) 200 MG tablet Take 400 mg by mouth every morning      modafinil (PROVIGIL) 200 MG tablet Take 1 tablet (200 mg) by mouth daily. 90 tablet 0     omeprazole (PRILOSEC) 40 MG DR capsule Take 1 capsule (40 mg) by mouth daily. 90 capsule 2    rosuvastatin (CRESTOR) 5 MG tablet Take 1 tablet (5 mg) by mouth daily 90 tablet 3     Allergies   Allergen Reactions    Wasps [Hornets] Swelling    Percocet [Oxycodone-Acetaminophen] Itching         Review of Systems  CONSTITUTIONAL: Positive for ongoing fatigue.  Patient will maintain his normal activities but feels tired all the time.  Recently this has exacerbated somewhat.  INTEGUMENTARY/SKIN: NEGATIVE for worrisome rashes, moles or lesions  EYES: NEGATIVE for vision changes or irritation  ENT/MOUTH: NEGATIVE for ear, mouth and throat problems  RESP: NEGATIVE for significant cough or SOB  BREAST: NEGATIVE for masses, tenderness or discharge  CV: NEGATIVE for chest pain, palpitations or peripheral edema  GI: NEGATIVE for nausea, abdominal pain, heartburn, or change in bowel habits  : NEGATIVE for frequency, dysuria, or hematuria  MUSCULOSKELETAL: Chronic neck pain with some radiation into the shoulders and arms.  Has ongoing pain in his left hip which is slowly worsening.  No history of recent trauma or injury.  NEURO: NEGATIVE for weakness, dizziness or paresthesias  ENDOCRINE: NEGATIVE for temperature intolerance, skin/hair changes  HEME: NEGATIVE for bleeding problems  PSYCHIATRIC: NEGATIVE for changes in mood or affect     Objective    Exam  /88   Pulse 76   Temp 98.2  F (36.8  C) (Temporal)   Resp 18   Ht 1.829 m (6')   Wt 114.2 kg (251 lb 11.2 oz)   SpO2 93%   BMI 34.14 kg/m     Estimated body mass index is 34.14 kg/m  as calculated from the following:    Height as of this encounter: 1.829 m (6').    Weight as of this encounter: 114.2 kg (251 lb 11.2 oz).    Physical Exam  GENERAL: alert and no distress  EYES: Eyes grossly normal to inspection, PERRL and conjunctivae and sclerae normal  HENT: ear canals and TM's normal, nose and mouth without ulcers or lesions  NECK: no adenopathy, no asymmetry,  masses, or scars  RESP: lungs clear to auscultation - no rales, rhonchi or wheezes  CV: regular rate and rhythm, normal S1 S2, no S3 or S4, no murmur, click or rub, no peripheral edema  ABDOMEN: soft, nontender, no hepatosplenomegaly, no masses and bowel sounds normal  MS: Decreased range of cervical motion is noted due to discomfort.  Some crepitance in the hip noted but no deformity.  SKIN: no suspicious lesions or rashes  NEURO: Normal strength and tone, mentation intact and speech normal  PSYCH: mentation appears normal, affect normal/bright        I have reviewed the patient's vaccination schedule and discussed the benefits of prophylactic vaccination in detail.  I recommend the patient contact their pharmacist for vaccinations.  Discussed that most insurance companies now favor reimbursement to the pharmacies and it will financially behoove the patient to have vaccinations performed at their pharmacy.      Signed Electronically by: Gustavo Farmer DO

## 2025-01-18 ENCOUNTER — LAB (OUTPATIENT)
Dept: LAB | Facility: CLINIC | Age: 60
End: 2025-01-18
Payer: COMMERCIAL

## 2025-01-18 DIAGNOSIS — Z83.49 FAMILY HISTORY OF HEMOCHROMATOSIS: ICD-10-CM

## 2025-01-18 DIAGNOSIS — G47.419 PRIMARY NARCOLEPSY WITHOUT CATAPLEXY: ICD-10-CM

## 2025-01-18 DIAGNOSIS — R53.83 FATIGUE, UNSPECIFIED TYPE: ICD-10-CM

## 2025-01-18 DIAGNOSIS — E78.2 MIXED HYPERLIPIDEMIA: ICD-10-CM

## 2025-01-18 DIAGNOSIS — R73.09 ELEVATED GLUCOSE: ICD-10-CM

## 2025-01-18 DIAGNOSIS — Z12.5 SCREENING FOR PROSTATE CANCER: ICD-10-CM

## 2025-01-18 LAB
ALBUMIN SERPL BCG-MCNC: 4.4 G/DL (ref 3.5–5.2)
ALBUMIN UR-MCNC: NEGATIVE MG/DL
ALP SERPL-CCNC: 84 U/L (ref 40–150)
ALT SERPL W P-5'-P-CCNC: 35 U/L (ref 0–70)
ANION GAP SERPL CALCULATED.3IONS-SCNC: 11 MMOL/L (ref 7–15)
APPEARANCE UR: CLEAR
AST SERPL W P-5'-P-CCNC: 23 U/L (ref 0–45)
BILIRUB SERPL-MCNC: 0.7 MG/DL
BILIRUB UR QL STRIP: NEGATIVE
BUN SERPL-MCNC: 16.5 MG/DL (ref 6–20)
CALCIUM SERPL-MCNC: 9.4 MG/DL (ref 8.8–10.4)
CHLORIDE SERPL-SCNC: 106 MMOL/L (ref 98–107)
CHOLEST SERPL-MCNC: 297 MG/DL
COLOR UR AUTO: YELLOW
CREAT SERPL-MCNC: 1 MG/DL (ref 0.67–1.17)
CREAT UR-MCNC: 175 MG/DL
EGFRCR SERPLBLD CKD-EPI 2021: 88 ML/MIN/1.73M2
ERYTHROCYTE [DISTWIDTH] IN BLOOD BY AUTOMATED COUNT: 12.9 % (ref 10–15)
EST. AVERAGE GLUCOSE BLD GHB EST-MCNC: 137 MG/DL
FASTING STATUS PATIENT QL REPORTED: YES
FASTING STATUS PATIENT QL REPORTED: YES
FERRITIN SERPL-MCNC: 396 NG/ML (ref 31–409)
GLUCOSE SERPL-MCNC: 111 MG/DL (ref 70–99)
GLUCOSE UR STRIP-MCNC: NEGATIVE MG/DL
HBA1C MFR BLD: 6.4 %
HCO3 SERPL-SCNC: 24 MMOL/L (ref 22–29)
HCT VFR BLD AUTO: 45.2 % (ref 40–53)
HDLC SERPL-MCNC: 44 MG/DL
HGB BLD-MCNC: 15.8 G/DL (ref 13.3–17.7)
HGB UR QL STRIP: NEGATIVE
KETONES UR STRIP-MCNC: NEGATIVE MG/DL
LDLC SERPL CALC-MCNC: 211 MG/DL
LEUKOCYTE ESTERASE UR QL STRIP: NEGATIVE
MCH RBC QN AUTO: 32.1 PG (ref 26.5–33)
MCHC RBC AUTO-ENTMCNC: 35 G/DL (ref 31.5–36.5)
MCV RBC AUTO: 92 FL (ref 78–100)
NITRATE UR QL: NEGATIVE
NONHDLC SERPL-MCNC: 253 MG/DL
PH UR STRIP: 6.5 [PH] (ref 5–7)
PLATELET # BLD AUTO: 195 10E3/UL (ref 150–450)
POTASSIUM SERPL-SCNC: 4.5 MMOL/L (ref 3.4–5.3)
PROT SERPL-MCNC: 7 G/DL (ref 6.4–8.3)
PSA SERPL DL<=0.01 NG/ML-MCNC: 0.61 NG/ML (ref 0–3.5)
RBC # BLD AUTO: 4.92 10E6/UL (ref 4.4–5.9)
SODIUM SERPL-SCNC: 141 MMOL/L (ref 135–145)
SP GR UR STRIP: 1.02 (ref 1–1.03)
TRIGL SERPL-MCNC: 208 MG/DL
TSH SERPL DL<=0.005 MIU/L-ACNC: 3.24 UIU/ML (ref 0.3–4.2)
UROBILINOGEN UR STRIP-MCNC: NORMAL MG/DL
WBC # BLD AUTO: 5.6 10E3/UL (ref 4–11)

## 2025-01-18 PROCEDURE — 80061 LIPID PANEL: CPT

## 2025-01-18 PROCEDURE — 84443 ASSAY THYROID STIM HORMONE: CPT

## 2025-01-18 PROCEDURE — G0103 PSA SCREENING: HCPCS

## 2025-01-18 PROCEDURE — 82728 ASSAY OF FERRITIN: CPT

## 2025-01-18 PROCEDURE — 81003 URINALYSIS AUTO W/O SCOPE: CPT

## 2025-01-18 PROCEDURE — 80053 COMPREHEN METABOLIC PANEL: CPT

## 2025-01-18 PROCEDURE — 85027 COMPLETE CBC AUTOMATED: CPT

## 2025-01-18 PROCEDURE — 83036 HEMOGLOBIN GLYCOSYLATED A1C: CPT

## 2025-01-18 PROCEDURE — 36415 COLL VENOUS BLD VENIPUNCTURE: CPT

## 2025-01-18 PROCEDURE — G0481 DRUG TEST DEF 8-14 CLASSES: HCPCS | Mod: 59

## 2025-02-05 ENCOUNTER — MYC MEDICAL ADVICE (OUTPATIENT)
Dept: INTERNAL MEDICINE | Facility: CLINIC | Age: 60
End: 2025-02-05

## 2025-02-05 ENCOUNTER — MEDICAL CORRESPONDENCE (OUTPATIENT)
Dept: HEALTH INFORMATION MANAGEMENT | Facility: CLINIC | Age: 60
End: 2025-02-05

## 2025-02-05 ENCOUNTER — OFFICE VISIT (OUTPATIENT)
Dept: ORTHOPEDICS | Facility: CLINIC | Age: 60
End: 2025-02-05
Payer: COMMERCIAL

## 2025-02-05 VITALS — HEIGHT: 72 IN | WEIGHT: 249.5 LBS | BODY MASS INDEX: 33.79 KG/M2

## 2025-02-05 DIAGNOSIS — M16.12 PRIMARY OSTEOARTHRITIS OF LEFT HIP: Primary | ICD-10-CM

## 2025-02-05 DIAGNOSIS — M54.12 CERVICAL RADICULOPATHY: Primary | ICD-10-CM

## 2025-02-05 PROCEDURE — 99213 OFFICE O/P EST LOW 20 MIN: CPT | Performed by: ORTHOPAEDIC SURGERY

## 2025-02-05 RX ORDER — DICLOFENAC SODIUM 75 MG/1
75 TABLET, DELAYED RELEASE ORAL 2 TIMES DAILY
Qty: 28 TABLET | Refills: 0 | Status: SHIPPED | OUTPATIENT
Start: 2025-02-05 | End: 2025-02-19

## 2025-02-05 NOTE — PROGRESS NOTES
ORTHOPEDIC CONSULT      Chief Complaint: Bulmaro Herrera is a 59 year old male who is being seen for   Chief Complaint   Patient presents with    Left Hip - Pain       History of Present Illness:   Presents with left lateral deep hip pain.  Is been going on 5 weeks.  It is slightly better since it came on.  Has been taken Tylenol and ibuprofen.  Has had no previous surgeries or injections to his hip.  He did have some leftover prednisone that he tried that helped some.  Bothers him with putting his shoes and socks and rotation to the hip.    August 5, 2021 visit with Gallo Hernandez  Bulmaro Herrera is a 56 year old male who is seen in consultation at the request of self for evaluation of left shoulder pain and a mass to the left upper arm.  He reports has had shoulder pain off and on for atleast 2-3 years.  Progressively getting worse. Last 2 weeks has been severely flared up. No trauma that can he recall though is very active and is a .  The pain is lateral shoulder that at times radiates to the elbow, worse with abduction, forward flexion >90 degrees. Any attempted overhead lifting and reaching behind the back.  Feels he has been progressively loosing strength and motion to the shoulder.  No radicular symptoms including No numbness/tingling,      #2: states has a mass to his left lateral upper arm for about 3 years. Does not think it is growing, or if it has has been slowly progressing. No trauma. Can be painful at times. No wounds, no erythema.             Patient's past medical, surgical, social and family histories reviewed.     Past Medical History:   Diagnosis Date    Back pains     Depressive disorder, not elsewhere classified 01/28/2007    declines Rx    Disc degeneration 12/01/2008    see MRI -throaco-lumbar mild discogenic degenerative changes    Elevated LFT's 11/01/2009    alt and ast    Other and unspecified hyperlipidemia 01/01/2007    Sleep disorder 09/01/2009    CPAP    Tobacco use disorder         Past Surgical History:   Procedure Laterality Date    COLONOSCOPY  07/07/08    adenomatous polyp repeat 5 years    COLONOSCOPY N/A 12/29/2017    Procedure: COMBINED COLONOSCOPY, SINGLE OR MULTIPLE BIOPSY/POLYPECTOMY BY BIOPSY;  Colonoscopy, Polypectomy by Biopsy;  Surgeon: Matt Reyes MD;  Location:  GI    COLONOSCOPY N/A 5/22/2023    Procedure: Colonoscopy;  Surgeon: Constantino Bowie DO;  Location:  GI    ENT SURGERY      for deviated septum    EXCISE LESION AXILLA  12/7/2018    Procedure: Excision Skin Tags Right Axilla;  Surgeon: Ayaan Chaney MD;  [facility]    EXCISE LESION BACK N/A 12/7/2018    Procedure: Excision Back Skin Lesion X Two;  Surgeon: Ayaan Chaney MD;  [facility]    EXCISE MASS BACK N/A 4/15/2022    Procedure: Excisions skin lesions of back;  Surgeon: Ayaan Chaney MD;  [facility]    INJECT BLOCK MEDIAL BRANCH CERVICAL/THORACIC/LUMBAR Bilateral 5/26/2022    Procedure: Cervical 4, 5, 6 Medial Branch Block;  Surgeon: Christian Chaudhry MD;  [facility]    INJECT EPIDURAL CERVICAL N/A 5/14/2015    Procedure: INJECT EPIDURAL CERVICAL;  Surgeon: Christian Chaudhry MD;  [facility]    INJECT EPIDURAL CERVICAL N/A 12/28/2018    Procedure: INJECT EPIDURAL CERVICAL 6-7;  Surgeon: Christian Chaudhry MD;  [facility]    INJECT EPIDURAL CERVICAL Bilateral 6/8/2023    Procedure: Left Cervical 5-Cervical 6, Right Cervical 5-Cervical 6, Left Cervical 6-Cervical 7 and Right Cervical 6-Cervical 7 Intra-articular zygopophyseal joint injection utilizing fluoroscopic guidance with contrast dye.;  Surgeon: Christian Chaudhry MD;  [facility]    INJECT EPIDURAL CERVICAL Bilateral 2/19/2024    Procedure: Left Cervical 5 - Cervical 6, Right Cervical 5 - Cervical 6, Left Cervical 6 - Cervical 7 and Right Cervical 6 - Cervical 7 Intra-articular zygopophyseal joint injection utilizing fluoroscopic guidance with contrast dye.;  Surgeon: Christian Chaudhry MD;  [facility]    INJECT EPIDURAL CERVICAL N/A 8/8/2024    Procedure: Cervical 7 - Thoracic 1 Interlaminar epidural steroid injection using fluoroscopic guidance with contrast dye.;  Surgeon: Christian Chaudhry MD;  [facility]    INJECT EPIDURAL LUMBAR N/A 12/20/2019    Procedure: Lumbar 5- Sacral 1 Epidural  Injection bilatetral;  Surgeon: Christian Chaudhry MD;  [facility]    INJECT EPIDURAL LUMBAR Bilateral 9/8/2022    Procedure: Bilateral Lumbar 5-Sacral 1 transforaminal epidural injections utilizing fluoroscopic guidance with contrast dye  and;  Surgeon: Christian Chaudhry MD;  [facility]    INJECT EPIDURAL LUMBAR Bilateral 8/16/2024    Procedure: Bilateral Lumbar 5-Sacral 1 transforaminal epidural injections utilizing fluoroscopic guidance with contrast dye;  Surgeon: Christian Chaudhry MD;  [facility]    INJECT EPIDURAL TRANSFORAMINAL Bilateral 5/20/2022    Procedure: Bilateral Lumbar 5- Sacral 1 transforaminal Epidural Steroid Injection;  Surgeon: Christian Chaudhry MD;  [facility]    INJECT EPIDURAL TRANSFORAMINAL Bilateral 7/14/2022    Procedure: Left Cervical 5-Cervical 6, Right Cervical 5-Cervical 6, Left Cervical 6-Cervical 7 and Right Cervical 6-Cervical 7 Intra-articular zygopophyseal joint injection utilizing fluoroscopic guidance with contrast dye;  Surgeon: Christian Chaudhry MD;  [facility]    INJECT EPIDURAL TRANSFORAMINAL Bilateral 6/16/2023    Procedure: Bilateral Lumbar 5-Sacral 1 transforaminal epidural injections utilizing fluoroscopic guidance with contrast dye;  Surgeon: Christian Chaudhry MD;  [facility]    INJECT EPIDURAL TRANSFORAMINAL Bilateral 9/28/2023    Procedure: Bilateral Lumbar 5-Sacral 1 transforaminal epidural injections utilizing fluoroscopic guidance with contrast dye;  Surgeon: Christian Chaudhry MD;  [facility]    INJECT EPIDURAL TRANSFORAMINAL Bilateral 3/15/2024    Procedure: Bilateral Lumbar 5 - Sacral 1 transforaminal epidural injections utilizing fluoroscopic  guidance with contrast dye.;  Surgeon: Christian Chaudhry MD;  [facility]    INJECT EPIDURAL TRANSFORAMINAL Bilateral 11/27/2024    Procedure: Bilateral Lumbar 5 - Sacral 1 transforaminal epidural injections utilizing fluoroscopic guidance with contrast dye;  Surgeon: Christian Chaudhry MD;  [facility]    INJECT FACET JOINT Bilateral 1/25/2019    Procedure: Cervical Facet Injections Cervical 5-6 and 6-7 Bilateral;  Surgeon: Christian Chaudhry MD;  [facility]    INJECT FACET JOINT Bilateral 11/29/2019    Procedure: Cervical 5-6 and 6-7 Bilateral facet joint injection;  Surgeon: Christian Chaudhry MD;  [facility]    INJECT FACET JOINT Bilateral 2/11/2021    Procedure: Cervical 5-6 and Cervical 6-7 Bilateral facet injections;  Surgeon: Christian Chaudhry MD;  [facility]    INJECT FACET JOINT Bilateral 10/19/2021    Procedure: Left C5-C6, Right C5-C6, Left C6-C7 and Right C6-C7 Intra-articular zygopophyseal joint injection utilizing fluoroscopic guidance with contrast dye;  Surgeon: Christian Chaudhry MD;  [facility]    INJECT FACET JOINT Bilateral 9/14/2023    Procedure: Left Cervical 5-Cervical 6, Right Cervical 5-Cervical 6, Left Cervical 6-Cervical 7 and Right Cervical 6-Cervical 7 Intra-articular zygopophyseal joint injection utilizing fluoroscopic guidance with contrast dye. Left Intra-articular shoulder Injection;  Surgeon: Christian Chaudhry MD;  [facility]    INJECT FACET JOINT Bilateral 11/15/2024    Procedure: Left Cervical 5-Cervical 6, Right Cervical 5-Cervical 6, Left Cervical 6-Cervical 7 and Right Cervical 6-Cervical 7 Intra-articular zygopophyseal joint injection utilizing fluoroscopic guidance with contrast dye.;  Surgeon: Christian Chaudhry MD;  [facility]    INJECT STEROID (LOCATION) Left 10/19/2021    Procedure: Glenohumeral joint injection left shoulder;  Surgeon: Christain Chaudhry MD;  [facility]    INJECT STEROID (LOCATION) Left 5/20/2022    Procedure: Left shoulder  glenohumeral steroid inj with local;  Surgeon: Christian Chaudhry MD;  [facility]    INJECT STEROID (LOCATION) Left 2024    Procedure: steroid injection left shoulder;  Surgeon: Christian Chaudhry MD;  [facility]    INJECT STEROID TROCHANTERIC BURSA Left 2022    Procedure: Left intra-articular shoulder injections;  Surgeon: Christian Chaudhry MD;  [facility]    ORTHOPEDIC SURGERY      Right hand    TONSILLECTOMY      ZZC NONSPECIFIC PROCEDURE      rt hand surgery - laceration/glass/tendons    [facility] ECHO HEART XTHORACIC, STRESS/REST  2009    neg       Medications:  Current Outpatient Medications   Medication Sig Dispense Refill    acetaminophen (TYLENOL) 500 MG tablet Take 1,000 mg by mouth every morning      aspirin (ASA) 81 MG chewable tablet Take 1 tablet (81 mg) by mouth daily      cetirizine (ZYRTEC) 10 MG tablet Take 1 tablet (10 mg) by mouth daily. 90 tablet 1    ibuprofen (ADVIL/MOTRIN) 200 MG tablet Take 400 mg by mouth every morning      modafinil (PROVIGIL) 200 MG tablet Take 1 tablet (200 mg) by mouth daily. 90 tablet 0    omeprazole (PRILOSEC) 40 MG DR capsule Take 1 capsule (40 mg) by mouth daily. 90 capsule 2    rosuvastatin (CRESTOR) 20 MG tablet Take 1 tablet (20 mg) by mouth daily. 90 tablet 0     No current facility-administered medications for this visit.       Allergies   Allergen Reactions    Wasps [Hornets] Swelling    Percocet [Oxycodone-Acetaminophen] Itching       Social History     Occupational History    Occupation:      Employer: SELF   Tobacco Use    Smoking status: Former     Current packs/day: 0.00     Types: Cigarettes     Quit date: 2007     Years since quittin.9    Smokeless tobacco: Never   Vaping Use    Vaping status: Never Used   Substance and Sexual Activity    Alcohol use: No     Comment: rare    Drug use: No    Sexual activity: Yes     Partners: Female       Family History   Problem Relation Age of Onset    Coronary Artery Disease  Mother     Breast Cancer Mother     Arthritis Mother         joint ?    Depression Father     Cancer Maternal Grandmother     ROGELIOAMACHELLE. Maternal Grandfather     Cardiovascular Maternal Grandfather     Gynecology Paternal Grandmother          giving birth    Prostate Cancer Paternal Grandfather     Genetic Disorder Brother         joint pain?    Depression Brother     Cardiovascular Son         congenital heart defect -      Prostate Cancer Other          age 70's    Diabetes Other        REVIEW OF SYSTEMS  10 point review systems performed otherwise negative as noted as per history of present illness.    Physical Exam:  Vitals: Ht 1.829 m (6')   Wt 113.2 kg (249 lb 8 oz)   BMI 33.84 kg/m    BMI= Body mass index is 33.84 kg/m .  Constitutional: healthy, alert and no acute distress   Psychiatric: mentation appears normal and affect normal/bright  NEURO: no focal deficits  RESP: Normal with easy respirations and no use of accessory muscles to breathe, no audible wheezing or retractions  CV: LLE:  no edema           SKIN: No erythema, rashes, excoriation, or breakdown. No evidence of infection.   JOINT/EXTREMITIES:left hip: No gross deformity.  No focal areas of tenderness.  Active hip flexion limited to approximate 100 degrees.  Any attempts intra-articular reproduces pain.  Negative straight leg.  No focal atrophy.     GAIT: antalgic    Diagnostic Modalities:  Left hip x-rays: Weightbearing views taken 2025 shows no acute fractures or dislocations.  Moderate joint space narrowing along the superior weightbearing surface.  Small osteophytes noted along the inferior acetabulum and humeral head.  Subchondral cystic changes.  Independent visualization of the images was performed.      Impression: left hip primary osteoarthritis    Plan:  All of the above pertinent physical exam and imaging modalities findings was reviewed with Bulmaro.    Treatment options discussed.  We reviewed his radiographs and  how they are clinically correlated.  We discussed treatments including anti-inflammatories Tylenol therapy injections.  He is deathly afraid of needles.  Provided a prescription of diclofenac.  He does routinely get some neck injections by Dr. Chaudhry under sedation.  Recommend he continues to have pain despite the anti-inflammatory should discuss it with him.    Risk reviewed for the medications    Return to clinic 4-6 , week(s), PRN, or sooner as needed for changes.  Re-x-ray on return: No    Leighton Wise D.O.

## 2025-02-05 NOTE — LETTER
2/5/2025      Bulmaro Herrera  [address]  [facility] [id number]      Dear Colleague,    Thank you for referring your patient, Bulmaro Herrera, to the [facility]. Please see a copy of my visit note below.    ORTHOPEDIC CONSULT      Chief Complaint: Bulmaro Herrera is a 59 year old male who is being seen for   Chief Complaint   Patient presents with     Left Hip - Pain       History of Present Illness:   Presents with left lateral deep hip pain.  Is been going on 5 weeks.  It is slightly better since it came on.  Has been taken Tylenol and ibuprofen.  Has had no previous surgeries or injections to his hip.  He did have some leftover prednisone that he tried that helped some.  Bothers him with putting his shoes and socks and rotation to the hip.    August 5, 2021 visit with Gallo Hernandez  Bulmaro Herrera is a 56 year old male who is seen in consultation at the request of self for evaluation of left shoulder pain and a mass to the left upper arm.  He reports has had shoulder pain off and on for atleast 2-3 years.  Progressively getting worse. Last 2 weeks has been severely flared up. No trauma that can he recall though is very active and is a .  The pain is lateral shoulder that at times radiates to the elbow, worse with abduction, forward flexion >90 degrees. Any attempted overhead lifting and reaching behind the back.  Feels he has been progressively loosing strength and motion to the shoulder.  No radicular symptoms including No numbness/tingling,      #2: states has a mass to his left lateral upper arm for about 3 years. Does not think it is growing, or if it has has been slowly progressing. No trauma. Can be painful at times. No wounds, no erythema.             Patient's past medical, surgical, social and family histories reviewed.     Past Medical History:   Diagnosis Date     Back pains      Depressive disorder, not elsewhere classified 01/28/2007    declines Rx     Disc degeneration  12/01/2008    see MRI -throaco-lumbar mild discogenic degenerative changes     Elevated LFT's 11/01/2009    alt and ast     Other and unspecified hyperlipidemia 01/01/2007     Sleep disorder 09/01/2009    CPAP     Tobacco use disorder        Past Surgical History:   Procedure Laterality Date     COLONOSCOPY  07/07/08    adenomatous polyp repeat 5 years     COLONOSCOPY N/A 12/29/2017    Procedure: COMBINED COLONOSCOPY, SINGLE OR MULTIPLE BIOPSY/POLYPECTOMY BY BIOPSY;  Colonoscopy, Polypectomy by Biopsy;  Surgeon: Matt Reyes MD;  Location:  GI     COLONOSCOPY N/A 5/22/2023    Procedure: Colonoscopy;  Surgeon: Constantino Bowie DO;  Location:  GI     ENT SURGERY      for deviated septum     EXCISE LESION AXILLA  12/7/2018    Procedure: Excision Skin Tags Right Axilla;  Surgeon: Ayaan Chaney MD;  [facility]     EXCISE LESION BACK N/A 12/7/2018    Procedure: Excision Back Skin Lesion X Two;  Surgeon: Ayaan Chaney MD;  [facility]     EXCISE MASS BACK N/A 4/15/2022    Procedure: Excisions skin lesions of back;  Surgeon: Ayaan Chaney MD;  [facility]     INJECT BLOCK MEDIAL BRANCH CERVICAL/THORACIC/LUMBAR Bilateral 5/26/2022    Procedure: Cervical 4, 5, 6 Medial Branch Block;  Surgeon: Christian Chaudhry MD;  [facility]     INJECT EPIDURAL CERVICAL N/A 5/14/2015    Procedure: INJECT EPIDURAL CERVICAL;  Surgeon: Christian Chaudhry MD;  [facility]     INJECT EPIDURAL CERVICAL N/A 12/28/2018    Procedure: INJECT EPIDURAL CERVICAL 6-7;  Surgeon: Christian Chaudhry MD;  [facility]     INJECT EPIDURAL CERVICAL Bilateral 6/8/2023    Procedure: Left Cervical 5-Cervical 6, Right Cervical 5-Cervical 6, Left Cervical 6-Cervical 7 and Right Cervical 6-Cervical 7 Intra-articular zygopophyseal joint injection utilizing fluoroscopic guidance with contrast dye.;  Surgeon: Christian Chaudhry MD;  [facility]     INJECT EPIDURAL CERVICAL Bilateral 2/19/2024    Procedure: Left Cervical 5 -  Cervical 6, Right Cervical 5 - Cervical 6, Left Cervical 6 - Cervical 7 and Right Cervical 6 - Cervical 7 Intra-articular zygopophyseal joint injection utilizing fluoroscopic guidance with contrast dye.;  Surgeon: Christian Chaudhry MD;  [facility]     INJECT EPIDURAL CERVICAL N/A 8/8/2024    Procedure: Cervical 7 - Thoracic 1 Interlaminar epidural steroid injection using fluoroscopic guidance with contrast dye.;  Surgeon: Christian Chaudhry MD;  [facility]     INJECT EPIDURAL LUMBAR N/A 12/20/2019    Procedure: Lumbar 5- Sacral 1 Epidural  Injection bilatetral;  Surgeon: Christian Chaudhry MD;  [facility]     INJECT EPIDURAL LUMBAR Bilateral 9/8/2022    Procedure: Bilateral Lumbar 5-Sacral 1 transforaminal epidural injections utilizing fluoroscopic guidance with contrast dye  and;  Surgeon: Christian Chaudhry MD;  [facility]     INJECT EPIDURAL LUMBAR Bilateral 8/16/2024    Procedure: Bilateral Lumbar 5-Sacral 1 transforaminal epidural injections utilizing fluoroscopic guidance with contrast dye;  Surgeon: Christian Chaudhry MD;  [facility]     INJECT EPIDURAL TRANSFORAMINAL Bilateral 5/20/2022    Procedure: Bilateral Lumbar 5- Sacral 1 transforaminal Epidural Steroid Injection;  Surgeon: Christian Chaudhry MD;  [facility]     INJECT EPIDURAL TRANSFORAMINAL Bilateral 7/14/2022    Procedure: Left Cervical 5-Cervical 6, Right Cervical 5-Cervical 6, Left Cervical 6-Cervical 7 and Right Cervical 6-Cervical 7 Intra-articular zygopophyseal joint injection utilizing fluoroscopic guidance with contrast dye;  Surgeon: Christian Chaudhry MD;  [facility]     INJECT EPIDURAL TRANSFORAMINAL Bilateral 6/16/2023    Procedure: Bilateral Lumbar 5-Sacral 1 transforaminal epidural injections utilizing fluoroscopic guidance with contrast dye;  Surgeon: Christian Chaudhry MD;  [facility]     INJECT EPIDURAL TRANSFORAMINAL Bilateral 9/28/2023    Procedure: Bilateral Lumbar 5-Sacral 1 transforaminal epidural injections  utilizing fluoroscopic guidance with contrast dye;  Surgeon: Christian Chaudhry MD;  [facility]     INJECT EPIDURAL TRANSFORAMINAL Bilateral 3/15/2024    Procedure: Bilateral Lumbar 5 - Sacral 1 transforaminal epidural injections utilizing fluoroscopic guidance with contrast dye.;  Surgeon: Christian Chaudhry MD;  [facility]     INJECT EPIDURAL TRANSFORAMINAL Bilateral 11/27/2024    Procedure: Bilateral Lumbar 5 - Sacral 1 transforaminal epidural injections utilizing fluoroscopic guidance with contrast dye;  Surgeon: Christian Chaudhry MD;  [facility]     INJECT FACET JOINT Bilateral 1/25/2019    Procedure: Cervical Facet Injections Cervical 5-6 and 6-7 Bilateral;  Surgeon: Christian Chaudhry MD;  [facility]     INJECT FACET JOINT Bilateral 11/29/2019    Procedure: Cervical 5-6 and 6-7 Bilateral facet joint injection;  Surgeon: Christian Chaudhry MD;  [facility]     INJECT FACET JOINT Bilateral 2/11/2021    Procedure: Cervical 5-6 and Cervical 6-7 Bilateral facet injections;  Surgeon: Christian Chaudhry MD;  [facility]     INJECT FACET JOINT Bilateral 10/19/2021    Procedure: Left C5-C6, Right C5-C6, Left C6-C7 and Right C6-C7 Intra-articular zygopophyseal joint injection utilizing fluoroscopic guidance with contrast dye;  Surgeon: Christian Chaudhry MD;  [facility]     INJECT FACET JOINT Bilateral 9/14/2023    Procedure: Left Cervical 5-Cervical 6, Right Cervical 5-Cervical 6, Left Cervical 6-Cervical 7 and Right Cervical 6-Cervical 7 Intra-articular zygopophyseal joint injection utilizing fluoroscopic guidance with contrast dye. Left Intra-articular shoulder Injection;  Surgeon: Christian Chaudhry MD;  [facility]     INJECT FACET JOINT Bilateral 11/15/2024    Procedure: Left Cervical 5-Cervical 6, Right Cervical 5-Cervical 6, Left Cervical 6-Cervical 7 and Right Cervical 6-Cervical 7 Intra-articular zygopophyseal joint injection utilizing fluoroscopic guidance with contrast dye.;  Surgeon: Isidoro  Christian CHAUHAN MD;  [facility]     INJECT STEROID (LOCATION) Left 10/19/2021    Procedure: Glenohumeral joint injection left shoulder;  Surgeon: Christian Chaudhry MD;  [facility]     INJECT STEROID (LOCATION) Left 2022    Procedure: Left shoulder glenohumeral steroid inj with local;  Surgeon: Christian Chaudhry MD;  [facility]     INJECT STEROID (LOCATION) Left 2024    Procedure: steroid injection left shoulder;  Surgeon: Christian Chaudhry MD;  [facility]     INJECT STEROID TROCHANTERIC BURSA Left 2022    Procedure: Left intra-articular shoulder injections;  Surgeon: Christian Chaudhry MD;  [facility]     ORTHOPEDIC SURGERY      Right hand     TONSILLECTOMY       ZZC NONSPECIFIC PROCEDURE      rt hand surgery - laceration/glass/tendons     ZZ ECHO HEART XTHORACIC, STRESS/REST  2009    neg       Medications:  Current Outpatient Medications   Medication Sig Dispense Refill     acetaminophen (TYLENOL) 500 MG tablet Take 1,000 mg by mouth every morning       aspirin (ASA) 81 MG chewable tablet Take 1 tablet (81 mg) by mouth daily       cetirizine (ZYRTEC) 10 MG tablet Take 1 tablet (10 mg) by mouth daily. 90 tablet 1     ibuprofen (ADVIL/MOTRIN) 200 MG tablet Take 400 mg by mouth every morning       modafinil (PROVIGIL) 200 MG tablet Take 1 tablet (200 mg) by mouth daily. 90 tablet 0     omeprazole (PRILOSEC) 40 MG DR capsule Take 1 capsule (40 mg) by mouth daily. 90 capsule 2     rosuvastatin (CRESTOR) 20 MG tablet Take 1 tablet (20 mg) by mouth daily. 90 tablet 0     No current facility-administered medications for this visit.       Allergies   Allergen Reactions     Wasps [Hornets] Swelling     Percocet [Oxycodone-Acetaminophen] Itching       Social History     Occupational History     Occupation:      Employer: SELF   Tobacco Use     Smoking status: Former     Current packs/day: 0.00     Types: Cigarettes     Quit date: 2007     Years since quittin.9     Smokeless  tobacco: Never   Vaping Use     Vaping status: Never Used   Substance and Sexual Activity     Alcohol use: No     Comment: rare     Drug use: No     Sexual activity: Yes     Partners: Female       Family History   Problem Relation Age of Onset     Coronary Artery Disease Mother      Breast Cancer Mother      Arthritis Mother         joint ?     Depression Father      Cancer Maternal Grandmother      C.A.D. Maternal Grandfather      Cardiovascular Maternal Grandfather      Gynecology Paternal Grandmother          giving birth     Prostate Cancer Paternal Grandfather      Genetic Disorder Brother         joint pain?     Depression Brother      Cardiovascular Son         congenital heart defect -       Prostate Cancer Other          age 70's     Diabetes Other        REVIEW OF SYSTEMS  10 point review systems performed otherwise negative as noted as per history of present illness.    Physical Exam:  Vitals: Ht 1.829 m (6')   Wt 113.2 kg (249 lb 8 oz)   BMI 33.84 kg/m    BMI= Body mass index is 33.84 kg/m .  Constitutional: healthy, alert and no acute distress   Psychiatric: mentation appears normal and affect normal/bright  NEURO: no focal deficits  RESP: Normal with easy respirations and no use of accessory muscles to breathe, no audible wheezing or retractions  CV: LLE:  no edema           SKIN: No erythema, rashes, excoriation, or breakdown. No evidence of infection.   JOINT/EXTREMITIES:left hip: No gross deformity.  No focal areas of tenderness.  Active hip flexion limited to approximate 100 degrees.  Any attempts intra-articular reproduces pain.  Negative straight leg.  No focal atrophy.     GAIT: antalgic    Diagnostic Modalities:  Left hip x-rays: Weightbearing views taken 2025 shows no acute fractures or dislocations.  Moderate joint space narrowing along the superior weightbearing surface.  Small osteophytes noted along the inferior acetabulum and humeral head.  Subchondral cystic  changes.  Independent visualization of the images was performed.      Impression: left hip primary osteoarthritis    Plan:  All of the above pertinent physical exam and imaging modalities findings was reviewed with Bulmaro.    Treatment options discussed.  We reviewed his radiographs and how they are clinically correlated.  We discussed treatments including anti-inflammatories Tylenol therapy injections.  He is deathly afraid of needles.  Provided a prescription of diclofenac.  He does routinely get some neck injections by Dr. Chaudhry under sedation.  Recommend he continues to have pain despite the anti-inflammatory should discuss it with him.    Risk reviewed for the medications    Return to clinic 4-6 , week(s), PRN, or sooner as needed for changes.  Re-x-ray on return: No    Leighton Wise D.O.      Again, thank you for allowing me to participate in the care of your patient.        Sincerely,        Jeramy Wise, DO    Electronically signed

## 2025-02-06 NOTE — TELEPHONE ENCOUNTER
Please contact the patient and instruct him to hold his aspirin 7 days prior to the procedure.    Thank you very much.    Darian

## 2025-02-17 ENCOUNTER — OFFICE VISIT (OUTPATIENT)
Dept: INTERNAL MEDICINE | Facility: CLINIC | Age: 60
End: 2025-02-17
Payer: COMMERCIAL

## 2025-02-17 VITALS
WEIGHT: 247.9 LBS | RESPIRATION RATE: 18 BRPM | DIASTOLIC BLOOD PRESSURE: 90 MMHG | HEIGHT: 72 IN | HEART RATE: 99 BPM | OXYGEN SATURATION: 95 % | TEMPERATURE: 96.9 F | SYSTOLIC BLOOD PRESSURE: 126 MMHG | BODY MASS INDEX: 33.58 KG/M2

## 2025-02-17 DIAGNOSIS — G47.33 OSA (OBSTRUCTIVE SLEEP APNEA): ICD-10-CM

## 2025-02-17 DIAGNOSIS — M51.362 DEGENERATION OF INTERVERTEBRAL DISC OF LUMBAR REGION WITH DISCOGENIC BACK PAIN AND LOWER EXTREMITY PAIN: ICD-10-CM

## 2025-02-17 DIAGNOSIS — M54.12 CERVICAL RADICULOPATHY: Primary | ICD-10-CM

## 2025-02-17 PROCEDURE — 1125F AMNT PAIN NOTED PAIN PRSNT: CPT | Performed by: INTERNAL MEDICINE

## 2025-02-17 PROCEDURE — 99214 OFFICE O/P EST MOD 30 MIN: CPT | Performed by: INTERNAL MEDICINE

## 2025-02-17 PROCEDURE — 3074F SYST BP LT 130 MM HG: CPT | Performed by: INTERNAL MEDICINE

## 2025-02-17 PROCEDURE — 3080F DIAST BP >= 90 MM HG: CPT | Performed by: INTERNAL MEDICINE

## 2025-02-17 ASSESSMENT — PAIN SCALES - GENERAL: PAINLEVEL_OUTOF10: MODERATE PAIN (6)

## 2025-02-17 NOTE — PROGRESS NOTES
Preoperative Evaluation  [address] [address]  Phone: [phone]  Primary Provider: Gustavo Farmer DO  Pre-op Performing Provider: Gustavo Farmer DO  Feb 17, 2025 2/17/2025   Surgical Information   What procedure is being done? 02/21/2025 -Bilateral Lumbar 5 - Sacral 1 transforaminal epidural injections utilizing fluoroscopic guidance with contrast dye     02/27/2025 -Left Cervical 5-Cervical 6, Right Cervical 5-Cervical 6, Left Cervical 6-Cervical 7 and Right Cervical 6-Cervical 7 Intra-articular zygopophyseal joint injection utilizing fluoroscopic guidance with contrast dye.    Facility or Hospital where procedure/surgery will be performed: [facility]     Who is doing the procedure / surgery?  Christian Chaudhry MD     Date of surgery / procedure: 02/21/2025 and 02/27/2025   Time of surgery / procedure: 7:30 AM and 3:30 PM   Where do you plan to recover after surgery? at home with family     Fax number for surgical facility: Note does not need to be faxed, will be available electronically in Epic.    Assessment & Plan     The proposed surgical procedure is considered LOW risk.    Cervical radiculopathy      Degeneration of intervertebral disc of lumbar region with discogenic back pain and lower extremity pain      ROBBY (obstructive sleep apnea)  Please do not leave the patient unattended in the supine position.            - No identified additional risk factors other than previously addressed         Recommendation  Approval given to proceed with proposed procedure, without further diagnostic evaluation.    Jemima Chamberlain is a 59 year old, presenting for the following:  Pre-Op Exam          2/17/2025     3:12 PM   Additional Questions   Roomed by Isma NAVARRO related to upcoming procedure: Persistent cervical and lumbar pain secondary to chronic degenerative disc and joint  disease.        2/17/2025   Pre-Op Questionnaire   Have you ever had a heart attack or stroke? No   Have you ever had surgery on your heart or blood vessels, such as a stent placement, a coronary artery bypass, or surgery on an artery in your head, neck, heart, or legs? No   Do you have chest pain with activity? No   Do you have a history of heart failure? No   Do you currently have a cold, bronchitis or symptoms of other infection? No   Do you have a cough, shortness of breath, or wheezing? No   Do you or anyone in your family have previous history of blood clots? No   Do you or does anyone in your family have a serious bleeding problem such as prolonged bleeding following surgeries or cuts? No   Have you ever had problems with anemia or been told to take iron pills? No   Have you had any abnormal blood loss such as black, tarry or bloody stools? No   Have you ever had a blood transfusion? No   Are you willing to have a blood transfusion if it is medically needed before, during, or after your surgery? Yes   Have you or any of your relatives ever had problems with anesthesia? (!) YES    Do you have sleep apnea, excessive snoring or daytime drowsiness? No   Do you have any artifical heart valves or other implanted medical devices like a pacemaker, defibrillator, or continuous glucose monitor? No   Do you have artificial joints? No   Are you allergic to latex? No     Health Care Directive  Patient does not have a Health Care Directive: Discussed advance care planning with patient; however, patient declined at this time.    Preoperative Review of    reviewed - controlled substances reflected in medication list.          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/2022     Priority: Medium     Added automatically from request for surgery [id number]      Chondromalacia of left shoulder 08/18/2021     Priority: Medium     Partial nontraumatic tear of left rotator cuff 08/18/2021     Priority: Medium    Soft tissue mass 08/05/2021     Priority: Medium    Rotator cuff syndrome of left shoulder 08/05/2021     Priority: Medium    Chronic left shoulder pain 08/05/2021     Priority: Medium    NSAID long-term use 11/18/2019     Priority: Medium    Severe needle phobia 01/21/2019     Priority: Medium     Class: Chronic     likely to preclude him from having minimally invasive interventional pain procedures with minimal sedation. Would need deeper MAC sedation      Mixed hyperlipidemia 11/13/2018     Priority: Medium    ROBBY (obstructive sleep apnea) 11/13/2018     Priority: Medium    Herniation of intervertebral disc of cervical spine without radiculopathy 03/21/2017     Priority: Medium    Cervicalgia 03/21/2017     Priority: Medium    Chronic pain syndrome 04/05/2016     Priority: Medium     DDD, cervical. T#3, #60/mo.       DDD (degenerative disc disease), lumbar 12/20/2013     Priority: Medium    DDD (degenerative disc disease), cervical 12/20/2013     Priority: Medium    Pruritus ani 01/20/2010     Priority: Medium    Back pains      Priority: Medium     Problem list name updated by automated process. Provider to review and confirm      Esophageal reflux 04/13/2005     Priority: Medium      Past Medical History:   Diagnosis Date    Back pains     Depressive disorder, not elsewhere classified 01/28/2007    declines Rx    Disc degeneration 12/01/2008    see MRI -throaco-lumbar mild discogenic degenerative changes    Elevated LFT's 11/01/2009    alt and ast    Other and unspecified hyperlipidemia 01/01/2007    Sleep disorder 09/01/2009    CPAP    Tobacco use disorder      Past Surgical History:   Procedure Laterality Date    COLONOSCOPY  07/07/08    adenomatous polyp repeat 5 years    COLONOSCOPY N/A 12/29/2017    Procedure: COMBINED COLONOSCOPY, SINGLE OR MULTIPLE BIOPSY/POLYPECTOMY BY BIOPSY;  Colonoscopy, Polypectomy by Biopsy;  Surgeon:  Matt Reyes MD;  [facility]    COLONOSCOPY N/A 5/22/2023    Procedure: Colonoscopy;  Surgeon: Constantino Bowie DO;  Location: [facility]    ENT SURGERY      for deviated septum    EXCISE LESION AXILLA  12/7/2018    Procedure: Excision Skin Tags Right Axilla;  Surgeon: Ayaan Chaney MD;  [facility]    EXCISE LESION BACK N/A 12/7/2018    Procedure: Excision Back Skin Lesion X Two;  Surgeon: Ayaan Cahney MD;  [facility]    EXCISE MASS BACK N/A 4/15/2022    Procedure: Excisions skin lesions of back;  Surgeon: Ayaan Chaney MD;  [facility]    INJECT BLOCK MEDIAL BRANCH CERVICAL/THORACIC/LUMBAR Bilateral 5/26/2022    Procedure: Cervical 4, 5, 6 Medial Branch Block;  Surgeon: Christian Chaudhry MD;  [facility]    INJECT EPIDURAL CERVICAL N/A 5/14/2015    Procedure: INJECT EPIDURAL CERVICAL;  Surgeon: Christian Chaudhry MD;  [facility]    INJECT EPIDURAL CERVICAL N/A 12/28/2018    Procedure: INJECT EPIDURAL CERVICAL 6-7;  Surgeon: Christian Chaudhry MD;  [facility]    INJECT EPIDURAL CERVICAL Bilateral 6/8/2023    Procedure: Left Cervical 5-Cervical 6, Right Cervical 5-Cervical 6, Left Cervical 6-Cervical 7 and Right Cervical 6-Cervical 7 Intra-articular zygopophyseal joint injection utilizing fluoroscopic guidance with contrast dye.;  Surgeon: Christian Chaudhry MD;  [facility]    INJECT EPIDURAL CERVICAL Bilateral 2/19/2024    Procedure: Left Cervical 5 - Cervical 6, Right Cervical 5 - Cervical 6, Left Cervical 6 - Cervical 7 and Right Cervical 6 - Cervical 7 Intra-articular zygopophyseal joint injection utilizing fluoroscopic guidance with contrast dye.;  Surgeon: Christian Chaudhry MD;  [facility]    INJECT EPIDURAL CERVICAL N/A 8/8/2024    Procedure: Cervical 7 - Thoracic 1 Interlaminar epidural steroid injection using fluoroscopic guidance with contrast dye.;  Surgeon: Christian Chaudhry MD;  [facility]    INJECT EPIDURAL LUMBAR N/A 12/20/2019    Procedure: Lumbar 5-  Sacral 1 Epidural  Injection bilatetral;  Surgeon: Christian Chaudhry MD;  [facility]    INJECT EPIDURAL LUMBAR Bilateral 9/8/2022    Procedure: Bilateral Lumbar 5-Sacral 1 transforaminal epidural injections utilizing fluoroscopic guidance with contrast dye  and;  Surgeon: Christian Chaudhry MD;  [facility]    INJECT EPIDURAL LUMBAR Bilateral 8/16/2024    Procedure: Bilateral Lumbar 5-Sacral 1 transforaminal epidural injections utilizing fluoroscopic guidance with contrast dye;  Surgeon: Christian Chaudhry MD;  [facility]    INJECT EPIDURAL TRANSFORAMINAL Bilateral 5/20/2022    Procedure: Bilateral Lumbar 5- Sacral 1 transforaminal Epidural Steroid Injection;  Surgeon: Christian Chaudhry MD;  [facility]    INJECT EPIDURAL TRANSFORAMINAL Bilateral 7/14/2022    Procedure: Left Cervical 5-Cervical 6, Right Cervical 5-Cervical 6, Left Cervical 6-Cervical 7 and Right Cervical 6-Cervical 7 Intra-articular zygopophyseal joint injection utilizing fluoroscopic guidance with contrast dye;  Surgeon: Christian Chaudhry MD;  [facility]    INJECT EPIDURAL TRANSFORAMINAL Bilateral 6/16/2023    Procedure: Bilateral Lumbar 5-Sacral 1 transforaminal epidural injections utilizing fluoroscopic guidance with contrast dye;  Surgeon: Christian Chaudhry MD;  [facility]    INJECT EPIDURAL TRANSFORAMINAL Bilateral 9/28/2023    Procedure: Bilateral Lumbar 5-Sacral 1 transforaminal epidural injections utilizing fluoroscopic guidance with contrast dye;  Surgeon: Christian Chaudhry MD;  [facility]    INJECT EPIDURAL TRANSFORAMINAL Bilateral 3/15/2024    Procedure: Bilateral Lumbar 5 - Sacral 1 transforaminal epidural injections utilizing fluoroscopic guidance with contrast dye.;  Surgeon: Christian Chaudhry MD;  [facility]    INJECT EPIDURAL TRANSFORAMINAL Bilateral 11/27/2024    Procedure: Bilateral Lumbar 5 - Sacral 1 transforaminal epidural injections utilizing fluoroscopic guidance with contrast dye;  Surgeon: Christian Chaudhry  MD;  [facility]    INJECT FACET JOINT Bilateral 1/25/2019    Procedure: Cervical Facet Injections Cervical 5-6 and 6-7 Bilateral;  Surgeon: Christian Chaudhry MD;  [facility]    INJECT FACET JOINT Bilateral 11/29/2019    Procedure: Cervical 5-6 and 6-7 Bilateral facet joint injection;  Surgeon: Christian Chaudhry MD;  [facility]    INJECT FACET JOINT Bilateral 2/11/2021    Procedure: Cervical 5-6 and Cervical 6-7 Bilateral facet injections;  Surgeon: Christian Chaudhry MD;  [facility]    INJECT FACET JOINT Bilateral 10/19/2021    Procedure: Left C5-C6, Right C5-C6, Left C6-C7 and Right C6-C7 Intra-articular zygopophyseal joint injection utilizing fluoroscopic guidance with contrast dye;  Surgeon: Christian Chaudhry MD;  [facility]    INJECT FACET JOINT Bilateral 9/14/2023    Procedure: Left Cervical 5-Cervical 6, Right Cervical 5-Cervical 6, Left Cervical 6-Cervical 7 and Right Cervical 6-Cervical 7 Intra-articular zygopophyseal joint injection utilizing fluoroscopic guidance with contrast dye. Left Intra-articular shoulder Injection;  Surgeon: Christian Chaudhry MD;  [facility]    INJECT FACET JOINT Bilateral 11/15/2024    Procedure: Left Cervical 5-Cervical 6, Right Cervical 5-Cervical 6, Left Cervical 6-Cervical 7 and Right Cervical 6-Cervical 7 Intra-articular zygopophyseal joint injection utilizing fluoroscopic guidance with contrast dye.;  Surgeon: Christian Chaudhry MD;  [facility]    INJECT STEROID (LOCATION) Left 10/19/2021    Procedure: Glenohumeral joint injection left shoulder;  Surgeon: Christian Chaudhry MD;  [facility]    INJECT STEROID (LOCATION) Left 5/20/2022    Procedure: Left shoulder glenohumeral steroid inj with local;  Surgeon: Christian Chaudhry MD;  [facility]    INJECT STEROID (LOCATION) Left 8/8/2024    Procedure: steroid injection left shoulder;  Surgeon: Christian Chaudhry MD;  [facility]    INJECT STEROID TROCHANTERIC BURSA Left 9/8/2022    Procedure: Left  intra-articular shoulder injections;  Surgeon: Christian Chaudhry MD;  [facility]    ORTHOPEDIC SURGERY      Right hand    TONSILLECTOMY      Z NONSPECIFIC PROCEDURE      rt hand surgery - laceration/glass/tendons    [facility] ECHO HEART XTHORACIC, STRESS/REST  2009    neg     Current Outpatient Medications   Medication Sig Dispense Refill    acetaminophen (TYLENOL) 500 MG tablet Take 1,000 mg by mouth every morning      aspirin (ASA) 81 MG chewable tablet Take 1 tablet (81 mg) by mouth daily      cetirizine (ZYRTEC) 10 MG tablet Take 1 tablet (10 mg) by mouth daily. 90 tablet 1    diclofenac (VOLTAREN) 75 MG EC tablet Take 1 tablet (75 mg) by mouth 2 times daily for 14 days. 28 tablet 0    ibuprofen (ADVIL/MOTRIN) 200 MG tablet Take 400 mg by mouth every morning.      meclizine (ANTIVERT) 25 MG tablet Take 1 tablet (25 mg) by mouth 3 times daily as needed for dizziness. 60 tablet 0    modafinil (PROVIGIL) 200 MG tablet Take 1 tablet (200 mg) by mouth daily. 90 tablet 0    omeprazole (PRILOSEC) 40 MG DR capsule Take 1 capsule (40 mg) by mouth daily. 90 capsule 2    rosuvastatin (CRESTOR) 20 MG tablet Take 1 tablet (20 mg) by mouth daily. 90 tablet 0       Allergies   Allergen Reactions    Wasps [Hornets] Swelling    Percocet [Oxycodone-Acetaminophen] Itching        Social History     Tobacco Use    Smoking status: Former     Current packs/day: 0.00     Types: Cigarettes     Quit date: 2007     Years since quittin.0    Smokeless tobacco: Never   Substance Use Topics    Alcohol use: No     Comment: rare       History   Drug Use No             Review of Systems  CONSTITUTIONAL: NEGATIVE for fever, chills, change in weight  INTEGUMENTARY/SKIN: NEGATIVE for worrisome rashes, moles or lesions  EYES: NEGATIVE for vision changes or irritation  ENT/MOUTH: NEGATIVE for ear, mouth and throat problems  RESP: NEGATIVE for significant cough or SOB  BREAST: NEGATIVE for masses, tenderness or discharge  CV: NEGATIVE for  "chest pain, palpitations or peripheral edema  GI: NEGATIVE for nausea, abdominal pain, heartburn, or change in bowel habits  : NEGATIVE for frequency, dysuria, or hematuria  MUSCULOSKELETAL: Patient has both cervical and lumbar radiculopathy secondary to chronic cervical and lumbar disc and joint disease.  NEURO: NEGATIVE for weakness, dizziness or paresthesias  ENDOCRINE: NEGATIVE for temperature intolerance, skin/hair changes  HEME: NEGATIVE for bleeding problems  PSYCHIATRIC: NEGATIVE for changes in mood or affect    Objective    /90 (BP Location: Right arm, Patient Position: Sitting, Cuff Size: Adult Large)   Pulse 99   Temp 96.9  F (36.1  C) (Temporal)   Resp 18   Ht 1.83 m (6' 0.05\")   Wt 112.4 kg (247 lb 14.4 oz)   SpO2 95%   BMI 33.58 kg/m     Estimated body mass index is 33.58 kg/m  as calculated from the following:    Height as of this encounter: 1.83 m (6' 0.05\").    Weight as of this encounter: 112.4 kg (247 lb 14.4 oz).  Physical Exam  GENERAL: alert and no distress  EYES: Eyes grossly normal to inspection, PERRL and conjunctivae and sclerae normal  HENT: ear canals and TM's normal, nose and mouth without ulcers or lesions  NECK: no adenopathy, no asymmetry, masses, or scars  RESP: lungs clear to auscultation - no rales, rhonchi or wheezes  CV: regular rate and rhythm, normal S1 S2, no S3 or S4, no murmur, click or rub, no peripheral edema  ABDOMEN: soft, nontender, no hepatosplenomegaly, no masses and bowel sounds normal  MS: no gross musculoskeletal defects noted, no edema  SKIN: no suspicious lesions or rashes  NEURO: Normal strength and tone, mentation intact and speech normal  PSYCH: mentation appears normal, affect normal/bright    Recent Labs   Lab Test 01/18/25  0835 03/09/24  1022   HGB 15.8 16.1    195    139   POTASSIUM 4.5 4.2   CR 1.00 0.98   A1C 6.4*  --         Diagnostics  No labs were ordered during this visit.       Revised Cardiac Risk Index (RCRI)  The " patient has the following serious cardiovascular risks for perioperative complications:   - No serious cardiac risks = 0 points     RCRI Interpretation: 0 points: Class I (very low risk - 0.4% complication rate)         Signed Electronically by: Gustavo Farmer DO  A copy of this evaluation report is provided to the requesting physician.

## 2025-02-21 ENCOUNTER — HOSPITAL ENCOUNTER (OUTPATIENT)
Facility: CLINIC | Age: 60
Discharge: HOME | End: 2025-02-21
Attending: ANESTHESIOLOGY | Admitting: ANESTHESIOLOGY
Payer: COMMERCIAL

## 2025-02-21 ENCOUNTER — APPOINTMENT (OUTPATIENT)
Dept: GENERAL RADIOLOGY | Facility: CLINIC | Age: 60
End: 2025-02-21
Attending: ANESTHESIOLOGY
Payer: COMMERCIAL

## 2025-02-21 VITALS
OXYGEN SATURATION: 95 % | RESPIRATION RATE: 16 BRPM | SYSTOLIC BLOOD PRESSURE: 117 MMHG | TEMPERATURE: 97.9 F | HEART RATE: 74 BPM | DIASTOLIC BLOOD PRESSURE: 89 MMHG

## 2025-02-21 DIAGNOSIS — M54.2 CERVICALGIA: ICD-10-CM

## 2025-02-21 PROCEDURE — 20610 DRAIN/INJ JOINT/BURSA W/O US: CPT | Mod: LT | Performed by: ANESTHESIOLOGY

## 2025-02-21 PROCEDURE — 250N000011 HC RX IP 250 OP 636: Performed by: ANESTHESIOLOGY

## 2025-02-21 PROCEDURE — 250N000011 HC RX IP 250 OP 636: Mod: JZ | Performed by: ANESTHESIOLOGY

## 2025-02-21 PROCEDURE — 64483 NJX AA&/STRD TFRM EPI L/S 1: CPT | Mod: 50 | Performed by: ANESTHESIOLOGY

## 2025-02-21 PROCEDURE — 250N000009 HC RX 250: Performed by: NURSE ANESTHETIST, CERTIFIED REGISTERED

## 2025-02-21 PROCEDURE — 999N000179 XR SURGERY CARM FLUORO LESS THAN 5 MIN W STILLS

## 2025-02-21 PROCEDURE — 370N000017 HC ANESTHESIA TECHNICAL FEE, PER MIN: Performed by: ANESTHESIOLOGY

## 2025-02-21 PROCEDURE — 20610 DRAIN/INJ JOINT/BURSA W/O US: CPT | Mod: XU,LT | Performed by: ANESTHESIOLOGY

## 2025-02-21 RX ORDER — TRIAMCINOLONE ACETONIDE 40 MG/ML
INJECTION, SUSPENSION INTRA-ARTICULAR; INTRAMUSCULAR PRN
Status: DISCONTINUED | OUTPATIENT
Start: 2025-02-21 | End: 2025-02-21 | Stop reason: HOSPADM

## 2025-02-21 RX ORDER — BUPIVACAINE HYDROCHLORIDE 5 MG/ML
INJECTION, SOLUTION EPIDURAL; INTRACAUDAL PRN
Status: DISCONTINUED | OUTPATIENT
Start: 2025-02-21 | End: 2025-02-21 | Stop reason: HOSPADM

## 2025-02-21 RX ORDER — DEXAMETHASONE SODIUM PHOSPHATE 10 MG/ML
4 INJECTION, SOLUTION INTRAMUSCULAR; INTRAVENOUS
Status: DISCONTINUED | OUTPATIENT
Start: 2025-02-21 | End: 2025-02-21 | Stop reason: HOSPADM

## 2025-02-21 RX ORDER — NALOXONE HYDROCHLORIDE 0.4 MG/ML
0.1 INJECTION, SOLUTION INTRAMUSCULAR; INTRAVENOUS; SUBCUTANEOUS
Status: DISCONTINUED | OUTPATIENT
Start: 2025-02-21 | End: 2025-02-21 | Stop reason: HOSPADM

## 2025-02-21 RX ORDER — ONDANSETRON 2 MG/ML
4 INJECTION INTRAMUSCULAR; INTRAVENOUS EVERY 30 MIN PRN
Status: DISCONTINUED | OUTPATIENT
Start: 2025-02-21 | End: 2025-02-21 | Stop reason: HOSPADM

## 2025-02-21 RX ORDER — IOPAMIDOL 612 MG/ML
INJECTION, SOLUTION INTRATHECAL PRN
Status: DISCONTINUED | OUTPATIENT
Start: 2025-02-21 | End: 2025-02-21 | Stop reason: HOSPADM

## 2025-02-21 RX ORDER — ONDANSETRON 4 MG/1
4 TABLET, ORALLY DISINTEGRATING ORAL EVERY 30 MIN PRN
Status: DISCONTINUED | OUTPATIENT
Start: 2025-02-21 | End: 2025-02-21 | Stop reason: HOSPADM

## 2025-02-21 RX ADMIN — LIDOCAINE HYDROCHLORIDE 0.2 ML: 10 INJECTION, SOLUTION EPIDURAL; INFILTRATION; INTRACAUDAL; PERINEURAL at 06:55

## 2025-02-21 ASSESSMENT — ACTIVITIES OF DAILY LIVING (ADL)
ADLS_ACUITY_SCORE: 41
ADLS_ACUITY_SCORE: 41

## 2025-02-21 NOTE — DISCHARGE INSTRUCTIONS
Home Care Instructions                Procedure: Epidural injection and left hip joint injection    Activity:  Rest today  Do not work today  Resume normal activity tomorrow    Pain:  You may experience soreness at the injection site for 1 to 3 days.  You may use an ice pack for 20 minutes every 2 hours for the first 24 hours  You may use a heating pad after the first 24 hours  You may use Tylenol  (acetaminophen) every 4 hours or other pain medicines as directed by your physician    Safety  Sedation medicine, if given may remain active for many hours.    It is important for the next 24 hours that you do not:  Drive a car  Operate machines or power tools  Consume alcohol, including beer  Sign any important papers or legal documents    You may experience numbness radiating into your legs or arms, (depending on the procedure location)  This numbness may last several hours.  Until the numb sensation returns to normal please use caution in walking, climbing stairs, stepping out of your vehicle, etc.    Common side effects of steroids:  Not everyone will experience corticosteroid side effects. If side effects are experienced they will gradually subside in the 7-10 day period following an injection.    Most common side effects include:  Flushed face and/or chest  Feeling of warmth, particularly in face but could be overall feeling of warmth  Increased blood sugar in diabetic patients  Menstrual irregularities may occur.  If taking hormone based birth control an alternate method of birth control is recommended  Sleep disturbances and/or mood swings are possible  Leg cramps    Please contact us if you have:  Severe pain   Fever more than 101.5 degrees Fahrenheit  Signs of infection (redness, swelling or drainage)       PLEASE GO TO THE NEAREST EMERGENCY ROOM IF YOU HAVE:   - Increasing numbness or weakness in your arms or legs or increasingly severe pain (greater than 4 hours after your injection)    If you have questions  during normal business hours (8am-5pm Monday-Friday) contact the [facility] at [phone] to access the [facility] . If you need help after hours, we recommend that you go to a hospital emergency room or dial 911.

## 2025-02-21 NOTE — OP NOTE
CHIEF COMPLAINT:   1. neck pain secondary to cervical spondylosis.  2.  Left shoulder pain secondary to glenohumeral joint degeneration and a left shoulder labral tear  3.  Low back pain with severe disc degeneration at L5-S1 causing back pain and leg pains  4.  Left hip pain resulting in generalized left groin pain and buttock pain     Bulmaro gets high-grade pain relief from these injections but they last for about 3 months with >75% pain relief and then the pain comes back abruptly  He is requesting to have this repeated again.    He was also sent to me to have his left hip injected.  He has left hip osteoarthritis and saw the orthopedic surgeon here at [facility]  who requested a left hip injection.           #1 PROCEDURE   1.  Bilateral L5-S1 transforaminal epidural injections utilizing fluoroscopic guidance with contrast dye.      PROCEDURE DETAILS: After written informed consent was obtained from the patient, the patient was escorted to the procedure room.  The patient was placed in the sitting position.  A  time out  was conducted to verify the patient identity, procedure to be performed, side, site, allergies and any special requirements.  The skin over the neck was prepped and draped in normal sterile fashion. Fluoroscopy was used to identify the zygopophyseal joint with a lateral view.   The skin was anesthetized with 0.5 mL of 1% lidocaine with bicarbonate buffer.  2 separate 22-gauge Quincke needles were advanced into the foraminal openings from the oblique views and walked into the posterior aspect of the foramen at L5-S1 in the lateral fluoroscopic image.  In the AP image Omnipaque contrast was injected which showed proper spread in the epidural space no evidence of any intravascular, intrathecal, intraneural injection.  I then injected 20 mg of Kenalog with 3 cc of preservative-free normal saline into each foraminal opening with good dispersion into his epidural space.     Blood loss: 0  cc     #2 PROCEDURE    Procedure details: The left gluteal region was prepped with ChloraPrep.  A bleb of 1% lidocaine was used for skin anesthesia.  I then advanced for more of a superior lateral aspect trying to avoid the sciatic nerve a 22-gauge 5 inch Quincke needle into the intra-articular location just inferior and lateral to his acetabulum.  After I could feel that the needle was just inside the ligamentous structure of the hip the needle was slightly removed and Isovue contrast was injected showing proper spread into the hip joint as well as around the ligamentous structure of the hip.  There was no vascular uptake.  I then injected 15 cc of 0.0625% bupivacaine with 2 mg/cc of triamcinolone.  There is good washout into the hip joint and the needle was removed and a sterile bandage placed over the needle insertion site.  DIAGNOSIS:  1.  Cervical spondylosis secondary to a symptomatic facet syndrome causing axial neck pain with a history of long-term pain relief from previous facet injections  2.  Severe needle phobia  3.  Severe disc degeneration at L5-S1.  With history of long-term benefit from previous epidural injections.  4.  Left hip osteoarthritis moderate       PLAN:  Performed a repeat bilateral L5-S1 epidural injections.  He really does not want to have low back surgery however he has severe degeneration at L5-S1 causing lumbar radiculopathy as well as axial back pain.  Performed a left hip injection for left hip osteoarthritis.     Christian Chaudhry MD  Diplomate of the American Board of Anesthesiology, Pain Medicine

## 2025-02-27 ENCOUNTER — ANESTHESIA EVENT (OUTPATIENT)
Dept: SURGERY | Facility: CLINIC | Age: 60
End: 2025-02-27
Payer: COMMERCIAL

## 2025-02-27 ENCOUNTER — ANESTHESIA (OUTPATIENT)
Dept: SURGERY | Facility: CLINIC | Age: 60
End: 2025-02-27
Payer: COMMERCIAL

## 2025-02-27 ENCOUNTER — HOSPITAL ENCOUNTER (OUTPATIENT)
Facility: CLINIC | Age: 60
Discharge: HOME | End: 2025-02-27
Attending: ANESTHESIOLOGY | Admitting: ANESTHESIOLOGY
Payer: COMMERCIAL

## 2025-02-27 ENCOUNTER — HOSPITAL ENCOUNTER (OUTPATIENT)
Dept: GENERAL RADIOLOGY | Facility: CLINIC | Age: 60
Discharge: HOME | End: 2025-02-27
Attending: ANESTHESIOLOGY | Admitting: ANESTHESIOLOGY
Payer: COMMERCIAL

## 2025-02-27 VITALS
DIASTOLIC BLOOD PRESSURE: 102 MMHG | TEMPERATURE: 97.8 F | HEART RATE: 78 BPM | SYSTOLIC BLOOD PRESSURE: 135 MMHG | RESPIRATION RATE: 14 BRPM | OXYGEN SATURATION: 94 %

## 2025-02-27 DIAGNOSIS — M54.16 LUMBAR RADICULOPATHY: ICD-10-CM

## 2025-02-27 PROCEDURE — 20550 NJX 1 TENDON SHEATH/LIGAMENT: CPT | Performed by: ANESTHESIOLOGY

## 2025-02-27 PROCEDURE — 250N000011 HC RX IP 250 OP 636: Performed by: NURSE ANESTHETIST, CERTIFIED REGISTERED

## 2025-02-27 PROCEDURE — 258N000003 HC RX IP 258 OP 636: Performed by: NURSE ANESTHETIST, CERTIFIED REGISTERED

## 2025-02-27 PROCEDURE — 999N000179 XR SURGERY CARM FLUORO LESS THAN 5 MIN W STILLS

## 2025-02-27 PROCEDURE — 250N000011 HC RX IP 250 OP 636: Performed by: ANESTHESIOLOGY

## 2025-02-27 PROCEDURE — 64491 INJ PARAVERT F JNT C/T 2 LEV: CPT | Performed by: ANESTHESIOLOGY

## 2025-02-27 PROCEDURE — 370N000017 HC ANESTHESIA TECHNICAL FEE, PER MIN: Performed by: ANESTHESIOLOGY

## 2025-02-27 PROCEDURE — 250N000009 HC RX 250: Performed by: ANESTHESIOLOGY

## 2025-02-27 PROCEDURE — 64490 INJ PARAVERT F JNT C/T 1 LEV: CPT | Mod: 50 | Performed by: ANESTHESIOLOGY

## 2025-02-27 RX ORDER — SODIUM CHLORIDE, SODIUM LACTATE, POTASSIUM CHLORIDE, CALCIUM CHLORIDE 600; 310; 30; 20 MG/100ML; MG/100ML; MG/100ML; MG/100ML
INJECTION, SOLUTION INTRAVENOUS CONTINUOUS
Status: DISCONTINUED | OUTPATIENT
Start: 2025-02-27 | End: 2025-02-27 | Stop reason: HOSPADM

## 2025-02-27 RX ORDER — LIDOCAINE 40 MG/G
CREAM TOPICAL
Status: DISCONTINUED | OUTPATIENT
Start: 2025-02-27 | End: 2025-02-27 | Stop reason: HOSPADM

## 2025-02-27 RX ORDER — IOPAMIDOL 612 MG/ML
INJECTION, SOLUTION INTRATHECAL PRN
Status: DISCONTINUED | OUTPATIENT
Start: 2025-02-27 | End: 2025-02-27 | Stop reason: HOSPADM

## 2025-02-27 RX ORDER — PROPOFOL 10 MG/ML
INJECTION, EMULSION INTRAVENOUS PRN
Status: DISCONTINUED | OUTPATIENT
Start: 2025-02-27 | End: 2025-02-27

## 2025-02-27 RX ORDER — TRIAMCINOLONE ACETONIDE 40 MG/ML
INJECTION, SUSPENSION INTRA-ARTICULAR; INTRAMUSCULAR PRN
Status: DISCONTINUED | OUTPATIENT
Start: 2025-02-27 | End: 2025-02-27 | Stop reason: HOSPADM

## 2025-02-27 RX ADMIN — PROPOFOL 100 MCG/KG/MIN: 10 INJECTION, EMULSION INTRAVENOUS at 15:56

## 2025-02-27 RX ADMIN — PROPOFOL 90 MG: 10 INJECTION, EMULSION INTRAVENOUS at 15:55

## 2025-02-27 RX ADMIN — SODIUM CHLORIDE, SODIUM LACTATE, POTASSIUM CHLORIDE, AND CALCIUM CHLORIDE: .6; .31; .03; .02 INJECTION, SOLUTION INTRAVENOUS at 15:17

## 2025-02-27 RX ADMIN — MIDAZOLAM 2 MG: 1 INJECTION INTRAMUSCULAR; INTRAVENOUS at 15:49

## 2025-02-27 ASSESSMENT — ACTIVITIES OF DAILY LIVING (ADL)
ADLS_ACUITY_SCORE: 41
ADLS_ACUITY_SCORE: 41

## 2025-02-27 ASSESSMENT — LIFESTYLE VARIABLES: TOBACCO_USE: 1

## 2025-02-27 NOTE — ANESTHESIA CARE TRANSFER NOTE
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    Anesthesia Type:   MAC     Note:    Oropharynx: spontaneously breathing  Level of Consciousness: drowsy  Oxygen Supplementation: room air    Independent Airway: airway patency satisfactory and stable  Dentition: dentition unchanged  Vital Signs Stable: post-procedure vital signs reviewed and stable  Report to RN Given: handoff report given  Patient transferred to: Phase II          Vitals:  Vitals Value Taken Time   /74 1618   Temp     Pulse 86 1618   Resp 16 1618   SpO2 91% 1618       Electronically Signed By: SHIV Kirkland CRNA  February 27, 2025  4:23 PM

## 2025-02-27 NOTE — OP NOTE
CHIEF COMPLAINT:   1. neck pain secondary to cervical spondylosis.  2.  Left shoulder pain secondary to glenohumeral joint degeneration and a left shoulder labral tear  INTERVAL HISTORY: He does get several months of relief from the intra-articular facet injections.  I am willing to repeat these up to 3 or 4 times per year.  However regarding his shoulder I encouraged him to move on and consider shoulder surgery and that this should be one of his last shoulder injections.  PHYSICAL EXAM:   Musculoskeletal: Strength of upper extremities is 5/5 bilaterally.  Pain on palpation of the   neck. Pain on   Rotation, extension and flexion of neck.   Neuro: No sensory perception differences of lower extremities.  PROCEDURE number #1:    1. Left C5-C6, Right C5-C6, Left C6-C7 and Right C6-C7  Intra-articular zygopophyseal joint injection utilizing fluoroscopic guidance with contrast dye.      PROCEDURE DETAILS: After written informed consent was obtained from the patient, the patient was escorted to the procedure room.  The patient was placed in the sitting position.  A  time out  was conducted to verify the patient identity, procedure to be performed, side, site, allergies and any special requirements.  The skin over the neck was prepped and draped in normal sterile fashion. Fluoroscopy was used to identify the zygopophyseal joint with a lateral view.   The skin was anesthetized with 0.5 mL of 1% lidocaine with bicarbonate buffer.  A 25-gauge 2 inch Quincke needle was advanced under fluoroscopic guidance in the lateral approach until entry into the zygopophyseal joint was successful.  View was confirmed in AP view.  Then, <0.3 cc of Omnipaque contrast dye was injected producing a satisfactory arthrogram without evidence of intrathecal or intravascular spread.  Then, a 1 mL solution of 5 mg dexamethasone and 0.5 mL of 2% lidocaine was incrementally injected into each  joint.  After injection of the medication, as the needle  tip was withdrawn, it was flushed with local anesthetic. The patient was monitored with blood pressure and pulse oximetry machines with the assistance of an RN throughout the procedure.  The patient was alert and responsive to questions throughout the procedure.   The patient tolerated the procedure well and was observed in the post-procedural area.  The patient was dismissed without apparent complications.       PROCEDURE number #2:     Procedure details: After his neck was treated we moved over to his left shoulder.  Left shoulder posterior approach was prepped with ChloraPrep.  1% lidocaine was used for skin.  I then advanced a 22-gauge Quincke needle into his left glenohumeral joint.  Initially contrast was injected which did not show proper spread throughout the joint and after several needle tip manipulations and contrast injection, I achieved a proper arthrogram of the left glenohumeral joint.  I then injected 8 cc of 0.25% bupivacaine with 4 mg/cc of Depo-Medrol.  The needle was then removed and a sterile bandage placed over the needle insertion site.     Blood loss: Less than 2 cc     DIAGNOSIS:  1.  Cervical spondylosis secondary to a symptomatic facet syndrome causing axial neck pain with a history of long-term pain relief from previous facet injections  2.  Severe needle phobia  3.  Labral tear left shoulder inferior with severe glenohumeral joint osteoarthritis     PLAN:  Performed repeat zygopophyseal joint injections.   In the past he has received between 6 to 10 months months of pain relief from facet injections.  However, he has a severe needle phobia so he would not be a candidate for medial branch blocks or a radiofrequency ablation so if he does have positive diagnostic injections with his pain form then he would need to consider a cervical fusion if the facet injections do not provide long-term pain relief for him.  2.    I completed a repeat left shoulder injection as well.  I was willing to  repeat his shoulder injection however I did have the discussion with him about moving ahead with his shoulder surgery.  Repeat injections around the rotator cuff definitely are not indicated and will likely weaken the rotator cuff.  I think it is time for him to move on and not have any more shoulder injections and have his shoulder replaced.     Christian Chaudhry MD  Diplomate of the [employer] Board of Anesthesiology, Pain Medicine

## 2025-02-27 NOTE — DISCHARGE INSTRUCTIONS
Home Care Instructions                Procedure: facet injections and left shoulder joint injection    Activity:  Rest today  Do not work today  Resume normal activity tomorrow    Pain:  You may experience soreness at the injection site for 1 to 3 days.  You may use an ice pack for 20 minutes every 2 hours for the first 24 hours  You may use a heating pad after the first 24 hours  You may use Tylenol  (acetaminophen) every 4 hours or other pain medicines as directed by your physician    Safety  Sedation medicine, if given may remain active for many hours.    It is important for the next 24 hours that you do not:  Drive a car  Operate machines or power tools  Consume alcohol, including beer  Sign any important papers or legal documents    You may experience numbness radiating into your legs or arms, (depending on the procedure location)  This numbness may last several hours.  Until the numb sensation returns to normal please use caution in walking, climbing stairs, stepping out of your vehicle, etc.    Common side effects of steroids:  Not everyone will experience corticosteroid side effects. If side effects are experienced they will gradually subside in the 7-10 day period following an injection.    Most common side effects include:  Flushed face and/or chest  Feeling of warmth, particularly in face but could be overall feeling of warmth  Increased blood sugar in diabetic patients  Menstrual irregularities may occur.  If taking hormone based birth control an alternate method of birth control is recommended  Sleep disturbances and/or mood swings are possible  Leg cramps    Please contact us if you have:  Severe pain   Fever more than 101.5 degrees Fahrenheit  Signs of infection (redness, swelling or drainage)       PLEASE GO TO THE NEAREST EMERGENCY ROOM IF YOU HAVE:   - Increasing numbness or weakness in your arms or legs or increasingly severe pain (greater than 4 hours after your injection)    If you have  questions during normal business hours (8am-5pm Monday-Friday) contact the [facility] at [phone] to access the [facility] . If you need help after hours, we recommend that you go to a hospital emergency room or dial 911.

## 2025-02-27 NOTE — ANESTHESIA POSTPROCEDURE EVALUATION
Patient: Bulmaro Herrera    Procedure: Procedure(s):  Bilateral Cervical 5-Cervical 6 and Cervical 6-Cervical 7 Intra-articular zygopophyseal joint injections- and Inject steroid shoulder - Left  Inject steroid  shoulder       Anesthesia Type:  MAC    Note:  Disposition: Outpatient   Postop Pain Control: Uneventful            Sign Out: Well controlled pain   PONV: No   Neuro/Psych: Uneventful            Sign Out: Acceptable/Baseline neuro status   Airway/Respiratory: Uneventful            Sign Out: Acceptable/Baseline resp. status   CV/Hemodynamics: Uneventful            Sign Out: Acceptable CV status; No obvious hypovolemia; No obvious fluid overload   Other NRE: NONE   DID A NON-ROUTINE EVENT OCCUR? No           Last vitals:  Vitals Value Taken Time   /102 02/27/25 1636   Temp     Pulse 78 02/27/25 1636   Resp     SpO2         Electronically Signed By: SHIV Kirkland CRNA  February 27, 2025  4:53 PM

## 2025-02-27 NOTE — ANESTHESIA PREPROCEDURE EVALUATION
Anesthesia Pre-Procedure Evaluation    Patient: Bulmaro Herrera   MRN: [id number] : 1965        Procedure : Procedure(s):  INJECTION, SPINE, LUMBAR, EPIDURAL, Lumbar 5 to Sacaral 1          Past Medical History:   Diagnosis Date     Back pains      Depressive disorder, not elsewhere classified 2007    declines Rx     Disc degeneration 2008    see MRI -throaco-lumbar mild discogenic degenerative changes     Elevated LFT's 2009    alt and ast     Other and unspecified hyperlipidemia 2007     Sleep disorder 2009    CPAP     Tobacco use disorder       Past Surgical History:   Procedure Laterality Date     COLONOSCOPY  08    adenomatous polyp repeat 5 years     COLONOSCOPY N/A 2017    Procedure: COMBINED COLONOSCOPY, SINGLE OR MULTIPLE BIOPSY/POLYPECTOMY BY BIOPSY;  Colonoscopy, Polypectomy by Biopsy;  Surgeon: Matt Reyes MD;  Location:  GI     COLONOSCOPY N/A 2023    Procedure: Colonoscopy;  Surgeon: Constantino Bowie DO;  Location:  GI     ENT SURGERY      for deviated septum     EXCISE LESION AXILLA  2018    Procedure: Excision Skin Tags Right Axilla;  Surgeon: Ayaan Chaney MD;  [facility]     EXCISE LESION BACK N/A 2018    Procedure: Excision Back Skin Lesion X Two;  Surgeon: Ayaan Chaney MD;  [facility]     EXCISE MASS BACK N/A 4/15/2022    Procedure: Excisions skin lesions of back;  Surgeon: Ayaan Chaney MD;  [facility]     INJECT BLOCK MEDIAL BRANCH CERVICAL/THORACIC/LUMBAR Bilateral 2022    Procedure: Cervical 4, 5, 6 Medial Branch Block;  Surgeon: Christian Chaudhry MD;  [facility]     INJECT EPIDURAL CERVICAL N/A 2015    Procedure: INJECT EPIDURAL CERVICAL;  Surgeon: Christian Chaudhry MD;  [facility]     INJECT EPIDURAL CERVICAL N/A 2018    Procedure: INJECT EPIDURAL CERVICAL 6-7;  Surgeon: Christian Chaudhry MD;  [facility]     INJECT EPIDURAL CERVICAL Bilateral 2023     Procedure: Left Cervical 5-Cervical 6, Right Cervical 5-Cervical 6, Left Cervical 6-Cervical 7 and Right Cervical 6-Cervical 7 Intra-articular zygopophyseal joint injection utilizing fluoroscopic guidance with contrast dye.;  Surgeon: Christian Chaudhry MD;  [facility]     INJECT EPIDURAL CERVICAL Bilateral 2/19/2024    Procedure: Left Cervical 5 - Cervical 6, Right Cervical 5 - Cervical 6, Left Cervical 6 - Cervical 7 and Right Cervical 6 - Cervical 7 Intra-articular zygopophyseal joint injection utilizing fluoroscopic guidance with contrast dye.;  Surgeon: Christian Chaudhry MD;  [facility]     INJECT EPIDURAL CERVICAL N/A 8/8/2024    Procedure: Cervical 7 - Thoracic 1 Interlaminar epidural steroid injection using fluoroscopic guidance with contrast dye.;  Surgeon: Christian Chaudhry MD;  [facility]     INJECT EPIDURAL LUMBAR N/A 12/20/2019    Procedure: Lumbar 5- Sacral 1 Epidural  Injection bilatetral;  Surgeon: Christian Chaudhry MD;  [facility]     INJECT EPIDURAL LUMBAR Bilateral 9/8/2022    Procedure: Bilateral Lumbar 5-Sacral 1 transforaminal epidural injections utilizing fluoroscopic guidance with contrast dye  and;  Surgeon: Christian Chaudhry MD;  [facility]     INJECT EPIDURAL LUMBAR Bilateral 8/16/2024    Procedure: Bilateral Lumbar 5-Sacral 1 transforaminal epidural injections utilizing fluoroscopic guidance with contrast dye;  Surgeon: Christian Chaudhry MD;  [facility]     INJECT EPIDURAL LUMBAR Bilateral 2/21/2025    Procedure: Bilateral Lumbar 5-Sacral 1 transforaminal epidural injections utilizing fluoroscopic guidance with contrast dye;  Surgeon: Christian Chaudhry MD;  [facility]     INJECT EPIDURAL TRANSFORAMINAL Bilateral 5/20/2022    Procedure: Bilateral Lumbar 5- Sacral 1 transforaminal Epidural Steroid Injection;  Surgeon: Christian Chaudhry MD;  [facility]     INJECT EPIDURAL TRANSFORAMINAL Bilateral 7/14/2022    Procedure: Left Cervical 5-Cervical 6, Right Cervical  5-Cervical 6, Left Cervical 6-Cervical 7 and Right Cervical 6-Cervical 7 Intra-articular zygopophyseal joint injection utilizing fluoroscopic guidance with contrast dye;  Surgeon: Christian Chaudhry MD;  [facility]     INJECT EPIDURAL TRANSFORAMINAL Bilateral 6/16/2023    Procedure: Bilateral Lumbar 5-Sacral 1 transforaminal epidural injections utilizing fluoroscopic guidance with contrast dye;  Surgeon: Christian Chaudhry MD;  [facility]     INJECT EPIDURAL TRANSFORAMINAL Bilateral 9/28/2023    Procedure: Bilateral Lumbar 5-Sacral 1 transforaminal epidural injections utilizing fluoroscopic guidance with contrast dye;  Surgeon: Christian Chaudhry MD;  [facility]     INJECT EPIDURAL TRANSFORAMINAL Bilateral 3/15/2024    Procedure: Bilateral Lumbar 5 - Sacral 1 transforaminal epidural injections utilizing fluoroscopic guidance with contrast dye.;  Surgeon: Christian Chaudhry MD;  [facility]     INJECT EPIDURAL TRANSFORAMINAL Bilateral 11/27/2024    Procedure: Bilateral Lumbar 5 - Sacral 1 transforaminal epidural injections utilizing fluoroscopic guidance with contrast dye;  Surgeon: Christian Chaudhry MD;  [facility]     INJECT FACET JOINT Bilateral 1/25/2019    Procedure: Cervical Facet Injections Cervical 5-6 and 6-7 Bilateral;  Surgeon: Christian Chaudhry MD;  [facility]     INJECT FACET JOINT Bilateral 11/29/2019    Procedure: Cervical 5-6 and 6-7 Bilateral facet joint injection;  Surgeon: Christian Chaudhry MD;  [facility]     INJECT FACET JOINT Bilateral 2/11/2021    Procedure: Cervical 5-6 and Cervical 6-7 Bilateral facet injections;  Surgeon: Christian Chaudhry MD;  [facility]     INJECT FACET JOINT Bilateral 10/19/2021    Procedure: Left C5-C6, Right C5-C6, Left C6-C7 and Right C6-C7 Intra-articular zygopophyseal joint injection utilizing fluoroscopic guidance with contrast dye;  Surgeon: Christian Chaudhry MD;  [facility]     INJECT FACET JOINT Bilateral 9/14/2023    Procedure: Left  Cervical 5-Cervical 6, Right Cervical 5-Cervical 6, Left Cervical 6-Cervical 7 and Right Cervical 6-Cervical 7 Intra-articular zygopophyseal joint injection utilizing fluoroscopic guidance with contrast dye. Left Intra-articular shoulder Injection;  Surgeon: Christian Chaudhry MD;  [facility]     INJECT FACET JOINT Bilateral 11/15/2024    Procedure: Left Cervical 5-Cervical 6, Right Cervical 5-Cervical 6, Left Cervical 6-Cervical 7 and Right Cervical 6-Cervical 7 Intra-articular zygopophyseal joint injection utilizing fluoroscopic guidance with contrast dye.;  Surgeon: Christian Chaudhry MD;  [facility]     INJECT MAJOR JOINT / BURSA Left 2025    Procedure: Inject Left Intra-articular hip;  Surgeon: Christian Chaudhry MD;  [facility]     INJECT STEROID (LOCATION) Left 10/19/2021    Procedure: Glenohumeral joint injection left shoulder;  Surgeon: Christian Chaudhry MD;  [facility]     INJECT STEROID (LOCATION) Left 2022    Procedure: Left shoulder glenohumeral steroid inj with local;  Surgeon: Christian Chaudhry MD;  [facility]     INJECT STEROID (LOCATION) Left 2024    Procedure: steroid injection left shoulder;  Surgeon: Christian Chaudhry MD;  [facility]     INJECT STEROID TROCHANTERIC BURSA Left 2022    Procedure: Left intra-articular shoulder injections;  Surgeon: Christian Chaudhry MD;  [facility]     ORTHOPEDIC SURGERY      Right hand     TONSILLECTOMY       ZZC NONSPECIFIC PROCEDURE      rt hand surgery - laceration/glass/tendons     ZZ ECHO HEART XTHORACIC, STRESS/REST  2009    neg      Allergies   Allergen Reactions     Wasps [Hornets] Swelling     Percocet [Oxycodone-Acetaminophen] Itching      Social History     Tobacco Use     Smoking status: Former     Current packs/day: 0.00     Types: Cigarettes     Quit date: 2007     Years since quittin.0     Smokeless tobacco: Never   Substance Use Topics     Alcohol use: No     Comment: rare      Wt Readings from Last  "1 Encounters:   02/17/25 112.4 kg (247 lb 14.4 oz)        Anesthesia Evaluation            ROS/MED HX  ENT/Pulmonary:     (+) sleep apnea,               tobacco use,                        Neurologic:       Cardiovascular:     (+) Dyslipidemia - -   -  - -                                      METS/Exercise Tolerance:     Hematologic:  - neg hematologic  ROS     Musculoskeletal: Comment: DDD      GI/Hepatic:     (+) GERD,            liver disease,       Renal/Genitourinary:  - neg Renal ROS     Endo:     (+)               Obesity,       Psychiatric/Substance Use:     (+) psychiatric history depression       Infectious Disease:  - neg infectious disease ROS     Malignancy:  - neg malignancy ROS     Other:      (+)  , H/O Chronic Pain,         Physical Exam    Airway  airway exam normal      Mallampati: II   TM distance: > 3 FB   Neck ROM: full   Mouth opening: > 3 cm    Respiratory Devices and Support         Dental       (+) Minor Abnormalities - some fillings, tiny chips      Cardiovascular   cardiovascular exam normal       Rhythm and rate: regular and normal     Pulmonary   pulmonary exam normal        breath sounds clear to auscultation         OUTSIDE LABS:  CBC:   Lab Results   Component Value Date    WBC 5.6 01/18/2025    WBC 5.9 03/09/2024    HGB 15.8 01/18/2025    HGB 16.1 03/09/2024    HCT 45.2 01/18/2025    HCT 47.2 06/09/2024     01/18/2025     03/09/2024     BMP:   Lab Results   Component Value Date     01/18/2025     03/09/2024    POTASSIUM 4.5 01/18/2025    POTASSIUM 4.2 03/09/2024    CHLORIDE 106 01/18/2025    CHLORIDE 104 03/09/2024    CO2 24 01/18/2025    CO2 25 03/09/2024    BUN 16.5 01/18/2025    BUN 15.6 03/09/2024    CR 1.00 01/18/2025    CR 0.98 03/09/2024     (H) 01/18/2025     (H) 03/09/2024     COAGS:   Lab Results   Component Value Date    PTT 31 06/06/2008    INR 1.0 02/17/2014     POC: No results found for: \"BGM\", \"HCG\", \"HCGS\"  HEPATIC:   Lab " "Results   Component Value Date    ALBUMIN 4.4 01/18/2025    PROTTOTAL 7.0 01/18/2025    ALT 35 01/18/2025    AST 23 01/18/2025    ALKPHOS 84 01/18/2025    BILITOTAL 0.7 01/18/2025     OTHER:   Lab Results   Component Value Date    PH 8.0 (H) 05/07/2002    A1C 6.4 (H) 01/18/2025    WILIAM 9.4 01/18/2025    PHOS 3.5 02/08/2021    MAG 2.2 02/08/2021    LIPASE 18 11/11/2023    AMYLASE 76 03/08/2014    TSH 3.24 01/18/2025    T4 0.94 11/15/2019    CRP <2.9 03/25/2015    SED 3 06/09/2024       Anesthesia Plan    ASA Status:  2    NPO Status:  NPO Appropriate    Anesthesia Type: MAC.     - Reason for MAC: immobility needed, straight local not clinically adequate (needle phobia)   Induction: Intravenous, Propofol.   Maintenance: TIVA.        Consents    Anesthesia Plan(s) and associated risks, benefits, and realistic alternatives discussed. Questions answered and patient/representative(s) expressed understanding.     - Discussed: Risks, Benefits and Alternatives for BOTH SEDATION and the PROCEDURE were discussed     - Discussed with:  Patient       Use of blood products discussed: No .     Postoperative Care       PONV prophylaxis: Background Propofol Infusion     Comments:    Other Comments: The risks and benefits of anesthesia, and the alternatives where applicable, have been discussed with the patient, and they wish to proceed.              SHIV Kirkland CRNA    I have reviewed the pertinent notes and labs in the chart from the past 30 days and (re)examined the patient.  Any updates or changes from those notes are reflected in this note.               # Obesity: Estimated body mass index is 33.58 kg/m  as calculated from the following:    Height as of 2/17/25: 1.83 m (6' 0.05\").    Weight as of 2/17/25: 112.4 kg (247 lb 14.4 oz).      "

## 2025-03-07 NOTE — PROGRESS NOTES
Office Visit-Follow up    Chief Complaint: Bulmaro Herrera is a 59 year old male who is being seen for   Chief Complaint   Patient presents with    Left Hip - Follow Up     Hip injection with Dr. Chaudhry 2/21/2025 provided a few days relief, pain is constant now       History of Present Illness:   Today's visit:  Since last visit did receive an intra-articular injection by Dr. Chaudhry in 2/21/25.  He reports had a few hours of total relief then the pain started to come back. Has been getting slowly progressively worse. The pain is deep lateral and posterior. At times radiates into the thigh. No numbness/tingling. Reports will get spasms to the hip and feels like the hip is catching.  Type of pain is similar to prior to the injection just more intense. Also been followed by [facility] for his [facility] and reports sees them again on Friday.  Overall the pain is progressively worse than when seen last.  Denies fevers, feeling sick, denies any redness, drainage or other concerns from the injection.      February 5, 2025 visit:  Presents with left lateral deep hip pain.  Is been going on 5 weeks.  It is slightly better since it came on.  Has been taken Tylenol and ibuprofen.  Has had no previous surgeries or injections to his hip.  He did have some leftover prednisone that he tried that helped some.  Bothers him with putting his shoes and socks and rotation to the hip.     August 5, 2021 visit with Gallo Hernandez  Bulmaro Herrera is a 56 year old male who is seen in consultation at the request of self for evaluation of left shoulder pain and a mass to the left upper arm.  He reports has had shoulder pain off and on for atleast 2-3 years.  Progressively getting worse. Last 2 weeks has been severely flared up. No trauma that can he recall though is very active and is a .  The pain is lateral shoulder that at times radiates to the elbow, worse with abduction, forward flexion >90 degrees. Any attempted overhead lifting and  reaching behind the back.  Feels he has been progressively loosing strength and motion to the shoulder.  No radicular symptoms including No numbness/tingling,      #2: states has a mass to his left lateral upper arm for about 3 years. Does not think it is growing, or if it has has been slowly progressing. No trauma. Can be painful at times. No wounds, no erythema.             Social History     Occupational History    Occupation:      Employer: SELF   Tobacco Use    Smoking status: Former     Current packs/day: 0.00     Types: Cigarettes     Quit date: 2007     Years since quittin.0    Smokeless tobacco: Never   Vaping Use    Vaping status: Never Used   Substance and Sexual Activity    Alcohol use: No     Comment: rare    Drug use: No    Sexual activity: Yes     Partners: Female       REVIEW OF SYSTEMS  General: negative for, night sweats, dizziness, fatigue  Resp: No shortness of breath and no cough  CV: negative for chest pain, syncope or near-syncope  GI: negative for nausea, vomiting and diarrhea  : negative for dysuria and hematuria  Musculoskeletal: as above  Neurologic: negative for syncope   Hematologic: negative for bleeding disorder    Physical Exam:  Constitutional: healthy, alert and no acute distress   Psychiatric: mentation appears normal and affect normal/bright  NEURO: no focal deficits  RESP: Normal with easy respirations and no use of accessory muscles to breathe, no audible wheezing or retractions  CV: No peripheral edema  SKIN: No erythema, rashes, excoriation, or breakdown. No evidence of infection. Injection site not visible.   JOINT/EXTREMITIES:left hip: no swelling no bruising. Compartments soft and compressible. Non-tender to palpation including the greater trochanteric bursa.  Patient declined motion testing and IR/ER testing due to concerns will increase pain.    GAIT: antalgic        Diagnostic Modalities:  None today.  Independent visualization of the images  was performed.      Impression: left hip primary osteoarthritis    Plan:  All of the above pertinent physical exam and imaging modalities findings was reviewed with Bulmaro and his spouse.  Received 1-2 hours of total relief including a diagnostic response. Unfortunately did not get any lasting relief and the pain has been getting progressively worse. Same type of pain but more intense.  No trauma. No systemic symptoms. No evidence of infection. Discussed options.  Recommended physical therapy. We also discussed muscle relaxer to help with spasms, consistent voltaren use, and also provided a very small rx of tramadol to use sparingly. Risks discussed.  Also recommended to continue with spine though seems the majority of the symptoms are hip related.  Discussed red flags symptoms.  Recommended to return in about 4-6 weeks for re-evaluation.  Last injection was 2/21/25.          Return to clinic 4-6 weeks, or sooner as needed for changes.  Re-x-ray on return: No    Dr. Wise was present in the office.  He personally discussed the care plan and reviewed all appropriate imaging.    SHIV Pena, CNP  Orthopedic Surgery

## 2025-03-12 ENCOUNTER — OFFICE VISIT (OUTPATIENT)
Dept: ORTHOPEDICS | Facility: CLINIC | Age: 60
End: 2025-03-12
Payer: COMMERCIAL

## 2025-03-12 DIAGNOSIS — M16.12 PRIMARY OSTEOARTHRITIS OF LEFT HIP: Primary | ICD-10-CM

## 2025-03-12 PROCEDURE — 99213 OFFICE O/P EST LOW 20 MIN: CPT | Performed by: ORTHOPAEDIC SURGERY

## 2025-03-12 RX ORDER — DICLOFENAC SODIUM 75 MG/1
75 TABLET, DELAYED RELEASE ORAL 2 TIMES DAILY
Qty: 28 TABLET | Refills: 0 | Status: SHIPPED | OUTPATIENT
Start: 2025-03-12 | End: 2025-03-26

## 2025-03-12 RX ORDER — TRAMADOL HYDROCHLORIDE 50 MG/1
50 TABLET ORAL 2 TIMES DAILY PRN
Qty: 12 TABLET | Refills: 0 | Status: SHIPPED | OUTPATIENT
Start: 2025-03-12 | End: 2025-03-18

## 2025-03-12 RX ORDER — CYCLOBENZAPRINE HCL 10 MG
10 TABLET ORAL 3 TIMES DAILY PRN
Qty: 45 TABLET | Refills: 1 | Status: SHIPPED | OUTPATIENT
Start: 2025-03-12

## 2025-03-12 NOTE — LETTER
3/12/2025      [facility]  [address] [facility] [id number]      Dear Colleague,    Thank you for referring your patient, Bulmaro Herrera, to the [facility]. Please see a copy of my visit note below.    Office Visit-Follow up    Chief Complaint: Bulmaro Herrera is a 59 year old male who is being seen for   Chief Complaint   Patient presents with     Left Hip - Follow Up     Hip injection with Dr. Chaudhry 2/21/2025 provided a few days relief, pain is constant now       History of Present Illness:   Today's visit:  Since last visit did receive an intra-articular injection by Dr. Chaudhry in 2/21/25.  He reports had a few hours of total relief then the pain started to come back. Has been getting slowly progressively worse. The pain is deep lateral and posterior. At times radiates into the thigh. No numbness/tingling. Reports will get spasms to the hip and feels like the hip is catching.  Type of pain is similar to prior to the injection just more intense. Also been followed by [facility] for his spine and reports sees them again on Friday.  Overall the pain is progressively worse than when seen last.  Denies fevers, feeling sick, denies any redness, drainage or other concerns from the injection.      February 5, 2025 visit:  Presents with left lateral deep hip pain.  Is been going on 5 weeks.  It is slightly better since it came on.  Has been taken Tylenol and ibuprofen.  Has had no previous surgeries or injections to his hip.  He did have some leftover prednisone that he tried that helped some.  Bothers him with putting his shoes and socks and rotation to the hip.     August 5, 2021 visit with Gallo Hernandez  Bulmaro Herrera is a 56 year old male who is seen in consultation at the request of self for evaluation of left shoulder pain and a mass to the left upper arm.  He reports has had shoulder pain off and on for atleast 2-3 years.  Progressively getting worse. Last 2 weeks has been severely flared  up. No trauma that can he recall though is very active and is a .  The pain is lateral shoulder that at times radiates to the elbow, worse with abduction, forward flexion >90 degrees. Any attempted overhead lifting and reaching behind the back.  Feels he has been progressively loosing strength and motion to the shoulder.  No radicular symptoms including No numbness/tingling,      #2: states has a mass to his left lateral upper arm for about 3 years. Does not think it is growing, or if it has has been slowly progressing. No trauma. Can be painful at times. No wounds, no erythema.             Social History     Occupational History     Occupation:      Employer: SELF   Tobacco Use     Smoking status: Former     Current packs/day: 0.00     Types: Cigarettes     Quit date: 2007     Years since quittin.0     Smokeless tobacco: Never   Vaping Use     Vaping status: Never Used   Substance and Sexual Activity     Alcohol use: No     Comment: rare     Drug use: No     Sexual activity: Yes     Partners: Female       REVIEW OF SYSTEMS  General: negative for, night sweats, dizziness, fatigue  Resp: No shortness of breath and no cough  CV: negative for chest pain, syncope or near-syncope  GI: negative for nausea, vomiting and diarrhea  : negative for dysuria and hematuria  Musculoskeletal: as above  Neurologic: negative for syncope   Hematologic: negative for bleeding disorder    Physical Exam:  Constitutional: healthy, alert and no acute distress   Psychiatric: mentation appears normal and affect normal/bright  NEURO: no focal deficits  RESP: Normal with easy respirations and no use of accessory muscles to breathe, no audible wheezing or retractions  CV: No peripheral edema  SKIN: No erythema, rashes, excoriation, or breakdown. No evidence of infection. Injection site not visible.   JOINT/EXTREMITIES:left hip: no swelling no bruising. Compartments soft and compressible. Non-tender to  palpation including the greater trochanteric bursa.  Patient declined motion testing and IR/ER testing due to concerns will increase pain.    GAIT: antalgic        Diagnostic Modalities:  None today.  Independent visualization of the images was performed.      Impression: left hip primary osteoarthritis    Plan:  All of the above pertinent physical exam and imaging modalities findings was reviewed with Bulmaro and his spouse.  Received 1-2 hours of total relief including a diagnostic response. Unfortunately did not get any lasting relief and the pain has been getting progressively worse. Same type of pain but more intense.  No trauma. No systemic symptoms. No evidence of infection. Discussed options.  Recommended physical therapy. We also discussed muscle relaxer to help with spasms, consistent voltaren use, and also provided a very small rx of tramadol to use sparingly. Risks discussed.  Also recommended to continue with spine though seems the majority of the symptoms are hip related.  Discussed red flags symptoms.  Recommended to return in about 4-6 weeks for re-evaluation.  Last injection was 2/21/25.          Return to clinic 4-6 weeks, or sooner as needed for changes.  Re-x-ray on return: No    Dr. Wise was present in the office.  He personally discussed the care plan and reviewed all appropriate imaging.    SHIV Pena, CNP  Orthopedic Surgery      Again, thank you for allowing me to participate in the care of your patient.        Sincerely,        Jeramy Wise, DO    Electronically signed

## 2025-03-18 ENCOUNTER — THERAPY VISIT (OUTPATIENT)
Dept: PHYSICAL THERAPY | Facility: CLINIC | Age: 60
End: 2025-03-18
Attending: NURSE PRACTITIONER
Payer: COMMERCIAL

## 2025-03-18 DIAGNOSIS — M16.12 PRIMARY OSTEOARTHRITIS OF LEFT HIP: Primary | ICD-10-CM

## 2025-03-18 PROCEDURE — 97110 THERAPEUTIC EXERCISES: CPT | Mod: GP | Performed by: PHYSICAL THERAPIST

## 2025-03-18 PROCEDURE — 97161 PT EVAL LOW COMPLEX 20 MIN: CPT | Mod: GP | Performed by: PHYSICAL THERAPIST

## 2025-03-18 ASSESSMENT — ACTIVITIES OF DAILY LIVING (ADL)
DEEP_SQUATTING: UNABLE TO DO
HOS_ADL_HIGHEST_POTENTIAL_SCORE: 64
GOING DOWN 1 FLIGHT OF STAIRS: MODERATE DIFFICULTY
ROLLING_OVER_IN_BED: NO DIFFICULTY AT ALL
PUTTING ON SOCKS AND SHOES: EXTREME DIFFICULTY
WALKING_UP_STEEP_HILLS: SLIGHT DIFFICULTY
SITTING FOR 15 MINUTES: NO DIFFICULTY AT ALL
WALKING_INITIALLY: SLIGHT DIFFICULTY
STANDING FOR 15 MINUTES: UNABLE TO DO
PLEASE_INDICATE_YOR_PRIMARY_REASON_FOR_REFERRAL_TO_THERAPY:: HIP
WALKING_FOR_APPROXIMATELY_10_MINUTES: EXTREME DIFFICULTY
ADL_SCORE(%): 0
SPORTS_HIGHEST_POTENTIAL_SCORE: 36
HEAVY_WORK: EXTREME DIFFICULTY
LANDING: UNABLE TO DO
GOING UP 1 FLIGHT OF STAIRS: MODERATE DIFFICULTY
STARTING_AND_STOPPING_QUICKLY: UNABLE TO DO
SPORTS_COUNT: 9
ADL_TOTAL_ITEM_SCORE: 0
WALKING_UP_STEEP_HILLS: SLIGHT DIFFICULTY
LIGHT_TO_MODERATE_WORK: MODERATE DIFFICULTY
ABILITY_TO_PARTICIPATE_IN_YOUR_DESIRED_SPORT_AS_LONG_AS_YOU_WOULD_LIKE: UNABLE TO DO
LOW_IMPACT_ACTIVITIES_LIKE_FAST_WALKING: UNABLE TO DO
GOING_DOWN_1_FLIGHT_OF_STAIRS: MODERATE DIFFICULTY
ADL_COUNT: 17
STANDING_FOR_15_MINUTES: UNABLE TO DO
ROLLING OVER IN BED: NO DIFFICULTY AT ALL
PUTTING_ON_SOCKS_AND_SHOES: EXTREME DIFFICULTY
HOS_ADL_SCORE(%): 43.75
SITTING_FOR_15_MINUTES: NO DIFFICULTY AT ALL
JUMPING: UNABLE TO DO
WALKING_15_MINUTES_OR_GREATER: UNABLE TO DO
WALKING_DOWN_STEEP_HILLS: MODERATE DIFFICULTY
DEEP SQUATTING: UNABLE TO DO
WALKING_INITIALLY: SLIGHT DIFFICULTY
RUNNING_ONE_MILE: UNABLE TO DO
ABILITY_TO_PERFORM_ACTIVITY_WITH_YOUR_NORMAL_TECHNIQUE: UNABLE TO DO
ADL_HIGHEST_POTENTIAL_SCORE: 68
HOS_ADL_ITEM_SCORE_TOTAL: 28
TWISTING/PIVOTING_ON_INVOLVED_LEG: UNABLE TO DO
HOW_WOULD_YOU_RATE_YOUR_CURRENT_LEVEL_OF_FUNCTION?: ABNORMAL
RECREATIONAL ACTIVITIES: SLIGHT DIFFICULTY
SPORTS_SCORE(%): 0
GETTING INTO AND OUT OF AN AVERAGE CAR: MODERATE DIFFICULTY
SWINGING_OBJECTS_LIKE_A_GOLF_CLUB: UNABLE TO DO
STEPPING_UP_AND_DOWN_CURBS: SLIGHT DIFFICULTY
GETTING_INTO_AND_OUT_OF_AN_AVERAGE_CAR: MODERATE DIFFICULTY
GOING_UP_1_FLIGHT_OF_STAIRS: MODERATE DIFFICULTY
LIGHT_TO_MODERATE_WORK: MODERATE DIFFICULTY
RECREATIONAL_ACTIVITIES: SLIGHT DIFFICULTY
WALKING_APPROXIMATELY_10_MINUTES: EXTREME DIFFICULTY
HEAVY_WORK: EXTREME DIFFICULTY
CUTTING/LATERAL_MOVEMENTS: UNABLE TO DO
WALKING_DOWN_STEEP_HILLS: MODERATE DIFFICULTY
SPORTS_TOTAL_ITEM_SCORE: 0
TWISTING/PIVOTING ON INVOLVED LEG: UNABLE TO DO
STEPPING UP AND DOWN CURBS: SLIGHT DIFFICULTY
WALKING_15_MINUTES_OR_GREATER: UNABLE TO DO

## 2025-03-18 NOTE — PROGRESS NOTES
PHYSICAL THERAPY EVALUATION  Type of Visit: Evaluation             Subjective         Presenting condition or subjective complaint: hip Pt is a 60 yo male who presents to PT with L hip pain. Pt received a steroid injection on 2/21/25, reports little to no relief with continued pain at this time. Pt states that his pain limits his ability to move around his home, his shop, perform work duties as a , and it will wake him up several times throughout the course of the evening needing prescription medication to help him get better sleep. Pt also reports frequent injectiosn to neck, shoulder, and low back.  Date of onset: 03/12/25    Relevant medical history: Arthritis; Dizziness   Past Medical History:   Diagnosis Date    Back pains     Depressive disorder, not elsewhere classified 01/28/2007    declines Rx    Disc degeneration 12/01/2008    see MRI -throaco-lumbar mild discogenic degenerative changes    Elevated LFT's 11/01/2009    alt and ast    Other and unspecified hyperlipidemia 01/01/2007    Sleep disorder 09/01/2009    CPAP    Tobacco use disorder        Dates & types of surgery: wrist   Past Surgical History:   Procedure Laterality Date    COLONOSCOPY  07/07/08    adenomatous polyp repeat 5 years    COLONOSCOPY N/A 12/29/2017    Procedure: COMBINED COLONOSCOPY, SINGLE OR MULTIPLE BIOPSY/POLYPECTOMY BY BIOPSY;  Colonoscopy, Polypectomy by Biopsy;  Surgeon: Matt Reyes MD;  Location:  GI    COLONOSCOPY N/A 5/22/2023    Procedure: Colonoscopy;  Surgeon: Constantino Bowie DO;  Location:  GI    ENT SURGERY      for deviated septum    EXCISE LESION AXILLA  12/7/2018    Procedure: Excision Skin Tags Right Axilla;  Surgeon: Ayaan Chaney MD;  [facility]    EXCISE LESION BACK N/A 12/7/2018    Procedure: Excision Back Skin Lesion X Two;  Surgeon: Ayaan Chaney MD;  [facility]    EXCISE MASS BACK N/A 4/15/2022    Procedure: Excisions skin lesions of back;  Surgeon: Shiv  MD Ayaan;  [facility]    INJECT BLOCK MEDIAL BRANCH CERVICAL/THORACIC/LUMBAR Bilateral 5/26/2022    Procedure: Cervical 4, 5, 6 Medial Branch Block;  Surgeon: Christian Chaudhry MD;  [facility]    INJECT EPIDURAL CERVICAL N/A 5/14/2015    Procedure: INJECT EPIDURAL CERVICAL;  Surgeon: Christian Chaudhry MD;  [facility]    INJECT EPIDURAL CERVICAL N/A 12/28/2018    Procedure: INJECT EPIDURAL CERVICAL 6-7;  Surgeon: Christian Chaudhry MD;  [facility]    INJECT EPIDURAL CERVICAL Bilateral 6/8/2023    Procedure: Left Cervical 5-Cervical 6, Right Cervical 5-Cervical 6, Left Cervical 6-Cervical 7 and Right Cervical 6-Cervical 7 Intra-articular zygopophyseal joint injection utilizing fluoroscopic guidance with contrast dye.;  Surgeon: Christian Chaudhry MD;  [facility]    INJECT EPIDURAL CERVICAL Bilateral 2/19/2024    Procedure: Left Cervical 5 - Cervical 6, Right Cervical 5 - Cervical 6, Left Cervical 6 - Cervical 7 and Right Cervical 6 - Cervical 7 Intra-articular zygopophyseal joint injection utilizing fluoroscopic guidance with contrast dye.;  Surgeon: Christian Chaudhry MD;  [facility]    INJECT EPIDURAL CERVICAL N/A 8/8/2024    Procedure: Cervical 7 - Thoracic 1 Interlaminar epidural steroid injection using fluoroscopic guidance with contrast dye.;  Surgeon: Christian Chaudhry MD;  [facility]    INJECT EPIDURAL CERVICAL Bilateral 2/27/2025    Procedure: Left Cervical 5-Cervical 6, Right Cervical 5-Cervical 6, Left Cervical 6-Cervical 7and Right Cervical 6-Cervical 7 Intra-articular zygapophyseal joint injection utilizing fluoroscopic guidance with contrast dye;  Surgeon: Christian Chaudhry MD;  [facility]    INJECT EPIDURAL LUMBAR N/A 12/20/2019    Procedure: Lumbar 5- Sacral 1 Epidural  Injection bilatetral;  Surgeon: Christian Chaudhry MD;  [facility]    INJECT EPIDURAL LUMBAR Bilateral 9/8/2022    Procedure: Bilateral Lumbar 5-Sacral 1 transforaminal epidural injections utilizing  fluoroscopic guidance with contrast dye  and;  Surgeon: Christian Chaudhry MD;  [facility]    INJECT EPIDURAL LUMBAR Bilateral 8/16/2024    Procedure: Bilateral Lumbar 5-Sacral 1 transforaminal epidural injections utilizing fluoroscopic guidance with contrast dye;  Surgeon: Christian Chaudhry MD;  [facility]    INJECT EPIDURAL LUMBAR Bilateral 2/21/2025    Procedure: Bilateral Lumbar 5-Sacral 1 transforaminal epidural injections utilizing fluoroscopic guidance with contrast dye;  Surgeon: Christian Chaudhry MD;  [facility]    INJECT EPIDURAL TRANSFORAMINAL Bilateral 5/20/2022    Procedure: Bilateral Lumbar 5- Sacral 1 transforaminal Epidural Steroid Injection;  Surgeon: Christian Chaudhry MD;  [facility]    INJECT EPIDURAL TRANSFORAMINAL Bilateral 7/14/2022    Procedure: Left Cervical 5-Cervical 6, Right Cervical 5-Cervical 6, Left Cervical 6-Cervical 7 and Right Cervical 6-Cervical 7 Intra-articular zygopophyseal joint injection utilizing fluoroscopic guidance with contrast dye;  Surgeon: Christian Chaudhry MD;  [facility]    INJECT EPIDURAL TRANSFORAMINAL Bilateral 6/16/2023    Procedure: Bilateral Lumbar 5-Sacral 1 transforaminal epidural injections utilizing fluoroscopic guidance with contrast dye;  Surgeon: Christian Chaudhry MD;  [facility]    INJECT EPIDURAL TRANSFORAMINAL Bilateral 9/28/2023    Procedure: Bilateral Lumbar 5-Sacral 1 transforaminal epidural injections utilizing fluoroscopic guidance with contrast dye;  Surgeon: Christian Chaudhry MD;  [facility]    INJECT EPIDURAL TRANSFORAMINAL Bilateral 3/15/2024    Procedure: Bilateral Lumbar 5 - Sacral 1 transforaminal epidural injections utilizing fluoroscopic guidance with contrast dye.;  Surgeon: Christian Chaudhry MD;  [facility]    INJECT EPIDURAL TRANSFORAMINAL Bilateral 11/27/2024    Procedure: Bilateral Lumbar 5 - Sacral 1 transforaminal epidural injections utilizing fluoroscopic guidance with contrast dye;  Surgeon: Christian Chaudhry  MD GISSEL;  [facility]    INJECT FACET JOINT Bilateral 1/25/2019    Procedure: Cervical Facet Injections Cervical 5-6 and 6-7 Bilateral;  Surgeon: Christian Chaudhry MD;  [facility]    INJECT FACET JOINT Bilateral 11/29/2019    Procedure: Cervical 5-6 and 6-7 Bilateral facet joint injection;  Surgeon: Christian Chaudhry MD;  [facility]    INJECT FACET JOINT Bilateral 2/11/2021    Procedure: Cervical 5-6 and Cervical 6-7 Bilateral facet injections;  Surgeon: Christian Chaudhry MD;  [facility]    INJECT FACET JOINT Bilateral 10/19/2021    Procedure: Left C5-C6, Right C5-C6, Left C6-C7 and Right C6-C7 Intra-articular zygopophyseal joint injection utilizing fluoroscopic guidance with contrast dye;  Surgeon: Christian Chaudhry MD;  [facility]    INJECT FACET JOINT Bilateral 9/14/2023    Procedure: Left Cervical 5-Cervical 6, Right Cervical 5-Cervical 6, Left Cervical 6-Cervical 7 and Right Cervical 6-Cervical 7 Intra-articular zygopophyseal joint injection utilizing fluoroscopic guidance with contrast dye. Left Intra-articular shoulder Injection;  Surgeon: Christian Chaudhry MD;  [facility]    INJECT FACET JOINT Bilateral 11/15/2024    Procedure: Left Cervical 5-Cervical 6, Right Cervical 5-Cervical 6, Left Cervical 6-Cervical 7 and Right Cervical 6-Cervical 7 Intra-articular zygopophyseal joint injection utilizing fluoroscopic guidance with contrast dye.;  Surgeon: Christian Chaudhry MD;  [facility]    INJECT MAJOR JOINT / BURSA Left 2/21/2025    Procedure: Inject Left Intra-articular hip;  Surgeon: Christian Chaudhry MD;  [facility]    INJECT STEROID (LOCATION) Left 10/19/2021    Procedure: Glenohumeral joint injection left shoulder;  Surgeon: Christian Chaudhry MD;  [facility]    INJECT STEROID (LOCATION) Left 5/20/2022    Procedure: Left shoulder glenohumeral steroid inj with local;  Surgeon: Christian Chaudhry MD;  [facility]    INJECT STEROID (LOCATION) Left 8/8/2024    Procedure: steroid  injection left shoulder;  Surgeon: Christian Chaudhry MD;  [facility]    INJECT STEROID (LOCATION) Left 2/27/2025    Procedure: Inject steroid  left shoulder;  Surgeon: Christian Chaudhry MD;  [facility]    INJECT STEROID TROCHANTERIC BURSA Left 9/8/2022    Procedure: Left intra-articular shoulder injections;  Surgeon: Christian Chaudhry MD;  [facility]    ORTHOPEDIC SURGERY      Right hand    TONSILLECTOMY      ZZC NONSPECIFIC PROCEDURE      rt hand surgery - laceration/glass/tendons    ZZHC ECHO HEART XTHORACIC, STRESS/REST  6/2009    neg         Prior diagnostic imaging/testing results: X-ray     Prior therapy history for the same diagnosis, illness or injury: No      Prior Level of Function  Transfers:   Ambulation:   ADL:   IADL:     Living Environment  Social support: With family members   Type of home: 2-story; Basement   Stairs to enter the home: Yes 4 Is there a railing: Yes     Ramp: No   Stairs inside the home: Yes   Is there a railing: Yes     Help at home: Medication and/or finances  Equipment owned:       Employment: Yes   Hobbies/Interests: working around the house    Patient goals for therapy: work walk not be in pain    Pain assessment: Pain present     Objective   HIP EVALUATION  PAIN: Pain Level at Rest: 8/10  Pain Level with Use: 10/10  INTEGUMENTARY (edema, incisions):   POSTURE:   GAIT:   Weightbearing Status: WBAT  Assistive Device(s): None  Gait Deviations: Antalgic  BALANCE/PROPRIOCEPTION:   WEIGHTBEARING ALIGNMENT:   NON-WEIGHTBEARING ALIGNMENT:    ROM:     PELVIC/SI SCREEN:   STRENGTH:   LE FLEXIBILITY:   SPECIAL TESTS:   FUNCTIONAL TESTS:   PALPATION:   JOINT MOBILITY:     Assessment & Plan   CLINICAL IMPRESSIONS  Medical Diagnosis: Primary osteoarthritis of left hip (M16.12)    Treatment Diagnosis: L hip pain, impaired mobility, weakness, impaired balance   Impression/Assessment:  Pt presents to PT with severe L hip pain, impaired mobility, and L LE weakness with  impaired mm contraction limiting his participation in home and work duties. Pt would benefit from skilled PT interventions in order to address his pain, create an appropriate HEP to address his strength and mobility deficits, and provide appropriate education in order to progress pt to more functional state, possibly in preparation for future ALEXI.    Clinical Decision Making (Complexity):  Clinical Presentation: Stable/Uncomplicated  Clinical Presentation Rationale: based on medical and personal factors listed in PT evaluation  Clinical Decision Making (Complexity): Low complexity    PLAN OF CARE  Treatment Interventions:  Interventions: Gait Training, Manual Therapy, Neuromuscular Re-education, Therapeutic Activity, Therapeutic Exercise    Long Term Goals     PT Goal 1  Goal Identifier: HEP  Goal Description: Pt will demo independence in performance and progression of strengthening and balance HEP in order to improve CLOF.  Target Date:  (Ongoing, updates as needed)  PT Goal 2  Goal Identifier: HOS  Goal Description: Pt will demo improved hip function and pain as shown by increased HOS score of at least 15 points in order to show significant improvement and greater return to previous function.  Goal Progress: 28/64 (eval)  Target Date: 04/15/25  PT Goal 3  Goal Identifier: Pain  Goal Description: Pt will demo improved pain ratings with functional mobility no greater than 6/10 for improved walking and carrying for home and work duties.  Goal Progress: Pt reporting pain at rest 8/10 (eval)  Target Date: 05/13/25      Frequency of Treatment: 1x/week  Duration of Treatment: 8 weeks    Recommended Referrals to Other Professionals:   Education Assessment:   Learner/Method: Patient;Listening;Demonstration    Risks and benefits of evaluation/treatment have been explained.   Patient/Family/caregiver agrees with Plan of Care.     Evaluation Time:     PT Eval, Low Complexity Minutes (61536): 10       Signing Clinician: Glynn  Christoph, PT

## 2025-03-26 ENCOUNTER — PATIENT OUTREACH (OUTPATIENT)
Dept: GASTROENTEROLOGY | Facility: CLINIC | Age: 60
End: 2025-03-26
Payer: COMMERCIAL

## 2025-03-26 PROBLEM — D12.6 ADENOMATOUS COLON POLYP: Status: ACTIVE | Noted: 2025-03-26

## 2025-04-06 ENCOUNTER — MYC REFILL (OUTPATIENT)
Dept: ORTHOPEDICS | Facility: CLINIC | Age: 60
End: 2025-04-06
Payer: COMMERCIAL

## 2025-04-06 DIAGNOSIS — M16.12 PRIMARY OSTEOARTHRITIS OF LEFT HIP: ICD-10-CM

## 2025-04-07 NOTE — TELEPHONE ENCOUNTER
REFILL REQUEST     Last Written Prescription Date:  3/12/25  Last Fill Quantity: 28,  # refills: 0   Last office visit with prescribing provider: 3/12/2025    Future Office Visit:  4/16/25    Routing refill request to provider for review/approval because:  Blood pressure out of range per protocol    Bernice Calvin RN   [facility]

## 2025-04-08 RX ORDER — DICLOFENAC SODIUM 75 MG/1
75 TABLET, DELAYED RELEASE ORAL 2 TIMES DAILY
Qty: 28 TABLET | Refills: 0 | Status: SHIPPED | OUTPATIENT
Start: 2025-04-08

## 2025-04-11 NOTE — PROGRESS NOTES
Office Visit-Follow up    Chief Complaint: Bulmaro Herrera is a 59 year old male who is being seen for   Chief Complaint   Patient presents with    Left Hip - Follow Up       History of Present Illness:   Today's visit:  Returns for his deep left hip pain.  Continues to being quite bothersome.  He has been taken diclofenac a muscle relaxant and a half a tramadol at night.  Describes it as achy.  He avoids any twisting pivoting motions.  Putting on his shoes and socks is very difficult.  Getting in and out of the truck is very difficult.  He is also been doing formal physical therapy with no improvement.    March 12, 2025 visit:  Since last visit did receive an intra-articular injection by Dr. Chaudhry in 2/21/25.  He reports had a few hours of total relief then the pain started to come back. Has been getting slowly progressively worse. The pain is deep lateral and posterior. At times radiates into the thigh. No numbness/tingling. Reports will get spasms to the hip and feels like the hip is catching.  Type of pain is similar to prior to the injection just more intense. Also been followed by [facility] for his spine and reports sees them again on Friday.  Overall the pain is progressively worse than when seen last.  Denies fevers, feeling sick, denies any redness, drainage or other concerns from the injection.       February 5, 2025 visit:  Presents with left lateral deep hip pain.  Is been going on 5 weeks.  It is slightly better since it came on.  Has been taken Tylenol and ibuprofen.  Has had no previous surgeries or injections to his hip.  He did have some leftover prednisone that he tried that helped some.  Bothers him with putting his shoes and socks and rotation to the hip.     August 5, 2021 visit with Gallo Hernandez  Bulmaro Herrera is a 56 year old male who is seen in consultation at the request of self for evaluation of left shoulder pain and a mass to the left upper arm.  He reports has had shoulder pain off and on  for atleast 2-3 years.  Progressively getting worse. Last 2 weeks has been severely flared up. No trauma that can he recall though is very active and is a .  The pain is lateral shoulder that at times radiates to the elbow, worse with abduction, forward flexion >90 degrees. Any attempted overhead lifting and reaching behind the back.  Feels he has been progressively loosing strength and motion to the shoulder.  No radicular symptoms including No numbness/tingling,      #2: states has a mass to his left lateral upper arm for about 3 years. Does not think it is growing, or if it has has been slowly progressing. No trauma. Can be painful at times. No wounds, no erythema.          Social History     Occupational History    Occupation:      Employer: SELF   Tobacco Use    Smoking status: Former     Current packs/day: 0.00     Types: Cigarettes     Quit date: 2007     Years since quittin.1    Smokeless tobacco: Never   Vaping Use    Vaping status: Never Used   Substance and Sexual Activity    Alcohol use: No     Comment: rare    Drug use: No    Sexual activity: Yes     Partners: Female       REVIEW OF SYSTEMS  General: negative for, night sweats, dizziness, fatigue  Resp: No shortness of breath and no cough  CV: negative for chest pain, syncope or near-syncope  GI: negative for nausea, vomiting and diarrhea  : negative for dysuria and hematuria  Musculoskeletal: as above  Neurologic: negative for syncope   Hematologic: negative for bleeding disorder    Physical Exam:  Vitals: Wt 114.3 kg (252 lb)   BMI 34.13 kg/m    BMI= Body mass index is 34.13 kg/m .  Constitutional: healthy, alert and no acute distress   Psychiatric: mentation appears normal and affect normal/bright  NEURO: no focal deficits  RESP: Normal with easy respirations and no use of accessory muscles to breathe, no audible wheezing or retractions  CV: LLE:  no edema         Regular rate and rhythm by palpation  SKIN: No  erythema, rashes, excoriation, or breakdown. No evidence of infection.   JOINT/EXTREMITIES:left hip: No gross deformity.  No point areas of tenderness.  Any attempted internal rotation reproduces pain.  Active hip flexion limited to approximately 100 degrees.  Negative straight leg.  GAIT: antalgic            Diagnostic Modalities:  Previous imaging reviewed.  Near bone-on-bone severe left hip joint space narrowing  Independent visualization of the images was performed.      Impression: left hip primary osteoarthritis     Plan:  All of the above pertinent physical exam and imaging modalities findings was reviewed with Bulmaro.    The patient has attempted conservative treatments that include NSAIDs, Tramadol, Tylenol, focused self directed physical therapy, formal physical therapy, steroid injections, activity modifications, rest yet still continues to have significant issues. These issues include pain at rest, increased pain with activity, pain that wakes at night, gait disturbances, interference with normal activities of daily living such as putting on shoes and socks or getting out of a chair, loss of motion of the joint. Secondary to these reasons I have offered surgery in the form of left total hip replacement.    Will lengthy discussion regarding the risk benefits.  We discussed typical timeframe for recovery.  Once all of this was carefully reviewed with him he is opted to go home and think about it and discuss it with his wife.    Scheduled after May 21 due to corticosteroid injection into the hip.    Return to clinic PRN, or sooner as needed for changes.  Re-x-ray on return: Sarahi Wise D.O.

## 2025-04-16 ENCOUNTER — OFFICE VISIT (OUTPATIENT)
Dept: ORTHOPEDICS | Facility: CLINIC | Age: 60
End: 2025-04-16
Payer: COMMERCIAL

## 2025-04-16 VITALS — BODY MASS INDEX: 34.13 KG/M2 | WEIGHT: 252 LBS

## 2025-04-16 DIAGNOSIS — M16.12 PRIMARY OSTEOARTHRITIS OF LEFT HIP: Primary | ICD-10-CM

## 2025-04-16 PROCEDURE — 99214 OFFICE O/P EST MOD 30 MIN: CPT | Performed by: ORTHOPAEDIC SURGERY

## 2025-04-16 NOTE — LETTER
4/16/2025      Bulmaro Herrera  [address]  [facility] [id number]      Dear Colleague,    Thank you for referring your patient, Bulmaro Herrera, to the [facility]. Please see a copy of my visit note below.    Office Visit-Follow up    Chief Complaint: Bulmaro Herrera is a 59 year old male who is being seen for   Chief Complaint   Patient presents with     Left Hip - Follow Up       History of Present Illness:   Today's visit:  Returns for his deep left hip pain.  Continues to being quite bothersome.  He has been taken diclofenac a muscle relaxant and a half a tramadol at night.  Describes it as achy.  He avoids any twisting pivoting motions.  Putting on his shoes and socks is very difficult.  Getting in and out of the truck is very difficult.  He is also been doing formal physical therapy with no improvement.    March 12, 2025 visit:  Since last visit did receive an intra-articular injection by Dr. Chaudhry in 2/21/25.  He reports had a few hours of total relief then the pain started to come back. Has been getting slowly progressively worse. The pain is deep lateral and posterior. At times radiates into the thigh. No numbness/tingling. Reports will get spasms to the hip and feels like the hip is catching.  Type of pain is similar to prior to the injection just more intense. Also been followed by [facility] for his spine and reports sees them again on Friday.  Overall the pain is progressively worse than when seen last.  Denies fevers, feeling sick, denies any redness, drainage or other concerns from the injection.       February 5, 2025 visit:  Presents with left lateral deep hip pain.  Is been going on 5 weeks.  It is slightly better since it came on.  Has been taken Tylenol and ibuprofen.  Has had no previous surgeries or injections to his hip.  He did have some leftover prednisone that he tried that helped some.  Bothers him with putting his shoes and socks and rotation to the hip.     August 5,   visit with Gallo Boses is a 56 year old male who is seen in consultation at the request of self for evaluation of left shoulder pain and a mass to the left upper arm.  He reports has had shoulder pain off and on for atleast 2-3 years.  Progressively getting worse. Last 2 weeks has been severely flared up. No trauma that can he recall though is very active and is a .  The pain is lateral shoulder that at times radiates to the elbow, worse with abduction, forward flexion >90 degrees. Any attempted overhead lifting and reaching behind the back.  Feels he has been progressively loosing strength and motion to the shoulder.  No radicular symptoms including No numbness/tingling,      #2: states has a mass to his left lateral upper arm for about 3 years. Does not think it is growing, or if it has has been slowly progressing. No trauma. Can be painful at times. No wounds, no erythema.          Social History     Occupational History     Occupation:      Employer: SELF   Tobacco Use     Smoking status: Former     Current packs/day: 0.00     Types: Cigarettes     Quit date: 2007     Years since quittin.1     Smokeless tobacco: Never   Vaping Use     Vaping status: Never Used   Substance and Sexual Activity     Alcohol use: No     Comment: rare     Drug use: No     Sexual activity: Yes     Partners: Female       REVIEW OF SYSTEMS  General: negative for, night sweats, dizziness, fatigue  Resp: No shortness of breath and no cough  CV: negative for chest pain, syncope or near-syncope  GI: negative for nausea, vomiting and diarrhea  : negative for dysuria and hematuria  Musculoskeletal: as above  Neurologic: negative for syncope   Hematologic: negative for bleeding disorder    Physical Exam:  Vitals: Wt 114.3 kg (252 lb)   BMI 34.13 kg/m    BMI= Body mass index is 34.13 kg/m .  Constitutional: healthy, alert and no acute distress   Psychiatric: mentation appears normal and  affect normal/bright  NEURO: no focal deficits  RESP: Normal with easy respirations and no use of accessory muscles to breathe, no audible wheezing or retractions  CV: LLE:  no edema         Regular rate and rhythm by palpation  SKIN: No erythema, rashes, excoriation, or breakdown. No evidence of infection.   JOINT/EXTREMITIES:left hip: No gross deformity.  No point areas of tenderness.  Any attempted internal rotation reproduces pain.  Active hip flexion limited to approximately 100 degrees.  Negative straight leg.  GAIT: antalgic            Diagnostic Modalities:  Previous imaging reviewed.  Near bone-on-bone severe left hip joint space narrowing  Independent visualization of the images was performed.      Impression: left hip primary osteoarthritis     Plan:  All of the above pertinent physical exam and imaging modalities findings was reviewed with Bulmaro.    The patient has attempted conservative treatments that include NSAIDs, Tramadol, Tylenol, focused self directed physical therapy, formal physical therapy, steroid injections, activity modifications, rest yet still continues to have significant issues. These issues include pain at rest, increased pain with activity, pain that wakes at night, gait disturbances, interference with normal activities of daily living such as putting on shoes and socks or getting out of a chair, loss of motion of the joint. Secondary to these reasons I have offered surgery in the form of left total hip replacement.    Will lengthy discussion regarding the risk benefits.  We discussed typical timeframe for recovery.  Once all of this was carefully reviewed with him he is opted to go home and think about it and discuss it with his wife.    Scheduled after May 21 due to corticosteroid injection into the hip.    Return to clinic PRN, or sooner as needed for changes.  Re-x-ray on return: No    Leighton Wise D.O.          Again, thank you for allowing me to participate in the care of your  patient.        Sincerely,        Jeramy Wise, DO    Electronically signed

## 2025-04-28 ENCOUNTER — MYC REFILL (OUTPATIENT)
Dept: INTERNAL MEDICINE | Facility: CLINIC | Age: 60
End: 2025-04-28
Payer: COMMERCIAL

## 2025-04-28 DIAGNOSIS — H81.13 BENIGN PAROXYSMAL POSITIONAL VERTIGO, BILATERAL: ICD-10-CM

## 2025-04-28 RX ORDER — MECLIZINE HYDROCHLORIDE 25 MG/1
25 TABLET ORAL 3 TIMES DAILY PRN
Qty: 60 TABLET | Refills: 0 | Status: SHIPPED | OUTPATIENT
Start: 2025-04-28

## 2025-04-29 ENCOUNTER — MYC REFILL (OUTPATIENT)
Dept: ORTHOPEDICS | Facility: CLINIC | Age: 60
End: 2025-04-29
Payer: COMMERCIAL

## 2025-04-29 DIAGNOSIS — M16.12 PRIMARY OSTEOARTHRITIS OF LEFT HIP: ICD-10-CM

## 2025-04-30 NOTE — TELEPHONE ENCOUNTER
REFILL REQUEST     Last Written Prescription Date:  3/12/25  Last Fill Quantity: 45,  # refills: 1   Last office visit with prescribing provider: 4/16/2025    Future Office Visit:  None scheduled     Routing refill request to provider for review/approval because:  Drug not on the [facility] refill protocol     Bernice Calvin RN   [facility]

## 2025-05-01 RX ORDER — CYCLOBENZAPRINE HCL 10 MG
10 TABLET ORAL 3 TIMES DAILY PRN
Qty: 45 TABLET | Refills: 2 | Status: SHIPPED | OUTPATIENT
Start: 2025-05-01

## 2025-05-21 ENCOUNTER — MYC MEDICAL ADVICE (OUTPATIENT)
Dept: INTERNAL MEDICINE | Facility: CLINIC | Age: 60
End: 2025-05-21
Payer: COMMERCIAL

## 2025-05-21 DIAGNOSIS — M16.12 PRIMARY OSTEOARTHRITIS OF LEFT HIP: ICD-10-CM

## 2025-05-22 RX ORDER — TRAMADOL HYDROCHLORIDE 50 MG/1
50 TABLET ORAL 2 TIMES DAILY PRN
Qty: 12 TABLET | Refills: 0 | Status: SHIPPED | OUTPATIENT
Start: 2025-05-22

## 2025-05-22 NOTE — TELEPHONE ENCOUNTER
Pt requesting refill of medication and an order for an injection.   Sending to refill pool then route to PCP.     Leana ROLAND, VF

## 2025-05-22 NOTE — TELEPHONE ENCOUNTER
"Patient had epidural injection about 3 months ago.  Will need to set up an appointment for evaluation before \"order called in\".    Darian"

## 2025-06-06 RX ORDER — THERA TABS 400 MCG
1 TAB ORAL DAILY
COMMUNITY

## 2025-06-09 ENCOUNTER — MYC REFILL (OUTPATIENT)
Dept: INTERNAL MEDICINE | Facility: CLINIC | Age: 60
End: 2025-06-09
Payer: COMMERCIAL

## 2025-06-09 ENCOUNTER — MYC REFILL (OUTPATIENT)
Dept: FAMILY MEDICINE | Facility: CLINIC | Age: 60
End: 2025-06-09
Payer: COMMERCIAL

## 2025-06-09 DIAGNOSIS — G47.419 PRIMARY NARCOLEPSY WITHOUT CATAPLEXY: ICD-10-CM

## 2025-06-09 DIAGNOSIS — E78.00 HYPERCHOLESTEROLEMIA: ICD-10-CM

## 2025-06-09 DIAGNOSIS — I25.10 CORONARY ARTERY DISEASE INVOLVING NATIVE CORONARY ARTERY OF NATIVE HEART WITHOUT ANGINA PECTORIS: ICD-10-CM

## 2025-06-10 RX ORDER — ROSUVASTATIN CALCIUM 20 MG/1
20 TABLET, COATED ORAL DAILY
Qty: 90 TABLET | Refills: 0 | Status: SHIPPED | OUTPATIENT
Start: 2025-06-10

## 2025-06-11 RX ORDER — MODAFINIL 200 MG/1
200 TABLET ORAL DAILY
Qty: 90 TABLET | Refills: 0 | Status: SHIPPED | OUTPATIENT
Start: 2025-06-11

## 2025-06-18 ENCOUNTER — OFFICE VISIT (OUTPATIENT)
Dept: INTERNAL MEDICINE | Facility: CLINIC | Age: 60
End: 2025-06-18
Payer: COMMERCIAL

## 2025-06-18 VITALS
DIASTOLIC BLOOD PRESSURE: 88 MMHG | HEIGHT: 72 IN | OXYGEN SATURATION: 96 % | WEIGHT: 254 LBS | SYSTOLIC BLOOD PRESSURE: 130 MMHG | RESPIRATION RATE: 16 BRPM | TEMPERATURE: 97.6 F | BODY MASS INDEX: 34.4 KG/M2 | HEART RATE: 89 BPM

## 2025-06-18 DIAGNOSIS — M51.361 DEGENERATION OF INTERVERTEBRAL DISC OF LUMBAR REGION WITH LOWER EXTREMITY PAIN: ICD-10-CM

## 2025-06-18 DIAGNOSIS — G47.33 OSA (OBSTRUCTIVE SLEEP APNEA): ICD-10-CM

## 2025-06-18 DIAGNOSIS — M54.12 CERVICAL RADICULOPATHY: Primary | ICD-10-CM

## 2025-06-18 DIAGNOSIS — G89.29 CHRONIC LEFT SHOULDER PAIN: ICD-10-CM

## 2025-06-18 DIAGNOSIS — M25.512 CHRONIC LEFT SHOULDER PAIN: ICD-10-CM

## 2025-06-18 DIAGNOSIS — Z01.818 PREOP GENERAL PHYSICAL EXAM: ICD-10-CM

## 2025-06-18 DIAGNOSIS — G47.419 PRIMARY NARCOLEPSY WITHOUT CATAPLEXY: ICD-10-CM

## 2025-06-18 PROCEDURE — 3079F DIAST BP 80-89 MM HG: CPT | Performed by: INTERNAL MEDICINE

## 2025-06-18 PROCEDURE — 99214 OFFICE O/P EST MOD 30 MIN: CPT | Performed by: INTERNAL MEDICINE

## 2025-06-18 PROCEDURE — G2211 COMPLEX E/M VISIT ADD ON: HCPCS | Performed by: INTERNAL MEDICINE

## 2025-06-18 PROCEDURE — 1125F AMNT PAIN NOTED PAIN PRSNT: CPT | Performed by: INTERNAL MEDICINE

## 2025-06-18 PROCEDURE — 3075F SYST BP GE 130 - 139MM HG: CPT | Performed by: INTERNAL MEDICINE

## 2025-06-18 ASSESSMENT — PAIN SCALES - GENERAL: PAINLEVEL_OUTOF10: MODERATE PAIN (6)

## 2025-06-18 NOTE — PROGRESS NOTES
Preoperative Evaluation  [address] [address]  Phone: [phone]  Primary Provider: Gustavo Farmer DO  Pre-op Performing Provider: Gustavo Farmer DO  Jun 18, 2025 6/18/2025   Surgical Information   What procedure is being done? pre op   Facility or Hospital where procedure/surgery will be performed: [facility]   Who is doing the procedure / surgery? Dr Mathew   Date of surgery / procedure: 06 20 2025   Time of surgery / procedure: 230pm   Where do you plan to recover after surgery? at home with family     Fax number for surgical facility: Note does not need to be faxed, will be available electronically in Epic.    Assessment & Plan     The proposed surgical procedure is considered LOW risk.    Preop general physical exam      Cervical radiculopathy      Degeneration of intervertebral disc of lumbar region with lower extremity pain      Chronic left shoulder pain      Primary narcolepsy without cataplexy      ROBBY (obstructive sleep apnea)              - No identified additional risk factors other than previously addressed    Antiplatelet or Anticoagulation Medication Instructions   - aspirin: Discontinue aspirin 7 days prior to procedure to reduce bleeding risk. It should be resumed postoperatively.     Additional Medication Instructions  We reviewed the medication list and there are no chronic medications that need to be adjusted for this procedure.    Recommendation  Approval given to proceed with proposed procedure, without further diagnostic evaluation.        Jemima Chamberlain is a 59 year old, presenting for the following:  Pre-Op Exam (DOS 6/20/25 and 6/26/25, Dr Chaudhry, [place], [facility])          6/18/2025     3:16 PM   Additional Questions   Roomed by Aide NAVARRO: Patient complains of chronic neck and lumbar pain as well as pain in the left shoulder.  Has had previous epidural steroid injections and  intra-articular injection of the shoulder and wishes to repeat this.  Anticipates the procedure to be performed in the next couple days.          6/18/2025   Pre-Op Questionnaire   Have you ever had a heart attack or stroke? No   Have you ever had surgery on your heart or blood vessels, such as a stent placement, a coronary artery bypass, or surgery on an artery in your head, neck, heart, or legs? No   Do you have chest pain with activity? No   Do you have a history of heart failure? No   Do you currently have a cold, bronchitis or symptoms of other infection? No   Do you have a cough, shortness of breath, or wheezing? No   Do you or anyone in your family have previous history of blood clots? No   Do you or does anyone in your family have a serious bleeding problem such as prolonged bleeding following surgeries or cuts? No   Have you ever had problems with anemia or been told to take iron pills? No   Have you had any abnormal blood loss such as black, tarry or bloody stools? No   Have you ever had a blood transfusion? No   Are you willing to have a blood transfusion if it is medically needed before, during, or after your surgery? Yes   Have you or any of your relatives ever had problems with anesthesia? (!) YES    Do you have sleep apnea, excessive snoring or daytime drowsiness? No   Do you have any artifical heart valves or other implanted medical devices like a pacemaker, defibrillator, or continuous glucose monitor? No   Do you have artificial joints? No   Are you allergic to latex? No     Advance Care Planning    Discussed advance care planning with patient; however, patient declined at this time.    Preoperative Review of    reviewed - controlled substances reflected in medication list.          Patient Active Problem List    Diagnosis Date Noted    Adenomatous colon polyp 03/26/2025     Priority: Medium    Primary osteoarthritis of left hip 03/12/2025     Priority: Medium    Morbid obesity (H) 01/04/2023      Priority: Medium    Arthrosis of left shoulder 04/19/2022     Priority: Medium     Added automatically from request for surgery [id number]      Chondromalacia of left shoulder 08/18/2021     Priority: Medium    Partial nontraumatic tear of left rotator cuff 08/18/2021     Priority: Medium    Soft tissue mass 08/05/2021     Priority: Medium    Rotator cuff syndrome of left shoulder 08/05/2021     Priority: Medium    Chronic left shoulder pain 08/05/2021     Priority: Medium    NSAID long-term use 11/18/2019     Priority: Medium    Severe needle phobia 01/21/2019     Priority: Medium     Class: Chronic     likely to preclude him from having minimally invasive interventional pain procedures with minimal sedation. Would need deeper MAC sedation      Mixed hyperlipidemia 11/13/2018     Priority: Medium    ROBBY (obstructive sleep apnea) 11/13/2018     Priority: Medium    Herniation of intervertebral disc of cervical spine without radiculopathy 03/21/2017     Priority: Medium    Cervicalgia 03/21/2017     Priority: Medium    Chronic pain syndrome 04/05/2016     Priority: Medium     DDD, cervical. T#3, #60/mo.       DDD (degenerative disc disease), lumbar 12/20/2013     Priority: Medium    DDD (degenerative disc disease), cervical 12/20/2013     Priority: Medium    Pruritus ani 01/20/2010     Priority: Medium    Back pains      Priority: Medium     Problem list name updated by automated process. Provider to review and confirm      Esophageal reflux 04/13/2005     Priority: Medium      Past Medical History:   Diagnosis Date    Back pains     Depressive disorder, not elsewhere classified 01/28/2007    declines Rx    Disc degeneration 12/01/2008    see MRI -throaco-lumbar mild discogenic degenerative changes    Elevated LFT's 11/01/2009    alt and ast    Other and unspecified hyperlipidemia 01/01/2007    Sleep disorder 09/01/2009    CPAP    Tobacco use disorder      Past Surgical History:   Procedure Laterality Date     COLONOSCOPY  07/07/08    adenomatous polyp repeat 5 years    COLONOSCOPY N/A 12/29/2017    Procedure: COMBINED COLONOSCOPY, SINGLE OR MULTIPLE BIOPSY/POLYPECTOMY BY BIOPSY;  Colonoscopy, Polypectomy by Biopsy;  Surgeon: Matt Reyes MD;  Location:  GI    COLONOSCOPY N/A 5/22/2023    Procedure: Colonoscopy;  Surgeon: Constantino Bowie DO;  Location:  GI    ENT SURGERY      for deviated septum    EXCISE LESION AXILLA  12/7/2018    Procedure: Excision Skin Tags Right Axilla;  Surgeon: Ayaan Chaney MD;  [facility]    EXCISE LESION BACK N/A 12/7/2018    Procedure: Excision Back Skin Lesion X Two;  Surgeon: Ayaan Chaney MD;  [facility]    EXCISE MASS BACK N/A 4/15/2022    Procedure: Excisions skin lesions of back;  Surgeon: Ayaan Chaney MD;  [facility]    INJECT BLOCK MEDIAL BRANCH CERVICAL/THORACIC/LUMBAR Bilateral 5/26/2022    Procedure: Cervical 4, 5, 6 Medial Branch Block;  Surgeon: Christian Chaudhry MD;  [facility]    INJECT EPIDURAL CERVICAL N/A 5/14/2015    Procedure: INJECT EPIDURAL CERVICAL;  Surgeon: Christian Chaudhry MD;  [facility]    INJECT EPIDURAL CERVICAL N/A 12/28/2018    Procedure: INJECT EPIDURAL CERVICAL 6-7;  Surgeon: Christian Chaudhry MD;  [facility]    INJECT EPIDURAL CERVICAL Bilateral 6/8/2023    Procedure: Left Cervical 5-Cervical 6, Right Cervical 5-Cervical 6, Left Cervical 6-Cervical 7 and Right Cervical 6-Cervical 7 Intra-articular zygopophyseal joint injection utilizing fluoroscopic guidance with contrast dye.;  Surgeon: Christian Chaudhry MD;  [facility]    INJECT EPIDURAL CERVICAL Bilateral 2/19/2024    Procedure: Left Cervical 5 - Cervical 6, Right Cervical 5 - Cervical 6, Left Cervical 6 - Cervical 7 and Right Cervical 6 - Cervical 7 Intra-articular zygopophyseal joint injection utilizing fluoroscopic guidance with contrast dye.;  Surgeon: Christian Chaudhry MD;  [facility]    INJECT EPIDURAL CERVICAL N/A 8/8/2024    Procedure:  Cervical 7 - Thoracic 1 Interlaminar epidural steroid injection using fluoroscopic guidance with contrast dye.;  Surgeon: Christian Chaudhry MD;  [facility]    INJECT EPIDURAL CERVICAL Bilateral 2/27/2025    Procedure: Left Cervical 5-Cervical 6, Right Cervical 5-Cervical 6, Left Cervical 6-Cervical 7and Right Cervical 6-Cervical 7 Intra-articular zygapophyseal joint injection utilizing fluoroscopic guidance with contrast dye;  Surgeon: Christian Chaudhry MD;  [facility]    INJECT EPIDURAL LUMBAR N/A 12/20/2019    Procedure: Lumbar 5- Sacral 1 Epidural  Injection bilatetral;  Surgeon: Christian Chaudhry MD;  [facility]    INJECT EPIDURAL LUMBAR Bilateral 9/8/2022    Procedure: Bilateral Lumbar 5-Sacral 1 transforaminal epidural injections utilizing fluoroscopic guidance with contrast dye  and;  Surgeon: Christian Chaudhry MD;  [facility]    INJECT EPIDURAL LUMBAR Bilateral 8/16/2024    Procedure: Bilateral Lumbar 5-Sacral 1 transforaminal epidural injections utilizing fluoroscopic guidance with contrast dye;  Surgeon: Christian Chaudhry MD;  [facility]    INJECT EPIDURAL LUMBAR Bilateral 2/21/2025    Procedure: Bilateral Lumbar 5-Sacral 1 transforaminal epidural injections utilizing fluoroscopic guidance with contrast dye;  Surgeon: Christian Chaudhry MD;  [facility]    INJECT EPIDURAL TRANSFORAMINAL Bilateral 5/20/2022    Procedure: Bilateral Lumbar 5- Sacral 1 transforaminal Epidural Steroid Injection;  Surgeon: Christian Chaudhry MD;  [facility]    INJECT EPIDURAL TRANSFORAMINAL Bilateral 7/14/2022    Procedure: Left Cervical 5-Cervical 6, Right Cervical 5-Cervical 6, Left Cervical 6-Cervical 7 and Right Cervical 6-Cervical 7 Intra-articular zygopophyseal joint injection utilizing fluoroscopic guidance with contrast dye;  Surgeon: Christian Chaudhry MD;  [facility]    INJECT EPIDURAL TRANSFORAMINAL Bilateral 6/16/2023    Procedure: Bilateral Lumbar 5-Sacral 1 transforaminal epidural injections  utilizing fluoroscopic guidance with contrast dye;  Surgeon: Christian Chaudhry MD;  [facility]    INJECT EPIDURAL TRANSFORAMINAL Bilateral 9/28/2023    Procedure: Bilateral Lumbar 5-Sacral 1 transforaminal epidural injections utilizing fluoroscopic guidance with contrast dye;  Surgeon: Christian Chaudhry MD;  [facility]    INJECT EPIDURAL TRANSFORAMINAL Bilateral 3/15/2024    Procedure: Bilateral Lumbar 5 - Sacral 1 transforaminal epidural injections utilizing fluoroscopic guidance with contrast dye.;  Surgeon: Christian Chaudhry MD;  [facility]    INJECT EPIDURAL TRANSFORAMINAL Bilateral 11/27/2024    Procedure: Bilateral Lumbar 5 - Sacral 1 transforaminal epidural injections utilizing fluoroscopic guidance with contrast dye;  Surgeon: Christian Chaudhry MD;  [facility]    INJECT FACET JOINT Bilateral 1/25/2019    Procedure: Cervical Facet Injections Cervical 5-6 and 6-7 Bilateral;  Surgeon: Christian Chaudhry MD;  [facility]    INJECT FACET JOINT Bilateral 11/29/2019    Procedure: Cervical 5-6 and 6-7 Bilateral facet joint injection;  Surgeon: Christian Chaudhry MD;  [facility]    INJECT FACET JOINT Bilateral 2/11/2021    Procedure: Cervical 5-6 and Cervical 6-7 Bilateral facet injections;  Surgeon: Christian Chaudhry MD;  [facility]    INJECT FACET JOINT Bilateral 10/19/2021    Procedure: Left C5-C6, Right C5-C6, Left C6-C7 and Right C6-C7 Intra-articular zygopophyseal joint injection utilizing fluoroscopic guidance with contrast dye;  Surgeon: Christian Chaudhry MD;  [facility]    INJECT FACET JOINT Bilateral 9/14/2023    Procedure: Left Cervical 5-Cervical 6, Right Cervical 5-Cervical 6, Left Cervical 6-Cervical 7 and Right Cervical 6-Cervical 7 Intra-articular zygopophyseal joint injection utilizing fluoroscopic guidance with contrast dye. Left Intra-articular shoulder Injection;  Surgeon: Christian Chaudhry MD;  [facility]    INJECT FACET JOINT Bilateral 11/15/2024    Procedure: Left  Cervical 5-Cervical 6, Right Cervical 5-Cervical 6, Left Cervical 6-Cervical 7 and Right Cervical 6-Cervical 7 Intra-articular zygopophyseal joint injection utilizing fluoroscopic guidance with contrast dye.;  Surgeon: Christian Chaudhry MD;  [facility]    INJECT MAJOR JOINT / BURSA Left 2/21/2025    Procedure: Inject Left Intra-articular hip;  Surgeon: Christian Chaudhry MD;  [facility]    INJECT STEROID (LOCATION) Left 10/19/2021    Procedure: Glenohumeral joint injection left shoulder;  Surgeon: Christian Chaudhry MD;  [facility]    INJECT STEROID (LOCATION) Left 5/20/2022    Procedure: Left shoulder glenohumeral steroid inj with local;  Surgeon: Christian Chaudhry MD;  [facility]    INJECT STEROID (LOCATION) Left 8/8/2024    Procedure: steroid injection left shoulder;  Surgeon: Christian Chaudhry MD;  [facility]    INJECT STEROID (LOCATION) Left 2/27/2025    Procedure: Inject steroid  left shoulder;  Surgeon: Christian Chaudhry MD;  [facility]    INJECT STEROID TROCHANTERIC BURSA Left 9/8/2022    Procedure: Left intra-articular shoulder injections;  Surgeon: Christian Chaudhry MD;  [facility]    ORTHOPEDIC SURGERY      Right hand    TONSILLECTOMY      ZZC NONSPECIFIC PROCEDURE      rt hand surgery - laceration/glass/tendons    ZZ ECHO HEART XTHORACIC, STRESS/REST  6/2009    neg     Current Outpatient Medications   Medication Sig Dispense Refill    acetaminophen (TYLENOL) 500 MG tablet Take 1,000 mg by mouth every morning      aspirin (ASA) 81 MG chewable tablet Take 1 tablet (81 mg) by mouth daily      cetirizine (ZYRTEC) 10 MG tablet Take 1 tablet (10 mg) by mouth daily. 90 tablet 1    cyclobenzaprine (FLEXERIL) 10 MG tablet Take 1 tablet (10 mg) by mouth 3 times daily as needed for muscle spasms (pain). 45 tablet 2    diclofenac (VOLTAREN) 75 MG EC tablet Take 1 tablet (75 mg) by mouth 2 times daily. 28 tablet 1    diclofenac (VOLTAREN) 75 MG EC tablet Take 1 tablet (75 mg) by mouth 2 times  daily for 14 days. 28 tablet 0    ibuprofen (ADVIL/MOTRIN) 200 MG tablet Take 400 mg by mouth every morning.      meclizine (ANTIVERT) 25 MG tablet Take 1 tablet (25 mg) by mouth 3 times daily as needed for dizziness. 60 tablet 0    modafinil (PROVIGIL) 200 MG tablet Take 1 tablet (200 mg) by mouth daily. 90 tablet 0    multivitamin, therapeutic (THERA-VIT) TABS tablet Take 1 tablet by mouth daily.      omeprazole (PRILOSEC) 40 MG DR capsule Take 1 capsule (40 mg) by mouth daily. 90 capsule 2    rosuvastatin (CRESTOR) 20 MG tablet Take 1 tablet (20 mg) by mouth daily. 90 tablet 0    traMADol (ULTRAM) 50 MG tablet Take 1 tablet (50 mg) by mouth 2 times daily as needed for severe pain. 12 tablet 0       Allergies   Allergen Reactions    Wasps [Hornets] Swelling    Percocet [Oxycodone-Acetaminophen] Itching        Social History     Tobacco Use    Smoking status: Former     Current packs/day: 0.00     Types: Cigarettes     Quit date: 2007     Years since quittin.3    Smokeless tobacco: Never   Substance Use Topics    Alcohol use: No     Comment: rare     Family History   Problem Relation Age of Onset    Coronary Artery Disease Mother     Breast Cancer Mother     Arthritis Mother         joint ?    Depression Father     Cancer Maternal Grandmother     C.A.D. Maternal Grandfather     Cardiovascular Maternal Grandfather     Gynecology Paternal Grandmother          giving birth    Prostate Cancer Paternal Grandfather     Genetic Disorder Brother         joint pain?    Depression Brother     Cardiovascular Son         congenital heart defect -      Prostate Cancer Other          age 70's    Diabetes Other      History   Drug Use No             Review of Systems  CONSTITUTIONAL: Patient knowledges acute and chronic daytime fatigue and tiredness.  Previously diagnosed with narcolepsy in 2019.  Is also on multiple medications that will induce fatigue.  Also has a history of obstructive sleep  "apnea.  INTEGUMENTARY/SKIN: NEGATIVE for worrisome rashes, moles or lesions  EYES: NEGATIVE for vision changes or irritation  ENT/MOUTH: NEGATIVE for ear, mouth and throat problems  RESP: NEGATIVE for significant cough or SOB  BREAST: NEGATIVE for masses, tenderness or discharge  CV: NEGATIVE for chest pain, palpitations or peripheral edema  GI: NEGATIVE for nausea, abdominal pain, heartburn, or change in bowel habits  : NEGATIVE for frequency, dysuria, or hematuria  MUSCULOSKELETAL: Chronic neck lumbar and shoulder pain.  Anticipating surgeries in the upcoming months.  NEURO: NEGATIVE for weakness, dizziness or paresthesias  ENDOCRINE: NEGATIVE for temperature intolerance, skin/hair changes  HEME: NEGATIVE for bleeding problems  PSYCHIATRIC: NEGATIVE for changes in mood or affect    Objective    /88   Pulse 89   Temp 97.6  F (36.4  C) (Temporal)   Resp 16   Ht 1.816 m (5' 11.5\")   Wt 115.2 kg (254 lb)   SpO2 96%   BMI 34.93 kg/m     Estimated body mass index is 34.93 kg/m  as calculated from the following:    Height as of this encounter: 1.816 m (5' 11.5\").    Weight as of this encounter: 115.2 kg (254 lb).  Physical Exam  GENERAL: alert and no distress  EYES: Eyes grossly normal to inspection, PERRL and conjunctivae and sclerae normal  HENT: ear canals and TM's normal, nose and mouth without ulcers or lesions  NECK: no adenopathy, no asymmetry, masses, or scars  RESP: lungs clear to auscultation - no rales, rhonchi or wheezes  CV: regular rate and rhythm, normal S1 S2, no S3 or S4, no murmur, click or rub, no peripheral edema  ABDOMEN: soft, nontender, no hepatosplenomegaly, no masses and bowel sounds normal  MS: Decreased range of motion of the left shoulder noted.  Decreased range of motion of cervical spine.  SKIN: no suspicious lesions or rashes  NEURO: Normal strength and tone, mentation intact and speech normal.  Positive straight leg raising bilaterally in the lumbar area.  This is " mild.  PSYCH: mentation appears normal, affect normal/bright    Recent Labs   Lab Test 01/18/25  0835   HGB 15.8         POTASSIUM 4.5   CR 1.00   A1C 6.4*        Diagnostics  No labs were ordered during this visit.   No EKG required for low risk surgery (cataract, skin procedure, breast biopsy, etc).    Revised Cardiac Risk Index (RCRI)  The patient has the following serious cardiovascular risks for perioperative complications:   - No serious cardiac risks = 0 points     RCRI Interpretation: 0 points: Class I (very low risk - 0.4% complication rate)         Signed Electronically by: Gustavo Farmer DO  A copy of this evaluation report is provided to the requesting physician.

## 2025-06-20 ENCOUNTER — HOSPITAL ENCOUNTER (OUTPATIENT)
Facility: CLINIC | Age: 60
Discharge: HOME | End: 2025-06-20
Attending: ANESTHESIOLOGY | Admitting: ANESTHESIOLOGY
Payer: COMMERCIAL

## 2025-06-20 ENCOUNTER — HOSPITAL ENCOUNTER (OUTPATIENT)
Dept: GENERAL RADIOLOGY | Facility: CLINIC | Age: 60
Discharge: HOME | End: 2025-06-20
Attending: ANESTHESIOLOGY | Admitting: ANESTHESIOLOGY
Payer: COMMERCIAL

## 2025-06-20 VITALS
WEIGHT: 250 LBS | BODY MASS INDEX: 34.38 KG/M2 | HEART RATE: 85 BPM | DIASTOLIC BLOOD PRESSURE: 100 MMHG | OXYGEN SATURATION: 94 % | TEMPERATURE: 97.7 F | SYSTOLIC BLOOD PRESSURE: 129 MMHG | RESPIRATION RATE: 16 BRPM

## 2025-06-20 DIAGNOSIS — M54.12 CERVICAL RADICULOPATHY: ICD-10-CM

## 2025-06-20 PROCEDURE — 370N000017 HC ANESTHESIA TECHNICAL FEE, PER MIN: Performed by: ANESTHESIOLOGY

## 2025-06-20 PROCEDURE — 20610 DRAIN/INJ JOINT/BURSA W/O US: CPT | Mod: LT | Performed by: ANESTHESIOLOGY

## 2025-06-20 PROCEDURE — 64490 INJ PARAVERT F JNT C/T 1 LEV: CPT | Performed by: ANESTHESIOLOGY

## 2025-06-20 PROCEDURE — 64491 INJ PARAVERT F JNT C/T 2 LEV: CPT | Mod: 50 | Performed by: ANESTHESIOLOGY

## 2025-06-20 PROCEDURE — 64490 INJ PARAVERT F JNT C/T 1 LEV: CPT | Mod: 50 | Performed by: ANESTHESIOLOGY

## 2025-06-20 PROCEDURE — 250N000011 HC RX IP 250 OP 636: Performed by: ANESTHESIOLOGY

## 2025-06-20 PROCEDURE — 250N000009 HC RX 250: Performed by: ANESTHESIOLOGY

## 2025-06-20 PROCEDURE — 64491 INJ PARAVERT F JNT C/T 2 LEV: CPT | Performed by: ANESTHESIOLOGY

## 2025-06-20 PROCEDURE — 999N000179 XR SURGERY CARM FLUORO LESS THAN 5 MIN W STILLS

## 2025-06-20 RX ORDER — NALOXONE HYDROCHLORIDE 0.4 MG/ML
0.1 INJECTION, SOLUTION INTRAMUSCULAR; INTRAVENOUS; SUBCUTANEOUS
Status: DISCONTINUED | OUTPATIENT
Start: 2025-06-20 | End: 2025-06-20 | Stop reason: HOSPADM

## 2025-06-20 RX ORDER — TRIAMCINOLONE ACETONIDE 40 MG/ML
INJECTION, SUSPENSION INTRA-ARTICULAR; INTRAMUSCULAR PRN
Status: DISCONTINUED | OUTPATIENT
Start: 2025-06-20 | End: 2025-06-20 | Stop reason: HOSPADM

## 2025-06-20 RX ORDER — ONDANSETRON 2 MG/ML
4 INJECTION INTRAMUSCULAR; INTRAVENOUS EVERY 30 MIN PRN
Status: DISCONTINUED | OUTPATIENT
Start: 2025-06-20 | End: 2025-06-20 | Stop reason: HOSPADM

## 2025-06-20 RX ORDER — DEXAMETHASONE SODIUM PHOSPHATE 10 MG/ML
4 INJECTION, SOLUTION INTRAMUSCULAR; INTRAVENOUS
Status: DISCONTINUED | OUTPATIENT
Start: 2025-06-20 | End: 2025-06-20 | Stop reason: HOSPADM

## 2025-06-20 RX ORDER — IOPAMIDOL 612 MG/ML
INJECTION, SOLUTION INTRATHECAL PRN
Status: DISCONTINUED | OUTPATIENT
Start: 2025-06-20 | End: 2025-06-20 | Stop reason: HOSPADM

## 2025-06-20 RX ORDER — ONDANSETRON 4 MG/1
4 TABLET, ORALLY DISINTEGRATING ORAL EVERY 30 MIN PRN
Status: DISCONTINUED | OUTPATIENT
Start: 2025-06-20 | End: 2025-06-20 | Stop reason: HOSPADM

## 2025-06-20 ASSESSMENT — ACTIVITIES OF DAILY LIVING (ADL): ADLS_ACUITY_SCORE: 41

## 2025-06-20 NOTE — OP NOTE
CHIEF COMPLAINT:   1. neck pain secondary to cervical spondylosis.  2.  Left shoulder pain secondary to glenohumeral joint degeneration and a left shoulder labral tear  INTERVAL HISTORY: He does get several months of relief from the intra-articular facet injections.  I am willing to repeat these up to 3 or 4 times per year.  However regarding his shoulder I encouraged him to move on and consider shoulder surgery and that this should be one of his last shoulder injections.  PHYSICAL EXAM:   Musculoskeletal: Strength of upper extremities is 5/5 bilaterally.  Pain on palpation of the   neck. Pain on   Rotation, extension and flexion of neck.   Neuro: No sensory perception differences of lower extremities.  PROCEDURE number #1:    1. Left C5-C6, Right C5-C6, Left C6-C7 and Right C6-C7  Intra-articular zygopophyseal joint injection utilizing fluoroscopic guidance with contrast dye.      PROCEDURE DETAILS: After written informed consent was obtained from the patient, the patient was escorted to the procedure room.  The patient was placed in the sitting position.  A  time out  was conducted to verify the patient identity, procedure to be performed, side, site, allergies and any special requirements.  The skin over the neck was prepped and draped in normal sterile fashion. Fluoroscopy was used to identify the zygopophyseal joint with a lateral view.   The skin was anesthetized with 0.5 mL of 1% lidocaine with bicarbonate buffer.  A 25-gauge 2 inch Quincke needle was advanced under fluoroscopic guidance in the lateral approach until entry into the zygopophyseal joint was successful.  View was confirmed in AP view.  Then, <0.3 cc of Omnipaque contrast dye was injected producing a satisfactory arthrogram without evidence of intrathecal or intravascular spread.  Then, a 1 mL solution of 5 mg dexamethasone and 0.5 mL of 2% lidocaine was incrementally injected into each  joint.  After injection of the medication, as the needle  tip was withdrawn, it was flushed with local anesthetic. The patient was monitored with blood pressure and pulse oximetry machines with the assistance of an RN throughout the procedure.  The patient was alert and responsive to questions throughout the procedure.   The patient tolerated the procedure well and was observed in the post-procedural area.  The patient was dismissed without apparent complications.       PROCEDURE number #2:     Procedure details: After his neck was treated we moved over to his left shoulder.  Left shoulder posterior approach was prepped with ChloraPrep.  1% lidocaine was used for skin.  I then advanced a 22-gauge Quincke needle into his left glenohumeral joint.  Initially contrast was injected which did not show proper spread throughout the joint and after several needle tip manipulations and contrast injection, I achieved a proper arthrogram of the left glenohumeral joint.  I then injected 8 cc of 0.25% bupivacaine with 4 mg/cc of Depo-Medrol.  The needle was then removed and a sterile bandage placed over the needle insertion site.     Blood loss: Less than 2 cc     DIAGNOSIS:  1.  Cervical spondylosis secondary to a symptomatic facet syndrome causing axial neck pain with a history of long-term pain relief from previous facet injections  2.  Severe needle phobia  3.  Labral tear left shoulder inferior with severe glenohumeral joint osteoarthritis     PLAN:  Performed repeat zygopophyseal joint injections.   In the past he has received between 6 to 10 months months of pain relief from facet injections.  However, he has a severe needle phobia so he would not be a candidate for medial branch blocks or a radiofrequency ablation so if he does have positive diagnostic injections with his pain form then he would need to consider a cervical fusion if the facet injections do not provide long-term pain relief for him.  2.    I completed a repeat left shoulder injection as well.  I was willing to  repeat his shoulder injection however I did have the discussion with him again about moving ahead with his shoulder surgery.  Repeat injections around the rotator cuff definitely are not indicated and will likely weaken the rotator cuff.  I think it is time for him to move on and not have any more shoulder injections and have his shoulder replaced. He still implored me to repat the shoulder injection and I stated this would be his last and he needs to go ahead with the replacement.     Christian Chaudhry MD  Diplomate of the [employer] Board of Anesthesiology, Pain Medicine

## 2025-06-20 NOTE — DISCHARGE INSTRUCTIONS

## 2025-06-26 ENCOUNTER — HOSPITAL ENCOUNTER (OUTPATIENT)
Facility: CLINIC | Age: 60
Discharge: HOME | End: 2025-06-26
Attending: ANESTHESIOLOGY | Admitting: ANESTHESIOLOGY
Payer: COMMERCIAL

## 2025-06-26 ENCOUNTER — APPOINTMENT (OUTPATIENT)
Dept: GENERAL RADIOLOGY | Facility: CLINIC | Age: 60
End: 2025-06-26
Attending: ANESTHESIOLOGY
Payer: COMMERCIAL

## 2025-06-26 ENCOUNTER — ANESTHESIA (OUTPATIENT)
Dept: SURGERY | Facility: CLINIC | Age: 60
End: 2025-06-26
Payer: COMMERCIAL

## 2025-06-26 ENCOUNTER — ANESTHESIA EVENT (OUTPATIENT)
Dept: SURGERY | Facility: CLINIC | Age: 60
End: 2025-06-26
Payer: COMMERCIAL

## 2025-06-26 VITALS
DIASTOLIC BLOOD PRESSURE: 98 MMHG | RESPIRATION RATE: 14 BRPM | HEART RATE: 77 BPM | OXYGEN SATURATION: 94 % | SYSTOLIC BLOOD PRESSURE: 144 MMHG | TEMPERATURE: 97.3 F

## 2025-06-26 DIAGNOSIS — M54.16 LUMBAR RADICULOPATHY: ICD-10-CM

## 2025-06-26 PROCEDURE — 250N000009 HC RX 250: Performed by: NURSE ANESTHETIST, CERTIFIED REGISTERED

## 2025-06-26 PROCEDURE — 999N000179 XR SURGERY CARM FLUORO LESS THAN 5 MIN W STILLS

## 2025-06-26 PROCEDURE — 250N000011 HC RX IP 250 OP 636: Performed by: ANESTHESIOLOGY

## 2025-06-26 PROCEDURE — 64483 NJX AA&/STRD TFRM EPI L/S 1: CPT | Mod: 50 | Performed by: ANESTHESIOLOGY

## 2025-06-26 PROCEDURE — 250N000011 HC RX IP 250 OP 636: Performed by: NURSE ANESTHETIST, CERTIFIED REGISTERED

## 2025-06-26 PROCEDURE — 370N000017 HC ANESTHESIA TECHNICAL FEE, PER MIN: Performed by: ANESTHESIOLOGY

## 2025-06-26 RX ORDER — ONDANSETRON 4 MG/1
4 TABLET, ORALLY DISINTEGRATING ORAL EVERY 30 MIN PRN
Status: CANCELLED | OUTPATIENT
Start: 2025-06-26

## 2025-06-26 RX ORDER — ONDANSETRON 2 MG/ML
4 INJECTION INTRAMUSCULAR; INTRAVENOUS EVERY 30 MIN PRN
Status: CANCELLED | OUTPATIENT
Start: 2025-06-26

## 2025-06-26 RX ORDER — IOPAMIDOL 612 MG/ML
INJECTION, SOLUTION INTRATHECAL PRN
Status: DISCONTINUED | OUTPATIENT
Start: 2025-06-26 | End: 2025-06-26 | Stop reason: HOSPADM

## 2025-06-26 RX ORDER — NALOXONE HYDROCHLORIDE 0.4 MG/ML
0.1 INJECTION, SOLUTION INTRAMUSCULAR; INTRAVENOUS; SUBCUTANEOUS
Status: CANCELLED | OUTPATIENT
Start: 2025-06-26

## 2025-06-26 RX ORDER — LIDOCAINE 40 MG/G
CREAM TOPICAL
Status: DISCONTINUED | OUTPATIENT
Start: 2025-06-26 | End: 2025-06-26 | Stop reason: HOSPADM

## 2025-06-26 RX ORDER — TRIAMCINOLONE ACETONIDE 40 MG/ML
INJECTION, SUSPENSION INTRA-ARTICULAR; INTRAMUSCULAR PRN
Status: DISCONTINUED | OUTPATIENT
Start: 2025-06-26 | End: 2025-06-26 | Stop reason: HOSPADM

## 2025-06-26 RX ORDER — DEXAMETHASONE SODIUM PHOSPHATE 10 MG/ML
4 INJECTION, SOLUTION INTRAMUSCULAR; INTRAVENOUS
Status: CANCELLED | OUTPATIENT
Start: 2025-06-26

## 2025-06-26 RX ORDER — FENTANYL CITRATE 50 UG/ML
INJECTION, SOLUTION INTRAMUSCULAR; INTRAVENOUS PRN
Status: DISCONTINUED | OUTPATIENT
Start: 2025-06-26 | End: 2025-06-26

## 2025-06-26 RX ORDER — PROPOFOL 10 MG/ML
INJECTION, EMULSION INTRAVENOUS CONTINUOUS PRN
Status: DISCONTINUED | OUTPATIENT
Start: 2025-06-26 | End: 2025-06-26

## 2025-06-26 RX ORDER — LIDOCAINE HYDROCHLORIDE 20 MG/ML
INJECTION, SOLUTION INFILTRATION; PERINEURAL PRN
Status: DISCONTINUED | OUTPATIENT
Start: 2025-06-26 | End: 2025-06-26

## 2025-06-26 RX ORDER — PROPOFOL 10 MG/ML
INJECTION, EMULSION INTRAVENOUS PRN
Status: DISCONTINUED | OUTPATIENT
Start: 2025-06-26 | End: 2025-06-26

## 2025-06-26 RX ADMIN — PROPOFOL 80 MG: 10 INJECTION, EMULSION INTRAVENOUS at 15:32

## 2025-06-26 RX ADMIN — LIDOCAINE HYDROCHLORIDE 0.1 ML: 10 INJECTION, SOLUTION EPIDURAL; INFILTRATION; INTRACAUDAL; PERINEURAL at 14:48

## 2025-06-26 RX ADMIN — FENTANYL CITRATE 50 MCG: 50 INJECTION INTRAMUSCULAR; INTRAVENOUS at 15:27

## 2025-06-26 RX ADMIN — LIDOCAINE HYDROCHLORIDE 50 MG: 20 INJECTION, SOLUTION INFILTRATION; PERINEURAL at 15:32

## 2025-06-26 RX ADMIN — PROPOFOL 30 MG: 10 INJECTION, EMULSION INTRAVENOUS at 15:33

## 2025-06-26 RX ADMIN — MIDAZOLAM 1.5 MG: 1 INJECTION INTRAMUSCULAR; INTRAVENOUS at 15:27

## 2025-06-26 RX ADMIN — PROPOFOL 250 MCG/KG/MIN: 10 INJECTION, EMULSION INTRAVENOUS at 15:32

## 2025-06-26 ASSESSMENT — ACTIVITIES OF DAILY LIVING (ADL)
ADLS_ACUITY_SCORE: 41
ADLS_ACUITY_SCORE: 41

## 2025-06-26 ASSESSMENT — LIFESTYLE VARIABLES: TOBACCO_USE: 1

## 2025-06-26 NOTE — ANESTHESIA PREPROCEDURE EVALUATION
Anesthesia Pre-Procedure Evaluation    Patient: Bulmaro Herrera   MRN: [id number] : 1965          Procedure : Procedure(s):  Bilateral Lumbar 5 - Sacral 1 transforaminal epidural injections utilizing fluoroscopic guidance with contrast dye         Past Medical History:   Diagnosis Date    Back pains     Depressive disorder, not elsewhere classified 2007    declines Rx    Disc degeneration 2008    see MRI -throaco-lumbar mild discogenic degenerative changes    Elevated LFT's 2009    alt and ast    Other and unspecified hyperlipidemia 2007    Sleep disorder 2009    CPAP    Tobacco use disorder       Past Surgical History:   Procedure Laterality Date    COLONOSCOPY  08    adenomatous polyp repeat 5 years    COLONOSCOPY N/A 2017    Procedure: COMBINED COLONOSCOPY, SINGLE OR MULTIPLE BIOPSY/POLYPECTOMY BY BIOPSY;  Colonoscopy, Polypectomy by Biopsy;  Surgeon: Matt Reyes MD;  Location:  GI    COLONOSCOPY N/A 2023    Procedure: Colonoscopy;  Surgeon: Constantino Bowie DO;  Location:  GI    ENT SURGERY      for deviated septum    EXCISE LESION AXILLA  2018    Procedure: Excision Skin Tags Right Axilla;  Surgeon: Ayaan Chaney MD;  [facility]    EXCISE LESION BACK N/A 2018    Procedure: Excision Back Skin Lesion X Two;  Surgeon: Ayaan Chaney MD;  [facility]    EXCISE MASS BACK N/A 4/15/2022    Procedure: Excisions skin lesions of back;  Surgeon: Ayaan Chaney MD;  [facility]    INJECT BLOCK MEDIAL BRANCH CERVICAL/THORACIC/LUMBAR Bilateral 2022    Procedure: Cervical 4, 5, 6 Medial Branch Block;  Surgeon: Christian Chaudhry MD;  [facility]    INJECT EPIDURAL CERVICAL N/A 2015    Procedure: INJECT EPIDURAL CERVICAL;  Surgeon: Christian Chaudhry MD;  [facility]    INJECT EPIDURAL CERVICAL N/A 2018    Procedure: INJECT EPIDURAL CERVICAL 6-7;  Surgeon: Christian Chaudhry MD;  [facility]    INJECT  EPIDURAL CERVICAL Bilateral 6/8/2023    Procedure: Left Cervical 5-Cervical 6, Right Cervical 5-Cervical 6, Left Cervical 6-Cervical 7 and Right Cervical 6-Cervical 7 Intra-articular zygopophyseal joint injection utilizing fluoroscopic guidance with contrast dye.;  Surgeon: Christian Chaudhry MD;  [facility]    INJECT EPIDURAL CERVICAL Bilateral 2/19/2024    Procedure: Left Cervical 5 - Cervical 6, Right Cervical 5 - Cervical 6, Left Cervical 6 - Cervical 7 and Right Cervical 6 - Cervical 7 Intra-articular zygopophyseal joint injection utilizing fluoroscopic guidance with contrast dye.;  Surgeon: Christian Chaudhry MD;  [facility]    INJECT EPIDURAL CERVICAL N/A 8/8/2024    Procedure: Cervical 7 - Thoracic 1 Interlaminar epidural steroid injection using fluoroscopic guidance with contrast dye.;  Surgeon: Christian Chaudhry MD;  [facility]    INJECT EPIDURAL CERVICAL Bilateral 2/27/2025    Procedure: Left Cervical 5-Cervical 6, Right Cervical 5-Cervical 6, Left Cervical 6-Cervical 7and Right Cervical 6-Cervical 7 Intra-articular zygapophyseal joint injection utilizing fluoroscopic guidance with contrast dye;  Surgeon: Christian Chaudhry MD;  [facility]    INJECT EPIDURAL CERVICAL Bilateral 6/20/2025    Procedure: Left Cervical 5 - Cervical 6, Right Cervical 5 - Cervical 6, Left Cervical 6 - Cervical 7 and Right Cervical 6 - Cervical 7 Intra-articular zygopophyseal joint injection utilizing fluoroscopic guidance with contrast dye;  Surgeon: Christian Chaudhry MD;  [facility]    INJECT EPIDURAL LUMBAR N/A 12/20/2019    Procedure: Lumbar 5- Sacral 1 Epidural  Injection bilatetral;  Surgeon: Christian Chaudhry MD;  [facility]    INJECT EPIDURAL LUMBAR Bilateral 9/8/2022    Procedure: Bilateral Lumbar 5-Sacral 1 transforaminal epidural injections utilizing fluoroscopic guidance with contrast dye  and;  Surgeon: Christian Chaudhry MD;  [facility]    INJECT EPIDURAL LUMBAR Bilateral 8/16/2024    Procedure:  Bilateral Lumbar 5-Sacral 1 transforaminal epidural injections utilizing fluoroscopic guidance with contrast dye;  Surgeon: Christian Chaudhry MD;  [facility]    INJECT EPIDURAL LUMBAR Bilateral 2/21/2025    Procedure: Bilateral Lumbar 5-Sacral 1 transforaminal epidural injections utilizing fluoroscopic guidance with contrast dye;  Surgeon: Christian Chaudhry MD;  [facility]    INJECT EPIDURAL TRANSFORAMINAL Bilateral 5/20/2022    Procedure: Bilateral Lumbar 5- Sacral 1 transforaminal Epidural Steroid Injection;  Surgeon: Christian Chaudhry MD;  [facility]    INJECT EPIDURAL TRANSFORAMINAL Bilateral 7/14/2022    Procedure: Left Cervical 5-Cervical 6, Right Cervical 5-Cervical 6, Left Cervical 6-Cervical 7 and Right Cervical 6-Cervical 7 Intra-articular zygopophyseal joint injection utilizing fluoroscopic guidance with contrast dye;  Surgeon: Christian Chuadhry MD;  [facility]    INJECT EPIDURAL TRANSFORAMINAL Bilateral 6/16/2023    Procedure: Bilateral Lumbar 5-Sacral 1 transforaminal epidural injections utilizing fluoroscopic guidance with contrast dye;  Surgeon: Christian Chaudhry MD;  [facility]    INJECT EPIDURAL TRANSFORAMINAL Bilateral 9/28/2023    Procedure: Bilateral Lumbar 5-Sacral 1 transforaminal epidural injections utilizing fluoroscopic guidance with contrast dye;  Surgeon: Christian Chaudhry MD;  [facility]    INJECT EPIDURAL TRANSFORAMINAL Bilateral 3/15/2024    Procedure: Bilateral Lumbar 5 - Sacral 1 transforaminal epidural injections utilizing fluoroscopic guidance with contrast dye.;  Surgeon: Christian Chaudhry MD;  [facility]    INJECT EPIDURAL TRANSFORAMINAL Bilateral 11/27/2024    Procedure: Bilateral Lumbar 5 - Sacral 1 transforaminal epidural injections utilizing fluoroscopic guidance with contrast dye;  Surgeon: Christian Chaudhry MD;  [facility]    INJECT FACET JOINT Bilateral 1/25/2019    Procedure: Cervical Facet Injections Cervical 5-6 and 6-7 Bilateral;  Surgeon:  Christian Chaudhry MD;  [facility]    INJECT FACET JOINT Bilateral 11/29/2019    Procedure: Cervical 5-6 and 6-7 Bilateral facet joint injection;  Surgeon: Christian Chaudhry MD;  [facility]    INJECT FACET JOINT Bilateral 2/11/2021    Procedure: Cervical 5-6 and Cervical 6-7 Bilateral facet injections;  Surgeon: Christian Chaduhry MD;  [facility]    INJECT FACET JOINT Bilateral 10/19/2021    Procedure: Left C5-C6, Right C5-C6, Left C6-C7 and Right C6-C7 Intra-articular zygopophyseal joint injection utilizing fluoroscopic guidance with contrast dye;  Surgeon: Christian Chaudhry MD;  [facility]    INJECT FACET JOINT Bilateral 9/14/2023    Procedure: Left Cervical 5-Cervical 6, Right Cervical 5-Cervical 6, Left Cervical 6-Cervical 7 and Right Cervical 6-Cervical 7 Intra-articular zygopophyseal joint injection utilizing fluoroscopic guidance with contrast dye. Left Intra-articular shoulder Injection;  Surgeon: Christian Chaudhry MD;  [facility]    INJECT FACET JOINT Bilateral 11/15/2024    Procedure: Left Cervical 5-Cervical 6, Right Cervical 5-Cervical 6, Left Cervical 6-Cervical 7 and Right Cervical 6-Cervical 7 Intra-articular zygopophyseal joint injection utilizing fluoroscopic guidance with contrast dye.;  Surgeon: Christian Chaudhry MD;  [facility]    INJECT FACET JOINT Left 6/20/2025    Procedure: left glenohumeral joint injection;  Surgeon: Christian Chaudhry MD;  [facility]    INJECT MAJOR JOINT / BURSA Left 2/21/2025    Procedure: Inject Left Intra-articular hip;  Surgeon: Christian Chaudhry MD;  [facility]    INJECT STEROID (LOCATION) Left 10/19/2021    Procedure: Glenohumeral joint injection left shoulder;  Surgeon: Christian Chaudhry MD;  [facility]    INJECT STEROID (LOCATION) Left 5/20/2022    Procedure: Left shoulder glenohumeral steroid inj with local;  Surgeon: Christian Chaudhry MD;  [facility]    INJECT STEROID (LOCATION) Left 8/8/2024    Procedure: steroid injection left  shoulder;  Surgeon: Christian Chaudhry MD;  [facility]    INJECT STEROID (LOCATION) Left 2025    Procedure: Inject steroid  left shoulder;  Surgeon: Christian Chaudhry MD;  [facility]    INJECT STEROID TROCHANTERIC BURSA Left 2022    Procedure: Left intra-articular shoulder injections;  Surgeon: Christian Chaudhry MD;  [facility]    ORTHOPEDIC SURGERY      Right hand    TONSILLECTOMY      ZZC NONSPECIFIC PROCEDURE      rt hand surgery - laceration/glass/tendons    ZZ ECHO HEART XTHORACIC, STRESS/REST  2009    neg      Allergies   Allergen Reactions    Wasps [Hornets] Swelling    Percocet [Oxycodone-Acetaminophen] Itching      Social History     Tobacco Use    Smoking status: Former     Current packs/day: 0.00     Types: Cigarettes     Quit date: 2007     Years since quittin.3    Smokeless tobacco: Never   Substance Use Topics    Alcohol use: No     Comment: rare      Wt Readings from Last 1 Encounters:   25 113.4 kg (250 lb)        Anesthesia Evaluation   Pt has had prior anesthetic. Type: General and MAC.    No history of anesthetic complications       ROS/MED HX  ENT/Pulmonary:     (+) sleep apnea,               tobacco use,                        Neurologic:       Cardiovascular:     (+) Dyslipidemia - -   -  - -                                 Previous cardiac testing   Echo: Date: 23 Results:  MD jeanette  [phone] 2023     Result Text  [id number]  [id number]  [id number]  [id number]^SMILEY^LEIGH^ERA     [facility]  Echocardiography Laboratory  [address] [place] [place] [address]     Name: JAZZY SIDDIQI  MRN: [id number]  : 1965  Study Date: 2023 11:53 AM  Age: 57 yrs  Gender: Male  Patient Location: [facility]  Reason For Study: SOB (shortness of breath)  History: ROBBY  Hyperlipidemia  Ordering Physician: LEIGH REIS  Referring Physician: JOSE MARIA MATHEWS  Performed By: Judit Alcantar     BSA: 2.4 m2  Height: 72 in  Weight:  264 lb  HR: 73  BP: 114/68 mmHg  ______________________________________________________________________________  Procedure  Complete Echo Adult.  ______________________________________________________________________________  Interpretation Summary     The visual ejection fraction is 60-65%.  The right ventricular systolic function is normal.  No hemodynamically significant valvular disease.  There is no pericardial effusion.    Stress Test:  Date: Results:    ECG Reviewed:  Date: 5-23-24 Results:  SR with Occ PVC  Cath:  Date: Results:      METS/Exercise Tolerance:     Hematologic:  - neg hematologic  ROS     Musculoskeletal: Comment: DDD      GI/Hepatic:     (+) GERD, Asymptomatic on medication,           liver disease,       Renal/Genitourinary:  - neg Renal ROS     Endo:     (+)               Obesity,       Psychiatric/Substance Use:     (+) psychiatric history depression       Infectious Disease:  - neg infectious disease ROS     Malignancy:  - neg malignancy ROS     Other:      (+)  , H/O Chronic Pain,           Physical Exam  Airway  Mallampati: II  TM distance: > 3 FB  Neck ROM: full  Mouth opening: >= 4 cm    Cardiovascular - normal exam  Rhythm: regular     Dental     Pulmonary - normal examBreath sounds clear to auscultation        Neurological - normal exam  He appears awake, alert and oriented x3.    Other Findings       OUTSIDE LABS:  CBC:   Lab Results   Component Value Date    WBC 5.6 01/18/2025    WBC 5.9 03/09/2024    HGB 15.8 01/18/2025    HGB 16.1 03/09/2024    HCT 45.2 01/18/2025    HCT 47.2 06/09/2024     01/18/2025     03/09/2024     BMP:   Lab Results   Component Value Date     01/18/2025     03/09/2024    POTASSIUM 4.5 01/18/2025    POTASSIUM 4.2 03/09/2024    CHLORIDE 106 01/18/2025    CHLORIDE 104 03/09/2024    CO2 24 01/18/2025    CO2 25 03/09/2024    BUN 16.5 01/18/2025    BUN 15.6 03/09/2024    CR 1.00 01/18/2025    CR 0.98 03/09/2024     (H) 01/18/2025  "    (H) 03/09/2024     COAGS:   Lab Results   Component Value Date    PTT 31 06/06/2008    INR 1.0 02/17/2014     POC: No results found for: \"BGM\", \"HCG\", \"HCGS\"  HEPATIC:   Lab Results   Component Value Date    ALBUMIN 4.4 01/18/2025    PROTTOTAL 7.0 01/18/2025    ALT 35 01/18/2025    AST 23 01/18/2025    ALKPHOS 84 01/18/2025    BILITOTAL 0.7 01/18/2025     OTHER:   Lab Results   Component Value Date    PH 8.0 (H) 05/07/2002    A1C 6.4 (H) 01/18/2025    WILIAM 9.4 01/18/2025    PHOS 3.5 02/08/2021    MAG 2.2 02/08/2021    LIPASE 18 11/11/2023    AMYLASE 76 03/08/2014    TSH 3.24 01/18/2025    T4 0.94 11/15/2019    CRP <2.9 03/25/2015    SED 3 06/09/2024       Anesthesia Plan    ASA Status:  2      NPO Status: NPO Appropriate   Anesthesia Type: MAC.  Induction: intravenous.  Maintenance: TIVA.   Techniques and Equipment:       - Monitoring Plan: standard ASA monitoring     Consents    Anesthesia Plan(s) and associated risks, benefits, and realistic alternatives discussed. Questions answered and patient/representative(s) expressed understanding.     - Discussed: CRNA     - Discussed with:  Patient        - Pt is DNR/DNI Status: no DNR          Postoperative Care    Pain management: non-narcotic analgesics, multimodal analgesia.     Comments:    Other Comments: The risks and benefits of anesthesia, and the alternatives where applicable, have been discussed with the patient, and they wish to proceed.                  SHIV Jackman CRNA    I have reviewed the pertinent notes and labs in the chart from the past 30 days and (re)examined the patient.  Any updates or changes from those notes are reflected in this note.    Clinically Significant Risk Factors Present on Admission                             # Obesity: Estimated body mass index is 34.38 kg/m  as calculated from the following:    Height as of 6/18/25: 1.816 m (5' 11.5\").    Weight as of 6/20/25: 113.4 kg (250 lb).                    "

## 2025-06-26 NOTE — ANESTHESIA POSTPROCEDURE EVALUATION
Patient: Bulmaro Herrera    Procedure: Procedure(s):  Bilateral Lumbar 5 - Sacral 1 transforaminal epidural injections utilizing fluoroscopic guidance with contrast dye - Bilateral       Anesthesia Type:  MAC    Note:  Disposition: Outpatient   Postop Pain Control: Uneventful            Sign Out: Well controlled pain   PONV: No   Neuro/Psych: Uneventful            Sign Out: Acceptable/Baseline neuro status   Airway/Respiratory: Uneventful            Sign Out: Acceptable/Baseline resp. status   CV/Hemodynamics: Uneventful            Sign Out: Acceptable CV status   Other NRE: NONE   DID A NON-ROUTINE EVENT OCCUR? No    Event details/Postop Comments:  Pt was happy with anesthesia care.  No complications.  I will follow up with the pt if needed.           Last vitals:  Vitals Value Taken Time   /100 06/26/25 16:00   Temp     Pulse 76 06/26/25 16:00   Resp     SpO2 96 % 06/26/25 16:04   Vitals shown include unfiled device data.    Electronically Signed By: SHIV Jackman CRNA  June 26, 2025  4:04 PM

## 2025-06-26 NOTE — DISCHARGE INSTRUCTIONS

## 2025-06-26 NOTE — ANESTHESIA CARE TRANSFER NOTE
Patient: Bulmaro Herrera    Procedure: Procedure(s):  Bilateral Lumbar 5 - Sacral 1 transforaminal epidural injections utilizing fluoroscopic guidance with contrast dye - Bilateral       Diagnosis: Lumbar radiculopathy [M54.16]  Diagnosis Additional Information: No value filed.    Anesthesia Type:   MAC     Note:    Oropharynx: oropharynx clear of all foreign objects and spontaneously breathing  Level of Consciousness: drowsy  Oxygen Supplementation: nasal cannula    Independent Airway: airway patency satisfactory and stable  Dentition: dentition unchanged  Vital Signs Stable: post-procedure vital signs reviewed and stable  Report to RN Given: handoff report given  Patient transferred to: Phase II    Handoff Report: Identifed the Patient, Identified the Reponsible Provider, Reviewed the pertinent medical history, Discussed the surgical course, Reviewed Intra-OP anesthesia mangement and issues during anesthesia, Set expectations for post-procedure period and Allowed opportunity for questions and acknowledgement of understanding      Vitals:  Vitals Value Taken Time   /100 06/26/25 16:00   Temp     Pulse 76 06/26/25 16:00   Resp     SpO2 96 % 06/26/25 16:03   Vitals shown include unfiled device data.    Electronically Signed By: SHIV Jackman CRNA  June 26, 2025  4:04 PM

## 2025-06-26 NOTE — OP NOTE
CHIEF COMPLAINT:   1. neck pain secondary to cervical spondylosis.  Doing better after intra-articular facet injections.  2.  Left shoulder pain secondary to glenohumeral joint degeneration and a left shoulder labral tear.  This is also improved after his left shoulder was injected.  3.  Low back pain with severe disc degeneration at L5-S1 causing back pain and leg pains  4.  Left hip pain resulting in generalized left groin pain and buttock pain     Bulmaro gets high-grade pain relief from these injections but they last for about 3 months with >75% pain relief and then the pain comes back abruptly  He is requesting to have this repeated again.               PROCEDURE   1.  Bilateral L5-S1 transforaminal epidural injections utilizing fluoroscopic guidance with contrast dye.      PROCEDURE DETAILS: After written informed consent was obtained from the patient, the patient was escorted to the procedure room.  The patient was placed in the sitting position.  A  time out  was conducted to verify the patient identity, procedure to be performed, side, site, allergies and any special requirements.  The skin over the neck was prepped and draped in normal sterile fashion. Fluoroscopy was used to identify the zygopophyseal joint with a lateral view.   The skin was anesthetized with 0.5 mL of 1% lidocaine with bicarbonate buffer.  2 separate 22-gauge Quincke needles were advanced into the foraminal openings from the oblique views and walked into the posterior aspect of the foramen at L5-S1 in the lateral fluoroscopic image.  In the AP image Omnipaque contrast was injected which showed proper spread in the epidural space no evidence of any intravascular, intrathecal, intraneural injection.  I then injected 20 mg of Kenalog with 3 cc of preservative-free normal saline into each foraminal opening with good dispersion into his epidural space.     Blood loss: 0 cc       DIAGNOSIS:  1.  Cervical spondylosis secondary to a symptomatic  facet syndrome causing axial neck pain with a history of long-term pain relief from previous facet injections  2.  Severe needle phobia  3.  Severe disc degeneration at L5-S1.  With history of long-term benefit from previous epidural injections.  4.  Left hip osteoarthritis moderate        PLAN:  Performed a repeat bilateral L5-S1 epidural injections.  He really does not want to have low back surgery however he has severe degeneration at L5-S1 causing lumbar radiculopathy as well as axial back pain.  Follow-up with me on an as-needed basis.     Christian Chaudhry MD  Diplomate of the American Board of Anesthesiology, Pain Medicine

## 2025-07-20 ENCOUNTER — MYC REFILL (OUTPATIENT)
Dept: INTERNAL MEDICINE | Facility: CLINIC | Age: 60
End: 2025-07-20
Payer: COMMERCIAL

## 2025-07-20 DIAGNOSIS — M16.12 PRIMARY OSTEOARTHRITIS OF LEFT HIP: ICD-10-CM

## 2025-07-21 RX ORDER — TRAMADOL HYDROCHLORIDE 50 MG/1
50 TABLET ORAL 2 TIMES DAILY PRN
Qty: 12 TABLET | Refills: 0 | Status: SHIPPED | OUTPATIENT
Start: 2025-07-21

## 2025-07-24 ENCOUNTER — OFFICE VISIT (OUTPATIENT)
Dept: INTERNAL MEDICINE | Facility: CLINIC | Age: 60
End: 2025-07-24
Payer: COMMERCIAL

## 2025-07-24 ENCOUNTER — MYC MEDICAL ADVICE (OUTPATIENT)
Dept: INTERNAL MEDICINE | Facility: CLINIC | Age: 60
End: 2025-07-24

## 2025-07-24 ENCOUNTER — HOSPITAL ENCOUNTER (OUTPATIENT)
Dept: GENERAL RADIOLOGY | Facility: CLINIC | Age: 60
Discharge: HOME | End: 2025-07-24
Attending: INTERNAL MEDICINE
Payer: COMMERCIAL

## 2025-07-24 VITALS
SYSTOLIC BLOOD PRESSURE: 138 MMHG | OXYGEN SATURATION: 96 % | HEART RATE: 89 BPM | TEMPERATURE: 98.1 F | HEIGHT: 72 IN | DIASTOLIC BLOOD PRESSURE: 90 MMHG | WEIGHT: 248.9 LBS | RESPIRATION RATE: 21 BRPM | BODY MASS INDEX: 33.71 KG/M2

## 2025-07-24 DIAGNOSIS — R10.84 ABDOMINAL PAIN, GENERALIZED: ICD-10-CM

## 2025-07-24 DIAGNOSIS — H81.13 BENIGN PAROXYSMAL POSITIONAL VERTIGO, BILATERAL: ICD-10-CM

## 2025-07-24 DIAGNOSIS — R10.84 ABDOMINAL PAIN, GENERALIZED: Primary | ICD-10-CM

## 2025-07-24 LAB
ALBUMIN SERPL BCG-MCNC: 4.4 G/DL (ref 3.5–5.2)
ALP SERPL-CCNC: 93 U/L (ref 40–150)
ALT SERPL W P-5'-P-CCNC: 49 U/L (ref 0–70)
ANION GAP SERPL CALCULATED.3IONS-SCNC: 13 MMOL/L (ref 7–15)
AST SERPL W P-5'-P-CCNC: 32 U/L (ref 0–45)
BILIRUB SERPL-MCNC: 0.7 MG/DL
BUN SERPL-MCNC: 15.1 MG/DL (ref 6–20)
CALCIUM SERPL-MCNC: 9.7 MG/DL (ref 8.8–10.4)
CHLORIDE SERPL-SCNC: 100 MMOL/L (ref 98–107)
CREAT SERPL-MCNC: 1.12 MG/DL (ref 0.67–1.17)
EGFRCR SERPLBLD CKD-EPI 2021: 76 ML/MIN/1.73M2
ERYTHROCYTE [DISTWIDTH] IN BLOOD BY AUTOMATED COUNT: 12.5 % (ref 10–15)
GLUCOSE SERPL-MCNC: 133 MG/DL (ref 70–99)
HCO3 SERPL-SCNC: 24 MMOL/L (ref 22–29)
HCT VFR BLD AUTO: 46.3 % (ref 40–53)
HGB BLD-MCNC: 16.2 G/DL (ref 13.3–17.7)
MCH RBC QN AUTO: 32.1 PG (ref 26.5–33)
MCHC RBC AUTO-ENTMCNC: 35 G/DL (ref 31.5–36.5)
MCV RBC AUTO: 92 FL (ref 78–100)
PLATELET # BLD AUTO: 213 10E3/UL (ref 150–450)
POTASSIUM SERPL-SCNC: 4.4 MMOL/L (ref 3.4–5.3)
PROT SERPL-MCNC: 7.4 G/DL (ref 6.4–8.3)
RBC # BLD AUTO: 5.05 10E6/UL (ref 4.4–5.9)
SODIUM SERPL-SCNC: 137 MMOL/L (ref 135–145)
WBC # BLD AUTO: 7 10E3/UL (ref 4–11)

## 2025-07-24 PROCEDURE — 74018 RADEX ABDOMEN 1 VIEW: CPT

## 2025-07-24 RX ORDER — HYOSCYAMINE SULFATE 0.12 MG/1
0.12 TABLET SUBLINGUAL EVERY 4 HOURS PRN
Qty: 10 TABLET | Refills: 0 | Status: SHIPPED | OUTPATIENT
Start: 2025-07-24

## 2025-07-24 RX ORDER — MECLIZINE HYDROCHLORIDE 25 MG/1
25 TABLET ORAL 3 TIMES DAILY PRN
Qty: 60 TABLET | Refills: 0 | Status: SHIPPED | OUTPATIENT
Start: 2025-07-24

## 2025-07-24 ASSESSMENT — PAIN SCALES - GENERAL: PAINLEVEL_OUTOF10: MODERATE PAIN (6)

## 2025-07-24 NOTE — PROGRESS NOTES
"  {PROVIDER CHARTING PREFERENCE:[id number]}    Subjective   Bulmaro is a 60 year old, presenting for the following health issues:  Gastrointestinal Problem and Dizziness        7/24/2025     2:21 PM   Additional Questions   Roomed by Isma     History of Present Illness       Reason for visit:  Gastrointestinal Problem/ dizziness  Symptom onset:  1-2 weeks ago  Symptoms include:  Bloating, abdominal pain, dizziness  Symptom intensity:  Severe  Symptom progression:  Worsening  Had these symptoms before:  No  What makes it worse:  Eating  What makes it better:  Nothing has seemed to help lately   He is taking medications regularly.        {MA/LPN/RN Pre-Provider Visit Orders- hCG/UA/Strep (Optional):465718}  {SUPERLIST (Optional):378291}  {additonal problems for provider to add (Optional):848189}    {ROS Picklists (Optional):879914}      Objective    BP (!) 138/90 (BP Location: Left arm, Patient Position: Sitting, Cuff Size: Adult Large)   Pulse 89   Temp 98.1  F (36.7  C) (Temporal)   Resp 21   Ht 1.816 m (5' 11.5\")   Wt 112.9 kg (248 lb 14.4 oz)   SpO2 96%   BMI 34.23 kg/m    Body mass index is 34.23 kg/m .  Physical Exam   {Exam List (Optional):065631}    {Diagnostic Test Results (Optional):589881}        Signed Electronically by: Gustavo Farmer DO  {Email feedback regarding this note to [email or web address]   :[id number]}  "

## 2025-08-04 ENCOUNTER — MYC MEDICAL ADVICE (OUTPATIENT)
Dept: INTERNAL MEDICINE | Facility: CLINIC | Age: 60
End: 2025-08-04
Payer: COMMERCIAL

## 2025-08-04 DIAGNOSIS — R73.09 ELEVATED GLUCOSE: Primary | ICD-10-CM

## 2025-08-04 RX ORDER — LANCETS
EACH MISCELLANEOUS
Qty: 100 EACH | Refills: 6 | Status: SHIPPED | OUTPATIENT
Start: 2025-08-04

## (undated) DEVICE — SYR 10ML LL W/O NDL

## (undated) DEVICE — SU MONOCRYL 4-0 PS-2 18" UND Y496G

## (undated) DEVICE — GLOVE PROTEXIS W/NEU-THERA 7.5  2D73TE75

## (undated) DEVICE — DRSG STERI STRIP 1/2X4" R1547

## (undated) DEVICE — TUBING EXTENSION SET MICROBORE 21CM LL 6N8374

## (undated) DEVICE — DRSG KERLIX 4 1/2"X4YDS ROLL 6730

## (undated) DEVICE — TRAY EPDRL 3.5IN 17GA 19GA PRFX BPA DEHP 332079

## (undated) DEVICE — DRAPE LAP W/ARMBOARD 29410

## (undated) DEVICE — SYR 05ML LL W/O NDL

## (undated) DEVICE — SOL WATER IRRIG 1000ML BOTTLE 07139-09

## (undated) DEVICE — TRAY PROCEDURE SUPPORT PAIN MANAGEMENT 332114

## (undated) DEVICE — NDL SPINAL 22GA 3.5" QUINCKE 405181

## (undated) DEVICE — PREP CHLORAPREP 26ML TINTED ORANGE  260815

## (undated) DEVICE — NDL SPINAL 25GA 3.5" QUINCKE 405180

## (undated) DEVICE — NEEDLE SPINAL DISP 22GA X 5" QUINCKE 3333355

## (undated) DEVICE — TUBING IV EXTENSION SET 34"

## (undated) DEVICE — TUBING SUCTION 12"X1/4" N612

## (undated) DEVICE — TRAY SINGLE DOSE EPIDURAL ANESTHESIA

## (undated) DEVICE — KIT ENDO TURNOVER/PROCEDURE CARRY-ON 101822

## (undated) DEVICE — SYR BULB IRRIG DOVER 60 ML LATEX FREE 67000

## (undated) DEVICE — SU VICRYL 3-0 SH 27" UND J416H

## (undated) DEVICE — SYR 10ML FINGER CONTROL W/O NDL 309695

## (undated) DEVICE — NDL COUNTER 10CT

## (undated) DEVICE — PREP CHLORAPREP ORANGE 3ML  260415

## (undated) DEVICE — GLOVE BIOGEL PI ULTRATOUCH G SZ 7.5 42175

## (undated) DEVICE — SYR 10ML PERFIX LL 332152

## (undated) DEVICE — NDL SPINAL 22GA 5" QUINCKE 405148

## (undated) DEVICE — SYR 03ML LL W/O NDL

## (undated) DEVICE — NDL COUNTER 20CT 31142493

## (undated) DEVICE — DRSG BANDAID 1X3" FABRIC

## (undated) DEVICE — PACK MINOR PROCEDURE CUSTOM

## (undated) DEVICE — DRSG PRIMAPORE 02X3" 7133

## (undated) DEVICE — DRSG PRIMAPORE 03 1/8X6" 66000318

## (undated) DEVICE — PREP DURAPREP 26ML APL 8630

## (undated) DEVICE — NDL ECLIPSE 18GA 1.5"

## (undated) DEVICE — GLOVE EXAM NITRILE LG

## (undated) DEVICE — BLADE KNIFE SURG 15 371115

## (undated) DEVICE — ESU ELEC BLADE HEX-LOCKING 2.5" E1450X

## (undated) DEVICE — LUBRICATING JELLY 4.25OZ

## (undated) DEVICE — SOL ADH LIQUID BENZOIN SWAB 0.6ML C1544

## (undated) DEVICE — DRSG GAUZE 4X4" 3033

## (undated) DEVICE — SUCTION MANIFOLD NEPTUNE 2 SYS 4 PORT 0702-020-000

## (undated) DEVICE — NEEDLE SPINAL DISP 22GA X 3.5" QUINCKE 333320

## (undated) DEVICE — SOL NACL 0.9% IRRIG 1000ML BOTTLE 07138-09

## (undated) DEVICE — DRAPE LAP TRANSVERSE 29421

## (undated) DEVICE — ADH SKIN CLOSURE PREMIERPRO EXOFIN 1.0ML 3470

## (undated) RX ORDER — FENTANYL CITRATE 50 UG/ML
INJECTION, SOLUTION INTRAMUSCULAR; INTRAVENOUS
Status: DISPENSED
Start: 2025-06-20

## (undated) RX ORDER — PROPOFOL 10 MG/ML
INJECTION, EMULSION INTRAVENOUS
Status: DISPENSED
Start: 2021-10-19

## (undated) RX ORDER — LIDOCAINE HYDROCHLORIDE 10 MG/ML
INJECTION, SOLUTION EPIDURAL; INFILTRATION; INTRACAUDAL; PERINEURAL
Status: DISPENSED
Start: 2025-06-20

## (undated) RX ORDER — LIDOCAINE HYDROCHLORIDE 20 MG/ML
INJECTION, SOLUTION EPIDURAL; INFILTRATION; INTRACAUDAL; PERINEURAL
Status: DISPENSED
Start: 2018-12-07

## (undated) RX ORDER — ONDANSETRON 2 MG/ML
INJECTION INTRAMUSCULAR; INTRAVENOUS
Status: DISPENSED
Start: 2022-04-15

## (undated) RX ORDER — LIDOCAINE HYDROCHLORIDE 20 MG/ML
INJECTION, SOLUTION EPIDURAL; INFILTRATION; INTRACAUDAL; PERINEURAL
Status: DISPENSED
Start: 2022-04-15

## (undated) RX ORDER — LIDOCAINE HYDROCHLORIDE 10 MG/ML
INJECTION, SOLUTION EPIDURAL; INFILTRATION; INTRACAUDAL; PERINEURAL
Status: DISPENSED
Start: 2023-06-16

## (undated) RX ORDER — GLYCOPYRROLATE 0.2 MG/ML
INJECTION, SOLUTION INTRAMUSCULAR; INTRAVENOUS
Status: DISPENSED
Start: 2025-06-20

## (undated) RX ORDER — BUPIVACAINE HYDROCHLORIDE AND EPINEPHRINE 2.5; 5 MG/ML; UG/ML
INJECTION, SOLUTION EPIDURAL; INFILTRATION; INTRACAUDAL; PERINEURAL
Status: DISPENSED
Start: 2022-04-15

## (undated) RX ORDER — LIDOCAINE HYDROCHLORIDE 10 MG/ML
INJECTION, SOLUTION EPIDURAL; INFILTRATION; INTRACAUDAL; PERINEURAL
Status: DISPENSED
Start: 2023-05-22

## (undated) RX ORDER — PROPOFOL 10 MG/ML
INJECTION, EMULSION INTRAVENOUS
Status: DISPENSED
Start: 2018-12-07

## (undated) RX ORDER — DEXMEDETOMIDINE HYDROCHLORIDE 100 UG/ML
INJECTION, SOLUTION INTRAVENOUS
Status: DISPENSED
Start: 2021-10-19

## (undated) RX ORDER — METOPROLOL TARTRATE 50 MG
TABLET ORAL
Status: DISPENSED
Start: 2021-03-10

## (undated) RX ORDER — BACITRACIN ZINC 500 [USP'U]/G
OINTMENT TOPICAL
Status: DISPENSED
Start: 2018-12-07

## (undated) RX ORDER — LIDOCAINE HYDROCHLORIDE 10 MG/ML
INJECTION, SOLUTION EPIDURAL; INFILTRATION; INTRACAUDAL; PERINEURAL
Status: DISPENSED
Start: 2024-08-16

## (undated) RX ORDER — PROPOFOL 10 MG/ML
INJECTION, EMULSION INTRAVENOUS
Status: DISPENSED
Start: 2024-08-08

## (undated) RX ORDER — FENTANYL CITRATE 50 UG/ML
INJECTION, SOLUTION INTRAMUSCULAR; INTRAVENOUS
Status: DISPENSED
Start: 2022-04-15

## (undated) RX ORDER — FENTANYL CITRATE 50 UG/ML
INJECTION, SOLUTION INTRAMUSCULAR; INTRAVENOUS
Status: DISPENSED
Start: 2018-12-07

## (undated) RX ORDER — LIDOCAINE HYDROCHLORIDE 10 MG/ML
INJECTION, SOLUTION EPIDURAL; INFILTRATION; INTRACAUDAL; PERINEURAL
Status: DISPENSED
Start: 2019-01-25

## (undated) RX ORDER — KETOROLAC TROMETHAMINE 30 MG/ML
INJECTION, SOLUTION INTRAMUSCULAR; INTRAVENOUS
Status: DISPENSED
Start: 2018-12-07

## (undated) RX ORDER — FENTANYL CITRATE 50 UG/ML
INJECTION, SOLUTION INTRAMUSCULAR; INTRAVENOUS
Status: DISPENSED
Start: 2025-06-26

## (undated) RX ORDER — FENTANYL CITRATE 50 UG/ML
INJECTION, SOLUTION INTRAMUSCULAR; INTRAVENOUS
Status: DISPENSED
Start: 2017-12-29

## (undated) RX ORDER — NITROGLYCERIN 0.4 MG/1
TABLET SUBLINGUAL
Status: DISPENSED
Start: 2021-03-10

## (undated) RX ORDER — EPINEPHRINE 1 MG/ML
INJECTION, SOLUTION INTRAMUSCULAR; SUBCUTANEOUS
Status: DISPENSED
Start: 2022-04-15

## (undated) RX ORDER — LIDOCAINE HYDROCHLORIDE 10 MG/ML
INJECTION, SOLUTION EPIDURAL; INFILTRATION; INTRACAUDAL; PERINEURAL
Status: DISPENSED
Start: 2023-06-08